# Patient Record
Sex: FEMALE | Race: WHITE | NOT HISPANIC OR LATINO | Employment: OTHER | URBAN - METROPOLITAN AREA
[De-identification: names, ages, dates, MRNs, and addresses within clinical notes are randomized per-mention and may not be internally consistent; named-entity substitution may affect disease eponyms.]

---

## 2017-09-22 ENCOUNTER — THERAPY VISIT (OUTPATIENT)
Dept: PHYSICAL THERAPY | Facility: CLINIC | Age: 37
End: 2017-09-22
Payer: OTHER MISCELLANEOUS

## 2017-09-22 DIAGNOSIS — M25.572 ACUTE LEFT ANKLE PAIN: ICD-10-CM

## 2017-09-22 DIAGNOSIS — M25.562 ACUTE PAIN OF LEFT KNEE: Primary | ICD-10-CM

## 2017-09-22 PROCEDURE — 97161 PT EVAL LOW COMPLEX 20 MIN: CPT | Mod: GP | Performed by: PHYSICAL THERAPIST

## 2017-09-22 PROCEDURE — 97110 THERAPEUTIC EXERCISES: CPT | Mod: GP | Performed by: PHYSICAL THERAPIST

## 2017-09-22 ASSESSMENT — ACTIVITIES OF DAILY LIVING (ADL)
STIFFNESS: THE SYMPTOM AFFECTS MY ACTIVITY SEVERELY
GO DOWN STAIRS: ACTIVITY IS FAIRLY DIFFICULT
WEAKNESS: THE SYMPTOM AFFECTS MY ACTIVITY SEVERELY
KNEE_ACTIVITY_OF_DAILY_LIVING_SCORE: 24.29
RISE FROM A CHAIR: ACTIVITY IS VERY DIFFICULT
SQUAT: ACTIVITY IS SOMEWHAT DIFFICULT
KNEEL ON THE FRONT OF YOUR KNEE: ACTIVITY IS VERY DIFFICULT
PAIN: THE SYMPTOM AFFECTS MY ACTIVITY SEVERELY
STAND: I AM UNABLE TO DO THE ACTIVITY
SWELLING: THE SYMPTOM AFFECTS MY ACTIVITY SLIGHTLY
LIMPING: THE SYMPTOM AFFECTS MY ACTIVITY SEVERELY
GIVING WAY, BUCKLING OR SHIFTING OF KNEE: THE SYMPTOM PREVENTS ME FROM ALL DAILY ACTIVITIES
GO UP STAIRS: ACTIVITY IS VERY DIFFICULT
KNEE_ACTIVITY_OF_DAILY_LIVING_SUM: 17
WALK: ACTIVITY IS VERY DIFFICULT
RAW_SCORE: 17
SIT WITH YOUR KNEE BENT: ACTIVITY IS VERY DIFFICULT

## 2017-09-22 NOTE — PROGRESS NOTES
Subjective:    Patient is a 37 year old female presenting with rehab left ankle/foot hpi.          Knee Activity of Daily Living Score: 24.29            Objective:    System    Physical Exam    General     ROS    Assessment/Plan:

## 2017-09-22 NOTE — LETTER
[facility]  [address] [address] [place] [id number]  [phone]    2017    Re: Yenifer Maher   :   1980  MRN:  [id number]   REFERRING PHYSICIAN:   Elisha Bernabe PA-C    [facility]  Date of Initial Evaluation:  17  Visits:  Rxs Used: 1  Reason for Referral:     Acute pain of left knee  Acute left ankle pain    EVALUATION SUMMARY  Subjective:  Patient is a 37 year old female presenting with rehab left knee hpi and rehab left ankle/foot hpi.   Yenifer Maher is a 37 year old female with a left knee condition.  Condition occurred with:  A fall/slip.  Condition occurred: at work.  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 Pt slipped and fell at work on 17, landed directly on L knee and twisting L ankle. Pt recalls slipping on wet floor in kitchen. Pt having difficulty with all weight bearing at this time, particularly squatting and ascending/descending stairs. Pt had MD appointment on 17 and referred to PT. .    Patient reports pain:  Anterior and in the joint.  Radiates to:  Thigh and lower leg.  Pain is described as sharp and aching  and reported as 6/10.  Associated symptoms:  Loss of motion/stiffness and loss of strength. Pain is the same all the time (activity dependent).  Symptoms are exacerbated by ascending stairs, descending stairs, walking, activity, weight bearing and bending/squatting and relieved by analgesics, NSAID's and ice.  Since onset symptoms are gradually improving.  Special tests:  X-ray.  Previous treatment: none.    General health as reported by patient is good.                  Barriers include:  None as reported by the patient.    Red flags:  None as reported by the patient.    Yenifer Maher is a 37 year old female with a left ankle condition.  Condition occurred with:  A fall/slip.  Condition occurred: at work.       Patient reports pain:  Anterior.  Radiates to:   No radiation.  Pain is described as aching and sharp (throbbing) and is intermittent and reported as 6/10.  Associated symptoms:  Loss of motion/stiffness and edema. Pain is worse in the P.M..  Symptoms are exacerbated by weight bearing, ascending stairs, descending stairs, kneeling and walking and relieved by ice and NSAID's.  Since onset symptoms are gradually improving.  Special tests:  X-ray.  Previous treatment: none.    General health as reported by patient is good.                  Barriers include:  None as reported by patient.    Red flags:  None as reported by patient.                              Re: Yenifer MANZO Dallinclaudia   :   1980  Objective:  Gait:    Gait Type:  Antalgic     Deviations:  Knee:  Knee flexion decr LAnkle:  Heel strike decr L and push off decr L    Ankle/Foot Evaluation  ROM:    AROM:    Dorsiflexion:  Left:   5  Right:   10  Plantarflexion:  Left:  55    Right:  55  Inversion:  Left:  75%     Right:  WNL  Eversion:  75%     Right:  WNL  Strength:    Dorsiflexion:  Left: 4+/5       Right: 5/5     Plantarflexion: Left: 4-/5      Right: 5/5    Inversion:Left: 4/5       Right: 5/5    Eversion:Left: 4/5    Right: 5/5    LIGAMENT TESTING: normal  PALPATION:   Left ankle tenderness present at:  posterior talofibular ligament and anterior tibiofibular ligament  Left ankle tenderness not present at:   anterior talofibular ligament or calcaneofibular ligament  EDEMA:   Left ankle edema present at: 48 cm  Right ankle edema present at:  48 cm  Figure 8 left: 48 cmFigure 8 right: 48 cm       Knee Evaluation:  ROM:  Strength wnl knee: glute med 4/5 L 4+/5 R, glute max 4/5 L 4+/5 R.  AROM  Hyperextension:  Left:  3    Right: 3  Extension:  Left: 0    Right:  0  Flexion: Left: 130    Right: 35  Strength:   Quad Set Left: Good     Quad Set Right: WNL      Ligament Testing:  Ligament testing knee: unable to assess secondary to increased muscle guarding.  Special Tests: Special test for knee: unable to  assess secondary to increased muscle guarding.  Palpation:    Left knee tenderness present at:  Patellar Tendon; Patellar Superior and Patellar Inferior  Left knee tenderness not present at:  Medial Joint Line and Lateral Joint Line  Edema:    Circumference:  Joint Line:  Left:  43 cm   Right:  43 cm  Functional Testing:    Quad:    Single Leg Squat:  Left:      Right:        Bilateral Leg Squat:   Moderate loss of control and excessive anterior knee excursion    Proprioception:   Stork Balance Test:  Left:  EO x 2 sec  Right:  EO x 10 sec  % of Uninvolved:             Re: Yenifer Maher   :   1980    [facility] Initial Evaluation  Assessment/Plan:    Patient is a 37 year old female with left side knee and left side ankle complaints.    Patient has the following significant findings with corresponding treatment plan.                Diagnosis 1:  L knee pain  Pain -  hot/cold therapy, manual therapy, self management, education and home program  Decreased ROM/flexibility - manual therapy, therapeutic exercise, therapeutic activity and home program  Decreased strength - therapeutic exercise, therapeutic activities and home program  Impaired balance - neuro re-education, therapeutic activities and home program  Impaired muscle performance - neuro re-education and home program  Decreased function - therapeutic activities and home program  Diagnosis 2:  L ankle pain   Pain -  hot/cold therapy, manual therapy, self management, education and home program  Decreased ROM/flexibility - manual therapy, therapeutic exercise, therapeutic activity and home program  Decreased strength - therapeutic exercise, therapeutic activities and home program  Impaired balance - neuro re-education, therapeutic activities and home program  Impaired muscle performance - neuro re-education and home program  Decreased function - therapeutic activities and home program    Therapy Evaluation Codes:   1) History comprised  of:   Personal factors that impact the plan of care:      None.    Comorbidity factors that impact the plan of care are:      Smoking.     Medications impacting care: None.  2) Examination of Body Systems comprised of:   Body structures and functions that impact the plan of care:      Ankle and Knee.   Activity limitations that impact the plan of care are:      Bending, Squatting/kneeling, Stairs, Standing and Walking.  3) Clinical presentation characteristics are:   Stable/Uncomplicated.  4) Decision-Making    Low complexity using standardized patient assessment instrument and/or measureable assessment of functional outcome.  Cumulative Therapy Evaluation is: Low complexity.  Previous and current functional limitations:  (See Goal Flow Sheet for this information)    Short term and Long term goals: (See Goal Flow Sheet for this information)     Communication ability:  Patient appears to be able to clearly communicate and understand verbal and written communication and follow directions correctly.  Treatment Explanation - The following has been discussed with the patient:     RX ordered/plan of care  Anticipated outcomes  Possible risks and side effects  This patient would benefit from PT intervention to resume normal activities.   Rehab potential is good.            Re: Yenifer Maher   :   1980    Frequency:  1 X week, once daily  Duration:  for 6 weeks  Discharge Plan:  Achieve all LTG.  Independent in home treatment program.  Return to previous functional level by discharge.  Reach maximal therapeutic benefit.  Knee Activity of Daily Living Score: 24.29              Thank you for your referral.    INQU[facility]IES  Therapist: TAIWO Cruz [address] [address]  Phone: [phone]  Fax: [phone]

## 2017-09-22 NOTE — PROGRESS NOTES
Subjective:    Patient is a 37 year old female presenting with rehab left ankle/foot hpi.                                      Pertinent medical history includes:  Smoking.    Other surgeries include:  Other.        Primary job tasks include:  Repetitive tasks, prolonged standing and lifting.                   Knee Activity of Daily Living Score: 24.29            Objective:    System    Physical Exam    General     ROS    Assessment/Plan:

## 2017-09-22 NOTE — PROGRESS NOTES
Subjective:    Patient is a 37 year old female presenting with rehab left knee hpi and rehab left ankle/foot hpi.   Yenifer Maher is a 37 year old female with a left knee condition.  Condition occurred with:  A fall/slip.  Condition occurred: at work.  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 Pt slipped and fell at work on 8/20/17, landed directly on L knee and twisting L ankle. Pt recalls slipping on wet floor in kitchen. Pt having difficulty with all weight bearing at this time, particularly squatting and ascending/descending stairs. Pt had MD appointment on 9/12/17 and referred to PT. .    Patient reports pain:  Anterior and in the joint.  Radiates to:  Thigh and lower leg.  Pain is described as sharp and aching  and reported as 6/10.  Associated symptoms:  Loss of motion/stiffness and loss of strength. Pain is the same all the time (activity dependent).  Symptoms are exacerbated by ascending stairs, descending stairs, walking, activity, weight bearing and bending/squatting and relieved by analgesics, NSAID's and ice.  Since onset symptoms are gradually improving.  Special tests:  X-ray.  Previous treatment: none.    General health as reported by patient is good.                  Barriers include:  None as reported by the patient.    Red flags:  None as reported by the patient.      Yenifer Maher is a 37 year old female with a left ankle condition.  Condition occurred with:  A fall/slip.  Condition occurred: at work.       Patient reports pain:  Anterior.  Radiates to:  No radiation.  Pain is described as aching and sharp (throbbing) and is intermittent and reported as 6/10.  Associated symptoms:  Loss of motion/stiffness and edema. Pain is worse in the P.M..  Symptoms are exacerbated by weight bearing, ascending stairs, descending stairs, kneeling and walking and relieved by ice and NSAID's.  Since onset symptoms are gradually improving.  Special tests:  X-ray.   Previous treatment: none.    General health as reported by patient is good.                  Barriers include:  None as reported by patient.    Red flags:  None as reported by patient.                        Objective:      Gait:    Gait Type:  Antalgic     Deviations:  Knee:  Knee flexion decr LAnkle:  Heel strike decr L and push off decr L          Ankle/Foot Evaluation  ROM:    AROM:    Dorsiflexion:  Left:   5  Right:   10  Plantarflexion:  Left:  55    Right:  55  Inversion:  Left:  75%     Right:  WNL  Eversion:  75%     Right:  WNL        Strength:    Dorsiflexion:  Left: 4+/5     Pain:   Right: 5/5   Pain:  Plantarflexion: Left: 4-/5   Pain:   Right: 5/5  Pain:  Inversion:Left: 4/5  Pain:     Right: 5/5  Pain:  Eversion:Left: 4/5  Pain:  Right: 5/5  Pain:                  LIGAMENT TESTING: normal                PALPATION:   Left ankle tenderness present at:  posterior talofibular ligament and anterior tibiofibular ligament  Left ankle tenderness not present at:   anterior talofibular ligament or calcaneofibular ligament    EDEMA:   Left ankle edema present at: 48 cm  Right ankle edema present at:  48 cm      Figure 8 left: 48 cmFigure 8 right: 48 cm                                                Knee Evaluation:  ROM:  Strength wnl knee: glute med 4/5 L 4+/5 R, glute max 4/5 L 4+/5 R.  AROM    Hyperextension:  Left:  3    Right: 3  Extension:  Left: 0    Right:  0  Flexion: Left: 130    Right: 35        Strength:         Quad Set Left: Good    Pain:   Quad Set Right: WNL    Pain:  Ligament Testing:  Ligament testing knee: unable to assess secondary to increased muscle guarding.                Special Tests: Special test for knee: unable to assess secondary to increased muscle guarding.      Palpation:    Left knee tenderness present at:  Patellar Tendon; Patellar Superior and Patellar Inferior  Left knee tenderness not present at:  Medial Joint Line and Lateral Joint Line    Edema:     Circumference:      Joint Line:  Left:  43 cm   Right:  43 cm      Functional Testing:          Quad:    Single Leg Squat:  Left:      Right:        Bilateral Leg Squat:   Moderate loss of control and excessive anterior knee excursion      Proprioception:   Stork Balance Test:  Left:  EO x 2 sec  Right:  EO x 10 sec  % of Uninvolved:           [facility]    [facility] Initial Evaluation    Assessment/Plan:      Patient is a 37 year old female with left side knee and left side ankle complaints.    Patient has the following significant findings with corresponding treatment plan.                Diagnosis 1:  L knee pain  Pain -  hot/cold therapy, manual therapy, self management, education and home program  Decreased ROM/flexibility - manual therapy, therapeutic exercise, therapeutic activity and home program  Decreased strength - therapeutic exercise, therapeutic activities and home program  Impaired balance - neuro re-education, therapeutic activities and home program  Impaired muscle performance - neuro re-education and home program  Decreased function - therapeutic activities and home program  Diagnosis 2:  L ankle pain   Pain -  hot/cold therapy, manual therapy, self management, education and home program  Decreased ROM/flexibility - manual therapy, therapeutic exercise, therapeutic activity and home program  Decreased strength - therapeutic exercise, therapeutic activities and home program  Impaired balance - neuro re-education, therapeutic activities and home program  Impaired muscle performance - neuro re-education and home program  Decreased function - therapeutic activities and home program    Therapy Evaluation Codes:   1) History comprised of:   Personal factors that impact the plan of care:      None.    Comorbidity factors that impact the plan of care are:      Smoking.     Medications impacting care: None.  2) Examination of Body Systems comprised of:   Body structures and  functions that impact the plan of care:      Ankle and Knee.   Activity limitations that impact the plan of care are:      Bending, Squatting/kneeling, Stairs, Standing and Walking.  3) Clinical presentation characteristics are:   Stable/Uncomplicated.  4) Decision-Making    Low complexity using standardized patient assessment instrument and/or measureable assessment of functional outcome.  Cumulative Therapy Evaluation is: Low complexity.    Previous and current functional limitations:  (See Goal Flow Sheet for this information)    Short term and Long term goals: (See Goal Flow Sheet for this information)     Communication ability:  Patient appears to be able to clearly communicate and understand verbal and written communication and follow directions correctly.  Treatment Explanation - The following has been discussed with the patient:   RX ordered/plan of care  Anticipated outcomes  Possible risks and side effects  This patient would benefit from PT intervention to resume normal activities.   Rehab potential is good.    Frequency:  1 X week, once daily  Duration:  for 6 weeks  Discharge Plan:  Achieve all LTG.  Independent in home treatment program.  Return to previous functional level by discharge.  Reach maximal therapeutic benefit.    Please refer to the daily flowsheet for treatment today, total treatment time and time spent performing 1:1 timed codes.         Initial evaluation equal to discharge summary as pt has not returned for follow up appointments.

## 2017-11-06 PROBLEM — M25.562 ACUTE PAIN OF LEFT KNEE: Status: RESOLVED | Noted: 2017-09-22 | Resolved: 2017-11-06

## 2017-11-06 PROBLEM — M25.572 ACUTE LEFT ANKLE PAIN: Status: RESOLVED | Noted: 2017-09-22 | Resolved: 2017-11-06

## 2018-08-23 ENCOUNTER — TRANSFERRED RECORDS (OUTPATIENT)
Dept: HEALTH INFORMATION MANAGEMENT | Facility: CLINIC | Age: 38
End: 2018-08-23

## 2018-08-24 ENCOUNTER — TRANSFERRED RECORDS (OUTPATIENT)
Dept: HEALTH INFORMATION MANAGEMENT | Facility: CLINIC | Age: 38
End: 2018-08-24

## 2018-08-25 ENCOUNTER — TRANSFERRED RECORDS (OUTPATIENT)
Dept: HEALTH INFORMATION MANAGEMENT | Facility: CLINIC | Age: 38
End: 2018-08-25

## 2018-08-26 ENCOUNTER — TRANSFERRED RECORDS (OUTPATIENT)
Dept: HEALTH INFORMATION MANAGEMENT | Facility: CLINIC | Age: 38
End: 2018-08-26

## 2018-08-27 ENCOUNTER — TRANSFERRED RECORDS (OUTPATIENT)
Dept: HEALTH INFORMATION MANAGEMENT | Facility: CLINIC | Age: 38
End: 2018-08-27

## 2018-08-30 ENCOUNTER — TRANSFERRED RECORDS (OUTPATIENT)
Dept: HEALTH INFORMATION MANAGEMENT | Facility: CLINIC | Age: 38
End: 2018-08-30

## 2018-09-02 ENCOUNTER — TRANSFERRED RECORDS (OUTPATIENT)
Dept: HEALTH INFORMATION MANAGEMENT | Facility: CLINIC | Age: 38
End: 2018-09-02

## 2018-09-03 ENCOUNTER — TRANSFERRED RECORDS (OUTPATIENT)
Dept: HEALTH INFORMATION MANAGEMENT | Facility: CLINIC | Age: 38
End: 2018-09-03

## 2018-09-04 ENCOUNTER — TRANSFERRED RECORDS (OUTPATIENT)
Dept: HEALTH INFORMATION MANAGEMENT | Facility: CLINIC | Age: 38
End: 2018-09-04

## 2018-09-06 ENCOUNTER — TRANSFERRED RECORDS (OUTPATIENT)
Dept: HEALTH INFORMATION MANAGEMENT | Facility: CLINIC | Age: 38
End: 2018-09-06

## 2018-09-07 ENCOUNTER — TRANSFERRED RECORDS (OUTPATIENT)
Dept: HEALTH INFORMATION MANAGEMENT | Facility: CLINIC | Age: 38
End: 2018-09-07

## 2018-09-08 ENCOUNTER — TRANSFERRED RECORDS (OUTPATIENT)
Dept: HEALTH INFORMATION MANAGEMENT | Facility: CLINIC | Age: 38
End: 2018-09-08

## 2018-09-10 ENCOUNTER — TRANSFERRED RECORDS (OUTPATIENT)
Dept: HEALTH INFORMATION MANAGEMENT | Facility: CLINIC | Age: 38
End: 2018-09-10

## 2018-09-13 ENCOUNTER — TRANSFERRED RECORDS (OUTPATIENT)
Dept: HEALTH INFORMATION MANAGEMENT | Facility: CLINIC | Age: 38
End: 2018-09-13

## 2018-09-14 ENCOUNTER — TRANSFERRED RECORDS (OUTPATIENT)
Dept: HEALTH INFORMATION MANAGEMENT | Facility: CLINIC | Age: 38
End: 2018-09-14

## 2018-11-20 ENCOUNTER — OFFICE VISIT (OUTPATIENT)
Dept: NEUROLOGY | Facility: CLINIC | Age: 38
End: 2018-11-20

## 2018-11-20 VITALS — WEIGHT: 135 LBS | DIASTOLIC BLOOD PRESSURE: 73 MMHG | HEART RATE: 76 BPM | SYSTOLIC BLOOD PRESSURE: 108 MMHG

## 2018-11-20 DIAGNOSIS — G63 NUTRITIONAL NEUROPATHY (H): Primary | ICD-10-CM

## 2018-11-20 DIAGNOSIS — E63.9 NUTRITIONAL NEUROPATHY (H): Primary | ICD-10-CM

## 2018-11-20 RX ORDER — FEXOFENADINE HCL 180 MG/1
TABLET ORAL
Status: ON HOLD | COMMUNITY
Start: 2010-10-11 | End: 2018-12-19

## 2018-11-20 RX ORDER — PRENATAL VIT/IRON FUM/FOLIC AC 27MG-0.8MG
1 TABLET ORAL DAILY
COMMUNITY
End: 2019-06-06

## 2018-11-20 ASSESSMENT — PAIN SCALES - GENERAL: PAINLEVEL: SEVERE PAIN (7)

## 2018-11-20 NOTE — PROGRESS NOTES
"Chief Complaint: pain in hands and feet    History of Present Illness:    Yenifer Maher is a 38 year old woman who I am seeing at the request of Dr. Worley for neuropathy evaluation. Historical data is not well characterized. She reports that the neuropathic symptoms began abruptly in August 2018. In her hands she experiences episodes of tingling, numbness, and pain in the finger tips. All the fingers are affected. These symptoms come and go. No clear aggravating events. In her feet she experiences stabbing pains in her arch. They occur when she is active and at rest. She also experiences tingling and numbness in her toes.  Her hands also feel stiff. She reports weakness in her hands. She has trouble with activities that require a firm . Her balance is poor. She currently is in a wheelchair. She can not articulate why she is in a wheel chair, but reports that her physical therapist advised her to use a wheel chair. She currently lives at a nursing home. Before this fairly abrupt change in August she did not have any numbness or gait difficulty.     In late August 2018 she was admitted to [facility] for alcohol liver cirrhosis. I do not have a good sense of her alcohol abuse history. She reports that over the summer prior to that admission she was having a glass or 2 of alcohol per night. Perhaps more in the years prior to that, but hard to know. Her mother recalls some episodes of N/V over the summer, but she does not recall this. Her mother also thinks there was some weight loss, but she is not sure.  In the [facility] notes there is comment made during her admission for liver cirrhosis that she presented with \"confusion and weakness with emesis and diarrhea potentially for months\".     She is a vegetarian.     Prior pertinent laboratory work-up:  10/18: B12 > 2000. B6 borderline low at 5 (N5-50).   9/18: Negative paraneoplastic panel other than a positive striational muscle ab    Prior pertinent " radiology work-up:  9/18: CT  Abdomen showed an enlarged liver and acites    Prior electrophysiologic work-up:  None available    Past Medical History:   Alcohol abuse  HTN    Past Surgical History:  Tonsillectomy    Family history:    There is no known family history of hereditary neuropathies or other neuromuscular disorders.    Social History:    Alcohol abuse    Medical Allergies:     Allergies   Allergen Reactions     Cats      Chocolate Dermatitis     Dicyclomine      Dust Mites      Derm, resp     No Clinical Screening - See Comments      GI upset     Phenobarbital      Pollen Extract      Derm, resp.      Current Medications:   Current Outpatient Prescriptions   Medication     B Complex-Biotin-FA (EQL B COMPLEX 50) TBCR     fexofenadine (ALLEGRA) 180 MG tablet     FUROSEMIDE PO     GABAPENTIN PO     MAGNESIUM OXIDE PO     METOPROLOL TARTRATE PO     PANTOPRAZOLE SODIUM PO     Pregabalin (LYRICA PO)     Prenatal Vit-Fe Fumarate-FA (PRENATAL MULTIVITAMIN PLUS IRON) 27-0.8 MG TABS per tablet     SPIRONOLACTONE PO     THIAMINE HCL PO     No current facility-administered medications for this visit.      Review of Systems: A complete review of systems was obtained and was negative except for what was noted above.    Physical examination:    /73 (BP Location: Right arm, Patient Position: Chair, Cuff Size: Adult Regular)  Pulse 76  Wt 61.2 kg (135 lb)     General Appearance: NAD    Skin: There are no rashes or other skin lesions.    Musculoskeletal:  There is no scoliosis, lordosis, kyphosis, pes cavus, or hammertoes.    Neurologic examination:    Mental status:  Patient is alert, attentive, and oriented x 3.  Language is coherent and fluent without dysarthria or aphasia.  Memory, comprehension and ability to follow commands were intact.       Cranial nerves:  Pupils were round and reacted to light.  Extraocular movements were full. There was no face, jaw, palate or tongue weakness or atrophy. Hearing was  grossly intact.  Shoulder shrug was normal.       Motor exam: Some atrophy in hand muscles. No fasciculations.   Manual muscle testing revealed the following MRC grade muscle power:   Right Left   Neck flexion 5    Neck extension: 5    Shoulder abduction:  5 5   Elbow extension: 5 5   Elbow flexion:  5 5   Wrist flexion:  5 5   Wrist extension:  5 5   Finger extension 4 4   FDI 3 3   APB 3 3   Hip flexion 5 5   Knee extension 5 5   Dorsiflexion 4 4   Plantar flexion 5 5     Complex motor skills: Ataxia with FNF     Sensory exam: Severely reduced vibration in feet > ankles and fingers. Pin reduced distally, with some hypesthesia to pin testing in feet and hands as well.     Gait: Wide based and ataxic    Deep tendon reflexes:   Right Left   Triceps 2 2   Biceps 2 2   Brachioradialis 2 2   Knee jerk 1 1   Ankle jerk 0 0   Plantar responses were flexor bilaterally.       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 Historical details are poorly characterized, but putting together what I can from her history and the available documentation the neuropathy appears to have abruptly developed in August 2018 within the context of alcohol abuse, emesis and diarrhea. She also is a vegetarian. These findings all raise the strongly likelihood of malnutrition and associated polynutritional deficiencies as the prinicipal cause of the neuropathy. Neuropathy due to thiamine deficiency (i.e., dry beriberi) is likely - but other nutritional deficiencies may be contributors as well. I will check for some of these today. I stressed the importance of optimization of nutrition as this gives her the best likelihood of neuropathy improvement - although given the severity of the neuropathy residual and irreversible deficits are likely. NCS for neuropathy characterization should be helpful to understand prognosis. All questions answered as best I was able. Will proceed as  below.     Plan:      1. Labs: B6, thiamine, vit E, SSa, SSb  2. Nutritional referral for malnutrition  3. She should continue physical therapy for gait and balance at her nursing home  4. Strict alcohol abstinence encouarged  5. I will see her back in a couple months. If individual vitamin are low then will offer some specific supplementation before then.  --

## 2018-11-20 NOTE — LETTER
Date:November 21, 2018      Patient was self referred, no letter generated. Do not send.        [facility] Information

## 2018-11-20 NOTE — MR AVS SNAPSHOT
After Visit Summary   11/20/2018    Yenifer Maher    MRN: [id number]           Patient Information     Date Of Birth          1980        Visit Information        Provider Department      11/20/2018 2:30 PM Broderick Navas MD [facility]UR[facility]        Today's Diagnoses     Nutritional neuropathy (H)    -  1       Follow-ups after your visit        Additional Services     NUTRITION REFERRAL       Your provider has referred you to: [facility]: [facility] [phone]   [email or web address]    Please be aware that coverage of these services is subject to the terms and limitations of your health insurance plan.  Call member services at your health plan with any benefit or coverage questions.      Please bring the following with you to your appointment:    (1) This referral request  (2) Any documents given to you regarding this referral  (3) Any specific questions you have about diet and/or food choices                  Follow-up notes from your care team     Return in about 3 months (around 2/20/2019).      Your next 10 appointments already scheduled     Jaime 15, 2019 10:00 AM CST   EMG with Broderick Navas MD   [facility]UR[facility] ([facility])    [address] [address] [phone]              Future tests that were ordered for you today     Open Future Orders        Priority Expected Expires Ordered    Vitamin B1 whole blood Routine  11/20/2019 11/20/2018    EMG Routine  11/20/2019 11/20/2018    SSA Ro OWEN Antibody IgG Routine  11/20/2019 11/20/2018            Who to contact     Please call your clinic at [phone] to:    Ask questions about your health    Make or cancel appointments    Discuss your medicines    Learn about your test results    Speak to your doctor            Additional Information About Your Visit        MyChart Information     [employer] is an electronic gateway that  provides easy, online access to your medical records. With [employer], you can request a clinic appointment, read your test results, renew a prescription or communicate with your care team.     To sign up for [employer] visit the website at [email or web address]   You will be asked to enter the access code listed below, as well as some personal information. Please follow the directions to create your username and password.     Your access code is: [email or web address]  Expires: 2019  1:59 PM     Your access code will  in 90 days. If you need help or a new code, please contact your [facility] or call [phone] for assistance.        Care EveryWhere ID     This is your Care EveryWhere ID. This could be used by other organizations to access your [facility] medical records  [id number]        Your Vitals Were     Pulse                   76            Blood Pressure from Last 3 Encounters:   18 108/73    Weight from Last 3 Encounters:   18 135 lb (61.2 kg)              We Performed the Following     NUTRITION REFERRAL     Vitamin B1 plasma     Vitamin B6     Vitamin E        Primary Care Provider    None Specified       No primary provider on file.        Equal Access to Services     XUAN THOMAS : Hadronna Sue, moises small, katelin villasenor, honey dean . So [facility] [phone].    ATENCIÓN: Si habla español, tiene a farris disposición servicios gratuitos de asistencia lingüística. Llame al [phone].    We comply with applicable federal civil rights laws and [place] laws. We do not discriminate on the basis of race, color, national origin, age, disability, sex, sexual orientation, or gender identity.            Thank you!     Thank you for choosing [facility]UROS[facility]CIAL[facility]  for your care. Our goal is always to provide you with excellent care. Hearing back from our patients is one way we can continue to improve our  services. Please take a few minutes to complete the written survey that you may receive in the mail after your visit with us. Thank you!             Your Updated Medication List - Protect others around you: Learn how to safely use, store and throw away your medicines at www.disposemymeds.org.          This list is accurate as of 11/20/18  3:57 PM.  Always use your most recent med list.                   Brand Name Dispense Instructions for use Diagnosis    EQL B COMPLEX 50 Tbcr      Take by mouth daily.        fexofenadine 180 MG tablet    ALLEGRA     Take 180 mg by mouth daily.        FUROSEMIDE PO      Take 20 mg by mouth        GABAPENTIN PO           LYRICA PO           MAGNESIUM OXIDE PO      Take 250 mg by mouth        METOPROLOL TARTRATE PO      Take 50 mg by mouth        PANTOPRAZOLE SODIUM PO      Take 40 mg by mouth        prenatal multivitamin plus iron 27-0.8 MG Tabs per tablet      Take 1 tablet by mouth daily        SPIRONOLACTONE PO      Take 50 mg by mouth        THIAMINE HCL PO      Take 100 mg by mouth

## 2018-11-20 NOTE — LETTER
"11/20/2018       RE: Yenifer Maher  [address] [facility] [address]     Dear Colleague,    Thank you for referring your patient, Yenifer Maher, to the P NEUROSPECIALTIES at [facility]. Please see a copy of my visit note below.    Chief Complaint: pain in hands and feet    History of Present Illness:    Yenifer Maher is a 38 year old woman who I am seeing at the request of Dr. Worley for neuropathy evaluation. Historical data is not well characterized. She reports that the neuropathic symptoms began abruptly in August 2018. In her hands she experiences episodes of tingling, numbness, and pain in the finger tips. All the fingers are affected. These symptoms come and go. No clear aggravating events. In her feet she experiences stabbing pains in her arch. They occur when she is active and at rest. She also experiences tingling and numbness in her toes.  Her hands also feel stiff. She reports weakness in her hands. She has trouble with activities that require a firm . Her balance is poor. She currently is in a wheelchair. She can not articulate why she is in a wheel chair, but reports that her physical therapist advised her to use a wheel chair. She currently lives at a nursing home. Before this fairly abrupt change in August she did not have any numbness or gait difficulty.     In late August 2018 she was admitted to [facility] for alcohol liver cirrhosis. I do not have a good sense of her alcohol abuse history. She reports that over the summer prior to that admission she was having a glass or 2 of alcohol per night. Perhaps more in the years prior to that, but hard to know. Her mother recalls some episodes of N/V over the summer, but she does not recall this. Her mother also thinks there was some weight loss, but she is not sure.  In the [facility] notes there is comment made during her admission for liver cirrhosis that she presented with \"confusion and weakness " "with emesis and diarrhea potentially for months\".     She is a vegetarian.     Prior pertinent laboratory work-up:  10/18: B12 > 2000. B6 borderline low at 5 (N5-50).   9/18: Negative paraneoplastic panel other than a positive striational muscle ab    Prior pertinent radiology work-up:  9/18: CT  Abdomen showed an enlarged liver and acites    Prior electrophysiologic work-up:  None available    Past Medical History:   Alcohol abuse  HTN    Past Surgical History:  Tonsillectomy    Family history:    There is no known family history of hereditary neuropathies or other neuromuscular disorders.    Social History:    Alcohol abuse    Medical Allergies:     Allergies   Allergen Reactions     Cats      Chocolate Dermatitis     Dicyclomine      Dust Mites      Derm, resp     No Clinical Screening - See Comments      GI upset     Phenobarbital      Pollen Extract      Derm, resp.      Current Medications:   Current Outpatient Prescriptions   Medication     B Complex-Biotin-FA (EQL B COMPLEX 50) TBCR     fexofenadine (ALLEGRA) 180 MG tablet     FUROSEMIDE PO     GABAPENTIN PO     MAGNESIUM OXIDE PO     METOPROLOL TARTRATE PO     PANTOPRAZOLE SODIUM PO     Pregabalin (LYRICA PO)     Prenatal Vit-Fe Fumarate-FA (PRENATAL MULTIVITAMIN PLUS IRON) 27-0.8 MG TABS per tablet     SPIRONOLACTONE PO     THIAMINE HCL PO     No current facility-administered medications for this visit.      Review of Systems: A complete review of systems was obtained and was negative except for what was noted above.    Physical examination:    /73 (BP Location: Right arm, Patient Position: Chair, Cuff Size: Adult Regular)  Pulse 76  Wt 61.2 kg (135 lb)     General Appearance: NAD    Skin: There are no rashes or other skin lesions.    Musculoskeletal:  There is no scoliosis, lordosis, kyphosis, pes cavus, or hammertoes.    Neurologic examination:    Mental status:  Patient is alert, attentive, and oriented x 3.  Language is coherent and fluent " without dysarthria or aphasia.  Memory, comprehension and ability to follow commands were intact.       Cranial nerves:  Pupils were round and reacted to light.  Extraocular movements were full. There was no face, jaw, palate or tongue weakness or atrophy. Hearing was grossly intact.  Shoulder shrug was normal.       Motor exam: Some atrophy in hand muscles. No fasciculations.   Manual muscle testing revealed the following MRC grade muscle power:   Right Left   Neck flexion 5    Neck extension: 5    Shoulder abduction:  5 5   Elbow extension: 5 5   Elbow flexion:  5 5   Wrist flexion:  5 5   Wrist extension:  5 5   Finger extension 4 4   FDI 3 3   APB 3 3   Hip flexion 5 5   Knee extension 5 5   Dorsiflexion 4 4   Plantar flexion 5 5     Complex motor skills: Ataxia with FNF     Sensory exam: Severely reduced vibration in feet > ankles and fingers. Pin reduced distally, with some hypesthesia to pin testing in feet and hands as well.     Gait: Wide based and ataxic    Deep tendon reflexes:   Right Left   Triceps 2 2   Biceps 2 2   Brachioradialis 2 2   Knee jerk 1 1   Ankle jerk 0 0   Plantar responses were flexor bilaterally.       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 Historical details are poorly characterized, but putting together what I can from her history and the available documentation the neuropathy appears to have abruptly developed in August 2018 within the context of alcohol abuse, emesis and diarrhea. She also is a vegetarian. These findings all raise the strongly likelihood of malnutrition and associated polynutritional deficiencies as the prinicipal cause of the neuropathy. Neuropathy due to thiamine deficiency (i.e., dry beriberi) is likely - but other nutritional deficiencies may be contributors as well. I will check for some of these today. I stressed the importance of optimization of nutrition as this gives her the best  likelihood of neuropathy improvement - although given the severity of the neuropathy residual and irreversible deficits are likely. NCS for neuropathy characterization should be helpful to understand prognosis. All questions answered as best I was able. Will proceed as below.     Plan:      1. Labs: B6, thiamine, vit E, SSa, SSb  2. Nutritional referral for malnutrition  3. She should continue physical therapy for gait and balance at her nursing home  4. Strict alcohol abstinence encouarged  5. I will see her back in a couple months. If individual vitamin are low then will offer some specific supplementation before then.  --        Again, thank you for allowing me to participate in the care of your patient.      Sincerely,    Broderick Navas MD

## 2018-12-10 ENCOUNTER — TRANSFERRED RECORDS (OUTPATIENT)
Dept: HEALTH INFORMATION MANAGEMENT | Facility: CLINIC | Age: 38
End: 2018-12-10

## 2018-12-11 ENCOUNTER — TRANSFERRED RECORDS (OUTPATIENT)
Dept: HEALTH INFORMATION MANAGEMENT | Facility: CLINIC | Age: 38
End: 2018-12-11

## 2018-12-11 LAB
ALT SERPL-CCNC: 21 U/L (ref 14–63)
AST SERPL-CCNC: 36 U/L (ref 15–37)
CREAT SERPL-MCNC: 0.54 MG/DL (ref 0.51–0.95)
GFR SERPL CREATININE-BSD FRML MDRD: >60 ML/MIN/1.73M2 (ref 60–150)
GLUCOSE SERPL-MCNC: 101 MG/DL (ref 74–100)
INR PPP: 1.5 (ref 0.9–1.1)
POTASSIUM SERPL-SCNC: 4.6 MMOL/L (ref 3.5–5.1)

## 2018-12-12 ENCOUNTER — HOSPITAL ENCOUNTER (INPATIENT)
Facility: CLINIC | Age: 38
LOS: 7 days | Discharge: HOME | DRG: 640 | End: 2018-12-19
Attending: INTERNAL MEDICINE | Admitting: INTERNAL MEDICINE
Payer: COMMERCIAL

## 2018-12-12 ENCOUNTER — TRANSFERRED RECORDS (OUTPATIENT)
Dept: HEALTH INFORMATION MANAGEMENT | Facility: CLINIC | Age: 38
End: 2018-12-12

## 2018-12-12 DIAGNOSIS — J30.89 SEASONAL ALLERGIC RHINITIS DUE TO OTHER ALLERGIC TRIGGER: ICD-10-CM

## 2018-12-12 DIAGNOSIS — R23.1 LIVEDO RETICULARIS: ICD-10-CM

## 2018-12-12 DIAGNOSIS — G62.9 NEUROPATHY: ICD-10-CM

## 2018-12-12 DIAGNOSIS — E83.59 CALCIPHYLAXIS: Primary | ICD-10-CM

## 2018-12-12 DIAGNOSIS — K21.9 GASTROESOPHAGEAL REFLUX DISEASE WITHOUT ESOPHAGITIS: ICD-10-CM

## 2018-12-12 DIAGNOSIS — F51.01 PRIMARY INSOMNIA: ICD-10-CM

## 2018-12-12 DIAGNOSIS — K59.04 CHRONIC IDIOPATHIC CONSTIPATION: ICD-10-CM

## 2018-12-12 PROCEDURE — 99223 1ST HOSP IP/OBS HIGH 75: CPT | Mod: GC | Performed by: INTERNAL MEDICINE

## 2018-12-12 PROCEDURE — 12000026 ZZH R&B TRANSPLANT

## 2018-12-12 PROCEDURE — 40000141 ZZH STATISTIC PERIPHERAL IV START W/O US GUIDANCE

## 2018-12-12 RX ORDER — LANOLIN ALCOHOL/MO/W.PET/CERES
3 CREAM (GRAM) TOPICAL
Status: DISCONTINUED | OUTPATIENT
Start: 2018-12-12 | End: 2018-12-19 | Stop reason: HOSPADM

## 2018-12-12 RX ORDER — NALOXONE HYDROCHLORIDE 0.4 MG/ML
.1-.4 INJECTION, SOLUTION INTRAMUSCULAR; INTRAVENOUS; SUBCUTANEOUS
Status: DISCONTINUED | OUTPATIENT
Start: 2018-12-12 | End: 2018-12-19 | Stop reason: HOSPADM

## 2018-12-12 RX ORDER — ONDANSETRON 4 MG/1
4 TABLET, ORALLY DISINTEGRATING ORAL EVERY 6 HOURS PRN
Status: DISCONTINUED | OUTPATIENT
Start: 2018-12-12 | End: 2018-12-19 | Stop reason: HOSPADM

## 2018-12-12 RX ORDER — TRAMADOL HYDROCHLORIDE 50 MG/1
50 TABLET ORAL EVERY 6 HOURS PRN
Status: ON HOLD | COMMUNITY
End: 2018-12-19

## 2018-12-12 RX ORDER — OXYCODONE HYDROCHLORIDE 5 MG/1
5-10 TABLET ORAL EVERY 4 HOURS PRN
Status: DISCONTINUED | OUTPATIENT
Start: 2018-12-12 | End: 2018-12-12

## 2018-12-12 RX ORDER — POLYETHYLENE GLYCOL 3350 17 G/17G
17 POWDER, FOR SOLUTION ORAL DAILY PRN
Status: DISCONTINUED | OUTPATIENT
Start: 2018-12-12 | End: 2018-12-19 | Stop reason: HOSPADM

## 2018-12-12 RX ORDER — PHYTONADIONE 5 MG/1
10 TABLET ORAL DAILY
Status: COMPLETED | OUTPATIENT
Start: 2018-12-13 | End: 2018-12-15

## 2018-12-12 RX ORDER — OXYCODONE HYDROCHLORIDE 5 MG/1
5-10 TABLET ORAL EVERY 6 HOURS PRN
Status: DISCONTINUED | OUTPATIENT
Start: 2018-12-12 | End: 2018-12-15

## 2018-12-12 RX ORDER — ONDANSETRON 2 MG/ML
4 INJECTION INTRAMUSCULAR; INTRAVENOUS EVERY 6 HOURS PRN
Status: DISCONTINUED | OUTPATIENT
Start: 2018-12-12 | End: 2018-12-19 | Stop reason: HOSPADM

## 2018-12-12 ASSESSMENT — MIFFLIN-ST. JEOR: SCORE: 1340.63

## 2018-12-13 ENCOUNTER — APPOINTMENT (OUTPATIENT)
Dept: ULTRASOUND IMAGING | Facility: CLINIC | Age: 38
DRG: 640 | End: 2018-12-13
Attending: INTERNAL MEDICINE
Payer: COMMERCIAL

## 2018-12-13 LAB
ALBUMIN SERPL-MCNC: 3.2 G/DL (ref 3.4–5)
ALBUMIN SERPL-MCNC: 3.2 G/DL (ref 3.4–5)
ALBUMIN UR-MCNC: 10 MG/DL
ALP SERPL-CCNC: 113 U/L (ref 40–150)
ALP SERPL-CCNC: 118 U/L (ref 40–150)
ALT SERPL W P-5'-P-CCNC: 19 U/L (ref 0–50)
ALT SERPL W P-5'-P-CCNC: 20 U/L (ref 0–50)
AMORPH CRY #/AREA URNS HPF: ABNORMAL /HPF
ANION GAP SERPL CALCULATED.3IONS-SCNC: 10 MMOL/L (ref 3–14)
ANION GAP SERPL CALCULATED.3IONS-SCNC: 9 MMOL/L (ref 3–14)
APPEARANCE UR: ABNORMAL
AST SERPL W P-5'-P-CCNC: 40 U/L (ref 0–45)
AST SERPL W P-5'-P-CCNC: 41 U/L (ref 0–45)
BACTERIA #/AREA URNS HPF: ABNORMAL /HPF
BASOPHILS # BLD AUTO: 0 10E9/L (ref 0–0.2)
BASOPHILS NFR BLD AUTO: 0.5 %
BILIRUB DIRECT SERPL-MCNC: 0.9 MG/DL (ref 0–0.2)
BILIRUB SERPL-MCNC: 2.5 MG/DL (ref 0.2–1.3)
BILIRUB SERPL-MCNC: 2.7 MG/DL (ref 0.2–1.3)
BILIRUB UR QL STRIP: NEGATIVE
BUN SERPL-MCNC: 10 MG/DL (ref 7–30)
BUN SERPL-MCNC: 9 MG/DL (ref 7–30)
C3 SERPL-MCNC: 119 MG/DL (ref 76–169)
C4 SERPL-MCNC: 20 MG/DL (ref 15–50)
CALCIUM SERPL-MCNC: 10.2 MG/DL (ref 8.5–10.1)
CALCIUM SERPL-MCNC: 10.4 MG/DL (ref 8.5–10.1)
CAOX CRY #/AREA URNS HPF: ABNORMAL /HPF
CERULOPLASMIN SERPL-MCNC: 33 MG/DL (ref 23–53)
CHLORIDE SERPL-SCNC: 104 MMOL/L (ref 94–109)
CHLORIDE SERPL-SCNC: 104 MMOL/L (ref 94–109)
CO2 SERPL-SCNC: 22 MMOL/L (ref 20–32)
CO2 SERPL-SCNC: 24 MMOL/L (ref 20–32)
COLOR UR AUTO: YELLOW
CREAT SERPL-MCNC: 0.48 MG/DL (ref 0.52–1.04)
CREAT SERPL-MCNC: 0.49 MG/DL (ref 0.52–1.04)
CRP SERPL-MCNC: 3.2 MG/L (ref 0–8)
CRP SERPL-MCNC: 4 MG/L (ref 0–8)
DEPRECATED CALCIDIOL+CALCIFEROL SERPL-MC: 24 UG/L (ref 20–75)
DIFFERENTIAL METHOD BLD: ABNORMAL
ENA RNP IGG SER IA-ACNC: 0.2 AI (ref 0–0.9)
ENA SCL70 IGG SER IA-ACNC: <0.2 AI (ref 0–0.9)
ENA SM IGG SER-ACNC: <0.2 AI (ref 0–0.9)
ENA SS-A IGG SER IA-ACNC: <0.2 AI (ref 0–0.9)
ENA SS-B IGG SER IA-ACNC: <0.2 AI (ref 0–0.9)
EOSINOPHIL # BLD AUTO: 0.2 10E9/L (ref 0–0.7)
EOSINOPHIL NFR BLD AUTO: 2.6 %
ERYTHROCYTE [DISTWIDTH] IN BLOOD BY AUTOMATED COUNT: 13.8 % (ref 10–15)
ERYTHROCYTE [DISTWIDTH] IN BLOOD BY AUTOMATED COUNT: 14 % (ref 10–15)
ERYTHROCYTE [SEDIMENTATION RATE] IN BLOOD BY WESTERGREN METHOD: 97 MM/H (ref 0–20)
FERRITIN SERPL-MCNC: 69 NG/ML (ref 12–150)
GFR SERPL CREATININE-BSD FRML MDRD: >90 ML/MIN/1.7M2
GFR SERPL CREATININE-BSD FRML MDRD: >90 ML/MIN/1.7M2
GLUCOSE SERPL-MCNC: 127 MG/DL (ref 70–99)
GLUCOSE SERPL-MCNC: 92 MG/DL (ref 70–99)
GLUCOSE UR STRIP-MCNC: NEGATIVE MG/DL
HBV CORE AB SERPL QL IA: NONREACTIVE
HBV SURFACE AB SERPL IA-ACNC: 2.1 M[IU]/ML
HBV SURFACE AG SERPL QL IA: NONREACTIVE
HCT VFR BLD AUTO: 30.6 % (ref 35–47)
HCT VFR BLD AUTO: 32.4 % (ref 35–47)
HCV AB SERPL QL IA: NONREACTIVE
HGB BLD-MCNC: 10.1 G/DL (ref 11.7–15.7)
HGB BLD-MCNC: 10.6 G/DL (ref 11.7–15.7)
HGB UR QL STRIP: NEGATIVE
HIV 1+2 AB+HIV1 P24 AG SERPL QL IA: NONREACTIVE
HYALINE CASTS #/AREA URNS LPF: 1 /LPF (ref 0–2)
IMM GRANULOCYTES # BLD: 0 10E9/L (ref 0–0.4)
IMM GRANULOCYTES NFR BLD: 0.2 %
INR PPP: 1.39 (ref 0.86–1.14)
IRON SATN MFR SERPL: 17 % (ref 15–46)
IRON SERPL-MCNC: 68 UG/DL (ref 35–180)
KETONES UR STRIP-MCNC: NEGATIVE MG/DL
LDH SERPL L TO P-CCNC: 164 U/L (ref 81–234)
LEUKOCYTE ESTERASE UR QL STRIP: ABNORMAL
LYMPHOCYTES # BLD AUTO: 2.3 10E9/L (ref 0.8–5.3)
LYMPHOCYTES NFR BLD AUTO: 37.4 %
MAGNESIUM SERPL-MCNC: 1.5 MG/DL (ref 1.6–2.3)
MCH RBC QN AUTO: 30.5 PG (ref 26.5–33)
MCH RBC QN AUTO: 31.1 PG (ref 26.5–33)
MCHC RBC AUTO-ENTMCNC: 32.7 G/DL (ref 31.5–36.5)
MCHC RBC AUTO-ENTMCNC: 33 G/DL (ref 31.5–36.5)
MCV RBC AUTO: 92 FL (ref 78–100)
MCV RBC AUTO: 95 FL (ref 78–100)
MONOCYTES # BLD AUTO: 1.1 10E9/L (ref 0–1.3)
MONOCYTES NFR BLD AUTO: 17.4 %
MUCOUS THREADS #/AREA URNS LPF: PRESENT /LPF
MYELOPEROXIDASE AB SER-ACNC: <0.2 AI (ref 0–0.9)
NEUTROPHILS # BLD AUTO: 2.6 10E9/L (ref 1.6–8.3)
NEUTROPHILS NFR BLD AUTO: 41.9 %
NITRATE UR QL: NEGATIVE
NRBC # BLD AUTO: 0 10*3/UL
NRBC BLD AUTO-RTO: 0 /100
PH UR STRIP: 5.5 PH (ref 5–7)
PHOSPHATE SERPL-MCNC: 5.2 MG/DL (ref 2.5–4.5)
PLATELET # BLD AUTO: 246 10E9/L (ref 150–450)
PLATELET # BLD AUTO: 259 10E9/L (ref 150–450)
POTASSIUM SERPL-SCNC: 4 MMOL/L (ref 3.4–5.3)
POTASSIUM SERPL-SCNC: 4.2 MMOL/L (ref 3.4–5.3)
PROT SERPL-MCNC: 7.8 G/DL (ref 6.8–8.8)
PROT SERPL-MCNC: 7.9 G/DL (ref 6.8–8.8)
PROTEINASE3 IGG SER-ACNC: <0.2 AI (ref 0–0.9)
PTH-INTACT SERPL-MCNC: 7 PG/ML (ref 18–80)
RBC # BLD AUTO: 3.31 10E12/L (ref 3.8–5.2)
RBC # BLD AUTO: 3.41 10E12/L (ref 3.8–5.2)
RBC #/AREA URNS AUTO: 0 /HPF (ref 0–2)
RETICS # AUTO: 56.9 10E9/L (ref 25–95)
RETICS # AUTO: 75.5 10E9/L (ref 25–95)
RETICS/RBC NFR AUTO: 1.7 % (ref 0.5–2)
RETICS/RBC NFR AUTO: 2.3 % (ref 0.5–2)
RHEUMATOID FACT SER NEPH-ACNC: <20 IU/ML (ref 0–20)
SODIUM SERPL-SCNC: 136 MMOL/L (ref 133–144)
SODIUM SERPL-SCNC: 137 MMOL/L (ref 133–144)
SOURCE: ABNORMAL
SP GR UR STRIP: 1.02 (ref 1–1.03)
SQUAMOUS #/AREA URNS AUTO: 17 /HPF (ref 0–1)
TIBC SERPL-MCNC: 393 UG/DL (ref 240–430)
TRANS CELLS #/AREA URNS HPF: <1 /HPF (ref 0–1)
TRANSFERRIN SERPL-MCNC: 326 MG/DL (ref 210–360)
UROBILINOGEN UR STRIP-MCNC: 2 MG/DL (ref 0–2)
VIT B12 SERPL-MCNC: 875 PG/ML (ref 193–986)
WBC # BLD AUTO: 5.9 10E9/L (ref 4–11)
WBC # BLD AUTO: 6.2 10E9/L (ref 4–11)
WBC #/AREA URNS AUTO: 29 /HPF (ref 0–5)

## 2018-12-13 PROCEDURE — 86160 COMPLEMENT ANTIGEN: CPT | Performed by: STUDENT IN AN ORGANIZED HEALTH CARE EDUCATION/TRAINING PROGRAM

## 2018-12-13 PROCEDURE — 81001 URINALYSIS AUTO W/SCOPE: CPT | Performed by: STUDENT IN AN ORGANIZED HEALTH CARE EDUCATION/TRAINING PROGRAM

## 2018-12-13 PROCEDURE — 87186 SC STD MICRODIL/AGAR DIL: CPT | Performed by: INTERNAL MEDICINE

## 2018-12-13 PROCEDURE — 87389 HIV-1 AG W/HIV-1&-2 AB AG IA: CPT | Performed by: STUDENT IN AN ORGANIZED HEALTH CARE EDUCATION/TRAINING PROGRAM

## 2018-12-13 PROCEDURE — 25000128 H RX IP 250 OP 636: Performed by: STUDENT IN AN ORGANIZED HEALTH CARE EDUCATION/TRAINING PROGRAM

## 2018-12-13 PROCEDURE — 83876 ASSAY MYELOPEROXIDASE: CPT | Performed by: STUDENT IN AN ORGANIZED HEALTH CARE EDUCATION/TRAINING PROGRAM

## 2018-12-13 PROCEDURE — 86235 NUCLEAR ANTIGEN ANTIBODY: CPT | Performed by: STUDENT IN AN ORGANIZED HEALTH CARE EDUCATION/TRAINING PROGRAM

## 2018-12-13 PROCEDURE — 86039 ANTINUCLEAR ANTIBODIES (ANA): CPT | Performed by: STUDENT IN AN ORGANIZED HEALTH CARE EDUCATION/TRAINING PROGRAM

## 2018-12-13 PROCEDURE — 85045 AUTOMATED RETICULOCYTE COUNT: CPT | Performed by: INTERNAL MEDICINE

## 2018-12-13 PROCEDURE — 82248 BILIRUBIN DIRECT: CPT | Performed by: INTERNAL MEDICINE

## 2018-12-13 PROCEDURE — 85025 COMPLETE CBC W/AUTO DIFF WBC: CPT | Performed by: INTERNAL MEDICINE

## 2018-12-13 PROCEDURE — 25000131 ZZH RX MED GY IP 250 OP 636 PS 637: Performed by: STUDENT IN AN ORGANIZED HEALTH CARE EDUCATION/TRAINING PROGRAM

## 2018-12-13 PROCEDURE — 85027 COMPLETE CBC AUTOMATED: CPT | Performed by: INTERNAL MEDICINE

## 2018-12-13 PROCEDURE — 85652 RBC SED RATE AUTOMATED: CPT | Performed by: INTERNAL MEDICINE

## 2018-12-13 PROCEDURE — 86431 RHEUMATOID FACTOR QUANT: CPT | Performed by: STUDENT IN AN ORGANIZED HEALTH CARE EDUCATION/TRAINING PROGRAM

## 2018-12-13 PROCEDURE — 87040 BLOOD CULTURE FOR BACTERIA: CPT | Performed by: STUDENT IN AN ORGANIZED HEALTH CARE EDUCATION/TRAINING PROGRAM

## 2018-12-13 PROCEDURE — 82306 VITAMIN D 25 HYDROXY: CPT | Performed by: STUDENT IN AN ORGANIZED HEALTH CARE EDUCATION/TRAINING PROGRAM

## 2018-12-13 PROCEDURE — 82595 ASSAY OF CRYOGLOBULIN: CPT | Performed by: STUDENT IN AN ORGANIZED HEALTH CARE EDUCATION/TRAINING PROGRAM

## 2018-12-13 PROCEDURE — 87340 HEPATITIS B SURFACE AG IA: CPT | Performed by: STUDENT IN AN ORGANIZED HEALTH CARE EDUCATION/TRAINING PROGRAM

## 2018-12-13 PROCEDURE — 84207 ASSAY OF VITAMIN B-6: CPT | Performed by: STUDENT IN AN ORGANIZED HEALTH CARE EDUCATION/TRAINING PROGRAM

## 2018-12-13 PROCEDURE — 83540 ASSAY OF IRON: CPT | Performed by: INTERNAL MEDICINE

## 2018-12-13 PROCEDURE — 85610 PROTHROMBIN TIME: CPT | Performed by: INTERNAL MEDICINE

## 2018-12-13 PROCEDURE — 80053 COMPREHEN METABOLIC PANEL: CPT | Performed by: INTERNAL MEDICINE

## 2018-12-13 PROCEDURE — 83516 IMMUNOASSAY NONANTIBODY: CPT | Performed by: STUDENT IN AN ORGANIZED HEALTH CARE EDUCATION/TRAINING PROGRAM

## 2018-12-13 PROCEDURE — 83970 ASSAY OF PARATHORMONE: CPT | Performed by: INTERNAL MEDICINE

## 2018-12-13 PROCEDURE — 87086 URINE CULTURE/COLONY COUNT: CPT | Performed by: INTERNAL MEDICINE

## 2018-12-13 PROCEDURE — 99233 SBSQ HOSP IP/OBS HIGH 50: CPT | Mod: GC | Performed by: INTERNAL MEDICINE

## 2018-12-13 PROCEDURE — 25000132 ZZH RX MED GY IP 250 OP 250 PS 637: Performed by: STUDENT IN AN ORGANIZED HEALTH CARE EDUCATION/TRAINING PROGRAM

## 2018-12-13 PROCEDURE — 36415 COLL VENOUS BLD VENIPUNCTURE: CPT | Performed by: STUDENT IN AN ORGANIZED HEALTH CARE EDUCATION/TRAINING PROGRAM

## 2018-12-13 PROCEDURE — 84425 ASSAY OF VITAMIN B-1: CPT | Performed by: STUDENT IN AN ORGANIZED HEALTH CARE EDUCATION/TRAINING PROGRAM

## 2018-12-13 PROCEDURE — 86803 HEPATITIS C AB TEST: CPT | Performed by: STUDENT IN AN ORGANIZED HEALTH CARE EDUCATION/TRAINING PROGRAM

## 2018-12-13 PROCEDURE — 83550 IRON BINDING TEST: CPT | Performed by: INTERNAL MEDICINE

## 2018-12-13 PROCEDURE — 84446 ASSAY OF VITAMIN E: CPT | Performed by: STUDENT IN AN ORGANIZED HEALTH CARE EDUCATION/TRAINING PROGRAM

## 2018-12-13 PROCEDURE — 36415 COLL VENOUS BLD VENIPUNCTURE: CPT | Performed by: INTERNAL MEDICINE

## 2018-12-13 PROCEDURE — 86334 IMMUNOFIX E-PHORESIS SERUM: CPT | Performed by: STUDENT IN AN ORGANIZED HEALTH CARE EDUCATION/TRAINING PROGRAM

## 2018-12-13 PROCEDURE — 86038 ANTINUCLEAR ANTIBODIES: CPT | Performed by: STUDENT IN AN ORGANIZED HEALTH CARE EDUCATION/TRAINING PROGRAM

## 2018-12-13 PROCEDURE — 82390 ASSAY OF CERULOPLASMIN: CPT | Performed by: STUDENT IN AN ORGANIZED HEALTH CARE EDUCATION/TRAINING PROGRAM

## 2018-12-13 PROCEDURE — 83615 LACTATE (LD) (LDH) ENZYME: CPT | Performed by: INTERNAL MEDICINE

## 2018-12-13 PROCEDURE — 84466 ASSAY OF TRANSFERRIN: CPT | Performed by: INTERNAL MEDICINE

## 2018-12-13 PROCEDURE — 86376 MICROSOMAL ANTIBODY EACH: CPT | Performed by: STUDENT IN AN ORGANIZED HEALTH CARE EDUCATION/TRAINING PROGRAM

## 2018-12-13 PROCEDURE — 84100 ASSAY OF PHOSPHORUS: CPT | Performed by: INTERNAL MEDICINE

## 2018-12-13 PROCEDURE — 87077 CULTURE AEROBIC IDENTIFY: CPT | Performed by: INTERNAL MEDICINE

## 2018-12-13 PROCEDURE — 82607 VITAMIN B-12: CPT | Performed by: INTERNAL MEDICINE

## 2018-12-13 PROCEDURE — 86704 HEP B CORE ANTIBODY TOTAL: CPT | Performed by: STUDENT IN AN ORGANIZED HEALTH CARE EDUCATION/TRAINING PROGRAM

## 2018-12-13 PROCEDURE — 82525 ASSAY OF COPPER: CPT | Performed by: INTERNAL MEDICINE

## 2018-12-13 PROCEDURE — 86140 C-REACTIVE PROTEIN: CPT | Performed by: INTERNAL MEDICINE

## 2018-12-13 PROCEDURE — 86706 HEP B SURFACE ANTIBODY: CPT | Performed by: STUDENT IN AN ORGANIZED HEALTH CARE EDUCATION/TRAINING PROGRAM

## 2018-12-13 PROCEDURE — 12000026 ZZH R&B TRANSPLANT

## 2018-12-13 PROCEDURE — 83735 ASSAY OF MAGNESIUM: CPT | Performed by: INTERNAL MEDICINE

## 2018-12-13 PROCEDURE — 93975 VASCULAR STUDY: CPT | Mod: TC

## 2018-12-13 PROCEDURE — 86162 COMPLEMENT TOTAL (CH50): CPT | Performed by: STUDENT IN AN ORGANIZED HEALTH CARE EDUCATION/TRAINING PROGRAM

## 2018-12-13 PROCEDURE — 83970 ASSAY OF PARATHORMONE: CPT | Performed by: STUDENT IN AN ORGANIZED HEALTH CARE EDUCATION/TRAINING PROGRAM

## 2018-12-13 PROCEDURE — 82728 ASSAY OF FERRITIN: CPT | Performed by: INTERNAL MEDICINE

## 2018-12-13 RX ORDER — SPIRONOLACTONE 25 MG/1
25 TABLET ORAL
Status: DISCONTINUED | OUTPATIENT
Start: 2018-12-13 | End: 2018-12-13

## 2018-12-13 RX ORDER — GABAPENTIN 800 MG/1
800 TABLET ORAL 3 TIMES DAILY
Status: DISCONTINUED | OUTPATIENT
Start: 2018-12-13 | End: 2018-12-19 | Stop reason: HOSPADM

## 2018-12-13 RX ORDER — MAGNESIUM OXIDE 400 MG/1
400 TABLET ORAL 2 TIMES DAILY
Status: DISCONTINUED | OUTPATIENT
Start: 2018-12-13 | End: 2018-12-19 | Stop reason: HOSPADM

## 2018-12-13 RX ORDER — LANOLIN ALCOHOL/MO/W.PET/CERES
100 CREAM (GRAM) TOPICAL DAILY
Status: DISCONTINUED | OUTPATIENT
Start: 2018-12-13 | End: 2018-12-19 | Stop reason: HOSPADM

## 2018-12-13 RX ORDER — PANTOPRAZOLE SODIUM 40 MG/1
40 TABLET, DELAYED RELEASE ORAL
Status: DISCONTINUED | OUTPATIENT
Start: 2018-12-13 | End: 2018-12-19 | Stop reason: HOSPADM

## 2018-12-13 RX ORDER — PRENATAL VIT/IRON FUM/FOLIC AC 27MG-0.8MG
1 TABLET ORAL DAILY
Status: DISCONTINUED | OUTPATIENT
Start: 2018-12-13 | End: 2018-12-19 | Stop reason: HOSPADM

## 2018-12-13 RX ORDER — FEXOFENADINE HCL 180 MG/1
180 TABLET ORAL DAILY
Status: DISCONTINUED | OUTPATIENT
Start: 2018-12-13 | End: 2018-12-19 | Stop reason: HOSPADM

## 2018-12-13 RX ORDER — MAGNESIUM OXIDE 400 MG/1
400 TABLET ORAL DAILY
Status: DISCONTINUED | OUTPATIENT
Start: 2018-12-13 | End: 2018-12-13

## 2018-12-13 RX ORDER — HYDROXYZINE HYDROCHLORIDE 10 MG/1
10 TABLET, FILM COATED ORAL 3 TIMES DAILY PRN
Status: DISCONTINUED | OUTPATIENT
Start: 2018-12-13 | End: 2018-12-19 | Stop reason: HOSPADM

## 2018-12-13 RX ORDER — FUROSEMIDE 20 MG
20 TABLET ORAL DAILY
Status: DISCONTINUED | OUTPATIENT
Start: 2018-12-13 | End: 2018-12-13

## 2018-12-13 RX ADMIN — MAGNESIUM SULFATE HEPTAHYDRATE 3 G: 500 INJECTION, SOLUTION INTRAMUSCULAR; INTRAVENOUS at 11:38

## 2018-12-13 RX ADMIN — FEXOFENADINE HYDROCHLORIDE 180 MG: 180 TABLET, FILM COATED ORAL at 07:48

## 2018-12-13 RX ADMIN — Medication 100 MG: at 07:48

## 2018-12-13 RX ADMIN — SPIRONOLACTONE 25 MG: 25 TABLET ORAL at 07:47

## 2018-12-13 RX ADMIN — GABAPENTIN 800 MG: 800 TABLET, FILM COATED ORAL at 14:08

## 2018-12-13 RX ADMIN — PHYTONADIONE 10 MG: 5 TABLET ORAL at 07:48

## 2018-12-13 RX ADMIN — OXYCODONE HYDROCHLORIDE 5 MG: 5 TABLET ORAL at 00:22

## 2018-12-13 RX ADMIN — ENOXAPARIN SODIUM 40 MG: 40 INJECTION SUBCUTANEOUS at 10:57

## 2018-12-13 RX ADMIN — Medication 400 MG: at 07:48

## 2018-12-13 RX ADMIN — MELATONIN TAB 3 MG 3 MG: 3 TAB at 02:00

## 2018-12-13 RX ADMIN — B-COMPLEX W/ C & FOLIC ACID TAB 1 TABLET: TAB at 07:48

## 2018-12-13 RX ADMIN — PRENATAL VIT W/ FE FUMARATE-FA TAB 27-0.8 MG 1 TABLET: 27-0.8 TAB at 07:48

## 2018-12-13 RX ADMIN — GABAPENTIN 800 MG: 800 TABLET, FILM COATED ORAL at 07:48

## 2018-12-13 RX ADMIN — ONDANSETRON 4 MG: 4 TABLET, ORALLY DISINTEGRATING ORAL at 07:16

## 2018-12-13 RX ADMIN — HYDROXYZINE HYDROCHLORIDE 10 MG: 10 TABLET ORAL at 01:53

## 2018-12-13 RX ADMIN — GABAPENTIN 800 MG: 800 TABLET, FILM COATED ORAL at 19:40

## 2018-12-13 RX ADMIN — MAGNESIUM OXIDE TAB 400 MG (241.3 MG ELEMENTAL MG) 400 MG: 400 (241.3 MG) TAB at 19:40

## 2018-12-13 RX ADMIN — HYDROXYZINE HYDROCHLORIDE 10 MG: 10 TABLET ORAL at 14:09

## 2018-12-13 RX ADMIN — PANTOPRAZOLE SODIUM 40 MG: 40 TABLET, DELAYED RELEASE ORAL at 07:47

## 2018-12-13 ASSESSMENT — ACTIVITIES OF DAILY LIVING (ADL)
ADLS_ACUITY_SCORE: 23

## 2018-12-13 NOTE — PROGRESS NOTES
Social Work: Assessment with Discharge Plan    Patient Name: Yenifer Maher  : 1980  Age: 38 year old  MRN: [id number]  Risk/Complexity Score:   Completed assessment with: Pt.    Presenting Information   Reason for Referral: Discharge plan, abuse concerns.  Date of intake: 2018  Referral Source: Bedside RN/Consult.  Decision Maker: Self.   Alternate Decision Maker: Per [facility] policy pts parents would be her decision makers.  Health Care Directive: None on file, did not discuss with pt.  Living Situation: Pt lives with her fiance. She has been residing at [facility] for the past few months in their TCU.  Previous Functional Status: Prior to TCU stay pt was independent at home. She now requires assistance with ADL's.  Patient and family understanding of hospitalization: Painful rash on legs.  Cultural/Language/Spiritual Considerations: Pt is a  female, english speaking.  Adjustment to Illness: Appropriate.     Physical Health  Reason for admission: No diagnosis found.  Services Needed/Recommended: Unclear at this time. Pt came from TCU. PT/OT consults in.    Mental Health/Chemical Dependency:   Diagnosis: History of alcohol abuse.  Support/Services in Place: Unknown.  Services Needed/Recommended: None.    Support System  Significant Relationship at Present time:  Pts ida.  Family of Origin is available for support: Yes.  Other support available:  Parents.  Current in home services: Pt is under evaluation for a CADI waiver.  Gaps in Support System: None.    Provider Information   Primary Care Physician:No primary care provider on file.      Clinic: Unknown.      : Yes, pts mother will provide SW with this information to follow up on CADI case.    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 She has been unable to do this since she has been at [facility].  Financial Concerns:  None.  Insurance: [employer] ANTHONY.      Discharge Plan   Patient and family discharge goal:  Pt would like to go home instead of returning to TCU.  Provided Education on discharge plan: YES  Patient agreeable to discharge plan:  YES  A list of Medicare Certified Facilities was provided to the patient and/or family to encourage patient choice. Patient's choices for facility are: Pt would prefer to not return to [facility]. If she needs TCU then SW will discuss alternate placement.  Will NH provide Skilled rehabilitation or complex medical:  YES  General information regarding anticipated insurance coverage and possible out of pocket cost was discussed. Patient and patient's family are aware patient may incur the cost of transportation to the facility, pending insurance payment: YES  Barriers to discharge: Medical workup.    Discharge Recommendations   Disposition: Unknown at this time.  Transportation Needs:   Name of Transportation Company and Phone:     Additional comments   Pt came from [facility] TCU. SW consulted due to safety concerns at facility that pt reported to bedside RN. Pt states she had concerns about another resident coming into her room and making harassing comments to her. She spoke with staff at the facility and she did not feel as though her concerns were properly addressed. SW offered to reach out to management to discuss this issue which pt declined. Also offered number for [facility] which pt also declined. She also states she has not been getting therapy at her facility for the past month or so and she is unsure why. She feels this led to further deconditioning and an inability to safely return home. She ultimately would like to go home after this hospital stay at not return to [facility]. MAI asked team to place orders for PT/OT to see if pt is safe to go home. She lives in a split level, but once she gets up the stairs her bedroom, bathroom, and kitchen are all on one floor. She resides with her fiance who does work during the day.    SOPHIA Kong, LGSW  7A    Ph: [phone], Pager: [phone]

## 2018-12-13 NOTE — PROGRESS NOTES
Resident/Fellow Attestation   I, Loc Ashby, was present with the medical student who participated in the service and in the documentation of the note.  I have verified the history and personally performed the physical exam and medical decision making.  I agree with the assessment and plan of care as documented in the note.        Loc Ashby MD  PGY1  Date of Service (when I saw the patient): 12/13/18    [facility], [place]    Progress Note - Maroon 1 Service        Date of Admission:  12/12/2018    Assessment & Plan   Yenifer Maher is a 38 year old female with a history of alcohol withdrawal, possible alcohol hepatitis and wernicke's, and polyneuorpathy 2/2 polynutritional deficiency admitted on 12/12/2018. She presented from an outpatient dermatology clinic for further work-up of 3 week history of a painful, migratory, bilateral, LE necrotic/eschar rash concerning for calciphylaxis vs vasculitis vs infection.    #Bilateral, migratory painful LE rash, eschar  Per pt, rash initially presented in September as bruising in her inner thigh. Now presents with 3 week hx of migratory, BL symmetric LE eschar with surrounding erythema. Rash pruritic and painful. No trauma hx. Seen by outpatient dermatologist 12/12/18 who attempted bx, patient couldn't tolerate, and sent to [facility] for further work-up. ESR and CRP elevated at outpatient derm visit. On admission, ESR 97, CRP 3.2. OWEN panel negative. Vasculitis panel negative. RF wnl. HIV negative. Initial DDx included vasculitis, LCV, calciphylaxis, possible relation to nutrition status. Per Rheum, vasculitis unlikely considering distribution of rash and negative autoimmune workup. 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 Serum calcium and phosphorous product greater than 50 and hx of alcoholic liver damage, both of which are assoc. w/calciphylaxis  - Dermatology consulted; appreciate recs  - Rheumatology consulted; appreciate recs  - Full  autoimmune work-up, grossly unremarkable: cryoglobulins, PTH, SMILEY, Vit D pending  - Atarax for itching, oxycodone for pain    #Hx of alcohol withdrawal  #Hx of Wernicke Encephalopathy(?)  #Eleveated INR  Per outside provider note, prior hospitalization at [facility], 8/23 - 9/14/18, for weakness, edema, and confusion found to have EtOH liver failure. Summary of this admission not in CareEverywhere. Pt could not provide details of hospitalization. Hx of multiple hospitalizations for alcohol withdrawal and possible Wernicke's Encephalopathy. Most recent 1/2018. Pt reports last drink was in August. On admission, Albumin 3.2, T Bili 2.7(direct 0.9). AST, ALT, AlkP wnl.  Hep serologies negative. Iron panel, ceruloplasmin, and urine copper unremarkable. Abdominal ultrasound without evidence of cirrhotic features, but showing possible cavernous transformation versus slow to-and-fro flow in the main and right portal veins.  MELD-Na score: 14 at 12/13/2018  8:33 AM  MELD score: 14 at 12/13/2018  8:33 AM  Calculated from:  Serum Creatinine: 0.49 mg/dL (Rounded to 1 mg/dL) at 12/13/2018  8:33 AM  Serum Sodium: 137 mmol/L at 12/13/2018  8:33 AM  Total Bilirubin: 2.7 mg/dL at 12/13/2018  8:33 AM  INR(ratio): 1.39 at 12/13/2018  8:33 AM  Age: 38 years    #Hypomagnesemia  Low magnesium possibly d/t low PO intake. On PTA Mag replacement. Unable to replace Mag by IV d/t burning sensation with infusion  - Increased PTA Mag 400 daily to 400mg BID    #Hyperphosphatemia  #Mild Hypercalcemia  Admission, serum Ca 10.4 (11.0 when corrected for albumin). Serum Phos 5.2. UA + calcium-oxalate crystals and amorphous crystals. Vit D wnl. Elevated calcium and phosphorous can be seen in tertiary hyperparathyroidism, however, patient does not have kidney dysfunction.   - PTH, ionized Calcium    #Polyneuropathy 2/2 polynutritional deficiency  #Malnutrition  #Poor PO intake  Seen by outpatient neurologist 11/20/18 for work-up. Per  visit note, patient reports tingling, numbness, and pain in all fingers on both hands as well as numbness and tingling in both feet. Began 8/18. PE notable for wide-based ataxic gait, reduced vibratory sense in feet>ankles and in hands, and reduced pin prick in feet and hands. Distal weakness in hands and feet with reduced ankle reflex. Started tx for polyneuropathy 2/2 polynutritional def. Vit B12 wnl on admission. Pt is vegetarian.  - Nutrition consulted; appreciate recs  - Labs: zinc, vit B1, vit B6, vitamin E, vit B12  - Continue all PTA supplements: thiamine, vitamin B complex with C, prenatal vitamin  - PTA gabapentin for polyneuropathic pain, consider increasing dose for increased pain control    #Normocytic, Normochromic Anemia  Admission Hgb 10.6. Last recorded Hgb 13.0 11/2017. Iron panel unremarkable. LDH wnl.   - Haptoglobin ordered    #UTI, uncomplicated  UA positive for WBC, LE, bacteria. Pt denies flank pain, dysuria, or burning with urination. No tx indicated at this time.    Chronic Medical Problems:  #GERD: continue PTA PPI  #HTN: hold PTA metoprolol (patient normotensive/hypotensive on admission)  #Seasonal allergies: continue allegra    Diet: Combination Diet [email or web address]  Fluids: PO  Lines: PIV  DVT Prophylaxis: Enoxaparin (Lovenox) SQ  Chavez Catheter: not present  Code Status: Full Code[unfilled]    Disposition Plan   Expected discharge: 2 - 3 days, recommended to prior living arrangement once LE skin rash worked up and liver disease plan established.  Entered: Berry Stanley 12/13/2018, 10:32 AM       The patient's care was discussed with the Attending Physician, Dr. Martins.    Berry Stanley  Medical Student  Maroon 1 Service  [facility], [place]  Pager: [phone]  Please see sticky note for cross cover information  ______________________________________________________________________    Interval History   Nursing notes reviewed. No acute events overnight.  Today, patient reports continued discomfort and pain resulting from her inner thigh rash. She is unable to provide details of her previous hospitalization in August-September. She could not remember which hospital she was at. She was agreeable to care team contacting mom for more information. She denies flank pain, dysuria, or burning with urination.    Data reviewed today: I reviewed all medications, new labs and imaging results over the last 24 hours. I personally reviewed the abdominal ultrasound image(s) showing 1.  Possible cavernous transformation versus slow to-and-fro flow in.    Physical Exam   Vital Signs: Temp: 98.4  F (36.9  C) Temp src: Oral BP: 106/64 Pulse: 76 Heart Rate: 74 Resp: 16 SpO2: 95 % O2 Device: None (Room air)    Weight: 136 lbs 3.91 oz  Constitutional: laying in bed, NAD  Eyes: nonicteric slcera, EOMI  Respiratory: CTAB  Cardiovascular: RRR, normal S1,S2. No extra heart sounds or murmurs  GI: active BS, soft, nontender, nondistended  Skin: eschar with surrounding salmon colored erythema located on both inner thighs. No excoriative pattern. 3-4 cm plaque with purple discoloration at center and surrounding salmon-colored erythema located on left, medial calf  Musculoskeletal: moves all extremities independently  Neurologic: AAOx3    Data   Recent Labs   Lab 12/13/18  0833 12/13/18  0017   WBC 6.2 5.9   HGB 10.1* 10.6*   MCV 92 95    259   INR 1.39*  --     136   POTASSIUM 4.2 4.0   CHLORIDE 104 104   CO2 24 22   BUN 10 9   CR 0.49* 0.48*   ANIONGAP 9 10   MARKO 10.4* 10.2*   GLC 92 127*   ALBUMIN 3.2* 3.2*   PROTTOTAL 7.8 7.9   BILITOTAL 2.7* 2.5*   ALKPHOS 113 118   ALT 19 20   AST 41 40     Recent Results (from the past 24 hour(s))   US Abd Cmpl Abd/Pelvis Duplex Cmpl    Narrative    EXAMINATION: US ABDOMEN COMPLETE WITH DOPPLER on 12/13/2018 9:36 AM.     INDICATION: History of cirrhosis.    COMPARISON: None.    TECHNIQUE: The abdomen was scanned in standard fashion  with  specialized ultrasound transducer(s) using both gray-scale, color  Doppler and spectral flow techniques.    FINDINGS:    Liver: Measures 18 cm in length. Demonstrates diffuse hyperechoic  hepatic parenchyma. No evidence of a focal hepatic mass.     Main portal vein exhibits to-and-fro flow. Maximum antegrade velocity  is 18 cm/s and retrograde velocity 23 cm/s. 1 cm diameter.  Right portal vein flow exhibits to-and-fro flow. Maximum antegrade  velocity is 8 cm/s and retrograde velocity 11 cm/s.  Left portal vein flow is antegrade, measuring 13 cm/sec.    Flow in the hepatic artery is towards the liver and:  92 cm/s peak systolic  0.52 resistive index.   The right hepatic artery is patent with a peak systolic velocity of 78  cm/s and a resistive index of 0.41.  The left hepatic artery is patent with a peak systolic velocity 67  cm/s and a resistive index of 0.69.    The splenic vein is patent and flow is towards the liver.  The left,  middle, and right hepatic veins are patent with flow towards the IVC.  The IVC is patent with flow towards the heart.   The visualized aorta  is not dilated.    Gallbladder: Borderline wall thickening (3 mm). No pericholecystic  fluid, positive sonographic Chao's sign. Layering sludge.    Bile Ducts: Both the intra- and extrahepatic biliary system are of  normal caliber.  The common bile duct measures 7 mm in diameter.    Pancreas: Visualized portions of the head and body of the pancreas are  unremarkable.     Kidneys: Both kidneys are of normal echotexture, without mass or  hydronephrosis.  The craniocaudal dimensions are: right- 10.3 cm,  left- 10.3 cm.    Spleen: The spleen measures 13 cm in sagittal dimension.    Fluid: No evidence of ascites or pleural effusions.    Vessels: The proximal aorta measures 1.5 cm in diameter. The IVC  measures 2.2 cm in diameter. Aorta and IVC are patent with antegrade  flow.      Impression    IMPRESSION:   1.  Possible cavernous  transformation versus slow to-and-fro flow in  the main portal vein. To and fro flow in the right portal vein. The  splenic vein, portal veins, hepatic veins, IVC and hepatic arteries  are patent.  If clinically indicated, consider CT or MRI for further  evaluation of the portal vein.  2.  Low resistive index in the right hepatic artery is suggestive of  an upstream stenosis.  3.  Hyperechoic hepatic parenchyma consistent with clinical history.  4.  Gallbladder wall sludge in borderline wall thickening without  evidence of acute cholecystitis.    I have personally reviewed the examination and initial interpretation  and I agree with the findings.    CHANTEL STOCKTON MD

## 2018-12-13 NOTE — CONSULTS
"[facility] Inpatient Consult Dermatology Note    Impression/Plan:  38y female with PMHx significant for alcohol use disorder, alcoholic hepatitis, hepatic encephalopathy, EtOH withdrawal, HTN, and polyneuropathy who was admitted on 12/12 for workup of 3 week history of painful/pruritic rash on her lower legs. 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 Patient has already had comprehensive work up that has been negative for or normal for MPO ab, proteinase 3 ab, RNP ab, scleroderma ab, La ab, Ro ab, Geronimo ab, hep B, hep C, cerulopasmin, HIV, RF. The following labs are still pending: SMILEY, F actin EIA, cryoglobulins, mitochondrial M2 ab, liver kidney microsomal ab, complement total.    A skin punch biopsy would possibly be very revealing for this case, however, patient has been unable to tolerate any stimulus to these plaques on her legs. She states that they are too painful. Biopsy was attempted at outside dermatologist and patient did not tolerate. Patient reports that she is resistant to any \"-rashad\" numbing medication. She reports that in the past her dentist has had to use a cocktail of 5 different numbing medications to get her numb.      Recommendations:    Quantiferon gold    In addition to extensive work up done so far, possible additional laboratory work up to include: cryofibrinogens, homocysteine, anticardiolipin ab, lupus anticoagulant, protein C and S, factor V leiden, and beta2-glycoprotein 1 ab.  Ideally, dermatology would like to attempt punch biopsy for both H&E as well as tissue culture, however, patient refused. Would request primary team inquire with anesthesiology if they would consider conscious sedation for biopsy or possible other solutions for local anesthesia.   Thank you for the dermatology consultation. Please do not hesitate to contact the " dermatology resident/faculty on call for any additional questions or concerns. We will continue to follow.     Berry Hollis MD, PhD  Medicine-Dermatology PGY-3  P:[phone]    Dr. Marti staffed the patient.    Staff Involved:  Resident/Staff    Attending Note  DDx for retiform purpura/stellate necrosis primarily includes prothrombotic processes such as APLS, DIC, TTP, calciphylaxis, but also medium (PAN) and small-medium (ANCA-pos) vasculitis.  Bx preferred to differentiate among these two classes; will attempt to facilitate over the next 24 hours.  For now broad hypercoagulable workup as you are doing.  Medial thigh involvement bilaterally raises concern for non-uremic calciphylaxis, though absence of significant coumadin or prednisone exposure makes this somewhat less likely (for many non-uremic cases, coumadin and/or chronic corticosteroids are significant cofactors).    I have seen and examined this patient and agree with the assessment and plan as documented in the resident's note.    Norberto Marti MD  Dermatology Attending        Dermatology Problem List:  1. Retiform purpura/livedoid vasculopathy      Date of Admission: Dec 12, 2018   Encounter Date: 12/13/2018     Reason for Consultation:   Presents from outside dermatologist with 3 week history of necrotic areas, eschars and palpable purpura of b/l lower extremities.     History of Present Illness:  Ms. Yenifer Maher is a 38 year old female with PMHx signfiicant for alcohol use disorder, alcoholic hepatitis, hepatic encephalopathy, EtOH withdrawal, HTN, and polyneuropathy who was admitted on 12/12 for workup of 3 week history of painful/pruritic rash on her lower legs that consists of palpable purpura, necrotic appearing plaques, and eschars. Patient is a poor historian and much of this HPI is gleamed from previous notes. Patient was seen by an outside dermatologist who attempted to perform a biopsy under local anaesthesia, however, this was not  "tolerated by the patient. Because of this and due to concern for patient's ongoing liver disease, patient was transferred to [facility] for further work up and evaluation. Dermatology has been consulted to assist in evaluation and management of her skin condition. Regarding her \"rash\" patient reports that 3 weeks ago it started out appearing like a bruise that eventually changed into red/brown areas with central scabbing. She says that these areas are extremely sensitive and can be very painful when touched. When describing her experience at the outside dermatologist when a punch biopsy was attempted, she says that she was not able to tolerate it because it was too painful. She says that she is resistant to numbing by any of the \"-rashad\" medications.      Patient was recently hospitalized for acute alcohol use related hepatitis/gastritis in August 2018 and has since been residing at a SNF undergoing rehab for deconditioning. She reports that she has had progressive weakness and fatigue over the last several months. She now has to use a walker to ambulate due to ataxia and recurrent falls. She reports that she has seen two neurologists since that time. Per a recent visit with Dr. Navas on 11/20, there was evidence for severe large and probably small fiber polyneuropathy. It was felt that this was most likely due to malnutrition and associated polynutritional deficiencies as the underlying cause. In addition to her drinking history, patient is a vegetarian.     On ROS, patient denies fevers, chills.       Past Medical History:   Patient Active Problem List   Diagnosis     Livedo reticularis     No past medical history on file.  No past surgical history on file.      Social History:  Patient reports that she has been smoking.  she has never used smokeless tobacco.    Family History:  No family history on file.    Medications:  Current Facility-Administered Medications   Medication     fexofenadine (ALLEGRA) tablet 180 mg     " "gabapentin (NEURONTIN) tablet 800 mg     hydrOXYzine (ATARAX) tablet 10 mg     influenza quadrivalent (PF) vacc (FLUZONE or Flulaval or FLUARIX) injection 0.5 mL     magnesium oxide (MAG-OX) tablet 400 mg     melatonin tablet 3 mg     naloxone (NARCAN) injection 0.1-0.4 mg     ondansetron (ZOFRAN-ODT) ODT tab 4 mg    Or     ondansetron (ZOFRAN) injection 4 mg     oxyCODONE (ROXICODONE) tablet 5-10 mg     pantoprazole (PROTONIX) EC tablet 40 mg     phytonadione (MEPHYTON/VIT K) tablet 10 mg     polyethylene glycol (MIRALAX/GLYCOLAX) Packet 17 g     prenatal multivitamin w/iron per tablet 1 tablet     vitamin B complex with vitamin C (STRESS TAB) tablet 1 tablet     vitamin B1 (THIAMINE) tablet 100 mg          Allergies   Allergen Reactions     Bengay Pain Relief [Menthol] Other (See Comments)     Skin turns red and feels extremely hot     Cats      Chocolate Dermatitis     Dicyclomine Other (See Comments)     Severe sedation     Dust Mites      Derm, resp     No Clinical Screening - See Comments      GI upset     Phenobarbital      Pollen Extract      Derm, resp.          Review of Systems:  -A ten point review of systems was performed and negative except as per HPI.      Physical exam:  Vitals: /64   Pulse 76   Temp 98.4  F (36.9  C) (Oral)   Resp 16   Ht 1.702 m (5' 7\")   Wt 61.8 kg (136 lb 3.9 oz)   SpO2 95%   BMI 21.34 kg/m    GEN: This is a female in no acute distress, in a pleasant mood, appears older than her age.    SKIN: Focused examination of the face, upper extremities, abdomen, and lower extremities was performed.  -on bilateral medial thighs there are erythematous plaques with central violaceous stellate plaques, partially covered with crust  - on the medial left lower leg there is a a erythematous plaque with central areas of violaceous stellate plaque with crust  - on the right medial lower leg there is an erythematous plaque  - patient reports severe pain when any of the above plaques are " lightly touched  - there appears to be pain out of proportion to exam  - No other lesions of concern on areas examined.     Laboratory:  Results for orders placed or performed during the hospital encounter of 12/12/18 (from the past 24 hour(s))   CBC with platelets   Result Value Ref Range    WBC 5.9 4.0 - 11.0 10e9/L    RBC Count 3.41 (L) 3.8 - 5.2 10e12/L    Hemoglobin 10.6 (L) 11.7 - 15.7 g/dL    Hematocrit 32.4 (L) 35.0 - 47.0 %    MCV 95 78 - 100 fl    MCH 31.1 26.5 - 33.0 pg    MCHC 32.7 31.5 - 36.5 g/dL    RDW 14.0 10.0 - 15.0 %    Platelet Count 259 150 - 450 10e9/L   Comprehensive metabolic panel   Result Value Ref Range    Sodium 136 133 - 144 mmol/L    Potassium 4.0 3.4 - 5.3 mmol/L    Chloride 104 94 - 109 mmol/L    Carbon Dioxide 22 20 - 32 mmol/L    Anion Gap 10 3 - 14 mmol/L    Glucose 127 (H) 70 - 99 mg/dL    Urea Nitrogen 9 7 - 30 mg/dL    Creatinine 0.48 (L) 0.52 - 1.04 mg/dL    GFR Estimate >90 >60 mL/min/1.7m2    GFR Estimate If Black >90 >60 mL/min/1.7m2    Calcium 10.2 (H) 8.5 - 10.1 mg/dL    Bilirubin Total 2.5 (H) 0.2 - 1.3 mg/dL    Albumin 3.2 (L) 3.4 - 5.0 g/dL    Protein Total 7.9 6.8 - 8.8 g/dL    Alkaline Phosphatase 118 40 - 150 U/L    ALT 20 0 - 50 U/L    AST 40 0 - 45 U/L   CRP inflammation   Result Value Ref Range    CRP Inflammation 4.0 0.0 - 8.0 mg/L   Reticulocyte count   Result Value Ref Range    % Retic 1.7 0.5 - 2.0 %    Absolute Retic 56.9 25 - 95 10e9/L   UA with Microscopic reflex to Culture   Result Value Ref Range    Color Urine Yellow     Appearance Urine Slightly Cloudy     Glucose Urine Negative NEG^Negative mg/dL    Bilirubin Urine Negative NEG^Negative    Ketones Urine Negative NEG^Negative mg/dL    Specific Gravity Urine 1.021 1.003 - 1.035    Blood Urine Negative NEG^Negative    pH Urine 5.5 5.0 - 7.0 pH    Protein Albumin Urine 10 (A) NEG^Negative mg/dL    Urobilinogen mg/dL 2.0 0.0 - 2.0 mg/dL    Nitrite Urine Negative NEG^Negative    Leukocyte Esterase Urine  Large (A) NEG^Negative    Source Unspecified Urine     WBC Urine 29 (H) 0 - 5 /HPF    RBC Urine 0 0 - 2 /HPF    Bacteria Urine Moderate (A) NEG^Negative /HPF    Squamous Epithelial /HPF Urine 17 (H) 0 - 1 /HPF    Transitional Epi <1 0 - 1 /HPF    Mucous Urine Present (A) NEG^Negative /LPF    Hyaline Casts 1 0 - 2 /LPF    Calcium Oxalate Few (A) NEG^Negative /HPF    Amorphous Crystals Few (A) NEG^Negative /HPF   Urine Culture Aerobic Bacterial   Result Value Ref Range    Specimen Description Unspecified Urine     Special Requests Specimen received in preservative     Culture Micro PENDING    Comprehensive metabolic panel   Result Value Ref Range    Sodium 137 133 - 144 mmol/L    Potassium 4.2 3.4 - 5.3 mmol/L    Chloride 104 94 - 109 mmol/L    Carbon Dioxide 24 20 - 32 mmol/L    Anion Gap 9 3 - 14 mmol/L    Glucose 92 70 - 99 mg/dL    Urea Nitrogen 10 7 - 30 mg/dL    Creatinine 0.49 (L) 0.52 - 1.04 mg/dL    GFR Estimate >90 >60 mL/min/1.7m2    GFR Estimate If Black >90 >60 mL/min/1.7m2    Calcium 10.4 (H) 8.5 - 10.1 mg/dL    Bilirubin Total 2.7 (H) 0.2 - 1.3 mg/dL    Albumin 3.2 (L) 3.4 - 5.0 g/dL    Protein Total 7.8 6.8 - 8.8 g/dL    Alkaline Phosphatase 113 40 - 150 U/L    ALT 19 0 - 50 U/L    AST 41 0 - 45 U/L   CBC with platelets differential   Result Value Ref Range    WBC 6.2 4.0 - 11.0 10e9/L    RBC Count 3.31 (L) 3.8 - 5.2 10e12/L    Hemoglobin 10.1 (L) 11.7 - 15.7 g/dL    Hematocrit 30.6 (L) 35.0 - 47.0 %    MCV 92 78 - 100 fl    MCH 30.5 26.5 - 33.0 pg    MCHC 33.0 31.5 - 36.5 g/dL    RDW 13.8 10.0 - 15.0 %    Platelet Count 246 150 - 450 10e9/L    Diff Method Automated Method     % Neutrophils 41.9 %    % Lymphocytes 37.4 %    % Monocytes 17.4 %    % Eosinophils 2.6 %    % Basophils 0.5 %    % Immature Granulocytes 0.2 %    Nucleated RBCs 0 0 /100    Absolute Neutrophil 2.6 1.6 - 8.3 10e9/L    Absolute Lymphocytes 2.3 0.8 - 5.3 10e9/L    Absolute Monocytes 1.1 0.0 - 1.3 10e9/L    Absolute Eosinophils 0.2  0.0 - 0.7 10e9/L    Absolute Basophils 0.0 0.0 - 0.2 10e9/L    Abs Immature Granulocytes 0.0 0 - 0.4 10e9/L    Absolute Nucleated RBC 0.0    Erythrocyte sedimentation rate auto   Result Value Ref Range    Sed Rate 97 (H) 0 - 20 mm/h   INR   Result Value Ref Range    INR 1.39 (H) 0.86 - 1.14   CRP inflammation   Result Value Ref Range    CRP Inflammation 3.2 0.0 - 8.0 mg/L   Vasculitis panel   Result Value Ref Range    Myeloperoxidase Antibody IgG <0.2 0.0 - 0.9 AI    Proteinase 3 Antibody IgG <0.2 0.0 - 0.9 AI   Rheumatoid factor   Result Value Ref Range    Rheumatoid Factor <20 <20 IU/mL   Complement C3   Result Value Ref Range    Complement C3 119 76 - 169 mg/dL   Complement C4   Result Value Ref Range    Complement C4 20 15 - 50 mg/dL   Ceruloplasmin   Result Value Ref Range    Ceruloplasmin 33 23 - 53 mg/dL   HIV Antigen Antibody Combo   Result Value Ref Range    HIV Antigen Antibody Combo Nonreactive NR^Nonreactive       OWEN antibody panel   Result Value Ref Range    RNP Antibody IgG 0.2 0.0 - 0.9 AI    Geronimo OWEN Antibody IgG <0.2 0.0 - 0.9 AI    SSA (Ro) (OWEN) Antibody, IgG <0.2 0.0 - 0.9 AI    SSB (La) (OWEN) Antibody, IgG <0.2 0.0 - 0.9 AI    Scleroderma Antibody Scl-70 OWEN IgG <0.2 0.0 - 0.9 AI   Hepatitis B surface antigen   Result Value Ref Range    Hep B Surface Agn Nonreactive NR^Nonreactive   Hepatitis B core antibody   Result Value Ref Range    Hepatitis B Core Rakel Nonreactive NR^Nonreactive   Hepatitis B Surface Antibody   Result Value Ref Range    Hepatitis B Surface Antibody 2.10 <8.00 m[IU]/mL   Iron and iron binding capacity   Result Value Ref Range    Iron 68 35 - 180 ug/dL    Iron Binding Cap 393 240 - 430 ug/dL    Iron Saturation Index 17 15 - 46 %   Ferritin   Result Value Ref Range    Ferritin 69 12 - 150 ng/mL   Lactate Dehydrogenase   Result Value Ref Range    Lactate Dehydrogenase 164 81 - 234 U/L   Magnesium   Result Value Ref Range    Magnesium 1.5 (L) 1.6 - 2.3 mg/dL   Phosphorus    Result Value Ref Range    Phosphorus 5.2 (H) 2.5 - 4.5 mg/dL   Transferrin   Result Value Ref Range    Transferrin 326 210 - 360 mg/dL   Reticulocyte count   Result Value Ref Range    % Retic 2.3 (H) 0.5 - 2.0 %    Absolute Retic 75.5 25 - 95 10e9/L   Vitamin B12   Result Value Ref Range    Vitamin B12 875 193 - 986 pg/mL   Hepatitis C antibody   Result Value Ref Range    Hepatitis C Antibody Nonreactive NR^Nonreactive   Bilirubin direct   Result Value Ref Range    Bilirubin Direct 0.9 (H) 0.0 - 0.2 mg/dL   Vitamin D Deficiency   Result Value Ref Range    Vitamin D Deficiency screening 24 20 - 75 ug/L
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
## 2018-12-13 NOTE — PLAN OF CARE
"/64   Pulse 76   Temp 98.4  F (36.9  C) (Oral)   Resp 16   Ht 1.702 m (5' 7\")   Wt 61.8 kg (136 lb 3.9 oz)   SpO2 95%   BMI 21.34 kg/m   VS stable on room air. Patient with unchanged generalized pain, vague leg pain around blackened scabs on the inside of her thighs. Patient with mild nausea associated with VP lab draws, relief with PRN Zofran. Patient unable to tolerate IV Magnesium due to burning at the site, changed to oral replacement. Waiting on Dermatology and Rheumatology consults.  Patient is restless in bed a lot,anxious and tearful, has difficulty at times sitting up on her own. PT/OT consulted. One void this morning encouraging fluids, ordered first meal of the day at 1430.  "

## 2018-12-13 NOTE — PLAN OF CARE
"/85 (BP Location: Right arm)   Temp 98.5  F (36.9  C) (Oral)   Resp 16   Ht 1.702 m (5' 7\")   Wt 61.8 kg (136 lb 3.9 oz)   SpO2 98%   BMI 21.34 kg/m      Pt was admitted from dermatology clinic @ 1920 this evening for suspected vasculitis. A/Ox4. VSS on RA. Regular diet. Up with SBA + walker. Pt reports throbbing pain in inner thighs related to possible vasculitis pain. She is unable to tolerate any stimulus to these areas. Attempted to apply 4x4 gauze, per orders, but pt refused application. WOC and Dermatology are consulted. Wounds are black with surrounding non-blanchable erythema; no drainage. She reports using betadine and gauze to treat these areas at home. Pain is tolerable; no PRN medications given at this time. Denies nausea. L PIV SL. Admission completed. Medication Reconciliation completed with patient's nursing home RN via telephone. Plan for possible biopsy tomorrow after primary team and consults completed. Will continue to monitor and notify the team of any acute changes.   "
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
## 2018-12-13 NOTE — H&P
[facility] acute hospital, [place]    History and Physical - [employer] [facility]        Date of Admission:  12/12/2018    Assessment & Plan   Yenifer Maher is a 38 year old female who has a history of alcoholic hepatitis, HE, alcohol withdrawal, HTN, polyneuropathy and  is admitted for a 3 week history of painful necrotic eschar/rash on her lower legs.    #Bilateral migratory LE rash, eschar   DDx includes vasculitis, PAN (liver disease, no Hep C labs), non-uremic calciphylaxis, LCV, possible related to nutritional status. Three week history of migratory, painful, erythematous rash on LE that now has necrotic dark eschar. Patient has no hx of dermatologic/rheumatologic disease. Elevated ESR/CRP at outpatient visit prior to admission. No hx of trauma. Outpatient dermatology visit 12/12/18 attempted bx though patient did no tolerate, recommended inpatient work up of cutaneous findings. Further, multiple recent ED visits for breakthrough pain/pruritis. Had been using toradol for pain.   - Dermatology consult  - Rheumatology consult    - Full autoimmune work-up: CRP, ESR, C3/4, cryoglobulins, OWEN panel, vasculitis panel, SMILEY, RF  - HIV  - Wound care consult  - Atarax, oxycodone for pain/itching    #Hx of alcoholic hepatitis  #Hx of EtOH withdrawal   #Hx of EtOH abuse (per patient last drink Aug 2018)  Per patient last drink sometime in August, though hx of minimization. Has been in a nursing home since the fall following a hospitalization for weakness, edema, and nausea/vomiting associated with alcohol use. MELD-Na calculated with outpatient clinic lab values from 12/12/18 and [facility] admission lab values 17. Has never seen a hepatologist. Unclear timecourse of treatment/evaluation. CareEverywhere with multiple admissions for EtOH hepatitis, withdrawal, hallucinosis admissions. Question of previous wernicke's.   - Daily MELD-Na labs  - Hepatology consult   - Abdominal U/S with doppler  - Hep C/B  serologies  - Autoimmune hepatitis serologies  - Serum ceruloplasmin, urine copper  - Iron panel   - Three day vit K challenge: 10 mg PO  - Continue home diuresis: lasix 20 mg daily, 25 mg bid spironolactone     #Anemia  Admission Hgb 10.6, unclear baseline though 13.0 in 11/2017. No sx/s of bleeding. Likely CATRACHO given nutritional status.   - hemolysis labs: LDH, haptoglobin, iron panel    #Malnutrition   #Hair loss  #Fragile skin, nails  #Polyneuropathy  #Polynutritional deficiency  #Thiamine deficiency (dry Beriberi)?  #Elevated INR  Albumin 3.2 on admission, however patient with hx of poor intake at SNF given lack of quality vegetarian options.. B12 WNL in past. Constellation of skin/nails/hair damage, polyneuropathy, ataxia, and gait/balance issues/weakness supports possible vitamin and/or micronutrient deficiency. Superimposed previous alcohol use also likely contributed.   - Nutrition consult  - Labs: zinc, vit B1, vit B6, vitamin E, vit B12  - Continue all PTA supplements: thiamine, vitamin B complex with C, prenatal vitamin  - Three day vit K challenge: 10 mg PO  - PTA gabapentin for polyneuropathic pain     #Hypomagnesemia  Low intake, recent lab value at today's outpatient clinic visit 1.3  - continue home  mg Mag daily        Chronic Medical Problems:  - GERD: continue PTA PPI  - HTN: hold PTA metoprolol (patient normotensive/hypotensive on admission)  - Seasonal allergies: continue allegra    Diet: Combination Diet Vegetarian Diet  Fluids: PO  DVT Prophylaxis: lovenox  Chavez Catheter: not present  Code Status: Full Code    Disposition Plan   Expected discharge: 2 - 3 days, recommended to prior living arrangement once diagnosis of rash reached and liver disease treatment plan established.  Entered: Ander Bagley MD 12/13/2018, 1:33 AM       The patient's care was discussed with the Attending Physician, Dr. Cano.    Ander Bagley MD  [facility]  "[facility], [place]  Pager: [phone]  Please see sticky note for cross cover information  ______________________________________________________________________    Chief Complaint   LE rash    History is obtained from the patient    History of Present Illness   Yenifer Maher is a 38 year old female who has a history of alcoholic hepatitis, HE, alcohol withdrawal, HTN, polyneuropathy and  is admitted for a 3 week history of painful necrotic eschar/rash on her lower legs.    Patient presents from an outpatient dermatology clinic visit for evaluation of lower extremity rash/eschar/necrosis.The outpatient dermatologist attempted biopsy (lidocaine was injected), though patient did not tolerate the remainder of the procedure. This provider was also concerned with the patient's liver disease and a decision was made to transfer to [facility] for enhanced work up and evaluation.     Since hospitalization for acute alcohol use related hepatitis/gastritis in August 2018, Ms. Maher has been residing at a SNF undergoing rehab for weakness. She tentatively remembers having a swollen belly and LE edema, though isn't sure of the timecourse.In the interim she provides a multiple month history of progressive weakness, lethargy, and fatigue. She is walker dependent due ataxia and recurrent falls.     Further, patient provides that her PO intake has been poor due to limited vegetarian options at her SNF and also a decreased appetite in the last few months.     As it relates to her bilateral LE skin changes, they have been present for approximately 3 weeks time. She describes the \"rash\" as originally appearing similar to a bruise, with eventual evolution toward red/brown erythema with central eschar. The areas have never wept or bleed, though they have crusted over. She claims they also have been migratory and have appeared at different locations on her legs.     Finally, Ms. Maher has experienced the development of " "stocking-glove polyneuropathy since approximately August 2018. She has been seen by two neurologists in the interim (see recent consult note from 11/20/18 for further details). She claims that she has lost \"soft touch\", though can feel pressure.     Fine bilateral distal UE tremor ongoing, used to be improved during previous alcohol use.     Patient denies arthralgias, mouth sores, myalgias, eye redness, dysuria, flank pain, N/V/D, chest pain, SOB, fevers, chills, HAs.     Denies sick contacts.     Smokes 1/2 ppd, last drink August 2018 (though per chart review patient has historically minimized EtOH use). Denies illicits    Review of Systems    The 10 point Review of Systems is negative other than noted in the HPI or here.     Past Medical History    I have reviewed this patient's medical history and updated it with pertinent information if needed.   No past medical history on file.     Past Surgical History   I have reviewed this patient's surgical history and updated it with pertinent information if needed.  No past surgical history on file.     Social History   I have reviewed this patient's social history and updated it with pertinent information if needed. Yenifer MANZO Nika  reports that she has been smoking.  she has never used smokeless tobacco.    Family History   I have reviewed this patient's family history and updated it with pertinent information if needed.   No family history on file.    Prior to Admission Medications   Prior to Admission Medications   Prescriptions Last Dose Informant Patient Reported? Taking?   B Complex-Biotin-FA (EQL B COMPLEX 50) TBCR 12/12/2018 at 0800  Yes Yes   Sig: Take by mouth daily.    FUROSEMIDE PO 12/12/2018 at 0800  Yes Yes   Sig: Take 20 mg by mouth   GABAPENTIN PO 12/12/2018 at 0800  Yes Yes   MAGNESIUM OXIDE PO 12/11/2018  Yes Yes   Sig: Take 250 mg by mouth   METOPROLOL TARTRATE PO 12/12/2018 at 0800  Yes Yes   Sig: Take 50 mg by mouth   PANTOPRAZOLE SODIUM PO " 12/12/2018 at 0800  Yes Yes   Sig: Take 40 mg by mouth   Pregabalin (LYRICA PO) More than a month at Unknown time  Yes No   Prenatal Vit-Fe Fumarate-FA (PRENATAL MULTIVITAMIN PLUS IRON) 27-0.8 MG TABS per tablet 12/12/2018 at 080  Yes Yes   Sig: Take 1 tablet by mouth daily   SPIRONOLACTONE PO 12/12/2018 at 0800  Yes Yes   Sig: Take 50 mg by mouth   THIAMINE HCL PO 12/12/2018 at 0800  Yes Yes   Sig: Take 100 mg by mouth   fexofenadine (ALLEGRA) 180 MG tablet 12/11/2018 at 0800  Yes Yes   Sig: Take 180 mg by mouth daily.   traMADol (ULTRAM) 50 MG tablet More than a month at Unknown time  Yes No   Sig: Take 50 mg by mouth every 6 hours as needed for severe pain      Facility-Administered Medications: None     Allergies   Allergies   Allergen Reactions     Bengay Pain Relief [Menthol] Other (See Comments)     Skin turns red and feels extremely hot     Cats      Chocolate Dermatitis     Dicyclomine Other (See Comments)     Severe sedation     Dust Mites      Derm, resp     No Clinical Screening - See Comments      GI upset     Phenobarbital      Pollen Extract      Derm, resp.        Physical Exam   Vital Signs: Temp: 98.5  F (36.9  C) Temp src: Oral BP: 124/85   Heart Rate: 74 Resp: 16 SpO2: 98 % O2 Device: None (Room air)    Weight: 136 lbs 3.91 oz    General Appearance: thin, frail, cachetic, NAD, cooperative  Eyes: EOMI, PERRLA  HEENT: tacky MMM, trachea midline  Respiratory: CTAB, comfortable on RA  Cardiovascular: RRR, no M/R/C/G  GI: increased liver span, soft, nontender, no rebound or guarding, normoactive BS  Lymph/Hematologic: no LAD appreciated   Genitourinary: lack of suprapubic or CVA tenderness  Skin: dry skin globally, 4 areas of dark brown/black eschar on medial aspects of LE and bilateral thighs, surrounding erythema with livedoid appearance, lack of excoriative pattern, extremely painful/hyperalgesic, pruritic on exam  Musculoskeletal: no gross deformities, moves all extremities independently,    Neurologic: ataxic gait, dysmetria with finger to nose, gradation of stocking-glove decreased sensation in a proximal-distal taper, decreased light touch from wrists to fingertips and ankles to toes, A&O x 3  Psychiatric: appropriate affect overall, some confabulation and tangential thoughts as it relates to previous hospitalizatoins    Data   Data reviewed today: I reviewed all medications, new labs and imaging results over the last 24 hours. I personally reviewed no images or EKG's today.    reviewed

## 2018-12-13 NOTE — PLAN OF CARE
5083-2224: AVSS on RA. PRNs overnight: melatonin, atarax for itching, oxycodone x1 at start of shift. Pt was able to sleep after around 0300 hour. L lower peripheral saline locked. Pt continuously asked for bacitracin on wounds, but per evening nurse per dermatologist, nothing is to be placed on wounds until consults are done and plan is made. Pt unable to tolerate dry gauze or any fabric touching her wounds but was comfortable once able to sleep. NPO since 0100 after pt had a cookie from "[employer] meal. UA and copper urine samples sent this AM. Pt continues to have discomfort with movement but declining intervention. Will continue to monitor and follow POC.

## 2018-12-14 ENCOUNTER — APPOINTMENT (OUTPATIENT)
Dept: OCCUPATIONAL THERAPY | Facility: CLINIC | Age: 38
DRG: 640 | End: 2018-12-14
Attending: INTERNAL MEDICINE
Payer: COMMERCIAL

## 2018-12-14 ENCOUNTER — APPOINTMENT (OUTPATIENT)
Dept: PHYSICAL THERAPY | Facility: CLINIC | Age: 38
DRG: 640 | End: 2018-12-14
Attending: INTERNAL MEDICINE
Payer: COMMERCIAL

## 2018-12-14 LAB
ALBUMIN SERPL-MCNC: 3.4 G/DL (ref 3.4–5)
ALP SERPL-CCNC: 120 U/L (ref 40–150)
ALT SERPL W P-5'-P-CCNC: 18 U/L (ref 0–50)
ANA PAT SER IF-IMP: ABNORMAL
ANA SER QL IF: POSITIVE
ANA TITR SER IF: ABNORMAL {TITER}
ANION GAP SERPL CALCULATED.3IONS-SCNC: 12 MMOL/L (ref 3–14)
AST SERPL W P-5'-P-CCNC: 40 U/L (ref 0–45)
BILIRUB SERPL-MCNC: 2.4 MG/DL (ref 0.2–1.3)
BUN SERPL-MCNC: 10 MG/DL (ref 7–30)
CA-I SERPL ISE-MCNC: 5 MG/DL (ref 4.4–5.2)
CALCIUM SERPL-MCNC: 10.2 MG/DL (ref 8.5–10.1)
CARDIOLIPIN ANTIBODY IGG: <1.6 GPL-U/ML (ref 0–19.9)
CARDIOLIPIN ANTIBODY IGM: 8.1 MPL-U/ML (ref 0–19.9)
CARDIOLIPIN IGA SERPL-ACNC: 1.1 APL U/ML (ref 0–19.9)
CHLORIDE SERPL-SCNC: 100 MMOL/L (ref 94–109)
CO2 SERPL-SCNC: 24 MMOL/L (ref 20–32)
CREAT SERPL-MCNC: 0.55 MG/DL (ref 0.52–1.04)
DAT POLY-SP REAG RBC QL: NORMAL
ERYTHROCYTE [DISTWIDTH] IN BLOOD BY AUTOMATED COUNT: 13.7 % (ref 10–15)
FACT V ACT/NOR PPP: 64 % (ref 60–140)
GFR SERPL CREATININE-BSD FRML MDRD: >90 ML/MIN/1.7M2
GLUCOSE SERPL-MCNC: 104 MG/DL (ref 70–99)
HAPTOGLOB SERPL-MCNC: 88 MG/DL (ref 15–200)
HCT VFR BLD AUTO: 31.4 % (ref 35–47)
HGB BLD-MCNC: 10.4 G/DL (ref 11.7–15.7)
IGA SERPL-MCNC: 273 MG/DL (ref 70–380)
IGG SERPL-MCNC: 1540 MG/DL (ref 695–1620)
IGG1 SER-MCNC: 1320 MG/DL (ref 300–856)
IGG2 SER-MCNC: 256 MG/DL (ref 158–761)
IGG3 SER-MCNC: 86 MG/DL (ref 24–192)
IGG4 SER-MCNC: 29 MG/DL (ref 11–86)
IGM SERPL-MCNC: 387 MG/DL (ref 60–265)
INR PPP: 1.35 (ref 0.86–1.14)
MAGNESIUM SERPL-MCNC: 1.5 MG/DL (ref 1.6–2.3)
MCH RBC QN AUTO: 30.9 PG (ref 26.5–33)
MCHC RBC AUTO-ENTMCNC: 33.1 G/DL (ref 31.5–36.5)
MCV RBC AUTO: 93 FL (ref 78–100)
MITOCHONDRIA M2 IGG SER-ACNC: 2 U/ML
PLATELET # BLD AUTO: 222 10E9/L (ref 150–450)
POTASSIUM SERPL-SCNC: 4.2 MMOL/L (ref 3.4–5.3)
PROT C ACT/NOR PPP CHRO: 49 % (ref 70–170)
PROT S FREE AG ACT/NOR PPP IA: 125 % (ref 55–125)
PROT SERPL-MCNC: 8.1 G/DL (ref 6.8–8.8)
RBC # BLD AUTO: 3.37 10E12/L (ref 3.8–5.2)
SMA IGG SER-ACNC: 9 UNITS (ref 0–19)
SODIUM SERPL-SCNC: 135 MMOL/L (ref 133–144)
WBC # BLD AUTO: 7.2 10E9/L (ref 4–11)

## 2018-12-14 PROCEDURE — 82784 ASSAY IGA/IGD/IGG/IGM EACH: CPT | Performed by: STUDENT IN AN ORGANIZED HEALTH CARE EDUCATION/TRAINING PROGRAM

## 2018-12-14 PROCEDURE — 00000401 ZZHCL STATISTIC THROMBIN TIME NC: Performed by: STUDENT IN AN ORGANIZED HEALTH CARE EDUCATION/TRAINING PROGRAM

## 2018-12-14 PROCEDURE — 97165 OT EVAL LOW COMPLEX 30 MIN: CPT | Mod: GO

## 2018-12-14 PROCEDURE — 85610 PROTHROMBIN TIME: CPT | Performed by: STUDENT IN AN ORGANIZED HEALTH CARE EDUCATION/TRAINING PROGRAM

## 2018-12-14 PROCEDURE — 85027 COMPLETE CBC AUTOMATED: CPT | Performed by: STUDENT IN AN ORGANIZED HEALTH CARE EDUCATION/TRAINING PROGRAM

## 2018-12-14 PROCEDURE — 84166 PROTEIN E-PHORESIS/URINE/CSF: CPT | Performed by: STUDENT IN AN ORGANIZED HEALTH CARE EDUCATION/TRAINING PROGRAM

## 2018-12-14 PROCEDURE — 84630 ASSAY OF ZINC: CPT | Performed by: STUDENT IN AN ORGANIZED HEALTH CARE EDUCATION/TRAINING PROGRAM

## 2018-12-14 PROCEDURE — 25000132 ZZH RX MED GY IP 250 OP 250 PS 637: Performed by: STUDENT IN AN ORGANIZED HEALTH CARE EDUCATION/TRAINING PROGRAM

## 2018-12-14 PROCEDURE — 86147 CARDIOLIPIN ANTIBODY EA IG: CPT | Performed by: STUDENT IN AN ORGANIZED HEALTH CARE EDUCATION/TRAINING PROGRAM

## 2018-12-14 PROCEDURE — 85613 RUSSELL VIPER VENOM DILUTED: CPT | Performed by: STUDENT IN AN ORGANIZED HEALTH CARE EDUCATION/TRAINING PROGRAM

## 2018-12-14 PROCEDURE — 85597 PHOSPHOLIPID PLTLT NEUTRALIZ: CPT | Performed by: STUDENT IN AN ORGANIZED HEALTH CARE EDUCATION/TRAINING PROGRAM

## 2018-12-14 PROCEDURE — 86146 BETA-2 GLYCOPROTEIN ANTIBODY: CPT | Performed by: STUDENT IN AN ORGANIZED HEALTH CARE EDUCATION/TRAINING PROGRAM

## 2018-12-14 PROCEDURE — 82787 IGG 1 2 3 OR 4 EACH: CPT | Performed by: STUDENT IN AN ORGANIZED HEALTH CARE EDUCATION/TRAINING PROGRAM

## 2018-12-14 PROCEDURE — 12000026 ZZH R&B TRANSPLANT

## 2018-12-14 PROCEDURE — 83010 ASSAY OF HAPTOGLOBIN QUANT: CPT | Performed by: STUDENT IN AN ORGANIZED HEALTH CARE EDUCATION/TRAINING PROGRAM

## 2018-12-14 PROCEDURE — 00000402 ZZHCL STATISTIC TOTAL PROTEIN: Performed by: STUDENT IN AN ORGANIZED HEALTH CARE EDUCATION/TRAINING PROGRAM

## 2018-12-14 PROCEDURE — 97530 THERAPEUTIC ACTIVITIES: CPT | Mod: GO

## 2018-12-14 PROCEDURE — 99233 SBSQ HOSP IP/OBS HIGH 50: CPT | Mod: GC | Performed by: INTERNAL MEDICINE

## 2018-12-14 PROCEDURE — 84165 PROTEIN E-PHORESIS SERUM: CPT | Performed by: STUDENT IN AN ORGANIZED HEALTH CARE EDUCATION/TRAINING PROGRAM

## 2018-12-14 PROCEDURE — 85306 CLOT INHIBIT PROT S FREE: CPT | Performed by: STUDENT IN AN ORGANIZED HEALTH CARE EDUCATION/TRAINING PROGRAM

## 2018-12-14 PROCEDURE — 97530 THERAPEUTIC ACTIVITIES: CPT | Mod: GP

## 2018-12-14 PROCEDURE — 40000193 ZZH STATISTIC PT WARD VISIT

## 2018-12-14 PROCEDURE — 83090 ASSAY OF HOMOCYSTEINE: CPT | Performed by: STUDENT IN AN ORGANIZED HEALTH CARE EDUCATION/TRAINING PROGRAM

## 2018-12-14 PROCEDURE — 0HBLXZX EXCISION OF LEFT LOWER LEG SKIN, EXTERNAL APPROACH, DIAGNOSTIC: ICD-10-PCS | Performed by: DERMATOLOGY

## 2018-12-14 PROCEDURE — 82330 ASSAY OF CALCIUM: CPT | Performed by: STUDENT IN AN ORGANIZED HEALTH CARE EDUCATION/TRAINING PROGRAM

## 2018-12-14 PROCEDURE — 86880 COOMBS TEST DIRECT: CPT | Performed by: STUDENT IN AN ORGANIZED HEALTH CARE EDUCATION/TRAINING PROGRAM

## 2018-12-14 PROCEDURE — 85220 BLOOC CLOT FACTOR V TEST: CPT | Performed by: STUDENT IN AN ORGANIZED HEALTH CARE EDUCATION/TRAINING PROGRAM

## 2018-12-14 PROCEDURE — 36415 COLL VENOUS BLD VENIPUNCTURE: CPT | Performed by: STUDENT IN AN ORGANIZED HEALTH CARE EDUCATION/TRAINING PROGRAM

## 2018-12-14 PROCEDURE — 88305 TISSUE EXAM BY PATHOLOGIST: CPT | Mod: TC | Performed by: STUDENT IN AN ORGANIZED HEALTH CARE EDUCATION/TRAINING PROGRAM

## 2018-12-14 PROCEDURE — 40000133 ZZH STATISTIC OT WARD VISIT

## 2018-12-14 PROCEDURE — 97535 SELF CARE MNGMENT TRAINING: CPT | Mod: GO

## 2018-12-14 PROCEDURE — 88312 SPECIAL STAINS GROUP 1: CPT | Mod: TC | Performed by: STUDENT IN AN ORGANIZED HEALTH CARE EDUCATION/TRAINING PROGRAM

## 2018-12-14 PROCEDURE — 85730 THROMBOPLASTIN TIME PARTIAL: CPT | Performed by: STUDENT IN AN ORGANIZED HEALTH CARE EDUCATION/TRAINING PROGRAM

## 2018-12-14 PROCEDURE — 83921 ORGANIC ACID SINGLE QUANT: CPT | Performed by: STUDENT IN AN ORGANIZED HEALTH CARE EDUCATION/TRAINING PROGRAM

## 2018-12-14 PROCEDURE — 83735 ASSAY OF MAGNESIUM: CPT | Performed by: STUDENT IN AN ORGANIZED HEALTH CARE EDUCATION/TRAINING PROGRAM

## 2018-12-14 PROCEDURE — 80053 COMPREHEN METABOLIC PANEL: CPT | Performed by: STUDENT IN AN ORGANIZED HEALTH CARE EDUCATION/TRAINING PROGRAM

## 2018-12-14 PROCEDURE — 82306 VITAMIN D 25 HYDROXY: CPT | Performed by: STUDENT IN AN ORGANIZED HEALTH CARE EDUCATION/TRAINING PROGRAM

## 2018-12-14 PROCEDURE — 85303 CLOT INHIBIT PROT C ACTIVITY: CPT | Performed by: STUDENT IN AN ORGANIZED HEALTH CARE EDUCATION/TRAINING PROGRAM

## 2018-12-14 PROCEDURE — 97110 THERAPEUTIC EXERCISES: CPT | Mod: GP

## 2018-12-14 PROCEDURE — 97161 PT EVAL LOW COMPLEX 20 MIN: CPT | Mod: GP

## 2018-12-14 PROCEDURE — 88313 SPECIAL STAINS GROUP 2: CPT | Mod: TC | Performed by: STUDENT IN AN ORGANIZED HEALTH CARE EDUCATION/TRAINING PROGRAM

## 2018-12-14 RX ORDER — PHYTONADIONE 5 MG/1
5 TABLET ORAL ONCE
Status: COMPLETED | OUTPATIENT
Start: 2018-12-14 | End: 2018-12-14

## 2018-12-14 RX ORDER — CEFTRIAXONE 2 G/1
2 INJECTION, POWDER, FOR SOLUTION INTRAMUSCULAR; INTRAVENOUS ONCE
Status: COMPLETED | OUTPATIENT
Start: 2018-12-14 | End: 2018-12-15

## 2018-12-14 RX ADMIN — PRENATAL VIT W/ FE FUMARATE-FA TAB 27-0.8 MG 1 TABLET: 27-0.8 TAB at 07:57

## 2018-12-14 RX ADMIN — OXYCODONE HYDROCHLORIDE 5 MG: 5 TABLET ORAL at 03:39

## 2018-12-14 RX ADMIN — GABAPENTIN 800 MG: 800 TABLET, FILM COATED ORAL at 07:57

## 2018-12-14 RX ADMIN — MAGNESIUM OXIDE TAB 400 MG (241.3 MG ELEMENTAL MG) 400 MG: 400 (241.3 MG) TAB at 07:57

## 2018-12-14 RX ADMIN — PHYTONADIONE 5 MG: 5 TABLET ORAL at 20:42

## 2018-12-14 RX ADMIN — MELATONIN TAB 3 MG 3 MG: 3 TAB at 01:15

## 2018-12-14 RX ADMIN — HYDROXYZINE HYDROCHLORIDE 10 MG: 10 TABLET ORAL at 09:16

## 2018-12-14 RX ADMIN — FEXOFENADINE HYDROCHLORIDE 180 MG: 180 TABLET, FILM COATED ORAL at 07:57

## 2018-12-14 RX ADMIN — OXYCODONE HYDROCHLORIDE 5 MG: 5 TABLET ORAL at 06:53

## 2018-12-14 RX ADMIN — HYDROXYZINE HYDROCHLORIDE 10 MG: 10 TABLET ORAL at 22:58

## 2018-12-14 RX ADMIN — GABAPENTIN 800 MG: 800 TABLET, FILM COATED ORAL at 20:41

## 2018-12-14 RX ADMIN — B-COMPLEX W/ C & FOLIC ACID TAB 1 TABLET: TAB at 07:57

## 2018-12-14 RX ADMIN — PANTOPRAZOLE SODIUM 40 MG: 40 TABLET, DELAYED RELEASE ORAL at 07:57

## 2018-12-14 RX ADMIN — HYDROXYZINE HYDROCHLORIDE 10 MG: 10 TABLET ORAL at 01:15

## 2018-12-14 RX ADMIN — Medication 100 MG: at 07:57

## 2018-12-14 RX ADMIN — MAGNESIUM OXIDE TAB 400 MG (241.3 MG ELEMENTAL MG) 400 MG: 400 (241.3 MG) TAB at 20:42

## 2018-12-14 RX ADMIN — PHYTONADIONE 10 MG: 5 TABLET ORAL at 07:56

## 2018-12-14 ASSESSMENT — ACTIVITIES OF DAILY LIVING (ADL)
ADLS_ACUITY_SCORE: 24
ADLS_ACUITY_SCORE: 24
ADLS_ACUITY_SCORE: 23
ADLS_ACUITY_SCORE: 21
ADLS_ACUITY_SCORE: 21
ADLS_ACUITY_SCORE: 25

## 2018-12-14 ASSESSMENT — PAIN DESCRIPTION - DESCRIPTORS: DESCRIPTORS: ACHING

## 2018-12-14 NOTE — PLAN OF CARE
"/61 (BP Location: Right arm)   Pulse 73   Temp 98.6  F (37  C) (Oral)   Resp 16   Ht 1.702 m (5' 7\")   Wt 61.8 kg (136 lb 3.9 oz)   SpO2 93%   BMI 21.34 kg/m      9206-8470: A/Ox4; very tired today. VSS on RA. Vegetarian diet; no PO intake - ordered lunch and dinner, but refused meals. Up with SBA + walker. Pt reports constant pain at wound sites on bilateral legs; comfortably manageable with repositioning. Waiting for wound care plan from Dermatology. Will continue to monitor.  "

## 2018-12-14 NOTE — PLAN OF CARE
"/60 (BP Location: Right arm)   Pulse 73   Temp 98.6  F (37  C) (Oral)   Resp 18   Ht 1.702 m (5' 7\")   Wt 61.8 kg (136 lb 3.9 oz)   SpO2 98%   BMI 21.34 kg/m       BPs soft. OVSS on RA. Pt c/o severe leg pain. Received atarax x1, 5mg oxy x1 and ice packs applied with minimal relief. Pt intermittently with bacitracin + dry gauze on inner thigh wounds. Pt unable to tolerate anything touching her legs. Denies nausea. PIV saline locked. Up SBA with walker. Will continue to monitor and notify of any changes.   "

## 2018-12-14 NOTE — PLAN OF CARE
Discharge Planner PT  7A  Patient plan for discharge: Prefers home over return to TCU  Current status: PT eval complete, treatment initiated. Pt intermittently tearful throughout session 2/2 pain and itching in B medial thigh/rash area. Pt does not tolerate any formal LE strength testing 2/2 pain and sensitivity to touch. Pt performs supine>sit with CGA, sit<>stand with CGA and FWW, ambulates 10ft with FWW and CGA with mild unsteadiness noted. Pt unable to tolerate further ambulation distance 2/2 LE pain.   Barriers to return to prior living situation: Medical status, wounds, fall history/risk, impaired balance, weakness, decreased activity tolerance  Recommendations for discharge: TCU  Rationale for recommendations: Pt currently below functional baseline and would benefit from continued skilled therapy services to progress strength, endurance, balance, and safety with functional mobility prior to safe return home.       Entered by: Kary Tinoco 12/14/2018 9:24 AM

## 2018-12-14 NOTE — PROGRESS NOTES
12/14/18 4325   Quick Adds   Type of Visit Initial PT Evaluation       Present no   Living Environment   Lives With significant other  (has been in TCU for last ~2 months )   Living Arrangements house  (TCU for past ~2 months)   Home Accessibility stairs to enter home;stairs within home   Living Environment Comment Above information is regarding house in which pt lives with her fiance, which is a split level home with 10 stairs up and 10 stairs down, all needs met on upper level, 5 stairs to enter home from outside. Pt has been residing in a TCU for the past ~2 months per pt report, prefers to return home over TCU.   Self-Care   Usual Activity Tolerance moderate   Current Activity Tolerance poor   Equipment Currently Used at Home cane, straight;walker, standard   Activity/Exercise/Self-Care Comment Pt has a cane and walker at home but was not using when living at home. Pt states she has been using a walker at [facility]. Pt states she was working with PT/OT at [facility] but states they have not come to see her in at least a few weeks and she is uncertain why, uncertain of accuracy of this information.   Functional Level Prior   Ambulation 1-->assistive equipment   Transferring 1-->assistive equipment   Toileting 1-->assistive equipment   Bathing 3-->assistive equipment and person   Communication 0-->understands/communicates without difficulty   Swallowing 0-->swallows foods/liquids without difficulty   Cognition 1 - attention or memory deficits   Fall history within last six months yes   Number of times patient has fallen within last six months 10  (10 (2 in last week))   Which of the above functional risks had a recent onset or change? ambulation;transferring   Prior Functional Level Comment Prior independent with functional mobility before most recent TCU admission, has required assist with mobility and ADLs at TCU most recently   General Information   Onset of Illness/Injury or Date of Surgery - Date  12/12/18   Referring Physician Tammy Lunsford MD   Patient/Family Goals Statement To return home   Pertinent History of Current Problem (include personal factors and/or comorbidities that impact the POC) Yenifer Maher is a 38 year old female who has a history of alcoholic hepatitis, HE, alcohol withdrawal, HTN, polyneuropathy and  is admitted for a 3 week history of painful necrotic eschar/rash on her lower legs.   Precautions/Limitations fall precautions   General Info Comments Activity orders: up with assist   Cognitive Status Examination   Orientation orientation to person, place and time   Level of Consciousness alert   Follows Commands and Answers Questions 100% of the time   Personal Safety and Judgment intact   Memory impaired   Pain Assessment   Patient Currently in Pain Yes, see Vital Sign flowsheet   Integumentary/Edema   Integumentary/Edema Comments Pt with rash/wounds on B medial thighs   Posture    Posture Forward head position;Protracted shoulders   Range of Motion (ROM)   ROM Comment B LE ROM limited 2/2 pain from wounds    Strength   Strength Comments Unable to assess 2/2 pt unable to tolerate therapist touching LEs, though pt displays at least 3/5 LE strength per ability to sit<>stand with CGA   Bed Mobility   Bed Mobility Comments CGA at trunk for supine>sit   Transfer Skills   Transfer Comments CGA and FWW for sit<>stand   Gait   Gait Comments Unable to assess   Balance   Balance Comments SBA for sitting balance, pt tends to posteriorly lean for comfort, CGA and FWW for standing balance   Sensory Examination   Sensory Perception Comments Numbness and tingling in B UEs and LEs per pt report   General Therapy Interventions   Planned Therapy Interventions balance training;bed mobility training;gait training;neuromuscular re-education;strengthening;transfer training;home program guidelines;progressive activity/exercise   Clinical Impression   Criteria for Skilled Therapeutic Intervention yes,  "treatment indicated   PT Diagnosis Impaired functional mobility   Influenced by the following impairments Decreased strength and endurance, impaired balance, numbness/tingling in UEs and LEs   Functional limitations due to impairments Pt requires assist for all mobility, has significant fall history   Clinical Presentation Stable/Uncomplicated   Clinical Presentation Rationale PMH and clinical judgment   Clinical Decision Making (Complexity) Low complexity   Therapy Frequency` 5 times/week   Predicted Duration of Therapy Intervention (days/wks) 1 week   Anticipated Equipment Needs at Discharge (TBD)   Anticipated Discharge Disposition Transitional Care Facility   Risk & Benefits of therapy have been explained Yes   Patient, Family & other staff in agreement with plan of care Yes   [facility] TM \"6 Clicks\"   2016, Trustees of [facility], under license to [employer].  All rights reserved.   6 Clicks Short Forms Basic Mobility Inpatient Short Form   [facility]  \"6 Clicks\" V.2 Basic Mobility Inpatient Short Form   1. Turning from your back to your side while in a flat bed without using bedrails? 4 - None   2. Moving from lying on your back to sitting on the side of a flat bed without using bedrails? 3 - A Little   3. Moving to and from a bed to a chair (including a wheelchair)? 3 - A Little   4. Standing up from a chair using your arms (e.g., wheelchair, or bedside chair)? 3 - A Little   5. To walk in hospital room? 3 - A Little   6. Climbing 3-5 steps with a railing? 2 - A Lot   Basic Mobility Raw Score (Score out of 24.Lower scores equate to lower levels of function) 18   Total Evaluation Time   Total Evaluation Time (Minutes) 10     "
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
## 2018-12-14 NOTE — PLAN OF CARE
OT/7A: Discharge Planner OT   Patient plan for discharge: wants to go home but reports not feeling ready  Current status: OT eval completed, treatment initiated. Extended time spent EOB to trial OOB activity. Pt tolerates standing ~2 min SBA+FWW and c/o of increased LE pain, refusing further OOB activity. Pt tearful during session. Performed in-bed ADLs SBA with set up provided.   Barriers to return to prior living situation: generalized weakness/deconditioning, medical status, pain, activity tolerance  Recommendations for discharge: TCU  Rationale for recommendations: Pt is performing below functional baseline and does not demonstrate OOB activity at this time. Recommending continued skilled therapy services to increase IND and activity tolerance for ADL/IADLs prior to returning home safely.        Entered by: Lily Cano 12/14/2018 2:18 PM

## 2018-12-14 NOTE — PLAN OF CARE
Patient reports unchanged bilateral inner leg pain from lesions. Bacitracin and gauze applied PRN. Anesthesiology consulted regarding Dermatology procedure. Patient's family in this morning. Release of information faxed to [facility] for previous records. Atarax 10 mg X1 for itching, patient declining Oxycodone preferring to stay away from narcotics. Will address with team alternatives to narcotics for better pain relief.

## 2018-12-14 NOTE — PROGRESS NOTES
12/14/18 1400   Quick Adds   Type of Visit Initial Occupational Therapy Evaluation   Living Environment   Lives With significant other  (Though has been in the TCU/SNF for the past 2 months)   Living Arrangements house  (TCU/SNF past few months)   Home Accessibility stairs to enter home;stairs within home   Living Environment Comment Pt reports the home she lives in with her ida has 5 stairs to enter, 10 stairs up, 10 stairs down. Pt wants to return to home instead of return to TCU.   Self-Care   Usual Activity Tolerance moderate   Current Activity Tolerance poor   Equipment Currently Used at Home cane, straight;walker, standard   Activity/Exercise/Self-Care Comment Unclear at this time what prior level of function was before TCU; Pt reports using walker for functional mobility at this time. There was a shower chair and grab bars at the TCU but not at her fijoanne's house.   Functional Level   Ambulation 1-->assistive equipment   Transferring 1-->assistive equipment   Toileting 1-->assistive equipment   Bathing 3-->assistive equipment and person   Dressing 0-->independent   Eating 0-->independent   Communication 0-->understands/communicates without difficulty   Swallowing 0-->swallows foods/liquids without difficulty   Cognition 1 - attention or memory deficits   Fall history within last six months yes   Number of times patient has fallen within last six months 10   Which of the above functional risks had a recent onset or change? ambulation;transferring   Prior Functional Level Comment Pt reports IND with most ADLs prior to TCU but has required assist for ADLs and use of FWW for functional mobility most recently.   General Information   Onset of Illness/Injury or Date of Surgery - Date 12/12/18   Referring Physician Tammy Lunsford MD    Patient/Family Goals Statement Wants to return home but does not feel strong enough yet.   Additional Occupational Profile Info/Pertinent History of Current Problem 38y  "female with PMHx significant for alcohol use disorder, alcoholic hepatitis, hepatic encephalopathy, EtOH withdrawal, HTN, and polyneuropathy who was admitted on 12/12 for workup of 3 week history of painful/pruritic rash on her lower legs.   Precautions/Limitations fall precautions   General Observations Pt supine in bed upon arrival, agreeable to therapy.   General Info Comments Activity: up with assist PRN   Cognitive Status Examination   Orientation orientation to person, place and time   Level of Consciousness alert   Follows Commands (Cognition) WFL   Memory impaired   Cognitive Comment Unclear what pt's cogntive baseline is at this time. Pt demonstrates difficulty with memory. Will assess per POC.   Visual Perception   Visual Perception Wears glasses   Sensory Examination   Sensory Comments polyneuropathy   Range of Motion (ROM)   ROM Comment WFL, symmetrical   Strength   Strength Comments WFL, symmetrical   Instrumental Activities of Daily Living (IADL)   IADL Comments Unclear at this time what her IADL responsibilities were prior to TCU. Based on pt report she receives some assist for IADLs.    Activities of Daily Living Analysis   Impairments Contributing to Impaired Activities of Daily Living cognition impaired;balance impaired;pain;sensation decreased;sensory feedback impaired;strength decreased   General Therapy Interventions   Planned Therapy Interventions ADL retraining;cognition;strengthening;home program guidelines;progressive activity/exercise;risk factor education;transfer training;ROM   Clinical Impression   Criteria for Skilled Therapeutic Interventions Met yes, treatment indicated   OT Diagnosis OT order for: \"hx of polyneuropathy, ataxic gait. From TCU would like to be safe to DC home\"   Influenced by the following impairments generalized weakness/deconditioning, pain, cognition, activity tolerance   Assessment of Occupational Performance 3-5 Performance Deficits   Identified Performance " "Deficits bathing, dressing, g/h, toileting, home mgmt   Clinical Decision Making (Complexity) Low complexity   Therapy Frequency 5 times/wk   Predicted Duration of Therapy Intervention (days/wks) 1 week   Anticipated Equipment Needs at Discharge other (see comments)  (TBD with further therapy)   Anticipated Discharge Disposition Transitional Care Facility   Risks and Benefits of Treatment have been explained. Yes   Patient, Family & other staff in agreement with plan of care Yes   [facility] TM \"6 Clicks\"   2016, Trustees of [facility], under license to [employer].  All rights reserved.   6 Clicks Short Forms Daily Activity Inpatient Short Form   [facility]  \"6 Clicks\" Daily Activity Inpatient Short Form   1. Putting on and taking off regular lower body clothing? 3 - A Little   2. Bathing (including washing, rinsing, drying)? 2 - A Lot   3. Toileting, which includes using toilet, bedpan or urinal? 2 - A Lot   4. Putting on and taking off regular upper body clothing? 3 - A Little   5. Taking care of personal grooming such as brushing teeth? 3 - A Little   6. Eating meals? 4 - None   Daily Activity Raw Score (Score out of 24.Lower scores equate to lower levels of function) 17   Total Evaluation Time   Total Evaluation Time (Minutes) 9     "

## 2018-12-14 NOTE — PROGRESS NOTES
[facility], [place]    Progress Note - Sundar 1 Service        Date of Admission:  12/12/2018    Assessment & Plan   Yenifer Maher is a 38 year old female with a history of alcohol withdrawal, alcohol hepatitis and ?wernicke's, and polyneuorpathy 2/2 polynutritional deficiency admitted on 12/12/2018. She presented from an outpatient dermatology clinic for further work-up of 3 week history of a painful, bilateral, LE necrotic/eschar rash concerning for calciphylaxis vs vasculitis/another rheum d/o.     Changes today:   - Per d/w anesthesia, patient needs an OR slot in order to have anesthesia perform conscious sedation for punch biopsy- discussed with derm, recommended gen surg consult for wedge biopsy     #Bilateral, migratory painful LE rash, eschar  Per pt, rash initially presented in September as bruising in her inner thigh. Now presents with 3 week hx of migratory, BL symmetric LE eschar with surrounding erythema. Rash pruritic and painful. No trauma hx. Seen by outpatient dermatologist 12/12/18 who attempted bx, patient couldn't tolerate, and sent to [facility] for further work-up. ESR and CRP elevated at outpatient derm visit. On admission 12/12, ESR 97, CRP 3.2. OWEN panel negative. Vasculitis panel negative. RF wnl. C3 and C4 wnl. HIV negative. Initial DDx included vasculitis, LCV, calciphylaxis, possible relation to nutrition status. Per Rheum, vasculitis unlikely, concern for calciphylaxis. Low suspicion for systemic autoimmune condition. 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 Punch bx would be helpful. 12/13 Further workup showed low protein C. Elevated IgG1 and IgM. Factor 5 assay wnl. Cardiolipin panel wnl.  - Cryoglobulin, SMILEY in process  - Dermatology consulted; appreciate recs       -In Process - cyrofibrinogens, lupus anticoagulant, beta2-glycoprotein 1 ab       -Punch biopsy for H&E and tissue culture but need to figure out sedation  (will need OR slot and conscious sedation).  - Rheumatology consulted; appreciate recs       - In Process - SPEP, UPEP, MMA, homocysteine, antiphospholipid syndrome workup  - Anesthesia consult - conscious sedation for punch bx - must perform punch bx in OR  - Oxycodone for pain  - Atarax 10mg itching    #Indirect hyperbilirubinemia  Down trending, US w/ gallbladder sludge but not signs of cholecystitis   - Coomb's test pending     #Hx of alcohol withdrawal  #Hx of Wernicke Encephalopathy(?)  Per outside provider note, prior hospitalization at [facility], 8/23 - 9/14/18, for weakness, edema, and confusion found to have EtOH liver failure. Summary of this admission not in CareEverywhere. Pt could not provide details of hospitalization. Hx of multiple hospitalizations for alcohol withdrawal and possible Wernicke's Encephalopathy. Most recent 1/2018. Pt reports last drink was in August. On admission, Albumin 3.2, T Bili 2.7(direct 0.9). AST, ALT, AlkP wnl. INR 1.39. Hep B and C serologies negative. Iron panel, ceruloplasmin unremarkable. Abdominal ultrasound without evidence of cirrhotic features, but showing possible cavernous transformation versus slow to-and-fro flow in the main and right portal veins. Per d/w hepatology, this is c/w early portal hypertension, would not recommend further w/u or imaging at this time given lack of abd pain and LFTs not terrible (has mild indirect hyperbilirubinemia, recommended following up w/ GI in outpatient setting in 6-8wks   - In process - urine copper  - Thiamine and folate     #Hypomagnesemia  Low magnesium possibly d/t low PO intake. On PTA Mag replacement. Unable to replace Mag by IV d/t burning sensation with infusion and patient refusal   - Increased PTA Mag 400 daily to 400mg BID      #Hyperphosphatemia  #Mild Hypercalcemia  Admission, serum Ca 10.4 (11.0 when corrected for albumin). Serum Phos 5.2. UA + calcium-oxalate crystals and amorphous crystals. Vit D  wnl. PTH low. Per discussion w/ nephrology, will order 1,25 vit D and TSH. Likely also be related to bone resorption and deconditioning. Can consider hypercalcemia of malignancy work up  - 1,25 vit D, TSH    #Polyneuropathy 2/2 polynutritional deficiency  #Severe Malnutrition  #Poor PO intake  Seen by outpatient neurologist 11/20/18 for work-up. Per visit note, patient reports tingling, numbness, and pain in all fingers on both hands as well as numbness and tingling in both feet. Began 8/18. PE notable for wide-based ataxic gait, reduced vibratory sense in feet>ankles and in hands, and reduced pin prick in feet and hands. Distal weakness in hands and feet with reduced ankle reflex. Started tx for polyneuropathy 2/2 polynutritional def. Vit B12 wnl on admission. Pt is vegetarian.  - PT/OT consult  - Start calorie counts  - Nutrition consulted; appreciate recs  - Labs in process: zinc, vit B1, vit B6, vitamin E  - Continue all PTA supplements: thiamine, vitamin B complex with C, prenatal vitamin, Vit K  - PTA gabapentin for polyneuropathic pain    #Normocytic, Normochromic Anemia  Admission Hgb 10.6. Last recorded Hgb 13.0 11/2017. Iron panel unremarkable. LDH wnl. Haptoglobin wnl. Likely AOCD   - Gilmer test as above   - Cont to monitor     Chronic Medical Problems:  #GERD: continue PTA PPI  #HTN: hold PTA metoprolol (patient normotensive/hypotensive on admission)  #Seasonal allergies: continue allegra    Diet: Combination Diet Vegetarian Diet  Snacks/Supplements Adult: Boost Breeze; Between Meals  Calorie Counts  Fluids: PO  Lines: PIV  DVT Prophylaxis: Enoxaparin (Lovenox) SQ  Chavez Catheter: not present  Code Status: Full Code[unfilled]    Disposition Plan   Expected discharge: 2 - 3 days, recommended to TCU once skin bx obtained  Entered: Berry Stanley 12/14/2018, 3:28 PM     The patient's care was discussed with the Attending Physician, Dr. Martins.    Tammy Lunsford M.D.   PGY2 Internal Medicine    [phone]    Physician Attestation   I, Melyssa Martins, was present with the medical student and resident who participated in the service and in the documentation of the note.  I have verified the history and personally performed the physical exam and medical decision making.  I agree with the assessment and plan of care as documented in the note.      I personally reviewed vital signs, medications, labs and imaging.      Melyssa Martins MD  Date of Service (when I saw the patient): 12/14/18    ______________________________________________________________________    Interval History   Nursing notes reviewed. Noted to have no PO intake yesterday. She ordered both lunch and dinner but did not eat them. No acute events overnight.     Today, she reports a on and off stabbing/cramping pain in her thighs that is new from yesterday. The pain started this morning. She did not wake up with it. The pain does not radiate. She is tender to palpation of any part of the thigh and back of the knee. She was in distress when the visit began but appeared to relax and have pain resolution ten minutes into the interview. She was not in pain when writer left the room. Writer explained reasoning for skin biopsy, and patient is open to conscious sedation for procedure.    She reports eating the majority of her dinner last night. She denies fever, chills, nausea, vomiting, diarrhea, CP, or SOB.    Data reviewed today: I reviewed all medications, new labs and imaging results over the last 24 hours. I personally reviewed the abdominal ultrasound image(s) showing 1.  Possible cavernous transformation versus slow to-and-fro flow in.    Physical Exam   Vital Signs: Temp: 98.5  F (36.9  C) Temp src: Oral BP: 108/63 Pulse: 73 Heart Rate: 86 Resp: 16 SpO2: 94 % O2 Device: None (Room air)    Weight: 136 lbs 3.91 oz  Constitutional: laying in bed, appears in pain  Eyes: nonicteric slcera, EOMI  Respiratory: CTAB  Cardiovascular: RRR, normal S1,S2. No  extra heart sounds or murmurs  GI: active BS, soft, nontender, nondistended  Skin: eschar with surrounding salmon colored erythema located on both inner thighs. No change compared to 12/13.No excoriative pattern. 3-4 cm plaque with purple discoloration at center and surrounding salmon-colored erythema located on left, medial calf. No change compared to 12/13  Musculoskeletal: moves all extremities independently  Neurologic: AAOx3, loses train of thought easily    Data   Recent Labs   Lab 12/14/18  0851 12/13/18  0833 12/13/18  0017   WBC 7.2 6.2 5.9   HGB 10.4* 10.1* 10.6*   MCV 93 92 95    246 259   INR 1.35* 1.39*  --     137 136   POTASSIUM 4.2 4.2 4.0   CHLORIDE 100 104 104   CO2 24 24 22   BUN 10 10 9   CR 0.55 0.49* 0.48*   ANIONGAP 12 9 10   MARKO 10.2* 10.4* 10.2*   * 92 127*   ALBUMIN 3.4 3.2* 3.2*   PROTTOTAL 8.1 7.8 7.9   BILITOTAL 2.4* 2.7* 2.5*   ALKPHOS 120 113 118   ALT 18 19 20   AST 40 41 40     No results found for this or any previous visit (from the past 24 hour(s)).

## 2018-12-15 LAB
A-TOCOPHEROL VIT E SERPL-MCNC: 12 MG/L (ref 5.5–18)
ALBUMIN SERPL-MCNC: 3.2 G/DL (ref 3.4–5)
ALP SERPL-CCNC: 110 U/L (ref 40–150)
ALT SERPL W P-5'-P-CCNC: 17 U/L (ref 0–50)
ANION GAP SERPL CALCULATED.3IONS-SCNC: 11 MMOL/L (ref 3–14)
AST SERPL W P-5'-P-CCNC: 37 U/L (ref 0–45)
BACTERIA SPEC CULT: ABNORMAL
BETA+GAMMA TOCOPHEROL SERPL-MCNC: 2.6 MG/L (ref 0–6)
BILIRUB DIRECT SERPL-MCNC: 0.7 MG/DL (ref 0–0.2)
BILIRUB SERPL-MCNC: 2 MG/DL (ref 0.2–1.3)
BUN SERPL-MCNC: 8 MG/DL (ref 7–30)
CALCIUM SERPL-MCNC: 10 MG/DL (ref 8.5–10.1)
CHLORIDE SERPL-SCNC: 101 MMOL/L (ref 94–109)
CO2 SERPL-SCNC: 24 MMOL/L (ref 20–32)
CREAT SERPL-MCNC: 0.53 MG/DL (ref 0.52–1.04)
GFR SERPL CREATININE-BSD FRML MDRD: >90 ML/MIN/1.7M2
GLUCOSE SERPL-MCNC: 93 MG/DL (ref 70–99)
LKM AB TITR SER IF: NORMAL {TITER}
Lab: ABNORMAL
MAGNESIUM SERPL-MCNC: 1.6 MG/DL (ref 1.6–2.3)
PHOSPHATE SERPL-MCNC: 4.6 MG/DL (ref 2.5–4.5)
POTASSIUM SERPL-SCNC: 4.6 MMOL/L (ref 3.4–5.3)
PROT SERPL-MCNC: 7.6 G/DL (ref 6.8–8.8)
SODIUM SERPL-SCNC: 135 MMOL/L (ref 133–144)
SPECIMEN SOURCE: ABNORMAL
TSH SERPL DL<=0.005 MIU/L-ACNC: 1.44 MU/L (ref 0.4–4)
VIT B6 SERPL-MCNC: 85.1 NMOL/L (ref 20–125)

## 2018-12-15 PROCEDURE — 25000132 ZZH RX MED GY IP 250 OP 250 PS 637: Performed by: STUDENT IN AN ORGANIZED HEALTH CARE EDUCATION/TRAINING PROGRAM

## 2018-12-15 PROCEDURE — 84100 ASSAY OF PHOSPHORUS: CPT | Performed by: STUDENT IN AN ORGANIZED HEALTH CARE EDUCATION/TRAINING PROGRAM

## 2018-12-15 PROCEDURE — 84443 ASSAY THYROID STIM HORMONE: CPT | Performed by: STUDENT IN AN ORGANIZED HEALTH CARE EDUCATION/TRAINING PROGRAM

## 2018-12-15 PROCEDURE — 36415 COLL VENOUS BLD VENIPUNCTURE: CPT | Performed by: STUDENT IN AN ORGANIZED HEALTH CARE EDUCATION/TRAINING PROGRAM

## 2018-12-15 PROCEDURE — 80048 BASIC METABOLIC PNL TOTAL CA: CPT | Performed by: STUDENT IN AN ORGANIZED HEALTH CARE EDUCATION/TRAINING PROGRAM

## 2018-12-15 PROCEDURE — 80076 HEPATIC FUNCTION PANEL: CPT | Performed by: STUDENT IN AN ORGANIZED HEALTH CARE EDUCATION/TRAINING PROGRAM

## 2018-12-15 PROCEDURE — 25000128 H RX IP 250 OP 636

## 2018-12-15 PROCEDURE — 12000026 ZZH R&B TRANSPLANT

## 2018-12-15 PROCEDURE — 83735 ASSAY OF MAGNESIUM: CPT | Performed by: STUDENT IN AN ORGANIZED HEALTH CARE EDUCATION/TRAINING PROGRAM

## 2018-12-15 PROCEDURE — 99231 SBSQ HOSP IP/OBS SF/LOW 25: CPT | Mod: GC | Performed by: INTERNAL MEDICINE

## 2018-12-15 RX ORDER — ACETAMINOPHEN 325 MG/1
650 TABLET ORAL EVERY 6 HOURS PRN
Status: DISCONTINUED | OUTPATIENT
Start: 2018-12-15 | End: 2018-12-17

## 2018-12-15 RX ORDER — TRAMADOL HYDROCHLORIDE 50 MG/1
50 TABLET ORAL EVERY 6 HOURS PRN
Status: DISCONTINUED | OUTPATIENT
Start: 2018-12-15 | End: 2018-12-19 | Stop reason: HOSPADM

## 2018-12-15 RX ADMIN — MAGNESIUM OXIDE TAB 400 MG (241.3 MG ELEMENTAL MG) 400 MG: 400 (241.3 MG) TAB at 20:05

## 2018-12-15 RX ADMIN — PRENATAL VIT W/ FE FUMARATE-FA TAB 27-0.8 MG 1 TABLET: 27-0.8 TAB at 09:22

## 2018-12-15 RX ADMIN — B-COMPLEX W/ C & FOLIC ACID TAB 1 TABLET: TAB at 08:30

## 2018-12-15 RX ADMIN — PANTOPRAZOLE SODIUM 40 MG: 40 TABLET, DELAYED RELEASE ORAL at 08:30

## 2018-12-15 RX ADMIN — GABAPENTIN 800 MG: 800 TABLET, FILM COATED ORAL at 20:05

## 2018-12-15 RX ADMIN — Medication 100 MG: at 08:30

## 2018-12-15 RX ADMIN — CEFTRIAXONE SODIUM 2 G: 2 INJECTION, POWDER, FOR SOLUTION INTRAMUSCULAR; INTRAVENOUS at 00:52

## 2018-12-15 RX ADMIN — GABAPENTIN 800 MG: 800 TABLET, FILM COATED ORAL at 08:30

## 2018-12-15 RX ADMIN — PHYTONADIONE 10 MG: 5 TABLET ORAL at 16:00

## 2018-12-15 RX ADMIN — TRAMADOL HYDROCHLORIDE 50 MG: 50 TABLET, COATED ORAL at 20:05

## 2018-12-15 RX ADMIN — ACETAMINOPHEN 650 MG: 325 TABLET, FILM COATED ORAL at 16:00

## 2018-12-15 RX ADMIN — FEXOFENADINE HYDROCHLORIDE 180 MG: 180 TABLET, FILM COATED ORAL at 16:00

## 2018-12-15 ASSESSMENT — ACTIVITIES OF DAILY LIVING (ADL)
ADLS_ACUITY_SCORE: 23
ADLS_ACUITY_SCORE: 23
ADLS_ACUITY_SCORE: 22
ADLS_ACUITY_SCORE: 23
ADLS_ACUITY_SCORE: 23
ADLS_ACUITY_SCORE: 21

## 2018-12-15 ASSESSMENT — PAIN DESCRIPTION - DESCRIPTORS: DESCRIPTORS: ACHING;SHARP

## 2018-12-15 NOTE — PROGRESS NOTES
Brief Inpatient Dermatology Procedure Note    Punch biopsy:  After discussion of benefits and risks including but not limited to bleeding/bruising, pain/swelling, infection, scar, incomplete removal, nerve damage/numbness, recurrence, and non-diagnostic biopsy, written consent, verbal consent and photographs were obtained. Time-out was performed. Ice was applied to the biopsy site for 15 minutes to provide initial anesthesia. The area was cleaned with isopropyl alcohol. 0.5mL of 1% lidocaine with epinephrine was injected to obtain adequate anesthesia of the lesion on the left medial calf. A 4 mm punch biopsy was performed.  4-0 prolene sutures were utilized to approximate the epidermal edges.  White petroleum jelly/VaselineTM and a bandage was applied to the wound.  Explicit verbal and written wound care instructions were provided.  Patient tolerated the procedure well and was left in her room in good condition. The patient was counseled to follow up for suture removal in approximately 14 days (12/28/2018).    Procedure performed with Dr. Culp.     Berry Hollis MD, PhD  Medicine-Dermatology PGY-3    Physician Attestation   I, Sushant Culp, saw this patient with the resident and agree with the resident/fellow's findings and plan of care as documented in the note.      I personally reviewed vital signs, medications and labs.    Extensively counseled patient on risks/benefits of bedside punch biopsy versus more invasive incisional biopsy under general anesthesia in the OR. Patient very tearful, high anxiety. Required reassurance of very low-risk nature of punch biopsy and brevity of procedure. Eventually, patient amenable to this biopsy and tolerated well without overt discomfort.     Sushant Culp MD  Date of Service (when I saw the patient): 12/14/18    Staff Physician Comments:   I saw and evaluated the patient with the resident and I edited the assessment and plan as documented in the note. I was  present for the entire punch biopsy and examination.    Sushant Culp MD   of Dermatology  Department of Dermatology  [facility] [employer]

## 2018-12-15 NOTE — PROGRESS NOTES
[facility] [place]    Progress Note - [facility] 1 Service        Date of Admission:  12/12/2018    Assessment & Plan   Yenifer Maher is a 38 year old female with a history of alcohol withdrawal, alcohol hepatitis and ?wernicke's, and polyneuorpathy 2/2 polynutritional deficiency admitted on 12/12/2018. 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    #Bilateral, migratory painful LE rash, eschar  Per pt, rash initially presented in September as bruising in her inner thigh. Now presents with 3 week hx of migratory, BL symmetric LE eschar with surrounding erythema. Rash pruritic and painful. No trauma hx. Seen by outpatient dermatologist 12/12/18 who attempted bx, patient couldn't tolerate, and sent to [facility] for further work-up. ESR and CRP elevated at outpatient derm visit. On admission 12/12, ESR 97, CRP 3.2. OWEN panel negative. Vasculitis panel negative. RF wnl. C3 and C4 wnl. HIV negative. Initial DDx included vasculitis, LCV, calciphylaxis, possible relation to nutrition status. Per Rheum, vasculitis unlikely, concern for calciphylaxis. Low suspicion for systemic autoimmune condition. 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 12/13 Further workup showed low protein C. Elevated IgG1 and IgM. Factor 5 assay wnl. Cardiolipin panel wnl. SMILEY positive. 12/14 Derm performed punch bx of left medial calf lesion, sent for staining and tissue culture.  - 12/14 4mm punch biopsy of left medial calf lesion  - Follow up with Derm for suture removal, appt 12/28/18  - Cryoglobulin in process  - Dermatology consulted; appreciate recs       -In Process - cyrofibrinogens, lupus anticoagulant, beta2-glycoprotein 1 ab  - Rheumatology consulted; appreciate recs       - In Process - SPEP, UPEP, MMA, homocysteine, antiphospholipid syndrome  workup  - Discontinued oxycodone per pt request. Start Tylenol for pain  - Atarax 10mg itching    #Indirect hyperbilirubinemia  Down trending, US w/ gallbladder sludge but no signs of cholecystitis. LDH and haptoglobin wnl. Gilmer test non-reactive.  - Continue to monitor    #Asymptomatic bacteriuria  UA on admission consistent with UTI. Pt asymptomatic. No fever, chills, flank pain, or dysuria. UCx grew Klebsiella pneumoniae. Received 2g IV ceftriaxone overnight on 12/15. Considering pt asymptomatic, will defer further treatment.    #Hx of alcohol withdrawal  #Hx of Wernicke Encephalopathy(?)  Per outside provider note, prior hospitalization at [facility], 8/23 - 9/14/18, for weakness, edema, and confusion found to have EtOH liver failure. Summary of this admission not in CareEverywhere. Pt could not provide details of hospitalization. Hx of multiple hospitalizations for alcohol withdrawal and possible Wernicke's Encephalopathy. Most recent 1/2018. Pt reports last drink was in August. On admission, Albumin 3.2, T Bili 2.7(direct 0.9). AST, ALT, AlkP wnl. INR 1.39. Hep B and C serologies negative. Iron panel, ceruloplasmin unremarkable. Abdominal ultrasound without evidence of cirrhotic features, but showing possible cavernous transformation versus slow to-and-fro flow in the main and right portal veins. Per d/w hepatology, this is c/w early portal hypertension, would not recommend further w/u or imaging at this time given lack of abd pain and LFTs not terrible (has mild indirect hyperbilirubinemia, recommended following up w/ GI in outpatient setting in 6-8wks   - In process - urine copper  - Thiamine and folate     #Hypomagnesemia  Low magnesium possibly d/t low PO intake. On PTA Mag replacement. Unable to replace Mag by IV d/t burning sensation with infusion and patient refusal   - Continue Mag 400mg BID    #Hyperphosphatemia  #Mild Hypercalcemia  Admission, serum Ca 10.4 (11.0 when corrected for  albumin). Serum Phos 5.2. UA + calcium-oxalate crystals and amorphous crystals. Vit D wnl. PTH low. Per discussion w/ nephrology 12/14, will order 1,25 vit D and TSH. Likely also be related to bone resorption and deconditioning. TSH wnl.   - 1,25 vit D in process    #Polyneuropathy 2/2 polynutritional deficiency  #Severe Malnutrition  #Poor PO intake  Seen by outpatient neurologist 11/20/18 for work-up. Per visit note, patient reports tingling, numbness, and pain in all fingers on both hands as well as numbness and tingling in both feet. Began 8/18. PE notable for wide-based ataxic gait, reduced vibratory sense in feet>ankles and in hands, and reduced pin prick in feet and hands. Distal weakness in hands and feet with reduced ankle reflex. Started tx for polyneuropathy 2/2 polynutritional def. Vit B12 wnl on admission. Pt is vegetarian.  - PT/OT consult  - Continue calorie counts (12/14-12/16); goal of 3721-4700 kcals/day  - Nutrition consulted; appreciate recs  - Labs in process: zinc, vit B1, vit B6, vitamin E  - Continue all PTA supplements: thiamine, vitamin B complex with C, prenatal vitamin, Vit K  - PTA gabapentin for polyneuropathic pain    #Normocytic, Normochromic Anemia  Admission Hgb 10.6. Last recorded Hgb 13.0 11/2017. Iron panel unremarkable. LDH wnl. Haptoglobin wnl. Gilmer test nonreactive. Likely AOCD   - Cont to monitor     Chronic Medical Problems:  #GERD: continue PTA PPI  #HTN: hold PTA metoprolol (patient normotensive/hypotensive on admission)  #Seasonal allergies: continue allegra    Diet: Combination Diet Vegetarian Diet  Snacks/Supplements Adult: Boost Breeze; Between Meals  Calorie Counts  Fluids: PO  Lines: PIV  DVT Prophylaxis: Enoxaparin (Lovenox) SQ  Chavez Catheter: not present  Code Status: Full Code[unfilled]    Disposition Plan   Expected discharge: 2 - 3 days, recommended to TCU once skin bx obtained  Entered: Berry Stanley 12/15/2018, 9:00 AM     The patient's care was discussed  with the Attending Physician, Dr. Gloria Stanley  Medical Student  [facility], [facility] 1 Service  Pager: [phone]      Loc Ashby MD  Internal Medicine, PGY1  Pager [phone]    ______________________________________________________________________    Interval History   Nursing notes reviewed. Received Ceftriaxone x1 for UCx positive for Klebsiella. She continues to deny any urinary symptoms of dysuria, urgency, frequency. Today, she reports continued pain in her thighs and new pain at her biopsy site. She has not been taking her PRN oxycodone and was hoping to try a non-opioid analgesic. She reports her appetite has been good.    She denies fever, chills, nausea, vomiting, diarrhea, CP, or SOB.    Data reviewed today: I reviewed all medications, new labs and imaging results over the last 24 hours. I personally reviewed the abdominal ultrasound image(s) showing 1.  Possible cavernous transformation versus slow to-and-fro flow in.    Physical Exam   Vital Signs: Temp: 98.7  F (37.1  C) Temp src: Oral BP: 114/69 Pulse: 90 Heart Rate: 86 Resp: 14 SpO2: 96 % O2 Device: None (Room air)    Weight: 136 lbs 3.91 oz  Constitutional: laying in bed, NAD  Eyes: nonicteric slcera, EOMI  Respiratory: CTAB  Cardiovascular: RRR, normal S1,S2. No extra heart sounds or murmurs  GI: active BS, soft, nontender, nondistended  Skin: eschar with surrounding salmon colored erythema located on both inner thighs. No change compared to 12/13.No excoriative pattern. 3-4 cm plaque with purple discoloration at center and surrounding salmon-colored erythema located on left, medial calf. Bx taken from this lesion. Band-aid over bx site.  Musculoskeletal: moves all extremities independently  Neurologic: AAOx3, loses train of thought easily    Data   Recent Labs   Lab 12/15/18  0645 12/14/18  0851 12/13/18  0833 12/13/18  0017   WBC  --  7.2 6.2 5.9   HGB  --  10.4* 10.1* 10.6*   MCV  --  93 92 95   PLT  --  222  246 259   INR  --  1.35* 1.39*  --     135 137 136   POTASSIUM 4.6 4.2 4.2 4.0   CHLORIDE 101 100 104 104   CO2 24 24 24 22   BUN 8 10 10 9   CR 0.53 0.55 0.49* 0.48*   ANIONGAP 11 12 9 10   MARKO 10.0 10.2* 10.4* 10.2*   GLC 93 104* 92 127*   ALBUMIN  --  3.4 3.2* 3.2*   PROTTOTAL  --  8.1 7.8 7.9   BILITOTAL  --  2.4* 2.7* 2.5*   ALKPHOS  --  120 113 118   ALT  --  18 19 20   AST  --  40 41 40     No results found for this or any previous visit (from the past 24 hour(s)).

## 2018-12-15 NOTE — PLAN OF CARE
"Patient tearful at times complaining of moderate right and left leg pain. She does not take her medications in a timely manner, states needing food to take them with but then does not order food or take after eating. She declines interventions to help with pain, ice/ bacitracin, vaseline gauze, etc. After much encouragement patient agreed to take Tylenol, has new order for Tramadol but falls asleep before medication can be given.  Patient reports more pain/burning when getting up to the bathroom. She also reports her fingers and hands are \"locked up\" at times, but is vague about symptoms.  "

## 2018-12-15 NOTE — PROGRESS NOTES
Progress Note:    Hospital Day: 4  Date of Initial SW Assessment: 12/13/18    Data: Pt is a 37 y/o female admitted to . She admitted from [facility], but does want to return there. TCU referrals made. Pt is medically ready for discharge, per medical team.      Intervention: SW followed up on TCU referrals:    [facility] [phone]: Left VM with DON.     [facility] (Weekend p: [phone]; f: [phone]): declined due to alcohol abuse.    [facility] Admissions: [phone]: No admissions on weekend. Requested that SW follow up on Monday.    [facility] (Admissions: [phone]): Left VM. No admissions this weekend.     [facility]: [phone]: Left VM. No weekend admissions.     12:15 PM: MAI met with pt. Updated on TCU referrals and decline by [facility] and the reason why. Pt discussed concern around this, as she works at [employer], and said that she was not aware her H&P would be sent to them. SW explained the TCU referral process. Pt said she did not feel she was fully informed. SW requested additional options for TCU's and provided pt with list of facilities near her home. Pt said that she would like her mom to provide SW with these options and left a VM for her mom with MAI's pager number.     2:20 PM: MAI met with pt. She called her mom who said that they would like to wait to hear from the TCU facilities they chose before looking for other facilities. SW let them know that we will likely not hear until Monday.     Assessment: Pt medically ready to discharge, per medical team. Pt will discharge to TCU. TCU referrals pending, but most are not open on the weekend. Pt frustrated that she was not informed about the medical information that would be sent to [employer] (her employer). Pt and her mom did not want to provide additional TCU choices until we hear from the ones they've chosen.      Plan:  -Discharge plans in progress: TCU referrals pending  -Barriers to discharge plan: Placement at TCU  -Follow up plan: SW will continue to follow to assist with discharge plan.     SOPHIA Elizabeth, LGSW  [facility]  [phone]

## 2018-12-15 NOTE — PLAN OF CARE
"/59 (BP Location: Right arm)   Pulse 90   Temp 99.2  F (37.3  C) (Oral)   Resp 16   Ht 1.702 m (5' 7\")   Wt 61.8 kg (136 lb 3.9 oz)   SpO2 94%   BMI 21.34 kg/m       VSS on RA. Patient endorses pain at wound sites with movement and at rest. Given PRN atarax x 1 for itching. Patient denies nausea. Punch biopsy collected at 1900 on 12/14. KIRA wounds covered with vaseline gauze per dermatology MD request. Urine Output - 200 mL orange, clear. No BM. Nutrition - vegetarian diet, fair appetite. L PIV NS locked. 1x dose of rocephin given for +urine culture for Klebsiella pneumoniae colonies. See related provider notification. Activity - up with walker + SBA. Endorses sore knees, encouraged to ambulate to maintain muscular function. Slept soundly through the night. Continue to monitor and update team with acute changes.     "

## 2018-12-15 NOTE — PROGRESS NOTES
Calorie Counts  Intake recorded for: 12/14 Total Kcals: 199  Total Protein: 8g  Kcals from Hospital Food: 199   Protein: 8g  Kcals from Outside Food: 0   Protein: 0g  # Meals recorded: 50% garlic maria alejandra tofu stirfry  # Supplements recorded: 0

## 2018-12-16 ENCOUNTER — APPOINTMENT (OUTPATIENT)
Dept: PHYSICAL THERAPY | Facility: CLINIC | Age: 38
DRG: 640 | End: 2018-12-16
Attending: INTERNAL MEDICINE
Payer: COMMERCIAL

## 2018-12-16 LAB
ALBUMIN SERPL-MCNC: 3.1 G/DL (ref 3.4–5)
ALP SERPL-CCNC: 108 U/L (ref 40–150)
ALT SERPL W P-5'-P-CCNC: 16 U/L (ref 0–50)
ANION GAP SERPL CALCULATED.3IONS-SCNC: 10 MMOL/L (ref 3–14)
AST SERPL W P-5'-P-CCNC: 35 U/L (ref 0–45)
BILIRUB SERPL-MCNC: 2 MG/DL (ref 0.2–1.3)
BUN SERPL-MCNC: 10 MG/DL (ref 7–30)
CALCIUM SERPL-MCNC: 10 MG/DL (ref 8.5–10.1)
CH50 SERPL-ACNC: 130 CAE UNITS (ref 60–144)
CHLORIDE SERPL-SCNC: 102 MMOL/L (ref 94–109)
CO2 SERPL-SCNC: 23 MMOL/L (ref 20–32)
COLLECT DURATION TIME UR: ABNORMAL HR
COPPER 24H UR-MRATE: ABNORMAL UG/D (ref 3–45)
COPPER ?TM UR-MCNC: 3.5 UG/DL (ref 0.3–3.2)
COPPER/CREAT 24H UR: 15.8 UG/G CRT (ref 10–45)
CREAT 24H UR-MRATE: ABNORMAL MG/D (ref 700–1600)
CREAT SERPL-MCNC: 0.44 MG/DL (ref 0.52–1.04)
CREAT UR-MCNC: 222 MG/DL
GFR SERPL CREATININE-BSD FRML MDRD: >90 ML/MIN/1.7M2
GLUCOSE SERPL-MCNC: 95 MG/DL (ref 70–99)
IGD SER-MCNC: 2.5 MG/DL
PHOSPHATE SERPL-MCNC: 4.6 MG/DL (ref 2.5–4.5)
PLATELET # BLD AUTO: 201 10E9/L (ref 150–450)
POTASSIUM SERPL-SCNC: 4.2 MMOL/L (ref 3.4–5.3)
PROT SERPL-MCNC: 7.5 G/DL (ref 6.8–8.8)
SODIUM SERPL-SCNC: 136 MMOL/L (ref 133–144)
SPECIMEN VOL ?TM UR: ABNORMAL ML
VIT B1 SERPL-MCNC: 18 NMOL/L (ref 4–15)

## 2018-12-16 PROCEDURE — 86225 DNA ANTIBODY NATIVE: CPT | Performed by: STUDENT IN AN ORGANIZED HEALTH CARE EDUCATION/TRAINING PROGRAM

## 2018-12-16 PROCEDURE — 36415 COLL VENOUS BLD VENIPUNCTURE: CPT | Performed by: STUDENT IN AN ORGANIZED HEALTH CARE EDUCATION/TRAINING PROGRAM

## 2018-12-16 PROCEDURE — 85049 AUTOMATED PLATELET COUNT: CPT | Performed by: STUDENT IN AN ORGANIZED HEALTH CARE EDUCATION/TRAINING PROGRAM

## 2018-12-16 PROCEDURE — 25000132 ZZH RX MED GY IP 250 OP 250 PS 637: Performed by: STUDENT IN AN ORGANIZED HEALTH CARE EDUCATION/TRAINING PROGRAM

## 2018-12-16 PROCEDURE — 80053 COMPREHEN METABOLIC PANEL: CPT | Performed by: STUDENT IN AN ORGANIZED HEALTH CARE EDUCATION/TRAINING PROGRAM

## 2018-12-16 PROCEDURE — 99231 SBSQ HOSP IP/OBS SF/LOW 25: CPT | Mod: GC | Performed by: INTERNAL MEDICINE

## 2018-12-16 PROCEDURE — 97530 THERAPEUTIC ACTIVITIES: CPT | Mod: GP

## 2018-12-16 PROCEDURE — 12000026 ZZH R&B TRANSPLANT

## 2018-12-16 PROCEDURE — 40000193 ZZH STATISTIC PT WARD VISIT

## 2018-12-16 PROCEDURE — 82652 VIT D 1 25-DIHYDROXY: CPT | Performed by: STUDENT IN AN ORGANIZED HEALTH CARE EDUCATION/TRAINING PROGRAM

## 2018-12-16 PROCEDURE — 84100 ASSAY OF PHOSPHORUS: CPT | Performed by: STUDENT IN AN ORGANIZED HEALTH CARE EDUCATION/TRAINING PROGRAM

## 2018-12-16 RX ORDER — IBUPROFEN 400 MG/1
400 TABLET, FILM COATED ORAL EVERY 6 HOURS PRN
Status: DISCONTINUED | OUTPATIENT
Start: 2018-12-16 | End: 2018-12-18

## 2018-12-16 RX ORDER — OXYCODONE HYDROCHLORIDE 5 MG/1
5 TABLET ORAL
Status: COMPLETED | OUTPATIENT
Start: 2018-12-16 | End: 2018-12-16

## 2018-12-16 RX ADMIN — FEXOFENADINE HYDROCHLORIDE 180 MG: 180 TABLET, FILM COATED ORAL at 07:56

## 2018-12-16 RX ADMIN — GABAPENTIN 800 MG: 800 TABLET, FILM COATED ORAL at 21:09

## 2018-12-16 RX ADMIN — HYDROXYZINE HYDROCHLORIDE 10 MG: 10 TABLET ORAL at 14:39

## 2018-12-16 RX ADMIN — MAGNESIUM OXIDE TAB 400 MG (241.3 MG ELEMENTAL MG) 400 MG: 400 (241.3 MG) TAB at 07:56

## 2018-12-16 RX ADMIN — TRAMADOL HYDROCHLORIDE 50 MG: 50 TABLET, COATED ORAL at 18:53

## 2018-12-16 RX ADMIN — GABAPENTIN 800 MG: 800 TABLET, FILM COATED ORAL at 07:56

## 2018-12-16 RX ADMIN — B-COMPLEX W/ C & FOLIC ACID TAB 1 TABLET: TAB at 07:56

## 2018-12-16 RX ADMIN — Medication 100 MG: at 07:56

## 2018-12-16 RX ADMIN — HYDROXYZINE HYDROCHLORIDE 10 MG: 10 TABLET ORAL at 21:08

## 2018-12-16 RX ADMIN — GABAPENTIN 800 MG: 800 TABLET, FILM COATED ORAL at 14:39

## 2018-12-16 RX ADMIN — IBUPROFEN 400 MG: 400 TABLET ORAL at 11:50

## 2018-12-16 RX ADMIN — PANTOPRAZOLE SODIUM 40 MG: 40 TABLET, DELAYED RELEASE ORAL at 07:56

## 2018-12-16 RX ADMIN — TRAMADOL HYDROCHLORIDE 50 MG: 50 TABLET, COATED ORAL at 09:43

## 2018-12-16 RX ADMIN — MAGNESIUM OXIDE TAB 400 MG (241.3 MG ELEMENTAL MG) 400 MG: 400 (241.3 MG) TAB at 21:08

## 2018-12-16 RX ADMIN — TRAMADOL HYDROCHLORIDE 50 MG: 50 TABLET, COATED ORAL at 04:11

## 2018-12-16 RX ADMIN — OXYCODONE HYDROCHLORIDE 5 MG: 5 TABLET ORAL at 01:13

## 2018-12-16 RX ADMIN — POLYETHYLENE GLYCOL 3350 17 G: 17 POWDER, FOR SOLUTION ORAL at 18:53

## 2018-12-16 RX ADMIN — IBUPROFEN 400 MG: 400 TABLET ORAL at 21:08

## 2018-12-16 RX ADMIN — HYDROXYZINE HYDROCHLORIDE 10 MG: 10 TABLET ORAL at 04:11

## 2018-12-16 RX ADMIN — PRENATAL VIT W/ FE FUMARATE-FA TAB 27-0.8 MG 1 TABLET: 27-0.8 TAB at 07:56

## 2018-12-16 ASSESSMENT — ACTIVITIES OF DAILY LIVING (ADL)
ADLS_ACUITY_SCORE: 21
ADLS_ACUITY_SCORE: 22
ADLS_ACUITY_SCORE: 21
ADLS_ACUITY_SCORE: 22
ADLS_ACUITY_SCORE: 23
ADLS_ACUITY_SCORE: 21

## 2018-12-16 NOTE — PLAN OF CARE
"BP 99/56 (BP Location: Right arm)   Pulse 90   Temp 98.5  F (36.9  C) (Oral)   Resp 16   Ht 1.702 m (5' 7\")   Wt 61.8 kg (136 lb 3.9 oz)   SpO2 95%   BMI 21.34 kg/m       Hypotensive, AOVSS on RA. Patient endorses intermittent KIRA LE pain along backside of quadriceps & inner thighs. Given PRN tramadol 50 mg x 2 & 1x dose of oxycodone 5 mg x 1 with little relief. Given PRN atarax 10 mg for itchiness. Wound on inner R thigh streaking up towards groin, MD called & rounded. Redness outlined. Redness appears to have decreased by AM. Dressings on KIRA upper thighs changed, biopsy site bandage replaced.  mL, yellow-orange. Bowel Function - last BM 12/12. Nutrition - vegetarian diet, calorie counts. L PIV NS locked, stinging on flush. Activity - up with SBA assist/assist x 1. Encouraged to ambulate to help with stiff joints and to maintain strength.      "

## 2018-12-16 NOTE — PLAN OF CARE
PT -   Discharge Planner PT   Patient plan for discharge: TCU  Current status: Pt continues to be in a significant amount of pain. Pt completed supine>sit with min A. Pt completed sit>stands x 2 with FWW and min A. Pt stood for 10 seconds and 5 seconds but needed to return to sitting 2/2 intense pain. Pt eventually became tearful and needed to return to bed. Pt able to scoot on elbows for repositioning but eventually required total boost to HOB.   Barriers to return to prior living situation: Medical status, pain, decreased activity tolerance, decreased functional mobility  Recommendations for discharge: Per plan established by the PhysicalTherapist, the recommendation for discharge location is TCU  Rationale for recommendations: Pt is significantly below baseline mobility and would benefit from continued therapy to progress gait, endurance, and strength once pain in managed.   Entered by: Aleena Soriaon 12/16/2018 11:55 AM

## 2018-12-16 NOTE — PROGRESS NOTES
[facility], [place]    Progress Note - Sundar 1 Service        Date of Admission:  12/12/2018    Assessment & Plan   Yenifer Maher is a 38 year old female with a history of alcohol withdrawal, alcohol hepatitis and ?wernicke's, and polyneuorpathy 2/2 polynutritional deficiency admitted on 12/12/2018. She presented from an outpatient dermatology clinic for further work-up of 3 week history of a painful, bilateral, LE necrotic/eschar rash concerning for calciphylaxis vs. vasculitis.     #Bilateral, migratory painful LE rash, eschar  Per pt, rash initially presented in September as bruising in her inner thigh. Presented with 3 week hx of migratory, BL symmetric LE eschar with surrounding erythema. Rash pruritic and painful. No trauma hx. Seen by outpatient dermatologist 12/12/18 who attempted bx, patient couldn't tolerate, and sent to [facility] for further work-up. ESR and CRP elevated at outpatient derm visit. On admission 12/12, ESR 97, CRP 3.2. OWEN panel negative. Vasculitis panel negative. RF wnl. C3 and C4 wnl. HIV negative. Initial DDx included vasculitis, LCV, calciphylaxis, possible relation to nutrition status. Per Rheum, vasculitis unlikely, concern for calciphylaxis. Low suspicion for systemic autoimmune condition. 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 12/13 Further workup showed low protein C. Elevated IgG1 and IgM. Factor 5 assay wnl. Cardiolipin panel wnl. SMILEY positive. 12/14 Derm performed punch bx of left medial calf lesion, sent for staining and tissue culture.  - Close follow-up with Derm for suture removal, path results, treatment plan  - Cryoglobulin in process  - Dermatology consulted; appreciate recs       -In Process - cyrofibrinogens, lupus anticoagulant, beta2-glycoprotein 1 ab  - Rheumatology consulted; appreciate recs       - In Process - SPEP, UPEP, MMA, homocysteine, antiphospholipid syndrome workup  - Increasing pain today at  medial thigh site - discontinue tramadol, added dilaudid PRN for pain  - Atarax 10mg itching    #Indirect hyperbilirubinemia  #Possible cavernous transformation in Portal vein  Down trending, US w/ gallbladder sludge but no signs of cholecystitis. LDH and haptoglobin wnl. Gilmer test non-reactive. Trending down. Abdominal ultrasound without evidence of cirrhotic features, but showing possible cavernous transformation versus slow to-and-fro flow in the main and right portal veins. Per d/w hepatology, this is c/w early portal hypertension, would not recommend further w/u or imaging at this time given lack of abd pain and LFTs not terrible.   - Following up w/ GI in outpatient setting in 6-8wks  - In process - urine copper  - Continue to monitor    #Hx of alcohol withdrawal  #Hx of Wernicke Encephalopathy(?)  Per outside provider note, prior hospitalization at [facility], 8/23 - 9/14/18, for weakness, edema, and confusion found to have EtOH liver failure. Summary of this admission not in CareEverywhere. Pt could not provide details of hospitalization. Hx of multiple hospitalizations for alcohol withdrawal and possible Wernicke's Encephalopathy. Most recent 1/2018. Pt reports last drink was in August. On admission, Albumin 3.2, T Bili 2.7(direct 0.9). AST, ALT, AlkP wnl. INR 1.39. Hep B and C serologies negative. Iron panel, ceruloplasmin unremarkable. - Thiamine and folate     #Hypomagnesemia  Low magnesium possibly d/t low PO intake. On PTA Mag replacement. Unable to replace Mag by IV d/t burning sensation with infusion and patient refusal   - Continue Mag 400mg BID    #Hyperphosphatemia  #Mild Hypercalcemia  Admission, serum Ca 10.4 (11.0 when corrected for albumin). Serum Phos 5.2. UA + calcium-oxalate crystals and amorphous crystals. Vit D wnl. PTH low. Per discussion w/ nephrology 12/14, will order 1,25 vit D and TSH. Likely also be related to bone resorption and deconditioning. TSH wnl.   - 1,25 vit  D in process  - CTM after discharge     #Polyneuropathy 2/2 polynutritional deficiency  #Severe Malnutrition  #Poor PO intake  Seen by outpatient neurologist 11/20/18 for work-up. Per visit note, patient reports tingling, numbness, and pain in all fingers on both hands as well as numbness and tingling in both feet. Began 8/18. PE notable for wide-based ataxic gait, reduced vibratory sense in feet>ankles and in hands, and reduced pin prick in feet and hands. Distal weakness in hands and feet with reduced ankle reflex. Started tx for polyneuropathy 2/2 polynutritional def. Vit B12 wnl on admission. Pt is vegetarian.  - PT/OT consulted   - Continue calorie counts (12/14-12/16); goal of 6135-9261 kcals/day  - Nutrition consulted; appreciate recs  - Labs in process: zinc, vit B1, vit B6, vitamin E  - Continue all PTA supplements: thiamine, vitamin B complex with C, prenatal vitamin, Vit K  - PTA gabapentin for polyneuropathic pain    #Asymptomatic bacteriuria  UA on admission with pyuria. Pt asymptomatic. No fever, chills, flank pain, or dysuria or urgency/frequency. UCx grew Klebsiella pneumoniae. Received 2g IV ceftriaxone overnight on 12/15. Considering pt asymptomatic, will defer further treatment.    #Normocytic, Normochromic Anemia  Admission Hgb 10.6. Last recorded Hgb 13.0 11/2017. Iron panel unremarkable. LDH wnl. Haptoglobin wnl. Gilmer test nonreactive. Likely AOCD   - Cont to monitor     Chronic Medical Problems:  #GERD: continue PTA PPI  #HTN: hold PTA metoprolol (patient normotensive/hypotensive on admission)  #Seasonal allergies: continue allegra    Diet: Combination Diet Vegetarian Diet  Snacks/Supplements Adult: Boost Breeze; Between Meals  Calorie Counts  Fluids: PO  Lines: PIV  DVT Prophylaxis: Enoxaparin (Lovenox) SQ  Chavez Catheter: not present  Code Status: Full Code    Disposition Plan   Expected discharge: 2 - 3 days, pending TCU placement  Entered: Lco Ashby MD 12/16/2018, 1:17 PM     The  patient's care was discussed with the Attending Physician, Dr. Martins.      Loc Ashby MD  Internal Medicine, PGY1  [facility], [facility] 1 Service  Pager [phone]    Physician Attestation  I, Melyssa Martins MD, saw this patient with the resident and agree with the resident s findings and plan of care as documented in the resident s note with my edits.     I personally reviewed vital signs, medications, labs and imaging.    Melyssa Martins MD  Date of Service (when I saw the patient): 12/16/18      ______________________________________________________________________    Interval History   Nursing notes reviewed. Patient had significant pain overnight, was given oxy 5mg x 1 dose with some relief. Seen and examined at bedside today. States that rash changed in color overnight to purple, now red but with increased erythema. Was outlined overnight, remains within outlined borders. Patient continues to experience sharp, intermittent pain at rash site in medial thighs. Tearful from pain this morning. Appreciates nausea, secondary to the pain. No vomiting or abdominal pain, no chest pain, SOB, fevers, chills. Painful to ambulate.       Data reviewed today: I reviewed all medications, new labs and imaging results over the last 24 hours. I personally reviewed the abdominal ultrasound image(s) showing 1.  Possible cavernous transformation versus slow to-and-fro flow in.    Physical Exam   Vital Signs: Temp: 98.5  F (36.9  C) Temp src: Oral BP: 114/61 Pulse: 81 Heart Rate: 87 Resp: 16 SpO2: 94 % O2 Device: None (Room air)    Weight: 136 lbs 3.91 oz  Constitutional: laying in bed, in acute distress 2/2 pain, tearful  Eyes: nonicteric slcera, EOMI  Respiratory: CTAB  Cardiovascular: RRR, normal S1,S2. No extra heart sounds or murmurs  GI: active BS, soft, nontender, nondistended  Skin: eschar with surrounding salmon colored erythema located on both inner thighs. Increased erythema today, no warmth.  Slightly increased from 12/14. Borders outlined 12/15, without expansion today. No excoriative pattern.   LLE: 3-4 cm plaque with purple discoloration at center and surrounding salmon-colored erythema located on left, medial calf. Bx taken from this lesion. Band-aid over bx site.  Musculoskeletal: moves all extremities independently  Neurologic: AAOx3, loses train of thought easily    Data   Recent Labs   Lab 12/16/18  0730 12/15/18  0645 12/14/18  0851 12/13/18  0833 12/13/18  0017   WBC  --   --  7.2 6.2 5.9   HGB  --   --  10.4* 10.1* 10.6*   MCV  --   --  93 92 95     --  222 246 259   INR  --   --  1.35* 1.39*  --     135 135 137 136   POTASSIUM 4.2 4.6 4.2 4.2 4.0   CHLORIDE 102 101 100 104 104   CO2 23 24 24 24 22   BUN 10 8 10 10 9   CR 0.44* 0.53 0.55 0.49* 0.48*   ANIONGAP 10 11 12 9 10   MARKO 10.0 10.0 10.2* 10.4* 10.2*   GLC 95 93 104* 92 127*   ALBUMIN 3.1* 3.2* 3.4 3.2* 3.2*   PROTTOTAL 7.5 7.6 8.1 7.8 7.9   BILITOTAL 2.0* 2.0* 2.4* 2.7* 2.5*   ALKPHOS 108 110 120 113 118   ALT 16 17 18 19 20   AST 35 37 40 41 40     No results found for this or any previous visit (from the past 24 hour(s)).

## 2018-12-16 NOTE — PROGRESS NOTES
Calorie Count  Intake recorded for: 12/15  Kcals: 500  Protein: 15g  kcals from Hospital Food: 0  Protein: 9g  Kcals from Outside Food: 250  Protein: 6g  # Meals Recorded: 100% 1 pasta salad  # Supplements Recorded: 100% 1 Breeze

## 2018-12-17 ENCOUNTER — APPOINTMENT (OUTPATIENT)
Dept: PHYSICAL THERAPY | Facility: CLINIC | Age: 38
DRG: 640 | End: 2018-12-17
Attending: INTERNAL MEDICINE
Payer: COMMERCIAL

## 2018-12-17 ENCOUNTER — APPOINTMENT (OUTPATIENT)
Dept: OCCUPATIONAL THERAPY | Facility: CLINIC | Age: 38
DRG: 640 | End: 2018-12-17
Attending: INTERNAL MEDICINE
Payer: COMMERCIAL

## 2018-12-17 LAB
ALBUMIN SERPL ELPH-MCNC: 3.9 G/DL (ref 3.7–5.1)
ALBUMIN SERPL-MCNC: 3 G/DL (ref 3.4–5)
ALP SERPL-CCNC: 110 U/L (ref 40–150)
ALPHA1 GLOB SERPL ELPH-MCNC: 0.3 G/DL (ref 0.2–0.4)
ALPHA2 GLOB SERPL ELPH-MCNC: 0.8 G/DL (ref 0.5–0.9)
ALT SERPL W P-5'-P-CCNC: 15 U/L (ref 0–50)
ANION GAP SERPL CALCULATED.3IONS-SCNC: 11 MMOL/L (ref 3–14)
AST SERPL W P-5'-P-CCNC: 34 U/L (ref 0–45)
B-GLOBULIN SERPL ELPH-MCNC: 0.9 G/DL (ref 0.6–1)
B2 GLYCOPROT1 IGG SERPL IA-ACNC: 0.7 U/ML
B2 GLYCOPROT1 IGM SERPL IA-ACNC: 4.8 U/ML
BILIRUB SERPL-MCNC: 2 MG/DL (ref 0.2–1.3)
BUN SERPL-MCNC: 13 MG/DL (ref 7–30)
CALCIUM SERPL-MCNC: 10.3 MG/DL (ref 8.5–10.1)
CHLORIDE SERPL-SCNC: 102 MMOL/L (ref 94–109)
CO2 SERPL-SCNC: 22 MMOL/L (ref 20–32)
CREAT SERPL-MCNC: 0.54 MG/DL (ref 0.52–1.04)
DEPRECATED CALCIDIOL+CALCIFEROL SERPL-MC: <26 UG/L (ref 20–75)
DSDNA AB SER-ACNC: 1 IU/ML
ERYTHROCYTE [DISTWIDTH] IN BLOOD BY AUTOMATED COUNT: 13.6 % (ref 10–15)
GAMMA GLOB SERPL ELPH-MCNC: 1.7 G/DL (ref 0.7–1.6)
GFR SERPL CREATININE-BSD FRML MDRD: >90 ML/MIN/1.7M2
GLUCOSE SERPL-MCNC: 86 MG/DL (ref 70–99)
HCT VFR BLD AUTO: 29.1 % (ref 35–47)
HGB BLD-MCNC: 9.5 G/DL (ref 11.7–15.7)
LA PPP-IMP: POSITIVE
M PROTEIN SERPL ELPH-MCNC: 0 G/DL
MAGNESIUM SERPL-MCNC: 1.6 MG/DL (ref 1.6–2.3)
MCH RBC QN AUTO: 30.4 PG (ref 26.5–33)
MCHC RBC AUTO-ENTMCNC: 32.6 G/DL (ref 31.5–36.5)
MCV RBC AUTO: 93 FL (ref 78–100)
METHYLMALONATE SERPL-SCNC: 0.18 UMOL/L (ref 0–0.4)
PHOSPHATE SERPL-MCNC: 5.9 MG/DL (ref 2.5–4.5)
PLATELET # BLD AUTO: 203 10E9/L (ref 150–450)
POTASSIUM SERPL-SCNC: 4.4 MMOL/L (ref 3.4–5.3)
PROT PATTERN SERPL ELPH-IMP: ABNORMAL
PROT PATTERN UR ELPH-IMP: NORMAL
PROT SERPL-MCNC: 7.2 G/DL (ref 6.8–8.8)
RBC # BLD AUTO: 3.12 10E12/L (ref 3.8–5.2)
SODIUM SERPL-SCNC: 135 MMOL/L (ref 133–144)
VITAMIN D2 SERPL-MCNC: <5 UG/L
VITAMIN D3 SERPL-MCNC: 21 UG/L
WBC # BLD AUTO: 4.5 10E9/L (ref 4–11)
ZINC SERPL-MCNC: 68 UG/DL (ref 60–120)

## 2018-12-17 PROCEDURE — 25000132 ZZH RX MED GY IP 250 OP 250 PS 637: Performed by: STUDENT IN AN ORGANIZED HEALTH CARE EDUCATION/TRAINING PROGRAM

## 2018-12-17 PROCEDURE — 97110 THERAPEUTIC EXERCISES: CPT | Mod: GP

## 2018-12-17 PROCEDURE — 99222 1ST HOSP IP/OBS MODERATE 55: CPT | Mod: GC | Performed by: INTERNAL MEDICINE

## 2018-12-17 PROCEDURE — 97535 SELF CARE MNGMENT TRAINING: CPT | Mod: GO

## 2018-12-17 PROCEDURE — 85027 COMPLETE CBC AUTOMATED: CPT | Performed by: STUDENT IN AN ORGANIZED HEALTH CARE EDUCATION/TRAINING PROGRAM

## 2018-12-17 PROCEDURE — 80053 COMPREHEN METABOLIC PANEL: CPT | Performed by: STUDENT IN AN ORGANIZED HEALTH CARE EDUCATION/TRAINING PROGRAM

## 2018-12-17 PROCEDURE — 40000193 ZZH STATISTIC PT WARD VISIT

## 2018-12-17 PROCEDURE — 97530 THERAPEUTIC ACTIVITIES: CPT | Mod: GO

## 2018-12-17 PROCEDURE — 40000133 ZZH STATISTIC OT WARD VISIT

## 2018-12-17 PROCEDURE — 83735 ASSAY OF MAGNESIUM: CPT | Performed by: STUDENT IN AN ORGANIZED HEALTH CARE EDUCATION/TRAINING PROGRAM

## 2018-12-17 PROCEDURE — 36415 COLL VENOUS BLD VENIPUNCTURE: CPT | Performed by: STUDENT IN AN ORGANIZED HEALTH CARE EDUCATION/TRAINING PROGRAM

## 2018-12-17 PROCEDURE — 25000132 ZZH RX MED GY IP 250 OP 250 PS 637: Performed by: INTERNAL MEDICINE

## 2018-12-17 PROCEDURE — 97530 THERAPEUTIC ACTIVITIES: CPT | Mod: GP

## 2018-12-17 PROCEDURE — 84100 ASSAY OF PHOSPHORUS: CPT | Performed by: STUDENT IN AN ORGANIZED HEALTH CARE EDUCATION/TRAINING PROGRAM

## 2018-12-17 PROCEDURE — 12000026 ZZH R&B TRANSPLANT

## 2018-12-17 PROCEDURE — 99232 SBSQ HOSP IP/OBS MODERATE 35: CPT | Mod: GC | Performed by: INTERNAL MEDICINE

## 2018-12-17 RX ORDER — OXYCODONE HYDROCHLORIDE 5 MG/1
5 TABLET ORAL ONCE
Status: DISCONTINUED | OUTPATIENT
Start: 2018-12-17 | End: 2018-12-17

## 2018-12-17 RX ORDER — NORTRIPTYLINE HCL 10 MG
10 CAPSULE ORAL AT BEDTIME
Status: DISCONTINUED | OUTPATIENT
Start: 2018-12-17 | End: 2018-12-17

## 2018-12-17 RX ORDER — ACETAMINOPHEN 325 MG/1
650 TABLET ORAL EVERY 6 HOURS
Status: DISCONTINUED | OUTPATIENT
Start: 2018-12-17 | End: 2018-12-18

## 2018-12-17 RX ORDER — OXYCODONE HYDROCHLORIDE 5 MG/1
5 TABLET ORAL
Status: COMPLETED | OUTPATIENT
Start: 2018-12-17 | End: 2018-12-17

## 2018-12-17 RX ORDER — NORTRIPTYLINE HCL 10 MG
10 CAPSULE ORAL AT BEDTIME
Status: DISPENSED | OUTPATIENT
Start: 2018-12-17 | End: 2018-12-18

## 2018-12-17 RX ADMIN — TRAMADOL HYDROCHLORIDE 50 MG: 50 TABLET, COATED ORAL at 05:34

## 2018-12-17 RX ADMIN — TRAMADOL HYDROCHLORIDE 50 MG: 50 TABLET, COATED ORAL at 20:57

## 2018-12-17 RX ADMIN — OXYCODONE HYDROCHLORIDE 5 MG: 5 TABLET ORAL at 08:27

## 2018-12-17 RX ADMIN — B-COMPLEX W/ C & FOLIC ACID TAB 1 TABLET: TAB at 08:21

## 2018-12-17 RX ADMIN — PANTOPRAZOLE SODIUM 40 MG: 40 TABLET, DELAYED RELEASE ORAL at 08:21

## 2018-12-17 RX ADMIN — ACETAMINOPHEN 650 MG: 325 TABLET, FILM COATED ORAL at 22:31

## 2018-12-17 RX ADMIN — MAGNESIUM OXIDE TAB 400 MG (241.3 MG ELEMENTAL MG) 400 MG: 400 (241.3 MG) TAB at 20:57

## 2018-12-17 RX ADMIN — TRAMADOL HYDROCHLORIDE 50 MG: 50 TABLET, COATED ORAL at 11:15

## 2018-12-17 RX ADMIN — ACETAMINOPHEN 650 MG: 325 TABLET, FILM COATED ORAL at 11:17

## 2018-12-17 RX ADMIN — IBUPROFEN 400 MG: 400 TABLET ORAL at 06:22

## 2018-12-17 RX ADMIN — GABAPENTIN 800 MG: 800 TABLET, FILM COATED ORAL at 08:21

## 2018-12-17 RX ADMIN — ACETAMINOPHEN 650 MG: 325 TABLET, FILM COATED ORAL at 16:26

## 2018-12-17 RX ADMIN — HYDROXYZINE HYDROCHLORIDE 10 MG: 10 TABLET ORAL at 06:22

## 2018-12-17 RX ADMIN — FEXOFENADINE HYDROCHLORIDE 180 MG: 180 TABLET, FILM COATED ORAL at 08:22

## 2018-12-17 RX ADMIN — MAGNESIUM OXIDE TAB 400 MG (241.3 MG ELEMENTAL MG) 400 MG: 400 (241.3 MG) TAB at 08:21

## 2018-12-17 RX ADMIN — GABAPENTIN 800 MG: 800 TABLET, FILM COATED ORAL at 20:57

## 2018-12-17 RX ADMIN — PRENATAL VIT W/ FE FUMARATE-FA TAB 27-0.8 MG 1 TABLET: 27-0.8 TAB at 08:21

## 2018-12-17 RX ADMIN — Medication 100 MG: at 08:21

## 2018-12-17 RX ADMIN — IBUPROFEN 400 MG: 400 TABLET ORAL at 16:27

## 2018-12-17 RX ADMIN — GABAPENTIN 800 MG: 800 TABLET, FILM COATED ORAL at 14:05

## 2018-12-17 ASSESSMENT — PAIN DESCRIPTION - DESCRIPTORS
DESCRIPTORS: SHARP
DESCRIPTORS: ACHING;SHARP
DESCRIPTORS: ACHING
DESCRIPTORS: ACHING

## 2018-12-17 ASSESSMENT — ACTIVITIES OF DAILY LIVING (ADL)
ADLS_ACUITY_SCORE: 21
ADLS_ACUITY_SCORE: 20
ADLS_ACUITY_SCORE: 21
ADLS_ACUITY_SCORE: 21

## 2018-12-17 NOTE — PROGRESS NOTES
Calorie Count    Intake recorded for: 12/16  Total Kcals: 0 Total Protein: 0g    Kcals from Hospital Food: 0   Protein: 0g    Kcals from Outside Food (average):0 Protein: 0g    # Meals Recorded: 2 meals ordered from kitchen, no intake recorded.     # Supplements Recorded: no intake recorded.

## 2018-12-17 NOTE — PROGRESS NOTES
[facility], [place]    Progress Note - Sundar 1 Service        Date of Admission:  12/12/2018    Plan Today:  - Improve pain control: Scheduled Tylenol, Nortriptyline HS x 1 dose today, monitor  - Hematology consulted to evaluate for Protein C deficiency (could this be associated with her necrotic skin rash?)  - Continue calorie counts    Assessment & Plan   Yenifer Maher is a 38 year old female with a history of alcohol withdrawal, alcohol hepatitis and ?wernicke's, and polyneuorpathy 2/2 polynutritional deficiency admitted on 12/12/2018. She presented from an outpatient dermatology clinic for further work-up of 3 week history of a painful, bilateral, LE necrotic/eschar rash concerning for calciphylaxis vs. vasculitis.     #Bilateral, migratory painful LE rash, eschar  Per pt, rash initially presented in September as bruising in her inner thigh. Presented with 3 week hx of migratory, BL symmetric LE eschar with surrounding erythema. Rash pruritic and painful. No trauma hx. Seen by outpatient dermatologist 12/12/18 who attempted bx, patient couldn't tolerate, and sent to [facility] for further work-up. ESR and CRP elevated at outpatient derm visit. On admission 12/12, ESR elevated, CRP WNL. Initial DDx included vasculitis, calciphylaxis, possible relation to nutrition status. OWEN panel negative. Vasculitis panel negative. RF, C3, C4 WNL. HIV negative. Rheum consulted; vasculitis unlikely, concern for calciphylaxis. Low suspicion for systemic autoimmune condition. 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 12/13 Further workup showed low protein C. Elevated IgG1 and IgM. Factor 5 assay wnl. Cardiolipin panel wnl. SMILEY positive. 12/14 Derm performed punch bx of left medial calf lesion, sent for staining and tissue culture.  - Heme Consult (found to have low Protein C, is rash related?), recommend serum Copper and Zinc, pending  - Increasing pain today at  medial thigh site, R>L       - Start notriptyline, at bedtime       - Oxy x1 today. Tylenol, Ibuprofen, Tramadol PRN.  - Close follow-up with Derm for suture removal, path results, treatment plan (Appt on 12/19 - call Derm to reschedule if patient remains inpatient longer).   - Pending labs: Cryoglobulin, cyrofibrinogens, lupus anticoagulant, serum copper, SPEP UPEP, homocysteine  - Rheumatology consulted; appreciate recs       - In Process - SPEP, UPEP, homocysteine, antiphospholipid syndrome workup  - Atarax 10mg itching    #Indirect hyperbilirubinemia  Down trending, US w/ gallbladder sludge but no signs of cholecystitis. LDH and haptoglobin wnl. Gilmer test non-reactive. Trending down.   - Continue to monitor    #Hx of alcohol withdrawal  #Hx of Wernicke Encephalopathy(?)  Per outside provider note, prior hospitalization at [facility], 8/23 - 9/14/18, for weakness, edema, and confusion found to have EtOH liver failure. Summary of this admission not in CareEverywhere. Pt could not provide details of hospitalization. Hx of multiple hospitalizations for alcohol withdrawal and possible Wernicke's Encephalopathy. Most recent 1/2018. Pt reports last drink was in August. On admission, Albumin 3.2, T Bili 2.7(direct 0.9). AST, ALT, AlkP wnl. INR 1.39. Hep B and C serologies negative. Iron panel, ceruloplasmin unremarkable. Urine copper wnl. Abdominal ultrasound without evidence of cirrhotic features, but showing possible cavernous transformation versus slow to-and-fro flow in the main and right portal veins. Per d/w hepatology, this is c/w early portal hypertension, would not recommend further w/u or imaging at this time given lack of abd pain and LFTs not terrible.  - Following up w/ GI in outpatient setting in 6-8wks  - Thiamine and folate     #Hypomagnesemia  Low magnesium possibly d/t low PO intake. On PTA Mag replacement. Unable to replace Mag by IV d/t burning sensation with infusion and patient  refusal   - Continue Mag 400mg BID    #Hyperphosphatemia  #Mild Hypercalcemia  Admission, serum Ca 10.4 (11.0 when corrected for albumin). Serum Phos 5.2. UA + calcium-oxalate crystals and amorphous crystals. Vit D wnl. PTH low. Per discussion w/ nephrology 12/14, will order 1,25 vit D and TSH. Likely also be related to bone resorption and deconditioning. TSH wnl.   - 1,25 vit D in process  - CTM after discharge    #Polyneuropathy 2/2 polynutritional deficiency  #Severe Malnutrition  #Poor PO intake  Seen by outpatient neurologist 11/20/18 for work-up. Per visit note, patient reports tingling, numbness, and pain in all fingers on both hands as well as numbness and tingling in both feet. Began 8/18. PE notable for wide-based ataxic gait, reduced vibratory sense in feet>ankles and in hands, and reduced pin prick in feet and hands. Distal weakness in hands and feet with reduced ankle reflex. Started tx for polyneuropathy 2/2 polynutritional def. Vit B12 wnl on admission. Pt is vegetarian. Calorie counts 12/14-12/16 showed 3-day total kcal 699 (goal of 6639-3065 kcal/day). 12/17 eating today.  - PT/OT consulted   - Calorie counts completed.3-day total kcal 699 (goal of 9126-3178 kcal/day)       -Eating today, will cont to monitor  - Nutrition consulted; appreciate recs  - Labs in process: zinc, vit B1, vit B6, vitamin E  - Continue all PTA supplements: thiamine, vitamin B complex with C, prenatal vitamin, Vit K  - PTA gabapentin for polyneuropathic pain    #Asymptomatic bacteriuria  UA on admission consistent with UTI. Pt asymptomatic. No fever, chills, flank pain, or dysuria. UCx grew Klebsiella pneumoniae. Received 2g IV ceftriaxone overnight on 12/15. Considering pt asymptomatic, will defer further treatment.    #Normocytic, Normochromic Anemia  Admission Hgb 10.6. Last recorded Hgb 13.0 11/2017. Iron panel unremarkable. LDH wnl. Haptoglobin wnl. Gilmer test nonreactive. Likely AOCD   - Cont to monitor     Chronic  Medical Problems:  #GERD: continue PTA PPI  #HTN: hold PTA metoprolol (patient normotensive/hypotensive on admission)  #Seasonal allergies: continue allegra    Diet: Combination Diet Vegetarian Diet  Snacks/Supplements Adult: Boost Breeze; Between Meals  Fluids: PO  Lines: PIV  DVT Prophylaxis: Enoxaparin (Lovenox) SQ  Chavez Catheter: not present  Code Status: Full Code    Disposition Plan   Expected discharge: 1-2 days, pending TCU placement  Entered: Berry Stanley 12/17/2018, 8:28 AM     The patient's care was discussed with the Attending Physician, Dr. Martins.    Berry Stanley  Medical Student  [facility], [place] [address] Service  Pager [phone]      Loc Ashby MD  Internal Medicine, PGY1  Pager [phone]    Physician Attestation   I, Melyssa Martins, was present with the medical student and resident who participated in the service and in the documentation of the note.  I have verified the history and personally performed the physical exam and medical decision making.  I agree with the assessment and plan of care as documented in the note.      I personally reviewed vital signs, medications, labs and imaging.      Melyssa Martins MD  Date of Service (when I saw the patient): 12/17/18  ______________________________________________________________________    Interval History      Nursing notes reviewed. No acute events overnight. Today, she reports continued shooting pain originating from her b/l thigh lesions. The right thigh pain is worse than the left. The right thigh pain ascends to the level of her groin and then radiates to both the front and back of her leg. She does not report improvement on her regimen of gabapentin, tylenol, and tramadol.     She reports a BM yesterday, improvement of her nausea thereafter. She denies fever, chills, SOB, CP, and constipation    Data reviewed today: I reviewed all medications, new labs and imaging results over the last 24 hours. I personally reviewed the  abdominal ultrasound image(s) showing 1.  Possible cavernous transformation versus slow to-and-fro flow in.    Physical Exam   Vital Signs: Temp: 98.5  F (36.9  C) Temp src: Oral BP: 110/63   Heart Rate: 85 Resp: 16 SpO2: 97 % O2 Device: None (Room air)    Weight: 136 lbs 3.91 oz  Constitutional: laying in bed, in acute distress 2/2 pain and blood draw  Eyes: nonicteric sclera, EOMI  Respiratory: CTAB  Cardiovascular: RRR, normal S1,S2. No extra heart sounds or murmurs  GI: active BS, soft, nontender, nondistended  Skin: eschar with surrounding salmon colored erythema located on both inner thighs. R Thigh: erythema expanded and Borders outlined 12/15. No expansion outside 12/15 border today. No excoriative pattern. Left Thigh: No change  LLE: 3-4 cm plaque with purple discoloration at center and surrounding salmon-colored erythema located on left, medial calf. Bx taken from this lesion. Band-aid over bx site.  Musculoskeletal: moves all extremities independently  Neurologic: AAOx3, loses train of thought easily    Data   Recent Labs   Lab 12/17/18  0733 12/16/18  0730 12/15/18  0645 12/14/18  0851 12/13/18  0833   WBC 4.5  --   --  7.2 6.2   HGB 9.5*  --   --  10.4* 10.1*   MCV 93  --   --  93 92    201  --  222 246   INR  --   --   --  1.35* 1.39*   NA  --  136 135 135 137   POTASSIUM  --  4.2 4.6 4.2 4.2   CHLORIDE  --  102 101 100 104   CO2  --  23 24 24 24   BUN  --  10 8 10 10   CR  --  0.44* 0.53 0.55 0.49*   ANIONGAP  --  10 11 12 9   MARKO  --  10.0 10.0 10.2* 10.4*   GLC  --  95 93 104* 92   ALBUMIN  --  3.1* 3.2* 3.4 3.2*   PROTTOTAL  --  7.5 7.6 8.1 7.8   BILITOTAL  --  2.0* 2.0* 2.4* 2.7*   ALKPHOS  --  108 110 120 113   ALT  --  16 17 18 19   AST  --  35 37 40 41     No results found for this or any previous visit (from the past 24 hour(s)).

## 2018-12-17 NOTE — PLAN OF CARE
[id number]: A&O x 4; Afebrile, soft BPs at time, oVSS on RA; c/o constant bilateral burning sensation at wounds, team notified, elected to defer to AM team; Administered Advil x 2, Atarax x 2; and Tramadol x 1; denies nausea and SOB; Vegetarian diet; Voiding; No BM overnight; Up with assist 1 and walker; Will continue to monitor and update [facility] service with any significant changes.

## 2018-12-17 NOTE — PLAN OF CARE
"/64 (BP Location: Right arm)   Pulse 81   Temp 98.2  F (36.8  C) (Oral)   Resp 16   Ht 1.702 m (5' 7\")   Wt 61.8 kg (136 lb 3.9 oz)   SpO2 97%   BMI 21.34 kg/m      Neuro: A&Ox4.     Cardiac: Afebrile, VSS.     Respiratory: RA     GI/: Voiding spontaneously. No BM this shift.     Diet/appetite: Tolerating diet. Denies nausea     Activity: Up SBA    Pain: Pt was in a lot of pain in am, but pain seems to be better this afternoon. Atarax, Ibuprofen and tramadol given. Pt has been sleeping most of the afternoon.    Skin: No new deficits noted. Biopsy sites ( L & R inner thighs)  Lines: PIV x1- SL  Drains: None      Will continue to monitor and follow plan of care.       "

## 2018-12-17 NOTE — PROGRESS NOTES
Social Work Services Progress Note    Hospital Day: 6  Date of Initial Social Work Evaluation:  12/13/2018  Collaborated with:  Pt, TCU's.    Data:  Pt needs TCU. Is medically stable for discharge. SW sent referrals, awaiting placement.    Intervention:      Wayne Rdz [phone]: Left VM with CORAZON Leslie at [facility], P: [phone]. Sent referral.    [facility], P: [phone]. Sent referral.    [facility] (Weekend p: [phone]; f: [phone]): declined due to alcohol abuse.    [facility], Main: [phone]: No beds.     [facility] Admissions: [phone]: Declined due to complexity.    [facility] (Admissions: [phone]): Does not take MA.    [facility], P: [phone]. No too complex.    [facility], P: [phone]. No too complex.    Assessment:  Pt needs TCU, not accepted anywhere yet.    Plan:    Anticipated Disposition: TCU.    Barriers to d/c plan:  TCU bed.    Follow Up:  SW will continue to pursue TCU placement.    SOPHIA Kong, [facility]  7A   Ph: [phone], Pager: [phone]

## 2018-12-17 NOTE — PLAN OF CARE
"OT/7A: Discharge Planner OT   Patient plan for discharge: TCU  Current status: RN present at the start of therapy to administer pain meds. Encouraged OOB activity to participate in ADLs. Extended time spent during position changes 2/2 pain. Pt tearful reporting \"shooting pain\" when bearing weight in LE's. Bed mobility SBA. STS x2 Shannan+FWW. Tolerates standing CGA+FWW ~10 seconds at a time before returning to EOB. Pt reports dizziness+nausea while standing, reports feeling like she \"could fall over when walking to the bathroom\". BP: 112/70 (83). Educated on breathing techniques for pain mgmt. Facilitated in-bed ADLs SBA with set up provided.   Barriers to return to prior living situation: generalized weakness, pain, polyneuropathy, fall risk/safety  Recommendations for discharge: TCU  Rationale for recommendations: Pt is performing below functional baseline and would benefit from continued skilled therapy services to increase IND in ADL/IADLs. Based on pain and limited OOB activity, pt would not be safe to return home at this time.        Entered by: Lily Cano 12/17/2018 10:40 AM       "

## 2018-12-17 NOTE — PLAN OF CARE
Discharge Planner PT  7A  Patient plan for discharge: Not stated at this date  Current status: Pt performs supine<>sit with SBA, sit<>stand with CGA and FWW. Pt takes 2 steps forward with FWW and CGA, though unable to tolerate further 2/2 significant increase in LE pain. Pt participates in supine LE exercises to promote ROM, strength, and endurance, handout left in room and encouraged to perform 2-3x/day.  Barriers to return to prior living situation: Medical status, weakness, pain, decreased activity tolerance, level of assist  Recommendations for discharge: TCU  Rationale for recommendations: Pt would benefit from continued skilled therapy services to progress strength, endurance, balance, and safety and independence with functional mobility. Pt currently unable to tolerate household distances for ambulation without significant increase in pain, therefore is at an increased risk for falls.        Entered by: Kary Tinoco 12/17/2018 2:34 PM

## 2018-12-17 NOTE — PROGRESS NOTES
CLINICAL NUTRITION SERVICES - BRIEF NOTE     Nutrition Prescription    RECOMMENDATIONS FOR MDs/PROVIDERS TO ORDER:  Consider checking whole blood thiamine level (which may more accurately reflect stores, plasma levels affected more by recent supplement intake)    Recommendations already ordered by Registered Dietitian (RD):  Re-ordered calorie counts    Oral supplements with meals, HS snack - per patient's preferences    Future/Additional Recommendations:  If patient unable to consume at least 1000 kcal, 40 grams of protein (~60% needs) daily, recommend FT for supplemental TF.       EVALUATION OF THE PROGRESS TOWARD GOALS   Diet: Vegetarian    Intake: 2 day calorie count average = 350 kcal, 12 grams protein (~20% needs)  Patient reports to have been eating more than this.  Suspect inadequate recording of meals.     Interventions:  Discussed resumption of calorie counts.  Encouraged accurate recording of all PO intake, discussed need to save meal tickets and notify RN of anything consumed.  Reviewed oral supplement options and encouraged trial.     Monitoring/Evaluation  Progress toward goals will be monitored and evaluated per protocol.    Angeles Christine MS, RD, LD  Pager [phone]

## 2018-12-17 NOTE — CONSULTS
HEMATOLOGY INITIAL CONSULT NOTE  12/17/18  11:20 AM    Assessment:  Bilateral lower extremity skin lesions  Hypercalcemia, hyperphosphatemia  Severe protein-calorie malnutrition  Polyneuropathy  Normocytic, normochromic anemia  Positive SMILEY and positive lupus anticoagulant    Ms. Maher is a 38-year-old woman with history of alcoholic hepatitis who presents with new skin lesions and has been found to have decreased protein C activity.  Hematology service has been consulted with the question of whether this could be caused by protein C deficiency in the setting of chronic liver disease, a sort of warfarin-induced skin necrosis without the warfarin.      It is not surprising that protein C is decreased, as this is made by the liver and Ms. Maher has chronic liver disease.  We suspect other factors would be decreased, as they normally are with liver disease.  Skin necrosis with warfarin occurs due to acute disbalance of protein C and the vitamin K-dependent factors (II, VII, IX, X).  As the patient has had no warfarin exposure, it is difficult to imagine how this would occur just due to liver disease, as the protein C is coming from the same source.    We believe the most appropriate differential includes etiologies already being worked up by the primary team: cryoglobulinemia, vasculitis, calciphylaxis.  The biopsy will be essential to diagnosis.     It should be noted that while Ms. Maher's lupus anticoagulant antibody is positive, she does not have antiphospholipid syndrome.  There is no reason to suspect that the skin lesions are due to coagulopathy.    Recommendations:  Await biopsy results, cryoglobulins.    Thank you for involving us in the care of Ms. Maher.  Hematology consult service will continue to follow with you.  Patient seen and discussed with faculty, Dr. Jeffery.    Monique Ardon MD  Hematology-Oncology-Transplant Fellow    Attending Note:  I have reviewed the patient chart, and interviewed  and examined the patient.  I agree with the assessment and plan.  Aleida Jeffery MD  Hematology    ---------------------------------------------------------------  History of present illness:  Ms. Maher is a 38-year-old woman with history of alcoholic hepatitis who was brought into the hospital when she could not tolerate a biopsy of a skin lesion in her outpatient dermatology clinic.    The skin lesions are on her bilateral inner thighs and calves and have been present for several weeks.  They first started as a subtle darkening of the skin (reddish-brown discoloration), and then became quite painful.  Now there are areas of central pallor with some blackish spots beginning to show through.  She says the lesions sometimes leave blood on bandages that are peeled off, but she has not noticed significant weeping or exudates.  They hurt any time she moves and her skin stretches or is pulled in a certain direction.    She has been staying in a rehab facility since August of this year, after a hospitalization for gastritis and alcoholic hepatitis.  She reports that she has been eating about the same amount, but is not getting many vegetables now, as they simply do not offer a lot of fresh vegetables.  She is a vegetarian and so is eating a lot of breads and pastas, since her choices are limited in this facility.    She has not had fevers, chills, headache, nausea, vomiting, diarrhea.  No history of blood clots for her or anyone in her family.  Says she is more apt to bleed if she gets a paper cut or something like that.  Has not noticed any recent bleeding or easy bruising.  No swelling or joint pains.    A complete review of systems was performed and was negative with the exception of pertinent positives noted above.    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  Lives in a nursing home.  Not currently smoking; no illicit drug use.    Medications:  Fexofenadine 180 mg daily  Gabapentin 800 mg TID  Magnesium oxide 400 mg BID  Nortriptyline 10 mg at bedtime  Pantoprazole 40 mg daily  Multivitamin, vitamin B complex, thiamine  PRN Tylenol, melatonin, hydroxyzine, ondansetron, tramadol    Allergies:  Bengay, dicyclomine, phenobarbital    OBJECTIVE DATA  Blood pressure 107/78, pulse 91, temperature 97.9  F (36.6  C), temperature source Oral, resp. rate 16, height 1.702 m (5' 7\"), weight 61.8 kg (136 lb 3.9 oz), SpO2 96 %.  -- General: no acute distress; appears older than her stated age  -- HEENT: mucous membranes moist, no erythema; pupils equally round, reactive  -- Lymph: no lymphadenopathy in the cervical, submandibular, supraclavicular, axillary, or inguinal areas  -- Cardiovascular: regular rate and rhythm, no murmurs; no peripheral edema or JVD  -- Pulmonary: lungs clear bilaterally, equal diaphragmatic excursion, no wheezes or crackles  -- Gastrointestinal: abdomen soft, non-tender, protuberant but non-distended; bowel sounds present; no organomegaly  -- Musculoskeletal: no swelling or erythema of joints  -- Integumentary: there are bilateral erythematous patches on the thighs and calves that have areas of central clearing with faint black areas underneath the clearing.  The white/black areas appear faintly cratered.  No obvious exudates or drainage.   -- Neurologic: alert and oriented to self, place, time; cranial nerves II-XII intact and symmetric; strength and sensation intact and symmetric; gait normal; reflexes symmetric in BLE and BUE  -- Psychiatric: appropriate affect, cooperative    I personally reviewed the relevant laboratory results and imaging studies.    "

## 2018-12-17 NOTE — PLAN OF CARE
Patient appears to be more comfortable, her mood is stable and she is more alert, less napping during the day. Right and left inner thigh wounds are unchanged blotchy and reddened, painful but more tolerable with dressing changes. Vaseline gauze to both scabs/wounds. Moderate pain with ambulation, she uses the walker with stand by assist to the bathroom. Appetite is improving, she is on calorie counts. PIV saline locked. Mom updated on the phone about progress and plan of care. Plan for transfer to TCU when facility and bed are available. Tramadol, 1X Oxycodone for leg pain, patient started on scheduled Tylenol and has PRN Ibuprofren when available from Pharmacy. Hematology consulted.

## 2018-12-18 ENCOUNTER — APPOINTMENT (OUTPATIENT)
Dept: PHYSICAL THERAPY | Facility: CLINIC | Age: 38
DRG: 640 | End: 2018-12-18
Attending: INTERNAL MEDICINE
Payer: COMMERCIAL

## 2018-12-18 LAB
ANION GAP SERPL CALCULATED.3IONS-SCNC: 10 MMOL/L (ref 3–14)
BUN SERPL-MCNC: 30 MG/DL (ref 7–30)
CALCIUM SERPL-MCNC: 10.4 MG/DL (ref 8.5–10.1)
CHLORIDE SERPL-SCNC: 103 MMOL/L (ref 94–109)
CO2 SERPL-SCNC: 22 MMOL/L (ref 20–32)
CREAT SERPL-MCNC: 0.79 MG/DL (ref 0.52–1.04)
CRYOGLOB SER QL: ABNORMAL %
ERYTHROCYTE [DISTWIDTH] IN BLOOD BY AUTOMATED COUNT: 13.6 % (ref 10–15)
GFR SERPL CREATININE-BSD FRML MDRD: 82 ML/MIN/1.7M2
GLUCOSE SERPL-MCNC: 101 MG/DL (ref 70–99)
HCT VFR BLD AUTO: 27.3 % (ref 35–47)
HGB BLD-MCNC: 8.9 G/DL (ref 11.7–15.7)
MAGNESIUM SERPL-MCNC: 1.8 MG/DL (ref 1.6–2.3)
MCH RBC QN AUTO: 30.4 PG (ref 26.5–33)
MCHC RBC AUTO-ENTMCNC: 32.6 G/DL (ref 31.5–36.5)
MCV RBC AUTO: 93 FL (ref 78–100)
PHOSPHATE SERPL-MCNC: 6.3 MG/DL (ref 2.5–4.5)
PLATELET # BLD AUTO: 191 10E9/L (ref 150–450)
POTASSIUM SERPL-SCNC: 4.7 MMOL/L (ref 3.4–5.3)
RBC # BLD AUTO: 2.93 10E12/L (ref 3.8–5.2)
SODIUM SERPL-SCNC: 136 MMOL/L (ref 133–144)
WBC # BLD AUTO: 4.2 10E9/L (ref 4–11)

## 2018-12-18 PROCEDURE — 80048 BASIC METABOLIC PNL TOTAL CA: CPT | Performed by: STUDENT IN AN ORGANIZED HEALTH CARE EDUCATION/TRAINING PROGRAM

## 2018-12-18 PROCEDURE — 25000132 ZZH RX MED GY IP 250 OP 250 PS 637: Performed by: STUDENT IN AN ORGANIZED HEALTH CARE EDUCATION/TRAINING PROGRAM

## 2018-12-18 PROCEDURE — 97530 THERAPEUTIC ACTIVITIES: CPT | Mod: GP

## 2018-12-18 PROCEDURE — 25000132 ZZH RX MED GY IP 250 OP 250 PS 637: Performed by: INTERNAL MEDICINE

## 2018-12-18 PROCEDURE — 12000026 ZZH R&B TRANSPLANT

## 2018-12-18 PROCEDURE — 99232 SBSQ HOSP IP/OBS MODERATE 35: CPT | Mod: GC | Performed by: INTERNAL MEDICINE

## 2018-12-18 PROCEDURE — 83735 ASSAY OF MAGNESIUM: CPT | Performed by: STUDENT IN AN ORGANIZED HEALTH CARE EDUCATION/TRAINING PROGRAM

## 2018-12-18 PROCEDURE — 82525 ASSAY OF COPPER: CPT | Performed by: STUDENT IN AN ORGANIZED HEALTH CARE EDUCATION/TRAINING PROGRAM

## 2018-12-18 PROCEDURE — 85027 COMPLETE CBC AUTOMATED: CPT | Performed by: STUDENT IN AN ORGANIZED HEALTH CARE EDUCATION/TRAINING PROGRAM

## 2018-12-18 PROCEDURE — 40000193 ZZH STATISTIC PT WARD VISIT

## 2018-12-18 PROCEDURE — 84100 ASSAY OF PHOSPHORUS: CPT | Performed by: STUDENT IN AN ORGANIZED HEALTH CARE EDUCATION/TRAINING PROGRAM

## 2018-12-18 PROCEDURE — 36415 COLL VENOUS BLD VENIPUNCTURE: CPT | Performed by: STUDENT IN AN ORGANIZED HEALTH CARE EDUCATION/TRAINING PROGRAM

## 2018-12-18 RX ORDER — SENNOSIDES 8.6 MG
1 TABLET ORAL 2 TIMES DAILY PRN
Status: DISCONTINUED | OUTPATIENT
Start: 2018-12-18 | End: 2018-12-19 | Stop reason: HOSPADM

## 2018-12-18 RX ORDER — ACETAMINOPHEN 325 MG/1
650 TABLET ORAL EVERY 8 HOURS PRN
Status: DISCONTINUED | OUTPATIENT
Start: 2018-12-18 | End: 2018-12-19 | Stop reason: HOSPADM

## 2018-12-18 RX ORDER — LIDOCAINE 4 G/G
1 PATCH TOPICAL
Status: DISCONTINUED | OUTPATIENT
Start: 2018-12-18 | End: 2018-12-19 | Stop reason: HOSPADM

## 2018-12-18 RX ORDER — NORTRIPTYLINE HCL 10 MG
10 CAPSULE ORAL AT BEDTIME
Status: DISCONTINUED | OUTPATIENT
Start: 2018-12-18 | End: 2018-12-19 | Stop reason: HOSPADM

## 2018-12-18 RX ADMIN — TRAMADOL HYDROCHLORIDE 50 MG: 50 TABLET, COATED ORAL at 05:00

## 2018-12-18 RX ADMIN — NORTRIPTYLINE HYDROCHLORIDE 10 MG: 10 CAPSULE ORAL at 23:04

## 2018-12-18 RX ADMIN — GABAPENTIN 800 MG: 800 TABLET, FILM COATED ORAL at 08:36

## 2018-12-18 RX ADMIN — FEXOFENADINE HYDROCHLORIDE 180 MG: 180 TABLET, FILM COATED ORAL at 08:36

## 2018-12-18 RX ADMIN — TRAMADOL HYDROCHLORIDE 50 MG: 50 TABLET, COATED ORAL at 16:36

## 2018-12-18 RX ADMIN — MAGNESIUM OXIDE TAB 400 MG (241.3 MG ELEMENTAL MG) 400 MG: 400 (241.3 MG) TAB at 20:04

## 2018-12-18 RX ADMIN — MAGNESIUM OXIDE TAB 400 MG (241.3 MG ELEMENTAL MG) 400 MG: 400 (241.3 MG) TAB at 08:36

## 2018-12-18 RX ADMIN — GABAPENTIN 800 MG: 800 TABLET, FILM COATED ORAL at 20:04

## 2018-12-18 RX ADMIN — LIDOCAINE 1 PATCH: 560 PATCH PERCUTANEOUS; TOPICAL; TRANSDERMAL at 20:06

## 2018-12-18 RX ADMIN — PRENATAL VIT W/ FE FUMARATE-FA TAB 27-0.8 MG 1 TABLET: 27-0.8 TAB at 08:36

## 2018-12-18 RX ADMIN — TRAMADOL HYDROCHLORIDE 50 MG: 50 TABLET, COATED ORAL at 11:16

## 2018-12-18 RX ADMIN — B-COMPLEX W/ C & FOLIC ACID TAB 1 TABLET: TAB at 08:36

## 2018-12-18 RX ADMIN — Medication 100 MG: at 08:36

## 2018-12-18 RX ADMIN — ACETAMINOPHEN 650 MG: 325 TABLET, FILM COATED ORAL at 03:57

## 2018-12-18 RX ADMIN — PANTOPRAZOLE SODIUM 40 MG: 40 TABLET, DELAYED RELEASE ORAL at 08:36

## 2018-12-18 RX ADMIN — GABAPENTIN 800 MG: 800 TABLET, FILM COATED ORAL at 16:36

## 2018-12-18 ASSESSMENT — ACTIVITIES OF DAILY LIVING (ADL)
ADLS_ACUITY_SCORE: 20

## 2018-12-18 ASSESSMENT — PAIN DESCRIPTION - DESCRIPTORS: DESCRIPTORS: ACHING;DISCOMFORT;CONSTANT;SHARP

## 2018-12-18 NOTE — PLAN OF CARE
Discharge Planner PT  7A  Patient plan for discharge: TCU  Current status: Pt again limited by pain in B LE wound sites at this date, unable to tolerate ambulation. Pt performs supine>sit Leyla, sit<>stand with CGA and FWW, tolerates standing ~45 seconds before returning to sitting 2/2 increase in pain. Pt expresses frustration and is tearful regarding pain and limited tolerance for therapy as of late, therapeutic listening and support provided.  Barriers to return to prior living situation: Medical status, wounds, pain, decreased activity tolerance, fall risk  Recommendations for discharge: TCU  Rationale for recommendations: Pt would benefit from continued skilled therapy services to progress strength, endurance, balance, and safety and independence with functional mobility prior to safe return home.        Entered by: Kary Tinoco 12/18/2018 9:46 AM

## 2018-12-18 NOTE — PLAN OF CARE
"/51 (BP Location: Right arm)   Pulse 91   Temp 98.5  F (36.9  C) (Oral)   Resp 16   Ht 1.702 m (5' 7\")   Wt 61.8 kg (136 lb 3.9 oz)   SpO2 95%   BMI 21.34 kg/m      Neuros: A&O x4  GI: Tolerating reg/vegetarian diet, Calorie counts in place. Denies nausea.  : Voiding spontaneously     IV: IV SL  Activity: Up Ax1, walker  Pain: Pain controlled with scheduled Tylenol and PO Tramadol x1  Skin: Lesions on bilateral legs with Vaseline gauze and co bands. Drsg changed during shift.    Plan of care: Pt resting comfortably, will continue to monitor and follow plan of care.     "

## 2018-12-18 NOTE — PROGRESS NOTES
[facility] Inpatient Consult Dermatology Note    Impression/Plan:    1. 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 On physical exam, patient's findings are consistent with retiform purpura/thrombotic vasculopathy. Patient has already had comprehensive work up that has been negative for or normal for MPO ab, proteinase 3 ab, RNP ab, scleroderma ab, La ab, Ro ab, Geronimo ab, hep B, hep C, cerulopasmin, HIV, RF, SMILEY, liver kidney microsomal ab, C3/C4, SPEP, mitochondrial M2 ab.     She has a positive lupus anticoagulant and a low protein C level. Level S is normal as is homocysteine, anticardiolipin ab, factor V leiden, and beta2-glycoprotein 1. Quantiferon gold and cryoglobulins are still still pending  -Bx shows calcium deposits in small blood vessels. 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  - Ideally pt would benefit from apixiban 5mg BID and starting trental 800mg TID. There is not a clear indication for STS in non-uremic calciphylaxis (studies of STS for non-uremic cases have been disappointing).   - However, the pt's ongoing liver dz and alcohol abuse potentially complicate the use of these medications. We will defer to the primary team and hematology what they believe is most practical for the pt. Ultimately, it is essential that warfarin is not used as it can exacerbate this process  - For patients with non-uremic calciphylaxis unable to tolerate apixaban, we have also had some limited success with Lovenox as a second-line option  -Pt should have follow up with dermatology as an  outpatient following her discharge from the nursing facility. I have placed a request for 6 weeks from today (the final appointment should appear in her [facility] upcoming visit list effective tomorrow)    Thank you for the dermatology consultation. Please do not hesitate to contact the dermatology resident/faculty on call for any additional questions or concerns. We will continue to follow.     Joaquin Lewis MD  PGY-4, Dermatology  Pager: [phone]    Dr. Marti staffed the patient.    Staff Involved:  Resident/Staff    I have seen and examined this patient and agree with the assessment and plan as documented in the resident's note.    Norberto Marti MD  Dermatology Attending      Dermatology Problem List:  1. Retiform purpura/thrombotic vasculopathy      Date of Admission: Dec 12, 2018   Encounter Date: 12/18/2018      Reason for Consultation:   Presents from outside dermatologist with 3 week history of necrotic areas, eschars and palpable purpura of b/l lower extremities.     Interval update:  The pt thinks she may be getting new areas of her skin changes around her ankles, but she is unsure. She notes she has active lesions on her bilateral thighs, and notes they are extremely painful. These lesions do not itch or burn. Otherwise, the pt denies any recurrent fevers, chill, or N/V. Pt denies any other general or skin-specific complaints at this time.       Past Medical History:   Patient Active Problem List   Diagnosis     Livedo reticularis     No past medical history on file.  No past surgical history on file.      Social History:  Patient reports that she has been smoking.  she has never used smokeless tobacco.    Family History:  No family history on file.    Medications:  Current Facility-Administered Medications   Medication     acetaminophen (TYLENOL) tablet 650 mg     fexofenadine (ALLEGRA) tablet 180 mg     gabapentin (NEURONTIN) tablet 800 mg     hydrOXYzine (ATARAX) tablet 10 mg     influenza  "quadrivalent (PF) vacc (FLUZONE or Flulaval or FLUARIX) injection 0.5 mL     Lidocaine (LIDOCARE) 4 % Patch 1 patch    And     [START ON 12/19/2018] lidocaine patch REMOVAL    And     lidocaine patch in PLACE     magnesium oxide (MAG-OX) tablet 400 mg     melatonin tablet 3 mg     naloxone (NARCAN) injection 0.1-0.4 mg     nortriptyline (PAMELOR) capsule 10 mg     nortriptyline (PAMELOR) capsule 10 mg     ondansetron (ZOFRAN-ODT) ODT tab 4 mg    Or     ondansetron (ZOFRAN) injection 4 mg     pantoprazole (PROTONIX) EC tablet 40 mg     polyethylene glycol (MIRALAX/GLYCOLAX) Packet 17 g     prenatal multivitamin w/iron per tablet 1 tablet     sennosides (SENOKOT) tablet 1 tablet     traMADol (ULTRAM) tablet 50 mg     vitamin B complex with vitamin C (STRESS TAB) tablet 1 tablet     vitamin B1 (THIAMINE) tablet 100 mg          Allergies   Allergen Reactions     Bengay Pain Relief [Menthol] Other (See Comments)     Skin turns red and feels extremely hot     Cats      Chocolate Dermatitis     Dicyclomine Other (See Comments)     Severe sedation     Dust Mites      Derm, resp     No Clinical Screening - See Comments      GI upset     Phenobarbital      Pollen Extract      Derm, resp.          Review of Systems:  -Positive as per HPI      Physical exam:  Vitals: /62 (BP Location: Right arm)   Pulse 85   Temp 98.5  F (36.9  C) (Oral)   Resp 16   Ht 1.702 m (5' 7\")   Wt 61.8 kg (136 lb 3.9 oz)   SpO2 94%   BMI 21.34 kg/m    GEN: This is a female in no acute distress, in a pleasant mood, appears older than her age.    SKIN: Focused examination of the face, neck, right and left arms, hands, right and left legs was notable for:  -Retention hyperkeratosis of the bilateral ankles  -A stellate purpuric plaque with gun metal gray necrosis centrally in the three areas on the right medial thigh. No similar lesions are noted elsewhere on the areas examined today  -Unable to assess left thigh as it is " wrapped    Laboratory:  Results for orders placed or performed during the hospital encounter of 12/12/18 (from the past 24 hour(s))   Basic metabolic panel   Result Value Ref Range    Sodium 136 133 - 144 mmol/L    Potassium 4.7 3.4 - 5.3 mmol/L    Chloride 103 94 - 109 mmol/L    Carbon Dioxide 22 20 - 32 mmol/L    Anion Gap 10 3 - 14 mmol/L    Glucose 101 (H) 70 - 99 mg/dL    Urea Nitrogen 30 7 - 30 mg/dL    Creatinine 0.79 0.52 - 1.04 mg/dL    GFR Estimate 82 >60 mL/min/1.7m2    GFR Estimate If Black >90 >60 mL/min/1.7m2    Calcium 10.4 (H) 8.5 - 10.1 mg/dL   Phosphorus   Result Value Ref Range    Phosphorus 6.3 (H) 2.5 - 4.5 mg/dL   Magnesium   Result Value Ref Range    Magnesium 1.8 1.6 - 2.3 mg/dL   CBC with platelets   Result Value Ref Range    WBC 4.2 4.0 - 11.0 10e9/L    RBC Count 2.93 (L) 3.8 - 5.2 10e12/L    Hemoglobin 8.9 (L) 11.7 - 15.7 g/dL    Hematocrit 27.3 (L) 35.0 - 47.0 %    MCV 93 78 - 100 fl    MCH 30.4 26.5 - 33.0 pg    MCHC 32.6 31.5 - 36.5 g/dL    RDW 13.6 10.0 - 15.0 %    Platelet Count 191 150 - 450 10e9/L

## 2018-12-18 NOTE — PROGRESS NOTES
Social Work Services Progress Note    Hospital Day: 7  Date of Initial Social Work Evaluation:  12/13/2018  Collaborated with:  Pt, TCU's, medical team.    Data:  Pt accepted at [facility], P: [phone]. They were going to take pt today but then one of their residents did not end up discharging. They are hoping there will be a bed tomorrow. Updated pt and team. Scheduled ride for 2pm on 12/19. [id number]    Intervention:  Spoke with pt and team. Scheduled ride.    Assessment: Pt can likely go to TCU tomorrow.    Plan:    Anticipated Disposition: [facility].    Barriers to d/c plan:  Bed availability.    Follow Up:  SW will continue to follow.    SOPHIA Kong, [facility]  7A   Ph: [phone], Pager: [phone]

## 2018-12-18 NOTE — PROGRESS NOTES
Resident/Fellow Attestation   I, Conrado Flynn, was present with the medical student who participated in the service and in the documentation of the note.  I have verified the history and personally performed the physical exam and medical decision making.  I agree with the assessment and plan of care as documented in the note.      39 y/o F with a hx of alcohol abuse and likely wernicke's encephalopathy, stocking glove polyneuropathy, admitted from derm clinic for evaluation of 3 weeks hx of painful bilateral LE necrotic eschar/rash now with pathology consistent with non uremic calciphylaxis    Will need to discuss apixaban initiation and Pentoxifylline initiation with the patient. No STS as she is not on dialysis. Will likely start in the AM and may be able to discharge to TCU tomorrow.     Appreciate dermatology recs.     Conrado Flynn MD  PGY3  Date of Service (when I saw the patient): 12/18/18    [facility], [place]    Progress Note - [facility] 1 Service        Date of Admission:  12/12/2018    Plan Today:  - Hold NSAIDs d/t SAPPHIRE  - Start lidocaine patch for pain  - Continue calorie counts  - Plan to discharge tomorrow to TCU    Assessment & Plan   Yenifer Maher is a 38 year old female with a history of alcohol withdrawal, alcohol hepatitis and ?wernicke's, and polyneuorpathy 2/2 polynutritional deficiency admitted on 12/12/2018. She presented from an outpatient dermatology clinic for further work-up of 3 week history of a painful, bilateral, LE necrotic/eschar rash concerning for calciphylaxis vs. vasculitis.     #Bilateral, migratory painful LE rash, eschar  Per pt, rash initially presented in September as bruising in her inner thigh. Presented with 3 week hx of migratory, BL symmetric LE eschar with surrounding erythema. Rash pruritic and painful. No trauma hx. Seen by outpatient dermatologist 12/12/18 who attempted bx, patient couldn't tolerate, and sent to [facility]  for further work-up. ESR and CRP elevated at outpatient derm visit. On admission 12/12, ESR elevated, CRP WNL. Initial DDx included vasculitis, calciphylaxis, possible relation to nutrition status. OWEN panel negative. Vasculitis panel negative. RF, C3, C4 WNL. HIV negative. Rheum consulted; vasculitis unlikely, concern for calciphylaxis. Low suspicion for systemic autoimmune condition. 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 12/13 Further workup showed low protein C. Elevated IgG1 and IgM. Factor 5 assay wnl. Cardiolipin panel wnl. SMILEY positive. 12/14 Derm performed punch bx of left medial calf lesion, sent for staining and tissue culture. 12/18 lupus anticoagulant positive. Trace cyroglobulin present.  - Heme consulted, no concern for protein C related process. Zinc wnl. rec serum Copper pending  - Continued pain originating from skin lesion sites       - Continue notriptyline, at bedtime       - Start lidocaine patch       - Tylenol, Tramadol PRN.  - Close follow-up with Derm for suture removal, path results, treatment plan (Appt on 12/19 - call Derm to reschedule if patient remains inpatient longer).   - Pending labs: Cyrofibrinogens, serum copper, SPEP UPEP, homocysteine  - Rheumatology consulted; appreciate recs  - Atarax 10mg itching    #SAPPHIRE  Cr bump 0.54 to 0.79. BUN 30. Likely 2/2 NSAIDs.   -Hold NSAIDs  -Trend Cr, BUN    #Indirect hyperbilirubinemia  Down trending, US w/ gallbladder sludge but no signs of cholecystitis. LDH and haptoglobin wnl. Gilmer test non-reactive. Trending down.   - Continue to monitor    #Hx of alcohol withdrawal  #Hx of Wernicke Encephalopathy(?)  Per outside provider note, prior hospitalization at [facility], 8/23 - 9/14/18, for weakness, edema, and confusion found to have EtOH liver failure. Summary of this admission not in CareEverywhere. Pt could not provide details of hospitalization. Hx of multiple hospitalizations for  alcohol withdrawal and possible Wernicke's Encephalopathy. Most recent 1/2018. Pt reports last drink was in August. On admission, Albumin 3.2, T Bili 2.7(direct 0.9). AST, ALT, AlkP wnl. INR 1.39. Hep B and C serologies negative. Iron panel, ceruloplasmin unremarkable. Urine copper wnl. Abdominal ultrasound without evidence of cirrhotic features, but showing possible cavernous transformation versus slow to-and-fro flow in the main and right portal veins. Per d/w hepatology, this is c/w early portal hypertension, would not recommend further w/u or imaging at this time given lack of abd pain and LFTs not terrible.  - Following up w/ GI in outpatient setting in 6-8wks  - Thiamine and folate     #Hypomagnesemia  Low magnesium possibly d/t low PO intake. On PTA Mag replacement. Unable to replace Mag by IV d/t burning sensation with infusion and patient refusal   - Continue Mag 400mg BID    #Hyperphosphatemia  #Mild Hypercalcemia  Admission, serum Ca 10.4 (11.0 when corrected for albumin). Serum Phos 5.2. UA + calcium-oxalate crystals and amorphous crystals. Vit D wnl. PTH low. Per discussion w/ nephrology 12/14, will order 1,25 vit D and TSH. Likely also be related to bone resorption and deconditioning. TSH wnl. 1,25 Vit D normal.  - CTM after discharge    #Polyneuropathy 2/2 polynutritional deficiency  #Severe Malnutrition  #Poor PO intake  Seen by outpatient neurologist 11/20/18 for work-up. Per visit note, patient reports tingling, numbness, and pain in all fingers on both hands as well as numbness and tingling in both feet. Began 8/18. PE notable for wide-based ataxic gait, reduced vibratory sense in feet>ankles and in hands, and reduced pin prick in feet and hands. Distal weakness in hands and feet with reduced ankle reflex. Started tx for polyneuropathy 2/2 polynutritional def. Vit B12 wnl on admission. Pt is vegetarian. Calorie counts 12/14-12/16 showed 3-day total kcal 699 (goal of 5503-0886 kcal/day). 12/17 1827  kcal  - PT/OT consulted   - Continue calorie counts  - Nutrition consulted; appreciate recs  - Continue all PTA supplements: thiamine, vitamin B complex with C, prenatal vitamin, Vit K  - PTA gabapentin for polyneuropathic pain    #Asymptomatic bacteriuria  UA on admission consistent with UTI. Pt asymptomatic. No fever, chills, flank pain, or dysuria. UCx grew Klebsiella pneumoniae. Received 2g IV ceftriaxone overnight on 12/15. Considering pt asymptomatic, will defer further treatment.    #Normocytic, Normochromic Anemia  Admission Hgb 10.6. Last recorded Hgb 13.0 11/2017. Iron panel unremarkable. LDH wnl. Haptoglobin wnl. Gilmer test nonreactive. Likely AOCD   - Cont to monitor     Chronic Medical Problems:  #GERD: continue PTA PPI  #HTN: hold PTA metoprolol (patient normotensive/hypotensive on admission)  #Seasonal allergies: continue allegra    Diet: Combination Diet Vegetarian Diet  Snacks/Supplements Adult: Boost Breeze; Between Meals  Calorie Counts  Fluids: PO  Lines: PIV  DVT Prophylaxis: Enoxaparin (Lovenox) SQ  Chavez Catheter: not present  Code Status: Full Code    Disposition Plan   Expected discharge: 1-2 days, pending TCU placement  Entered: Berry Stanley 12/18/2018, 6:51 AM     The patient's care was discussed with the Attending Physician, Dr. Kilpatrick.    Berry Stanley  Medical Student  [facility], [place] [id number] Service  Pager [phone]  ______________________________________________________________________    Interval History      Nursing notes reviewed. No acute events overnight. Today, she reports improvement in her shooting leg pain compared to yesterday. She does describe continued leg pain with walking. She reports increased itchiness in her legs. She reports increased numbness in both her hands. The numbness encompasses all fingers and extends to the wrist. She reports good appetite, and calorie counts from 12/17 noted 1827 kcal intact. She denies fever, chills,  chest pain, SOB, or changes in bowel habits.    Data reviewed today: I reviewed all medications, new labs and imaging results over the last 24 hours. I personally reviewed the abdominal ultrasound image(s) showing 1.  Possible cavernous transformation versus slow to-and-fro flow in.    Physical Exam   Vital Signs: Temp: 98.6  F (37  C) Temp src: Oral BP: 106/56 Pulse: 85 Heart Rate: 85 Resp: 16 SpO2: 95 % O2 Device: None (Room air)    Weight: 136 lbs 3.91 oz  Constitutional: sleeping in bed, NAD  Eyes: nonicteric sclera, EOMI  Respiratory: CTAB  Cardiovascular: RRR, normal S1,S2. No extra heart sounds or murmurs  GI: active BS, soft, nontender, nondistended  Skin: R Thigh: eschar with surrounding salmon colored erythema. Peripheral erythema now developing a central purple discoloration. Erythema interrupted by patch of normal appearing skin. Erythema border outlined 12/15, no expansion today. Left Thigh: eschar with surrounding salmon colored erythema. No change noticed today. LLE: 3-4 cm plaque with purple discoloration at center and surrounding salmon-colored erythema located on left, medial calf. Bx taken from this lesion. Band-aid over bx site.  Musculoskeletal: moves all extremities independently  Neurologic: AAOx3    Data   Recent Labs   Lab 12/18/18  0623 12/17/18  0733 12/16/18  0730 12/15/18  0645 12/14/18  0851 12/13/18  0833   WBC 4.2 4.5  --   --  7.2 6.2   HGB 8.9* 9.5*  --   --  10.4* 10.1*   MCV 93 93  --   --  93 92    203 201  --  222 246   INR  --   --   --   --  1.35* 1.39*   NA  --  135 136 135 135 137   POTASSIUM  --  4.4 4.2 4.6 4.2 4.2   CHLORIDE  --  102 102 101 100 104   CO2  --  22 23 24 24 24   BUN  --  13 10 8 10 10   CR  --  0.54 0.44* 0.53 0.55 0.49*   ANIONGAP  --  11 10 11 12 9   MARKO  --  10.3* 10.0 10.0 10.2* 10.4*   GLC  --  86 95 93 104* 92   ALBUMIN  --  3.0* 3.1* 3.2* 3.4 3.2*   PROTTOTAL  --  7.2 7.5 7.6 8.1 7.8   BILITOTAL  --  2.0* 2.0* 2.0* 2.4* 2.7*   ALKPHOS  --  110  108 110 120 113   ALT  --  15 16 17 18 19   AST  --  34 35 37 40 41     No results found for this or any previous visit (from the past 24 hour(s)).

## 2018-12-18 NOTE — PROGRESS NOTES
Calorie Count    Intake recorded for: 12/17  Total Kcals: 1827 Total Protein: 84g    Kcals from Hospital Food: 1827  Protein: 84g    Kcals from Outside Food (average):0 Protein: 0g    # Meals Recorded: 3 meals (First - 100% raisin bran w/ skim milk, veggie sausage, bagel w/ cream cheese)          (Second - 100% garlic ginger tofu stir friedman, iced tea w/ lemon)           (Third - 100% ginger ale, fruit ice, side caesar salad, flatbread pizza w/ mushrooms)    # Supplements Recorded: 100% 1 Gelatein Plus

## 2018-12-18 NOTE — PLAN OF CARE
Temp: 98.1  F (36.7  C) Temp src: Oral BP: 102/59 Pulse: 91 Heart Rate: 85 Resp: 14 SpO2: 96 % O2 Device: None (Room air)       Patient is here for new lesions on bilateral legs:    -pain well controlled with scheduled Tylenol and PRN Tramadol and Ibuprofen  -tolerating Regular diet ( vegetarian ) with good appetite; denies nausea  -on calorie count  -PIV saline locked  -no BM today  -up with one assist and a walker  -wounds covered with Vaseline gauze  -alert and oriented x 4    Plan: awaiting TCU placement.

## 2018-12-18 NOTE — PLAN OF CARE
Patient continues to experience more bilateral leg pain after getting up to the bathroom. Patient was given Tramadol but had waited to take until after she got up due to sleepiness from medication. Encouraged her to take in a timely manner and use before activities. Right scab/wound is reddened after getting up, left leg covered with gauze dressing. Patient did not eat breakfast due to rounds and therapy, is now eating lunch. PIV DC'd before discharge plans to TCU altered for tomorrow.

## 2018-12-19 ENCOUNTER — PATIENT OUTREACH (OUTPATIENT)
Dept: CARE COORDINATION | Facility: CLINIC | Age: 38
End: 2018-12-19

## 2018-12-19 VITALS
BODY MASS INDEX: 21.38 KG/M2 | HEART RATE: 95 BPM | OXYGEN SATURATION: 96 % | RESPIRATION RATE: 16 BRPM | SYSTOLIC BLOOD PRESSURE: 110 MMHG | DIASTOLIC BLOOD PRESSURE: 58 MMHG | TEMPERATURE: 98.6 F | WEIGHT: 136.24 LBS | HEIGHT: 67 IN

## 2018-12-19 LAB
1,25(OH)2D SERPL-MCNC: 9.7 PG/ML (ref 18–78)
ALBUMIN SERPL ELPH-MCNC: NEGATIVE G/DL
ANION GAP SERPL CALCULATED.3IONS-SCNC: 7 MMOL/L (ref 3–14)
BUN SERPL-MCNC: 14 MG/DL (ref 7–30)
CALCIUM SERPL-MCNC: 10.1 MG/DL (ref 8.5–10.1)
CHLORIDE SERPL-SCNC: 103 MMOL/L (ref 94–109)
CO2 SERPL-SCNC: 25 MMOL/L (ref 20–32)
CREAT SERPL-MCNC: 0.47 MG/DL (ref 0.52–1.04)
CRYOGLOB IGA & IGG & IGM SER-IMP: NORMAL
CRYOGLOB TYP SER IFE: NEGATIVE
ERYTHROCYTE [DISTWIDTH] IN BLOOD BY AUTOMATED COUNT: 13.9 % (ref 10–15)
GFR SERPL CREATININE-BSD FRML MDRD: >90 ML/MIN/{1.73_M2}
GLUCOSE SERPL-MCNC: 83 MG/DL (ref 70–99)
HCT VFR BLD AUTO: 28.5 % (ref 35–47)
HCYS SERPL-SCNC: 7.8 UMOL/L (ref 4–12)
HGB BLD-MCNC: 9.5 G/DL (ref 11.7–15.7)
MCH RBC QN AUTO: 31 PG (ref 26.5–33)
MCHC RBC AUTO-ENTMCNC: 33.3 G/DL (ref 31.5–36.5)
MCV RBC AUTO: 93 FL (ref 78–100)
PLATELET # BLD AUTO: 210 10E9/L (ref 150–450)
POTASSIUM SERPL-SCNC: 4.5 MMOL/L (ref 3.4–5.3)
RBC # BLD AUTO: 3.06 10E12/L (ref 3.8–5.2)
SODIUM SERPL-SCNC: 136 MMOL/L (ref 133–144)
WBC # BLD AUTO: 5.1 10E9/L (ref 4–11)

## 2018-12-19 PROCEDURE — 99239 HOSP IP/OBS DSCHRG MGMT >30: CPT | Mod: GC | Performed by: INTERNAL MEDICINE

## 2018-12-19 PROCEDURE — 25000132 ZZH RX MED GY IP 250 OP 250 PS 637: Performed by: STUDENT IN AN ORGANIZED HEALTH CARE EDUCATION/TRAINING PROGRAM

## 2018-12-19 PROCEDURE — 85027 COMPLETE CBC AUTOMATED: CPT | Performed by: INTERNAL MEDICINE

## 2018-12-19 PROCEDURE — 36415 COLL VENOUS BLD VENIPUNCTURE: CPT | Performed by: INTERNAL MEDICINE

## 2018-12-19 PROCEDURE — 80048 BASIC METABOLIC PNL TOTAL CA: CPT | Performed by: INTERNAL MEDICINE

## 2018-12-19 PROCEDURE — 82542 COL CHROMOTOGRAPHY QUAL/QUAN: CPT | Performed by: STUDENT IN AN ORGANIZED HEALTH CARE EDUCATION/TRAINING PROGRAM

## 2018-12-19 RX ORDER — GABAPENTIN 800 MG/1
800 TABLET ORAL 3 TIMES DAILY
Qty: 90 TABLET | Refills: 0 | Status: ON HOLD | DISCHARGE
Start: 2018-12-19 | End: 2019-06-04

## 2018-12-19 RX ORDER — NORTRIPTYLINE HCL 10 MG
10 CAPSULE ORAL AT BEDTIME
Qty: 30 CAPSULE | Refills: 0 | Status: ON HOLD | DISCHARGE
Start: 2018-12-19 | End: 2019-08-13

## 2018-12-19 RX ORDER — HYDROXYZINE HYDROCHLORIDE 10 MG/1
10 TABLET, FILM COATED ORAL 3 TIMES DAILY PRN
DISCHARGE
Start: 2018-12-19 | End: 2018-12-29

## 2018-12-19 RX ORDER — PENTOXIFYLLINE 400 MG/1
800 TABLET, EXTENDED RELEASE ORAL
Qty: 180 TABLET | Refills: 0 | Status: ON HOLD | DISCHARGE
Start: 2018-12-19 | End: 2019-06-04

## 2018-12-19 RX ORDER — LORATADINE 10 MG/1
10 TABLET ORAL DAILY
Qty: 30 TABLET | Refills: 3 | Status: ON HOLD | DISCHARGE
Start: 2018-12-19 | End: 2019-06-04

## 2018-12-19 RX ORDER — SENNOSIDES 8.6 MG
1 TABLET ORAL 2 TIMES DAILY PRN
DISCHARGE
Start: 2018-12-19 | End: 2019-01-18

## 2018-12-19 RX ORDER — PANTOPRAZOLE SODIUM 40 MG/1
40 TABLET, DELAYED RELEASE ORAL
Qty: 30 TABLET | Refills: 0 | DISCHARGE
Start: 2018-12-20 | End: 2019-01-19

## 2018-12-19 RX ORDER — LANOLIN ALCOHOL/MO/W.PET/CERES
100 CREAM (GRAM) TOPICAL DAILY
Qty: 30 TABLET | Refills: 0 | DISCHARGE
Start: 2018-12-20 | End: 2019-01-19

## 2018-12-19 RX ORDER — ONDANSETRON 4 MG/1
4 TABLET, ORALLY DISINTEGRATING ORAL EVERY 6 HOURS PRN
DISCHARGE
Start: 2018-12-19 | End: 2018-12-26

## 2018-12-19 RX ORDER — LANOLIN ALCOHOL/MO/W.PET/CERES
3 CREAM (GRAM) TOPICAL
DISCHARGE
Start: 2018-12-19 | End: 2019-01-18

## 2018-12-19 RX ORDER — PENTOXIFYLLINE 400 MG/1
400 TABLET, EXTENDED RELEASE ORAL
Status: DISCONTINUED | OUTPATIENT
Start: 2018-12-19 | End: 2018-12-19

## 2018-12-19 RX ORDER — PENTOXIFYLLINE 400 MG/1
800 TABLET, EXTENDED RELEASE ORAL
Status: DISCONTINUED | OUTPATIENT
Start: 2018-12-19 | End: 2018-12-19 | Stop reason: HOSPADM

## 2018-12-19 RX ORDER — ACETAMINOPHEN 325 MG/1
650 TABLET ORAL EVERY 8 HOURS PRN
DISCHARGE
Start: 2018-12-19 | End: 2019-01-18

## 2018-12-19 RX ORDER — TRAMADOL HYDROCHLORIDE 50 MG/1
50 TABLET ORAL 3 TIMES DAILY PRN
Qty: 42 TABLET | Refills: 0 | Status: SHIPPED | OUTPATIENT
Start: 2018-12-19 | End: 2019-01-02

## 2018-12-19 RX ADMIN — TRAMADOL HYDROCHLORIDE 50 MG: 50 TABLET, COATED ORAL at 08:48

## 2018-12-19 RX ADMIN — PENTOXIFYLLINE 800 MG: 400 TABLET, FILM COATED, EXTENDED RELEASE ORAL at 09:33

## 2018-12-19 RX ADMIN — B-COMPLEX W/ C & FOLIC ACID TAB 1 TABLET: TAB at 08:30

## 2018-12-19 RX ADMIN — APIXABAN 5 MG: 5 TABLET, FILM COATED ORAL at 09:33

## 2018-12-19 RX ADMIN — TRAMADOL HYDROCHLORIDE 50 MG: 50 TABLET, COATED ORAL at 14:15

## 2018-12-19 RX ADMIN — Medication 100 MG: at 08:30

## 2018-12-19 RX ADMIN — MAGNESIUM OXIDE TAB 400 MG (241.3 MG ELEMENTAL MG) 400 MG: 400 (241.3 MG) TAB at 08:30

## 2018-12-19 RX ADMIN — FEXOFENADINE HYDROCHLORIDE 180 MG: 180 TABLET, FILM COATED ORAL at 08:30

## 2018-12-19 RX ADMIN — TRAMADOL HYDROCHLORIDE 50 MG: 50 TABLET, COATED ORAL at 00:44

## 2018-12-19 RX ADMIN — GABAPENTIN 800 MG: 800 TABLET, FILM COATED ORAL at 08:30

## 2018-12-19 RX ADMIN — PANTOPRAZOLE SODIUM 40 MG: 40 TABLET, DELAYED RELEASE ORAL at 08:30

## 2018-12-19 RX ADMIN — PENTOXIFYLLINE 800 MG: 400 TABLET, FILM COATED, EXTENDED RELEASE ORAL at 13:20

## 2018-12-19 RX ADMIN — PRENATAL VIT W/ FE FUMARATE-FA TAB 27-0.8 MG 1 TABLET: 27-0.8 TAB at 08:30

## 2018-12-19 ASSESSMENT — ACTIVITIES OF DAILY LIVING (ADL)
ADLS_ACUITY_SCORE: 20

## 2018-12-19 ASSESSMENT — PAIN DESCRIPTION - DESCRIPTORS
DESCRIPTORS: ACHING
DESCRIPTORS: ACHING

## 2018-12-19 NOTE — PLAN OF CARE
Occupational Therapy Discharge Summary    Reason for therapy discharge:    Discharged to long term care facility.    Progress towards therapy goal(s). See goals on Care Plan in [facility] electronic health record for goal details.  Goals partially met.  Barriers to achieving goals:   discharge from facility.    Therapy recommendation(s):    Continued therapy is recommended.  Rationale/Recommendations:  to increase ADL/IADL IND prior to return home.

## 2018-12-19 NOTE — DISCHARGE SUMMARY
[facility], [place]  Discharge Summary - Medicine & Pediatrics       Date of Admission:  12/12/2018  Date of Discharge:  12/19/2018  2:15 PM  Discharging Provider: Dr. Kilpatrick   Discharge Service: Sundar 1    Discharge Diagnoses   1. Non Uremic Calciphylaxis  2. Positive Lupus Anticoagulant  3. Hx of Alcohol Abuse in Remission with a question of Wernicke's Encephalopathy  4. Mild asymptomatic hypercalcemia  5. Polyneuropathy likely 2/2 Nutritional Deficiency   6. Asymptomatic Bacteruria   7. Hx of Alcoholic Hepatitis   8. Chronic Normocytic Anemia     Follow-ups Needed After Discharge   1. PTHrP drawn and pending on discharge for continued workup of hypercalcemia. This should be followed as an outpatient by PCP.  2. Repeat Lupus Anticoagulant in 6 weeks to see whether a diagnosis of APAS can be made  3. Follow up with Hematology Oncology in 6 weeks  4. Follow up with Dermatology in 6 weeks  5. Follow up with neurology in 4 weeks  6. Labs and PCP follow up in 1 week with CBC and CMP   7. Follow up with GI in 6 weeks.     Unresulted Labs Ordered in the Past 30 Days of this Admission     Date and Time Order Name Status Description    12/18/2018 2300 PTH Related Peptide Test In process     12/14/2018 1953 Dermatological path order and indications In process       These results will be followed up by Primary Care in 1 week     Hospital Course   Yenifer Maher is a 38 year old female with a history of alcohol withdrawal, alcohol hepatitis and ?wernicke's, and polyneuorpathy 2/2 polynutritional deficiency admitted on 12/12/2018. She presented from an outpatient dermatology clinic for further work-up of 3 week history of a painful, bilateral, LE necrotic/eschar rash concerning for calciphylaxis vs. vasculitis.     1. Non Uremic Calciphylaxis: The patient presented after a 3-week history of migratory bilateral symmetric inner thigh rash, with surrounding erythema and central S chart.  The rash  was pruritic and extremely painful.  She was seen actually as an outpatient initially by dermatology on 12 December, where biopsy was attempted.  However it was much too painful, and therefore the patient was admitted to the [facility] for further workup.  A very broad rheumatologic and dermatologic workup was commenced.  Biopsy was done by the dermatology team at the bedside of the left lower extremity rash and a host of labs was ordered.  She was seen by rheumatology, hematology, and dermatology. Notably the patient's lupus anticoagulant came back positive as did her SMILEY. Her protein C was also noted to be mildly decreased.  Final pathology did show evidence of calcium deposition in the small blood vessels in the skin, consistent with a diagnosis of nonuremic calciphylaxis.  The rheumatology team did not feel that her presentation was c/w vasculitic process.   The patient was started on pentoxyfylline, and apixaban initially.  However shortly after discharge, the hematology team actually recommended that she be switched from a apixaban to Lovenox as they could not rule out antiphospholipid antibody syndrome in the setting of her positive lupus anticoagulant (though admittedly with a lower suspicion).  We therefore called the transitional care unit that the patient was sent to, and faxed over new prescription for lovenox and instructions to discontinue apixaban. The medication changes were discussed with Laura Martinez. She had started apixaban already and we therefore clarified that she should not ever receive both lovenox and apixaban. Her Lovenox will start on the morning of 12/21 at 60mg BID. She will need to continue Lovenox, and pentoxyfylline at least until she follows up with dermatology and hematology in 6 weeks.   In the setting of her nonuremic calciphylaxis, pain was a significant issue.  She was discharged on gabapentin 800 mg 3 times daily, tramadol 50 mg 3 times daily as  needed, scheduled Tylenol 3 times a day, as well as nortriptyline at night.    Meds For Calciphylaxis On Discharge: Lovenox 60 mg BID, and Pentoxyfylline.   Pain Meds: Tramadol 50mg TID, Tylenol, Neurontin 800mg TID, and Nortryptilline 10mg hs.     2.  Polyneuropathy likely secondary to nutritional deficiency.  The patient has a long history of significant numbness and tingling in bilateral upper and lower extremities especially in the feet and the hands.  On exam she was noted to have a wide-based ataxic gait, reduced vibratory sense in the feet greater than the ankles as well as in the hands, and reduced pinprick sensation in the feet and the hands.  She also had distal weakness in the hands and feet with reduced ankle reflexes.  She had been seen by neurology as an outpatient, who thought that this was most likely related to nutritional deficiency as the rest of her workup had been negative.  We did do a broad workup for nutritional deficiencies including a vitamin B12 level, folate level, thiamine level, which all came back within normal limits.  She was continued on her prior to admission vitamin supplementation, and asked to follow-up with the neurology team as an outpatient in about 4 weeks.  We continued her gabapentin for her neuropathic pain associated with this, and also added nortriptyline at night to help her with sleep, which seemed to work well.    3.  History of alcoholic hepatitis, alcohol abuse history, and a question of Warnicke's encephalopathy.  The patient has a history of significant alcohol use, and an admission for alcoholic hepatitis in August 2018.  She however has been in and out of TCU, and has been sober since about August.  On admission she had mild elevations in her total bilirubin to 2.7, with evidence of decreased synthetic function with an INR of 1.4.  Abdominal ultrasound was completed which showed a possible cavernous transformation versus slow to and fro flow in the main and  right portal veins consistent with early development of portal hypertension.  She did NOT have a cirrhotic configuration of the liver on ultrasound.  She will follow-up with GI in about 8 weeks for continued monitoring of this.  She does not currently carry the diagnosis of cirrhosis.    4.  Mild hypercalcemia.  The patient presented with mild hypercalcemia to 11 (when corrected for albumin).  This persisted despite IV fluid hydration.  The patient's PTH was appropriately low, with normal vitamin D studies.  Her TSH, was completely within normal limits.  PTH RP was drawn, and pending upon discharge.    5. Asymptomatic Bacteruria: The patient had a UC done on admission that resulted with Klebsiella Pneumoniae. However she had absolutely no symptoms of cystitis or pyelonephritis. She received one dose of abx in the Emergency Dept and no more. She continued to have no symptoms during her admission.     Consultations This Hospital Stay   ADVANCE DIRECTIVE IP CONSULT  SOCIAL WORK IP CONSULT  VASCULAR ACCESS CARE ADULT IP CONSULT  WOUND OSTOMY CONTINENCE NURSE  IP CONSULT  DERMATOLOGY IP CONSULT  RHEUMATOLOGY IP CONSULT  GI HEPATOLOGY ADULT IP CONSULT  NUTRITION SERVICES ADULT IP CONSULT  PHYSICAL THERAPY ADULT IP CONSULT  OCCUPATIONAL THERAPY ADULT IP CONSULT  ANESTHESIOLOGY IP CONSULT  PHARMACY TO DOSE PHYTONADIONE  HEMATOLOGY IP CONSULT  PHYSICAL THERAPY ADULT IP CONSULT  OCCUPATIONAL THERAPY ADULT IP CONSULT    Code Status   Full Code     The patient was discussed with MD Sundar Hill 1 Service  [facility], [place]  Pager: [phone]   ______________________________________________________________________    Physical Exam   Vital Signs: Temp: 98.6  F (37  C) Temp src: Oral BP: 110/58 Pulse: 95 Heart Rate: 84 Resp: 16 SpO2: 96 % O2 Device: None (Room air)    Weight: 136 lbs 3.91 oz  Constitutional: Anxious about transition but in nad  Eyes: nonicteric  sclera, EOMI  Respiratory: CTAB  Cardiovascular: RRR, normal S1,S2. No extra heart sounds or murmurs  GI: active BS, soft, nontender, nondistended  Skin: R Thigh: eschar with surrounding salmon colored erythema. Peripheral erythema now developing a central purple discoloration. Erythema interrupted by patch of normal appearing skin. Erythema border outlined 12/15, no expansion today. Left Thigh: eschar with surrounding salmon colored erythema. No change noticed today. LLE: 3-4 cm plaque with purple discoloration at center and surrounding salmon-colored erythema located on left, medial calf. Bx taken from this lesion. Band-aid over bx site.  Musculoskeletal: moves all extremities independently  Neurologic: AAOx3       Primary Care Physician   Physician No Ref-Primary    Discharge Disposition   Discharged to rehabilitation facility  Condition at discharge: Stable    Significant Results and Procedures   Most Recent 3 CBC's:  Recent Labs   Lab Test 12/19/18  0707 12/18/18  0623 12/17/18  0733   WBC 5.1 4.2 4.5   HGB 9.5* 8.9* 9.5*   MCV 93 93 93    191 203     Most Recent 3 BMP's:  Recent Labs   Lab Test 12/19/18  0707 12/18/18  0623 12/17/18  0733    136 135   POTASSIUM 4.5 4.7 4.4   CHLORIDE 103 103 102   CO2 25 22 22   BUN 14 30 13   CR 0.47* 0.79 0.54   ANIONGAP 7 10 11   MARKO 10.1 10.4* 10.3*   GLC 83 101* 86     Most Recent 2 LFT's:  Recent Labs   Lab Test 12/17/18  0733 12/16/18  0730   AST 34 35   ALT 15 16   ALKPHOS 110 108   BILITOTAL 2.0* 2.0*     Most Recent 3 INR's:  Recent Labs   Lab Test 12/14/18  0851 12/13/18  0833   INR 1.35* 1.39*     Most Recent 6 Bacteria Isolates From Any Culture (See EPIC Reports for Culture Details):  Recent Labs   Lab Test 12/13/18  0609   CULT >100,000 colonies/mL  Klebsiella pneumoniae  *   ,   Results for orders placed or performed during the hospital encounter of 12/12/18   US Abd Cmpl Abd/Pelvis Duplex Cmpl    Narrative    EXAMINATION: US ABDOMEN COMPLETE WITH  DOPPLER on 12/13/2018 9:36 AM.     INDICATION: History of cirrhosis.    COMPARISON: None.    TECHNIQUE: The abdomen was scanned in standard fashion with  specialized ultrasound transducer(s) using both gray-scale, color  Doppler and spectral flow techniques.    FINDINGS:    Liver: Measures 18 cm in length. Demonstrates diffuse hyperechoic  hepatic parenchyma. No evidence of a focal hepatic mass.     Main portal vein exhibits to-and-fro flow. Maximum antegrade velocity  is 18 cm/s and retrograde velocity 23 cm/s. 1 cm diameter.  Right portal vein flow exhibits to-and-fro flow. Maximum antegrade  velocity is 8 cm/s and retrograde velocity 11 cm/s.  Left portal vein flow is antegrade, measuring 13 cm/sec.    Flow in the hepatic artery is towards the liver and:  92 cm/s peak systolic  0.52 resistive index.   The right hepatic artery is patent with a peak systolic velocity of 78  cm/s and a resistive index of 0.41.  The left hepatic artery is patent with a peak systolic velocity 67  cm/s and a resistive index of 0.69.    The splenic vein is patent and flow is towards the liver.  The left,  middle, and right hepatic veins are patent with flow towards the IVC.  The IVC is patent with flow towards the heart.   The visualized aorta  is not dilated.    Gallbladder: Borderline wall thickening (3 mm). No pericholecystic  fluid, positive sonographic Chao's sign. Layering sludge.    Bile Ducts: Both the intra- and extrahepatic biliary system are of  normal caliber.  The common bile duct measures 7 mm in diameter.    Pancreas: Visualized portions of the head and body of the pancreas are  unremarkable.     Kidneys: Both kidneys are of normal echotexture, without mass or  hydronephrosis.  The craniocaudal dimensions are: right- 10.3 cm,  left- 10.3 cm.    Spleen: The spleen measures 13 cm in sagittal dimension.    Fluid: No evidence of ascites or pleural effusions.    Vessels: The proximal aorta measures 1.5 cm in diameter. The  IVC  measures 2.2 cm in diameter. Aorta and IVC are patent with antegrade  flow.      Impression    IMPRESSION:   1.  Possible cavernous transformation versus slow to-and-fro flow in  the main portal vein. To and fro flow in the right portal vein. The  splenic vein, portal veins, hepatic veins, IVC and hepatic arteries  are patent.  If clinically indicated, consider CT or MRI for further  evaluation of the portal vein.  2.  Low resistive index in the right hepatic artery is suggestive of  an upstream stenosis.  3.  Hyperechoic hepatic parenchyma consistent with clinical history.  4.  Gallbladder wall sludge in borderline wall thickening without  evidence of acute cholecystitis.    I have personally reviewed the examination and initial interpretation  and I agree with the findings.    CHANTEL STOCKTON MD       Discharge Orders      Neurology Adult Referral      General info for SNF    Length of Stay Estimate: Short Term Care: Estimated # of Days <30  Condition at Discharge: Improving  Level of care:skilled   Rehabilitation Potential: Good  Admission H&P remains valid and up-to-date: Yes  Recent Chemotherapy: N/A  Use Nursing Home Standing Orders: Yes     Mantoux instructions    Give two-step Mantoux (PPD) Per Facility Policy Yes     Activity - Up ad marcelo     Follow Up ([facility])    Follow up with primary care provider, Physician No Ref-Primary, within 7 days to evaluate medication change.  The following labs/tests are recommended: CBC, CMP.      Appointments on [place] and/or [facility] (with [facility] or [facility] provider or service). Call [phone] if you haven't heard regarding these appointments within 7 days of discharge.     Reason for your hospital stay    Dear Ms. Maher,     You came to the hospital because you were having pain and a new rash on your legs. You were seen by dermatology, and they did a biopsy that showed that you have something called calciphylaxis. This is a rare condition where your  blood vessels in your skin get very narrow and clogged. This can cause a lot of pain. Treatment aims to open up these blood vessels. This should relieve your pain and help improve the appearance of the rash.     You also have some numbness and tingling in your feet and hands. You should see a neurologist for this. Your vitamin levels are normal, so we do not think that you need to change your vitamins at this time.     Take care. We hope you get stronger and recover nicely. Please follow up with labs within 1 week. Please follow up with neurology within 4 weeks. Please follow up with dermatology within 6 weeks.     Full Code     Physical Therapy Adult Consult    Evaluate and treat as clinically indicated.    Reason:  Decreased sensation in the feet, difficulty with walking     Occupational Therapy Adult Consult    Evaluate and treat as clinically indicated.    Reason:  Decreased sensation in her hands; difficulty using her hands     Fall precautions     Advance Diet as Tolerated    Follow this diet upon discharge: Regular     Discharge Medications   Discharge Medication List as of 12/19/2018  1:41 PM      START taking these medications    Details   acetaminophen (TYLENOL) 325 MG tablet Take 2 tablets (650 mg) by mouth every 8 hours as needed for mild pain, Transitional      hydrOXYzine (ATARAX) 10 MG tablet Take 1 tablet (10 mg) by mouth 3 times daily as needed for itching, Transitional      loratadine (CLARITIN) 10 MG tablet Take 1 tablet (10 mg) by mouth daily, Disp-30 tablet, R-3, Transitional      melatonin 3 MG tablet Take 1 tablet (3 mg) by mouth nightly as needed for sleep, Transitional      nortriptyline (PAMELOR) 10 MG capsule Take 1 capsule (10 mg) by mouth At Bedtime, Disp-30 capsule, R-0, Transitional      ondansetron (ZOFRAN-ODT) 4 MG ODT tab Take 1 tablet (4 mg) by mouth every 6 hours as needed for nausea or vomiting, Transitional      pentoxifylline ER (TRENTAL) 400 MG CR tablet Take 2 tablets (800  mg) by mouth 3 times daily (with meals), Disp-180 tablet, R-0, Transitional      sennosides (SENOKOT) 8.6 MG tablet Take 1 tablet by mouth 2 times daily as needed for constipation, Transitional      vitamin B complex with vitamin C (STRESS TAB) tablet Take 1 tablet by mouth daily, Disp-30 tablet, R-0, Transitional      apixaban ANTICOAGULANT (ELIQUIS) 5 MG tablet Take 1 tablet (5 mg) by mouth 2 times daily, Disp-60 tablet, R-0, Transitional         CONTINUE these medications which have CHANGED    Details   gabapentin (NEURONTIN) 800 MG tablet Take 1 tablet (800 mg) by mouth 3 times daily, Disp-90 tablet, R-0, Transitional      pantoprazole (PROTONIX) 40 MG EC tablet Take 1 tablet (40 mg) by mouth every morning (before breakfast), Disp-30 tablet, R-0, Transitional      traMADol (ULTRAM) 50 MG tablet Take 1 tablet (50 mg) by mouth 3 times daily as needed for severe pain, Disp-42 tablet, R-0, Local Print      vitamin B1 (THIAMINE) 100 MG tablet Take 1 tablet (100 mg) by mouth daily, Disp-30 tablet, R-0, Transitional         CONTINUE these medications which have NOT CHANGED    Details   Prenatal Vit-Fe Fumarate-FA (PRENATAL MULTIVITAMIN PLUS IRON) 27-0.8 MG TABS per tablet Take 1 tablet by mouth daily, Historical         STOP taking these medications       B Complex-Biotin-FA (EQL B COMPLEX 50) TBCR Comments:   Reason for Stopping:         fexofenadine (ALLEGRA) 180 MG tablet Comments:   Reason for Stopping:         FUROSEMIDE PO Comments:   Reason for Stopping:         MAGNESIUM OXIDE PO Comments:   Reason for Stopping:         METOPROLOL TARTRATE PO Comments:   Reason for Stopping:         Pregabalin (LYRICA PO) Comments:   Reason for Stopping:         SPIRONOLACTONE PO Comments:   Reason for Stopping:             Allergies   Allergies   Allergen Reactions     Bengay Pain Relief [Menthol] Other (See Comments)     Skin turns red and feels extremely hot     Cats      Chocolate Dermatitis     Dicyclomine Other (See  Comments)     Severe sedation     Dust Mites      Derm, resp     No Clinical Screening - See Comments      GI upset     Phenobarbital      Pollen Extract      Derm, resp.

## 2018-12-19 NOTE — PROGRESS NOTES
[facility] Inpatient Consult Dermatology Note    12/19/2018      Impression/Plan:    1. 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 On physical exam, patient's findings are consistent with retiform purpura/thrombotic vasculopathy. Patient has already had comprehensive work up that has been negative for or normal for MPO ab, proteinase 3 ab, RNP ab, scleroderma ab, La ab, Ro ab, Geronimo ab, hep B, hep C, cerulopasmin, HIV, RF, SMILEY, liver kidney microsomal ab, C3/C4, SPEP, mitochondrial M2 ab.     She has a positive lupus anticoagulant and a low protein C level. Level S is normal as is homocysteine, anticardiolipin ab, factor V leiden, and beta2-glycoprotein 1. The pt has trace cryoglobulins (identification is still pending).  -Bx shows calcium deposits in small blood vessels. 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  - Pt has been started on apixiban 5mg BID and pentoxifylline 800mg TID. We do not believe the pt has to have a 10mg BID loading treatment as we have not done this in the past to treat calciphylaxis. Reviewed with pt risks of bleeding and GI upset  -Pt is scheduled to see Dr. Marti as an outpatient on 2/7/2019 at 12:15pm  -Her stiches should be removed at sometime between December 25 and 29, 2018  -Pt may benefit from a F/U in 6 weeks with benign hematology to reassess her positive lupus anticoagulant    Thank you for the dermatology consultation. Please do not hesitate to contact the dermatology resident/faculty on call for any additional questions or concerns. We will continue  to follow.     Joaquin Lewis MD  PGY-4, Dermatology  Pager: [phone]     Dr. Marti staffed the patient.    Staff Involved:  Resident/Staff        Dermatology Problem List:  1. Retiform purpura/thrombotic vasculopathy      Date of Admission: Dec 12, 2018   Encounter Date: 12/18/2018      Reason for Consultation:   Presents from outside dermatologist with 3 week history of necrotic areas, eschars and palpable purpura of b/l lower extremities.     Interval update:  Since yesterday, the pt has started apixiban 5mg BDI and pentoxifylline 800mg TID. The pt states she does not have any concerns about the medication, but her mother did (her mother was not present in the room during the visit). The pt notes that the areas of calciphylaxis hurt, often having a pinching sensation, and she is interested in preventing more of the spots. Otherwise, the pt denies any other general or skin-specific complaints at this time.       Past Medical History:   Patient Active Problem List   Diagnosis     Livedo reticularis     No past medical history on file.  No past surgical history on file.      Social History:  Patient reports that she has been smoking.  she has never used smokeless tobacco.    Family History:  No family history on file.    Medications:  Current Facility-Administered Medications   Medication     acetaminophen (TYLENOL) tablet 650 mg     apixaban ANTICOAGULANT (ELIQUIS) tablet 5 mg     fexofenadine (ALLEGRA) tablet 180 mg     gabapentin (NEURONTIN) tablet 800 mg     hydrOXYzine (ATARAX) tablet 10 mg     influenza quadrivalent (PF) vacc (FLUZONE or Flulaval or FLUARIX) injection 0.5 mL     Lidocaine (LIDOCARE) 4 % Patch 1 patch    And     lidocaine patch REMOVAL    And     lidocaine patch in PLACE     magnesium oxide (MAG-OX) tablet 400 mg     melatonin tablet 3 mg     naloxone (NARCAN) injection 0.1-0.4 mg     nortriptyline (PAMELOR) capsule 10 mg     ondansetron (ZOFRAN-ODT) ODT tab 4 mg    Or     ondansetron  "(ZOFRAN) injection 4 mg     pantoprazole (PROTONIX) EC tablet 40 mg     pentoxifylline ER (TRENtal) CR tablet 800 mg     polyethylene glycol (MIRALAX/GLYCOLAX) Packet 17 g     prenatal multivitamin w/iron per tablet 1 tablet     sennosides (SENOKOT) tablet 1 tablet     traMADol (ULTRAM) tablet 50 mg     vitamin B complex with vitamin C (STRESS TAB) tablet 1 tablet     vitamin B1 (THIAMINE) tablet 100 mg          Allergies   Allergen Reactions     Bengay Pain Relief [Menthol] Other (See Comments)     Skin turns red and feels extremely hot     Cats      Chocolate Dermatitis     Dicyclomine Other (See Comments)     Severe sedation     Dust Mites      Derm, resp     No Clinical Screening - See Comments      GI upset     Phenobarbital      Pollen Extract      Derm, resp.          Review of Systems:  -Positive as per HPI      Physical exam:  Vitals: /58   Pulse 95   Temp 98.6  F (37  C) (Oral)   Resp 16   Ht 1.702 m (5' 7\")   Wt 61.8 kg (136 lb 3.9 oz)   SpO2 96%   BMI 21.34 kg/m    GEN: This is a female in no acute distress, in a pleasant mood, appears older than her age.    SKIN: Focused examination of the face, neck, right and left arms, hands, right and left legs was notable for:  -A stellate purpuric plaque with gun metal gray necrosis centrally in the three areas on the right medial thigh. The left thigh is wrapped. There is a purpuric plaque on the left posterior lower leg with a band aid over the biopsy site.    Laboratory:  Results for orders placed or performed during the hospital encounter of 12/12/18 (from the past 24 hour(s))   Basic metabolic panel   Result Value Ref Range    Sodium 136 133 - 144 mmol/L    Potassium 4.5 3.4 - 5.3 mmol/L    Chloride 103 94 - 109 mmol/L    Carbon Dioxide 25 20 - 32 mmol/L    Anion Gap 7 3 - 14 mmol/L    Glucose 83 70 - 99 mg/dL    Urea Nitrogen 14 7 - 30 mg/dL    Creatinine 0.47 (L) 0.52 - 1.04 mg/dL    GFR Estimate >90 >60 mL/min/[1.73_m2]    GFR Estimate If " Black >90 >60 mL/min/[1.73_m2]    Calcium 10.1 8.5 - 10.1 mg/dL   CBC with platelets   Result Value Ref Range    WBC 5.1 4.0 - 11.0 10e9/L    RBC Count 3.06 (L) 3.8 - 5.2 10e12/L    Hemoglobin 9.5 (L) 11.7 - 15.7 g/dL    Hematocrit 28.5 (L) 35.0 - 47.0 %    MCV 93 78 - 100 fl    MCH 31.0 26.5 - 33.0 pg    MCHC 33.3 31.5 - 36.5 g/dL    RDW 13.9 10.0 - 15.0 %    Platelet Count 210 150 - 450 10e9/L

## 2018-12-19 NOTE — PLAN OF CARE
Physical Therapy Discharge Summary    Reason for therapy discharge:    Discharged to transitional care facility.    Progress towards therapy goal(s). See goals on Care Plan in [facility] electronic health record for goal details.  Goals not met.  Barriers to achieving goals:   limited tolerance for therapy and discharge from facility.    Therapy recommendation(s):    Continued therapy is recommended.  Rationale/Recommendations:  TCU to progress strength, endurance, and safety and independence with functional mobility.

## 2018-12-19 NOTE — PROGRESS NOTES
Social Work Services Discharge Note      Patient Name:  Yenifer Maher     Anticipated Discharge Date:  12/19/2018     Discharge Disposition:   [facility]    Following MD:  Dr. Kilpatrick     Pre-Admission Screening (PAS) online form has been completed.  The Level of Care (LOC) is:  Determined  Confirmation Code is: [id number]  Patient/caregiver informed of referral to [facility] for Pre-Admission Screening for skilled nursing facility (SNF) placement and to expect a phone call post discharge from SNF.     Additional Services/Equipment Arranged:   arranged WC ride for 2pm via [facility] [employer] [phone].     Patient / Family response to discharge plan:  In agreement.     Persons notified of above discharge plan:  Pt and mother, medical team.    Staff Discharge Instructions:  Please fax discharge orders and signed hard scripts for any controlled substances.  Please print a packet and send with patient.     CTS Handoff completed:  NO    Medicare Notice of Rights provided to the patient/family:  NO    SOPHIA Kong, TEJAL  7A   Ph: [phone], Pager: [phone]

## 2018-12-19 NOTE — PLAN OF CARE
"/68 (BP Location: Right arm)   Pulse 85   Temp 98.6  F (37  C) (Oral)   Resp 16   Ht 1.702 m (5' 7\")   Wt 61.8 kg (136 lb 3.9 oz)   SpO2 96%   BMI 21.34 kg/m  . Vitals stable on RA. Pt. c/o bilat. Thigh pain & decreased with sched. Lidoderm patch & prn Tramadol x1. Right thigh dressing changed d/t prior dressing fell off. Left thigh dressing dry/intact. Pt. up to commode with assist of 1 & walker. Voided adequate amounts, no stools this shift. No IV access. Pt. slept fair between cares.  Pt. to discharge to TCU today with ride coming at 1400. Continue to follow POC & notify team with changes.    "

## 2018-12-19 NOTE — PROGRESS NOTES
I assumed service from Dr. Jeffery today. Patient was discharged and unable to be seen on rounds. Had discussion with fellow and plan is documented as below. Will defer ultimate management of calciphylaxis to Dermatology- but patient's liver disease, malnutrition and falls risk should be considered.   Copper level needs to be followed up as may explain her atrixa, etc.   Follow up with Dr. Jeffery or myself is appropriate.   Susan Lara MD/PhD   of Medicine  Division of Hematology, Oncology and Transplantation  Pager [phone]      Brief Hematology Note  12/19/18  3:34 PM    Patient with calciphylaxis on biopsy; recommendation by Dermatology is to anticoagulate with apixaban.  We would actually recommend enoxaparin 1 mg/kg BID for several reasons:    1. Patient has severe malnutrition and we do not know how apixaban will be absorbed.    2. Patient is at risk for GI bleed with her liver disease and malnutrition; while enoxaparin still carries a risk of GI bleed, the risk from apixaban is higher.    3. Patient has a positive lupus anticoagulant.  From Kendricko et al. Blood 2018;132:0975-9507, we know that the direct oral anticoagulant rivaroxaban has poor efficacy (as compared to warfarin) in patients who have high-risk antiphospholipid antibody syndrome.  This patient does not have a conclusive diagnosis of antiphospholipid antibody syndrome, but does have one antibody positive.  To determine whether this antibody is spurious or persistent, we will need to retest her in 6 weeks (see below).  The relevant issue is that we have evidence that rivaroxaban is not effective means of anticoagulation in such patients.  Apixaban efficacy has not been reported, but by extension, we do not think it safe to consider any DOAC as adequate anticoagulation in this population.    We do recognize that Dermatology's recommendation is for apixaban and that enoxaparin would be considered second-line therapy for  calciphylaxis, but we want to ensure the care team is informed on all the risks unique to this patient.  I discussed these recommendations with the patient's care team.    Please have her follow up with Dr. Jeffery in 6 weeks for repeat testing.    Monique Ardon MD  Hematology-Oncology-Transplant Fellow

## 2018-12-19 NOTE — PROGRESS NOTES
Calorie Count    Intake recorded for: 12/18  Total Kcals: 0 Total Protein: 0g    Kcals from Hospital Food: 0   Protein: 0g    Kcals from Outside Food (average):0 Protein: 0g    # Meals Recorded: 1 meal ordered from kitchen, no intake recorded.     # Supplements Recorded: no intake recorded.

## 2018-12-19 NOTE — PLAN OF CARE
Shift:   VS: Temp: 98.4  F (36.9  C) Temp src: Oral BP: 112/71 Pulse: 85 Heart Rate: 100 Resp: 18 SpO2: 96 % O2 Device: None (Room air)    Pain: C/o BLE pain, managed wit prn tramadol  Neuro: A&Ox4, pt is tearful/crying d/t pain  Cardiac:   Intermittent tachycardiac  Respiratory: Tolerating RA  GI/Diet/Appetite: No appetite, refused dinner. Denies nausea  :  Adequate UO  LDA's: No PIV access  Skin: No new deficit noted. Bilateral inner thing rash dressing CDI  Activity: SBA with walker  Tests/Procedures:   Pertinent Labs/Lab Collection:      Plan: Possible discharge to TCU tomorrow. Continue with cares.

## 2018-12-19 NOTE — PLAN OF CARE
Patient transferred tp [facility] at 1400 via [facility]. Patient was tearful, very upset about the numbness in her hands and feet. Tramadol 50 mg given before transfer. Report called to Admissions. All belongings with the patient, no lines or drains in place. Bilateral gauze wraps in place on upper thighs. Vitals are stable, patient started on new medications for leg wounds, Trental and Eliquis.

## 2018-12-20 LAB — COPPER SERPL-MCNC: 90 UG/DL (ref 80–155)

## 2018-12-20 NOTE — PROGRESS NOTES
Hematology team requesting a change in anticoagulation plan after patient discharged to [facility] TCU the afternoon of 12/19/18. In short, recommendations per Hematology are to discontinue the apixaban prescription patient was discharged with (apixaban 5mg PO BID) and instead initiate enoxaparin 1mg/kg BID and follow up with Hematology clinic in 6 weeks. Per Dermatology recommendations, patient should remain on anticoagulation indefinitely for management of calciphylaxis unless patient develops a contraindication to anticoagulation.     I spoke with Discharge Pharmacy to confirm dosing for enoxaparin  (recommendation per Heme is for 1mg/kg Q12hrs), with most recent weight in chart and review of renal function, dosing recommendation per Pharmacist is for enoxaparin 60mg subcutaneous Q12hrs. Flora with Social Work team facilitated communication with [facility] which was greatly appreciated, I completed a [id number] update on this medication change by phone on the morning of 12/20/18 with Laura Martinez PA-C. Per Ms. Martinez's request, I also faxed a copy of a new order for enoxaparin, a copy of a discontinued order for apixaban, and a letter outlining instructions for the medication change on the morning of 12/20/18. Discharge summary was also faxed today which reflects these updated instructions.     Kalee Kilpatrick MD  [phone]

## 2018-12-30 LAB — PTH RELATED PROT SERPL-SCNC: 2.2 PMOL/L (ref 0–3.4)

## 2019-01-02 ENCOUNTER — DOCUMENTATION ONLY (OUTPATIENT)
Dept: OTHER | Facility: CLINIC | Age: 39
End: 2019-01-02

## 2019-01-02 LAB — COPATH REPORT: NORMAL

## 2019-01-06 ENCOUNTER — APPOINTMENT (OUTPATIENT)
Dept: GENERAL RADIOLOGY | Facility: CLINIC | Age: 39
End: 2019-01-06
Payer: COMMERCIAL

## 2019-01-06 ENCOUNTER — HOSPITAL ENCOUNTER (EMERGENCY)
Facility: CLINIC | Age: 39
Discharge: HOME | End: 2019-01-07
Attending: EMERGENCY MEDICINE | Admitting: EMERGENCY MEDICINE
Payer: COMMERCIAL

## 2019-01-06 DIAGNOSIS — N39.0 URINARY TRACT INFECTION WITHOUT HEMATURIA, SITE UNSPECIFIED: ICD-10-CM

## 2019-01-06 DIAGNOSIS — A08.4 VIRAL GASTROENTERITIS: ICD-10-CM

## 2019-01-06 LAB
ALBUMIN SERPL-MCNC: 3.3 G/DL (ref 3.4–5)
ALP SERPL-CCNC: 123 U/L (ref 40–150)
ALT SERPL W P-5'-P-CCNC: 32 U/L (ref 0–50)
ANION GAP SERPL CALCULATED.3IONS-SCNC: 11 MMOL/L (ref 3–14)
AST SERPL W P-5'-P-CCNC: 70 U/L (ref 0–45)
BASOPHILS # BLD AUTO: 0 10E9/L (ref 0–0.2)
BASOPHILS NFR BLD AUTO: 0 %
BILIRUB SERPL-MCNC: 2.2 MG/DL (ref 0.2–1.3)
BUN SERPL-MCNC: 13 MG/DL (ref 7–30)
CALCIUM SERPL-MCNC: 10 MG/DL (ref 8.5–10.1)
CHLORIDE SERPL-SCNC: 103 MMOL/L (ref 94–109)
CO2 BLDCOV-SCNC: 21 MMOL/L (ref 21–28)
CO2 SERPL-SCNC: 21 MMOL/L (ref 20–32)
CREAT SERPL-MCNC: 0.45 MG/DL (ref 0.52–1.04)
DIFFERENTIAL METHOD BLD: ABNORMAL
EOSINOPHIL # BLD AUTO: 0 10E9/L (ref 0–0.7)
EOSINOPHIL NFR BLD AUTO: 0 %
ERYTHROCYTE [DISTWIDTH] IN BLOOD BY AUTOMATED COUNT: 14.3 % (ref 10–15)
GFR SERPL CREATININE-BSD FRML MDRD: >90 ML/MIN/{1.73_M2}
GLUCOSE SERPL-MCNC: 118 MG/DL (ref 70–99)
HCG UR QL: NEGATIVE
HCT VFR BLD AUTO: 30.8 % (ref 35–47)
HGB BLD-MCNC: 10.3 G/DL (ref 11.7–15.7)
IMM GRANULOCYTES # BLD: 0 10E9/L (ref 0–0.4)
IMM GRANULOCYTES NFR BLD: 0.2 %
INTERNAL QC OK POCT: YES
LACTATE BLD-SCNC: 2.1 MMOL/L (ref 0.7–2.1)
LACTATE BLD-SCNC: 2.6 MMOL/L (ref 0.7–2)
LIPASE SERPL-CCNC: 36 U/L (ref 73–393)
LYMPHOCYTES # BLD AUTO: 0.6 10E9/L (ref 0.8–5.3)
LYMPHOCYTES NFR BLD AUTO: 7.1 %
MCH RBC QN AUTO: 30.7 PG (ref 26.5–33)
MCHC RBC AUTO-ENTMCNC: 33.4 G/DL (ref 31.5–36.5)
MCV RBC AUTO: 92 FL (ref 78–100)
MONOCYTES # BLD AUTO: 0.3 10E9/L (ref 0–1.3)
MONOCYTES NFR BLD AUTO: 4.2 %
NEUTROPHILS # BLD AUTO: 7.3 10E9/L (ref 1.6–8.3)
NEUTROPHILS NFR BLD AUTO: 88.5 %
NRBC # BLD AUTO: 0 10*3/UL
NRBC BLD AUTO-RTO: 0 /100
PCO2 BLDV: 31 MM HG (ref 40–50)
PH BLDV: 7.43 PH (ref 7.32–7.43)
PLATELET # BLD AUTO: 188 10E9/L (ref 150–450)
PO2 BLDV: 49 MM HG (ref 25–47)
POTASSIUM SERPL-SCNC: 3.6 MMOL/L (ref 3.4–5.3)
PROT SERPL-MCNC: 7.9 G/DL (ref 6.8–8.8)
RBC # BLD AUTO: 3.35 10E12/L (ref 3.8–5.2)
SAO2 % BLDV FROM PO2: 86 %
SODIUM SERPL-SCNC: 134 MMOL/L (ref 133–144)
WBC # BLD AUTO: 8.2 10E9/L (ref 4–11)

## 2019-01-06 PROCEDURE — 87186 SC STD MICRODIL/AGAR DIL: CPT | Performed by: EMERGENCY MEDICINE

## 2019-01-06 PROCEDURE — 96375 TX/PRO/DX INJ NEW DRUG ADDON: CPT | Performed by: EMERGENCY MEDICINE

## 2019-01-06 PROCEDURE — 74019 RADEX ABDOMEN 2 VIEWS: CPT

## 2019-01-06 PROCEDURE — 99284 EMERGENCY DEPT VISIT MOD MDM: CPT | Mod: 25 | Performed by: EMERGENCY MEDICINE

## 2019-01-06 PROCEDURE — 83605 ASSAY OF LACTIC ACID: CPT

## 2019-01-06 PROCEDURE — 87506 IADNA-DNA/RNA PROBE TQ 6-11: CPT | Performed by: EMERGENCY MEDICINE

## 2019-01-06 PROCEDURE — 87088 URINE BACTERIA CULTURE: CPT | Performed by: EMERGENCY MEDICINE

## 2019-01-06 PROCEDURE — 25000128 H RX IP 250 OP 636: Performed by: EMERGENCY MEDICINE

## 2019-01-06 PROCEDURE — 85025 COMPLETE CBC W/AUTO DIFF WBC: CPT | Performed by: EMERGENCY MEDICINE

## 2019-01-06 PROCEDURE — 83690 ASSAY OF LIPASE: CPT | Performed by: EMERGENCY MEDICINE

## 2019-01-06 PROCEDURE — 83605 ASSAY OF LACTIC ACID: CPT | Performed by: EMERGENCY MEDICINE

## 2019-01-06 PROCEDURE — 81001 URINALYSIS AUTO W/SCOPE: CPT | Performed by: EMERGENCY MEDICINE

## 2019-01-06 PROCEDURE — 80053 COMPREHEN METABOLIC PANEL: CPT | Performed by: EMERGENCY MEDICINE

## 2019-01-06 PROCEDURE — 99284 EMERGENCY DEPT VISIT MOD MDM: CPT | Mod: Z6 | Performed by: EMERGENCY MEDICINE

## 2019-01-06 PROCEDURE — 87086 URINE CULTURE/COLONY COUNT: CPT | Performed by: EMERGENCY MEDICINE

## 2019-01-06 PROCEDURE — 87040 BLOOD CULTURE FOR BACTERIA: CPT | Performed by: EMERGENCY MEDICINE

## 2019-01-06 PROCEDURE — 81025 URINE PREGNANCY TEST: CPT | Performed by: EMERGENCY MEDICINE

## 2019-01-06 PROCEDURE — 96365 THER/PROPH/DIAG IV INF INIT: CPT | Performed by: EMERGENCY MEDICINE

## 2019-01-06 PROCEDURE — 82803 BLOOD GASES ANY COMBINATION: CPT

## 2019-01-06 PROCEDURE — 87493 C DIFF AMPLIFIED PROBE: CPT | Performed by: EMERGENCY MEDICINE

## 2019-01-06 PROCEDURE — 96361 HYDRATE IV INFUSION ADD-ON: CPT | Performed by: EMERGENCY MEDICINE

## 2019-01-06 RX ORDER — SODIUM CHLORIDE 9 MG/ML
1000 INJECTION, SOLUTION INTRAVENOUS CONTINUOUS
Status: DISCONTINUED | OUTPATIENT
Start: 2019-01-06 | End: 2019-01-07 | Stop reason: HOSPADM

## 2019-01-06 RX ORDER — KETOROLAC TROMETHAMINE 30 MG/ML
30 INJECTION, SOLUTION INTRAMUSCULAR; INTRAVENOUS ONCE
Status: COMPLETED | OUTPATIENT
Start: 2019-01-06 | End: 2019-01-06

## 2019-01-06 RX ORDER — ONDANSETRON 2 MG/ML
4 INJECTION INTRAMUSCULAR; INTRAVENOUS EVERY 30 MIN PRN
Status: DISCONTINUED | OUTPATIENT
Start: 2019-01-06 | End: 2019-01-07 | Stop reason: HOSPADM

## 2019-01-06 RX ADMIN — SODIUM CHLORIDE 1000 ML: 9 INJECTION, SOLUTION INTRAVENOUS at 20:31

## 2019-01-06 RX ADMIN — ONDANSETRON 4 MG: 2 INJECTION INTRAMUSCULAR; INTRAVENOUS at 22:15

## 2019-01-06 RX ADMIN — PROCHLORPERAZINE EDISYLATE 10 MG: 5 INJECTION INTRAMUSCULAR; INTRAVENOUS at 22:48

## 2019-01-06 RX ADMIN — FAMOTIDINE 20 MG: 20 INJECTION, SOLUTION INTRAVENOUS at 22:54

## 2019-01-06 RX ADMIN — SODIUM CHLORIDE 1000 ML: 9 INJECTION, SOLUTION INTRAVENOUS at 22:15

## 2019-01-06 RX ADMIN — KETOROLAC TROMETHAMINE 30 MG: 30 INJECTION, SOLUTION INTRAMUSCULAR at 22:46

## 2019-01-06 RX ADMIN — SODIUM CHLORIDE 1000 ML: 9 INJECTION, SOLUTION INTRAVENOUS at 23:47

## 2019-01-06 ASSESSMENT — ENCOUNTER SYMPTOMS
NAUSEA: 1
FEVER: 1
HEADACHES: 1
DIARRHEA: 1
VOMITING: 1
ABDOMINAL PAIN: 1

## 2019-01-06 NOTE — ED AVS SNAPSHOT
[facility], [place], Emergency Department  [address] [place] [id number]  Phone:  [phone]                                    Yenifer Maher   MRN: [id number]    Department:  [facility], Emergency Department   Date of Visit:  1/6/2019           After Visit Summary Signature Page    I have received my discharge instructions, and my questions have been answered. I have discussed any challenges I see with this plan with the nurse or doctor.    ..........................................................................................................................................  Patient/Patient Representative Signature      ..........................................................................................................................................  Patient Representative Print Name and Relationship to Patient    ..................................................               ................................................  Date                                   Time    ..........................................................................................................................................  Reviewed by Signature/Title    ...................................................              ..............................................  Date                                               Time          22EPIC Rev 08/18

## 2019-01-07 ENCOUNTER — TELEPHONE (OUTPATIENT)
Dept: EMERGENCY MEDICINE | Facility: CLINIC | Age: 39
End: 2019-01-07

## 2019-01-07 VITALS
OXYGEN SATURATION: 99 % | BODY MASS INDEX: 22.71 KG/M2 | RESPIRATION RATE: 14 BRPM | WEIGHT: 145 LBS | SYSTOLIC BLOOD PRESSURE: 121 MMHG | TEMPERATURE: 98.6 F | DIASTOLIC BLOOD PRESSURE: 68 MMHG | HEART RATE: 98 BPM

## 2019-01-07 LAB
ALBUMIN UR-MCNC: 30 MG/DL
APPEARANCE UR: ABNORMAL
BILIRUB UR QL STRIP: NEGATIVE
C COLI+JEJUNI+LARI FUSA STL QL NAA+PROBE: NOT DETECTED
C DIFF TOX B STL QL: NEGATIVE
COLOR UR AUTO: ABNORMAL
EC STX1 GENE STL QL NAA+PROBE: NOT DETECTED
EC STX2 GENE STL QL NAA+PROBE: NOT DETECTED
ENTERIC PATHOGEN COMMENT: ABNORMAL
GLUCOSE UR STRIP-MCNC: NEGATIVE MG/DL
HGB UR QL STRIP: ABNORMAL
KETONES UR STRIP-MCNC: NEGATIVE MG/DL
LEUKOCYTE ESTERASE UR QL STRIP: ABNORMAL
MUCOUS THREADS #/AREA URNS LPF: PRESENT /LPF
NITRATE UR QL: NEGATIVE
NOROV GI+II ORF1-ORF2 JNC STL QL NAA+PR: ABNORMAL
PH UR STRIP: 5.5 PH (ref 5–7)
RBC #/AREA URNS AUTO: 13 /HPF (ref 0–2)
RVA NSP5 STL QL NAA+PROBE: NOT DETECTED
SALMONELLA SP RPOD STL QL NAA+PROBE: NOT DETECTED
SHIGELLA SP+EIEC IPAH STL QL NAA+PROBE: NOT DETECTED
SOURCE: ABNORMAL
SP GR UR STRIP: 1.03 (ref 1–1.03)
SPECIMEN SOURCE: NORMAL
SQUAMOUS #/AREA URNS AUTO: 10 /HPF (ref 0–1)
TRANS CELLS #/AREA URNS HPF: 1 /HPF (ref 0–1)
UROBILINOGEN UR STRIP-MCNC: NORMAL MG/DL (ref 0–2)
V CHOL+PARA RFBL+TRKH+TNAA STL QL NAA+PR: NOT DETECTED
WBC #/AREA URNS AUTO: 36 /HPF (ref 0–5)
Y ENTERO RECN STL QL NAA+PROBE: NOT DETECTED

## 2019-01-07 RX ORDER — NITROFURANTOIN 25; 75 MG/1; MG/1
100 CAPSULE ORAL 2 TIMES DAILY
Qty: 14 CAPSULE | Refills: 0 | Status: SHIPPED | OUTPATIENT
Start: 2019-01-07 | End: 2019-01-14

## 2019-01-07 NOTE — DISCHARGE INSTRUCTIONS
Your blood work is stable.   You have tests from your diarrhea that are still not back, you will receive a phone call in 24 hrs if the diarrhea shows anything concerning.     Please make an appointment to follow up with Your Primary Care Provider in 2-4 days even if entirely better.    Return to the ER if symptoms worsen.     Take the antibiotics as prescribed.

## 2019-01-07 NOTE — ED TRIAGE NOTES
Yenifer brought in by EMS for c/o fever and abd pain starting yesterday. Pt states she had a fever of 102 today and was given Tylenol per group home staff, but pt is unsure of time. Pt also c/o N/V/D today. Pt tearful during triage.

## 2019-01-07 NOTE — ED PROVIDER NOTES
History     Chief Complaint   Patient presents with     Fever     Nausea, Vomiting, & Diarrhea     HPI  Yenifer Maher is a 38 year old female with a history of calciphylaxis, alcohol abuse, who presents to the emergency department for evaluation of fever, nausea, vomiting, diarrhea.  Patient states that at her group home today she had a temperature of 102.5 and experienced vomiting last night and diarrhea last night through this morning (estimated approximately 5 episodes within the last 24 hours).  Patient also complains of a headache, abdominal pain, nausea, and darker urine than usual.  Patient states that she has been taking her Lovenox injections as directed and drinking fluids.   She states that she takes multiple medications though does not know what they are as her group home organizes this for her. Denies any recent alcohol use.  Denies history of abdominal surgeries.  Patient uses a walker to ambulate. She states that she has not had food since yesterday.    I have reviewed the Medications, Allergies, Past Medical and Surgical History, and Social History in the Epic system.  No past medical history on file.    No past surgical history on file.    No family history on file.    Social History     Tobacco Use     Smoking status: Current Some Day Smoker     Smokeless tobacco: Never Used   Substance Use Topics     Alcohol use: Not on file       Current Facility-Administered Medications   Medication     0.9% sodium chloride BOLUS     0.9% sodium chloride BOLUS    Followed by     sodium chloride 0.9% infusion     ondansetron (ZOFRAN) injection 4 mg     Current Outpatient Medications   Medication     acetaminophen (TYLENOL) 325 MG tablet     enoxaparin (LOVENOX) 60 MG/0.6ML syringe     gabapentin (NEURONTIN) 800 MG tablet     loratadine (CLARITIN) 10 MG tablet     melatonin 3 MG tablet     nortriptyline (PAMELOR) 10 MG capsule     pantoprazole (PROTONIX) 40 MG EC tablet     pentoxifylline ER (TRENTAL) 400 MG CR  tablet     Prenatal Vit-Fe Fumarate-FA (PRENATAL MULTIVITAMIN PLUS IRON) 27-0.8 MG TABS per tablet     sennosides (SENOKOT) 8.6 MG tablet     vitamin B complex with vitamin C (STRESS TAB) tablet     vitamin B1 (THIAMINE) 100 MG tablet        Allergies   Allergen Reactions     Bengay Pain Relief [Menthol] Other (See Comments)     Skin turns red and feels extremely hot     Cats      Chocolate Dermatitis     Dicyclomine Other (See Comments)     Severe sedation     Dust Mites      Derm, resp     No Clinical Screening - See Comments      GI upset     Phenobarbital      Pollen Extract      Derm, resp.        Review of Systems   Constitutional: Positive for fever (102.5 measured at group home).   Gastrointestinal: Positive for abdominal pain, diarrhea, nausea and vomiting.   Genitourinary:        Positive - dark urine   Neurological: Positive for headaches.       Physical Exam   BP: 125/74  Heart Rate: 125  Temp: 99.4  F (37.4  C)(Pt was given Tylenol a few hours ago.)  Resp: 16  Weight: 65.8 kg (145 lb)  SpO2: 97 %      Physical Exam   Constitutional: She is oriented to person, place, and time. She appears well-developed and well-nourished.   HENT:   Head: Normocephalic and atraumatic.   Neck: Normal range of motion.   Cardiovascular: Normal rate, regular rhythm and normal heart sounds.   Pulmonary/Chest: Effort normal and breath sounds normal. No stridor. No respiratory distress. She has no wheezes.   Abdominal: Soft. She exhibits no distension and no mass. There is no tenderness. There is no rebound and no guarding.   Musculoskeletal: She exhibits no tenderness.   Neurological: She is alert and oriented to person, place, and time.   Skin: Skin is warm and dry.   No visible large skin lesion on extremities or torso    Psychiatric: She has a normal mood and affect. Her behavior is normal. Thought content normal.       ED Course   8:31 PM  The patient was seen and examined by Geraldine Moreno MD in Room 12.        Procedures    Results for orders placed or performed during the hospital encounter of 01/06/19 (from the past 24 hour(s))   CBC with platelets differential   Result Value Ref Range    WBC 8.2 4.0 - 11.0 10e9/L    RBC Count 3.35 (L) 3.8 - 5.2 10e12/L    Hemoglobin 10.3 (L) 11.7 - 15.7 g/dL    Hematocrit 30.8 (L) 35.0 - 47.0 %    MCV 92 78 - 100 fl    MCH 30.7 26.5 - 33.0 pg    MCHC 33.4 31.5 - 36.5 g/dL    RDW 14.3 10.0 - 15.0 %    Platelet Count 188 150 - 450 10e9/L    Diff Method Automated Method     % Neutrophils 88.5 %    % Lymphocytes 7.1 %    % Monocytes 4.2 %    % Eosinophils 0.0 %    % Basophils 0.0 %    % Immature Granulocytes 0.2 %    Nucleated RBCs 0 0 /100    Absolute Neutrophil 7.3 1.6 - 8.3 10e9/L    Absolute Lymphocytes 0.6 (L) 0.8 - 5.3 10e9/L    Absolute Monocytes 0.3 0.0 - 1.3 10e9/L    Absolute Eosinophils 0.0 0.0 - 0.7 10e9/L    Absolute Basophils 0.0 0.0 - 0.2 10e9/L    Abs Immature Granulocytes 0.0 0 - 0.4 10e9/L    Absolute Nucleated RBC 0.0    Lactic acid whole blood   Result Value Ref Range    Lactic Acid 2.6 (H) 0.7 - 2.0 mmol/L                  Critical Care time:  none         Results for orders placed or performed during the hospital encounter of 01/06/19   XR Abdomen 2 Views    Impression    Impression: Non obstructed bowel gas pattern   CBC with platelets differential   Result Value Ref Range    WBC 8.2 4.0 - 11.0 10e9/L    RBC Count 3.35 (L) 3.8 - 5.2 10e12/L    Hemoglobin 10.3 (L) 11.7 - 15.7 g/dL    Hematocrit 30.8 (L) 35.0 - 47.0 %    MCV 92 78 - 100 fl    MCH 30.7 26.5 - 33.0 pg    MCHC 33.4 31.5 - 36.5 g/dL    RDW 14.3 10.0 - 15.0 %    Platelet Count 188 150 - 450 10e9/L    Diff Method Automated Method     % Neutrophils 88.5 %    % Lymphocytes 7.1 %    % Monocytes 4.2 %    % Eosinophils 0.0 %    % Basophils 0.0 %    % Immature Granulocytes 0.2 %    Nucleated RBCs 0 0 /100    Absolute Neutrophil 7.3 1.6 - 8.3 10e9/L    Absolute Lymphocytes 0.6 (L) 0.8 - 5.3 10e9/L    Absolute Monocytes 0.3 0.0  - 1.3 10e9/L    Absolute Eosinophils 0.0 0.0 - 0.7 10e9/L    Absolute Basophils 0.0 0.0 - 0.2 10e9/L    Abs Immature Granulocytes 0.0 0 - 0.4 10e9/L    Absolute Nucleated RBC 0.0    Comprehensive metabolic panel   Result Value Ref Range    Sodium 134 133 - 144 mmol/L    Potassium 3.6 3.4 - 5.3 mmol/L    Chloride 103 94 - 109 mmol/L    Carbon Dioxide 21 20 - 32 mmol/L    Anion Gap 11 3 - 14 mmol/L    Glucose 118 (H) 70 - 99 mg/dL    Urea Nitrogen 13 7 - 30 mg/dL    Creatinine 0.45 (L) 0.52 - 1.04 mg/dL    GFR Estimate >90 >60 mL/min/[1.73_m2]    GFR Estimate If Black >90 >60 mL/min/[1.73_m2]    Calcium 10.0 8.5 - 10.1 mg/dL    Bilirubin Total 2.2 (H) 0.2 - 1.3 mg/dL    Albumin 3.3 (L) 3.4 - 5.0 g/dL    Protein Total 7.9 6.8 - 8.8 g/dL    Alkaline Phosphatase 123 40 - 150 U/L    ALT 32 0 - 50 U/L    AST 70 (H) 0 - 45 U/L   Lactic acid whole blood   Result Value Ref Range    Lactic Acid 2.6 (H) 0.7 - 2.0 mmol/L   UA with Microscopic   Result Value Ref Range    Color Urine Dark Yellow     Appearance Urine Cloudy     Glucose Urine Negative NEG^Negative mg/dL    Bilirubin Urine Negative NEG^Negative    Ketones Urine Negative NEG^Negative mg/dL    Specific Gravity Urine 1.029 1.003 - 1.035    Blood Urine Moderate (A) NEG^Negative    pH Urine 5.5 5.0 - 7.0 pH    Protein Albumin Urine 30 (A) NEG^Negative mg/dL    Urobilinogen mg/dL Normal 0.0 - 2.0 mg/dL    Nitrite Urine Negative NEG^Negative    Leukocyte Esterase Urine Large (A) NEG^Negative    Source Midstream Urine     WBC Urine 36 (H) 0 - 5 /HPF    RBC Urine 13 (H) 0 - 2 /HPF    Squamous Epithelial /HPF Urine 10 (H) 0 - 1 /HPF    Transitional Epi 1 0 - 1 /HPF    Mucous Urine Present (A) NEG^Negative /LPF   Lipase   Result Value Ref Range    Lipase 36 (L) 73 - 393 U/L   hCG qual urine POCT   Result Value Ref Range    HCG Qual Urine Negative neg    Internal QC OK Yes    ISTAT gases lactate jasmin POCT   Result Value Ref Range    Ph Venous 7.43 7.32 - 7.43 pH    PCO2 Venous 31  (L) 40 - 50 mm Hg    PO2 Venous 49 (H) 25 - 47 mm Hg    Bicarbonate Venous 21 21 - 28 mmol/L    O2 Sat Venous 86 %    Lactic Acid 2.1 0.7 - 2.1 mmol/L   Urine Culture   Result Value Ref Range    Specimen Description Unspecified Urine     Special Requests Specimen received in preservative     Culture Micro PENDING    Blood culture   Result Value Ref Range    Specimen Description Blood Left Arm     Special Requests Received in aerobic bottle only     Culture Micro PENDING    Blood culture   Result Value Ref Range    Specimen Description Blood Left Arm     Special Requests Received in aerobic bottle only     Culture Micro PENDING    Clostridium difficile toxin B PCR   Result Value Ref Range    Specimen Description Feces     C Diff Toxin B PCR Negative NEG^Negative     Medications   0.9% sodium chloride BOLUS (0 mLs Intravenous Stopped 1/6/19 2315)     Followed by   sodium chloride 0.9% infusion (0 mLs Intravenous Stopped 1/7/19 0036)   ondansetron (ZOFRAN) injection 4 mg (4 mg Intravenous Given 1/6/19 2215)   0.9% sodium chloride BOLUS (0 mLs Intravenous Stopped 1/6/19 2153)   prochlorperazine (COMPAZINE) injection 10 mg (10 mg Intravenous Given 1/6/19 2248)   famotidine (PEPCID) infusion 20 mg (0 mg Intravenous Stopped 1/6/19 2315)   ketorolac (TORADOL) injection 30 mg (30 mg Intravenous Given 1/6/19 2246)            Labs Ordered and Resulted from Time of ED Arrival Up to the Time of Departure from the ED - No data to display         Assessments & Plan (with Medical Decision Making)   Patient is a 38-year-old female with a history of alcohol abuse and subsequent Wernicke's encephalopathy as well as history of calciphylaxis who presented to the ER due to 1 day of vomiting and 1 day of diarrhea.  Patient here received IV fluids and nausea medications.  Her nausea improved.  Patient was able to eat some crackers and drink some juice.  Patient did have one large episode of liquid diarrhea here in the ER and a culture was  sent.  Patient's abdominal exam remained remains benign.  Patient with no signs of acute abdomen.  Patient's blood work is stable.  Patient with no signs of acute calciphylaxis at this time.  Patient is feeling better as will be discharged back to her group home.  Patient encouraged to return to the ER if symptoms worsen.  Symptoms are likely caused by gastroenteritis.    I have reviewed the nursing notes.    I have reviewed the findings, diagnosis, plan and need for follow up with the patient.       Medication List      There are no discharge medications for this visit.         Final diagnoses:   Viral gastroenteritis   Urinary tract infection without hematuria, site unspecified     IAlbin, am serving as a trained medical scribe to document services personally performed by Geraldine Moreno MD, based on the provider's statements to me.   Geraldine BELLE MD, was physically present and have reviewed and verified the accuracy of this note documented by Albin Figueroa.    1/6/2019   [facility], [place], EMERGENCY DEPARTMENT     Geraldine Moreno MD  01/07/19 0222

## 2019-01-07 NOTE — TELEPHONE ENCOUNTER
[place] /[facility] Emergency Department Lab result notification:    [place] ED lab result protocol used  Norovirus    Reason for call  Notify of lab results, assess symptoms,  review ED providers recommendations/discharge instructions (if necessary) and advise per ED lab result f/u protocol    Lab Result  Final Enteric Bacteria and Virus Panel by [place] [facility] is POSITIVE for Norovirus I and II by [place]  Patient to be notified, symptom's assessed and advised per [place] ED lab result f/u protocol.  Information table from ED Provider visit on 1-6-19/1-7-19  Symptoms reported at ED visit (Chief complaint, HPI)  Fever     Nausea, Vomiting, & Diarrhea      HPI  Yenifer Maher is a 38 year old female with a history of calciphylaxis, alcohol abuse, who presents to the emergency department for evaluation of fever, nausea, vomiting, diarrhea.  Patient states that at her group home today she had a temperature of 102.5 and experienced vomiting last night and diarrhea last night through this morning (estimated approximately 5 episodes within the last 24 hours).  Patient also complains of a headache, abdominal pain, nausea, and darker urine than usual.  Patient states that she has been taking her Lovenox injections as directed and drinking fluids.   She states that she takes multiple medications though does not know what they are as her group home organizes this for her. Denies any recent alcohol use.  Denies history of abdominal surgeries.  Patient uses a walker to ambulate. She states that she has not had food since yesterday.     ED providers Impression and Plan (applicable information) Patient is a 38-year-old female with a history of alcohol abuse and subsequent Wernicke's encephalopathy as well as history of calciphylaxis who presented to the ER due to 1 day of vomiting and 1 day of diarrhea.  Patient here received IV fluids and nausea medications.  Her nausea improved.  Patient was able to eat some crackers and  "drink some juice.  Patient did have one large episode of liquid diarrhea here in the ER and a culture was sent.  Patient's abdominal exam remained remains benign.  Patient with no signs of acute abdomen.  Patient's blood work is stable.  Patient with no signs of acute calciphylaxis at this time.  Patient is feeling better as will be discharged back to her group home.  Patient encouraged to return to the ER if symptoms worsen.  Symptoms are likely caused by gastroenteritis.   Miscellaneous information Final diagnosis:   Viral gastroenteritis   Urinary tract infection without hematuria, site unspecified          RN Assessment (Patient s current Symptoms), include time called.  [Insert Left message here if message left]  5:10PM: Spoke with group home nurse Ac. He states that the Patient is doing better. Has had no diarrhea stools today, denies fever or abdominal pain. \"She's back to normal.\"   RN Recommendations/Instructions per [place] ED lab result protocol  Patient's group home nurse notified of lab result and treatment recommendation.   Nurse declines further information regarding Norovirus. \"It has been going around over here so we know all about it.\" She's on clear liquids today and is doing well.\"     Please Contact your PCP clinic or return to the Emergency department if your:    Symptoms worsen or other concerning symptom's.    PCP follow-up Questions asked: YES       [RN Name]  Gauri Lainez RN  [place] [facility] RN  Lung Nodule and ED Lab Result RN  Epic pool (ED late result f/u RN): [id number]  [facility] INCIDENTAL RADIOLOGY F/U NURSES: [id number]  Ph# [phone]      Copy of Lab result   Enteric Bacteria and Virus Panel by [place] [facility] [[id number]] (Order [id number])   Exam Information     Exam Date Exam Time Accession # Results    1/6/19 11:15 PM     Specimen Information: Feces        Component Value Flag Ref Range Units Status Collected Lab   Campylobacter group by [place] Not Detected   NDET^Not " Detected  Final 01/06/2019 11:15    Salmonella species by [place] Not Detected   NDET^Not Detected  Final 01/06/2019 11:15    Shigella species by [place] Not Detected   NDET^Not Detected  Final 01/06/2019 11:15    Vibrio group by [place] Not Detected   NDET^Not Detected  Final 01/06/2019 11:15    Rotavirus A by [place] Not Detected   NDET^Not Detected  Final 01/06/2019 11:15    Shiga toxin 1 gene by [place] Not Detected   NDET^Not Detected  Final 01/06/2019 11:15    Shiga toxin 2 gene by [place] Not Detected   NDET^Not Detected  Final 01/06/2019 11:15    Norovirus I and II by [place] (Abnormal)   NDET^Not Detected  Final 01/06/2019 11:15    Detected, Abnormal Result Abnormal     Yersinia enterocolitica by [place] Not Detected   NDET^Not Detected  Final 01/06/2019 11:15    Enteric pathogen comment     Final 01/06/2019 11:15    Testing performed by multiplexed, qualitative PCR using the [employer] Enteric   Pathogens Nucleic Acid Test. Results should not be used as the sole basis for diagnosis,   treatment, or other patient management decisions.    Comment:   Positive results do not rule out co-infection with other organisms that are   not detected by this test, and may not be the sole or definitive cause of   patient illness.   Negative results in the setting of clinical illness compatible with   gastroenteritis may be due to infection by pathogens that are not detected by   this test or non-infectious causes such as ulcerative colitis, irritable bowel    syndrome, or Crohn's disease.   Note: Shiga toxin producing E. coli (STEC) typically harbor one or both genes   that encode for Shiga toxins 1 and 2.

## 2019-01-08 LAB
BACTERIA SPEC CULT: ABNORMAL
BACTERIA SPEC CULT: ABNORMAL
Lab: ABNORMAL
SPECIMEN SOURCE: ABNORMAL

## 2019-01-08 NOTE — RESULT ENCOUNTER NOTE
Emergency Dept/Urgent Care discharge antibiotic: Nitrofurantoin Macrocrystal-Monohydrate (Macrobid) 100 mg PO capsule, 1 capsule (100 mg) by mouth 2 times daily for 7 days  Date of Rx (If Applicable): 1/6/18  Recommendations per [place] ED Lab result protocol - Urine culture protocol.

## 2019-01-09 NOTE — RESULT ENCOUNTER NOTE
Final Urine Culture Report on 1/8/19  Emergency Dept/Urgent Care discharge antibiotic prescribed: Nitrofurantoin Macrocrystal-Monohydrate (Macrobid) 100 mg PO capsule, 1 capsule (100 mg) by mouth 2 times daily for 7 days  #1. Bacteria, >100,000 colonies/mL Escherichia coli, is SUSCEPTIBLE to Antibiotic.    As per [place] ED Lab Result protocol, no change in antibiotic therapy.

## 2019-01-12 LAB
BACTERIA SPEC CULT: NO GROWTH
BACTERIA SPEC CULT: NO GROWTH
Lab: NORMAL
Lab: NORMAL
SPECIMEN SOURCE: NORMAL
SPECIMEN SOURCE: NORMAL

## 2019-01-26 ENCOUNTER — HOSPITAL ENCOUNTER (EMERGENCY)
Facility: CLINIC | Age: 39
Discharge: HOME | End: 2019-01-26
Attending: EMERGENCY MEDICINE | Admitting: EMERGENCY MEDICINE
Payer: COMMERCIAL

## 2019-01-26 VITALS
OXYGEN SATURATION: 93 % | SYSTOLIC BLOOD PRESSURE: 130 MMHG | WEIGHT: 139 LBS | BODY MASS INDEX: 21.82 KG/M2 | HEIGHT: 67 IN | TEMPERATURE: 98.4 F | HEART RATE: 112 BPM | DIASTOLIC BLOOD PRESSURE: 79 MMHG | RESPIRATION RATE: 18 BRPM

## 2019-01-26 DIAGNOSIS — R04.0 ANTERIOR EPISTAXIS: ICD-10-CM

## 2019-01-26 DIAGNOSIS — G62.9 NEUROPATHY: ICD-10-CM

## 2019-01-26 LAB
ALBUMIN SERPL-MCNC: 3 G/DL (ref 3.4–5)
ALP SERPL-CCNC: 116 U/L (ref 40–150)
ALT SERPL W P-5'-P-CCNC: 25 U/L (ref 0–50)
ANION GAP SERPL CALCULATED.3IONS-SCNC: 9 MMOL/L (ref 3–14)
APTT PPP: 50 SEC (ref 22–37)
AST SERPL W P-5'-P-CCNC: ABNORMAL U/L (ref 0–45)
BASOPHILS # BLD AUTO: 0 10E9/L (ref 0–0.2)
BASOPHILS NFR BLD AUTO: 0.5 %
BILIRUB SERPL-MCNC: 1.6 MG/DL (ref 0.2–1.3)
BUN SERPL-MCNC: 7 MG/DL (ref 7–30)
CALCIUM SERPL-MCNC: 9.8 MG/DL (ref 8.5–10.1)
CHLORIDE SERPL-SCNC: 104 MMOL/L (ref 94–109)
CO2 SERPL-SCNC: 24 MMOL/L (ref 20–32)
CREAT SERPL-MCNC: 0.4 MG/DL (ref 0.52–1.04)
DIFFERENTIAL METHOD BLD: ABNORMAL
EOSINOPHIL # BLD AUTO: 0.1 10E9/L (ref 0–0.7)
EOSINOPHIL NFR BLD AUTO: 1.9 %
ERYTHROCYTE [DISTWIDTH] IN BLOOD BY AUTOMATED COUNT: 13.4 % (ref 10–15)
ETHANOL SERPL-MCNC: <0.01 G/DL
GFR SERPL CREATININE-BSD FRML MDRD: >90 ML/MIN/{1.73_M2}
GLUCOSE SERPL-MCNC: 87 MG/DL (ref 70–99)
HCT VFR BLD AUTO: 31.8 % (ref 35–47)
HGB BLD-MCNC: 11 G/DL (ref 11.7–15.7)
IMM GRANULOCYTES # BLD: 0 10E9/L (ref 0–0.4)
IMM GRANULOCYTES NFR BLD: 0.3 %
INR PPP: 1.26 (ref 0.86–1.14)
LYMPHOCYTES # BLD AUTO: 2.3 10E9/L (ref 0.8–5.3)
LYMPHOCYTES NFR BLD AUTO: 36.4 %
MCH RBC QN AUTO: 31.2 PG (ref 26.5–33)
MCHC RBC AUTO-ENTMCNC: 34.6 G/DL (ref 31.5–36.5)
MCV RBC AUTO: 90 FL (ref 78–100)
MONOCYTES # BLD AUTO: 0.7 10E9/L (ref 0–1.3)
MONOCYTES NFR BLD AUTO: 11 %
NEUTROPHILS # BLD AUTO: 3.1 10E9/L (ref 1.6–8.3)
NEUTROPHILS NFR BLD AUTO: 49.9 %
NRBC # BLD AUTO: 0 10*3/UL
NRBC BLD AUTO-RTO: 0 /100
PHOSPHATE SERPL-MCNC: 4.4 MG/DL (ref 2.5–4.5)
PLATELET # BLD AUTO: 242 10E9/L (ref 150–450)
POTASSIUM SERPL-SCNC: 5.9 MMOL/L (ref 3.4–5.3)
PROT SERPL-MCNC: 7.9 G/DL (ref 6.8–8.8)
RBC # BLD AUTO: 3.53 10E12/L (ref 3.8–5.2)
SODIUM SERPL-SCNC: 137 MMOL/L (ref 133–144)
WBC # BLD AUTO: 6.3 10E9/L (ref 4–11)

## 2019-01-26 PROCEDURE — 85025 COMPLETE CBC W/AUTO DIFF WBC: CPT | Performed by: EMERGENCY MEDICINE

## 2019-01-26 PROCEDURE — 82040 ASSAY OF SERUM ALBUMIN: CPT

## 2019-01-26 PROCEDURE — 80320 DRUG SCREEN QUANTALCOHOLS: CPT | Performed by: EMERGENCY MEDICINE

## 2019-01-26 PROCEDURE — 84100 ASSAY OF PHOSPHORUS: CPT | Performed by: EMERGENCY MEDICINE

## 2019-01-26 PROCEDURE — 96361 HYDRATE IV INFUSION ADD-ON: CPT | Performed by: EMERGENCY MEDICINE

## 2019-01-26 PROCEDURE — 25000128 H RX IP 250 OP 636: Performed by: EMERGENCY MEDICINE

## 2019-01-26 PROCEDURE — 82565 ASSAY OF CREATININE: CPT

## 2019-01-26 PROCEDURE — 25000132 ZZH RX MED GY IP 250 OP 250 PS 637: Performed by: EMERGENCY MEDICINE

## 2019-01-26 PROCEDURE — 84460 ALANINE AMINO (ALT) (SGPT): CPT

## 2019-01-26 PROCEDURE — 82247 BILIRUBIN TOTAL: CPT

## 2019-01-26 PROCEDURE — 84295 ASSAY OF SERUM SODIUM: CPT

## 2019-01-26 PROCEDURE — 82435 ASSAY OF BLOOD CHLORIDE: CPT

## 2019-01-26 PROCEDURE — 82947 ASSAY GLUCOSE BLOOD QUANT: CPT

## 2019-01-26 PROCEDURE — 96360 HYDRATION IV INFUSION INIT: CPT | Performed by: EMERGENCY MEDICINE

## 2019-01-26 PROCEDURE — 84132 ASSAY OF SERUM POTASSIUM: CPT

## 2019-01-26 PROCEDURE — 85730 THROMBOPLASTIN TIME PARTIAL: CPT | Performed by: EMERGENCY MEDICINE

## 2019-01-26 PROCEDURE — 84075 ASSAY ALKALINE PHOSPHATASE: CPT

## 2019-01-26 PROCEDURE — 84155 ASSAY OF PROTEIN SERUM: CPT

## 2019-01-26 PROCEDURE — 99284 EMERGENCY DEPT VISIT MOD MDM: CPT | Mod: 25 | Performed by: EMERGENCY MEDICINE

## 2019-01-26 PROCEDURE — 84520 ASSAY OF UREA NITROGEN: CPT

## 2019-01-26 PROCEDURE — 82310 ASSAY OF CALCIUM: CPT

## 2019-01-26 PROCEDURE — 82374 ASSAY BLOOD CARBON DIOXIDE: CPT

## 2019-01-26 PROCEDURE — 85610 PROTHROMBIN TIME: CPT | Performed by: EMERGENCY MEDICINE

## 2019-01-26 PROCEDURE — 99285 EMERGENCY DEPT VISIT HI MDM: CPT | Mod: Z6 | Performed by: EMERGENCY MEDICINE

## 2019-01-26 RX ORDER — SODIUM CHLORIDE 9 MG/ML
1000 INJECTION, SOLUTION INTRAVENOUS CONTINUOUS
Status: DISCONTINUED | OUTPATIENT
Start: 2019-01-26 | End: 2019-01-26 | Stop reason: HOSPADM

## 2019-01-26 RX ADMIN — Medication 1 SPRAY: at 18:21

## 2019-01-26 RX ADMIN — SODIUM CHLORIDE 1000 ML: 9 INJECTION, SOLUTION INTRAVENOUS at 15:33

## 2019-01-26 ASSESSMENT — ENCOUNTER SYMPTOMS
FEVER: 0
NUMBNESS: 1
ABDOMINAL PAIN: 0
WOUND: 1
SHORTNESS OF BREATH: 0
DYSURIA: 0
CHILLS: 1
VOMITING: 0
COUGH: 0
HEADACHES: 0
NAUSEA: 0
SPEECH DIFFICULTY: 0
DIARRHEA: 0

## 2019-01-26 ASSESSMENT — MIFFLIN-ST. JEOR: SCORE: 1353.13

## 2019-01-26 NOTE — ED PROVIDER NOTES
"      [facility] ([facility])  January 26, 2019    History     Chief Complaint   Patient presents with     Epistaxis     HPI  Yenifer Maher is a 38 year old female with history notable for non-uremic calciphylaxis with recent admission for necrotic skin wounds on her thighs, positive lupus anticoagulant (on Lovenox), polyneuropathy, and alcohol abuse who presents from her skilled nursing facility  to the ED for nosebleeds and bleeding in her mouth. Also c/o worsening paresthesias in bilateral hands and feet. Patient reports that yesterday, she had an episode of epistaxis that lasted for a couple of hours, and today, she had another episode of bleeding from left nare that lasted for an hour and noted some blood clots. She also had some bleeding from a wound in her mouth. She thinks this was from around a tooth. She is not currently having any bleeding. She complains of painful numbness and tingling in her bilateral hands and feet which she has had previously. She states that this symptoms is worse than usual and her hands and feet \"feel like sandbags.\"  She also reports a new lesion in her R  thigh that developed in the last week. She otherwise currently denies any recent alcohol consumption, headache, fevers, slurred speech, vomiting, diarrhea, cough, or cold symptoms.    Per chart review, she was hospitalized from 12/12-12/19/18 for 3 weeks of ongoing bilateral lower extremity necrotic/eschar rash and diagnosed with nonuremic calciphylaxis.  She was discharged with tramadol for pain and Neurontin.  Patient also noted to have long history of significant numbness and tingling in her bilateral hands and feet.  She has been seen previously in Neurology in outpatient, who felt that this would likely be related to nutritional deficiency.  Plan was to have her follow-up with Neurology again in 4 weeks.    PAST MEDICAL HISTORY  History reviewed. No pertinent past medical history.  PAST SURGICAL " HISTORY  History reviewed. No pertinent surgical history.  FAMILY HISTORY  History reviewed. No pertinent family history.  SOCIAL HISTORY  Social History     Tobacco Use     Smoking status: Current Some Day Smoker     Smokeless tobacco: Never Used   Substance Use Topics     Alcohol use: Yes     Comment: rarely     MEDICATIONS  Current Facility-Administered Medications   Medication     sodium chloride 0.9% infusion     Current Outpatient Medications   Medication     enoxaparin (LOVENOX) 60 MG/0.6ML syringe     gabapentin (NEURONTIN) 800 MG tablet     loratadine (CLARITIN) 10 MG tablet     nortriptyline (PAMELOR) 10 MG capsule     pentoxifylline ER (TRENTAL) 400 MG CR tablet     Prenatal Vit-Fe Fumarate-FA (PRENATAL MULTIVITAMIN PLUS IRON) 27-0.8 MG TABS per tablet     ALLERGIES  Allergies   Allergen Reactions     Bengay Pain Relief [Menthol] Other (See Comments)     Skin turns red and feels extremely hot     Cats      Chocolate Dermatitis     Dicyclomine Other (See Comments)     Severe sedation     Dust Mites      Derm, resp     No Clinical Screening - See Comments      GI upset     Phenobarbital      Pollen Extract      Derm, resp.            I have reviewed the Medications, Allergies, Past Medical and Surgical History, and Social History in the Epic system.    Review of Systems   Constitutional: Positive for chills. Negative for fever.   HENT: Positive for nosebleeds.    Eyes: Negative for visual disturbance.   Respiratory: Negative for cough and shortness of breath.    Gastrointestinal: Negative for abdominal pain, diarrhea, nausea and vomiting.   Genitourinary: Negative for dysuria.   Skin: Positive for wound (lesion in R thigh).   Neurological: Positive for numbness (subjective paresthesias in bilateral hands and feet). Negative for speech difficulty and headaches.   All other systems reviewed and are negative.      Physical Exam   BP: (!) 136/92  Pulse: 115  Temp: 98.4  F (36.9  C)  Resp: 18  Height: 170.2 cm  "(5' 7\")  Weight: 63 kg (139 lb)  SpO2: 97 %      Physical Exam   Constitutional: She is oriented to person, place, and time. She appears well-developed and well-nourished. She appears distressed.   HENT:   Head: Normocephalic and atraumatic. Head is without laceration.   Nose: Nose normal. No mucosal edema, rhinorrhea, nose lacerations or nasal deformity. No epistaxis.  No foreign bodies.   Mouth/Throat: Mucous membranes are dry. No oral lesions. No trismus in the jaw. No lacerations. No oropharyngeal exudate, posterior oropharyngeal edema or posterior oropharyngeal erythema.   Trace dried blood in left nare. No visible mucosal lesions. No dried blood in oropharynx. No buccal mucosal lesions or gingival hemorrhage.   Eyes: Conjunctivae are normal. No scleral icterus.   Neck: Normal range of motion. Neck supple.   Cardiovascular: Normal rate, regular rhythm, normal heart sounds and intact distal pulses. Exam reveals no friction rub.   No murmur heard.  Pulmonary/Chest: Effort normal and breath sounds normal. No stridor. No respiratory distress. She has no wheezes. She has no rales.   Abdominal: Soft. She exhibits no distension. There is no tenderness. There is no rebound and no guarding.   Musculoskeletal: Normal range of motion. She exhibits no deformity.        Right hand: She exhibits tenderness. She exhibits normal range of motion, no bony tenderness, normal capillary refill, no deformity, no laceration and no swelling. Normal sensation noted.        Left hand: She exhibits tenderness. She exhibits normal range of motion, no bony tenderness, normal capillary refill, no deformity, no laceration and no swelling. Normal sensation noted.        Right foot: There is tenderness. There is normal range of motion, no bony tenderness, no swelling, normal capillary refill, no deformity and no laceration.        Left foot: There is tenderness. There is normal range of motion, no bony tenderness, no swelling, normal capillary " refill, no deformity and no laceration.   Exam of  strength limited by pain and poor effort. Able to make a fist bilaterally. Bilateral feet, dry and flaky with hyperesthesia. No skin discoloration. Warm with brisk cap refill and intact ROM of toes and ankles. Subjectively with paresthesias on palpation on plantar and dorsal feet as well as hands. No wounds, lesions, joint swelling, or rash.   Neurological: She is alert and oriented to person, place, and time.   Skin: Skin is warm and dry. Lesion noted. No rash noted. She is not diaphoretic. No pallor.   Wound dressing removed from bilateral distal thighs. There is a newer wound on posterior right thigh (per pt). This was examined and noted to be erythematous with dark center. Painful to palpation with friability and some superficial wound disruption during dressing removal. No drainage or warmth. No proximal streaking or edema.   Psychiatric: Thought content normal. Her mood appears anxious. Her affect is labile. She is slowed. She exhibits a depressed mood.   Nursing note and vitals reviewed.      ED Course        Procedures   2:38 PM  The patient was seen and examined by Dr. Lyle in Room 23.                Critical Care time:  none             Labs Ordered and Resulted from Time of ED Arrival Up to the Time of Departure from the ED   PARTIAL THROMBOPLASTIN TIME - Abnormal; Notable for the following components:       Result Value    PTT 50 (*)     All other components within normal limits   CBC WITH PLATELETS DIFFERENTIAL - Abnormal; Notable for the following components:    RBC Count 3.53 (*)     Hemoglobin 11.0 (*)     Hematocrit 31.8 (*)     All other components within normal limits   INR - Abnormal; Notable for the following components:    INR 1.26 (*)     All other components within normal limits   COMPREHENSIVE METABOLIC PANEL - Abnormal; Notable for the following components:    Potassium 5.9 (*)     Creatinine 0.40 (*)     Bilirubin Total 1.6 (*)      Albumin 3.0 (*)     All other components within normal limits   ALCOHOL ETHYL   PHOSPHORUS   PERIPHERAL IV CATHETER       Consults  Other (Comment): Called(Dermatology) (01/26/19 5352)    Assessments & Plan (with Medical Decision Making)   Yenifer Maher is a 38 year old female with history notable for non-uremic calciphylaxis, positive for lupus anticoagulant (on Lovenox), polyneuropathy, and alcohol abuse who presents from her skilled nursing facility  to the ED for epistaxis and paresthesias in her bilateral hands and feet.    Ddx: resolved epistaxis, acute blood loss anemia, supratherapeutic lovenox, coagulopathy, acute on chronic glove and stocking neuropathy, progressive calciphylaxis, hypovolemia       Patient is in distress on the time of my evaluation due to the IV catheter in her left hand.  Tearful and emotionally labile.  She appears dry with red, cracked lips.  No active epistaxis.  Very trace dried blood in the left nare.  She was sent in by her nursing home for evaluation of epistaxis and glove and stocking distribution numbness and pain.  Patient reports she has not had symptoms like this in the past but per review of previous admission and discharge notes, she has had significant distress about painful neuropathy and paresthesias in her bilateral hands and feet.  No abnormality on exam decision ischemia, infection, trauma.  No other focal neurologic deficits to suggest CVA.  Blood work obtained and significant for PTT of 50.  No thrombocytopenia.  Low suspicion for supratherapeutic Lovenox as a cause for her epistaxis.  Discussed with the patient that it is common to have epistaxis this time a year with the dry air.  I discussed the patient with hematology and dermatology.  Per their report, no indication for admission.  Symptoms are not consistent with progressive calciphylaxis that would require additional treatment or diagnostic workup.  She is already on outpatient management with Lovenox and  pentoxifylline.  The dermatology fellow stated that he would send a message to schedule patient for close outpatient dermatology clinic follow-up.  He was aware of the new wound on the patient's right thigh but did not think hospitalization would of benefit as wound care is currently been provided at patient's nursing home.  Hemoglobin 11 from 10.3 earlier this month.  No evidence of significant hemorrhage or blood loss from patient's recent epistaxis episodes.  Labs otherwise unremarkable.  Encouraged to continue outpatient management with Lovenox and maintain follow-up with dermatology who will call her to set up an appointment.  She should also establish care with a primary care doctor who can continue to manage her illnesses.  Patient was comfortable with this plan and agreed to discharge back to her nursing facility.  Detailed return precautions provided eluding worsening wounds, fever, recurrent epistaxis, signs of a stroke      I have reviewed the nursing notes.    I have reviewed the findings, diagnosis, plan and need for follow up with the patient.       Medication List      There are no discharge medications for this visit.         Final diagnoses:   Anterior epistaxis   Neuropathy     IPratik, am serving as a trained medical scribe to document services personally performed by Milagros Lyel MD, based on the provider's statements to me.      IMilagros MD, was physically present and have reviewed and verified the accuracy of this note documented by Pratik Lewis.    1/26/2019   [facility], [place], EMERGENCY DEPARTMENT    Milagros Lyle MD  01/26/19 9963

## 2019-01-26 NOTE — ED NOTES
Bed: ED23  Expected date: 1/26/19  Expected time: 1:35 PM  Means of arrival: Ambulance  Comments:  [facility] with a 39 yo F c/o nose bleed, on blood thinners. Yellow in 10 mins

## 2019-01-26 NOTE — ED AVS SNAPSHOT
[facility], [place], Emergency Department  [address] [place] [id number]  Phone:  [phone]                                    Yenifer Maher   MRN: [id number]    Department:  [facility], Emergency Department   Date of Visit:  1/26/2019           After Visit Summary Signature Page    I have received my discharge instructions, and my questions have been answered. I have discussed any challenges I see with this plan with the nurse or doctor.    ..........................................................................................................................................  Patient/Patient Representative Signature      ..........................................................................................................................................  Patient Representative Print Name and Relationship to Patient    ..................................................               ................................................  Date                                   Time    ..........................................................................................................................................  Reviewed by Signature/Title    ...................................................              ..............................................  Date                                               Time          22EPIC Rev 08/18

## 2019-01-26 NOTE — ED TRIAGE NOTES
Pt BIBA from TCU. Has had 2 nosebleeds over last couple days each lasting 1-2 hours. No current epistaxis. Hands and feet feel heavy. Hx of calcification of red blood cells.

## 2019-01-26 NOTE — DISCHARGE INSTRUCTIONS
Please make an appointment to follow up with Your Primary Care Provider and Dermatology Clinic (phone: [phone]) as soon as possible.    Return to the emergency department for worsening bleeding, pain, weakness, fever.

## 2019-01-28 NOTE — PROGRESS NOTES
Emergency Social Work Services Note    Date of  Intervention: 01/26/19 LATE ENTRY  Last Emergency Department Visit:    Care Plan:  1/6/19  Collaborated with:  radha Kelley RN    Data:  Pt presented to the ED with a nosebleed.  Pt is from [facility].  SW informed that pt is ready to return to the SNF and will need a w/c ride.    Intervention:  Chart reviewed.  SW met with pt who confirmed she is currently at [facility] and will need a w/c ride back.  SW noted mild cognitive concerns during this interaction.    SW arranged a w/c ride with [facility] for earliest  (in one hour).  Updated BRAYAN Kelley.    Assessment:  W/C ride back to SNF.    Plan:    Anticipated Disposition:  Facility:  [facility] in [place], [place]    Barriers to d/c plan:  Pt states she needs her IV out and has not yet received discharge paperwork.     Follow Up:  SW available as needed.    RAJINDER Bowers  Social Work Services  Emergency Department   [phone] phone  [phone] pager  On-call pager, [phone], 1600 to midnight

## 2019-02-07 ENCOUNTER — OFFICE VISIT (OUTPATIENT)
Dept: DERMATOLOGY | Facility: CLINIC | Age: 39
End: 2019-02-07
Payer: COMMERCIAL

## 2019-02-07 ENCOUNTER — ONCOLOGY VISIT (OUTPATIENT)
Dept: ONCOLOGY | Facility: CLINIC | Age: 39
End: 2019-02-07
Attending: INTERNAL MEDICINE
Payer: COMMERCIAL

## 2019-02-07 VITALS
HEART RATE: 119 BPM | TEMPERATURE: 98.6 F | OXYGEN SATURATION: 98 % | DIASTOLIC BLOOD PRESSURE: 88 MMHG | BODY MASS INDEX: 21.77 KG/M2 | HEIGHT: 67 IN | RESPIRATION RATE: 16 BRPM | SYSTOLIC BLOOD PRESSURE: 131 MMHG

## 2019-02-07 DIAGNOSIS — D63.8 ANEMIA IN OTHER CHRONIC DISEASES CLASSIFIED ELSEWHERE: ICD-10-CM

## 2019-02-07 DIAGNOSIS — R58 HEMORRHAGE: Primary | ICD-10-CM

## 2019-02-07 DIAGNOSIS — E83.59 CALCIPHYLAXIS: Primary | ICD-10-CM

## 2019-02-07 LAB
ALBUMIN SERPL-MCNC: 3.8 G/DL (ref 3.4–5)
ALP SERPL-CCNC: 128 U/L (ref 40–150)
ALT SERPL W P-5'-P-CCNC: 27 U/L (ref 0–50)
ANION GAP SERPL CALCULATED.3IONS-SCNC: 10 MMOL/L (ref 3–14)
APTT PPP: 46 SEC (ref 22–37)
AST SERPL W P-5'-P-CCNC: 66 U/L (ref 0–45)
BASOPHILS # BLD AUTO: 0 10E9/L (ref 0–0.2)
BASOPHILS NFR BLD AUTO: 0.6 %
BILIRUB SERPL-MCNC: 1.9 MG/DL (ref 0.2–1.3)
BUN SERPL-MCNC: 8 MG/DL (ref 7–30)
CALCIUM SERPL-MCNC: 10.5 MG/DL (ref 8.5–10.1)
CHLORIDE SERPL-SCNC: 102 MMOL/L (ref 94–109)
CO2 SERPL-SCNC: 25 MMOL/L (ref 20–32)
CREAT SERPL-MCNC: 0.47 MG/DL (ref 0.52–1.04)
DIFFERENTIAL METHOD BLD: ABNORMAL
EOSINOPHIL # BLD AUTO: 0.1 10E9/L (ref 0–0.7)
EOSINOPHIL NFR BLD AUTO: 1.8 %
ERYTHROCYTE [DISTWIDTH] IN BLOOD BY AUTOMATED COUNT: 14.3 % (ref 10–15)
FERRITIN SERPL-MCNC: 79 NG/ML (ref 12–150)
FIBRINOGEN PPP-MCNC: 483 MG/DL (ref 200–420)
GFR SERPL CREATININE-BSD FRML MDRD: >90 ML/MIN/{1.73_M2}
GLUCOSE SERPL-MCNC: 96 MG/DL (ref 70–99)
HCT VFR BLD AUTO: 32.6 % (ref 35–47)
HGB BLD-MCNC: 11.1 G/DL (ref 11.7–15.7)
IMM GRANULOCYTES # BLD: 0 10E9/L (ref 0–0.4)
IMM GRANULOCYTES NFR BLD: 0.3 %
INR PPP: 1.28 (ref 0.86–1.14)
LYMPHOCYTES # BLD AUTO: 1.9 10E9/L (ref 0.8–5.3)
LYMPHOCYTES NFR BLD AUTO: 28.6 %
MCH RBC QN AUTO: 30.9 PG (ref 26.5–33)
MCHC RBC AUTO-ENTMCNC: 34 G/DL (ref 31.5–36.5)
MCV RBC AUTO: 91 FL (ref 78–100)
MONOCYTES # BLD AUTO: 0.7 10E9/L (ref 0–1.3)
MONOCYTES NFR BLD AUTO: 10.2 %
NEUTROPHILS # BLD AUTO: 3.9 10E9/L (ref 1.6–8.3)
NEUTROPHILS NFR BLD AUTO: 58.5 %
NRBC # BLD AUTO: 0 10*3/UL
NRBC BLD AUTO-RTO: 0 /100
PLATELET # BLD AUTO: 304 10E9/L (ref 150–450)
POTASSIUM SERPL-SCNC: 4.4 MMOL/L (ref 3.4–5.3)
PROT SERPL-MCNC: 8.3 G/DL (ref 6.8–8.8)
RBC # BLD AUTO: 3.59 10E12/L (ref 3.8–5.2)
RETICS # AUTO: 100.9 10E9/L (ref 25–95)
RETICS/RBC NFR AUTO: 2.8 % (ref 0.5–2)
SODIUM SERPL-SCNC: 137 MMOL/L (ref 133–144)
WBC # BLD AUTO: 6.6 10E9/L (ref 4–11)

## 2019-02-07 PROCEDURE — 82728 ASSAY OF FERRITIN: CPT | Performed by: INTERNAL MEDICINE

## 2019-02-07 PROCEDURE — 00000401 ZZHCL STATISTIC THROMBIN TIME NC: Performed by: INTERNAL MEDICINE

## 2019-02-07 PROCEDURE — 85384 FIBRINOGEN ACTIVITY: CPT | Performed by: INTERNAL MEDICINE

## 2019-02-07 PROCEDURE — 85730 THROMBOPLASTIN TIME PARTIAL: CPT | Mod: 91 | Performed by: INTERNAL MEDICINE

## 2019-02-07 PROCEDURE — G0463 HOSPITAL OUTPT CLINIC VISIT: HCPCS | Mod: ZF

## 2019-02-07 PROCEDURE — 85730 THROMBOPLASTIN TIME PARTIAL: CPT | Performed by: INTERNAL MEDICINE

## 2019-02-07 PROCEDURE — 36415 COLL VENOUS BLD VENIPUNCTURE: CPT

## 2019-02-07 PROCEDURE — 85025 COMPLETE CBC W/AUTO DIFF WBC: CPT | Performed by: INTERNAL MEDICINE

## 2019-02-07 PROCEDURE — 80053 COMPREHEN METABOLIC PANEL: CPT | Performed by: INTERNAL MEDICINE

## 2019-02-07 PROCEDURE — 85610 PROTHROMBIN TIME: CPT | Performed by: INTERNAL MEDICINE

## 2019-02-07 PROCEDURE — 85525 HEPARIN NEUTRALIZATION: CPT | Performed by: INTERNAL MEDICINE

## 2019-02-07 PROCEDURE — 85597 PHOSPHOLIPID PLTLT NEUTRALIZ: CPT | Performed by: INTERNAL MEDICINE

## 2019-02-07 PROCEDURE — 85613 RUSSELL VIPER VENOM DILUTED: CPT | Performed by: INTERNAL MEDICINE

## 2019-02-07 PROCEDURE — 85045 AUTOMATED RETICULOCYTE COUNT: CPT | Performed by: INTERNAL MEDICINE

## 2019-02-07 PROCEDURE — 85303 CLOT INHIBIT PROT C ACTIVITY: CPT | Performed by: INTERNAL MEDICINE

## 2019-02-07 PROCEDURE — 99213 OFFICE O/P EST LOW 20 MIN: CPT | Mod: ZP | Performed by: INTERNAL MEDICINE

## 2019-02-07 RX ORDER — PENTOXIFYLLINE 400 MG/1
800 TABLET, EXTENDED RELEASE ORAL
Qty: 540 TABLET | Refills: 3 | Status: ON HOLD | OUTPATIENT
Start: 2019-02-07 | End: 2019-06-06

## 2019-02-07 RX ORDER — AMINOCAPROIC ACID 0.25 G/ML
SYRUP ORAL EVERY 6 HOURS
Status: CANCELLED | OUTPATIENT
Start: 2019-02-07 | End: 2020-02-07

## 2019-02-07 ASSESSMENT — PAIN SCALES - GENERAL
PAINLEVEL: MODERATE PAIN (5)
PAINLEVEL: MODERATE PAIN (4)

## 2019-02-07 NOTE — NURSING NOTE
"Oncology Rooming Note    February 7, 2019 8:25 AM   Yenifer Maher is a 38 year old female who presents for:    Chief Complaint   Patient presents with     Lab Only     VS taken. No lab orders. Pt checked in for next appt     RECHECK     ONC Hematology consult 6 week f/up     Initial Vitals: /88 (BP Location: Right arm, Patient Position: Sitting, Cuff Size: Adult Regular)   Pulse 119   Temp 98.6  F (37  C) (Oral)   Resp 16   Ht 1.702 m (5' 7.01\")   SpO2 98%   BMI 21.77 kg/m   Estimated body mass index is 21.77 kg/m  as calculated from the following:    Height as of this encounter: 1.702 m (5' 7.01\").    Weight as of 1/26/19: 63 kg (139 lb). Body surface area is 1.73 meters squared.  Moderate Pain (5) Comment: Data Unavailable   No LMP recorded. Patient is not currently having periods (Reason: Irregular Periods).  Allergies reviewed: Yes  Medications reviewed: Yes    Medications: Medication refills not needed today.  Pharmacy name entered into EPIC: [facility] - [place], [place] - [address] [address]    Clinical concerns: none      6 minutes for nursing intake (face to face time)     Elicia JASPREET Nickerson              "

## 2019-02-07 NOTE — NURSING NOTE
Chief Complaint   Patient presents with     Lab Only     VS taken. No lab orders. Pt checked in for next appt     Pt VS taken. No lab orders. Pt unable to stand at this time for weight. Message sent to provider team.    Venecia PISANO RN PHN BSN  BMT/Oncology Lab

## 2019-02-07 NOTE — PROGRESS NOTES
PROBLEM LIST:  1. 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   Patient Name: BJORN MAHER   MR#: [id number]   Specimen #: [id number]   Collected: 12/14/2018   Received: 12/15/2018   Reported: 1/2/2019 09:48   Ordering Phy(s): ABHIJIT CORRIGAN     For improved result formatting, select 'View Enhanced Report Format' under    Linked Documents section.     SPECIMEN(S):   Skin, left medial calf     FINAL DIAGNOSIS:   Skin, left medial calf:   - Features consistent with thrombotic vasculopathy - (see comment)     COMMENT:   Given the presence of calcium deposition within small vessels in the   subcutis, a diagnosis of non-uremic   calciphylaxis is favored.  Regardless, complete hypercoagulability workup   is recommended for further   evaluation.  Lipomembranous fat necrosis is also present, suggesting a   concomitant element of chronic venous   insufficiency.     I have personally reviewed all specimens  and/or slides, including the   listed special stains, and used them   with my medical judgement to determine or confirm the final diagnosis.     Electronically signed out by:   Norberto Marti M.D., [facility]   2. Positive lupus anticoagulant and mildly low protein C on 12/14/18 with no h/o VTE or arterial thrombosis.   3. H/O alcoholic hepatitis. Negatvie hepatitis B and C  4. SMILEY positive 1:640 on 12/13/18. Negative RNP, Geronimo, SSA, SSB, scleroderma Proteinase 3, DS DNA. Negative SPEP. Trace cryoglobulins with negative cryo proteins on further studies.   5.  Polyneuropathy due to nutritional deficiency.    Interim History:  Ms. Maher is here for f/u.  She is accompanied by her mother.  She was hospitalized from 12/12- 12/19/18 for nonuremic calciphylaxis.  She had significant ulcerations and eschar of her skin on the lower extremities.  This was managed by dermatology and wound care.  She was found to have a positive lupus anticoagulant.  And was placed initially on apixaban but then switched to  "enoxaparin 60 mg twice daily.  She was also treated with pentoxyfylline.     She was seen in the ED on 1/6/19 for nausea and vomiting and diarrhea.  She was given IV fluids and antinausea medications and this resolved, not needing admission.  At that time there is no sign of acute calciphylaxis.    She was then seen on 1/26/19 for epistaxis and bleeding in her mouth.  She also had a new lesion on her right thigh.  At the time she was actually seen in the ED there was no active bleeding.  She was discharged back to group home.  Since discharge she has not seen dermatology or wound care clinic.  She has follow-up with Dr. Marti in dermatology today.    Yesterday she started having bleeding from her mouth again.  She says she was having large clots in the mouth. She dos not recall biting the buccal mucosa when chewing. .  She has been given gauze that says soaked in some water.  She does not think it is from a bad tooth.  Her Lovenox was given yesterday, 60 mg subcu.  It was not given this morning.  She has been living in a group home, and is given her meds by nurse.  She was hoping to move out of the group home within the week, but that may be has been delayed.      PHYSICAL EXAMINATION:  /88 (BP Location: Right arm, Patient Position: Sitting, Cuff Size: Adult Regular)   Pulse 119   Temp 98.6  F (37  C) (Oral)   Resp 16   Ht 1.702 m (5' 7.01\")   SpO2 98%   BMI 21.77 kg/m      General appearance:  Patient is 39 yo woman sitting in wheelchair. Looks uncomfortable, gauze in L side of mouth.    HEENT:  No pallor, icterus.  About 5 mm of dark blood blister left buccal mucosa and a small blood blister higher up in the gum on the left.  No active bleeding.  Lymph nodes:  No cervical, supraclavicular, axillary, or inguinal lymphadenopathy.   Lungs:  Clear to auscultation bilaterally.   Heart:  Tachycardic. Regular rate and rhythm; no S3 S4 or murmer.     Abdomen: Multiple 1-2 cm ecchymoses lower abdomen in " various stages of healing at Lovenox injection sites.  Positive bowel sounds, soft and nontender, nondistended.  No hepatomegaly. No splenomegaly appreciated.    Extremities: I did not have her take off her pants to look at the skin lesions, since she will be seeing dermatology in the few hours.  No ankle edema.     Skin:  No rash, no petechiae or ecchymoses on back , chset, forearms. Spider angioma on forearms.     Labs:  Pending    Assessment and Recommendation:  1.  Nonuremic calciphylaxis-there is no clear association of thrombophilias with calciphylaxis and it is not clear that treatment with anticoagulation is helpful.There are case reports of non-uremic calciphylaxis associated with liver disease, and with a variety of other chronic conditions. She did have a positive lupus anticoagulant, which could be a temporary abnormality related to inflammation versus a chronic condition.  She does not have any features of antiphospholipid antibody syndrome-no DVT, PE, stroke, pregnancy loss.  She also had a low protein C when she was admitted.  In the absence of warfarin I would not expect a mildly low protein C to cause skin necrosis.  I think it is worth rechecking the lupus anticoagulant and protein C today.  Because she is having recurrent bleeding episodes while on enoxaparin 60 mg every 12 hours, I recommend holding it for a day to allow the mouth bleeding to get under control. Because there is no proof that anticoagulation is of any benefit for calciphylaxis, and she is having bleeding issues, I will switch her to enoxaparin at a prophylactic dose of 40 mg subcu daily for the next few months while she is relatively immobilized.       She has not had sodium thiosulfate- not clear to me how likely that is to be helpful in this setting, but will leave that decision to the dermatologist.     2.  Bleeding from mouth with blood blister-I am not sure if she actually bit the inside of her lip.  Otherwise sometimes one  can see lesions like this with very low platelet count.  Certainly being fully anticoagulated will make the bleeding worse.  I recommend holding the Lovenox today and then restarting at 40 mg subcu daily (prophylactic dose).  I also recommend aminocaproic acid 25% liquid, 2.5 g swish and spit every 4 hours times 5 days.    Most importantly she needs to follow-up with Dr. Marti today.    RTC with me in 2 months with labs.    Aleida Jeffery MD  Hematology

## 2019-02-07 NOTE — NURSING NOTE
Chief Complaint   Patient presents with     Blood Draw     Labs drawn via  by RN in lab. Lab appt only.      Malick Pierson RN

## 2019-02-07 NOTE — NURSING NOTE
Dermatology Rooming Note    Yenifer Maher's goals for this visit include:   Chief Complaint   Patient presents with     Derm Problem     Yenifer is here for hospital follow up.     Amina Oneal, CMA

## 2019-02-07 NOTE — PROGRESS NOTES
[facility] Dermatology Note    Dermatology Problem List:  1. Retiform purpura and stellate necrosis, with clinical and biopsy features most consistent with non-uremic calicphylaxis  - (+) lupus anticoagulant and a low protein C level. Level S is normal as is homocysteine, anticardiolipin ab, factor V leiden, and beta2-glycoprotein 1. The pt had trace cryoglobulins.  - Bx showed calcium deposits in small blood vessels.   - apixiban 5mg BID and pentoxifylline 800mg TID.     Encounter Date: Feb 7, 2019    CC:   Chief Complaint   Patient presents with     Derm Problem     Yenifer is here for hospital follow up.     History of Present Illness:  Ms. Yenifer Maher is a 38 year old female with PMHx significant for alcohol use disorder, alcoholic hepatitis, hepatic encephalopathy, EtOH withdrawal, HTN, and polyneuropathy who was admitted on 12/12 for workup of 3 week history of painful/pruritic rash on her lower legs. On physical exam, patient's findings are consistent with retiform purpura/thrombotic vasculopathy and bx was consistent with calciphylaxis. Patient work up was negative/ normal for MPO ab, proteinase 3 ab, RNP ab, scleroderma ab, La ab, Ro ab, Geronimo ab, hep B, hep C, cerulopasmin, HIV, RF, SMILEY, liver kidney microsomal ab, C3/C4, SPEP, mitochondrial M2 ab. At time of discharge we had recommended apixiban and trental.  Due to concern from the hematology team that she may have APLS she was switched from apixiban to lovenox. At her last visit with Dr. Jeffery today there was less concern for APLS and so there was a decrease in her Lovenox to prophylactic dosing. They are also planning to repeat her lupus anticoagulant and Protein C labs today.   At today's visit Yenifer states that she feels her left thigh is starting to healing quite nicely however she feels that she has had a distention of her right thigh plaque posteromedially while the traveling areas are healing in.  She is concerned that she is  continuing to develop new lesions.  She reports that they have been doing daily dressing changes with nonadhesive dressings but due to location she is still having quite a bit of pain particularly when sitting on the toilet.  She also notes that she has been having and newer outbreaks of blisters and bleeding in the mouth which started only several days ago.  She reports that this is exacerbated by eating foods that are not soft.  She is accompanied by her caretaker at today's visit who is wondering about the utility of using STS as this was mentioned at her hematology appointment.      Past Medical History:   Patient Active Problem List   Diagnosis     Livedo reticularis     No past medical history on file.  No past surgical history on file.    Social History:  Patient reports that she has been smoking.  she has never used smokeless tobacco. She reports that she drinks alcohol.    Family History:  No family history on file.    Medications:  Current Outpatient Medications   Medication Sig Dispense Refill     enoxaparin (LOVENOX) 60 MG/0.6ML syringe Inject 0.6 mLs (60 mg) Subcutaneous every 12 hours (Patient taking differently: Inject 40 mg Subcutaneous every 12 hours ) 30 Syringe 1     loratadine (CLARITIN) 10 MG tablet Take 1 tablet (10 mg) by mouth daily 30 tablet 3     Prenatal Vit-Fe Fumarate-FA (PRENATAL MULTIVITAMIN PLUS IRON) 27-0.8 MG TABS per tablet Take 1 tablet by mouth daily       gabapentin (NEURONTIN) 800 MG tablet Take 1 tablet (800 mg) by mouth 3 times daily 90 tablet 0     nortriptyline (PAMELOR) 10 MG capsule Take 1 capsule (10 mg) by mouth At Bedtime 30 capsule 0     pentoxifylline ER (TRENTAL) 400 MG CR tablet Take 2 tablets (800 mg) by mouth 3 times daily (with meals) 180 tablet 0        Allergies   Allergen Reactions     Bengay Pain Relief [Menthol] Other (See Comments)     Skin turns red and feels extremely hot     Cats      Chocolate Dermatitis     Dicyclomine Other (See Comments)     Severe  sedation     Dust Mites      Derm, resp     No Clinical Screening - See Comments      GI upset     Phenobarbital      Pollen Extract      Derm, resp.          Review of Systems:  -As per HPI  -Constitutional: Otherwise feeling well today, in usual state of health.  -HEENT: Patient denies nonhealing oral sores.  -Skin: As above in HPI. No additional skin concerns.    Physical exam:  Vitals: There were no vitals taken for this visit.  GEN: This is a well developed, mal-nourished female in no acute distress, in a pleasant mood though does not answer questions appropriately.    SKIN: Focused examination of the face, mouth, chest, upper and lower back, b/l lower legs and buttocks was performed.  - blood clots seen on the left buccal mucosa  - scattered 1-2mm telangectasia on the upper chest and back  - retiform purpura on the b/l inner thighs (<1% BSA) with one 4mm ulcer on the R medial thigh  - Livido reticularis of the b/l lower legs without skin   - No other lesions of concern on areas examined.                   Impression/Plan:    1. Non-uremic calciphylaxis    normal for MPO ab, proteinase 3 ab, RNP ab, scleroderma ab, La ab, Ro ab, Geronimo ab, hep B, hep C, cerulopasmin, HIV, RF, SMILEY, liver kidney microsomal ab, C3/C4, SPEP, mitochondrial M2 ab.     (+) lupus anticoagulant and a low protein C level. Level S is normal as is homocysteine, anticardiolipin ab, factor V leiden, and beta2-glycoprotein 1. The pt has trace cryoglobulins (identification is still pending).    Bx showed calcium deposits in small blood vessels. 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    We feel that any could benefit from switching back to apixaban which is been shown to be of some benefit in patients with calciphylaxis over other anticoagulants such as Lovenox.  Will plan to discuss this with Dr. Jeffery.     Will plan to check an anti-smooth muscle  antibody today    Plan to reinitiate pentoxifylline 800 mg 3 times daily with meals    Follow-up in 1 months, earlier for new or changing lesions.     Dr. Marti staffed the patient.    Staff Involved:  Resident (Migdalia Ibarra MD) / Staff (as above)    Attending Note  Dr. Jeffery and I are in the process of discussing best ongoing anticoagulation for Yenifer.  Her repeat lupus anticoagulant is positive.  Her skin disease is improved since her hospitalization, which is encouraging, but she does report continuing to get some painful new lesions.  She has had bleeding complications on standard-dose Lovenox, thus we are approaching a switch to apixaban with more caution than we have in other patients.    I have seen and examined this patient and agree with the assessment and plan as documented in the resident's note.    Norberto Marti MD  Dermatology Attending

## 2019-02-07 NOTE — LETTER
2/7/2019       RE: Yenifer Maher  [address] [facility] [address]     Dear Colleague,    Thank you for referring your patient, Yenifer Maher, to the [facility] DERMATOLOGY at [facility]. Please see a copy of my visit note below.    .    [facility] Dermatology Note    Dermatology Problem List:  1. Retiform purpura and stellate necrosis, with clinical and biopsy features most consistent with non-uremic calicphylaxis  - (+) lupus anticoagulant and a low protein C level. Level S is normal as is homocysteine, anticardiolipin ab, factor V leiden, and beta2-glycoprotein 1. The pt had trace cryoglobulins.  - Bx showed calcium deposits in small blood vessels.   - apixiban 5mg BID and pentoxifylline 800mg TID.     Encounter Date: Feb 7, 2019    CC:   Chief Complaint   Patient presents with     Derm Problem     Yenifer is here for hospital follow up.     History of Present Illness:  Ms. Yenifer Maher is a 38 year old female with PMHx significant for alcohol use disorder, alcoholic hepatitis, hepatic encephalopathy, EtOH withdrawal, HTN, and polyneuropathy who was admitted on 12/12 for workup of 3 week history of painful/pruritic rash on her lower legs. On physical exam, patient's findings are consistent with retiform purpura/thrombotic vasculopathy and bx was consistent with calciphylaxis. Patient work up was negative/ normal for MPO ab, proteinase 3 ab, RNP ab, scleroderma ab, La ab, Ro ab, Geronimo ab, hep B, hep C, cerulopasmin, HIV, RF, SMILEY, liver kidney microsomal ab, C3/C4, SPEP, mitochondrial M2 ab. At time of discharge we had recommended apixiban and trental.  Due to concern from the hematology team that she may have APLS she was switched from apixiban to lovenox. At her last visit with Dr. Jeffery today there was less concern for APLS and so there was a decrease in her Lovenox to prophylactic dosing. They are also planning to repeat her lupus anticoagulant and  Protein C labs today.   At today's visit Yenifer states that she feels her left thigh is starting to healing quite nicely however she feels that she has had a distention of her right thigh plaque posteromedially while the traveling areas are healing in.  She is concerned that she is continuing to develop new lesions.  She reports that they have been doing daily dressing changes with nonadhesive dressings but due to location she is still having quite a bit of pain particularly when sitting on the toilet.  She also notes that she has been having and newer outbreaks of blisters and bleeding in the mouth which started only several days ago.  She reports that this is exacerbated by eating foods that are not soft.  She is accompanied by her caretaker at today's visit who is wondering about the utility of using STS as this was mentioned at her hematology appointment.      Past Medical History:   Patient Active Problem List   Diagnosis     Livedo reticularis     No past medical history on file.  No past surgical history on file.    Social History:  Patient reports that she has been smoking.  she has never used smokeless tobacco. She reports that she drinks alcohol.    Family History:  No family history on file.    Medications:  Current Outpatient Medications   Medication Sig Dispense Refill     enoxaparin (LOVENOX) 60 MG/0.6ML syringe Inject 0.6 mLs (60 mg) Subcutaneous every 12 hours (Patient taking differently: Inject 40 mg Subcutaneous every 12 hours ) 30 Syringe 1     loratadine (CLARITIN) 10 MG tablet Take 1 tablet (10 mg) by mouth daily 30 tablet 3     Prenatal Vit-Fe Fumarate-FA (PRENATAL MULTIVITAMIN PLUS IRON) 27-0.8 MG TABS per tablet Take 1 tablet by mouth daily       gabapentin (NEURONTIN) 800 MG tablet Take 1 tablet (800 mg) by mouth 3 times daily 90 tablet 0     nortriptyline (PAMELOR) 10 MG capsule Take 1 capsule (10 mg) by mouth At Bedtime 30 capsule 0     pentoxifylline ER (TRENTAL) 400 MG CR tablet Take 2  tablets (800 mg) by mouth 3 times daily (with meals) 180 tablet 0        Allergies   Allergen Reactions     Bengay Pain Relief [Menthol] Other (See Comments)     Skin turns red and feels extremely hot     Cats      Chocolate Dermatitis     Dicyclomine Other (See Comments)     Severe sedation     Dust Mites      Derm, resp     No Clinical Screening - See Comments      GI upset     Phenobarbital      Pollen Extract      Derm, resp.          Review of Systems:  -As per HPI  -Constitutional: Otherwise feeling well today, in usual state of health.  -HEENT: Patient denies nonhealing oral sores.  -Skin: As above in HPI. No additional skin concerns.    Physical exam:  Vitals: There were no vitals taken for this visit.  GEN: This is a well developed, mal-nourished female in no acute distress, in a pleasant mood though does not answer questions appropriately.    SKIN: Focused examination of the face, mouth, chest, upper and lower back, b/l lower legs and buttocks was performed.  - blood clots seen on the left buccal mucosa  - scattered 1-2mm telangectasia on the upper chest and back  - retiform purpura on the b/l inner thighs (<1% BSA) with one 4mm ulcer on the R medial thigh  - Livido reticularis of the b/l lower legs without skin   - No other lesions of concern on areas examined.                   Impression/Plan:    1. Non-uremic calciphylaxis    normal for MPO ab, proteinase 3 ab, RNP ab, scleroderma ab, La ab, Ro ab, Geronimo ab, hep B, hep C, cerulopasmin, HIV, RF, SMILEY, liver kidney microsomal ab, C3/C4, SPEP, mitochondrial M2 ab.     (+) lupus anticoagulant and a low protein C level. Level S is normal as is homocysteine, anticardiolipin ab, factor V leiden, and beta2-glycoprotein 1. The pt has trace cryoglobulins (identification is still pending).    Bx showed calcium deposits in small blood vessels. Given known lupus anticoagulant and low Protein C levels, we view non-uremic calciphylaxis as the best clinico-pathologic  diagnosis.    We feel that any could benefit from switching back to apixaban which is been shown to be of some benefit in patients with calciphylaxis over other anticoagulants such as Lovenox.  Will plan to discuss this with Dr. Jeffery.     Will plan to check an anti-smooth muscle antibody today    Plan to reinitiate pentoxifylline 800 mg 3 times daily with meals    Follow-up in 1 months, earlier for new or changing lesions.     Dr. Marti staffed the patient.    Staff Involved:  Resident (Migdalia Ibarra MD) / Staff (as above)    Attending Note  Dr. Jeffery and I are in the process of discussing best ongoing anticoagulation for Yenifer.  Her repeat lupus anticoagulant is positive.  Her skin disease is improved since her hospitalization, which is encouraging, but she does report continuing to get some painful new lesions.  She has had bleeding complications on standard-dose Lovenox, thus we are approaching a switch to apixaban with more caution than we have in other patients.    I have seen and examined this patient and agree with the assessment and plan as documented in the resident's note.    Norberto Marti MD  Dermatology Attending

## 2019-02-07 NOTE — NURSING NOTE
Chief Complaint   Patient presents with     Lab Only     labs drawn with vpt by rn.      Labs drawn with vpt by rn.  Pt tolerated well.      Isabel Garcia RN

## 2019-02-08 ENCOUNTER — CARE COORDINATION (OUTPATIENT)
Dept: ONCOLOGY | Facility: CLINIC | Age: 39
End: 2019-02-08

## 2019-02-08 LAB — PROT C ACT/NOR PPP CHRO: 84 % (ref 70–170)

## 2019-02-08 NOTE — PROGRESS NOTES
Received call from patient's TCU nursing staff.  They are having difficulty getting aminocaproic acid 25% liquid for mouth swishes as recommended by Dr. Jeffery yesterday.  It would be next week before they can get medication and it is very expensive.     Reviewed with Tracey Tovar PA-C- as long as pt is not actively bleeding in mouth it is ok to continue salt water rinses.   If pt begins to actively bleed they should pursue aminocaproic acid 25% liquid.

## 2019-02-09 LAB — SMA IGG SER-ACNC: 11 UNITS (ref 0–19)

## 2019-02-11 LAB — LA PPP-IMP: POSITIVE

## 2019-02-17 PROBLEM — E83.59 CALCIPHYLAXIS: Status: ACTIVE | Noted: 2019-02-17

## 2019-03-07 ENCOUNTER — TELEPHONE (OUTPATIENT)
Dept: DERMATOLOGY | Facility: CLINIC | Age: 39
End: 2019-03-07

## 2019-03-07 NOTE — TELEPHONE ENCOUNTER
I called the patients TCU and she was scheduled for the 26th at 9am as 8am is too early for her TCU's transportation.   Reina Schilling, CMA

## 2019-03-07 NOTE — TELEPHONE ENCOUNTER
[facility] Call Center    Phone Message    May a detailed message be left on voicemail: yes    Reason for Call: Other: Pt had to reschedule appt with Dr. Marti for today, was for 4 week f/u and soonest is going out until May. Please call Karen if anything can be scheuled sooner     Action Taken: Message routed to:  [facility] ([facility] Derm

## 2019-03-21 ENCOUNTER — DOCUMENTATION ONLY (OUTPATIENT)
Dept: CARE COORDINATION | Facility: CLINIC | Age: 39
End: 2019-03-21

## 2019-05-24 ENCOUNTER — TELEPHONE (OUTPATIENT)
Dept: ONCOLOGY | Facility: CLINIC | Age: 39
End: 2019-05-24

## 2019-05-24 NOTE — TELEPHONE ENCOUNTER
"Pt called in experiencing a \"heavy\" menstrual flow for the past 10 days. Using one tampon every 1-2 hours and quantity \"isn't tapering like usual\". Pt reports taking 0.4 mL/day of Lovenox and would like to know if she should stop. Feels \"pretty good otherwise\". No loss of energy, SOB, fever, or other complaints.    Paged Dr Jeffery, said yes hold Lovenox and see GYN or PCP as soon as possible.    Called Pt back and reported what Dr Jeffery said, Pt verbalized understanding.  "

## 2019-05-28 ENCOUNTER — HOSPITAL ENCOUNTER (INPATIENT)
Facility: CLINIC | Age: 39
LOS: 9 days | Discharge: HOME | DRG: 760 | End: 2019-06-06
Attending: PEDIATRICS | Admitting: INTERNAL MEDICINE
Payer: COMMERCIAL

## 2019-05-28 ENCOUNTER — APPOINTMENT (OUTPATIENT)
Dept: GENERAL RADIOLOGY | Facility: CLINIC | Age: 39
DRG: 760 | End: 2019-05-28
Attending: PEDIATRICS
Payer: COMMERCIAL

## 2019-05-28 ENCOUNTER — TRANSFERRED RECORDS (OUTPATIENT)
Dept: HEALTH INFORMATION MANAGEMENT | Facility: CLINIC | Age: 39
End: 2019-05-28

## 2019-05-28 DIAGNOSIS — G62.9 NEUROPATHY: ICD-10-CM

## 2019-05-28 DIAGNOSIS — Z36.3 SCREENING, ANTENATAL, FOR MALFORMATION BY ULTRASOUND: ICD-10-CM

## 2019-05-28 DIAGNOSIS — K21.00 GASTROESOPHAGEAL REFLUX DISEASE WITH ESOPHAGITIS: ICD-10-CM

## 2019-05-28 DIAGNOSIS — E83.42 HYPOMAGNESEMIA: ICD-10-CM

## 2019-05-28 DIAGNOSIS — R14.0 BLOATING: ICD-10-CM

## 2019-05-28 DIAGNOSIS — R23.1 LIVEDO RETICULARIS: ICD-10-CM

## 2019-05-28 DIAGNOSIS — J30.2 SEASONAL ALLERGIC RHINITIS, UNSPECIFIED TRIGGER: ICD-10-CM

## 2019-05-28 DIAGNOSIS — R58 BLEEDING: Primary | ICD-10-CM

## 2019-05-28 DIAGNOSIS — K59.00 CONSTIPATION, UNSPECIFIED CONSTIPATION TYPE: ICD-10-CM

## 2019-05-28 DIAGNOSIS — F10.10 ALCOHOL ABUSE: ICD-10-CM

## 2019-05-28 DIAGNOSIS — E83.59 CALCIPHYLAXIS: ICD-10-CM

## 2019-05-28 LAB
ALBUMIN SERPL-MCNC: 2.5 G/DL (ref 3.4–5)
ALP SERPL-CCNC: 195 U/L (ref 40–150)
ALT SERPL W P-5'-P-CCNC: 77 U/L (ref 0–50)
ALT SERPL-CCNC: 88 U/L (ref 14–63)
ANION GAP SERPL CALCULATED.3IONS-SCNC: 6 MMOL/L (ref 3–14)
APTT PPP: 31 SEC (ref 22–37)
AST SERPL W P-5'-P-CCNC: 289 U/L (ref 0–45)
AST SERPL-CCNC: 298 U/L (ref 14–37)
B-HCG SERPL-ACNC: <1 IU/L (ref 0–5)
BASOPHILS # BLD AUTO: 0 10E9/L (ref 0–0.2)
BASOPHILS NFR BLD AUTO: 0.2 %
BILIRUB SERPL-MCNC: 2.7 MG/DL (ref 0.2–1.3)
BLD PROD TYP BPU: NORMAL
BLD UNIT ID BPU: 0
BLOOD PRODUCT CODE: NORMAL
BPU ID: NORMAL
BUN SERPL-MCNC: 7 MG/DL (ref 7–30)
CALCIUM SERPL-MCNC: 7.5 MG/DL (ref 8.5–10.1)
CHLORIDE SERPL-SCNC: 100 MMOL/L (ref 94–109)
CK SERPL-CCNC: 78 U/L (ref 30–225)
CO2 SERPL-SCNC: 23 MMOL/L (ref 20–32)
CREAT SERPL-MCNC: 0.33 MG/DL (ref 0.52–1.04)
CREAT SERPL-MCNC: 0.39 MG/DL (ref 0.51–0.95)
DIFFERENTIAL METHOD BLD: ABNORMAL
EOSINOPHIL # BLD AUTO: 0 10E9/L (ref 0–0.7)
EOSINOPHIL NFR BLD AUTO: 0.2 %
ERYTHROCYTE [DISTWIDTH] IN BLOOD BY AUTOMATED COUNT: 14.4 % (ref 10–15)
ERYTHROCYTE [SEDIMENTATION RATE] IN BLOOD BY WESTERGREN METHOD: 63 MM/H (ref 0–20)
FIBRINOGEN PPP-MCNC: 255 MG/DL (ref 200–420)
GFR SERPL CREATININE-BSD FRML MDRD: >60 ML/MIN/1.73ME2 (ref 60–150)
GFR SERPL CREATININE-BSD FRML MDRD: >90 ML/MIN/{1.73_M2}
GLUCOSE BLDC GLUCOMTR-MCNC: 121 MG/DL (ref 70–99)
GLUCOSE SERPL-MCNC: 104 MG/DL (ref 70–99)
GLUCOSE SERPL-MCNC: 152 MG/DL (ref 74–100)
HCT VFR BLD AUTO: 17.7 % (ref 35–47)
HGB BLD-MCNC: 5.8 G/DL (ref 11.7–15.7)
IMM GRANULOCYTES # BLD: 0.1 10E9/L (ref 0–0.4)
IMM GRANULOCYTES NFR BLD: 1 %
INR PPP: 1.29 (ref 0.86–1.14)
INR PPP: 1.4 (ref 0.9–1.1)
LACTATE BLD-SCNC: 0.8 MMOL/L (ref 0.7–2)
LMWH PPP CHRO-ACNC: <0.1 IU/ML
LYMPHOCYTES # BLD AUTO: 1.4 10E9/L (ref 0.8–5.3)
LYMPHOCYTES NFR BLD AUTO: 13.5 %
MAGNESIUM SERPL-MCNC: 1.7 MG/DL (ref 1.6–2.3)
MCH RBC QN AUTO: 29.1 PG (ref 26.5–33)
MCHC RBC AUTO-ENTMCNC: 32.8 G/DL (ref 31.5–36.5)
MCV RBC AUTO: 89 FL (ref 78–100)
MONOCYTES # BLD AUTO: 1.5 10E9/L (ref 0–1.3)
MONOCYTES NFR BLD AUTO: 14 %
NEUTROPHILS # BLD AUTO: 7.6 10E9/L (ref 1.6–8.3)
NEUTROPHILS NFR BLD AUTO: 71.1 %
NRBC # BLD AUTO: 0.1 10*3/UL
NRBC BLD AUTO-RTO: 1 /100
PHOSPHATE SERPL-MCNC: 1.9 MG/DL (ref 2.5–4.5)
PLATELET # BLD AUTO: 242 10E9/L (ref 150–450)
POTASSIUM SERPL-SCNC: 4.1 MMOL/L (ref 3.4–5.3)
POTASSIUM SERPL-SCNC: 4.2 MMOL/L (ref 3.5–5.1)
PROT SERPL-MCNC: 5.5 G/DL (ref 6.8–8.8)
RBC # BLD AUTO: 1.99 10E12/L (ref 3.8–5.2)
RETICS # AUTO: 131.5 10E9/L (ref 25–95)
RETICS/RBC NFR AUTO: 6.6 % (ref 0.5–2)
SODIUM SERPL-SCNC: 130 MMOL/L (ref 133–144)
TRANSFUSION STATUS PATIENT QL: NORMAL
TRANSFUSION STATUS PATIENT QL: NORMAL
TSH SERPL DL<=0.005 MIU/L-ACNC: 1.96 MU/L (ref 0.4–4)
WBC # BLD AUTO: 10.7 10E9/L (ref 4–11)

## 2019-05-28 PROCEDURE — 12000004 ZZH R&B IMCU UMMC

## 2019-05-28 PROCEDURE — 71045 X-RAY EXAM CHEST 1 VIEW: CPT

## 2019-05-28 PROCEDURE — 85396 CLOTTING ASSAY WHOLE BLOOD: CPT | Performed by: SURGERY

## 2019-05-28 PROCEDURE — 83735 ASSAY OF MAGNESIUM: CPT | Performed by: SURGERY

## 2019-05-28 PROCEDURE — 85045 AUTOMATED RETICULOCYTE COUNT: CPT | Performed by: SURGERY

## 2019-05-28 PROCEDURE — 86900 BLOOD TYPING SEROLOGIC ABO: CPT | Performed by: SURGERY

## 2019-05-28 PROCEDURE — 84702 CHORIONIC GONADOTROPIN TEST: CPT | Performed by: SURGERY

## 2019-05-28 PROCEDURE — 85610 PROTHROMBIN TIME: CPT | Performed by: SURGERY

## 2019-05-28 PROCEDURE — 93005 ELECTROCARDIOGRAM TRACING: CPT

## 2019-05-28 PROCEDURE — 85652 RBC SED RATE AUTOMATED: CPT | Performed by: SURGERY

## 2019-05-28 PROCEDURE — 80053 COMPREHEN METABOLIC PANEL: CPT | Performed by: SURGERY

## 2019-05-28 PROCEDURE — 86850 RBC ANTIBODY SCREEN: CPT | Performed by: SURGERY

## 2019-05-28 PROCEDURE — 93010 ELECTROCARDIOGRAM REPORT: CPT | Performed by: INTERNAL MEDICINE

## 2019-05-28 PROCEDURE — 85025 COMPLETE CBC W/AUTO DIFF WBC: CPT | Performed by: SURGERY

## 2019-05-28 PROCEDURE — 83605 ASSAY OF LACTIC ACID: CPT | Performed by: SURGERY

## 2019-05-28 PROCEDURE — 40000141 ZZH STATISTIC PERIPHERAL IV START W/O US GUIDANCE

## 2019-05-28 PROCEDURE — HZ2ZZZZ DETOXIFICATION SERVICES FOR SUBSTANCE ABUSE TREATMENT: ICD-10-PCS | Performed by: INTERNAL MEDICINE

## 2019-05-28 PROCEDURE — 84443 ASSAY THYROID STIM HORMONE: CPT | Performed by: SURGERY

## 2019-05-28 PROCEDURE — 82550 ASSAY OF CK (CPK): CPT | Performed by: SURGERY

## 2019-05-28 PROCEDURE — 84100 ASSAY OF PHOSPHORUS: CPT | Performed by: SURGERY

## 2019-05-28 PROCEDURE — 86901 BLOOD TYPING SEROLOGIC RH(D): CPT | Performed by: SURGERY

## 2019-05-28 PROCEDURE — 85260 CLOT FACTOR X STUART-POWER: CPT | Performed by: SURGERY

## 2019-05-28 PROCEDURE — 36415 COLL VENOUS BLD VENIPUNCTURE: CPT | Performed by: SURGERY

## 2019-05-28 PROCEDURE — 85520 HEPARIN ASSAY: CPT | Performed by: SURGERY

## 2019-05-28 PROCEDURE — 86923 COMPATIBILITY TEST ELECTRIC: CPT | Performed by: SURGERY

## 2019-05-28 PROCEDURE — P9016 RBC LEUKOCYTES REDUCED: HCPCS | Performed by: SURGERY

## 2019-05-28 PROCEDURE — 85730 THROMBOPLASTIN TIME PARTIAL: CPT | Performed by: SURGERY

## 2019-05-28 PROCEDURE — 85384 FIBRINOGEN ACTIVITY: CPT | Performed by: SURGERY

## 2019-05-28 PROCEDURE — 00000146 ZZHCL STATISTIC GLUCOSE BY METER IP

## 2019-05-28 RX ORDER — MULTIPLE VITAMINS W/ MINERALS TAB 9MG-400MCG
1 TAB ORAL DAILY
Status: DISCONTINUED | OUTPATIENT
Start: 2019-05-29 | End: 2019-06-06 | Stop reason: HOSPADM

## 2019-05-28 RX ORDER — LORAZEPAM 1 MG/1
1-2 TABLET ORAL EVERY 30 MIN PRN
Status: DISCONTINUED | OUTPATIENT
Start: 2019-05-28 | End: 2019-06-01

## 2019-05-28 RX ORDER — ONDANSETRON 2 MG/ML
4 INJECTION INTRAMUSCULAR; INTRAVENOUS EVERY 6 HOURS PRN
Status: DISCONTINUED | OUTPATIENT
Start: 2019-05-28 | End: 2019-05-29

## 2019-05-28 RX ORDER — FOLIC ACID 1 MG/1
1 TABLET ORAL DAILY
Status: DISCONTINUED | OUTPATIENT
Start: 2019-05-29 | End: 2019-05-29

## 2019-05-28 RX ORDER — LANOLIN ALCOHOL/MO/W.PET/CERES
100 CREAM (GRAM) TOPICAL DAILY
Status: DISCONTINUED | OUTPATIENT
Start: 2019-05-29 | End: 2019-05-29

## 2019-05-28 RX ORDER — AMOXICILLIN 250 MG
1 CAPSULE ORAL 2 TIMES DAILY
Status: DISCONTINUED | OUTPATIENT
Start: 2019-05-28 | End: 2019-06-06 | Stop reason: HOSPADM

## 2019-05-28 RX ORDER — ONDANSETRON 4 MG/1
4 TABLET, ORALLY DISINTEGRATING ORAL EVERY 6 HOURS PRN
Status: DISCONTINUED | OUTPATIENT
Start: 2019-05-28 | End: 2019-05-29

## 2019-05-28 RX ORDER — LIDOCAINE 40 MG/G
CREAM TOPICAL
Status: DISCONTINUED | OUTPATIENT
Start: 2019-05-28 | End: 2019-06-06 | Stop reason: HOSPADM

## 2019-05-28 RX ORDER — AMOXICILLIN 250 MG
2 CAPSULE ORAL 2 TIMES DAILY
Status: DISCONTINUED | OUTPATIENT
Start: 2019-05-28 | End: 2019-06-06 | Stop reason: HOSPADM

## 2019-05-28 RX ORDER — NALOXONE HYDROCHLORIDE 0.4 MG/ML
.1-.4 INJECTION, SOLUTION INTRAMUSCULAR; INTRAVENOUS; SUBCUTANEOUS
Status: DISCONTINUED | OUTPATIENT
Start: 2019-05-28 | End: 2019-06-06 | Stop reason: HOSPADM

## 2019-05-28 RX ORDER — PROCHLORPERAZINE MALEATE 5 MG
10 TABLET ORAL EVERY 6 HOURS PRN
Status: DISCONTINUED | OUTPATIENT
Start: 2019-05-28 | End: 2019-05-29

## 2019-05-28 RX ORDER — PROCHLORPERAZINE 25 MG
25 SUPPOSITORY, RECTAL RECTAL EVERY 12 HOURS PRN
Status: DISCONTINUED | OUTPATIENT
Start: 2019-05-28 | End: 2019-05-29

## 2019-05-28 RX ORDER — ACETAMINOPHEN 325 MG/1
650 TABLET ORAL EVERY 4 HOURS PRN
Status: DISCONTINUED | OUTPATIENT
Start: 2019-05-28 | End: 2019-05-29

## 2019-05-28 ASSESSMENT — PAIN DESCRIPTION - DESCRIPTORS: DESCRIPTORS: NUMBNESS

## 2019-05-28 NOTE — PROGRESS NOTES
[facility]  Transfer Triage Note    Date of call: 05/28/19  Time of call: 2:42 PM    Reason for Transfer:Patient has established care here  Procedure can be done here and not at referring hospital  Further diagnostic work up, management, and consultation for specialized care  Diagnosis: Severe anemia    Outside Records: Not available  Additional records requested to be faxed to [phone].    Stability of Patient: Patient is too unstable for transfer, and transfer is not urgent. Transfer will be delayed until patient has stabilized    Expected Time of Arrival for Transfer: 8-24 hours    Recommendations for Management and Stabilization: Given    Additional Comments patient with ETOH abuse, calciphalyaxis, lupus anticoagulation on chronic lovenox. Called into Heme clinic with concern for heavy menstral bleed. Told to stop lovenox and see GYN. She did stop lovenox, but did not see doctor. Now in ED Larry v[facility]quez with HGB 3.3.  Pulse 120s. /60.  We need [facility] bed with that anemia, but none available.  The ED there will be giving 2UNits of prbcs, and will call back if Hgb is >5 for consideration of med-surg tele bed.     Manohar Alonzo MD

## 2019-05-29 ENCOUNTER — APPOINTMENT (OUTPATIENT)
Dept: ULTRASOUND IMAGING | Facility: CLINIC | Age: 39
DRG: 760 | End: 2019-05-29
Attending: PEDIATRICS
Payer: COMMERCIAL

## 2019-05-29 ENCOUNTER — APPOINTMENT (OUTPATIENT)
Dept: OCCUPATIONAL THERAPY | Facility: CLINIC | Age: 39
DRG: 760 | End: 2019-05-29
Attending: PEDIATRICS
Payer: COMMERCIAL

## 2019-05-29 ENCOUNTER — APPOINTMENT (OUTPATIENT)
Dept: PHYSICAL THERAPY | Facility: CLINIC | Age: 39
DRG: 760 | End: 2019-05-29
Attending: PEDIATRICS
Payer: COMMERCIAL

## 2019-05-29 LAB
ALBUMIN UR-MCNC: 30 MG/DL
ANGLE RATE OF CLOT STRENGTH: 78.4 DEGREES (ref 53–72)
APPEARANCE UR: CLEAR
BASOPHILS # BLD AUTO: 0 10E9/L (ref 0–0.2)
BASOPHILS NFR BLD AUTO: 0.2 %
BILIRUB UR QL STRIP: ABNORMAL
BLD PROD TYP BPU: NORMAL
BLD UNIT ID BPU: 0
BLOOD PRODUCT CODE: NORMAL
BPU ID: NORMAL
CI HYPERCOAGULATION INDEX: 3.9 RATIO (ref 0–3)
COLOR UR AUTO: YELLOW
DIFFERENTIAL METHOD BLD: ABNORMAL
EOSINOPHIL # BLD AUTO: 0.1 10E9/L (ref 0–0.7)
EOSINOPHIL NFR BLD AUTO: 0.9 %
ERYTHROCYTE [DISTWIDTH] IN BLOOD BY AUTOMATED COUNT: 14.2 % (ref 10–15)
G ACTUAL CLOT STRENGTH: 11.5 KD/SC (ref 4.5–11)
GLUCOSE BLDC GLUCOMTR-MCNC: 100 MG/DL (ref 70–99)
GLUCOSE BLDC GLUCOMTR-MCNC: 112 MG/DL (ref 70–99)
GLUCOSE UR STRIP-MCNC: NEGATIVE MG/DL
HCT VFR BLD AUTO: 22.9 % (ref 35–47)
HGB BLD-MCNC: 7.3 G/DL (ref 11.7–15.7)
HGB BLD-MCNC: 7.8 G/DL (ref 11.7–15.7)
HGB BLD-MCNC: 8.3 G/DL (ref 11.7–15.7)
HGB UR QL STRIP: ABNORMAL
IMM GRANULOCYTES # BLD: 0.1 10E9/L (ref 0–0.4)
IMM GRANULOCYTES NFR BLD: 0.9 %
INTERPRETATION ECG - MUSE: NORMAL
K TIME TO SPEC CLOT STRENGTH: 0.8 MINUTE (ref 1–3)
KETONES UR STRIP-MCNC: 5 MG/DL
LEUKOCYTE ESTERASE UR QL STRIP: NEGATIVE
LY30 LYSIS AT 30 MINUTES: 1.4 % (ref 0–8)
LY60 LYSIS AT 60 MINUTES: 5.2 % (ref 0–15)
LYMPHOCYTES # BLD AUTO: 1.4 10E9/L (ref 0.8–5.3)
LYMPHOCYTES NFR BLD AUTO: 17.5 %
MA MAXIMUM CLOT STRENGTH: 69.8 MM (ref 50–70)
MCH RBC QN AUTO: 28.9 PG (ref 26.5–33)
MCHC RBC AUTO-ENTMCNC: 34.1 G/DL (ref 31.5–36.5)
MCV RBC AUTO: 85 FL (ref 78–100)
MONOCYTES # BLD AUTO: 1 10E9/L (ref 0–1.3)
MONOCYTES NFR BLD AUTO: 12.7 %
MUCOUS THREADS #/AREA URNS LPF: PRESENT /LPF
NEUTROPHILS # BLD AUTO: 5.4 10E9/L (ref 1.6–8.3)
NEUTROPHILS NFR BLD AUTO: 67.8 %
NITRATE UR QL: NEGATIVE
NRBC # BLD AUTO: 0 10*3/UL
NRBC BLD AUTO-RTO: 1 /100
PH UR STRIP: 6 PH (ref 5–7)
PHOSPHATE SERPL-MCNC: 2.1 MG/DL (ref 2.5–4.5)
PLATELET # BLD AUTO: 145 10E9/L (ref 150–450)
PLATELET # BLD EST: ABNORMAL 10*3/UL
R TIME UNTIL CLOT FORMS: 3.8 MINUTE (ref 5–10)
RBC # BLD AUTO: 2.7 10E12/L (ref 3.8–5.2)
RBC #/AREA URNS AUTO: >182 /HPF (ref 0–2)
SOURCE: ABNORMAL
SP GR UR STRIP: 1.02 (ref 1–1.03)
SQUAMOUS #/AREA URNS AUTO: 1 /HPF (ref 0–1)
TRANSFUSION STATUS PATIENT QL: NORMAL
TRANSFUSION STATUS PATIENT QL: NORMAL
UROBILINOGEN UR STRIP-MCNC: 2 MG/DL (ref 0–2)
WBC # BLD AUTO: 8 10E9/L (ref 4–11)
WBC #/AREA URNS AUTO: 2 /HPF (ref 0–5)

## 2019-05-29 PROCEDURE — 93975 VASCULAR STUDY: CPT | Mod: TC

## 2019-05-29 PROCEDURE — 97116 GAIT TRAINING THERAPY: CPT | Mod: GP

## 2019-05-29 PROCEDURE — 97161 PT EVAL LOW COMPLEX 20 MIN: CPT | Mod: GP

## 2019-05-29 PROCEDURE — 36415 COLL VENOUS BLD VENIPUNCTURE: CPT | Performed by: SURGERY

## 2019-05-29 PROCEDURE — 25000128 H RX IP 250 OP 636: Performed by: SURGERY

## 2019-05-29 PROCEDURE — 12000004 ZZH R&B IMCU UMMC

## 2019-05-29 PROCEDURE — 99223 1ST HOSP IP/OBS HIGH 75: CPT | Mod: AI | Performed by: INTERNAL MEDICINE

## 2019-05-29 PROCEDURE — 81001 URINALYSIS AUTO W/SCOPE: CPT | Performed by: SURGERY

## 2019-05-29 PROCEDURE — 25000125 ZZHC RX 250: Performed by: SURGERY

## 2019-05-29 PROCEDURE — 25000132 ZZH RX MED GY IP 250 OP 250 PS 637: Performed by: HOSPITALIST

## 2019-05-29 PROCEDURE — 85018 HEMOGLOBIN: CPT | Performed by: HOSPITALIST

## 2019-05-29 PROCEDURE — 25800030 ZZH RX IP 258 OP 636: Performed by: SURGERY

## 2019-05-29 PROCEDURE — 36415 COLL VENOUS BLD VENIPUNCTURE: CPT | Performed by: HOSPITALIST

## 2019-05-29 PROCEDURE — 97530 THERAPEUTIC ACTIVITIES: CPT | Mod: GP

## 2019-05-29 PROCEDURE — P9016 RBC LEUKOCYTES REDUCED: HCPCS | Performed by: SURGERY

## 2019-05-29 PROCEDURE — 97535 SELF CARE MNGMENT TRAINING: CPT | Mod: GO | Performed by: OCCUPATIONAL THERAPIST

## 2019-05-29 PROCEDURE — 97165 OT EVAL LOW COMPLEX 30 MIN: CPT | Mod: GO | Performed by: OCCUPATIONAL THERAPIST

## 2019-05-29 PROCEDURE — 76830 TRANSVAGINAL US NON-OB: CPT

## 2019-05-29 PROCEDURE — 84100 ASSAY OF PHOSPHORUS: CPT | Performed by: SURGERY

## 2019-05-29 PROCEDURE — 84100 ASSAY OF PHOSPHORUS: CPT | Performed by: HOSPITALIST

## 2019-05-29 PROCEDURE — 85025 COMPLETE CBC W/AUTO DIFF WBC: CPT | Performed by: SURGERY

## 2019-05-29 PROCEDURE — 00000146 ZZHCL STATISTIC GLUCOSE BY METER IP

## 2019-05-29 PROCEDURE — 85027 COMPLETE CBC AUTOMATED: CPT | Performed by: SURGERY

## 2019-05-29 PROCEDURE — 25000132 ZZH RX MED GY IP 250 OP 250 PS 637: Performed by: SURGERY

## 2019-05-29 PROCEDURE — 99222 1ST HOSP IP/OBS MODERATE 55: CPT | Mod: GC | Performed by: INTERNAL MEDICINE

## 2019-05-29 RX ORDER — LACTULOSE 10 G/15ML
100 SOLUTION ORAL
Status: DISCONTINUED | OUTPATIENT
Start: 2019-05-29 | End: 2019-05-30

## 2019-05-29 RX ORDER — MEDROXYPROGESTERONE ACETATE 10 MG
20 TABLET ORAL DAILY
Status: DISCONTINUED | OUTPATIENT
Start: 2019-05-29 | End: 2019-05-31

## 2019-05-29 RX ORDER — ONDANSETRON 2 MG/ML
4 INJECTION INTRAMUSCULAR; INTRAVENOUS EVERY 6 HOURS PRN
Status: DISCONTINUED | OUTPATIENT
Start: 2019-05-29 | End: 2019-06-06 | Stop reason: HOSPADM

## 2019-05-29 RX ORDER — POTASSIUM CHLORIDE 750 MG/1
20-40 TABLET, EXTENDED RELEASE ORAL
Status: DISCONTINUED | OUTPATIENT
Start: 2019-05-29 | End: 2019-06-06 | Stop reason: HOSPADM

## 2019-05-29 RX ORDER — ONDANSETRON 4 MG/1
4 TABLET, ORALLY DISINTEGRATING ORAL EVERY 6 HOURS PRN
Status: DISCONTINUED | OUTPATIENT
Start: 2019-05-29 | End: 2019-06-06 | Stop reason: HOSPADM

## 2019-05-29 RX ORDER — POTASSIUM CHLORIDE 1.5 G/1.58G
20-40 POWDER, FOR SOLUTION ORAL
Status: DISCONTINUED | OUTPATIENT
Start: 2019-05-29 | End: 2019-06-06 | Stop reason: HOSPADM

## 2019-05-29 RX ORDER — LACTULOSE 10 G/15ML
20 SOLUTION ORAL
Status: DISCONTINUED | OUTPATIENT
Start: 2019-05-29 | End: 2019-05-30

## 2019-05-29 RX ORDER — BISACODYL 10 MG
10 SUPPOSITORY, RECTAL RECTAL DAILY PRN
Status: DISCONTINUED | OUTPATIENT
Start: 2019-05-29 | End: 2019-06-03

## 2019-05-29 RX ORDER — POTASSIUM CHLORIDE 7.45 MG/ML
10 INJECTION INTRAVENOUS
Status: DISCONTINUED | OUTPATIENT
Start: 2019-05-29 | End: 2019-06-06 | Stop reason: HOSPADM

## 2019-05-29 RX ORDER — FOLIC ACID 5 MG/ML
1 INJECTION, SOLUTION INTRAMUSCULAR; INTRAVENOUS; SUBCUTANEOUS DAILY
Status: DISCONTINUED | OUTPATIENT
Start: 2019-05-29 | End: 2019-05-29

## 2019-05-29 RX ORDER — MAGNESIUM SULFATE HEPTAHYDRATE 40 MG/ML
4 INJECTION, SOLUTION INTRAVENOUS EVERY 4 HOURS PRN
Status: DISCONTINUED | OUTPATIENT
Start: 2019-05-29 | End: 2019-06-06 | Stop reason: HOSPADM

## 2019-05-29 RX ORDER — AMOXICILLIN 250 MG
1 CAPSULE ORAL 2 TIMES DAILY PRN
Status: DISCONTINUED | OUTPATIENT
Start: 2019-05-29 | End: 2019-06-06 | Stop reason: HOSPADM

## 2019-05-29 RX ORDER — LANOLIN ALCOHOL/MO/W.PET/CERES
100 CREAM (GRAM) TOPICAL DAILY
Status: DISCONTINUED | OUTPATIENT
Start: 2019-05-29 | End: 2019-06-06 | Stop reason: HOSPADM

## 2019-05-29 RX ORDER — POTASSIUM CL/LIDO/0.9 % NACL 10MEQ/0.1L
10 INTRAVENOUS SOLUTION, PIGGYBACK (ML) INTRAVENOUS
Status: DISCONTINUED | OUTPATIENT
Start: 2019-05-29 | End: 2019-06-06 | Stop reason: HOSPADM

## 2019-05-29 RX ORDER — THIAMINE HYDROCHLORIDE 100 MG/ML
100 INJECTION, SOLUTION INTRAMUSCULAR; INTRAVENOUS DAILY
Status: DISCONTINUED | OUTPATIENT
Start: 2019-05-29 | End: 2019-05-29

## 2019-05-29 RX ORDER — AMOXICILLIN 250 MG
2 CAPSULE ORAL 2 TIMES DAILY PRN
Status: DISCONTINUED | OUTPATIENT
Start: 2019-05-29 | End: 2019-06-06 | Stop reason: HOSPADM

## 2019-05-29 RX ORDER — FOLIC ACID 1 MG/1
1 TABLET ORAL DAILY
Status: DISCONTINUED | OUTPATIENT
Start: 2019-05-29 | End: 2019-06-06 | Stop reason: HOSPADM

## 2019-05-29 RX ORDER — PROCHLORPERAZINE 25 MG
25 SUPPOSITORY, RECTAL RECTAL EVERY 12 HOURS PRN
Status: DISCONTINUED | OUTPATIENT
Start: 2019-05-29 | End: 2019-06-06 | Stop reason: HOSPADM

## 2019-05-29 RX ORDER — POTASSIUM CHLORIDE 29.8 MG/ML
20 INJECTION INTRAVENOUS
Status: DISCONTINUED | OUTPATIENT
Start: 2019-05-29 | End: 2019-06-06 | Stop reason: HOSPADM

## 2019-05-29 RX ORDER — PROCHLORPERAZINE MALEATE 5 MG
10 TABLET ORAL EVERY 6 HOURS PRN
Status: DISCONTINUED | OUTPATIENT
Start: 2019-05-29 | End: 2019-06-06 | Stop reason: HOSPADM

## 2019-05-29 RX ADMIN — NICOTINE 1 PATCH: 7 PATCH TRANSDERMAL at 08:41

## 2019-05-29 RX ADMIN — RIFAXIMIN 550 MG: 550 TABLET ORAL at 08:37

## 2019-05-29 RX ADMIN — LORAZEPAM 1 MG: 1 TABLET ORAL at 02:49

## 2019-05-29 RX ADMIN — RIFAXIMIN 550 MG: 550 TABLET ORAL at 20:13

## 2019-05-29 RX ADMIN — MULTIPLE VITAMINS W/ MINERALS TAB 1 TABLET: TAB at 08:36

## 2019-05-29 RX ADMIN — POTASSIUM PHOSPHATE, MONOBASIC AND POTASSIUM PHOSPHATE, DIBASIC 15 MMOL: 224; 236 INJECTION, SOLUTION INTRAVENOUS at 16:11

## 2019-05-29 RX ADMIN — LORAZEPAM 1 MG: 1 TABLET ORAL at 03:51

## 2019-05-29 RX ADMIN — MEDROXYPROGESTERONE ACETATE 20 MG: 10 TABLET ORAL at 20:14

## 2019-05-29 RX ADMIN — FOLIC ACID: 5 INJECTION, SOLUTION INTRAMUSCULAR; INTRAVENOUS; SUBCUTANEOUS at 00:22

## 2019-05-29 RX ADMIN — POTASSIUM PHOSPHATE, MONOBASIC AND POTASSIUM PHOSPHATE, DIBASIC 20 MMOL: 224; 236 INJECTION, SOLUTION INTRAVENOUS at 04:28

## 2019-05-29 RX ADMIN — Medication 100 MG: at 08:37

## 2019-05-29 RX ADMIN — LORAZEPAM 1 MG: 1 TABLET ORAL at 00:42

## 2019-05-29 RX ADMIN — FOLIC ACID 1 MG: 1 TABLET ORAL at 08:36

## 2019-05-29 RX ADMIN — LORAZEPAM 1 MG: 1 TABLET ORAL at 01:43

## 2019-05-29 RX ADMIN — OMEPRAZOLE 20 MG: 20 CAPSULE, DELAYED RELEASE ORAL at 08:36

## 2019-05-29 ASSESSMENT — ACTIVITIES OF DAILY LIVING (ADL)
ADLS_ACUITY_SCORE: 14
ADLS_ACUITY_SCORE: 14
ADLS_ACUITY_SCORE: 17
ADLS_ACUITY_SCORE: 14
ADLS_ACUITY_SCORE: 16
ADLS_ACUITY_SCORE: 17

## 2019-05-29 ASSESSMENT — PAIN DESCRIPTION - DESCRIPTORS
DESCRIPTORS: ACHING;CRAMPING
DESCRIPTORS: NUMBNESS

## 2019-05-29 NOTE — PROGRESS NOTES
Admission          5/28/2019  9:31 PM  -----------------------------------------------------------  Reason for admission: Fatigue, Lightheaded, Vaginal bleeding, Low HGB.  Primary team notified of pt arrival.  Admitted from: OSH  Via: stretcher  Accompanied by: EMTs  Belongings: Placed in closet; valuables sent home with family  Admission Profile: complete  Teaching: orientation to unit and call light- call light within reach, call don't fall, use of console, meal times, when to call for the RN, and enforced importance of safety   Access: PIV x2  Telemetry:Placed on pt  Ht./Wt.: complete  2 RN Skin Assessment Completed By: Lexus VAUGHN And Dayanara PEREZ  Pt status: VSS

## 2019-05-29 NOTE — PROGRESS NOTES
"   05/29/19 1618   Quick Adds   Type of Visit Initial PT Evaluation   Living Environment   Lives With significant other   Living Arrangements house   Home Accessibility stairs to enter home;stairs within home   Number of Stairs, Main Entrance 3  (No HR.)   Number of Stairs, Within Home, Primary other (see comments)  (Split entry - 6 upstairs (1 HR), 6 downstairs (1 HR).)   Transportation Anticipated family or friend will provide   Self-Care   Usual Activity Tolerance moderate   Current Activity Tolerance fair   Regular Exercise No   Equipment Currently Used at Home shower chair  (Owns 4WW though does not use at baseline.)   Functional Level Prior   Ambulation 0-->independent   Transferring 0-->independent   Toileting 0-->independent   Bathing 3-->assistive equipment and person   Communication 0-->understands/communicates without difficulty   Swallowing 0-->swallows foods/liquids without difficulty   Cognition 0 - no cognition issues reported   Fall history within last six months yes   Number of times patient has fallen within last six months 3   Which of the above functional risks had a recent onset or change? ambulation;transferring;fall history   Prior Functional Level Comment Pt does not use AD at baseline, though owns 4WW. Pt receives assist from S.O. for bathing, utilizes shower chair. Pt reports IND with dressing.   General Information   Onset of Illness/Injury or Date of Surgery - Date 05/28/19   Referring Physician Ricki Motley MD    Patient/Family Goals Statement Pt would like to return home.   Pertinent History of Current Problem (include personal factors and/or comorbidities that impact the POC) Per chart \"39F alcohol abuse complicated by alcoholic stage 4 liver disease c/b coagulopathy, encephalopathy, and polyneuropathy, positive lupus anticoagulant testing without evidence of antiphospholipid syndrome, gastritis, moderate cognitive impairment and tobacco abuse admitted with bleeding per " "vagina.\"   Precautions/Limitations no known precautions/limitations   General Observations Upon arrival, pt supine in bed and agreeable to PT session.   General Info Comments Activity - up with assist.   Cognitive Status Examination   Orientation orientation to person, place and time   Level of Consciousness alert   Follows Commands and Answers Questions 100% of the time   Personal Safety and Judgment intact   Pain Assessment   Patient Currently in Pain No   Posture    Posture Forward head position;Protracted shoulders   Range of Motion (ROM)   ROM Comment B LE ROM WFL.   Strength   Strength Comments B LE strength grossly at least 3+/5 or greater based on functional mobility assessment.   Bed Mobility   Bed Mobility Comments Pt completes supine <> sit with SBA.   Transfer Skills   Transfer Comments Pt transfers sit <> stand with SBA.   Gait   Gait Comments Pt ambulates ~100ft with 4WW + CGA. Pt ambulates additional ~100ft and ~200ft with no AD and CGA. Pt ambulates at decreased gait speed, no overt LOB though high level balance deficits noted when ambulating without AD. Pt ascends/descends 3 steps 3x with B HR and CGA.    Balance   Balance Comments Pt demonstrates IND sitting balance. Pt requires SBA for static standing balance. Pt requires CGA for ambulation, demonstrates high level balance deficits.   Sensory Examination   Sensory Perception Comments Pt reports neuropathy in B fingers and feet.   General Therapy Interventions   Planned Therapy Interventions balance training;bed mobility training;gait training;strengthening;transfer training;home program guidelines;progressive activity/exercise   Clinical Impression   Criteria for Skilled Therapeutic Intervention yes, treatment indicated   PT Diagnosis Impaired functional mobility   Influenced by the following impairments LE weakness, impaired balance, decreased activity tolerance   Functional limitations due to impairments Bed mobility, transfers, gait, stairs, " "functional endurance   Clinical Presentation Stable/Uncomplicated   Clinical Presentation Rationale Clinical judgement   Clinical Decision Making (Complexity) Low complexity   Therapy Frequency` 5 times/week   Predicted Duration of Therapy Intervention (days/wks) 1-2 weeks   Anticipated Equipment Needs at Discharge   (None anticipated)   Anticipated Discharge Disposition Home with Assist;Home with Outpatient Therapy   Risk & Benefits of therapy have been explained Yes   Patient, Family & other staff in agreement with plan of care Yes   [facility] TM \"6 Clicks\"   2016, Trustees of [facility], under license to [employer].  All rights reserved.   6 Clicks Short Forms Basic Mobility Inpatient Short Form   [facility]  \"6 Clicks\" V.2 Basic Mobility Inpatient Short Form   1. Turning from your back to your side while in a flat bed without using bedrails? 4 - None   2. Moving from lying on your back to sitting on the side of a flat bed without using bedrails? 4 - None   3. Moving to and from a bed to a chair (including a wheelchair)? 3 - A Little   4. Standing up from a chair using your arms (e.g., wheelchair, or bedside chair)? 3 - A Little   5. To walk in hospital room? 3 - A Little   6. Climbing 3-5 steps with a railing? 3 - A Little   Basic Mobility Raw Score (Score out of 24.Lower scores equate to lower levels of function) 20   Total Evaluation Time   Total Evaluation Time (Minutes) 8     "

## 2019-05-29 NOTE — CONSULTS
[facility], [place]    Hematology/Oncology Consult Note  Patient Name: Yenifer Maher   MRN: [id number]  YOB: 1980      Date of Service: May 29, 2019  Attending Physician: Dr. Hdez      Reason for Consult: Vaginal bleeding       Assessment & Plan   Yenifer Maher is a 39 year old female with a history significant for  Stage 4 liver disease secondary to alcohol abuse c/b encephalopathy, coagulapathy, positive lupus anticoagulant testing without evidence of antiphospholipid syndrome, and cognitive impairment who presented on 5/28/2019 with vaginal bleeding.     Agree with evaluation of abdominal bleeding by gynecology. Do not recommend progesterone use long term as there is a increased risk of thromboembolic complications. Risk is as high as 2-4%, sometimes higher depending on the agent. In this patient IUD would be more suitable. Ok for use of progesterone short term until IUD is able to be placed. Consider aspirin once bleeding is under control. No need to restart lovenox this admission or on discharge. Patient will follow up with primary Hematologist Dr. Zaragoza on discharge and at this time lovenox can be restarted.     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  # Polyneuropathy 2/2 nutritional deficiency      Summary of Recommendations:    When bleeding resolves start aspirin instead of lovenox. Patient will follow up with Dr. Jeffery outpatient for restarting lovenox.    Ok to use progesterone short term while patient awaits IUD placement.     Thank you for involving us in the care of this patient. We will continue to follow during the hospitalization.    Patient was seen and plan of care developed with Dr. Hdez.    Elizabeth Salas MD MS  Internal Medicine, PGY-1      ______________________________________________________________________      History of Present Illness   Yenifer Maher is a 39  year old female with a history significant for  Stage 4 liver disease secondary to alcohol abuse c/b encephalopathy, coagulapathy, positive lupus anticoagulant testing without evidence of antiphospholipid syndrome, and cognitive impairment who presented on 5/28/2019 with vaginal bleeding.     Started having vaginal bleeding consistent with regular menstruation. Normal menstrual period lasts 5-7 days, she is currently on day 16 of menstruation.   After noticing that bleeding was ongoing she contacted primary hematologist Dr. Jeffery and was instructed to discontinue enoxaparin use until bleeding stopped. She was initially started on apixaban and then switched to enoxaparin. Originally she was on enoxaparin 60mg daily, but this dose was decreased 2/7/2019 to 40mg daily.  She She was started on anticoagulation in response to combination of significant history of lupus anticoagulant, low protein C, and limited mobility secondary to weakness and neuropathy. Originally presented to outside hospital where she was found to have Hgb of 3.3 for which she received 2 units PRBC.     Per EMR she has no features consistent with antiphospholipid antibody syndrome despite positive testing. No evidence of history of CVA/TIA, PE, DVT, or miscarriage.     Of note she was then seen on 1/26/19 for epistaxis and bleeding in her mouth. She was started on aminocaproic acid 25% liquid, 2.5 swish and spit every 4 hours     Review of Systems    The 10 point Review of Systems is negative other than noted in the HPI.    Past Medical History    Past Medical History:   Diagnosis Date     Alcohol abuse      Alcoholic cirrhosis (H)      Alcoholic peripheral neuropathy (H)      Coagulopathy (H)      Gastritis      History of Clostridium difficile colitis     unknown date     Impairment of cognitive function     MOCA 2/2019 22/30     Leukocytosis 02/2019    persistent leukocytosis across 2/2019 hospitalization without evidence of source across multiple  diagnostics including LP, BCx, UCx      Lupus anticoagulant positive      Macrocytic anemia      Moderate protein-calorie malnutrition (H)      Paroxysmal A-fib (H) 02/2019    not on chronic anticoagulation     Peptic ulcer disease      Positive SMILEY (antinuclear antibody)      Subclinical hypothyroidism 02/2019    normal T3, T4     Tobacco dependence     0.5 PPD       Past Surgical History   No past surgical history on file.    Social History   Social History     Tobacco Use     Smoking status: Current Some Day Smoker     Smokeless tobacco: Never Used   Substance Use Topics     Alcohol use: Yes     Comment: rarely     Drug use: None       Family History   No family history on file.    Prior to Admission Medications   Prior to Admission Medications   Prescriptions Last Dose Informant Patient Reported? Taking?   Prenatal Vit-Fe Fumarate-FA (PRENATAL MULTIVITAMIN PLUS IRON) 27-0.8 MG TABS per tablet 5/27/2019 at Unknown time  Yes Yes   Sig: Take 1 tablet by mouth daily   enoxaparin (LOVENOX) 60 MG/0.6ML syringe Past Week at Unknown time  No Yes   Sig: Inject 0.6 mLs (60 mg) Subcutaneous every 12 hours   Patient taking differently: Inject 40 mg Subcutaneous every 12 hours    gabapentin (NEURONTIN) 800 MG tablet   No No   Sig: Take 1 tablet (800 mg) by mouth 3 times daily   loratadine (CLARITIN) 10 MG tablet   No No   Sig: Take 1 tablet (10 mg) by mouth daily   nortriptyline (PAMELOR) 10 MG capsule   No No   Sig: Take 1 capsule (10 mg) by mouth At Bedtime   pentoxifylline ER (TRENTAL) 400 MG CR tablet   No No   Sig: Take 2 tablets (800 mg) by mouth 3 times daily (with meals)   pentoxifylline ER (TRENTAL) 400 MG CR tablet 5/27/2019 at Unknown time  No Yes   Sig: Take 2 tablets (800 mg) by mouth 3 times daily (with meals)      Facility-Administered Medications: None       Allergies      Allergies   Allergen Reactions     Bengay Pain Relief [Menthol] Other (See Comments)     Skin turns red and feels extremely hot     Cats       Chocolate Dermatitis     Dicyclomine Other (See Comments)     Severe sedation     Dust Mites      Derm, resp     No Clinical Screening - See Comments      GI upset     Phenobarbital      Pollen Extract      Derm, resp.        Physical Exam   Vital Signs: Temp: 98.4  F (36.9  C) Temp src: Oral BP: 131/82 Pulse: 124 Heart Rate: 102 Resp: 20 SpO2: 97 % O2 Device: None (Room air) Oxygen Delivery: 15 LPM(Float RN and MD's at bedside)  Weight: 142 lbs 3.15 oz      GENERAL: Alert, interactive, NAD  HEENT: AT/NC,   RESP: clear to auscultation bilaterally, no crackles or wheezes  CARDIAC: regular rate and rhythm, normal S1 and S2, no murmur appreciated  ABDOMEN: Soft, nontender, nondistended. +BS, no HSM or masses, no rebound or guarding.  EXTREMITIES: No LE edema, 2+ DP and radial pulses bialterally  SKIN: Warm and dry, pigment changes from healing left medial thigh  NEURO: Speech intact   PSYCH: Affect and mood normal.       Data     Results for orders placed or performed during the hospital encounter of 05/28/19 (from the past 24 hour(s))   EKG 12-lead, tracing only   Result Value Ref Range    Interpretation ECG Click View Image link to view waveform and result    XR Chest Port 1 View    Narrative    EXAM: XR CHEST PORT 1 VW  5/28/2019 10:20 PM      HISTORY: dyspnea    COMPARISON: None    FINDINGS: No acute cardiac pulmonary abnormality. Unremarkable upper  abdomen and visualized osseous structures.       Impression    IMPRESSION: No acute cardiopulmonary abnormality.    I have personally reviewed the examination and initial interpretation  and I agree with the findings.    CHANTEL STOCKTON MD   Glucose by meter   Result Value Ref Range    Glucose 121 (H) 70 - 99 mg/dL   TSH with free T4 reflex   Result Value Ref Range    TSH 1.96 0.40 - 4.00 mU/L   Magnesium   Result Value Ref Range    Magnesium 1.7 1.6 - 2.3 mg/dL   Phosphorus   Result Value Ref Range    Phosphorus 1.9 (L) 2.5 - 4.5 mg/dL   CK total   Result  Value Ref Range    CK Total 78 30 - 225 U/L   CBC with platelets differential   Result Value Ref Range    WBC 10.7 4.0 - 11.0 10e9/L    RBC Count 1.99 (L) 3.8 - 5.2 10e12/L    Hemoglobin 5.8 (LL) 11.7 - 15.7 g/dL    Hematocrit 17.7 (L) 35.0 - 47.0 %    MCV 89 78 - 100 fl    MCH 29.1 26.5 - 33.0 pg    MCHC 32.8 31.5 - 36.5 g/dL    RDW 14.4 10.0 - 15.0 %    Platelet Count 242 150 - 450 10e9/L    Diff Method Automated Method     % Neutrophils 71.1 %    % Lymphocytes 13.5 %    % Monocytes 14.0 %    % Eosinophils 0.2 %    % Basophils 0.2 %    % Immature Granulocytes 1.0 %    Nucleated RBCs 1 (H) 0 /100    Absolute Neutrophil 7.6 1.6 - 8.3 10e9/L    Absolute Lymphocytes 1.4 0.8 - 5.3 10e9/L    Absolute Monocytes 1.5 (H) 0.0 - 1.3 10e9/L    Absolute Eosinophils 0.0 0.0 - 0.7 10e9/L    Absolute Basophils 0.0 0.0 - 0.2 10e9/L    Abs Immature Granulocytes 0.1 0 - 0.4 10e9/L    Absolute Nucleated RBC 0.1    Comprehensive metabolic panel   Result Value Ref Range    Sodium 130 (L) 133 - 144 mmol/L    Potassium 4.1 3.4 - 5.3 mmol/L    Chloride 100 94 - 109 mmol/L    Carbon Dioxide 23 20 - 32 mmol/L    Anion Gap 6 3 - 14 mmol/L    Glucose 104 (H) 70 - 99 mg/dL    Urea Nitrogen 7 7 - 30 mg/dL    Creatinine 0.33 (L) 0.52 - 1.04 mg/dL    GFR Estimate >90 >60 mL/min/[1.73_m2]    GFR Estimate If Black >90 >60 mL/min/[1.73_m2]    Calcium 7.5 (L) 8.5 - 10.1 mg/dL    Bilirubin Total 2.7 (H) 0.2 - 1.3 mg/dL    Albumin 2.5 (L) 3.4 - 5.0 g/dL    Protein Total 5.5 (L) 6.8 - 8.8 g/dL    Alkaline Phosphatase 195 (H) 40 - 150 U/L    ALT 77 (H) 0 - 50 U/L     (H) 0 - 45 U/L   Partial thromboplastin time   Result Value Ref Range    PTT 31 22 - 37 sec   INR   Result Value Ref Range    INR 1.29 (H) 0.86 - 1.14   Reticulocyte count   Result Value Ref Range    % Retic 6.6 (H) 0.5 - 2.0 %    Absolute Retic 131.5 (H) 25 - 95 10e9/L   Erythrocyte sedimentation rate auto   Result Value Ref Range    Sed Rate 63 (H) 0 - 20 mm/h   HCG quantitative  pregnancy   Result Value Ref Range    HCG Quantitative Serum <1 0 - 5 IU/L   ABO/Rh type and screen   Result Value Ref Range    Units Ordered 2     ABO A     RH(D) Pos     Antibody Screen Neg     Test Valid Only At          [facility]    Specimen Expires 05/31/2019     Crossmatch Red Blood Cells    TEG without Heparinase   Result Value Ref Range    R time until clot forms 3.8 (L) 5 - 10 Minute    K time to spec clot strength 0.8 (L) 1 - 3 Minute    Angle rate of clot strength 78.4 (H) 53 - 72 Degrees    MA maximum clot strength 69.8 50 - 70 mm    CI hypercoagulation index 3.9 (H) 0.0 - 3.0 Ratio    G actual clot strength 11.5 (H) 4.5 - 11.0 Kd/sc    LY30 lysis at 30 minutes 1.4 0 - 8 %    LY60 lysis at 60 minutes 5.2 0 - 15 %   Fibrinogen activity   Result Value Ref Range    Fibrinogen 255 200 - 420 mg/dL   Heparin 10a Level   Result Value Ref Range    Heparin 10A Level <0.10 IU/mL   Lactic acid whole blood   Result Value Ref Range    Lactic Acid 0.8 0.7 - 2.0 mmol/L   Blood component   Result Value Ref Range    Unit Number [id number]     Blood Component Type Red Blood Cells Leukocyte Reduced     Division Number 00     Status of Unit Released to care unit     Blood Product Code B2651G53     Unit Status ISS    Blood component   Result Value Ref Range    Unit Number [id number]     Blood Component Type Red Blood Cells Leukocyte Reduced     Division Number 00     Status of Unit Released to care unit 05/29/2019 0228     Blood Product Code [id number]     Unit Status ISS    UA with Microscopic reflex to Culture   Result Value Ref Range    Color Urine Yellow     Appearance Urine Clear     Glucose Urine Negative NEG^Negative mg/dL    Bilirubin Urine Small (A) NEG^Negative    Ketones Urine 5 (A) NEG^Negative mg/dL    Specific Gravity Urine 1.023 1.003 - 1.035    Blood Urine Large (A) NEG^Negative    pH Urine 6.0 5.0 - 7.0 pH    Protein Albumin Urine 30 (A) NEG^Negative mg/dL     Urobilinogen mg/dL 2.0 0.0 - 2.0 mg/dL    Nitrite Urine Negative NEG^Negative    Leukocyte Esterase Urine Negative NEG^Negative    Source Urine     WBC Urine 2 0 - 5 /HPF    RBC Urine >182 (H) 0 - 2 /HPF    Squamous Epithelial /HPF Urine 1 0 - 1 /HPF    Mucous Urine Present (A) NEG^Negative /LPF   Glucose by meter   Result Value Ref Range    Glucose 100 (H) 70 - 99 mg/dL   CBC with platelets differential   Result Value Ref Range    WBC 8.0 4.0 - 11.0 10e9/L    RBC Count 2.70 (L) 3.8 - 5.2 10e12/L    Hemoglobin 7.8 (L) 11.7 - 15.7 g/dL    Hematocrit 22.9 (L) 35.0 - 47.0 %    MCV 85 78 - 100 fl    MCH 28.9 26.5 - 33.0 pg    MCHC 34.1 31.5 - 36.5 g/dL    RDW 14.2 10.0 - 15.0 %    Platelet Count 145 (L) 150 - 450 10e9/L    Diff Method Automated Method     % Neutrophils 67.8 %    % Lymphocytes 17.5 %    % Monocytes 12.7 %    % Eosinophils 0.9 %    % Basophils 0.2 %    % Immature Granulocytes 0.9 %    Nucleated RBCs 1 (H) 0 /100    Absolute Neutrophil 5.4 1.6 - 8.3 10e9/L    Absolute Lymphocytes 1.4 0.8 - 5.3 10e9/L    Absolute Monocytes 1.0 0.0 - 1.3 10e9/L    Absolute Eosinophils 0.1 0.0 - 0.7 10e9/L    Absolute Basophils 0.0 0.0 - 0.2 10e9/L    Abs Immature Granulocytes 0.1 0 - 0.4 10e9/L    Absolute Nucleated RBC 0.0     Platelet Estimate Confirming automated cell count    Glucose by meter   Result Value Ref Range    Glucose 112 (H) 70 - 99 mg/dL         I have personally reviewed the following labs/imaging:  CBC  Recent Labs   Lab 05/29/19  0556 05/28/19  2233   WBC 8.0 10.7   RBC 2.70* 1.99*   HGB 7.8* 5.8*   HCT 22.9* 17.7*   MCV 85 89   MCH 28.9 29.1   MCHC 34.1 32.8   RDW 14.2 14.4   * 242     CMP  Recent Labs   Lab 05/28/19  2233   *   POTASSIUM 4.1   CHLORIDE 100   CO2 23   ANIONGAP 6   *   BUN 7   CR 0.33*   GFRESTIMATED >90   GFRESTBLACK >90   MARKO 7.5*   MAG 1.7   PHOS 1.9*   PROTTOTAL 5.5*   ALBUMIN 2.5*   BILITOTAL 2.7*   ALKPHOS 195*   *   ALT 77*     INR  Recent Labs   Lab  05/28/19  0257   INR 1.29*

## 2019-05-29 NOTE — PLAN OF CARE
Discharge Planner OT   Patient plan for discharge: not discussed  Current status: CGA for sit>stand, CGAx1-2 for transfer bed>chair with IV pole. Pt ambulated to bathroom with CGA, performing standing G/H with SBA. Min VCs for sequencing, with some attention issues and slow processing noted. Pt endorses mild cognitive issues at baseline. HR 120s at rest sitting EOB, up to 140s with activity, VSS otherwise.   Barriers to return to prior living situation: Decreased ADL independence and activity tolerance  Recommendations for discharge: likely home with assist vs rehab  Rationale for recommendations: With progress in therapies, pt should be able to discharge home, but will continue to assess.        Entered by: Regulo Del Real 05/29/2019 12:32 PM

## 2019-05-29 NOTE — PLAN OF CARE
/75   Pulse 107   Temp 98.2  F (36.8  C) (Axillary)   Resp 20   Wt 64.5 kg (142 lb 3.2 oz)   SpO2 97%   BMI 22.27 kg/m     Neuro: A&Ox4. Follows commands. Scoring 7-10 on CIWA and 0 and [place]. Oral ativan given. Numbness/tingling/pain/highly sensitive to touch on fingers and toes.   Cardiac: ST. HR 99 to 1-teens. HR got up to 150s with activity. Afebrile. SBP 1-teens to 120s. Port wine colored vaginal bleeding. Soaked through 2 tampons.  Respiratory: Sating > 96% on RA. Clear lungs.   GI/: Adequate urine output via commode. BM X0. Passing gas.  Diet/appetite: Vegan diet.   Activity:  Assist of 2 up to commode. Unsteady on feet.  Pain: Generalized, but mostly in fingers and toes. At acceptable level on current regimen.   Skin: No new deficits noted.  LDA's: Rt PIV x2 infusing and intact.  Two units of RBC given for low hgb. HGB recheck 7.8. Replacing phosphorus.   Thiamine going at 100 mL/hr. UA sent.  Plan:  Gynocology consult, hematology consult and transvaginal pelic ultrasound. Continue with POC. Notify primary team with changes.

## 2019-05-29 NOTE — CONSULTS
Gynecology Consult Note    HPI: .Yenifer Maher is 39 year old  who is admitted in the setting stage IV alcoholic liver disease with heavy vaginal bleeding. Patient states that until a few months ago she had regular monthly menses. No issues with heavy bleeding. Has been on Lovenox for quite a while without issue. The past few months her bleeding has been more irregular. Last period started 16 days ago and has been very, very heavy. States that she was using 18 average weight pads per day or a half a package of heavy pads. Had two syncopal episodes at home as well as significant lightheadedness and pallor and was brought to ED by her . Found to have HGB of three, has received multiple transfusions and is feeling a bit better. Continues to have bleeding, though states it has improved modestly. Size of clots has decreased. Otherwise has been feeling ok.     OBGYN Hx:  Hx of TAB x1  Denies hx of abnormal paps, thinks her last was a couple of years ago  Denies STIs   Reports regular monthly menses until a couple weeks ago.    ROS: 10 pt ROS neg other than HPI    PMH:   Past Medical History:   Diagnosis Date     Alcohol abuse      Alcoholic cirrhosis (H)      Alcoholic peripheral neuropathy (H)      Coagulopathy (H)      Gastritis      History of Clostridium difficile colitis     unknown date     Impairment of cognitive function     MOCA 2/2019 22/30     Leukocytosis 02/2019    persistent leukocytosis across 2/2019 hospitalization without evidence of source across multiple diagnostics including LP, BCx, UCx      Lupus anticoagulant positive      Macrocytic anemia      Moderate protein-calorie malnutrition (H)      Paroxysmal A-fib (H) 02/2019    not on chronic anticoagulation     Peptic ulcer disease      Positive SMILEY (antinuclear antibody)      Subclinical hypothyroidism 02/2019    normal T3, T4     Tobacco dependence     0.5 PPD       PSHx:   No past surgical history on file.    Medications:     No current  facility-administered medications on file prior to encounter.   Current Outpatient Medications on File Prior to Encounter:  enoxaparin (LOVENOX) 60 MG/0.6ML syringe Inject 0.6 mLs (60 mg) Subcutaneous every 12 hours (Patient taking differently: Inject 40 mg Subcutaneous every 12 hours )   pentoxifylline ER (TRENTAL) 400 MG CR tablet Take 2 tablets (800 mg) by mouth 3 times daily (with meals)   Prenatal Vit-Fe Fumarate-FA (PRENATAL MULTIVITAMIN PLUS IRON) 27-0.8 MG TABS per tablet Take 1 tablet by mouth daily   [] acetaminophen (TYLENOL) 325 MG tablet Take 2 tablets (650 mg) by mouth every 8 hours as needed for mild pain   gabapentin (NEURONTIN) 800 MG tablet Take 1 tablet (800 mg) by mouth 3 times daily   [] hydrOXYzine (ATARAX) 10 MG tablet Take 1 tablet (10 mg) by mouth 3 times daily as needed for itching   loratadine (CLARITIN) 10 MG tablet Take 1 tablet (10 mg) by mouth daily   [] melatonin 3 MG tablet Take 1 tablet (3 mg) by mouth nightly as needed for sleep   [] nitroFURantoin macrocrystal-monohydrate (MACROBID) 100 MG capsule Take 1 capsule (100 mg) by mouth 2 times daily for 7 days   nortriptyline (PAMELOR) 10 MG capsule Take 1 capsule (10 mg) by mouth At Bedtime   [] ondansetron (ZOFRAN-ODT) 4 MG ODT tab Take 1 tablet (4 mg) by mouth every 6 hours as needed for nausea or vomiting   [] pantoprazole (PROTONIX) 40 MG EC tablet Take 1 tablet (40 mg) by mouth every morning (before breakfast)   pentoxifylline ER (TRENTAL) 400 MG CR tablet Take 2 tablets (800 mg) by mouth 3 times daily (with meals)   [] sennosides (SENOKOT) 8.6 MG tablet Take 1 tablet by mouth 2 times daily as needed for constipation   [] traMADol (ULTRAM) 50 MG tablet Take 1 tablet (50 mg) by mouth 3 times daily as needed for severe pain   [] vitamin B complex with vitamin C (STRESS TAB) tablet Take 1 tablet by mouth daily   [] vitamin B1 (THIAMINE) 100 MG tablet Take 1 tablet  (100 mg) by mouth daily       Allergies:    Allergies   Allergen Reactions     Bengay Pain Relief [Menthol] Other (See Comments)     Skin turns red and feels extremely hot     Cats      Chocolate Dermatitis     Dicyclomine Other (See Comments)     Severe sedation     Dust Mites      Derm, resp     No Clinical Screening - See Comments      GI upset     Phenobarbital      Pollen Extract      Derm, resp.        Social History:  Hx of heavy alcohol use, smokes tobacco. No drug use.     Physical Exam:   Vitals:    05/29/19 0600 05/29/19 0700 05/29/19 0805 05/29/19 1036   BP: 116/74 114/74 115/74 122/86   BP Location:   Left arm    Pulse: 102 100  124   Resp:   20    Temp:   98.4  F (36.9  C)    TempSrc:   Oral    SpO2: 97% 97% 97% 97%   Weight:          Gen: lying in bed, AND  CV: reg rate, well perfused  Pulm: CTAB, no increased work of breathing  Abd: non-tender, mildly distended  Pelvis: normal appearing external genitalia, vaginal mucosa, cervix. Moderate amount of clotted blood in vaginal vault. Small amount of ongoing bleeding noted from cervical os.  Extremities: non-tender, no erythema; trace edema    Labs:  CBC RESULTS:   Recent Labs   Lab Test 05/29/19  1259 05/29/19  0556   WBC  --  8.0   RBC  --  2.70*   HGB 8.3* 7.8*   HCT  --  22.9*   MCV  --  85   MCH  --  28.9   MCHC  --  34.1   RDW  --  14.2   PLT  --  145*   INR: 1.29  PTT 31  Fibrinogen 255  Hep Xa: <0.10    A&P: Yenifer Maher is 39 year old  who is admitted in the setting stage IV alcoholic liver disease with heavy vaginal bleeding. On exam, it does appear that patient's bleeding is likely improving some. Did remove a moderate amount of clot but ongoing bleeding was quite slow. Given patient's comorbidities, she is not a candidate for TXA or Estrogen. Would recommend use of Provera oral in the short term with transition to IUD for more long term management in clinic. Pelvic US is still pending to rule out structural cause but exam no concerning  for large fibroid uterus. Therefore would recommend initiation of oral provera 20 mg daily. If bleeding failing to improve overnight or tomorrow morning can increase dose to 30-40 mg daily.  Would recommend continuation of oral provera at time of discharge until patient follows up in gynecology clinic for IUD consultation/placement. If a higher dose (ie the 30-40 mg) is needed to gain control of her symptoms, would attempt to decrease back to 20 mg daily at time of discharge if able. If bleeding is stable on the provera medication, do feel it would be reasonable to restart her ppx lovenox but would wait until current level of bleeding is improved if able.     Emilie Abebe PGY4  5/29/2019 1:56 PM     Thank you for this consult.  Please do not hesitate to contact us with concerns or questions.       Staff MD Note    I appreciate the note by Dr. Abebe.  Any necessary changes have been made by me.  I evaluated the patient with the resident and agree with the assessment and plan.    Lexus Sheffield MD

## 2019-05-29 NOTE — PLAN OF CARE
Discharge Planner PT  - 6B - PT evaluation completed, treatment initiated.  Patient plan for discharge: Home with assist.  Current status: Pt completes supine <> sit with SBA. Pt transfers sit <> stand with SBA. Pt ambulates ~100ft with 4WW + CGA. Pt ambulates additional ~100ft and ~200ft with no AD and CGA. Pt ambulates at decreased gait speed, no overt LOB though high level balance deficits noted when ambulating without AD. Pt ascends/descends 3 steps 3x with B HR and CGA. Pt requires occasional seated rest breaks throughout PT session 2/2 fatigue.  Barriers to return to prior living situation: Medical needs, deconditioning, high level balance deficits.  Recommendations for discharge: Home with assist and OP PT.  Rationale for recommendations: Pending continued progress in therapies, anticipate pt would be appropriate to discharge home with assist once medically cleared. Pt would benefit from OP PT for progression of strength, high level balance, and endurance.

## 2019-05-29 NOTE — PROGRESS NOTES
05/29/19 1045   Quick Adds   Type of Visit Initial Occupational Therapy Evaluation   Living Environment   Lives With significant other   Living Arrangements house   Living Environment Comment 3 LB. Split level entry, full flight up/down. Tub shower with sliding doors   Self-Care   Usual Activity Tolerance moderate   Current Activity Tolerance fair   Regular Exercise No   Equipment Currently Used at Home   (shower chair, has walker if needed)   Functional Level   Ambulation 0-->independent   Transferring 0-->independent   Toileting 0-->independent   Bathing 3-->assistive equipment and person   Dressing 0-->independent   Eating 0-->independent   Cognition 1 - attention or memory deficits   Fall history within last six months yes   Number of times patient has fallen within last six months 4   Prior Functional Level Comment Pt was I in most BADLs, but has assist with tub transfers and bathing from SO. Has used walker in the past, but does not use currently. SO sets up her pillbox. She completes some housework and cooking. Just started driving again, but SO can provide.    General Information   Referring Physician Ricki Motley MD   Patient/Family Goals Statement [facility] to [facility].   Additional Occupational Profile Info/Pertinent History of Current Problem 39F alcohol abuse complicated by alcoholic stage 4 liver disease c/b coagulopathy, encephalopathy, and polyneuropathy, positive lupus anticoagulant testing without evidence of antiphospholipid syndrome, gastritis, moderate cognitive impairment and tobacco abuse admitted with bleeding per vagina.   Precautions/Limitations fall precautions   Cognitive Status Examination   Cognitive Comment Mild baseline cognitive impairment that waxes/wanes   Visual Perception   Visual Acuity Wears glasses for distance   Sensory Examination   Sensory Comments Neuropathy in B hands/feet   Transfer Skill: Bed to Chair/Chair to Bed   Level of [place]: Bed to Chair contact  guard   Physical Assist/Nonphysical Assist: Bed to Chair set-up required;supervision;verbal cues;nonverbal cues (demo/gestures);2 persons   Transfer Skill: Sit to Stand   Level of [place]: Sit/Stand contact guard   Physical Assist/Nonphysical Assist: Sit/Stand set-up required;supervision;verbal cues;nonverbal cues (demo/gestures);1 person assist   Grooming   Level of [place]: Grooming contact guard   Physical Assist/Nonphysical Assist: Grooming set-up required;supervision;verbal cues;nonverbal cues (demo/gestures);1 person assist   Clinical Impression   Criteria for Skilled Therapeutic Interventions Met yes, treatment indicated   OT Diagnosis Decreased I in ADLs due to deconditioning   Assessment of Occupational Performance 3-5 Performance Deficits   Identified Performance Deficits cognition, dressing, bathing, toileting, functional endurance   Clinical Decision Making (Complexity) Low complexity   Therapy Frequency 5 times/wk   Predicted Duration of Therapy Intervention (days/wks) 2 weeks   Anticipated Discharge Disposition Home with Assist;Home with Home Therapy   Risks and Benefits of Treatment have been explained. Yes   Patient, Family & other staff in agreement with plan of care Yes   Total Evaluation Time   Total Evaluation Time (Minutes) 10

## 2019-05-29 NOTE — H&P
[facility], [place]    History and Physical -  Service        Date of Admission:  5/28/2019    Assessment & Plan   39F alcohol abuse complicated by alcoholic stage 4 liver disease c/b coagulopathy, encephalopathy, and polyneuropathy, positive lupus anticoagulant testing without evidence of antiphospholipid syndrome, gastritis, moderate cognitive impairment and tobacco abuse admitted with bleeding per vagina.    #acute blood loss anemia due to vaginal bleeding of occult etiology  #stage 4 alcoholic liver disease   Hemodynamically stable. Concern for coagulopathy likely due to liver disease +/-/vs enoxaparin use, primary hematologic coagulopathy, or gynecologic etiology. No prior history of bleeding from vagina and otherwise normal gynecologic history would make this maybe less likely although without adequate exam and evaluation is unable to be ruled out. Lack of significant hypercoagulable history argues against newly developed APLS. Enoxaparin use has been maintained for several months prior to current episode without issue however likely exacerbated current issues. Minimally elevated INR would seem unusual to cause such significant blood loss however likely contributed to current issue to some extent. Will need further exam, imaging, and likely laboratory exam given co-morbidities and risks if issue progresses/persists.   -hematology consult, appreciate recommendations  -gynecology consult, appreciate recommendations  -blood products as needed to goal hemoglobin >7, platelets >50k, INR <1.5, fibrinogen >150k.   -consider additional hematologic workup per hematology   -transvaginal/pelvic ultrasound, consider further work-up per gynecology  -little utility for reversal of enoxaparin given 5 days since last usage per discussion with hematology fellow 5/28/19 PM.   -hold pentoxifylline while actively bleeding     #non-uremic calciphylaxis  -hold pentoxifylline while actively  bleeding  -WOCN consult, appreciate recommendations     #polysubstance abuse  #history of alcohol withdrawal  -alcohol withdrawal protocol   -nicotine replacement therapy  -consider chemical dependency treatment/health psychology given ongoing substance abuse with numerous medicolegal (SHA 2016) complications.     #history of gastritis  -continue omeprazole 20mg PO every day      Diet: Regular Diet Adult    Fluids: hemodynamically stable, not indicated   DVT Prophylaxis: VTE Prophylaxis contraindicated due to active bleeding  Chavez Catheter: not present  Code Status: Full Code      Disposition Plan   Expected discharge: 4 - 7 days, recommended to prior living arrangement once hemoglobin stable.  Entered: Ricki Motley MD 05/29/2019, 2:06 AM       The patient's care was discussed with the Attending Physician, Dr. Moore.    Ricki Motley MD  [facility], [place]  Pager: [phone]  Please see sticky note for cross cover information  ______________________________________________________________________    Chief Complaint   bleeding    History is obtained from the patient  EMR    History of Present Illness   39F alcohol abuse complicated by alcoholic stage 4 liver disease c/b coagulopathy, encephalopathy, and polyneuropathy, positive lupus anticoagulant testing without evidence of antiphospholipid syndrome, gastritis, moderate cognitive impairment and tobacco abuse admitted with bleeding per vagina. Initially presented to OSH found to have hgb of 3.3 for which she received 2 units of PRBC. She reports no prior episodes of vaginal bleeding or edilia/metrorhaggia. She reports onset of menstrual period 16 days after which she has persistently heavy and constant vaginal bleeding for which she contacted her hematologist Dr Jeffery who recommended she discontinue her enoxaparin after which she has noted a decrease in her vaginal blood output. Of note, she was on enoxaparin at  prophylactic (40mg every day) dosing given her history of positive lupus anticoagulant and low protein C with limited mobility due to weakness and her severe neuropathy. She has never been on birth control. She has no family history of coagulopathy or hypercoagulable disorder. Notably, she has no features consistent with antiphospholipid antibody syndrome, that is, she has no history of CVA/TIA, PE, DVT, or miscarriage. She is known to have biopsy proven (2/2019 transjugular liver biopsy) stage 4 alcoholic liver disease and has had coagulopathy resultant from this previously. She notes no other mucosal or other bleeding or skin bruising. She does have gastritis on a prior EGD, report of ascites, and other findings consistent with cirrhosis historically although it is unclear if she has varices.     She does also notes skin lesions attributed to non-uremic calciphylaxis which are manifold in character and progression/regression for which she sees [facility] dermatology Dr Marti who had previously recommended the patient for apixaban which was deferred for unclear reasons 2/2019. She is managed by wound care for these and they are biopsy proven to be most consistent with non uremic calciphylaxis. She had prior evaluation for vasculitis 12/2018 at [facility] without supportive findings for vasculitic etiology.     On exam, she is nontoxic and appears chronically ill. She is tearful which she attributes to anxiety. She is unable to participate neurologic exam given her degree of pain from neuropathy but is grossly appropriate to exam. She is unable to be assessed for asterixis. Her abdomen is distended but non-tender and likely has a fluid wave. She has 4+ lower extremity edema but grossly equal in size lower extremities. The rest of her exam is remarkable for heterotropic skin findings of telangectasias, palmar erythema, and chronic wounds/eschars involving mainly her lower extremities in various stages of disease without active  bleeding. She wishes to defer vaginal exam at this time.     Review of Systems    The 10 point Review of Systems is negative other than noted in the HPI or here.      Past Medical History    I have reviewed this patient's medical history and updated it with pertinent information if needed.   Past Medical History:   Diagnosis Date     Alcohol abuse      Stage 4 alcoholic liver disease       Alcoholic peripheral neuropathy (H)      Coagulopathy (H)      Gastritis      History of Clostridium difficile colitis     unknown date     Impairment of cognitive function     MOCA 2019     Leukocytosis 2019    persistent leukocytosis across 2019 hospitalization without evidence of source across multiple diagnostics including LP, BCx, UCx      Lupus anticoagulant positive      Macrocytic anemia      Moderate protein-calorie malnutrition (H)      Paroxysmal A-fib (H) 2019    not on chronic anticoagulation     Peptic ulcer disease      Positive SMILEY (antinuclear antibody)      Subclinical hypothyroidism 2019    normal T3, T4     Tobacco dependence     0.5 PPD     Past Surgical History   I have reviewed this patient's surgical history and updated it with pertinent information if needed.  History of prior elective      Social History   I have reviewed this patient's social history and updated it with pertinent information if needed. Yenifer ANAIS Maher  reports that she has been smoking.  She has never used smokeless tobacco. She reports that she drinks alcohol. Reported as 2-3 drinks per day with more per day depending on social interactions. Concern for much higher intake. History of ETOH withdrawal, never DTs or seizures.     Family History   I have reviewed this patient's family history and updated it with pertinent information if needed.   No family history on file.      Prior to Admission Medications   Prior to Admission Medications   Prescriptions Last Dose Informant Patient Reported? Taking?   Prenatal  Vit-Fe Fumarate-FA (PRENATAL MULTIVITAMIN PLUS IRON) 27-0.8 MG TABS per tablet 5/27/2019 at Unknown time  Yes Yes   Sig: Take 1 tablet by mouth daily   enoxaparin (LOVENOX) 60 MG/0.6ML syringe Past Week at Unknown time  No Yes   Sig: Inject 0.6 mLs (60 mg) Subcutaneous every 12 hours   Patient taking differently: Inject 40 mg Subcutaneous every 12 hours    gabapentin (NEURONTIN) 800 MG tablet   No No   Sig: Take 1 tablet (800 mg) by mouth 3 times daily   loratadine (CLARITIN) 10 MG tablet   No No   Sig: Take 1 tablet (10 mg) by mouth daily   nortriptyline (PAMELOR) 10 MG capsule   No No   Sig: Take 1 capsule (10 mg) by mouth At Bedtime   pentoxifylline ER (TRENTAL) 400 MG CR tablet   No No   Sig: Take 2 tablets (800 mg) by mouth 3 times daily (with meals)   pentoxifylline ER (TRENTAL) 400 MG CR tablet 5/27/2019 at Unknown time  No Yes   Sig: Take 2 tablets (800 mg) by mouth 3 times daily (with meals)      Facility-Administered Medications: None     Allergies   Allergies   Allergen Reactions     Bengay Pain Relief [Menthol] Other (See Comments)     Skin turns red and feels extremely hot     Cats      Chocolate Dermatitis     Dicyclomine Other (See Comments)     Severe sedation     Dust Mites      Derm, resp     No Clinical Screening - See Comments      GI upset     Phenobarbital      Pollen Extract      Derm, resp.        Physical Exam   Vital Signs: Temp: 98.3  F (36.8  C) Temp src: Oral BP: 133/75 Pulse: 110 Heart Rate: 117 Resp: 20 SpO2: 97 % O2 Device: None (Room air) Oxygen Delivery: 15 LPM(Float RN and MD's at bedside)    Gen: NAD, alert, cooperative, non-toxic  HEENT: EOMI, no scleral icterus  Resp: CTAB, no crackles or wheezes, no increased WOB  Cardiac: RRR, no S3/S4, no M/R/G appreciated  GI: see HPI  Vascular: WWP, +4 pitting edema   Neuro: see HPI  MSK: see HPI  Psych: affect appropriate; mood appropriate   Integumentary: see HPI    Data   Data reviewed today: I reviewed all medications, new labs and  imaging results over the last 24 hours.     Recent Labs   Lab 05/28/19 2233   WBC 10.7   HGB 5.8*   MCV 89      INR 1.29*   *   POTASSIUM 4.1   CHLORIDE 100   CO2 23   BUN 7   CR 0.33*   ANIONGAP 6   MARKO 7.5*   *   ALBUMIN 2.5*   PROTTOTAL 5.5*   BILITOTAL 2.7*   ALKPHOS 195*   ALT 77*   *     Most Recent 3 CBC's:  Recent Labs   Lab Test 05/28/19 2233 02/07/19  0947 01/26/19  1437   WBC 10.7 6.6 6.3   HGB 5.8* 11.1* 11.0*   MCV 89 91 90    304 242     Most Recent 3 BMP's:  Recent Labs   Lab Test 05/28/19 2233 02/07/19  0947 01/26/19  1437   * 137 137   POTASSIUM 4.1 4.4 5.9*   CHLORIDE 100 102 104   CO2 23 25 24   BUN 7 8 7   CR 0.33* 0.47* 0.40*   ANIONGAP 6 10 9   MARKO 7.5* 10.5* 9.8   * 96 87     Most Recent 2 LFT's:  Recent Labs   Lab Test 05/28/19 2233 02/07/19  0947   * 66*   ALT 77* 27   ALKPHOS 195* 128   BILITOTAL 2.7* 1.9*     Most Recent 3 INR's:  Recent Labs   Lab Test 05/28/19 2233 02/07/19  0947 01/26/19  1437   INR 1.29* 1.28* 1.26*     Anticoagulation Dose History     Recent Dosing and Labs Latest Ref Rng & Units 12/11/2018 12/13/2018 12/14/2018 1/26/2019 2/7/2019 5/28/2019    INR 0.86 - 1.14 1.5(H) 1.39(H) 1.35(H) 1.26(H) 1.28(H) 1.29(H)          Most Recent 3 Creatinines:  Recent Labs   Lab Test 05/28/19 2233 02/07/19  0947 01/26/19  1437   CR 0.33* 0.47* 0.40*     Most Recent 3 Hemoglobins:  Recent Labs   Lab Test 05/28/19 2233 02/07/19  0947 01/26/19  1437   HGB 5.8* 11.1* 11.0*     Most Recent 3 Troponin's:No lab results found.  Most Recent 3 BNP's:No lab results found.  Most Recent D-dimer:No lab results found.  Most Recent Cholesterol Panel:No lab results found.  Most Recent 6 Bacteria Isolates From Any Culture (See EPIC Reports for Culture Details):  Recent Labs   Lab Test 01/06/19  2335 01/06/19  2255 01/06/19  2158 12/13/18  0609   CULT >100,000 colonies/mL  Escherichia coli  *  <10,000 colonies/mL  urogenital anthony   No growth No  growth >100,000 colonies/mL  Klebsiella pneumoniae  *     Most Recent TSH and T4:  Recent Labs   Lab Test 05/28/19 2233   TSH 1.96     Most Recent Hemoglobin A1c:No lab results found.  Most Recent 6 glucoses:  Recent Labs   Lab Test 05/28/19 2233 02/07/19  0947 01/26/19  1437 01/06/19 2022 12/19/18  0707 12/18/18  0623   * 96 87 118* 83 101*     Most Recent Urinalysis:  Recent Labs   Lab Test 01/06/19 2335   COLOR Dark Yellow   APPEARANCE Cloudy   URINEGLC Negative   URINEBILI Negative   URINEKETONE Negative   SG 1.029   UBLD Moderate*   URINEPH 5.5   PROTEIN 30*   NITRITE Negative   LEUKEST Large*   RBCU 13*   WBCU 36*     Most Recent ABG:No lab results found.  Most Recent ESR & CRP:  Recent Labs   Lab Test 05/28/19 2233 12/13/18  0833   SED 63* 97*   CRP  --  3.2     Most Recent Anemia Panel:  Recent Labs   Lab Test 05/28/19 2233 02/07/19  0947  12/13/18  0833   WBC 10.7 6.6   < > 6.2   HGB 5.8* 11.1*   < > 10.1*   HCT 17.7* 32.6*   < > 30.6*   MCV 89 91   < > 92    304   < > 246   IRON  --   --   --  68   IRONSAT  --   --   --  17   RETICABSCT 131.5* 100.9*  --  75.5   RETP 6.6* 2.8*  --  2.3*   FEB  --   --   --  393   JUSTO  --  79  --  69   B12  --   --   --  875    < > = values in this interval not displayed.     Most Recent CPK:  Recent Labs   Lab Test 05/28/19 2233   CKT 78     No results found for this or any previous visit (from the past 24 hour(s)).

## 2019-05-29 NOTE — PROGRESS NOTES
D/I: Hgb improved 8.3 Hematology and Gynecology consulted.  Pelvic exam and US completed along with abdominal US. New orders for provera. Vaginal bleeding improving; saturated 1 pad (light pink). Large clots present this morning (team aware); smaller clots this evening. Worked with therapy and ambulated in hallways. Per medicine team, Dr. Soto, glucose checks to be discontinued.     Neuro- a/ox4. Scoring below 8 via CIWA. [place] 0  CV- ST  Pulm- RA; denies SOB  GI- Adequate PO. Small BM x 1  - Adequate UOP  Gtts- Banana bag x 1. Phos infusing. Re-check in AM  Skin- No issues  Pain- denies    *See flow sheets for further interventions and assessments.    A: stable  P: Continue to monitor pt closely. Notify MD of significant changes.

## 2019-05-30 ENCOUNTER — APPOINTMENT (OUTPATIENT)
Dept: OCCUPATIONAL THERAPY | Facility: CLINIC | Age: 39
DRG: 760 | End: 2019-05-30
Attending: PEDIATRICS
Payer: COMMERCIAL

## 2019-05-30 LAB
ALBUMIN SERPL-MCNC: 2.4 G/DL (ref 3.4–5)
ALP SERPL-CCNC: 221 U/L (ref 40–150)
ALT SERPL W P-5'-P-CCNC: 71 U/L (ref 0–50)
ANION GAP SERPL CALCULATED.3IONS-SCNC: 6 MMOL/L (ref 3–14)
AST SERPL W P-5'-P-CCNC: 221 U/L (ref 0–45)
BILIRUB DIRECT SERPL-MCNC: 1.1 MG/DL (ref 0–0.2)
BILIRUB SERPL-MCNC: 1.9 MG/DL (ref 0.2–1.3)
BLD PROD TYP BPU: NORMAL
BLD UNIT ID BPU: 0
BLOOD PRODUCT CODE: NORMAL
BPU ID: NORMAL
BUN SERPL-MCNC: 7 MG/DL (ref 7–30)
CALCIUM SERPL-MCNC: 7.8 MG/DL (ref 8.5–10.1)
CHLORIDE SERPL-SCNC: 102 MMOL/L (ref 94–109)
CO2 SERPL-SCNC: 24 MMOL/L (ref 20–32)
CREAT SERPL-MCNC: 0.43 MG/DL (ref 0.52–1.04)
ERYTHROCYTE [DISTWIDTH] IN BLOOD BY AUTOMATED COUNT: 14.8 % (ref 10–15)
ERYTHROCYTE [DISTWIDTH] IN BLOOD BY AUTOMATED COUNT: 14.9 % (ref 10–15)
FACT X ACT/NOR PPP CHRO: NORMAL % (ref 70–130)
GFR SERPL CREATININE-BSD FRML MDRD: >90 ML/MIN/{1.73_M2}
GLUCOSE BLDC GLUCOMTR-MCNC: 102 MG/DL (ref 70–99)
GLUCOSE SERPL-MCNC: 101 MG/DL (ref 70–99)
HCT VFR BLD AUTO: 22.5 % (ref 35–47)
HCT VFR BLD AUTO: 24 % (ref 35–47)
HGB BLD-MCNC: 6.6 G/DL (ref 11.7–15.7)
HGB BLD-MCNC: 7.1 G/DL (ref 11.7–15.7)
HGB BLD-MCNC: 7.6 G/DL (ref 11.7–15.7)
HGB BLD-MCNC: 7.6 G/DL (ref 11.7–15.7)
INR PPP: 1.36 (ref 0.86–1.14)
LACTATE BLD-SCNC: 1 MMOL/L (ref 0.7–2)
MAGNESIUM SERPL-MCNC: 1.9 MG/DL (ref 1.6–2.3)
MCH RBC QN AUTO: 28.5 PG (ref 26.5–33)
MCH RBC QN AUTO: 28.6 PG (ref 26.5–33)
MCHC RBC AUTO-ENTMCNC: 31.6 G/DL (ref 31.5–36.5)
MCHC RBC AUTO-ENTMCNC: 31.7 G/DL (ref 31.5–36.5)
MCV RBC AUTO: 90 FL (ref 78–100)
MCV RBC AUTO: 91 FL (ref 78–100)
PHOSPHATE SERPL-MCNC: 2.8 MG/DL (ref 2.5–4.5)
PLATELET # BLD AUTO: 208 10E9/L (ref 150–450)
PLATELET # BLD AUTO: 209 10E9/L (ref 150–450)
POTASSIUM SERPL-SCNC: 4 MMOL/L (ref 3.4–5.3)
PROT SERPL-MCNC: 5.5 G/DL (ref 6.8–8.8)
RBC # BLD AUTO: 2.48 10E12/L (ref 3.8–5.2)
RBC # BLD AUTO: 2.67 10E12/L (ref 3.8–5.2)
SODIUM SERPL-SCNC: 132 MMOL/L (ref 133–144)
TRANSFUSION STATUS PATIENT QL: NORMAL
TRANSFUSION STATUS PATIENT QL: NORMAL
WBC # BLD AUTO: 6.8 10E9/L (ref 4–11)
WBC # BLD AUTO: 7.6 10E9/L (ref 4–11)

## 2019-05-30 PROCEDURE — 85018 HEMOGLOBIN: CPT | Performed by: HOSPITALIST

## 2019-05-30 PROCEDURE — 36415 COLL VENOUS BLD VENIPUNCTURE: CPT

## 2019-05-30 PROCEDURE — 12000001 ZZH R&B MED SURG/OB UMMC

## 2019-05-30 PROCEDURE — 25000132 ZZH RX MED GY IP 250 OP 250 PS 637: Performed by: HOSPITALIST

## 2019-05-30 PROCEDURE — 83735 ASSAY OF MAGNESIUM: CPT | Performed by: INTERNAL MEDICINE

## 2019-05-30 PROCEDURE — 83605 ASSAY OF LACTIC ACID: CPT | Performed by: INTERNAL MEDICINE

## 2019-05-30 PROCEDURE — 00000146 ZZHCL STATISTIC GLUCOSE BY METER IP

## 2019-05-30 PROCEDURE — 99233 SBSQ HOSP IP/OBS HIGH 50: CPT | Mod: GC | Performed by: INTERNAL MEDICINE

## 2019-05-30 PROCEDURE — 36415 COLL VENOUS BLD VENIPUNCTURE: CPT | Performed by: INTERNAL MEDICINE

## 2019-05-30 PROCEDURE — 25000132 ZZH RX MED GY IP 250 OP 250 PS 637: Performed by: SURGERY

## 2019-05-30 PROCEDURE — 80076 HEPATIC FUNCTION PANEL: CPT | Performed by: HOSPITALIST

## 2019-05-30 PROCEDURE — 85260 CLOT FACTOR X STUART-POWER: CPT | Performed by: INTERNAL MEDICINE

## 2019-05-30 PROCEDURE — 40000257 ZZH STATISTIC CONSULT NO CHARGE VASC ACCESS

## 2019-05-30 PROCEDURE — 97535 SELF CARE MNGMENT TRAINING: CPT | Mod: GO

## 2019-05-30 PROCEDURE — 80048 BASIC METABOLIC PNL TOTAL CA: CPT | Performed by: HOSPITALIST

## 2019-05-30 PROCEDURE — 85027 COMPLETE CBC AUTOMATED: CPT | Performed by: SURGERY

## 2019-05-30 PROCEDURE — 36415 COLL VENOUS BLD VENIPUNCTURE: CPT | Performed by: HOSPITALIST

## 2019-05-30 PROCEDURE — 83735 ASSAY OF MAGNESIUM: CPT | Performed by: HOSPITALIST

## 2019-05-30 PROCEDURE — 85027 COMPLETE CBC AUTOMATED: CPT | Performed by: HOSPITALIST

## 2019-05-30 PROCEDURE — 85610 PROTHROMBIN TIME: CPT | Performed by: HOSPITALIST

## 2019-05-30 PROCEDURE — P9016 RBC LEUKOCYTES REDUCED: HCPCS | Performed by: SURGERY

## 2019-05-30 RX ORDER — NORTRIPTYLINE HCL 10 MG
10 CAPSULE ORAL AT BEDTIME
Status: DISCONTINUED | OUTPATIENT
Start: 2019-05-30 | End: 2019-06-06 | Stop reason: HOSPADM

## 2019-05-30 RX ORDER — SIMETHICONE 80 MG
80 TABLET,CHEWABLE ORAL EVERY 6 HOURS PRN
Status: DISCONTINUED | OUTPATIENT
Start: 2019-05-30 | End: 2019-05-30

## 2019-05-30 RX ORDER — PREGABALIN 50 MG/1
50 CAPSULE ORAL 3 TIMES DAILY
Status: DISCONTINUED | OUTPATIENT
Start: 2019-05-30 | End: 2019-06-06 | Stop reason: HOSPADM

## 2019-05-30 RX ORDER — SIMETHICONE 80 MG
80 TABLET,CHEWABLE ORAL EVERY 6 HOURS PRN
Status: DISCONTINUED | OUTPATIENT
Start: 2019-05-30 | End: 2019-06-05

## 2019-05-30 RX ADMIN — FOLIC ACID 1 MG: 1 TABLET ORAL at 09:23

## 2019-05-30 RX ADMIN — NICOTINE 1 PATCH: 7 PATCH TRANSDERMAL at 09:23

## 2019-05-30 RX ADMIN — SIMETHICONE CHEW TAB 80 MG 80 MG: 80 TABLET ORAL at 20:46

## 2019-05-30 RX ADMIN — MEDROXYPROGESTERONE ACETATE 20 MG: 10 TABLET ORAL at 09:23

## 2019-05-30 RX ADMIN — MULTIPLE VITAMINS W/ MINERALS TAB 1 TABLET: TAB at 09:21

## 2019-05-30 RX ADMIN — NORTRIPTYLINE HYDROCHLORIDE 10 MG: 10 CAPSULE ORAL at 22:35

## 2019-05-30 RX ADMIN — PREGABALIN 50 MG: 50 CAPSULE ORAL at 20:09

## 2019-05-30 RX ADMIN — Medication 100 MG: at 09:23

## 2019-05-30 RX ADMIN — RIFAXIMIN 550 MG: 550 TABLET ORAL at 09:21

## 2019-05-30 RX ADMIN — OMEPRAZOLE 20 MG: 20 CAPSULE, DELAYED RELEASE ORAL at 09:23

## 2019-05-30 ASSESSMENT — ACTIVITIES OF DAILY LIVING (ADL)
ADLS_ACUITY_SCORE: 17

## 2019-05-30 ASSESSMENT — PAIN DESCRIPTION - DESCRIPTORS: DESCRIPTORS: ACHING;CRAMPING

## 2019-05-30 NOTE — PROVIDER NOTIFICATION
-------------------CRITICAL LAB VALUE-------------------    Lab Value: Hgb 6.6   Time of notification: 12:48 PM  MD notified: Mandy Main   Patient status: VSS. Resting in bed. Vaginal bleeding slowed   Temp:  [98.5  F (36.9  C)-99.3  F (37.4  C)] 98.9  F (37.2  C)  Heart Rate:  [106-125] 120  Resp:  [18-22] 18  BP: (101-126)/(57-76) 126/75  SpO2:  [93 %-99 %] 93 %  Orders received:  1 unit of blood ordered.

## 2019-05-30 NOTE — CONSULTS
Transplant Social Work Services Progress Note      Data: This is to acknowledge the social work consult for discharge planning, which was written for transplant social work, if  is listed for a liver transplant. She is not.   Intervention: Chart review.   Assessment:  is not currently being evaluated for a liver transplant nor is she listed. Please contact the medicine  for assistance with disposition planning.   Plan:    Discharge Plans in Progress: Unknown.     Barriers to d/c plan: Medical status.     Follow up Plan: No follow up by this writer, unless her potential transplant status changes. The medicine  will be available to follow.     SOPHIA Antonio, LICSW

## 2019-05-30 NOTE — PLAN OF CARE
PT Cancel: Pt with low hemoglobin value of 6.6 upon check in today, receiving products this afternoon. Will rescehdule

## 2019-05-30 NOTE — PLAN OF CARE
Transfer  Transferred to: 5B  Via:Wheelchair  Reason for transfer:Pt no longer appropriate for 6B- improved patient condition  Family: Aware of transfer  Belongings: Packed and sent with pt  Chart: Delivered with pt to next unit  Medications: Meds sent to new unit with pt  Report given to: MADDIE RN  Pt status: Pt tolerated the one unit of blood, vitals are stable except tachycardic and anxious. -130's provider aware. Pt needs Hgb recheck this evening.

## 2019-05-30 NOTE — PLAN OF CARE
Discharge Planner OT   Patient plan for discharge: Home  Current status: SBA/independent for functional transfers and bed mobility. SBA for in-room mobility without assistive device, no LOB. SBA toilet transfer, LB dressing, and hand hygiene. Completed MOCA for assessment of cognition, pt scoring 29/30 with normal score being 26+/30 indicating cognition WNL.  Barriers to return to prior living situation: deconditioning, medical status, impaired ADLs  Recommendations for discharge: Home with assist as needed  Rationale for recommendations: Pt demonstrates safety and independence with basic ADLs, functional transfers, and mobility, anticipate pt will be safe to discharge home with assist as needed.        Entered by: Fiona Mora 05/30/2019 11:15 AM

## 2019-05-30 NOTE — PLAN OF CARE
Neuro: A&Ox4. Forgetful at times. Baseline numbness/tingling to hands and feet.   Cardiac: ST, HR 100s-130s. SBP 100s-110s.            Respiratory: Sating 95% on RA, denies SOB.   GI/: Adequate urine output, Bm x1. Vaginal bleeding improving, serosang/light red drainage with minimal clots. No N/V.   Diet/appetite: Tolerating regular diet.   Activity:  1 person to ambulate with patient in room.  Pain: At acceptable level on current regimen. Intermittent abd cramping.   Skin: No new deficits noted.  LDA's: 2 PIVs saline locked.      Plan: Continue with POC. Notify primary team with changes

## 2019-05-30 NOTE — PROGRESS NOTES
[facility]    Progress Note - Sundar 5 Service        Date of Admission:  5/28/2019    Assessment & Plan   Yenifer Maher is a 39 year old female with a past medical history of alcoholic hepatitis, alcohol abuse and non-uremic calciphylaxis (on lovenox) that presents with vaginal bleeding.     # acute blood loss anemia 2/2 vaginal bleeding  # non-uremic calciphylaxis  Presented with a one month history of vaginal bleeding in the setting of prophylactic lovenox for calciphylaxis.  Hgb 3.3 on presentation to outside hospital.  Pelvic ultrasound without acute findings.    - gynecology consulted   - outpatient follow up with placement of IUD   - oncology consulted   - trend hgb Q6H   - transfusion history:    pRBC's: 2u (@ OSH), 2u (5/29), 1u (5/30)   - provera 20 mg daily (uptitrate if needed)   - holding PTA pentoxifylline     # alcoholic hepatitis   Hx of alcoholic hepatitis in the past.  No prior diagnosis of cirrhosis. LFT's elevated on presentation w/  and ALT of 77.  Abdominal US findings concerning for cirrhosis with superimposed hepatic steatosis.    - Gastroenterology referral at discharge    # alcohol use disorder  - thiamine 100 mg daily   - folate 1 mg daily   - CIWA protocol (d/c in AM if CIWA scores remain low)   - declined chemical dependency consult at this time     # polyneuropathy - continue PTA lyrica 50 mg TID, nortriptyline 10 mg at bedtime     Diet: Vegetarian Diet    Fluids: None  Lines:  PIV  DVT Prophylaxis: Pneumatic Compression Devices  Chavez Catheter: not present  Code Status: Full Code      Disposition Plan   Expected discharge: 2 - 3 days, recommended to prior living arrangement once bleeding controlled.  Entered: Mandy Marti MD 05/30/2019, 4:18 PM     The patient's care was discussed with the Attending Physician, Dr. Soto.    MD Sundar Minor 5 Service  [facility]  Pager: [phone]  Please see  sticky note for cross cover information  ______________________________________________________________________    Interval History   Continued to pass blood clots this morning.  Also, with cramping abdominal discomfort.  Concerned about use of progesterone as she is interested in having children in the future.  Discussed ongoing alcohol use and liver injury.  Declined chemical dependency.     4 - point ROS otherwise negative.      Physical Exam   Vital Signs: Temp: 98.1  F (36.7  C) Temp src: Axillary BP: 114/79   Heart Rate: 129 Resp: 24 SpO2: 97 % O2 Device: None (Room air)    Weight: 147 lbs 14.86 oz   General: laying in bed, no acute distress  HEENT: nc/at, mmm  CV: tachycardic, regular  Pulmonary: CTAB, normal respiratory effort on room air  Abdomen: soft, non-tender, non-distended    Data   Recent Labs   Lab 05/30/19  1224 05/30/19  0410 05/30/19  0031  05/29/19  0556 05/28/19  2233   WBC  --  6.8 7.6  --  8.0 10.7   HGB 6.6* 7.1* 7.6*   < > 7.8* 5.8*   MCV  --  91 90  --  85 89   PLT  --  209 208  --  145* 242   INR  --  1.36*  --   --   --  1.29*   NA  --  132*  --   --   --  130*   POTASSIUM  --  4.0  --   --   --  4.1   CHLORIDE  --  102  --   --   --  100   CO2  --  24  --   --   --  23   BUN  --  7  --   --   --  7   CR  --  0.43*  --   --   --  0.33*   ANIONGAP  --  6  --   --   --  6   MARKO  --  7.8*  --   --   --  7.5*   GLC  --  101*  --   --   --  104*   ALBUMIN  --  2.4*  --   --   --  2.5*   PROTTOTAL  --  5.5*  --   --   --  5.5*   BILITOTAL  --  1.9*  --   --   --  2.7*   ALKPHOS  --  221*  --   --   --  195*   ALT  --  71*  --   --   --  77*   AST  --  221*  --   --   --  289*    < > = values in this interval not displayed.     No results found for this or any previous visit (from the past 24 hour(s)).

## 2019-05-30 NOTE — PLAN OF CARE
/72   Pulse 124   Temp 98.5  F (36.9  C)   Resp 16   Wt 67.1 kg (147 lb 14.9 oz)   SpO2 93%   BMI 23.16 kg/m      Neuro: A&Ox4.   Cardiac: ST. VSS.   Respiratory: Sating 98% on RA.  GI/: Adequate urine output. Pink/red vaginal bleeding, Large clots passing this morning.   Diet/appetite: Tolerating Regular diet.   Activity:  Assist of 1-SBA, up to chair and in halls.  Pain: At acceptable level on current regimen.   Skin: No new deficits noted.  LDA's: PIV x1- infusing 1 unit RBCs (Hgb 6.6)     Plan: Continue with POC. Notify primary team with changes. Transfer orders in to 5A/B.

## 2019-05-31 ENCOUNTER — APPOINTMENT (OUTPATIENT)
Dept: PHYSICAL THERAPY | Facility: CLINIC | Age: 39
DRG: 760 | End: 2019-05-31
Attending: PEDIATRICS
Payer: COMMERCIAL

## 2019-05-31 LAB
ABO + RH BLD: NORMAL
ABO + RH BLD: NORMAL
ALBUMIN SERPL-MCNC: 2.2 G/DL (ref 3.4–5)
ALP SERPL-CCNC: 192 U/L (ref 40–150)
ALT SERPL W P-5'-P-CCNC: 56 U/L (ref 0–50)
ANION GAP SERPL CALCULATED.3IONS-SCNC: 7 MMOL/L (ref 3–14)
AST SERPL W P-5'-P-CCNC: 164 U/L (ref 0–45)
BILIRUB DIRECT SERPL-MCNC: 0.8 MG/DL (ref 0–0.2)
BILIRUB SERPL-MCNC: 1.6 MG/DL (ref 0.2–1.3)
BLD GP AB SCN SERPL QL: NORMAL
BLD PROD TYP BPU: NORMAL
BLD PROD TYP BPU: NORMAL
BLD UNIT ID BPU: 0
BLOOD BANK CMNT PATIENT-IMP: NORMAL
BLOOD PRODUCT CODE: NORMAL
BPU ID: NORMAL
BUN SERPL-MCNC: 6 MG/DL (ref 7–30)
CALCIUM SERPL-MCNC: 7.9 MG/DL (ref 8.5–10.1)
CHLORIDE SERPL-SCNC: 104 MMOL/L (ref 94–109)
CO2 SERPL-SCNC: 22 MMOL/L (ref 20–32)
CREAT SERPL-MCNC: 0.43 MG/DL (ref 0.52–1.04)
ERYTHROCYTE [DISTWIDTH] IN BLOOD BY AUTOMATED COUNT: 15 % (ref 10–15)
FACT X ACT/NOR PPP CHRO: 63 % (ref 70–130)
GFR SERPL CREATININE-BSD FRML MDRD: >90 ML/MIN/{1.73_M2}
GLUCOSE BLDC GLUCOMTR-MCNC: 122 MG/DL (ref 70–99)
GLUCOSE SERPL-MCNC: 97 MG/DL (ref 70–99)
HCT VFR BLD AUTO: 21 % (ref 35–47)
HGB BLD-MCNC: 6.6 G/DL (ref 11.7–15.7)
HGB BLD-MCNC: 7.4 G/DL (ref 11.7–15.7)
HGB BLD-MCNC: 7.8 G/DL (ref 11.7–15.7)
INR PPP: 1.31 (ref 0.86–1.14)
MCH RBC QN AUTO: 28.3 PG (ref 26.5–33)
MCHC RBC AUTO-ENTMCNC: 31.4 G/DL (ref 31.5–36.5)
MCV RBC AUTO: 90 FL (ref 78–100)
NUM BPU REQUESTED: 4
PLATELET # BLD AUTO: 210 10E9/L (ref 150–450)
POTASSIUM SERPL-SCNC: 4 MMOL/L (ref 3.4–5.3)
PROT SERPL-MCNC: 5 G/DL (ref 6.8–8.8)
RBC # BLD AUTO: 2.33 10E12/L (ref 3.8–5.2)
SODIUM SERPL-SCNC: 132 MMOL/L (ref 133–144)
SPECIMEN EXP DATE BLD: NORMAL
TRANSFUSION STATUS PATIENT QL: NORMAL
TRANSFUSION STATUS PATIENT QL: NORMAL
WBC # BLD AUTO: 7.2 10E9/L (ref 4–11)

## 2019-05-31 PROCEDURE — 12000001 ZZH R&B MED SURG/OB UMMC

## 2019-05-31 PROCEDURE — 25000132 ZZH RX MED GY IP 250 OP 250 PS 637: Performed by: HOSPITALIST

## 2019-05-31 PROCEDURE — 97530 THERAPEUTIC ACTIVITIES: CPT | Mod: GP

## 2019-05-31 PROCEDURE — 85610 PROTHROMBIN TIME: CPT | Performed by: HOSPITALIST

## 2019-05-31 PROCEDURE — 99232 SBSQ HOSP IP/OBS MODERATE 35: CPT | Mod: GC | Performed by: INTERNAL MEDICINE

## 2019-05-31 PROCEDURE — 85027 COMPLETE CBC AUTOMATED: CPT | Performed by: HOSPITALIST

## 2019-05-31 PROCEDURE — 99233 SBSQ HOSP IP/OBS HIGH 50: CPT | Mod: GC | Performed by: INTERNAL MEDICINE

## 2019-05-31 PROCEDURE — 80076 HEPATIC FUNCTION PANEL: CPT | Performed by: HOSPITALIST

## 2019-05-31 PROCEDURE — 97110 THERAPEUTIC EXERCISES: CPT | Mod: GP

## 2019-05-31 PROCEDURE — 25000131 ZZH RX MED GY IP 250 OP 636 PS 637: Performed by: SURGERY

## 2019-05-31 PROCEDURE — 25000132 ZZH RX MED GY IP 250 OP 250 PS 637: Performed by: OBSTETRICS & GYNECOLOGY

## 2019-05-31 PROCEDURE — 25000132 ZZH RX MED GY IP 250 OP 250 PS 637: Performed by: SURGERY

## 2019-05-31 PROCEDURE — 85018 HEMOGLOBIN: CPT | Performed by: HOSPITALIST

## 2019-05-31 PROCEDURE — 00000146 ZZHCL STATISTIC GLUCOSE BY METER IP

## 2019-05-31 PROCEDURE — P9016 RBC LEUKOCYTES REDUCED: HCPCS | Performed by: SURGERY

## 2019-05-31 PROCEDURE — 80048 BASIC METABOLIC PNL TOTAL CA: CPT | Performed by: HOSPITALIST

## 2019-05-31 PROCEDURE — 36415 COLL VENOUS BLD VENIPUNCTURE: CPT | Performed by: HOSPITALIST

## 2019-05-31 RX ORDER — MEDROXYPROGESTERONE ACETATE 10 MG
20 TABLET ORAL DAILY
Status: DISCONTINUED | OUTPATIENT
Start: 2019-05-31 | End: 2019-06-04

## 2019-05-31 RX ORDER — MEDROXYPROGESTERONE ACETATE 10 MG
20 TABLET ORAL DAILY
Status: DISCONTINUED | OUTPATIENT
Start: 2019-05-31 | End: 2019-05-31

## 2019-05-31 RX ORDER — MEDROXYPROGESTERONE ACETATE 10 MG
30 TABLET ORAL DAILY
Status: DISCONTINUED | OUTPATIENT
Start: 2019-05-31 | End: 2019-05-31

## 2019-05-31 RX ORDER — MEDROXYPROGESTERONE ACETATE 10 MG
10 TABLET ORAL EVERY EVENING
Status: DISCONTINUED | OUTPATIENT
Start: 2019-05-31 | End: 2019-05-31

## 2019-05-31 RX ORDER — ESTRADIOL 0.1 MG/D
1 FILM, EXTENDED RELEASE TRANSDERMAL
Status: DISCONTINUED | OUTPATIENT
Start: 2019-05-31 | End: 2019-06-06 | Stop reason: HOSPADM

## 2019-05-31 RX ADMIN — ESTRADIOL 1 PATCH: 0.1 PATCH TRANSDERMAL at 12:12

## 2019-05-31 RX ADMIN — FOLIC ACID 1 MG: 1 TABLET ORAL at 08:11

## 2019-05-31 RX ADMIN — PREGABALIN 50 MG: 50 CAPSULE ORAL at 19:59

## 2019-05-31 RX ADMIN — ONDANSETRON 4 MG: 4 TABLET, ORALLY DISINTEGRATING ORAL at 02:45

## 2019-05-31 RX ADMIN — SIMETHICONE CHEW TAB 80 MG 80 MG: 80 TABLET ORAL at 08:26

## 2019-05-31 RX ADMIN — Medication 100 MG: at 08:11

## 2019-05-31 RX ADMIN — SIMETHICONE CHEW TAB 80 MG 80 MG: 80 TABLET ORAL at 19:59

## 2019-05-31 RX ADMIN — OMEPRAZOLE 20 MG: 20 CAPSULE, DELAYED RELEASE ORAL at 08:11

## 2019-05-31 RX ADMIN — NICOTINE 1 PATCH: 7 PATCH TRANSDERMAL at 08:12

## 2019-05-31 RX ADMIN — SIMETHICONE CHEW TAB 80 MG 80 MG: 80 TABLET ORAL at 14:01

## 2019-05-31 RX ADMIN — POTASSIUM CHLORIDE 20 MEQ: 750 TABLET, EXTENDED RELEASE ORAL at 08:11

## 2019-05-31 RX ADMIN — MEDROXYPROGESTERONE ACETATE 20 MG: 10 TABLET ORAL at 11:01

## 2019-05-31 RX ADMIN — PREGABALIN 50 MG: 50 CAPSULE ORAL at 14:01

## 2019-05-31 RX ADMIN — PREGABALIN 50 MG: 50 CAPSULE ORAL at 08:12

## 2019-05-31 RX ADMIN — NORTRIPTYLINE HYDROCHLORIDE 10 MG: 10 CAPSULE ORAL at 21:29

## 2019-05-31 RX ADMIN — MULTIPLE VITAMINS W/ MINERALS TAB 1 TABLET: TAB at 08:11

## 2019-05-31 RX ADMIN — SIMETHICONE CHEW TAB 80 MG 80 MG: 80 TABLET ORAL at 02:49

## 2019-05-31 ASSESSMENT — ACTIVITIES OF DAILY LIVING (ADL)
ADLS_ACUITY_SCORE: 16
ADLS_ACUITY_SCORE: 17
ADLS_ACUITY_SCORE: 16
ADLS_ACUITY_SCORE: 16
ADLS_ACUITY_SCORE: 17
ADLS_ACUITY_SCORE: 17

## 2019-05-31 NOTE — PROGRESS NOTES
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     # acute blood loss anemia 2/2 vaginal bleeding  # non-uremic calciphylaxis  Presented with a one month history of vaginal bleeding in the setting of prophylactic lovenox for calciphylaxis.  Hgb 3.3 on presentation to outside hospital.  Pelvic ultrasound without acute findings.  No improvement with initiation of progesterone.  Started on estradiol 5/31.   - gynecology consulted  - oncology consulted   - trend hgb Q8H   - provera 20 mg daily   - estradiol patch 0.1 mg daily  - plan to add tranexamic acid 1,300 BID if patient continues to have bleeding this evening  - holding PTA pentoxifylline   - transfusion history:    pRBC's: 2u (@ OSH), 2u (5/29), 1u (5/30), 1u 5/31    # alcoholic hepatitis   Hx of alcoholic hepatitis in the past.  No prior diagnosis of cirrhosis. LFT's elevated on presentation w/  and ALT of 77.  Abdominal US findings concerning for cirrhosis with superimposed hepatic steatosis.    - Gastroenterology referral at discharge    # alcohol use disorder  - thiamine 100 mg daily   - folate 1 mg daily   - CIWA protocol (d/c in AM if CIWA scores remain low)   - declined chemical dependency consult at this time     # polyneuropathy - continue PTA lyrica 50 mg TID, nortriptyline 10 mg at bedtime     Diet: Vegetarian Diet    Fluids: None  Lines:  PIV  DVT Prophylaxis: Pneumatic Compression Devices  Chavez Catheter: not present  Code Status: Full Code      Disposition Plan   Expected discharge: 2 - 3 days, recommended to prior living arrangement once bleeding controlled.  Entered: Mandy Marti MD 05/31/2019, 3:13 PM     The patient's care was discussed with the Attending  Physician, Dr. Soto.    MD Sundar Minor 5 Service  [facility], [place]  Pager: [phone]  Please see sticky note for cross cover information  ______________________________________________________________________    Interval History   Bleeding persisted overnight.  Also, having gas pain and bloating.     4 - point ROS otherwise negative.      Physical Exam   Vital Signs: Temp: 98.9  F (37.2  C) Temp src: Oral BP: 118/70 Pulse: 113 Heart Rate: 117 Resp: 16 SpO2: 98 % O2 Device: None (Room air)    Weight: 147 lbs 14.86 oz   General: laying in bed, no acute distress  HEENT: nc/at, mmm  CV: tachycardic, regular  Pulmonary: CTAB, normal respiratory effort on room air  Abdomen: soft, mild diffuse tenderness, no guarding    Data   Recent Labs   Lab 05/31/19  1341 05/31/19  0520 05/30/19  2042  05/30/19  0410 05/30/19  0031  05/28/19  2233   WBC  --  7.2  --   --  6.8 7.6   < > 10.7   HGB 7.4* 6.6* 7.6*   < > 7.1* 7.6*   < > 5.8*   MCV  --  90  --   --  91 90   < > 89   PLT  --  210  --   --  209 208   < > 242   INR  --  1.31*  --   --  1.36*  --   --  1.29*   NA  --  132*  --   --  132*  --   --  130*   POTASSIUM  --  4.0  --   --  4.0  --   --  4.1   CHLORIDE  --  104  --   --  102  --   --  100   CO2  --  22  --   --  24  --   --  23   BUN  --  6*  --   --  7  --   --  7   CR  --  0.43*  --   --  0.43*  --   --  0.33*   ANIONGAP  --  7  --   --  6  --   --  6   MARKO  --  7.9*  --   --  7.8*  --   --  7.5*   GLC  --  97  --   --  101*  --   --  104*   ALBUMIN  --  2.2*  --   --  2.4*  --   --  2.5*   PROTTOTAL  --  5.0*  --   --  5.5*  --   --  5.5*   BILITOTAL  --  1.6*  --   --  1.9*  --   --  2.7*   ALKPHOS  --  192*  --   --  221*  --   --  195*   ALT  --  56*  --   --  71*  --   --  77*   AST  --  164*  --   --  221*  --   --  289*    < > = values in this interval not displayed.     No results found for this or any previous visit (from the past 24 hour(s)).

## 2019-05-31 NOTE — PLAN OF CARE
REceived patient from  via bed at 1900. Came in with bCC of Dizziness, lightheadedness and Vaginal bleeding. With PMHx of alcohol hepatitis, alc abuse and calciphylaxis. Made comfortable in bed. Orientation to room, call light and staff done. Assist of 1 to SBA with ambulation. Ate 100% of supper with fair appetite.She was tearful after failed blood draw attempt due to bilateral upper ext neuropathy. Will use US guided blood draw tonight for special coagulation level. Hgb level recheck resulted to 7.6 from 6.6. Had received 1 Unit of PRBC prior to transfer. Resting in bed as of this time. No other calls made.  P: Continue to monitor for excessive vaginal bleeding and notify MD promptly.

## 2019-05-31 NOTE — PLAN OF CARE
OT 5B: Cancel this AM - pt with Hgb 6.6 and awaiting transfusion, will check back in the PM if appropriate.

## 2019-05-31 NOTE — CONSULTS
Progress Note/Ongoing Consult Note from Gynecology    S: states was up all night due to continued/increased vaginal bleeding, loud roommate, and new onset (2 days) severe abdominal distension and pain.   O: Temp: 99.1  F (37.3  C) Temp src: Oral BP: 116/64 Pulse: 127 Heart Rate: 115 Resp: 16 SpO2: 99 % O2 Device: None (Room air)     Exam:  Constitutional: alert and mild distress  Gastrointestinal: positive findings: distended, soft, tympanitic, diffusely tender  : Deferred  Musculoskeletal: extremities normal- no gross deformities noted, gait normal and normal muscle tone  Skin: no suspicious lesions or rashes  Neurologic: Gait normal. Reflexes normal and symmetric. Sensation grossly WNL.  Psychiatric: mentation appears normal and affect normal/bright    Reviewed CXR and Pelvic Ultrasound with patient.   Hemoglobin   Date Value Ref Range Status   05/31/2019 6.6 (LL) 11.7 - 15.7 g/dL Final     Comment:     This result has been called to DANIELE PINA RN by Alize Quiroz on   05 31 2019 at 0608, and has been read back.      05/30/2019 7.6 (L) 11.7 - 15.7 g/dL Final     A/P:   40yo  who desires future fertility in setting Stage IV alcoholic liver disease and coagulopathy with ongoing menorrhagia not responsive to Provera (progesterone) therapy.     Upon admission endometrial stripe 2.6mm (very thin); progesterone has the effect of further thinning the endometrium and given menorrhagia can result from too thick or too thin endometrium this intervention may NOT be helping.     Patient has developed severe gas pain and bloating likely due to the high dose progesterone.     Options from gynecology include:   1. Could we add estrogen? Oral or transdermal? This may stabilize the endometrium.  2. Could she use TXA (tranexemic acid)?   3. Dilation and Curettage is unlikely to be helpful in the setting of thin endometrium  4. Hysterectomy is currently not desired by the patient    IMPORTANT:  Even though the  current progesterone intervention may not be working it is imperative to know stopping it abruptly will temporarily worsen vaginal bleeding (withdrawal bleed). If estrogen can be added we would recommmend tapering the progesterone to a maintenance dose or transitioning to a combination therapy such as a birth control pill.     All of this information was discussed with the patient and Mandy Marti.     Thank you for this consult and do not hesitate to contact us.    Leah Fagan MD, FACOG  [phone]      After discussion between Dr. Marti and heme-onc we will proceed with estradiol patch (0.1mg) and add TXA oral (1300mg BID) tonight if still bleeding. Continue Provera 20mg daily until bleeding controlled then gyn can recommend a maintenance/taper dose.     Leah Fagan

## 2019-05-31 NOTE — PROGRESS NOTES
Hematology progress note.  S: continued vaginal bleeding despite progesterone. No more syncope. Anxious about starting hormonal therapy due to a concern of DVT risk.     O:   /70 (BP Location: Left arm)   Pulse 113   Temp 98.9  F (37.2  C) (Oral)   Resp 16   Wt 67.1 kg (147 lb 14.9 oz)   SpO2 98%   BMI 23.16 kg/m    Wt Readings from Last 3 Encounters:   05/30/19 67.1 kg (147 lb 14.9 oz)   01/26/19 63 kg (139 lb)   01/06/19 65.8 kg (145 lb)     General:  no acute distress, NAD  HEENT: NCAT. anicteric sclera.   Neck: Neck supple.   Respiratory: Non-labored breathing, lungs clear to auscultation bilaterally.  Cardiovascular: Regular rate and rhythm. No murmur or rub.   Abdomen: Normoactive bowel sounds. Abdomen soft, non-distended, and non-tender.   Skin: pigment changes from healing left medial thigh  Extremities: grossly normal, non-tender, no edema. Good strength and ROM.    Lab Results   Component Value Date    HGB 7.4 (L) 05/31/2019    HGB 6.6 (LL) 05/31/2019    HGB 7.6 (L) 05/30/2019    HGB 6.6 (LL) 05/30/2019    HGB 7.1 (L) 05/30/2019     Assessment and plans:  # Acute blood loss anemia 2/2 to vaginal bleeding  # Non-uremic calciphylaxis diagnosed December 2018.  # Polyneuropathy 2/2 nutritional deficiency    Yenifer Maher is a 39 year old female with a history significant for  Stage 4 liver disease secondary to alcohol abuse c/b encephalopathy, coagulapathy, positive lupus anticoagulant testing without evidence of antiphospholipid syndrome, and cognitive impairment who presented on 5/28/2019 with vaginal bleeding.     She has been on progesterone for past 48 hrs but does not work well. She continue to require transfusions. Primary and Gyn asked us about options oral estrogen VS amicar.    First, she has ongoing severe symptomatic anemia from vaginal bleeding to require multiple transfusions. Thus, she needs more interventions to achieve hemostasis. It is not a question to add something to stop  bleeding (patient agreed).    Second, both oral estrogen and amicar increased the risk of clotting. Which one is greater risk, there is no evidence - risk of long term oral contraceptives or estrogen is well studied but I do not know there is any study to look into comparing both or combined treatment VS oral estrogen alone, particularly in a short term.     Third, her previous indication for anticoagulation was increased risk of DVT (immobilization from neuropathy + calciphylaxis or LA positivity). There is not enough data calciphylaxis is a strong risk factor or not. In absence of arterial/venous clot or miscarriage hx, the significance of LA is unknown. Limited mobility in the hospital is a risk for DVT.     Finally, she agreed with taking estrogen patch.     Summary of Recommendations:  - Agree with use estrogen patch short term per gyn recommendation  - When bleeding resolves start aspirin instead of lovenox. Patient will follow up with Dr. Jeffery outpatient for restarting lovenox.    The patient was seen and care plans discussed with Dr. Hdez.    Se tasha Quarles MD  [facility]  Hematology oncology and transplantation fellow  Pager [phone]

## 2019-05-31 NOTE — PLAN OF CARE
A:  patient states feels bloated and gassy.  Sipping on gingerale and taking simethicone when available with relief.  Zofran given times one.  Up to bathroom independently.  States frequent trips to bathroom with  loose stools and passing large blood clots vaginally.  Did note one clot at beginning of shift size of half baseball.  Hemoglobin 6.6 this morning and new orders received.  Patient updated need to receive blood today.  R:  continue to monitor and treat per plan of care.

## 2019-05-31 NOTE — PLAN OF CARE
Discharge Planner PT 5B  Patient plan for discharge: home  Current status: pt declining OOB activity upon attempt due to not feeling well. Reports minimal activity out of bed the last few days as well. Reviewed supine/seated LE exercises to perform when not tolerating any OOB activity and to complete outside of therapy.   Barriers to return to prior living situation: medical status  Recommendations for discharge: home with assist, OP PT  Rationale for recommendations: pt will benefit from ongoing therapy to improve strength and functional mobility.        Entered by: Norberto Montejo 05/31/2019 3:55 PM

## 2019-05-31 NOTE — PLAN OF CARE
/70 (BP Location: Left arm)   Pulse 113   Temp 98.9  F (37.2  C) (Oral)   Resp 16   Wt 67.1 kg (147 lb 14.9 oz)   SpO2 98%   BMI 23.16 kg/m      Time: 5653-6766.   Reason for admission: Vaginal bleeding.   VS: VSS on RA with O2 sats in high 90s. Tmax 99.1, decreased to 98.7 this afternoon with no interventions.   Activity: Up with SBA, steady on feet. Calls appropriately.   Neuros: A&Ox4. Anxious at times. Forgetful, BUE tremors. C/o lower back pain, declined interventions.   Cardiac: Tachycardia with HR in 110-120s. Denies chest pain.   Respiratory: WDL. LS clear. Denies SOB.   GI/: Voiding without difficulty, not saving. Vaginal bleeding copious amounts, pt reports clots, going through multiple pads this shift. Loose BMs, stool softeners held. Abdomen distended, pt reports feeling bloated. +BS< +gas. Denies nausea or vomiting. C/o gas pain managed with PRN PO Simethicone.   Diet: Vegetarian diet, tolerating well. BG stable this morning at 97.   Skin: Nicotine patch on right deltoid. Estradiol on left deltoid.   Lines: Right PIV SL'd.   Labs: Hgb low at 6.6, received one unit of PRBCs, recheck this afternoon Hgb 7.4. K+ low at 4.0, replaced, recheck tomorrow morning.   New changes this shift/Plan: Estradiol patch added today. Received one unit of RBCs.   Will continue to monitor & follow POC.

## 2019-06-01 LAB
ABO + RH BLD: NORMAL
ABO + RH BLD: NORMAL
ALBUMIN SERPL-MCNC: 2 G/DL (ref 3.4–5)
ALP SERPL-CCNC: 192 U/L (ref 40–150)
ALT SERPL W P-5'-P-CCNC: 52 U/L (ref 0–50)
ANION GAP SERPL CALCULATED.3IONS-SCNC: 6 MMOL/L (ref 3–14)
AST SERPL W P-5'-P-CCNC: 150 U/L (ref 0–45)
BILIRUB DIRECT SERPL-MCNC: 0.8 MG/DL (ref 0–0.2)
BILIRUB SERPL-MCNC: 2 MG/DL (ref 0.2–1.3)
BLD GP AB SCN SERPL QL: NORMAL
BLD PROD TYP BPU: NORMAL
BLD PROD TYP BPU: NORMAL
BLD UNIT ID BPU: 0
BLOOD BANK CMNT PATIENT-IMP: NORMAL
BLOOD PRODUCT CODE: NORMAL
BPU ID: NORMAL
BUN SERPL-MCNC: 7 MG/DL (ref 7–30)
CALCIUM SERPL-MCNC: 8 MG/DL (ref 8.5–10.1)
CHLORIDE SERPL-SCNC: 105 MMOL/L (ref 94–109)
CO2 SERPL-SCNC: 22 MMOL/L (ref 20–32)
CREAT SERPL-MCNC: 0.43 MG/DL (ref 0.52–1.04)
ERYTHROCYTE [DISTWIDTH] IN BLOOD BY AUTOMATED COUNT: 15.2 % (ref 10–15)
GFR SERPL CREATININE-BSD FRML MDRD: >90 ML/MIN/{1.73_M2}
GLUCOSE SERPL-MCNC: 95 MG/DL (ref 70–99)
HCT VFR BLD AUTO: 22.7 % (ref 35–47)
HGB BLD-MCNC: 6.8 G/DL (ref 11.7–15.7)
HGB BLD-MCNC: 7.2 G/DL (ref 11.7–15.7)
HGB BLD-MCNC: 8.2 G/DL (ref 11.7–15.7)
INR PPP: 1.25 (ref 0.86–1.14)
MAGNESIUM SERPL-MCNC: 2.1 MG/DL (ref 1.6–2.3)
MCH RBC QN AUTO: 28.5 PG (ref 26.5–33)
MCHC RBC AUTO-ENTMCNC: 31.7 G/DL (ref 31.5–36.5)
MCV RBC AUTO: 90 FL (ref 78–100)
NUM BPU REQUESTED: 1
PHOSPHATE SERPL-MCNC: 3.2 MG/DL (ref 2.5–4.5)
PLATELET # BLD AUTO: 254 10E9/L (ref 150–450)
POTASSIUM SERPL-SCNC: 4 MMOL/L (ref 3.4–5.3)
PROT SERPL-MCNC: 5.2 G/DL (ref 6.8–8.8)
RBC # BLD AUTO: 2.53 10E12/L (ref 3.8–5.2)
SODIUM SERPL-SCNC: 132 MMOL/L (ref 133–144)
SPECIMEN EXP DATE BLD: NORMAL
TRANSFUSION STATUS PATIENT QL: NORMAL
TRANSFUSION STATUS PATIENT QL: NORMAL
WBC # BLD AUTO: 6.7 10E9/L (ref 4–11)

## 2019-06-01 PROCEDURE — 25000132 ZZH RX MED GY IP 250 OP 250 PS 637: Performed by: SURGERY

## 2019-06-01 PROCEDURE — P9016 RBC LEUKOCYTES REDUCED: HCPCS | Performed by: INTERNAL MEDICINE

## 2019-06-01 PROCEDURE — 86901 BLOOD TYPING SEROLOGIC RH(D): CPT | Performed by: INTERNAL MEDICINE

## 2019-06-01 PROCEDURE — 85018 HEMOGLOBIN: CPT | Performed by: SURGERY

## 2019-06-01 PROCEDURE — 86923 COMPATIBILITY TEST ELECTRIC: CPT | Performed by: INTERNAL MEDICINE

## 2019-06-01 PROCEDURE — 80048 BASIC METABOLIC PNL TOTAL CA: CPT | Performed by: HOSPITALIST

## 2019-06-01 PROCEDURE — 86900 BLOOD TYPING SEROLOGIC ABO: CPT | Performed by: INTERNAL MEDICINE

## 2019-06-01 PROCEDURE — 84100 ASSAY OF PHOSPHORUS: CPT | Performed by: HOSPITALIST

## 2019-06-01 PROCEDURE — 83735 ASSAY OF MAGNESIUM: CPT | Performed by: HOSPITALIST

## 2019-06-01 PROCEDURE — 12000001 ZZH R&B MED SURG/OB UMMC

## 2019-06-01 PROCEDURE — 99233 SBSQ HOSP IP/OBS HIGH 50: CPT | Mod: GC | Performed by: INTERNAL MEDICINE

## 2019-06-01 PROCEDURE — 86850 RBC ANTIBODY SCREEN: CPT | Performed by: INTERNAL MEDICINE

## 2019-06-01 PROCEDURE — 25000132 ZZH RX MED GY IP 250 OP 250 PS 637: Performed by: HOSPITALIST

## 2019-06-01 PROCEDURE — 83735 ASSAY OF MAGNESIUM: CPT | Performed by: INTERNAL MEDICINE

## 2019-06-01 PROCEDURE — 36415 COLL VENOUS BLD VENIPUNCTURE: CPT | Performed by: HOSPITALIST

## 2019-06-01 PROCEDURE — 36415 COLL VENOUS BLD VENIPUNCTURE: CPT | Performed by: SURGERY

## 2019-06-01 PROCEDURE — 85027 COMPLETE CBC AUTOMATED: CPT | Performed by: HOSPITALIST

## 2019-06-01 PROCEDURE — 85018 HEMOGLOBIN: CPT | Performed by: HOSPITALIST

## 2019-06-01 PROCEDURE — 85610 PROTHROMBIN TIME: CPT | Performed by: HOSPITALIST

## 2019-06-01 PROCEDURE — 25000132 ZZH RX MED GY IP 250 OP 250 PS 637: Performed by: STUDENT IN AN ORGANIZED HEALTH CARE EDUCATION/TRAINING PROGRAM

## 2019-06-01 PROCEDURE — 80076 HEPATIC FUNCTION PANEL: CPT | Performed by: HOSPITALIST

## 2019-06-01 RX ORDER — TRANEXAMIC ACID 650 MG/1
1300 TABLET ORAL 2 TIMES DAILY
Status: DISCONTINUED | OUTPATIENT
Start: 2019-06-01 | End: 2019-06-04

## 2019-06-01 RX ADMIN — Medication 25 MG: at 23:31

## 2019-06-01 RX ADMIN — MEDROXYPROGESTERONE ACETATE 20 MG: 10 TABLET ORAL at 08:08

## 2019-06-01 RX ADMIN — SENNOSIDES AND DOCUSATE SODIUM 2 TABLET: 8.6; 5 TABLET ORAL at 20:45

## 2019-06-01 RX ADMIN — SIMETHICONE CHEW TAB 80 MG 80 MG: 80 TABLET ORAL at 04:29

## 2019-06-01 RX ADMIN — PREGABALIN 50 MG: 50 CAPSULE ORAL at 20:45

## 2019-06-01 RX ADMIN — Medication 100 MG: at 08:09

## 2019-06-01 RX ADMIN — OMEPRAZOLE 20 MG: 20 CAPSULE, DELAYED RELEASE ORAL at 08:09

## 2019-06-01 RX ADMIN — Medication 1 TABLET: at 20:45

## 2019-06-01 RX ADMIN — PREGABALIN 50 MG: 50 CAPSULE ORAL at 13:57

## 2019-06-01 RX ADMIN — NICOTINE 1 PATCH: 7 PATCH TRANSDERMAL at 08:09

## 2019-06-01 RX ADMIN — FOLIC ACID 1 MG: 1 TABLET ORAL at 08:09

## 2019-06-01 RX ADMIN — NORTRIPTYLINE HYDROCHLORIDE 10 MG: 10 CAPSULE ORAL at 21:22

## 2019-06-01 RX ADMIN — MAGNESIUM HYDROXIDE 30 ML: 400 SUSPENSION ORAL at 14:12

## 2019-06-01 RX ADMIN — PREGABALIN 50 MG: 50 CAPSULE ORAL at 08:09

## 2019-06-01 RX ADMIN — SENNOSIDES AND DOCUSATE SODIUM 2 TABLET: 8.6; 5 TABLET ORAL at 13:57

## 2019-06-01 RX ADMIN — TRANEXAMIC ACID 1300 MG: 650 TABLET ORAL at 17:52

## 2019-06-01 RX ADMIN — TRANEXAMIC ACID 1300 MG: 650 TABLET ORAL at 05:50

## 2019-06-01 RX ADMIN — POTASSIUM CHLORIDE 20 MEQ: 750 TABLET, EXTENDED RELEASE ORAL at 14:13

## 2019-06-01 RX ADMIN — SENNOSIDES AND DOCUSATE SODIUM 2 TABLET: 8.6; 5 TABLET ORAL at 04:29

## 2019-06-01 RX ADMIN — MULTIPLE VITAMINS W/ MINERALS TAB 1 TABLET: TAB at 08:09

## 2019-06-01 RX ADMIN — SIMETHICONE CHEW TAB 80 MG 80 MG: 80 TABLET ORAL at 16:17

## 2019-06-01 ASSESSMENT — ACTIVITIES OF DAILY LIVING (ADL)
ADLS_ACUITY_SCORE: 14
ADLS_ACUITY_SCORE: 14
ADLS_ACUITY_SCORE: 16
ADLS_ACUITY_SCORE: 16
ADLS_ACUITY_SCORE: 14
ADLS_ACUITY_SCORE: 14

## 2019-06-01 NOTE — PLAN OF CARE
Heart rate 111-115, other vitals stable.  Alert and oriented x4.  Continues to have menses with large clots.  Hgb was 7.2 this am and 6.8 by 12 noon.  Blood transfusion started as ordered.  Patient tolerating regular diet.  She complains of abdominal fullness and constipation.  Senna tablets given for constipation.  Plan to monitor Hgb closely.

## 2019-06-01 NOTE — PROGRESS NOTES
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     # acute blood loss anemia 2/2 vaginal bleeding  # non-uremic calciphylaxis  Presented with a one month history of vaginal bleeding in the setting of prophylactic lovenox for calciphylaxis.  Hgb 3.3 on presentation to outside hospital.  Pelvic ultrasound without acute findings.  No improvement with initiation of progesterone and estradiol 5/31.  TXA added 6/1.    - gynecology consulted  - oncology consulted   - trend hgb Q8H   - provera 20 mg daily   - estradiol patch 0.1 mg daily  - tranexamic acid 1,300 BID  - holding PTA pentoxifylline   - transfusion history:    pRBC's: 2u (@ OSH), 2u (5/29), 1u (5/30), 1u 5/31, 1u 6/1    # alcoholic hepatitis   Hx of alcoholic hepatitis in the past.  No prior diagnosis of cirrhosis. LFT's elevated on presentation w/  and ALT of 77.  Abdominal US findings concerning for cirrhosis with superimposed hepatic steatosis.    - Gastroenterology referral at discharge    # alcohol use disorder  # alcohol withdrawal  Required treatment for alcohol withdrawal over the first 24 hours of admission.  Now stable and CIWA discontinued.  Declined chemical dependency consultation.  Discussed importance of alcohol cessation over the course of the admission.   - thiamine 100 mg daily   - folate 1 mg daily     # polyneuropathy - continue PTA lyrica 50 mg TID, nortriptyline 10 mg at bedtime     Diet: Vegetarian Diet    Fluids: None  Lines:  PIV  DVT Prophylaxis: Pneumatic Compression Devices  Chavez Catheter: not present  Code Status: Full Code      Disposition Plan   Expected discharge: 2 - 3 days, recommended to prior living arrangement once bleeding  controlled.  Entered: Mandy Marti MD 06/01/2019, 1:01 PM     The patient's care was discussed with the Attending Physician, Dr. Soto.    Mandy Marti MD  [address], [place]  Pager: [phone]  Please see sticky note for cross cover information  ______________________________________________________________________    Interval History   No change in bleeding with initiation of estradiol yesterday.  TXA started this morning.  Continues to have ongoing abdominal bloating.      4 - point ROS otherwise negative.      Physical Exam   Vital Signs: Temp: 97.9  F (36.6  C) Temp src: Oral BP: 114/60 Pulse: 115 Heart Rate: 100 Resp: 16 SpO2: 100 % O2 Device: None (Room air)    Weight: 152 lbs 12.8 oz   General: laying in bed, no acute distress  HEENT: nc/at, mmm  CV: tachycardic, regular  Pulmonary: CTAB, normal respiratory effort on room air  Abdomen: soft, mild distension, tympanic, non-tender    Data   Recent Labs   Lab 06/01/19  1143 06/01/19  0502 05/31/19  2240  05/31/19  0520  05/30/19  0410   WBC  --  6.7  --   --  7.2  --  6.8   HGB 6.8* 7.2* 7.8*   < > 6.6*   < > 7.1*   MCV  --  90  --   --  90  --  91   PLT  --  254  --   --  210  --  209   INR  --  1.25*  --   --  1.31*  --  1.36*   NA  --  132*  --   --  132*  --  132*   POTASSIUM  --  4.0  --   --  4.0  --  4.0   CHLORIDE  --  105  --   --  104  --  102   CO2  --  22  --   --  22  --  24   BUN  --  7  --   --  6*  --  7   CR  --  0.43*  --   --  0.43*  --  0.43*   ANIONGAP  --  6  --   --  7  --  6   MARKO  --  8.0*  --   --  7.9*  --  7.8*   GLC  --  95  --   --  97  --  101*   ALBUMIN  --  2.0*  --   --  2.2*  --  2.4*   PROTTOTAL  --  5.2*  --   --  5.0*  --  5.5*   BILITOTAL  --  2.0*  --   --  1.6*  --  1.9*   ALKPHOS  --  192*  --   --  192*  --  221*   ALT  --  52*  --   --  56*  --  71*   AST  --  150*  --   --  164*  --  221*    < > = values in this interval not displayed.     No results found for this or any  previous visit (from the past 24 hour(s)).

## 2019-06-01 NOTE — PROGRESS NOTES
Care from 15:00-19:00     /72 (BP Location: Left arm)   Pulse 109   Temp 98.2  F (36.8  C) (Oral)   Resp 16   Wt 69.3 kg (152 lb 12.8 oz)   SpO2 97%   BMI 23.93 kg/m      Pt alert, oriented x 4. VSS ex tachycardia. Pt finished 1 unit of PRBC with no signs of transfusion reaction. Will continue to monitor. Pt has c/o worsening abdominal discomfort related to gas and bloating. Rec'd orders for GI cocktail, awaiting medication from pharmacy. Pt eating slowly and has been up out of bed x 1 to go to the bathroom. Pt is independent with ambulation and most self-care. Continue plan of care.

## 2019-06-01 NOTE — PLAN OF CARE
Patient condition improving. Patient alert and able to engage longer conversation compared to yesterday. Denies pain and breathing comfortably in room air. Ate 100% of supper with good appetite. She, however, complained of abdominal fullness due to gas and requested for simethicone which offered good relief. Reported she had changed 3 soaked sanitary pads this shift. Estradiol  and nicotine patch in place. Resting in bed, no other calls made.  P: Continue to monitor for Vaginal bleed, trend Hgb levels.

## 2019-06-01 NOTE — PLAN OF CARE
Pt A&Ox4. VSS. Up independently. Voiding adequately. Pt c/o feeling bloated relieved minimally w/ PRN senna and mylicon. PIV intact. Pt reports large clots and a saturated pad x4 from 4027-6488 (4 pads reported saturated from 3487-8926 by previous nurse). Pt reports increase in size and frequency of clots. MD notified and tranexamic acid tablet given. Continue to monitor blood flow.     Continue with plan of care.

## 2019-06-02 ENCOUNTER — APPOINTMENT (OUTPATIENT)
Dept: OCCUPATIONAL THERAPY | Facility: CLINIC | Age: 39
DRG: 760 | End: 2019-06-02
Attending: PEDIATRICS
Payer: COMMERCIAL

## 2019-06-02 LAB
ALBUMIN SERPL-MCNC: 2.2 G/DL (ref 3.4–5)
ALP SERPL-CCNC: 186 U/L (ref 40–150)
ALT SERPL W P-5'-P-CCNC: 55 U/L (ref 0–50)
ANION GAP SERPL CALCULATED.3IONS-SCNC: 8 MMOL/L (ref 3–14)
AST SERPL W P-5'-P-CCNC: 144 U/L (ref 0–45)
BILIRUB DIRECT SERPL-MCNC: 0.9 MG/DL (ref 0–0.2)
BILIRUB SERPL-MCNC: 2.3 MG/DL (ref 0.2–1.3)
BUN SERPL-MCNC: 6 MG/DL (ref 7–30)
CALCIUM SERPL-MCNC: 8 MG/DL (ref 8.5–10.1)
CHLORIDE SERPL-SCNC: 103 MMOL/L (ref 94–109)
CO2 SERPL-SCNC: 22 MMOL/L (ref 20–32)
CREAT SERPL-MCNC: 0.42 MG/DL (ref 0.52–1.04)
ERYTHROCYTE [DISTWIDTH] IN BLOOD BY AUTOMATED COUNT: 15 % (ref 10–15)
ERYTHROCYTE [DISTWIDTH] IN BLOOD BY AUTOMATED COUNT: 15.4 % (ref 10–15)
GFR SERPL CREATININE-BSD FRML MDRD: >90 ML/MIN/{1.73_M2}
GLUCOSE SERPL-MCNC: 70 MG/DL (ref 70–99)
HCT VFR BLD AUTO: 22.2 % (ref 35–47)
HCT VFR BLD AUTO: 27.7 % (ref 35–47)
HGB BLD-MCNC: 7.3 G/DL (ref 11.7–15.7)
HGB BLD-MCNC: 7.7 G/DL (ref 11.7–15.7)
HGB BLD-MCNC: 8.6 G/DL (ref 11.7–15.7)
MCH RBC QN AUTO: 28.7 PG (ref 26.5–33)
MCH RBC QN AUTO: 28.9 PG (ref 26.5–33)
MCHC RBC AUTO-ENTMCNC: 31 G/DL (ref 31.5–36.5)
MCHC RBC AUTO-ENTMCNC: 32.9 G/DL (ref 31.5–36.5)
MCV RBC AUTO: 88 FL (ref 78–100)
MCV RBC AUTO: 92 FL (ref 78–100)
PLATELET # BLD AUTO: 261 10E9/L (ref 150–450)
PLATELET # BLD AUTO: 308 10E9/L (ref 150–450)
POTASSIUM SERPL-SCNC: 3.8 MMOL/L (ref 3.4–5.3)
PROT SERPL-MCNC: 5.8 G/DL (ref 6.8–8.8)
RBC # BLD AUTO: 2.53 10E12/L (ref 3.8–5.2)
RBC # BLD AUTO: 3 10E12/L (ref 3.8–5.2)
SODIUM SERPL-SCNC: 134 MMOL/L (ref 133–144)
WBC # BLD AUTO: 7.3 10E9/L (ref 4–11)
WBC # BLD AUTO: 8.6 10E9/L (ref 4–11)

## 2019-06-02 PROCEDURE — 85018 HEMOGLOBIN: CPT | Performed by: INTERNAL MEDICINE

## 2019-06-02 PROCEDURE — 85027 COMPLETE CBC AUTOMATED: CPT | Performed by: SURGERY

## 2019-06-02 PROCEDURE — 25000132 ZZH RX MED GY IP 250 OP 250 PS 637: Performed by: SURGERY

## 2019-06-02 PROCEDURE — 25000132 ZZH RX MED GY IP 250 OP 250 PS 637: Performed by: HOSPITALIST

## 2019-06-02 PROCEDURE — 40000556 ZZH STATISTIC PERIPHERAL IV START W US GUIDANCE

## 2019-06-02 PROCEDURE — 97530 THERAPEUTIC ACTIVITIES: CPT | Mod: GO

## 2019-06-02 PROCEDURE — 97110 THERAPEUTIC EXERCISES: CPT | Mod: GO

## 2019-06-02 PROCEDURE — 25000132 ZZH RX MED GY IP 250 OP 250 PS 637: Performed by: STUDENT IN AN ORGANIZED HEALTH CARE EDUCATION/TRAINING PROGRAM

## 2019-06-02 PROCEDURE — 80048 BASIC METABOLIC PNL TOTAL CA: CPT | Performed by: HOSPITALIST

## 2019-06-02 PROCEDURE — 36415 COLL VENOUS BLD VENIPUNCTURE: CPT | Performed by: HOSPITALIST

## 2019-06-02 PROCEDURE — 12000001 ZZH R&B MED SURG/OB UMMC

## 2019-06-02 PROCEDURE — 80076 HEPATIC FUNCTION PANEL: CPT | Performed by: HOSPITALIST

## 2019-06-02 PROCEDURE — 36415 COLL VENOUS BLD VENIPUNCTURE: CPT | Performed by: INTERNAL MEDICINE

## 2019-06-02 PROCEDURE — 99233 SBSQ HOSP IP/OBS HIGH 50: CPT | Performed by: INTERNAL MEDICINE

## 2019-06-02 PROCEDURE — 85027 COMPLETE CBC AUTOMATED: CPT | Performed by: HOSPITALIST

## 2019-06-02 PROCEDURE — 97535 SELF CARE MNGMENT TRAINING: CPT | Mod: GO

## 2019-06-02 RX ORDER — BISACODYL 5 MG
10 TABLET, DELAYED RELEASE (ENTERIC COATED) ORAL DAILY PRN
Status: DISCONTINUED | OUTPATIENT
Start: 2019-06-02 | End: 2019-06-06 | Stop reason: HOSPADM

## 2019-06-02 RX ORDER — HYDROXYZINE HYDROCHLORIDE 10 MG/1
10 TABLET, FILM COATED ORAL ONCE
Status: COMPLETED | OUTPATIENT
Start: 2019-06-02 | End: 2019-06-02

## 2019-06-02 RX ORDER — OXYCODONE HYDROCHLORIDE 5 MG/1
5 TABLET ORAL ONCE
Status: COMPLETED | OUTPATIENT
Start: 2019-06-02 | End: 2019-06-02

## 2019-06-02 RX ORDER — LIDOCAINE 4 G/G
2 PATCH TOPICAL
Status: DISCONTINUED | OUTPATIENT
Start: 2019-06-02 | End: 2019-06-06 | Stop reason: HOSPADM

## 2019-06-02 RX ADMIN — SIMETHICONE CHEW TAB 80 MG 80 MG: 80 TABLET ORAL at 20:10

## 2019-06-02 RX ADMIN — Medication 1 TABLET: at 16:05

## 2019-06-02 RX ADMIN — NICOTINE 1 PATCH: 7 PATCH TRANSDERMAL at 08:38

## 2019-06-02 RX ADMIN — FOLIC ACID 1 MG: 1 TABLET ORAL at 08:39

## 2019-06-02 RX ADMIN — PREGABALIN 50 MG: 50 CAPSULE ORAL at 14:18

## 2019-06-02 RX ADMIN — SIMETHICONE CHEW TAB 80 MG 80 MG: 80 TABLET ORAL at 14:20

## 2019-06-02 RX ADMIN — TRANEXAMIC ACID 1300 MG: 650 TABLET ORAL at 17:35

## 2019-06-02 RX ADMIN — HYDROXYZINE HYDROCHLORIDE 10 MG: 10 TABLET ORAL at 14:18

## 2019-06-02 RX ADMIN — PREGABALIN 50 MG: 50 CAPSULE ORAL at 20:09

## 2019-06-02 RX ADMIN — SIMETHICONE CHEW TAB 80 MG 80 MG: 80 TABLET ORAL at 08:39

## 2019-06-02 RX ADMIN — NORTRIPTYLINE HYDROCHLORIDE 10 MG: 10 CAPSULE ORAL at 22:17

## 2019-06-02 RX ADMIN — LIDOCAINE 2 PATCH: 560 PATCH PERCUTANEOUS; TOPICAL; TRANSDERMAL at 20:11

## 2019-06-02 RX ADMIN — MEDROXYPROGESTERONE ACETATE 20 MG: 10 TABLET ORAL at 08:39

## 2019-06-02 RX ADMIN — PREGABALIN 50 MG: 50 CAPSULE ORAL at 08:39

## 2019-06-02 RX ADMIN — TRANEXAMIC ACID 1300 MG: 650 TABLET ORAL at 06:32

## 2019-06-02 RX ADMIN — BISACODYL 10 MG: 5 TABLET, COATED ORAL at 02:14

## 2019-06-02 RX ADMIN — OMEPRAZOLE 20 MG: 20 CAPSULE, DELAYED RELEASE ORAL at 08:39

## 2019-06-02 RX ADMIN — POTASSIUM CHLORIDE 20 MEQ: 750 TABLET, EXTENDED RELEASE ORAL at 16:05

## 2019-06-02 RX ADMIN — Medication 1 TABLET: at 08:38

## 2019-06-02 RX ADMIN — OXYCODONE HYDROCHLORIDE 5 MG: 5 TABLET ORAL at 02:15

## 2019-06-02 RX ADMIN — MULTIPLE VITAMINS W/ MINERALS TAB 1 TABLET: TAB at 08:39

## 2019-06-02 RX ADMIN — Medication 100 MG: at 08:39

## 2019-06-02 RX ADMIN — SIMETHICONE CHEW TAB 80 MG 80 MG: 80 TABLET ORAL at 02:15

## 2019-06-02 ASSESSMENT — ACTIVITIES OF DAILY LIVING (ADL)
ADLS_ACUITY_SCORE: 14

## 2019-06-02 NOTE — PLAN OF CARE
Discharge Planner OT   Patient plan for discharge: not discussed   Current status: Pt declining ambulation OOR due to bleeding/toilet urgency. Pt educated on importance of ambulating within room and provided with standing UE/LE exercises to reduce risk of DVT/PE and prevent deconditioning impacting ADL I. Pt observed ambulating in room and completed toileting task independently. Encouraged pt to sit up in chair to reduce low back pain.   Barriers to return to prior living situation: medical status, at risk for deconditioning   Recommendations for discharge: home with assist   Rationale for recommendations: anticipate home with assist when medically stable, pt to benefit from continued OT intervention to address deconditioning leading to decreased tolerance for ADLs/IADLs.        Entered by: Salina Cantu 06/02/2019 2:06 PM

## 2019-06-02 NOTE — PLAN OF CARE
Pt A&Ox4. VSS. Up independently. Voiding adequately. Pt c/o feeling bloated relieved minimally w/ PRN senna and mylicon. PIV clotted and to be replaced. Pt reports large clots and a saturated pad x4 from 0467-7163. Pt reports q30 minutes the need to go to the bathroom due to loss of silver dollar sized clot. Continue to monitor blood flow and Hgb levels. Provider notified of increased pain level and 1x doses of tramadol and oxycodone were tried. Pt reports constipation and bloating so PRN stool softeners, gus setlzer, and simethicone are available.      Continue with plan of care.

## 2019-06-02 NOTE — PROGRESS NOTES
Care from 15:00 - 19:00   /69 (BP Location: Left arm)   Pulse 106   Temp 98.5  F (36.9  C) (Oral)   Resp 16   Wt 69.2 kg (152 lb 8.9 oz)   SpO2 98%   BMI 23.89 kg/m       Pt alert, oriented x 4. VSS on room air. Hgb recheck 7.7, down from 8.4 earlier today. Another CBC is ordered for 21:00. Potassium replaced per orders. Pt reports no blood-soaked pads during this period but at least one clot passed. Bloating/gas pain managed with simethicone and gus seltzer tabs. No other acute changes and few requests. Pt enjoyed visit from family member and is eating her dinner slowly.

## 2019-06-02 NOTE — PLAN OF CARE
VSS, tachy. A&Ox4. Up ad marcelo in room. Reporting abdominal discomfort; PRN simethicone x2. Patient reported 4 saturated pads this shift. Hgb check 8.6. Fair appetite. Last BM today. Will report to oncoming RN.

## 2019-06-02 NOTE — PROGRESS NOTES
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     acute blood loss anemia 2/2 vaginal bleeding    No improvement with initiation of progesterone and estradiol 5/31.  TXA added 6/1.  Hgb stable on last 2 checks despite continued clots (possible that endometrium has stabilized with estrogen administration and she is passing clots from within uterine cavity and/or TXA is doing its job), but she also appears a bit hemoconcentrated on labs today. Encouraged walks, even short ones, to reduce risk of DVT  - gynecology consulted  - hematology consulted   - trend hgb Q12H today, given slight stability  - provera 20 mg daily; will discuss with gyn on 6/3 whether to decrease dose vs switch to combined contraceptive   - estradiol patch 0.1 mg daily  - continue tranexamic acid 1,300 BID; if bleeding worsens, increase to TID  - holding PTA pentoxifylline   - transfusion history:    pRBC's: 2u (@ OSH), 2u (5/29), 1u (5/30), 1u 5/31, 1u 6/1    H/o alcoholic hepatitis, with ultrasound now concerning for cirrhosis: Abdominal US findings also with superimposed hepatic steatosis.    - Gastroenterology referral at discharge    Alcohol use disorder: Declined chemical dependency consultation.  Discussed importance of alcohol cessation over the course of the admission.   - thiamine 100 mg daily   - folate 1 mg daily     H/o polyneuropathy - continue PTA pregabalin 50 mg TID, nortriptyline 10 mg at bedtime     Diet: Vegetarian Diet    DVT Prophylaxis: VTE Prophylaxis contraindicated due to ongoing bleeding  Chavez Catheter: not present  Code Status: Full Code      Disposition Plan   Expected discharge: 2 - 3 days, recommended to prior  living arrangement once hemoglobin stable.  Entered: Dale Soto MD 06/02/2019, 12:18 PM       The patient's care was discussed with the Patient.    Dale Soto MD  Hospitalist Service, [address], [place]  Pager: [phone]  Please see sticky note for cross cover information  ______________________________________________________________________    Interval History   Still passing frequent clots--more clots, less free flowing blood today. No lightheadedness, no CP. + abdominal distension so decreased appetite. Now having loose stools.  No SOB. Up with to bathroom a lot overnight so slept poorly    Data reviewed today: I reviewed all medications, new labs and imaging results over the last 24 hours. I personally reviewed no images or EKG's today.    Physical Exam   Vital Signs: Temp: 96.1  F (35.6  C) Temp src: Oral BP: 125/70 Pulse: 109 Heart Rate: 100 Resp: 16 SpO2: 100 % O2 Device: None (Room air)    Weight: 152 lbs 8.93 oz  General appearance: in no apparent distress.  Neck: supple  CV: regular rate and rhythm and normal S1 S2, no murmur  Resp: clear to ausculation bilaterally, normal respiratory effort  Abd: + BS, soft, NT, distended, tympanic no masses   Extr: WWP, trace edema  Skin: warm and dry      Data   Recent Labs   Lab 06/02/19  0626 06/01/19  1913 06/01/19  1143 06/01/19  0502  05/31/19  0520  05/30/19  0410   WBC 8.6  --   --  6.7  --  7.2  --  6.8   HGB 8.6* 8.2* 6.8* 7.2*   < > 6.6*   < > 7.1*   MCV 92  --   --  90  --  90  --  91     --   --  254  --  210  --  209   INR  --   --   --  1.25*  --  1.31*  --  1.36*     --   --  132*  --  132*  --  132*   POTASSIUM 3.8  --   --  4.0  --  4.0  --  4.0   CHLORIDE 103  --   --  105  --  104  --  102   CO2 22  --   --  22  --  22  --  24   BUN 6*  --   --  7  --  6*  --  7   CR 0.42*  --   --  0.43*  --  0.43*  --  0.43*   ANIONGAP 8  --   --  6  --  7  --  6   MARKO 8.0*  --   --  8.0*  --  7.9*  --  7.8*   GLC  70  --   --  95  --  97  --  101*   ALBUMIN 2.2*  --   --  2.0*  --  2.2*  --  2.4*   PROTTOTAL 5.8*  --   --  5.2*  --  5.0*  --  5.5*   BILITOTAL 2.3*  --   --  2.0*  --  1.6*  --  1.9*   ALKPHOS 186*  --   --  192*  --  192*  --  221*   ALT 55*  --   --  52*  --  56*  --  71*   *  --   --  150*  --  164*  --  221*    < > = values in this interval not displayed.

## 2019-06-03 ENCOUNTER — APPOINTMENT (OUTPATIENT)
Dept: PHYSICAL THERAPY | Facility: CLINIC | Age: 39
DRG: 760 | End: 2019-06-03
Attending: PEDIATRICS
Payer: COMMERCIAL

## 2019-06-03 ENCOUNTER — APPOINTMENT (OUTPATIENT)
Dept: OCCUPATIONAL THERAPY | Facility: CLINIC | Age: 39
DRG: 760 | End: 2019-06-03
Attending: PEDIATRICS
Payer: COMMERCIAL

## 2019-06-03 ENCOUNTER — APPOINTMENT (OUTPATIENT)
Dept: GENERAL RADIOLOGY | Facility: CLINIC | Age: 39
DRG: 760 | End: 2019-06-03
Attending: PEDIATRICS
Payer: COMMERCIAL

## 2019-06-03 LAB
BASOPHILS # BLD AUTO: 0 10E9/L (ref 0–0.2)
BASOPHILS NFR BLD AUTO: 0.6 %
DIFFERENTIAL METHOD BLD: ABNORMAL
EOSINOPHIL # BLD AUTO: 0.1 10E9/L (ref 0–0.7)
EOSINOPHIL NFR BLD AUTO: 1.9 %
ERYTHROCYTE [DISTWIDTH] IN BLOOD BY AUTOMATED COUNT: 15.3 % (ref 10–15)
FERRITIN SERPL-MCNC: 50 NG/ML (ref 12–150)
HCT VFR BLD AUTO: 25 % (ref 35–47)
HGB BLD-MCNC: 7.8 G/DL (ref 11.7–15.7)
IMM GRANULOCYTES # BLD: 0.1 10E9/L (ref 0–0.4)
IMM GRANULOCYTES NFR BLD: 1.1 %
IRON SATN MFR SERPL: 4 % (ref 15–46)
IRON SERPL-MCNC: 11 UG/DL (ref 35–180)
LYMPHOCYTES # BLD AUTO: 1.4 10E9/L (ref 0.8–5.3)
LYMPHOCYTES NFR BLD AUTO: 19 %
MCH RBC QN AUTO: 28 PG (ref 26.5–33)
MCHC RBC AUTO-ENTMCNC: 31.2 G/DL (ref 31.5–36.5)
MCV RBC AUTO: 90 FL (ref 78–100)
MONOCYTES # BLD AUTO: 1 10E9/L (ref 0–1.3)
MONOCYTES NFR BLD AUTO: 13.9 %
NEUTROPHILS # BLD AUTO: 4.6 10E9/L (ref 1.6–8.3)
NEUTROPHILS NFR BLD AUTO: 63.5 %
NRBC # BLD AUTO: 0 10*3/UL
NRBC BLD AUTO-RTO: 0 /100
PLATELET # BLD AUTO: 327 10E9/L (ref 150–450)
RBC # BLD AUTO: 2.79 10E12/L (ref 3.8–5.2)
TIBC SERPL-MCNC: 294 UG/DL (ref 240–430)
TRANSFERRIN SERPL-MCNC: 234 MG/DL (ref 210–360)
VIT B12 SERPL-MCNC: 1146 PG/ML (ref 193–986)
WBC # BLD AUTO: 7.2 10E9/L (ref 4–11)

## 2019-06-03 PROCEDURE — 12000001 ZZH R&B MED SURG/OB UMMC

## 2019-06-03 PROCEDURE — 83540 ASSAY OF IRON: CPT | Performed by: INTERNAL MEDICINE

## 2019-06-03 PROCEDURE — 82728 ASSAY OF FERRITIN: CPT | Performed by: INTERNAL MEDICINE

## 2019-06-03 PROCEDURE — 85025 COMPLETE CBC W/AUTO DIFF WBC: CPT | Performed by: INTERNAL MEDICINE

## 2019-06-03 PROCEDURE — 97530 THERAPEUTIC ACTIVITIES: CPT | Mod: GP

## 2019-06-03 PROCEDURE — 97116 GAIT TRAINING THERAPY: CPT | Mod: GP

## 2019-06-03 PROCEDURE — 99233 SBSQ HOSP IP/OBS HIGH 50: CPT | Mod: GC | Performed by: INTERNAL MEDICINE

## 2019-06-03 PROCEDURE — 84466 ASSAY OF TRANSFERRIN: CPT | Performed by: INTERNAL MEDICINE

## 2019-06-03 PROCEDURE — 36415 COLL VENOUS BLD VENIPUNCTURE: CPT | Performed by: INTERNAL MEDICINE

## 2019-06-03 PROCEDURE — 25000132 ZZH RX MED GY IP 250 OP 250 PS 637: Performed by: OBSTETRICS & GYNECOLOGY

## 2019-06-03 PROCEDURE — 97535 SELF CARE MNGMENT TRAINING: CPT | Mod: GO

## 2019-06-03 PROCEDURE — 83550 IRON BINDING TEST: CPT | Performed by: INTERNAL MEDICINE

## 2019-06-03 PROCEDURE — 25000132 ZZH RX MED GY IP 250 OP 250 PS 637: Performed by: HOSPITALIST

## 2019-06-03 PROCEDURE — 74018 RADEX ABDOMEN 1 VIEW: CPT

## 2019-06-03 PROCEDURE — 25000132 ZZH RX MED GY IP 250 OP 250 PS 637: Performed by: SURGERY

## 2019-06-03 PROCEDURE — 82607 VITAMIN B-12: CPT | Performed by: INTERNAL MEDICINE

## 2019-06-03 PROCEDURE — 25000128 H RX IP 250 OP 636: Performed by: INTERNAL MEDICINE

## 2019-06-03 PROCEDURE — 25800030 ZZH RX IP 258 OP 636: Performed by: INTERNAL MEDICINE

## 2019-06-03 PROCEDURE — 25000132 ZZH RX MED GY IP 250 OP 250 PS 637: Performed by: STUDENT IN AN ORGANIZED HEALTH CARE EDUCATION/TRAINING PROGRAM

## 2019-06-03 PROCEDURE — 25000132 ZZH RX MED GY IP 250 OP 250 PS 637: Performed by: INTERNAL MEDICINE

## 2019-06-03 RX ORDER — OXYCODONE HYDROCHLORIDE 5 MG/1
5 TABLET ORAL ONCE
Status: COMPLETED | OUTPATIENT
Start: 2019-06-03 | End: 2019-06-03

## 2019-06-03 RX ORDER — BISACODYL 10 MG
10 SUPPOSITORY, RECTAL RECTAL DAILY
Status: DISCONTINUED | OUTPATIENT
Start: 2019-06-03 | End: 2019-06-06 | Stop reason: HOSPADM

## 2019-06-03 RX ORDER — BISACODYL 5 MG
10 TABLET, DELAYED RELEASE (ENTERIC COATED) ORAL ONCE
Status: DISCONTINUED | OUTPATIENT
Start: 2019-06-03 | End: 2019-06-03

## 2019-06-03 RX ORDER — DIPHENHYDRAMINE HYDROCHLORIDE 50 MG/ML
50 INJECTION INTRAMUSCULAR; INTRAVENOUS
Status: DISCONTINUED | OUTPATIENT
Start: 2019-06-03 | End: 2019-06-06 | Stop reason: HOSPADM

## 2019-06-03 RX ORDER — METHYLPREDNISOLONE SODIUM SUCCINATE 125 MG/2ML
125 INJECTION, POWDER, LYOPHILIZED, FOR SOLUTION INTRAMUSCULAR; INTRAVENOUS
Status: DISCONTINUED | OUTPATIENT
Start: 2019-06-03 | End: 2019-06-06 | Stop reason: HOSPADM

## 2019-06-03 RX ORDER — POLYETHYLENE GLYCOL 3350 17 G/17G
17 POWDER, FOR SOLUTION ORAL DAILY
Status: DISCONTINUED | OUTPATIENT
Start: 2019-06-03 | End: 2019-06-06 | Stop reason: HOSPADM

## 2019-06-03 RX ADMIN — Medication 1 TABLET: at 20:47

## 2019-06-03 RX ADMIN — FOLIC ACID 1 MG: 1 TABLET ORAL at 08:30

## 2019-06-03 RX ADMIN — SENNOSIDES AND DOCUSATE SODIUM 1 TABLET: 8.6; 5 TABLET ORAL at 08:31

## 2019-06-03 RX ADMIN — MEDROXYPROGESTERONE ACETATE 20 MG: 10 TABLET ORAL at 08:30

## 2019-06-03 RX ADMIN — TRANEXAMIC ACID 1300 MG: 650 TABLET ORAL at 20:44

## 2019-06-03 RX ADMIN — IRON SUCROSE 200 MG: 20 INJECTION, SOLUTION INTRAVENOUS at 18:45

## 2019-06-03 RX ADMIN — SENNOSIDES AND DOCUSATE SODIUM 2 TABLET: 8.6; 5 TABLET ORAL at 20:44

## 2019-06-03 RX ADMIN — BISACODYL 10 MG: 5 TABLET, COATED ORAL at 20:44

## 2019-06-03 RX ADMIN — MULTIPLE VITAMINS W/ MINERALS TAB 1 TABLET: TAB at 08:30

## 2019-06-03 RX ADMIN — LIDOCAINE 2 PATCH: 560 PATCH PERCUTANEOUS; TOPICAL; TRANSDERMAL at 20:44

## 2019-06-03 RX ADMIN — NORTRIPTYLINE HYDROCHLORIDE 10 MG: 10 CAPSULE ORAL at 22:52

## 2019-06-03 RX ADMIN — OMEPRAZOLE 20 MG: 20 CAPSULE, DELAYED RELEASE ORAL at 08:30

## 2019-06-03 RX ADMIN — IRON SUCROSE 50 MG: 20 INJECTION, SOLUTION INTRAVENOUS at 16:37

## 2019-06-03 RX ADMIN — SIMETHICONE CHEW TAB 80 MG 80 MG: 80 TABLET ORAL at 03:08

## 2019-06-03 RX ADMIN — PREGABALIN 50 MG: 50 CAPSULE ORAL at 08:30

## 2019-06-03 RX ADMIN — POLYETHYLENE GLYCOL 3350 17 G: 17 POWDER, FOR SOLUTION ORAL at 15:02

## 2019-06-03 RX ADMIN — OXYCODONE HYDROCHLORIDE 5 MG: 5 TABLET ORAL at 04:07

## 2019-06-03 RX ADMIN — NICOTINE 1 PATCH: 7 PATCH TRANSDERMAL at 08:36

## 2019-06-03 RX ADMIN — PREGABALIN 50 MG: 50 CAPSULE ORAL at 13:58

## 2019-06-03 RX ADMIN — ESTRADIOL 1 PATCH: 0.1 PATCH TRANSDERMAL at 08:36

## 2019-06-03 RX ADMIN — SIMETHICONE CHEW TAB 80 MG 80 MG: 80 TABLET ORAL at 09:10

## 2019-06-03 RX ADMIN — PREGABALIN 50 MG: 50 CAPSULE ORAL at 20:44

## 2019-06-03 RX ADMIN — BISACODYL 10 MG: 10 SUPPOSITORY RECTAL at 15:03

## 2019-06-03 RX ADMIN — SIMETHICONE CHEW TAB 80 MG 80 MG: 80 TABLET ORAL at 20:47

## 2019-06-03 RX ADMIN — Medication 1 TABLET: at 06:00

## 2019-06-03 RX ADMIN — TRANEXAMIC ACID 1300 MG: 650 TABLET ORAL at 06:00

## 2019-06-03 RX ADMIN — Medication 100 MG: at 08:30

## 2019-06-03 RX ADMIN — MAGNESIUM HYDROXIDE 30 ML: 400 SUSPENSION ORAL at 15:02

## 2019-06-03 ASSESSMENT — ACTIVITIES OF DAILY LIVING (ADL)
ADLS_ACUITY_SCORE: 14

## 2019-06-03 NOTE — PLAN OF CARE
Pt A&Ox4. VSS. Up independently. Voiding adequately. PIV intact. Pt c/o stomach bloating, diarrhea before start of shift, and back pain. Pt reports large clots and a saturated pad x3 from 8937-1481. Continue to monitor blood flow and Hgb levels (last Hgb was 7.4). Provider notified of increased pain level and 1x dose of oxycodone was administered. Pt reports PRN gus setlzer and simethicone given for bloating.      Continue with plan of care.

## 2019-06-03 NOTE — PROGRESS NOTES
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      acute blood loss anemia 2/2 vaginal bleeding resulting in iron deficiency anemia    No improvement with initiation of progesterone and estradiol 5/31.  TXA added 6/1. The patient has had a decrease in blood clots passed on 6/3 consistent with improvement in her bleeding. Encouraged walks, even short ones, to reduce risk of DVT. Iron panel was checked that was consistent with iron deficiency and with her low hgb she will require IV iron for replacement   - gynecology consulted  - hematology consulted   - trend hgb Q12H today, given slight stability  - Ferrous Sucrose administration to help promote reticulocytosis in the setting of iron deficiency   - provera 20 mg daily, gyn will consider transition to PO contraceptives on 6/4  - estradiol patch 0.1 mg daily  - continue tranexamic acid 1,300 BID; if bleeding worsens, increase to TID  - holding PTA pentoxifylline   - transfusion history:               pRBC's: 2u (@ OSH), 2u (5/29), 1u (5/30), 1u 5/31, 1u 6/1     H/o alcoholic hepatitis, with ultrasound now concerning for cirrhosis: Abdominal US findings also with superimposed hepatic steatosis.    - Gastroenterology referral at discharge     Alcohol use disorder: Declined chemical dependency consultation.  Discussed importance of alcohol cessation over the course of the admission.   - thiamine 100 mg daily   - folate 1 mg daily      H/o polyneuropathy - continue PTA pregabalin 50 mg TID, nortriptyline 10 mg at bedtime    Abdominal Cramping  The patient has been complaining of abdominal cramping and bloating that is concerning for constipation  - KUB today to eval for stool burden         Diet: Vegetarian Diet    DVT Prophylaxis: VTE Prophylaxis contraindicated due to ongoing bleeding  Chavez Catheter: not present  Code Status: Full Code        Disposition Plan   Expected discharge: 2 - 3 days, recommended to prior living arrangement once hemoglobin stable.  Entered: Flako Davies MD 06/03/2019, 1:12 PM       The patient's care was discussed with the Attending Physician, Dr. Soto.    Flako Davies MD  [address], [place]  Pager: [phone]  Please see sticky note for cross cover information  ______________________________________________________________________    Interval History   The patient has noted improvement in her vaginal bleeding with only 1 pad soaked overnight. She noted that she continues to have abdominal bloating and feels constipated. This was only mildly alleviated by oxycodone     Data reviewed today: I reviewed all medications, new labs and imaging results over the last 24 hours. I personally reviewed no images or EKG's today.    Physical Exam   Vital Signs: Temp: 98.8  F (37.1  C) Temp src: Oral BP: 120/61 Pulse: 110 Heart Rate: 115 Resp: 16 SpO2: 96 % O2 Device: None (Room air)    Weight: 152 lbs 8.93 oz    General: Patient lying in bed in no acute distress  HEENT: No Scleral icterus, MMM. EOMI, No JVD  Cardiac: Tachycardic rate, regular rhythm. No m/r/g. Normal S1, S2.  Pulm: CTAB, no wheezes, or crackles. Normal respiratory effort  Abd: Soft, distended abdomen   Skin: No jaundice, No rash  Extremities: No LE edema  Joints: No inflammation noted on joints  Neuro: A&Ox3, no focal deficits    Data   Recent Labs   Lab 06/03/19  0739 06/02/19  1917 06/02/19  1548 06/02/19  0626  06/01/19  0502  05/31/19  0520  05/30/19  0410   WBC 7.2 7.3  --  8.6  --  6.7  --  7.2  --  6.8   HGB 7.8* 7.3* 7.7* 8.6*   < > 7.2*   < > 6.6*   < > 7.1*   MCV 90 88  --  92  --  90  --  90  --  91    261  --  308  --  254  --  210  --  209    INR  --   --   --   --   --  1.25*  --  1.31*  --  1.36*   NA  --   --   --  134  --  132*  --  132*  --  132*   POTASSIUM  --   --   --  3.8  --  4.0  --  4.0  --  4.0   CHLORIDE  --   --   --  103  --  105  --  104  --  102   CO2  --   --   --  22  --  22  --  22  --  24   BUN  --   --   --  6*  --  7  --  6*  --  7   CR  --   --   --  0.42*  --  0.43*  --  0.43*  --  0.43*   ANIONGAP  --   --   --  8  --  6  --  7  --  6   MARKO  --   --   --  8.0*  --  8.0*  --  7.9*  --  7.8*   GLC  --   --   --  70  --  95  --  97  --  101*   ALBUMIN  --   --   --  2.2*  --  2.0*  --  2.2*  --  2.4*   PROTTOTAL  --   --   --  5.8*  --  5.2*  --  5.0*  --  5.5*   BILITOTAL  --   --   --  2.3*  --  2.0*  --  1.6*  --  1.9*   ALKPHOS  --   --   --  186*  --  192*  --  192*  --  221*   ALT  --   --   --  55*  --  52*  --  56*  --  71*   AST  --   --   --  144*  --  150*  --  164*  --  221*    < > = values in this interval not displayed.

## 2019-06-03 NOTE — PLAN OF CARE
Discharge Planner OT   Patient plan for discharge: home  Current status: Pt performs functional transfers and bed mobility with SBA. Pt completed bathing, and donning and doffing briefs and gown with SBA. Pt educated on utilizing shower chair for safety and energy conservation. Pt required maxA to don socks 2/2 neuropathy and back pain.   Barriers to return to prior living situation: deconditioning, fatigue, medical status  Recommendations for discharge: home with assist as needed  Rationale for recommendations: Assist as needed for heavier household tasks and IADLs.       Entered by: Marlen Aragon 06/03/2019 3:12 PM

## 2019-06-03 NOTE — PLAN OF CARE
Discharge Planner PT   Patient plan for discharge: Home with continued PT as able pending insurance  Current status: Patient agreeable to PT session with encouragement. Patient on RA with HR at 120 while resting in bed and increasing to 140 with activity. Patient reporting lightheadedness with activity that resolves with long seated rest break and HR dropping to 110 with seated rest break. Patient participating in ambulation OOR without an assistive device and static standing balance challenges in her room. Mom and aunt arriving at end of session and RN notified of elevated HR during session.   Barriers to return to prior living situation: medical status.  Recommendations for discharge: Home with home PT  Rationale for recommendations: Patient reporting she previously received PT in her home but was unable to continue with sessions due to insurance coverage. Patient would benefit from continued PT to address deficits in balance, activity tolerance and gait mechanics.        Entered by: Millicent Bills 06/03/2019 9:57 AM

## 2019-06-03 NOTE — PLAN OF CARE
VSS, except tachy. A&Ox4. Up ad marcelo in room. Reporting abdominal discomfort; PRN simethicone given with no improvement per pt. Abdominal xray showing large stool burden. Started on bowel regimen.Hgb check 7.8. Patient to start daily iron infusions. Worked w/ PT & OT. Spent time visiting with family this morning.  Will report to oncoming RN.

## 2019-06-04 ENCOUNTER — APPOINTMENT (OUTPATIENT)
Dept: OCCUPATIONAL THERAPY | Facility: CLINIC | Age: 39
DRG: 760 | End: 2019-06-04
Attending: PEDIATRICS
Payer: COMMERCIAL

## 2019-06-04 LAB
ALBUMIN SERPL-MCNC: 2.2 G/DL (ref 3.4–5)
ALP SERPL-CCNC: 172 U/L (ref 40–150)
ALT SERPL W P-5'-P-CCNC: 45 U/L (ref 0–50)
ANION GAP SERPL CALCULATED.3IONS-SCNC: 5 MMOL/L (ref 3–14)
AST SERPL W P-5'-P-CCNC: 120 U/L (ref 0–45)
BASOPHILS # BLD AUTO: 0 10E9/L (ref 0–0.2)
BASOPHILS NFR BLD AUTO: 0.7 %
BILIRUB SERPL-MCNC: 1.1 MG/DL (ref 0.2–1.3)
BUN SERPL-MCNC: 5 MG/DL (ref 7–30)
CALCIUM SERPL-MCNC: 8 MG/DL (ref 8.5–10.1)
CHLORIDE SERPL-SCNC: 106 MMOL/L (ref 94–109)
CO2 SERPL-SCNC: 24 MMOL/L (ref 20–32)
CREAT SERPL-MCNC: 0.4 MG/DL (ref 0.52–1.04)
DIFFERENTIAL METHOD BLD: ABNORMAL
EOSINOPHIL # BLD AUTO: 0.2 10E9/L (ref 0–0.7)
EOSINOPHIL NFR BLD AUTO: 2.6 %
ERYTHROCYTE [DISTWIDTH] IN BLOOD BY AUTOMATED COUNT: 15.1 % (ref 10–15)
GFR SERPL CREATININE-BSD FRML MDRD: >90 ML/MIN/{1.73_M2}
GLUCOSE SERPL-MCNC: 104 MG/DL (ref 70–99)
HCT VFR BLD AUTO: 25.3 % (ref 35–47)
HGB BLD-MCNC: 7.9 G/DL (ref 11.7–15.7)
IMM GRANULOCYTES # BLD: 0.1 10E9/L (ref 0–0.4)
IMM GRANULOCYTES NFR BLD: 0.8 %
LYMPHOCYTES # BLD AUTO: 1.3 10E9/L (ref 0.8–5.3)
LYMPHOCYTES NFR BLD AUTO: 21.5 %
MCH RBC QN AUTO: 27.8 PG (ref 26.5–33)
MCHC RBC AUTO-ENTMCNC: 31.2 G/DL (ref 31.5–36.5)
MCV RBC AUTO: 89 FL (ref 78–100)
MONOCYTES # BLD AUTO: 0.9 10E9/L (ref 0–1.3)
MONOCYTES NFR BLD AUTO: 14.8 %
NEUTROPHILS # BLD AUTO: 3.6 10E9/L (ref 1.6–8.3)
NEUTROPHILS NFR BLD AUTO: 59.6 %
NRBC # BLD AUTO: 0 10*3/UL
NRBC BLD AUTO-RTO: 0 /100
PLATELET # BLD AUTO: 330 10E9/L (ref 150–450)
POTASSIUM SERPL-SCNC: 3.6 MMOL/L (ref 3.4–5.3)
PROT SERPL-MCNC: 5.7 G/DL (ref 6.8–8.8)
RBC # BLD AUTO: 2.84 10E12/L (ref 3.8–5.2)
SODIUM SERPL-SCNC: 135 MMOL/L (ref 133–144)
WBC # BLD AUTO: 6.1 10E9/L (ref 4–11)

## 2019-06-04 PROCEDURE — 36415 COLL VENOUS BLD VENIPUNCTURE: CPT | Performed by: INTERNAL MEDICINE

## 2019-06-04 PROCEDURE — 99233 SBSQ HOSP IP/OBS HIGH 50: CPT | Mod: GC | Performed by: INTERNAL MEDICINE

## 2019-06-04 PROCEDURE — 12000001 ZZH R&B MED SURG/OB UMMC

## 2019-06-04 PROCEDURE — 25000132 ZZH RX MED GY IP 250 OP 250 PS 637: Performed by: INTERNAL MEDICINE

## 2019-06-04 PROCEDURE — 25000132 ZZH RX MED GY IP 250 OP 250 PS 637: Performed by: HOSPITALIST

## 2019-06-04 PROCEDURE — 85025 COMPLETE CBC W/AUTO DIFF WBC: CPT | Performed by: INTERNAL MEDICINE

## 2019-06-04 PROCEDURE — 25000132 ZZH RX MED GY IP 250 OP 250 PS 637: Performed by: STUDENT IN AN ORGANIZED HEALTH CARE EDUCATION/TRAINING PROGRAM

## 2019-06-04 PROCEDURE — 97535 SELF CARE MNGMENT TRAINING: CPT | Mod: GO

## 2019-06-04 PROCEDURE — 97530 THERAPEUTIC ACTIVITIES: CPT | Mod: GO

## 2019-06-04 PROCEDURE — 25000132 ZZH RX MED GY IP 250 OP 250 PS 637: Performed by: SURGERY

## 2019-06-04 PROCEDURE — 80053 COMPREHEN METABOLIC PANEL: CPT | Performed by: INTERNAL MEDICINE

## 2019-06-04 PROCEDURE — 25800030 ZZH RX IP 258 OP 636: Performed by: INTERNAL MEDICINE

## 2019-06-04 PROCEDURE — 25000128 H RX IP 250 OP 636: Performed by: INTERNAL MEDICINE

## 2019-06-04 RX ORDER — PREGABALIN 50 MG/1
50 CAPSULE ORAL 3 TIMES DAILY
COMMUNITY
End: 2019-06-06

## 2019-06-04 RX ORDER — FEXOFENADINE HCL 180 MG/1
180 TABLET ORAL DAILY PRN
COMMUNITY
End: 2019-06-06

## 2019-06-04 RX ORDER — LANOLIN ALCOHOL/MO/W.PET/CERES
400 CREAM (GRAM) TOPICAL DAILY
COMMUNITY
End: 2019-06-06

## 2019-06-04 RX ORDER — CALCIUM CARBONATE 500 MG/1
500 TABLET, CHEWABLE ORAL DAILY PRN
Status: DISCONTINUED | OUTPATIENT
Start: 2019-06-04 | End: 2019-06-06 | Stop reason: HOSPADM

## 2019-06-04 RX ORDER — MEDROXYPROGESTERONE ACETATE 10 MG
10 TABLET ORAL DAILY
Status: DISCONTINUED | OUTPATIENT
Start: 2019-06-05 | End: 2019-06-06 | Stop reason: HOSPADM

## 2019-06-04 RX ORDER — LACTULOSE 10 G/15ML
20 SOLUTION ORAL DAILY
Status: DISCONTINUED | OUTPATIENT
Start: 2019-06-04 | End: 2019-06-06 | Stop reason: HOSPADM

## 2019-06-04 RX ORDER — MULTIVITAMIN WITH IRON
1 TABLET ORAL DAILY
COMMUNITY
End: 2019-06-06

## 2019-06-04 RX ORDER — PANTOPRAZOLE SODIUM 20 MG/1
20 TABLET, DELAYED RELEASE ORAL DAILY
COMMUNITY
End: 2019-06-06

## 2019-06-04 RX ADMIN — NORTRIPTYLINE HYDROCHLORIDE 10 MG: 10 CAPSULE ORAL at 22:03

## 2019-06-04 RX ADMIN — PREGABALIN 50 MG: 50 CAPSULE ORAL at 20:48

## 2019-06-04 RX ADMIN — LACTULOSE 20 G: 20 SOLUTION ORAL at 08:40

## 2019-06-04 RX ADMIN — BISACODYL 10 MG: 10 SUPPOSITORY RECTAL at 09:04

## 2019-06-04 RX ADMIN — SENNOSIDES AND DOCUSATE SODIUM 2 TABLET: 8.6; 5 TABLET ORAL at 08:43

## 2019-06-04 RX ADMIN — MEDROXYPROGESTERONE ACETATE 20 MG: 10 TABLET ORAL at 08:42

## 2019-06-04 RX ADMIN — TRANEXAMIC ACID 1300 MG: 650 TABLET ORAL at 07:08

## 2019-06-04 RX ADMIN — SIMETHICONE CHEW TAB 80 MG 80 MG: 80 TABLET ORAL at 04:52

## 2019-06-04 RX ADMIN — MAGNESIUM HYDROXIDE 30 ML: 400 SUSPENSION ORAL at 08:40

## 2019-06-04 RX ADMIN — LIDOCAINE 2 PATCH: 560 PATCH PERCUTANEOUS; TOPICAL; TRANSDERMAL at 20:48

## 2019-06-04 RX ADMIN — POLYETHYLENE GLYCOL 3350 17 G: 17 POWDER, FOR SOLUTION ORAL at 08:40

## 2019-06-04 RX ADMIN — POTASSIUM CHLORIDE 20 MEQ: 750 TABLET, EXTENDED RELEASE ORAL at 14:30

## 2019-06-04 RX ADMIN — OMEPRAZOLE 20 MG: 20 CAPSULE, DELAYED RELEASE ORAL at 08:43

## 2019-06-04 RX ADMIN — Medication 1 TABLET: at 23:08

## 2019-06-04 RX ADMIN — MULTIPLE VITAMINS W/ MINERALS TAB 1 TABLET: TAB at 08:43

## 2019-06-04 RX ADMIN — PREGABALIN 50 MG: 50 CAPSULE ORAL at 08:44

## 2019-06-04 RX ADMIN — PREGABALIN 50 MG: 50 CAPSULE ORAL at 14:30

## 2019-06-04 RX ADMIN — SIMETHICONE CHEW TAB 80 MG 80 MG: 80 TABLET ORAL at 23:08

## 2019-06-04 RX ADMIN — CALCIUM CARBONATE (ANTACID) CHEW TAB 500 MG 500 MG: 500 CHEW TAB at 01:58

## 2019-06-04 RX ADMIN — FOLIC ACID 1 MG: 1 TABLET ORAL at 08:43

## 2019-06-04 RX ADMIN — NICOTINE 1 PATCH: 7 PATCH TRANSDERMAL at 08:44

## 2019-06-04 RX ADMIN — IRON SUCROSE 200 MG: 20 INJECTION, SOLUTION INTRAVENOUS at 16:50

## 2019-06-04 RX ADMIN — Medication 100 MG: at 08:43

## 2019-06-04 ASSESSMENT — ACTIVITIES OF DAILY LIVING (ADL)
ADLS_ACUITY_SCORE: 14

## 2019-06-04 NOTE — PROGRESS NOTES
Discussed patient with primary medicine team. Bleeding has continued to be improved with HGB today stable. Given multiple medications and risk of clots, plan had been to attempt to titrate down medications. Do feel provera can be decreased to 10 mg from 20 mg without significant increased risk of bleeding given thin EMS and previous lack of improvement on provera alone. Regarding decreasing TXA vs Estrogen, medicine team has discussed with hematology and would prefer to decrease TXA first over Estrogen. Therefore would recommend continuing estradiol patch at current dose with discontinuation of TXA. Plan to continue to monitor bleeding-- recommend pad weights. Gyn team has plans to meet with patient tomorrow to further discuss IUD placement with potential placement in hospital if patient is agreeable given prolonged hospital course requiring multiple blood transfusion, not responsive to oral progesterone alone.     Emilie Abebe PGY4  6/4/2019 3:45 PM

## 2019-06-04 NOTE — DISCHARGE SUMMARY
Medicine Discharge Summary  Yenifer Maher MRN: [id number]  1980  Primary care provider: No Ref-Primary, Physician  ___________________________________          Date of Admission:  5/28/2019  Date of Discharge:  6/6/2019   Admitting Physician:  Charles Martin MD  Discharge Physician:  Shara Mujica MD   Discharging Service:  Internal Medicine, [place] [address]     Primary Provider: No Ref-Primary, Physician         Reason for Admission:   Vaginal bleeding for ~16 days resulting in hemoglobin of 3.3          Discharge Diagnosis:   Acute Blood Loss Anemia 2/2 Vaginal Bleeding  Iron Deficiency Anemia  Hx of Alcoholic Hepatitis w/Possible Cirrhosis  Alcohol Use Disorder  Hx of Polyneuropathy           Procedures & Significant Findings:   Blood Transfusion         Consultations:   Hematology  OB/GYN  Social Work      Brief HPI:  Adapted from H&P on 5/28/2019.  Please refer to it for further details.  She initially presented to OSH for ~ 2 weeks of vaginal bleeding and was found to have hgb of 3.3. She reported no prior episodes of vaginal bleeding or edilia/metrorhaggia. She described her vaginal bleeding as persistently heavy and constant. She contacted her hematologist Dr Jeffery who recommended discontinuing her enoxaparin. This resulted in some decrease in bleeding. Of note, she was on enoxaparin at prophylactic (40mg every day) dosing given her history of positive lupus anticoagulant and low protein C with limited mobility due to weakness and her severe neuropathy. She has no history of CVA/TIA, PE, DVT, or miscarriage.              Hospital Course by Problem:    Yenifer Maher is a 39 year old female with a past medical history of alcoholic hepatitis, alcohol abuse and non-uremic calciphylaxis (previously on enoxaparin) that presents with vaginal bleeding. Bleeding became stable on provera 10 mg daily and estradiol patch 0.1 mg daily.      Acute blood  loss anemia 2/2 vaginal bleeding resulting in iron deficiency anemia  History of non-uremic calciphylaxis   Positive antiphospholipid antibody, without antiphospholipid syndrome   Pt received blood transfusions and iron panel showed iron deficiency anemia. She received four infusions of iron. Hematology and Gynecology were consulted and ultimately, 10 mg of provera and 0.1 estradiol patch were required for bleeding to stop. She was treated with tranexamic acid for a few days (stopped 6/4) and she did not have repeat bleeding after stopping this.   In the context of her +antiphospholipid antibody (without signs of APS) and history of non-uremic calciphylaxis (which is improved significantly), she was taken off lovenox due to the severity of bleeding and will resume aspirin as an outpatient.   Plan:  -f/u with PCP for CBC in 2 weeks  -f/u with gynecology in 1 week.   -cont provera 10 mg daily and estradiol patch 0.1 mg daily  -holding PTA pentoxifylline and stop enoxaparin  - transfusion history:               pRBC's: 2u (@ OSH), 2u (5/29), 1u (5/30), 1u (5/31), 1u (6/1). Hgb stable at discharge.   - can consider 5th and final IV iron infusion per PCP   - start ASA 81 mg at discharge, can follow-up with dermatology regarding calciphylaxis.      Abdominal Distension  Constipation  Pt was initially having diarrhea, likely overflow due to significant stool burden on KUB. With bowel regimen, by discharge she was having solid stools but continued to have gas and cramping discomfort without signs of obstruction. POC U/S revealed small pocket of ascites, not large enough to account for distension.   Plan:  -f/u with PCP  -simethicone and hyoscyamine prn     H/o alcoholic hepatitis, with ultrasound now concerning for cirrhosis: Abdominal US findings also with superimposed hepatic steatosis.  Patient is generally unconcerned about her alcohol use, and declined chemical dependency counseling.   -Gastroenterology referral placed  at discharge     Alcohol use disorder: Declined chemical dependency consultation.  Discussed importance of alcohol cessation over the course of the admission.   - on discharge, cont thiamine 100 mg and folate 1 mg daily      H/o polyneuropathy - continue PTA pregabalin 50 mg TID, nortriptyline 10 mg at bedtime         Physical Exam on day of Discharge:  Blood pressure 126/73, pulse 110, temperature 97.8  F (36.6  C), temperature source Oral, resp. rate 18, weight 72 kg (158 lb 12.8 oz), SpO2 97 %.  General: AAOx3, NAD  HEENT: NC/AT, MMM, oropharynx clear, anicteric sclera, conjunctiva normal  CV: RRR, normal S1S2, no murmur, clicks, rubs appreciated  Resp: breathing comfortably on room air  Abd: distended, non-tender, normoactive bs  Extremities: wwp,  no pedal edema  Skin: No obvious rashes or lesions  Neuro: A/Ox3, no lateralizing symptoms or focal neurologic deficits  Psych: Appropriate mood    Lines/Tubes: none         Pending Results:   None         Discharge Medications:     Current Discharge Medication List      START taking these medications    Details   aspirin (ASA) 81 MG EC tablet Take 1 tablet (81 mg) by mouth daily  Qty: 90 tablet, Refills: 0    Associated Diagnoses: Calciphylaxis      bisacodyl (DULCOLAX) 10 MG suppository Place 1 suppository (10 mg) rectally daily  Qty: 10 suppository, Refills: 0    Associated Diagnoses: Constipation, unspecified constipation type      bisacodyl (DULCOLAX) 5 MG EC tablet Take 2 tablets (10 mg) by mouth daily as needed for constipation  Qty: 30 tablet, Refills: 0    Associated Diagnoses: Constipation, unspecified constipation type      estradiol (VIVELLE-DOT) 0.1 MG/24HR bi-weekly patch Place 1 patch onto the skin twice a week  Qty: 15 patch, Refills: 0    Associated Diagnoses: Bleeding      folic acid (FOLVITE) 1 MG tablet Take 1 tablet (1 mg) by mouth daily  Qty: 30 tablet, Refills: 0    Associated Diagnoses: Alcohol abuse      hyoscyamine (ANASPAZ/LEVSIN) 0.125 MG  tablet Take 1 tablet (125 mcg) by mouth every 4 hours as needed for cramping  Qty: 30 tablet, Refills: 0    Associated Diagnoses: Bloating      medroxyPROGESTERone (PROVERA) 10 MG tablet Take 1 tablet (10 mg) by mouth daily  Qty: 30 tablet, Refills: 0    Associated Diagnoses: Bleeding      simethicone (MYLICON) 80 MG chewable tablet Take 1 tablet (80 mg) by mouth every 6 hours as needed for flatulence or cramping  Qty: 30 tablet, Refills: 0    Associated Diagnoses: Bloating      vitamin B1 (THIAMINE) 100 MG tablet Take 1 tablet (100 mg) by mouth daily  Qty: 30 tablet, Refills: 0    Associated Diagnoses: Alcohol abuse         CONTINUE these medications which have NOT CHANGED    Details   fexofenadine (ALLEGRA) 180 MG tablet Take 180 mg by mouth daily as needed for allergies      magnesium oxide (MAG-OX) 400 (240 Mg) MG tablet Take 400 mg by mouth daily      nortriptyline (PAMELOR) 10 MG capsule Take 1 capsule (10 mg) by mouth At Bedtime  Qty: 30 capsule, Refills: 0    Associated Diagnoses: Neuropathy      pantoprazole (PROTONIX) 20 MG EC tablet Take 20 mg by mouth daily      pregabalin (LYRICA) 50 MG capsule Take 50 mg by mouth 3 times daily      Prenatal Vit-Fe Fumarate-FA (PRENATAL MULTIVITAMIN PLUS IRON) 27-0.8 MG TABS per tablet Take 1 tablet by mouth daily      vitamin (B COMPLEX-C) tablet Take 1 tablet by mouth daily         STOP taking these medications       enoxaparin (LOVENOX) 40 MG/0.4ML syringe Comments:   Reason for Stopping:         pentoxifylline ER (TRENTAL) 400 MG CR tablet Comments:   Reason for Stopping:                    Discharge Instructions and Follow-Up:     Discharge Procedure Orders   Physical Therapy Referral   Standing Status: Future   Referral Priority: Routine Referral Type: Rehab Therapy Physical Therapy   Number of Visits Requested: 1     Ob/Gyn Referral     GASTROENTEROLOGY ADULT REF CONSULT ONLY   Referral Priority: Routine   Number of Visits Requested: 1     Reason for your  hospital stay   Order Comments: Vaginal bleeding-now controlled with oral progesterone and estrogen.     Adult [facility] Follow-up and recommended labs and tests   Order Comments: Follow up with primary care provider, Physician No Ref-Primary, within 7 days to evaluate medication change.  The following labs/tests are recommended: CBC in 2 weeks.  Follow up with Gynecology.     Appointments on [place] and/or [facility] (with [facility] or [facility] provider or service). Call [phone] if you haven't heard regarding these appointments within 7 days of discharge.     Activity   Order Comments: Your activity upon discharge: activity as tolerated     Order Specific Question Answer Comments   Is discharge order? Yes      Full Code     Order Specific Question Answer Comments   Code status determined by: Discussion with patient/legal decision maker      Diet   Order Comments: Follow this diet upon discharge: Orders Placed This Encounter      Room Service      Vegetarian Diet     Order Specific Question Answer Comments   Is discharge order? Yes                Discharge Disposition:   Home         Condition on Discharge:     Discharge condition: Good   Code status on discharge: Full Code        Date of service: 6/4/2019    The patient was discussed with Dr. Mujica.    Corinna Piña MD PhD   Internal Medicine, PGY 2  p        Physician Attestation   I, Shara Mujica, saw and evaluated this patient prior to discharge.  I discussed the patient with the resident/fellow and agree with plan of care as documented in the note.      I personally reviewed vital signs, medications and labs.    I personally spent 35 minutes on discharge activities.    Shara Mujica MD  Date of Service (when I saw the patient): 6/6/19

## 2019-06-04 NOTE — PHARMACY-ADMISSION MEDICATION HISTORY
Admission medication history interview status for the 5/28/2019 admission is complete. See Epic admission navigator for allergy information, pharmacy, prior to admission medications and immunization status.     Medication history interview sources:  patient, Care Everywhere, Pharmacy Fill history    Changes made to PTA medication list (reason)  Added: Mag Ox, B Complex, Lyrica, Pantoprazole  Deleted: loratadine, gabapentin  Changed: enoxaparin - most recently doing 40mg subcutaneous daily    Additional medication history information (including reliability of information, actions taken by pharmacist):     Pt was switched to pregabalin from gabapentin earlier this year    There was a recent rx from 5/10/19 for hydroxyzine 10mg tid prn __  - pt didn't recognize, doesn't think she's taking      Prior to Admission medications    Medication Sig Last Dose Taking? Auth Provider   enoxaparin (LOVENOX) 40 MG/0.4ML syringe Inject 40 mg Subcutaneous every 24 hours 5/27/2019 Yes Unknown, Entered By History   fexofenadine (ALLEGRA) 180 MG tablet Take 180 mg by mouth daily as needed for allergies  Yes Unknown, Entered By History   magnesium oxide (MAG-OX) 400 (240 Mg) MG tablet Take 400 mg by mouth daily 5/27/2019 Yes Unknown, Entered By History   nortriptyline (PAMELOR) 10 MG capsule Take 1 capsule (10 mg) by mouth At Bedtime 5/27/2019 Yes Juli Faustin MD   pantoprazole (PROTONIX) 20 MG EC tablet Take 20 mg by mouth daily 5/27/2019 Yes Unknown, Entered By History   pentoxifylline ER (TRENTAL) 400 MG CR tablet Take 2 tablets (800 mg) by mouth 3 times daily (with meals) 5/27/2019 at Unknown time Yes Norberto Marti MD   pregabalin (LYRICA) 50 MG capsule Take 50 mg by mouth 3 times daily 5/27/2019 Yes Unknown, Entered By History   Prenatal Vit-Fe Fumarate-FA (PRENATAL MULTIVITAMIN PLUS IRON) 27-0.8 MG TABS per tablet Take 1 tablet by mouth daily 5/27/2019 at Unknown time Yes Reported, Patient   vitamin (B COMPLEX-C)  tablet Take 1 tablet by mouth daily 5/27/2019 Yes Unknown, Entered By History         Medication history completed by:      Veto GarciaD, [facility]    [phone]  Pager [phone]

## 2019-06-04 NOTE — PLAN OF CARE
Discharge Planner OT   Patient plan for discharge: Home.  Current status: Patient wanting to stay near bathroom this afternoon due to loose stools per lactulose. SBA required for sit to stand transfers from bed and toilet.  Barriers to return to prior living situation: Endurance.  Recommendations for discharge: Home with assistance from fiance' as needed.  Rationale for recommendations: Patient will continue to benefit from IP OT to progress functional ease and independence.       Entered by: Diamond Blandon 06/04/2019 4:01 PM

## 2019-06-04 NOTE — PLAN OF CARE
Patient A&O x 4 this shift. VSS on RA. Patient up with SBA. Patient started on  5 day course of Iron Sucrose. Tolerated infusion well. Lidocaine patches to lower back for low back pain. Continue with current plan of care.

## 2019-06-04 NOTE — PROGRESS NOTES
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      Acute blood loss anemia 2/2 vaginal bleeding resulting in iron deficiency anemia    No improvement with initiation of progesterone and estradiol 5/31.  TXA added 6/1. The patient has had a decrease in blood clots passed on 6/3 consistent with improvement in her bleeding. Encouraged walks, even short ones, to reduce risk of DVT. Iron panel was checked that was consistent with iron deficiency and with her low hgb she will require IV iron for replacement   - gynecology consulted  - hematology consulted, follow up with hematology 2 weeks after discharge   - Ferrous Sucrose administration for a total of 5 days to help promote reticulocytosis in the setting of iron deficiency   - provera 20 mg daily, gyn will consider transition to PO contraceptives on 6/4  - estradiol patch 0.1 mg daily  - Discontinued tranexamic acid 1,300 BID on 6/4 as she has stopped bleeding  - holding PTA pentoxifylline   - transfusion history:               pRBC's: 2u (@ OSH), 2u (5/29), 1u (5/30), 1u 5/31, 1u 6/1     H/o alcoholic hepatitis, with ultrasound now concerning for cirrhosis: Abdominal US findings also with superimposed hepatic steatosis.    - Gastroenterology referral at discharge     Alcohol use disorder: Declined chemical dependency consultation.  Discussed importance of alcohol cessation over the course of the admission.   - thiamine 100 mg daily   - folate 1 mg daily      H/o polyneuropathy - continue PTA pregabalin 50 mg TID, nortriptyline 10 mg at bedtime     Constipation  KUB showed constipation. She was given miralax, milk of magnesium, and bisacodyl suppository that were not  entirely helpful with alleviating constipation  - Lactulose  - Bisacodyl suppository         Diet: Vegetarian Diet    DVT Prophylaxis: VTE Prophylaxis contraindicated due to ongoing bleeding  Chavez Catheter: not present  Code Status: Full Code            Disposition Plan   Expected discharge: Tomorrow, recommended to prior living arrangement once hemoglobin stable.  Entered: Flako Davies MD 06/04/2019, 11:34 AM       The patient's care was discussed with the Attending Physician, Dr. Mujica.    Flako Davies MD  [address], [place]  Pager: [phone]  Please see sticky note for cross cover information  ______________________________________________________________________    Interval History   The patient noted that she has had minimal spotting of her pad without overt bleeding. She had a small bowel movement overnight, but continues to have significant constipation despite bisacodyl suppository, miralax, and milk of magnesium     Data reviewed today: I reviewed all medications, new labs and imaging results over the last 24 hours. I personally reviewed no images or EKG's today.    Physical Exam   Vital Signs: Temp: 98.9  F (37.2  C) Temp src: Oral BP: 120/67 Pulse: 106 Heart Rate: 101 Resp: 16 SpO2: 97 % O2 Device: None (Room air)    Weight: 159 lbs 4.8 oz    General: Patient lying in bed in no acute distress  HEENT: No Scleral icterus, MMM. EOMI  Cardiac: Tachycardic rate, regular rhythm. No m/r/g. Normal S1, S2.  Pulm: CTAB, no wheezes, or crackles. Normal respiratory effort  Abd: Soft, distended, non tender abdomen  Skin: No jaundice, No rash  Extremities: No LE edema  Joints: No inflammation noted on joints  Neuro: A&Ox3, no focal deficits    Data   Recent Labs   Lab 06/04/19  0434 06/03/19  0739 06/02/19  1917  06/02/19  0626  06/01/19  0502  05/31/19  0520  05/30/19  0410   WBC 6.1 7.2 7.3  --  8.6  --  6.7  --  7.2  --  6.8   HGB 7.9* 7.8* 7.3*   < > 8.6*   <  > 7.2*   < > 6.6*   < > 7.1*   MCV 89 90 88  --  92  --  90  --  90  --  91    327 261  --  308  --  254  --  210  --  209   INR  --   --   --   --   --   --  1.25*  --  1.31*  --  1.36*     --   --   --  134  --  132*  --  132*  --  132*   POTASSIUM 3.6  --   --   --  3.8  --  4.0  --  4.0  --  4.0   CHLORIDE 106  --   --   --  103  --  105  --  104  --  102   CO2 24  --   --   --  22  --  22  --  22  --  24   BUN 5*  --   --   --  6*  --  7  --  6*  --  7   CR 0.40*  --   --   --  0.42*  --  0.43*  --  0.43*  --  0.43*   ANIONGAP 5  --   --   --  8  --  6  --  7  --  6   MARKO 8.0*  --   --   --  8.0*  --  8.0*  --  7.9*  --  7.8*   *  --   --   --  70  --  95  --  97  --  101*   ALBUMIN 2.2*  --   --   --  2.2*  --  2.0*  --  2.2*  --  2.4*   PROTTOTAL 5.7*  --   --   --  5.8*  --  5.2*  --  5.0*  --  5.5*   BILITOTAL 1.1  --   --   --  2.3*  --  2.0*  --  1.6*  --  1.9*   ALKPHOS 172*  --   --   --  186*  --  192*  --  192*  --  221*   ALT 45  --   --   --  55*  --  52*  --  56*  --  71*   *  --   --   --  144*  --  150*  --  164*  --  221*    < > = values in this interval not displayed.

## 2019-06-04 NOTE — PROGRESS NOTES
CLINICAL NUTRITION SERVICES    Reviewed nutrition risk factors due to LOS. Pt is tolerating diet, eating well per nursing documentation. No nutrition issues identified at this time. RD will follow per routine at this time, unless consulted.    Leda Bacon RD/GERARD  Pager [phone]

## 2019-06-04 NOTE — PLAN OF CARE
Alert and oriented x 4. Complained of pain and bloating in the abdomen. Abdomen is distended. Tums and simethicone given. Patient stated that she does not like to take regular pain medications. Up independently. Stated that her vaginal bleeding has significantly decreased.

## 2019-06-04 NOTE — PLAN OF CARE
"VSS, tachy. A&Ox4. Up ad marcelo in room. Hgb check  7.9. Continues on daily iron infusions. Worked w/ OT. Patient reports having one \"significant\" stool & one \"runny\" stool this shift. Continues to report abdominal discomfort & feeling gassy. Poor appetite. Ate bites from breakfast tray but declined lunch d/t abdominal discomfort. K+ 3.6; replaced per protocol. Plan for tentative discharge tomorrow. Will report to oncoming RN.    "

## 2019-06-04 NOTE — PLAN OF CARE
PT 5B: cancel - pt politely declining therapy 2/2 fear of incontinence; unable to return later in PM; pt will ambulate outside of therapy with staff.

## 2019-06-05 ENCOUNTER — APPOINTMENT (OUTPATIENT)
Dept: OCCUPATIONAL THERAPY | Facility: CLINIC | Age: 39
DRG: 760 | End: 2019-06-05
Attending: PEDIATRICS
Payer: COMMERCIAL

## 2019-06-05 LAB
BASOPHILS # BLD AUTO: 0 10E9/L (ref 0–0.2)
BASOPHILS NFR BLD AUTO: 0.6 %
DIFFERENTIAL METHOD BLD: ABNORMAL
EOSINOPHIL # BLD AUTO: 0.2 10E9/L (ref 0–0.7)
EOSINOPHIL NFR BLD AUTO: 2.3 %
ERYTHROCYTE [DISTWIDTH] IN BLOOD BY AUTOMATED COUNT: 15.3 % (ref 10–15)
HCT VFR BLD AUTO: 23.1 % (ref 35–47)
HGB BLD-MCNC: 7.2 G/DL (ref 11.7–15.7)
IMM GRANULOCYTES # BLD: 0.1 10E9/L (ref 0–0.4)
IMM GRANULOCYTES NFR BLD: 1 %
LYMPHOCYTES # BLD AUTO: 1.2 10E9/L (ref 0.8–5.3)
LYMPHOCYTES NFR BLD AUTO: 17.2 %
MCH RBC QN AUTO: 28 PG (ref 26.5–33)
MCHC RBC AUTO-ENTMCNC: 31.2 G/DL (ref 31.5–36.5)
MCV RBC AUTO: 90 FL (ref 78–100)
MONOCYTES # BLD AUTO: 1.1 10E9/L (ref 0–1.3)
MONOCYTES NFR BLD AUTO: 15.1 %
NEUTROPHILS # BLD AUTO: 4.5 10E9/L (ref 1.6–8.3)
NEUTROPHILS NFR BLD AUTO: 63.8 %
NRBC # BLD AUTO: 0 10*3/UL
NRBC BLD AUTO-RTO: 0 /100
PLATELET # BLD AUTO: 326 10E9/L (ref 150–450)
RBC # BLD AUTO: 2.57 10E12/L (ref 3.8–5.2)
WBC # BLD AUTO: 7.1 10E9/L (ref 4–11)

## 2019-06-05 PROCEDURE — 25000132 ZZH RX MED GY IP 250 OP 250 PS 637: Performed by: INTERNAL MEDICINE

## 2019-06-05 PROCEDURE — 25000132 ZZH RX MED GY IP 250 OP 250 PS 637: Performed by: SURGERY

## 2019-06-05 PROCEDURE — 85025 COMPLETE CBC W/AUTO DIFF WBC: CPT | Performed by: INTERNAL MEDICINE

## 2019-06-05 PROCEDURE — 25000128 H RX IP 250 OP 636: Performed by: INTERNAL MEDICINE

## 2019-06-05 PROCEDURE — 99233 SBSQ HOSP IP/OBS HIGH 50: CPT | Mod: GC | Performed by: INTERNAL MEDICINE

## 2019-06-05 PROCEDURE — 99231 SBSQ HOSP IP/OBS SF/LOW 25: CPT | Mod: GC | Performed by: INTERNAL MEDICINE

## 2019-06-05 PROCEDURE — 25000132 ZZH RX MED GY IP 250 OP 250 PS 637: Performed by: HOSPITALIST

## 2019-06-05 PROCEDURE — 25800030 ZZH RX IP 258 OP 636: Performed by: INTERNAL MEDICINE

## 2019-06-05 PROCEDURE — 12000001 ZZH R&B MED SURG/OB UMMC

## 2019-06-05 PROCEDURE — 97535 SELF CARE MNGMENT TRAINING: CPT | Mod: GO

## 2019-06-05 RX ORDER — SIMETHICONE 80 MG
160 TABLET,CHEWABLE ORAL 4 TIMES DAILY
Status: DISCONTINUED | OUTPATIENT
Start: 2019-06-05 | End: 2019-06-06 | Stop reason: HOSPADM

## 2019-06-05 RX ORDER — HYOSCYAMINE SULFATE 0.125 MG
125 TABLET ORAL EVERY 4 HOURS PRN
Status: DISCONTINUED | OUTPATIENT
Start: 2019-06-05 | End: 2019-06-06 | Stop reason: HOSPADM

## 2019-06-05 RX ADMIN — SIMETHICONE CHEW TAB 80 MG 80 MG: 80 TABLET ORAL at 12:14

## 2019-06-05 RX ADMIN — BISACODYL 10 MG: 10 SUPPOSITORY RECTAL at 20:05

## 2019-06-05 RX ADMIN — SENNOSIDES AND DOCUSATE SODIUM 1 TABLET: 8.6; 5 TABLET ORAL at 18:39

## 2019-06-05 RX ADMIN — Medication 100 MG: at 07:41

## 2019-06-05 RX ADMIN — LIDOCAINE 2 PATCH: 560 PATCH PERCUTANEOUS; TOPICAL; TRANSDERMAL at 19:45

## 2019-06-05 RX ADMIN — Medication 1 TABLET: at 18:25

## 2019-06-05 RX ADMIN — PREGABALIN 50 MG: 50 CAPSULE ORAL at 13:59

## 2019-06-05 RX ADMIN — HYOSCYAMINE SULFATE 125 MCG: 0.12 TABLET ORAL at 22:27

## 2019-06-05 RX ADMIN — NORTRIPTYLINE HYDROCHLORIDE 10 MG: 10 CAPSULE ORAL at 22:27

## 2019-06-05 RX ADMIN — SENNOSIDES AND DOCUSATE SODIUM 1 TABLET: 8.6; 5 TABLET ORAL at 22:28

## 2019-06-05 RX ADMIN — Medication 1 TABLET: at 08:37

## 2019-06-05 RX ADMIN — PREGABALIN 50 MG: 50 CAPSULE ORAL at 19:45

## 2019-06-05 RX ADMIN — SIMETHICONE CHEW TAB 80 MG 160 MG: 80 TABLET ORAL at 22:27

## 2019-06-05 RX ADMIN — IRON SUCROSE 200 MG: 20 INJECTION, SOLUTION INTRAVENOUS at 18:25

## 2019-06-05 RX ADMIN — MEDROXYPROGESTERONE ACETATE 10 MG: 10 TABLET ORAL at 07:40

## 2019-06-05 RX ADMIN — FOLIC ACID 1 MG: 1 TABLET ORAL at 07:40

## 2019-06-05 RX ADMIN — PREGABALIN 50 MG: 50 CAPSULE ORAL at 07:41

## 2019-06-05 RX ADMIN — SIMETHICONE CHEW TAB 80 MG 160 MG: 80 TABLET ORAL at 17:02

## 2019-06-05 RX ADMIN — MULTIPLE VITAMINS W/ MINERALS TAB 1 TABLET: TAB at 07:41

## 2019-06-05 RX ADMIN — OMEPRAZOLE 20 MG: 20 CAPSULE, DELAYED RELEASE ORAL at 07:41

## 2019-06-05 RX ADMIN — HYOSCYAMINE SULFATE 125 MCG: 0.12 TABLET ORAL at 17:02

## 2019-06-05 RX ADMIN — NICOTINE 1 PATCH: 7 PATCH TRANSDERMAL at 07:40

## 2019-06-05 RX ADMIN — MAGNESIUM HYDROXIDE 30 ML: 400 SUSPENSION ORAL at 20:11

## 2019-06-05 RX ADMIN — SIMETHICONE CHEW TAB 80 MG 80 MG: 80 TABLET ORAL at 05:37

## 2019-06-05 ASSESSMENT — ACTIVITIES OF DAILY LIVING (ADL)
ADLS_ACUITY_SCORE: 14

## 2019-06-05 NOTE — PLAN OF CARE
Patient A&O x 4 this shift. VSS on RA. Patient up independently in room. Patient having loose BMs this shift, so Senna S held. No c/o pain, but states she feels more bloated. Patient requesting page to MD team for imaging of belly to be redone. Will page team. Continue with current plan of care.

## 2019-06-05 NOTE — PROGRESS NOTES
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     Changes Today:  -scheduled simethicone and prn hyoscyamine     Acute blood loss anemia 2/2 vaginal bleeding resulting in iron deficiency anemia    No improvement with initiation of progesterone and estradiol 5/31.  TXA added 6/1. The patient has had a decrease in blood clots passed on 6/3 consistent with improvement in her bleeding. Encouraged walks, even short ones, to reduce risk of DVT. Iron panel was checked that was consistent with iron deficiency and with her low hgb she will require IV iron for replacement   - gynecology consulted  - hematology consulted, follow up with hematology 2 weeks after discharge   - Ferrous Sucrose administration for a total of 5 days to help promote reticulocytosis in the setting of iron deficiency   - provera 10 mg daily, gyn does not want to transition to PO contraceptives at this time  - estradiol patch 0.1 mg daily  - Discontinued tranexamic acid 1,300 BID on 6/4 as she has stopped bleeding  - holding PTA pentoxifylline   - transfusion history:               pRBC's: 2u (@ OSH), 2u (5/29), 1u (5/30), 1u 5/31, 1u 6/1    Abdominal Distension  Pt is having diarrhea, does not feel constipated. POC U/S revealed small pocket of ascites, not large enough to account for distension. Does note some gassiness.   Plan:  -scheduled simethicone  -prn hyoscyamine     H/o alcoholic hepatitis, with ultrasound now concerning for cirrhosis: Abdominal US findings also with superimposed hepatic steatosis.    - Gastroenterology referral at discharge     Alcohol use disorder: Declined chemical dependency consultation.  Discussed importance of alcohol  cessation over the course of the admission.   - thiamine 100 mg daily   - folate 1 mg daily      H/o polyneuropathy - continue PTA pregabalin 50 mg TID, nortriptyline 10 mg at bedtime     Constipation  KUB showed constipation. She was given miralax, milk of magnesium, and bisacodyl suppository that were not entirely helpful with alleviating constipation  - Lactulose  - Bisacodyl suppository         Diet: Vegetarian Diet    DVT Prophylaxis: VTE Prophylaxis contraindicated due to ongoing bleeding  Chavez Catheter: not present  Code Status: Full Code            Disposition Plan   Expected discharge: Tomorrow, recommended to prior living arrangement once hemoglobin stable.  Entered: Corinna Piña MD 06/05/2019, 5:07 PM       The patient's care was discussed with the Attending Physician, Dr. Mujica.    Corinna Piña MD PhD   Internal Medicine, PGY 2  p     Please see sticky note for cross cover information  ______________________________________________________________________    Interval History   No bleeding since yesterday morning. Denies Abd pain, chest pain, SOB. No dizziness.    Data reviewed today: I reviewed all medications, new labs and imaging results over the last 24 hours. I personally reviewed no images or EKG's today.    Physical Exam   Vital Signs: Temp: 99  F (37.2  C) Temp src: Oral BP: 131/71 Pulse: 110 Heart Rate: 116 Resp: 18 SpO2: 96 % O2 Device: None (Room air)    Weight: 158 lbs 12.8 oz    General: Patient lying in bed in no acute distress  HEENT: No Scleral icterus, MMM. EOMI  Cardiac: Tachycardic rate, regular rhythm. No m/r/g. Normal S1, S2.  Pulm: CTAB, no wheezes, or crackles. Normal respiratory effort  Abd: Soft, distended, non tender abdomen  Skin: No jaundice, No rash  Extremities: No LE edema  Joints: No inflammation noted on joints  Neuro: A&Ox3, no focal deficits    Data   Recent Labs   Lab 06/05/19  0500 06/04/19  0434 06/03/19  0739  06/02/19  0626   06/01/19  0502  05/31/19  0520  05/30/19  0410   WBC 7.1 6.1 7.2   < > 8.6  --  6.7  --  7.2  --  6.8   HGB 7.2* 7.9* 7.8*   < > 8.6*   < > 7.2*   < > 6.6*   < > 7.1*   MCV 90 89 90   < > 92  --  90  --  90  --  91    330 327   < > 308  --  254  --  210  --  209   INR  --   --   --   --   --   --  1.25*  --  1.31*  --  1.36*   NA  --  135  --   --  134  --  132*  --  132*  --  132*   POTASSIUM  --  3.6  --   --  3.8  --  4.0  --  4.0  --  4.0   CHLORIDE  --  106  --   --  103  --  105  --  104  --  102   CO2  --  24  --   --  22  --  22  --  22  --  24   BUN  --  5*  --   --  6*  --  7  --  6*  --  7   CR  --  0.40*  --   --  0.42*  --  0.43*  --  0.43*  --  0.43*   ANIONGAP  --  5  --   --  8  --  6  --  7  --  6   MARKO  --  8.0*  --   --  8.0*  --  8.0*  --  7.9*  --  7.8*   GLC  --  104*  --   --  70  --  95  --  97  --  101*   ALBUMIN  --  2.2*  --   --  2.2*  --  2.0*  --  2.2*  --  2.4*   PROTTOTAL  --  5.7*  --   --  5.8*  --  5.2*  --  5.0*  --  5.5*   BILITOTAL  --  1.1  --   --  2.3*  --  2.0*  --  1.6*  --  1.9*   ALKPHOS  --  172*  --   --  186*  --  192*  --  192*  --  221*   ALT  --  45  --   --  55*  --  52*  --  56*  --  71*   AST  --  120*  --   --  144*  --  150*  --  164*  --  221*    < > = values in this interval not displayed.

## 2019-06-05 NOTE — PLAN OF CARE
Alert and oriented x 4. Abdomen distended and still complained of discomfort but did not ask for anything. Up independently to the BR. Patient stated no vaginal bleeding.

## 2019-06-05 NOTE — PROGRESS NOTES
Hematology - Inpatient Consult Service  Progress Note   Date of Service: 06/05/2019    Patient: Yenifer Maher  MRN: [id number]  Admission Date: 5/28/2019  Hospital Day # 8    Reason for Consult: History of non-uremic calciphylaxis previously on enoxaparin prophylaxis, admitted with severe vaginal bleeding.      Interval History:  Doing better over the last few days. Vaginal bleeding has stopped. Dealing with more abdominal discomfort today. Had some diarrhea yesterday. Denies any other bleeding. Has stopped taking Amicar.     Physical Exam:    Blood pressure 131/71, pulse 110, temperature 99  F (37.2  C), temperature source Oral, resp. rate 18, weight 72 kg (158 lb 12.8 oz), SpO2 96 %.  General: alert and cooperative, lying in bed, no acute distress  HEENT: sclera anicteric, EOMI, MMM  Resp: normal respiratory effort on ambient air  GI: soft, mildly distended, tender to palpation  MSK: warm and well-perfused, no edema  Skin: previously noted skin lesions are markedly improved  Neuro: AOx3, moves all extremities equally, no focal deficits    Inpatient Medications:  Current Facility-Administered Medications   Medication     bisacodyl (DULCOLAX) EC tablet 10 mg     bisacodyl (DULCOLAX) Suppository 10 mg     calcium carbonate (TUMS) chewable tablet 500 mg     Daily 2 GRAM acetaminophen limit, unless fulminent liver failure or transaminases greater than or equal to 300 - 400, then none     diphenhydrAMINE (BENADRYL) injection 50 mg     EPINEPHrine (ADRENALIN) kit 0.3 mg     estradiol (VIVELLE-DOT) 0.1 MG/24HR BIW patch 1 patch     estradiol biweekly (VIVELLE-DOT ) patch REMOVAL     estradiol biweekly (VIVELLE-DOT) Patch in Place     folic acid (FOLVITE) tablet 1 mg     hyoscyamine (ANASPAZ/LEVSIN) tablet 125 mcg     iron sucrose (VENOFER) 200 mg in sodium chloride 0.9 % 100 mL intermittent infusion     lactulose (CHRONULAC) solution 20 g     Lidocaine (LIDOCARE) 4 % Patch 2 patch    And     lidocaine patch REMOVAL     And     lidocaine patch in PLACE     lidocaine (LMX4) cream     lidocaine 1 % 0.1-1 mL     magnesium hydroxide (MILK OF MAGNESIA) suspension 30 mL     magnesium sulfate 2 g in NS intermittent infusion ([employer]ar[employer] or [facility] Cmpd)     magnesium sulfate 4 g in 100 mL sterile water (premade)     medroxyPROGESTERone (PROVERA) tablet 10 mg     melatonin tablet 1 mg     methylPREDNISolone sodium succinate (solu-MEDROL) injection 125 mg     multivitamin w/minerals (THERA-VIT-M) tablet 1 tablet     naloxone (NARCAN) injection 0.1-0.4 mg     nicotine (NICODERM CQ) 7 MG/24HR 24 hr patch 1 patch     nicotine Patch in Place     nicotine patch REMOVAL     nortriptyline (PAMELOR) capsule 10 mg     omeprazole (priLOSEC) CR capsule 20 mg     ondansetron (ZOFRAN-ODT) ODT tab 4 mg    Or     ondansetron (ZOFRAN) injection 4 mg     polyethylene glycol (MIRALAX/GLYCOLAX) Packet 17 g     potassium chloride (KLOR-CON) Packet 20-40 mEq     potassium chloride 10 mEq in 100 mL intermittent infusion with 10 mg lidocaine     potassium chloride 10 mEq in 100 mL sterile water intermittent infusion (premix)     potassium chloride 20 mEq in 50 mL intermittent infusion     potassium chloride ER (K-DUR/KLOR-CON M) CR tablet 20-40 mEq     potassium phosphate 10 mmol in D5W 250 mL intermittent infusion     potassium phosphate 15 mmol in D5W 250 mL intermittent infusion     potassium phosphate 20 mmol in D5W 250 mL intermittent infusion     potassium phosphate 20 mmol in D5W 500 mL intermittent infusion     potassium phosphate 25 mmol in D5W 500 mL intermittent infusion     pregabalin (LYRICA) capsule 50 mg     prochlorperazine (COMPAZINE) injection 10 mg    Or     prochlorperazine (COMPAZINE) tablet 10 mg    Or     prochlorperazine (COMPAZINE) Suppository 25 mg     ranitidine (ZANTAC) injection 50 mg     senna-docusate (SENOKOT-S/PERICOLACE) 8.6-50 MG per tablet 1 tablet    Or     senna-docusate (SENOKOT-S/PERICOLACE) 8.6-50 MG per tablet 2 tablet      senna-docusate (SENOKOT-S/PERICOLACE) 8.6-50 MG per tablet 1 tablet    Or     senna-docusate (SENOKOT-S/PERICOLACE) 8.6-50 MG per tablet 2 tablet     simethicone (MYLICON) chewable tablet 160 mg     sodium chloride (PF) 0.9% PF flush 3 mL     sodium chloride (PF) 0.9% PF flush 3 mL     sodium-potassium bicarbonate-citric acid (FRIDA-SELTZER GOLD) solu-tab 1 tablet     traMADol (ULTRAM) half-tab 25 mg     vitamin B1 (THIAMINE) tablet 100 mg       Labs & Studies: I personally reviewed the following studies:  ROUTINE LABS (Last four results):  CMP  Recent Labs   Lab 06/04/19  0434 06/02/19  0626 06/01/19  0502 05/31/19  0520 05/30/19  0410  05/29/19  2349    134 132* 132* 132*   < >  --    POTASSIUM 3.6 3.8 4.0 4.0 4.0   < >  --    CHLORIDE 106 103 105 104 102   < >  --    CO2 24 22 22 22 24   < >  --    ANIONGAP 5 8 6 7 6   < >  --    * 70 95 97 101*   < >  --    BUN 5* 6* 7 6* 7   < >  --    CR 0.40* 0.42* 0.43* 0.43* 0.43*   < >  --    GFRESTIMATED >90 >90 >90 >90 >90   < >  --    GFRESTBLACK >90 >90 >90 >90 >90   < >  --    MARKO 8.0* 8.0* 8.0* 7.9* 7.8*   < >  --    MAG  --   --  2.1  --  1.9  --   --    PHOS  --   --  3.2  --   --   --  2.8   PROTTOTAL 5.7* 5.8* 5.2* 5.0* 5.5*   < >  --    ALBUMIN 2.2* 2.2* 2.0* 2.2* 2.4*   < >  --    BILITOTAL 1.1 2.3* 2.0* 1.6* 1.9*   < >  --    ALKPHOS 172* 186* 192* 192* 221*   < >  --    * 144* 150* 164* 221*   < >  --    ALT 45 55* 52* 56* 71*   < >  --     < > = values in this interval not displayed.     CBC  Recent Labs   Lab 06/05/19  0500 06/04/19  0434 06/03/19  0739 06/02/19  1917   WBC 7.1 6.1 7.2 7.3   RBC 2.57* 2.84* 2.79* 2.53*   HGB 7.2* 7.9* 7.8* 7.3*   HCT 23.1* 25.3* 25.0* 22.2*   MCV 90 89 90 88   MCH 28.0 27.8 28.0 28.9   MCHC 31.2* 31.2* 31.2* 32.9   RDW 15.3* 15.1* 15.3* 15.0    330 327 261     INR  Recent Labs   Lab 06/01/19  0502 05/31/19  0520 05/30/19  0410   INR 1.25* 1.31* 1.36*       Assessment & Plan:   Yenifer rivera a  39 year old woman with history notable for alcoholic hepatitis, alcohol abuse and non-uremic calciphylaxis. She was previously on enoxaparin prophylaxis, but was admitted with heavy menstrual bleeding which required pRBC transfusions. Gynecology consulted for assistance, now vaginal bleeding has stopped with Provera and an estradiol patch, and Amicar. Now stable and off Amicar though not back on any anticoagulation yet.    Recommendations:    No further Amicar at this time.    Appreciate assistance of the gynecology team in work-up of menorrhagia. Agree that IUD may be helpful.    Would not resume enoxaparin at this time. Recommend aspirin 81 mg daily when menstrual bleeding is resolved.      The hematology team will sign off. Please do not hesitate to page with any questions or concerns.    Patient was seen and plan of care was discussed with attending physician Dr. Jeffery.    Soumya Barr MD/PhD  Heme/Onc Fellow  Pager [phone]    Attending Note:  I have reviewed the patient chart, and interviewed and examined the patient.  I agree with the assessment and plan. She was on enoxaparin for non-uremic calciphylaxis, which has resolved. The prophylactic dose of enoxaparin she was receiving (40 mg subcutaneous daily in the setting of normal renal function) will not cause 12 days of continuous heavy menses. There were clearly mainly gyn factors contributing. Regardless, the calciphylaxis has nearly completely resolved, with just some small patches of erythema. She can be started on aspirin 81 mg daily. No f/u in hematology clinic is needed.     Aleida Jeffery MD  Hematology

## 2019-06-05 NOTE — PLAN OF CARE
Patient A&O x 4 this shift. VSS on RA. Patient up independently. PIV saline locked. Patient c/o increased bloating and abdominal distention this shift. Patient states she had an abdominal ultrasound this shift, but no order seen. Prn Simethicone and Mayda Everett given x 1 this shift. Reports vaginal bleeding has stopped. Continue with current plan of care.

## 2019-06-05 NOTE — PROGRESS NOTES
Gynecology Progress Note    Subjective:  Patient is doing okay. No bleeding for 2 days. Complaining of abdominal distension today. States that she has been having bowel movements though not today. Passing flatus. States someone told her there was fluid in her abdomen on ultrasound. Is engaged and hopes to start a family shortly after their marriage.     Objective:  Vitals:    06/04/19 1528 06/04/19 2150 06/05/19 0751 06/05/19 1405   BP:  124/76 122/69 131/71   BP Location:  Left arm Left arm Left arm   Pulse:  110     Resp:  16 18 18   Temp:  98.4  F (36.9  C) 98.9  F (37.2  C) 99  F (37.2  C)   TempSrc:  Oral Oral Oral   SpO2:  97% 97% 96%   Weight: 72 kg (158 lb 12.8 oz)        General:  A&Ox3. NAD. Resting in bed  CV: RRR.  Pulm: Normal respiratory effort.  Abd: Moderately-distended, tympanic, non tender.    Assessment/Plan:  Yenifer Maher is a 39 year old with pmh alcoholic hepatitis with possible cirrhosis admitted for heavy vaginal bleeding. Patient has had no further bleeding for 2 days and hemoglobin is stable. TXA discontinued yesterday without issue. Discussed with Medicine team who plans discharge home tomorrow 6/6. Recommended discharge home on current regimen - Vivelle 0.1mg/24hr, Provera 10mg per day. Discussed IUD insertion with patient, she is somewhat hesitant for this and has concerns about it affecting future fertility despite extensive counseling. She will think about the IUD and is agreeable to close follow up in clinic. Will send a message to our schedulers to have her scheduled next week. Will also need to coordinate with hematology on when/if they would want to restart lovenox.    With regards to her new abdominal distension, progesterone can cause bloating though would not expect this degree of distension to be from the progesterone. Would consider other etiologies.     Patient also had questions about future fertility. We discussed that would not advise pregnancy at this time. Recommended  working on her active health issues and optimizing her health. When she would like to start trying for pregnancy recommended appointment with Maternal-Fetal-Medicine for preconception counseling. We also briefly discussed her alcohol use which she did not feel like would be an issue if she were to achieve pregnancy.    Discussed with Dr. Sin Guerrero MD  Ob/Gyn PGY-1  June 5, 2019 4:16 PM    [facility] staff:  Appreciate note by Dr. Guerrero.  I have reviewed, edited, and agree with the note.      Zuleyma Vogt MD, FACOG

## 2019-06-06 ENCOUNTER — PATIENT OUTREACH (OUTPATIENT)
Dept: CARE COORDINATION | Facility: CLINIC | Age: 39
End: 2019-06-06

## 2019-06-06 ENCOUNTER — APPOINTMENT (OUTPATIENT)
Dept: PHYSICAL THERAPY | Facility: CLINIC | Age: 39
DRG: 760 | End: 2019-06-06
Attending: PEDIATRICS
Payer: COMMERCIAL

## 2019-06-06 VITALS
RESPIRATION RATE: 18 BRPM | TEMPERATURE: 97.8 F | BODY MASS INDEX: 24.87 KG/M2 | DIASTOLIC BLOOD PRESSURE: 73 MMHG | SYSTOLIC BLOOD PRESSURE: 126 MMHG | WEIGHT: 158.8 LBS | OXYGEN SATURATION: 97 % | HEART RATE: 110 BPM

## 2019-06-06 LAB
ERYTHROCYTE [DISTWIDTH] IN BLOOD BY AUTOMATED COUNT: 15.5 % (ref 10–15)
HCT VFR BLD AUTO: 24.5 % (ref 35–47)
HGB BLD-MCNC: 7.4 G/DL (ref 11.7–15.7)
MCH RBC QN AUTO: 27.9 PG (ref 26.5–33)
MCHC RBC AUTO-ENTMCNC: 30.2 G/DL (ref 31.5–36.5)
MCV RBC AUTO: 93 FL (ref 78–100)
PLATELET # BLD AUTO: 335 10E9/L (ref 150–450)
RBC # BLD AUTO: 2.65 10E12/L (ref 3.8–5.2)
WBC # BLD AUTO: 7.2 10E9/L (ref 4–11)

## 2019-06-06 PROCEDURE — 36415 COLL VENOUS BLD VENIPUNCTURE: CPT | Performed by: STUDENT IN AN ORGANIZED HEALTH CARE EDUCATION/TRAINING PROGRAM

## 2019-06-06 PROCEDURE — 25000132 ZZH RX MED GY IP 250 OP 250 PS 637: Performed by: INTERNAL MEDICINE

## 2019-06-06 PROCEDURE — 85027 COMPLETE CBC AUTOMATED: CPT | Performed by: STUDENT IN AN ORGANIZED HEALTH CARE EDUCATION/TRAINING PROGRAM

## 2019-06-06 PROCEDURE — 97530 THERAPEUTIC ACTIVITIES: CPT | Mod: GP

## 2019-06-06 PROCEDURE — 25000132 ZZH RX MED GY IP 250 OP 250 PS 637: Performed by: OBSTETRICS & GYNECOLOGY

## 2019-06-06 PROCEDURE — 99239 HOSP IP/OBS DSCHRG MGMT >30: CPT | Mod: GC | Performed by: INTERNAL MEDICINE

## 2019-06-06 PROCEDURE — 25000132 ZZH RX MED GY IP 250 OP 250 PS 637: Performed by: HOSPITALIST

## 2019-06-06 PROCEDURE — 25000132 ZZH RX MED GY IP 250 OP 250 PS 637: Performed by: SURGERY

## 2019-06-06 PROCEDURE — 25000132 ZZH RX MED GY IP 250 OP 250 PS 637: Performed by: STUDENT IN AN ORGANIZED HEALTH CARE EDUCATION/TRAINING PROGRAM

## 2019-06-06 RX ORDER — SIMETHICONE 80 MG
80 TABLET,CHEWABLE ORAL EVERY 6 HOURS PRN
Qty: 30 TABLET | Refills: 0 | Status: ON HOLD | OUTPATIENT
Start: 2019-06-06 | End: 2019-08-13

## 2019-06-06 RX ORDER — BISACODYL 10 MG
10 SUPPOSITORY, RECTAL RECTAL DAILY
Qty: 10 SUPPOSITORY | Refills: 0 | Status: ON HOLD | OUTPATIENT
Start: 2019-06-07 | End: 2019-08-13

## 2019-06-06 RX ORDER — LANOLIN ALCOHOL/MO/W.PET/CERES
100 CREAM (GRAM) TOPICAL DAILY
Qty: 30 TABLET | Refills: 0 | Status: ON HOLD | OUTPATIENT
Start: 2019-06-06 | End: 2020-11-27

## 2019-06-06 RX ORDER — FEXOFENADINE HCL 180 MG/1
180 TABLET ORAL DAILY PRN
Qty: 30 TABLET | Refills: 0 | Status: ON HOLD | OUTPATIENT
Start: 2019-06-06 | End: 2020-06-11

## 2019-06-06 RX ORDER — LANOLIN ALCOHOL/MO/W.PET/CERES
400 CREAM (GRAM) TOPICAL DAILY
Qty: 30 TABLET | Refills: 0 | Status: ON HOLD | OUTPATIENT
Start: 2019-06-06 | End: 2020-11-27

## 2019-06-06 RX ORDER — FOLIC ACID 1 MG/1
1 TABLET ORAL DAILY
Qty: 30 TABLET | Refills: 0 | Status: ON HOLD | OUTPATIENT
Start: 2019-06-07 | End: 2020-12-06

## 2019-06-06 RX ORDER — PREGABALIN 50 MG/1
50 CAPSULE ORAL 3 TIMES DAILY
Qty: 30 CAPSULE | Refills: 0 | Status: ON HOLD | OUTPATIENT
Start: 2019-06-06 | End: 2020-11-27

## 2019-06-06 RX ORDER — PANTOPRAZOLE SODIUM 20 MG/1
20 TABLET, DELAYED RELEASE ORAL DAILY
Qty: 30 TABLET | Refills: 0 | Status: ON HOLD | OUTPATIENT
Start: 2019-06-06 | End: 2019-08-14

## 2019-06-06 RX ORDER — FOLIC ACID 1 MG/1
1 TABLET ORAL DAILY
Qty: 30 TABLET | Refills: 0 | Status: SHIPPED | OUTPATIENT
Start: 2019-06-07 | End: 2019-06-06

## 2019-06-06 RX ORDER — MULTIVITAMIN WITH IRON
1 TABLET ORAL DAILY
Qty: 30 TABLET | Refills: 0 | Status: ON HOLD | OUTPATIENT
Start: 2019-06-06 | End: 2020-11-27

## 2019-06-06 RX ORDER — ESTRADIOL 0.1 MG/D
1 FILM, EXTENDED RELEASE TRANSDERMAL
Qty: 15 PATCH | Refills: 0 | Status: SHIPPED | OUTPATIENT
Start: 2019-06-10 | End: 2019-12-23

## 2019-06-06 RX ORDER — BISACODYL 5 MG
10 TABLET, DELAYED RELEASE (ENTERIC COATED) ORAL DAILY PRN
Qty: 30 TABLET | Refills: 0 | Status: ON HOLD | OUTPATIENT
Start: 2019-06-06 | End: 2019-08-13

## 2019-06-06 RX ORDER — HYOSCYAMINE SULFATE 0.125 MG
125 TABLET ORAL EVERY 4 HOURS PRN
Qty: 30 TABLET | Refills: 0 | Status: SHIPPED | OUTPATIENT
Start: 2019-06-06 | End: 2019-06-06

## 2019-06-06 RX ORDER — LANOLIN ALCOHOL/MO/W.PET/CERES
100 CREAM (GRAM) TOPICAL DAILY
Qty: 30 TABLET | Refills: 0 | Status: SHIPPED | OUTPATIENT
Start: 2019-06-06 | End: 2019-06-06

## 2019-06-06 RX ORDER — NORTRIPTYLINE HCL 10 MG
10 CAPSULE ORAL AT BEDTIME
Qty: 30 CAPSULE | Refills: 0 | Status: ON HOLD | OUTPATIENT
Start: 2019-06-06 | End: 2020-12-06

## 2019-06-06 RX ORDER — MEDROXYPROGESTERONE ACETATE 10 MG
10 TABLET ORAL DAILY
Qty: 30 TABLET | Refills: 0 | Status: ON HOLD | OUTPATIENT
Start: 2019-06-07 | End: 2019-08-13

## 2019-06-06 RX ORDER — HYOSCYAMINE SULFATE 0.125 MG
125 TABLET ORAL EVERY 4 HOURS PRN
Qty: 30 TABLET | Refills: 0 | Status: ON HOLD | OUTPATIENT
Start: 2019-06-06 | End: 2019-08-13

## 2019-06-06 RX ORDER — PRENATAL VIT/IRON FUM/FOLIC AC 27MG-0.8MG
1 TABLET ORAL DAILY
Qty: 30 TABLET | Refills: 0 | Status: ON HOLD | OUTPATIENT
Start: 2019-06-06 | End: 2020-11-27

## 2019-06-06 RX ORDER — ESTRADIOL 0.1 MG/D
1 FILM, EXTENDED RELEASE TRANSDERMAL
Qty: 15 PATCH | Refills: 0 | Status: SHIPPED | OUTPATIENT
Start: 2019-06-10 | End: 2019-06-06

## 2019-06-06 RX ORDER — BISACODYL 5 MG
10 TABLET, DELAYED RELEASE (ENTERIC COATED) ORAL DAILY PRN
Qty: 30 TABLET | Refills: 0 | Status: SHIPPED | OUTPATIENT
Start: 2019-06-06 | End: 2019-06-06

## 2019-06-06 RX ORDER — BISACODYL 10 MG
10 SUPPOSITORY, RECTAL RECTAL DAILY
Qty: 10 SUPPOSITORY | Refills: 0 | Status: SHIPPED | OUTPATIENT
Start: 2019-06-07 | End: 2019-06-06

## 2019-06-06 RX ORDER — ASPIRIN 81 MG/1
81 TABLET ORAL DAILY
Status: DISCONTINUED | OUTPATIENT
Start: 2019-06-06 | End: 2019-06-06 | Stop reason: HOSPADM

## 2019-06-06 RX ADMIN — Medication 100 MG: at 07:59

## 2019-06-06 RX ADMIN — ASPIRIN 81 MG: 81 TABLET, COATED ORAL at 08:32

## 2019-06-06 RX ADMIN — NICOTINE 1 PATCH: 7 PATCH TRANSDERMAL at 07:58

## 2019-06-06 RX ADMIN — MAGNESIUM HYDROXIDE 30 ML: 400 SUSPENSION ORAL at 07:57

## 2019-06-06 RX ADMIN — Medication 1 TABLET: at 13:06

## 2019-06-06 RX ADMIN — PREGABALIN 50 MG: 50 CAPSULE ORAL at 14:12

## 2019-06-06 RX ADMIN — ESTRADIOL 1 PATCH: 0.1 PATCH TRANSDERMAL at 08:00

## 2019-06-06 RX ADMIN — PREGABALIN 50 MG: 50 CAPSULE ORAL at 07:59

## 2019-06-06 RX ADMIN — SIMETHICONE CHEW TAB 80 MG 160 MG: 80 TABLET ORAL at 07:59

## 2019-06-06 RX ADMIN — SIMETHICONE CHEW TAB 80 MG 160 MG: 80 TABLET ORAL at 11:40

## 2019-06-06 RX ADMIN — SENNOSIDES AND DOCUSATE SODIUM 2 TABLET: 8.6; 5 TABLET ORAL at 07:59

## 2019-06-06 RX ADMIN — OMEPRAZOLE 20 MG: 20 CAPSULE, DELAYED RELEASE ORAL at 07:59

## 2019-06-06 RX ADMIN — LACTULOSE 20 G: 20 SOLUTION ORAL at 07:57

## 2019-06-06 RX ADMIN — BISACODYL 10 MG: 10 SUPPOSITORY RECTAL at 08:00

## 2019-06-06 RX ADMIN — POLYETHYLENE GLYCOL 3350 17 G: 17 POWDER, FOR SOLUTION ORAL at 07:57

## 2019-06-06 RX ADMIN — MEDROXYPROGESTERONE ACETATE 10 MG: 10 TABLET ORAL at 07:58

## 2019-06-06 RX ADMIN — MULTIPLE VITAMINS W/ MINERALS TAB 1 TABLET: TAB at 07:59

## 2019-06-06 RX ADMIN — HYOSCYAMINE SULFATE 125 MCG: 0.12 TABLET ORAL at 16:16

## 2019-06-06 RX ADMIN — FOLIC ACID 1 MG: 1 TABLET ORAL at 07:59

## 2019-06-06 RX ADMIN — SIMETHICONE CHEW TAB 80 MG 160 MG: 80 TABLET ORAL at 16:16

## 2019-06-06 ASSESSMENT — ACTIVITIES OF DAILY LIVING (ADL)
ADLS_ACUITY_SCORE: 14
ADLS_ACUITY_SCORE: 15
ADLS_ACUITY_SCORE: 14

## 2019-06-06 NOTE — PLAN OF CARE
Discharge Planner PT 5B  Patient plan for discharge: home  Current status: pt endorsing increased abdominal discomfort this AM; is up IND in room. Declines additional activity with PT in AM; denies concern with return home.   Barriers to return to prior living situation: medical status  Recommendations for discharge: home with assist  Rationale for recommendations: pt mobilizing independently, will benefit from assist for heavy ADLs.        Entered by: Norberto Montejo 06/06/2019 3:46 PM

## 2019-06-06 NOTE — PROVIDER NOTIFICATION
"Web page sent to luke titus ([id number]):    SHIRA in 32-1 on 5B: Pt c/o worsening abd pain.  Very distended and tender, has taken available PRNs (declining tramadol, \"ineffective\"). BS dim. Can you come see her?     MD returned page, believes pain is r/t large stool burden, will encourage pt to take bowel meds (including suppository).  MD said that he will stop by to see pt this evening.    "

## 2019-06-06 NOTE — PLAN OF CARE
A&Ox4, tachycardic (110s) otherwise VSS on RA.  Pt c/o worsening abd pain/fullness, received hycosamine and simethicone which were not effective.  XR shows large stool burden - given senna, milk of mag, and bisacodyl suppository.  Small BM x2, reports minor relief of abd pain.  Poor appetite.  Denies vag bleeding.  Up ad marcelo.  Continue to monitor and follow POC.

## 2019-06-06 NOTE — PLAN OF CARE
Pt discharged around 1630 from .  Pt cancelled transport, so CNA took pt to discharge pharmacy via wheelchair to get meds.  Pt says that pharmacy is missing some of her home meds, so Sundar LORA updated and will address issue.  Pt dropped off at front entrance where her father will pick her up in private vehicle.      Addendum:  After being discharged from unit, pt realized that MD did not reorder her home meds. Per Discharge pharmacy, pt called them, was under the impression that tonight a  could bring her these meds and discharge paperwork tonight (pt states she left AVS in bed, but not found in pt's room).  Pharmacist will have pt have scripts transferred to home pharm tomorrow.  Pt can access her AVS on Octopart.

## 2019-06-06 NOTE — PLAN OF CARE
Discharge Planner OT   Patient plan for discharge: Home.  Current status: Abdominal pain with increased movement limiting this morning. Patient able to demonstrate independence with simple transitions and repositioning, however, unable to perform mobility tasks out of bed due to pain and distention through abdomen.  Barriers to return to prior living situation: Endurance, pain.  Recommendations for discharge: Home with assistance from fiance' as needed.  Rationale for recommendations: Patient will continue to benefit from skilled OT with IP to progress functional independence.       Entered by: Diamond Blandon 06/05/2019 10:40 PM

## 2019-06-06 NOTE — PLAN OF CARE
Patient A&O x 4 this shift. VSS on RA. Patient up independently in room. Patient c/o increased bloating and distention and was educated about importance of maintaining bowel regimen and increasing activity in order to move bowels. All discharge paperwork reviewed with patient, all questions answered and all belongings sent. Patient advised to stop by discharge pharmacy on way out to  medications. Patient in agreement with discharge plan, but is stalling in regards to letting ride know she is ready. Continue to encourage her to pack up her belongings and call her ride.

## 2019-06-06 NOTE — PLAN OF CARE
Assumed cares at 2300. Pt A/O, VSS. Pt up ad marcelo on unit, to bathroom, no stool output during shift. Belly appears to have become more distended, pt c/o discomfort but was able to rest. She was observed to snack on fruit and candy during shift, still has some food in pt refrigerator. Hgb was 7.4 this a.m., pt will likely discharge in a.m. Will continue to monitor.

## 2019-06-07 NOTE — PLAN OF CARE
Physical Therapy Discharge Summary    Reason for therapy discharge:    Discharged to home.    Progress towards therapy goal(s). See goals on Care Plan in [facility] electronic health record for goal details.  Goals partially met.  Barriers to achieving goals:   discharge from facility.    Therapy recommendation(s):    No further therapy is recommended.

## 2019-06-11 ENCOUNTER — DOCUMENTATION ONLY (OUTPATIENT)
Dept: OTHER | Facility: CLINIC | Age: 39
End: 2019-06-11

## 2019-06-19 DIAGNOSIS — R58 HEMORRHAGE: Primary | ICD-10-CM

## 2019-06-21 ENCOUNTER — APPOINTMENT (OUTPATIENT)
Dept: GENERAL RADIOLOGY | Facility: CLINIC | Age: 39
End: 2019-06-21
Attending: EMERGENCY MEDICINE
Payer: COMMERCIAL

## 2019-06-21 ENCOUNTER — APPOINTMENT (OUTPATIENT)
Dept: ULTRASOUND IMAGING | Facility: CLINIC | Age: 39
End: 2019-06-21
Attending: EMERGENCY MEDICINE
Payer: COMMERCIAL

## 2019-06-21 ENCOUNTER — HOSPITAL ENCOUNTER (EMERGENCY)
Facility: CLINIC | Age: 39
Discharge: HOME | End: 2019-06-21
Admitting: EMERGENCY MEDICINE
Payer: COMMERCIAL

## 2019-06-21 ENCOUNTER — APPOINTMENT (OUTPATIENT)
Dept: CT IMAGING | Facility: CLINIC | Age: 39
End: 2019-06-21
Attending: EMERGENCY MEDICINE
Payer: COMMERCIAL

## 2019-06-21 VITALS
DIASTOLIC BLOOD PRESSURE: 83 MMHG | OXYGEN SATURATION: 97 % | TEMPERATURE: 99.2 F | WEIGHT: 155 LBS | SYSTOLIC BLOOD PRESSURE: 138 MMHG | HEART RATE: 119 BPM | HEIGHT: 67 IN | RESPIRATION RATE: 16 BRPM | BODY MASS INDEX: 24.33 KG/M2

## 2019-06-21 DIAGNOSIS — D12.6 ADENOMATOSIS: ICD-10-CM

## 2019-06-21 DIAGNOSIS — M79.89 SWELLING OF BOTH LOWER EXTREMITIES: ICD-10-CM

## 2019-06-21 DIAGNOSIS — K82.8 SLUDGE IN GALLBLADDER: ICD-10-CM

## 2019-06-21 DIAGNOSIS — R18.8 CIRRHOSIS OF LIVER WITH ASCITES, UNSPECIFIED HEPATIC CIRRHOSIS TYPE (H): ICD-10-CM

## 2019-06-21 DIAGNOSIS — K76.6 PORTAL HYPERTENSION (H): ICD-10-CM

## 2019-06-21 DIAGNOSIS — R19.00 SWELLING OF ABDOMEN: ICD-10-CM

## 2019-06-21 DIAGNOSIS — K74.60 CIRRHOSIS OF LIVER WITH ASCITES, UNSPECIFIED HEPATIC CIRRHOSIS TYPE (H): ICD-10-CM

## 2019-06-21 LAB
ALBUMIN SERPL-MCNC: 2.6 G/DL (ref 3.4–5)
ALBUMIN UR-MCNC: NEGATIVE MG/DL
ALP SERPL-CCNC: 293 U/L (ref 40–150)
ALT SERPL W P-5'-P-CCNC: 38 U/L (ref 0–50)
AMMONIA PLAS-SCNC: 41 UMOL/L (ref 10–50)
ANION GAP SERPL CALCULATED.3IONS-SCNC: 10 MMOL/L (ref 3–14)
APPEARANCE UR: CLEAR
APTT PPP: 45 SEC (ref 22–37)
AST SERPL W P-5'-P-CCNC: 171 U/L (ref 0–45)
BASOPHILS # BLD AUTO: 0.1 10E9/L (ref 0–0.2)
BASOPHILS NFR BLD AUTO: 0.8 %
BILIRUB SERPL-MCNC: 0.8 MG/DL (ref 0.2–1.3)
BILIRUB UR QL STRIP: NEGATIVE
BUN SERPL-MCNC: 3 MG/DL (ref 7–30)
CALCIUM SERPL-MCNC: 8.6 MG/DL (ref 8.5–10.1)
CHLORIDE SERPL-SCNC: 108 MMOL/L (ref 94–109)
CO2 SERPL-SCNC: 21 MMOL/L (ref 20–32)
COLOR UR AUTO: ABNORMAL
CREAT SERPL-MCNC: 0.38 MG/DL (ref 0.52–1.04)
DIFFERENTIAL METHOD BLD: ABNORMAL
EOSINOPHIL # BLD AUTO: 0 10E9/L (ref 0–0.7)
EOSINOPHIL NFR BLD AUTO: 0.5 %
ERYTHROCYTE [DISTWIDTH] IN BLOOD BY AUTOMATED COUNT: 17 % (ref 10–15)
GFR SERPL CREATININE-BSD FRML MDRD: >90 ML/MIN/{1.73_M2}
GLUCOSE SERPL-MCNC: 98 MG/DL (ref 70–99)
GLUCOSE UR STRIP-MCNC: NEGATIVE MG/DL
HCG SERPL QL: NEGATIVE
HCT VFR BLD AUTO: 31.7 % (ref 35–47)
HGB BLD-MCNC: 9.8 G/DL (ref 11.7–15.7)
HGB UR QL STRIP: NEGATIVE
IMM GRANULOCYTES # BLD: 0 10E9/L (ref 0–0.4)
IMM GRANULOCYTES NFR BLD: 0.3 %
INR PPP: 1.25 (ref 0.86–1.14)
KETONES UR STRIP-MCNC: NEGATIVE MG/DL
LEUKOCYTE ESTERASE UR QL STRIP: NEGATIVE
LIPASE SERPL-CCNC: 141 U/L (ref 73–393)
LYMPHOCYTES # BLD AUTO: 1 10E9/L (ref 0.8–5.3)
LYMPHOCYTES NFR BLD AUTO: 15.7 %
MCH RBC QN AUTO: 28 PG (ref 26.5–33)
MCHC RBC AUTO-ENTMCNC: 30.9 G/DL (ref 31.5–36.5)
MCV RBC AUTO: 91 FL (ref 78–100)
MONOCYTES # BLD AUTO: 0.4 10E9/L (ref 0–1.3)
MONOCYTES NFR BLD AUTO: 6.4 %
NEUTROPHILS # BLD AUTO: 5 10E9/L (ref 1.6–8.3)
NEUTROPHILS NFR BLD AUTO: 76.3 %
NITRATE UR QL: NEGATIVE
NRBC # BLD AUTO: 0 10*3/UL
NRBC BLD AUTO-RTO: 0 /100
PH UR STRIP: 7.5 PH (ref 5–7)
PLATELET # BLD AUTO: 279 10E9/L (ref 150–450)
POTASSIUM SERPL-SCNC: 3.9 MMOL/L (ref 3.4–5.3)
PROT SERPL-MCNC: 7.7 G/DL (ref 6.8–8.8)
RBC # BLD AUTO: 3.5 10E12/L (ref 3.8–5.2)
SODIUM SERPL-SCNC: 139 MMOL/L (ref 133–144)
SOURCE: ABNORMAL
SP GR UR STRIP: 1.02 (ref 1–1.03)
TSH SERPL DL<=0.005 MIU/L-ACNC: 1.41 MU/L (ref 0.4–4)
UROBILINOGEN UR STRIP-MCNC: NORMAL MG/DL (ref 0–2)
WBC # BLD AUTO: 6.6 10E9/L (ref 4–11)

## 2019-06-21 PROCEDURE — 83690 ASSAY OF LIPASE: CPT | Performed by: EMERGENCY MEDICINE

## 2019-06-21 PROCEDURE — 85730 THROMBOPLASTIN TIME PARTIAL: CPT | Performed by: EMERGENCY MEDICINE

## 2019-06-21 PROCEDURE — 85610 PROTHROMBIN TIME: CPT | Performed by: EMERGENCY MEDICINE

## 2019-06-21 PROCEDURE — 80053 COMPREHEN METABOLIC PANEL: CPT | Performed by: EMERGENCY MEDICINE

## 2019-06-21 PROCEDURE — 71045 X-RAY EXAM CHEST 1 VIEW: CPT

## 2019-06-21 PROCEDURE — 25000128 H RX IP 250 OP 636: Performed by: EMERGENCY MEDICINE

## 2019-06-21 PROCEDURE — 96360 HYDRATION IV INFUSION INIT: CPT | Mod: 59 | Performed by: EMERGENCY MEDICINE

## 2019-06-21 PROCEDURE — 84443 ASSAY THYROID STIM HORMONE: CPT | Performed by: EMERGENCY MEDICINE

## 2019-06-21 PROCEDURE — 82140 ASSAY OF AMMONIA: CPT | Performed by: EMERGENCY MEDICINE

## 2019-06-21 PROCEDURE — 84703 CHORIONIC GONADOTROPIN ASSAY: CPT | Performed by: EMERGENCY MEDICINE

## 2019-06-21 PROCEDURE — 76700 US EXAM ABDOM COMPLETE: CPT

## 2019-06-21 PROCEDURE — 99285 EMERGENCY DEPT VISIT HI MDM: CPT | Mod: 25 | Performed by: EMERGENCY MEDICINE

## 2019-06-21 PROCEDURE — 74177 CT ABD & PELVIS W/CONTRAST: CPT

## 2019-06-21 PROCEDURE — 99284 EMERGENCY DEPT VISIT MOD MDM: CPT | Mod: Z6 | Performed by: EMERGENCY MEDICINE

## 2019-06-21 PROCEDURE — 93970 EXTREMITY STUDY: CPT

## 2019-06-21 PROCEDURE — 81003 URINALYSIS AUTO W/O SCOPE: CPT | Performed by: EMERGENCY MEDICINE

## 2019-06-21 PROCEDURE — 96361 HYDRATE IV INFUSION ADD-ON: CPT | Performed by: EMERGENCY MEDICINE

## 2019-06-21 PROCEDURE — 25800030 ZZH RX IP 258 OP 636: Performed by: EMERGENCY MEDICINE

## 2019-06-21 PROCEDURE — 85025 COMPLETE CBC W/AUTO DIFF WBC: CPT | Performed by: EMERGENCY MEDICINE

## 2019-06-21 RX ORDER — SODIUM CHLORIDE 9 MG/ML
1000 INJECTION, SOLUTION INTRAVENOUS CONTINUOUS
Status: DISCONTINUED | OUTPATIENT
Start: 2019-06-21 | End: 2019-06-22 | Stop reason: HOSPADM

## 2019-06-21 RX ORDER — IOPAMIDOL 755 MG/ML
95 INJECTION, SOLUTION INTRAVASCULAR ONCE
Status: COMPLETED | OUTPATIENT
Start: 2019-06-21 | End: 2019-06-21

## 2019-06-21 RX ADMIN — IOPAMIDOL 95 ML: 755 INJECTION, SOLUTION INTRAVENOUS at 21:09

## 2019-06-21 RX ADMIN — SODIUM CHLORIDE 1000 ML: 9 INJECTION, SOLUTION INTRAVENOUS at 22:28

## 2019-06-21 RX ADMIN — SODIUM CHLORIDE 1000 ML: 9 INJECTION, SOLUTION INTRAVENOUS at 20:35

## 2019-06-21 ASSESSMENT — MIFFLIN-ST. JEOR: SCORE: 1420.71

## 2019-06-21 ASSESSMENT — ENCOUNTER SYMPTOMS: ABDOMINAL DISTENTION: 1

## 2019-06-21 NOTE — ED TRIAGE NOTES
"Yenifer comes in with concerns for her hypertension at home which is a SBP of 148. She also has worsening swelling of her feet and a swollen belly \"that the doctors don't understand. I am getting the lesions on my thighs again-calcifilaxis. I have also had diarrhea all week and I think I am dehydrated.\"   "

## 2019-06-21 NOTE — ED PROVIDER NOTES
[facility]ERGENCY DEPARTMENT ([facility])  June 21, 2019    History     Chief Complaint   Patient presents with     Hypertension     HPI  Yenifer Maher is a 39 year old female with history notable for calciphylaxis and lupus anticoagulant positive (on Lovenox) who presents to the ED for bilateral leg swelling.  Patient also complains of subjective paresthesias in her bilateral hands and feet again and notes that she had a subjective fever.  She reports that she was hospitalized at the end of May for menorrhagia which was treated with hormones to control the bleeding.  She also received blood transfusions during that time.  Patient also currently complains of abdominal distention and lesions appearing on her inner thighs which is typical of her calciphylaxis.  She otherwise denies previous history of DVT or PE or previous abdominal surgeries.     Per chart review, patient was hospitalized here from 5/4 acute blood loss anemia secondary to vaginal bleeding.  With regards to her abdominal distention, they did do a point-of-care ultrasound that showed small pocket of ascites, but not large enough to account for distention.  Her ultrasound findings were notable for superimposed hepatic steatosis which they were concerned for cirrhosis.  Patient does have previous history of alcohol abuse, but patient had reported she was not concerned about her alcohol use and denied chemical dependency counseling.        PAST MEDICAL HISTORY  Past Medical History:   Diagnosis Date     Alcohol abuse      Alcoholic cirrhosis (H)      Alcoholic peripheral neuropathy (H)      Coagulopathy (H)      Gastritis      History of Clostridium difficile colitis     unknown date     Impairment of cognitive function     MOCA 2/2019 22/30     Leukocytosis 02/2019    persistent leukocytosis across 2/2019 hospitalization without evidence of source across multiple diagnostics including LP, BCx, UCx      Lupus anticoagulant positive       Macrocytic anemia      Moderate protein-calorie malnutrition (H)      Paroxysmal A-fib (H) 02/2019    not on chronic anticoagulation     Peptic ulcer disease      Positive SMILEY (antinuclear antibody)      Subclinical hypothyroidism 02/2019    normal T3, T4     Tobacco dependence     0.5 PPD     PAST SURGICAL HISTORY  No past surgical history on file.  FAMILY HISTORY  No family history on file.  SOCIAL HISTORY  Social History     Tobacco Use     Smoking status: Current Some Day Smoker     Smokeless tobacco: Never Used   Substance Use Topics     Alcohol use: Yes     Comment: rarely     MEDICATIONS  Current Facility-Administered Medications   Medication     sodium chloride 0.9% infusion     Current Outpatient Medications   Medication     aspirin (ASA) 81 MG EC tablet     bisacodyl (DULCOLAX) 10 MG suppository     bisacodyl (DULCOLAX) 5 MG EC tablet     estradiol (VIVELLE-DOT) 0.1 MG/24HR bi-weekly patch     fexofenadine (ALLEGRA) 180 MG tablet     folic acid (FOLVITE) 1 MG tablet     hyoscyamine (ANASPAZ/LEVSIN) 0.125 MG tablet     magnesium oxide (MAG-OX) 400 (240 Mg) MG tablet     medroxyPROGESTERone (PROVERA) 10 MG tablet     nortriptyline (PAMELOR) 10 MG capsule     nortriptyline (PAMELOR) 10 MG capsule     pantoprazole (PROTONIX) 20 MG EC tablet     pregabalin (LYRICA) 50 MG capsule     Prenatal Vit-Fe Fumarate-FA (PRENATAL MULTIVITAMIN W/IRON) 27-0.8 MG tablet     simethicone (MYLICON) 80 MG chewable tablet     vitamin (B COMPLEX-C) tablet     vitamin B1 (THIAMINE) 100 MG tablet     ALLERGIES  Allergies   Allergen Reactions     Bengay Pain Relief [Menthol] Other (See Comments)     Skin turns red and feels extremely hot     Cats      Chocolate Dermatitis     Dicyclomine Other (See Comments)     Severe sedation     Dust Mites      Derm, resp     No Clinical Screening - See Comments      GI upset     Phenobarbital      Pollen Extract      Derm, resp.        I have reviewed the Medications, Allergies, Past Medical and  "Surgical History, and Social History in the Epic system.    Review of Systems   Cardiovascular: Positive for leg swelling (bilateral).   Gastrointestinal: Positive for abdominal distention.   All other systems reviewed and are negative.      Physical Exam   BP: 132/83  Pulse: 128  Temp: 99.5  F (37.5  C)  Resp: 18  Height: 170.2 cm (5' 7\")  Weight: 70.3 kg (155 lb)  SpO2: 95 %      Physical Exam   Constitutional: No distress.   HENT:   Head: Atraumatic.   Mouth/Throat: Oropharynx is clear and moist. No oropharyngeal exudate.   Eyes: Pupils are equal, round, and reactive to light. No scleral icterus.   Cardiovascular: Normal heart sounds and intact distal pulses.   Pulmonary/Chest: Breath sounds normal. No respiratory distress.   Abdominal: Soft. Bowel sounds are normal. There is tenderness (mild diffuse tenderness to palpation).   Musculoskeletal: She exhibits no edema or tenderness.   Skin: Skin is warm. No rash noted. She is not diaphoretic.       ED Course   Due to the fact that there are nonspecific findings on the ultrasound and the CT scan I went ahead and called GI to ensure as a disposition for this patient.  GI feels that she can safely follow-up with outpatient for pathology for further evaluation and management.  Clinically this patient does not have encephalopathy in her abdominal pain seems to be baseline also due to the fact that her liver function test only mildly elevated and she does not have a fever is been assumed that she can follow-up with pathology for further evaluation and treatment     Procedures   6:57 PM  The patient was seen and examined by Dr. Dunlap in [facility] .     None     Assessments & Plan (with Medical Decision Making)   This is a 39-year-old female with medical history of alcoholic abuse as well as a history of bilateral lower swelling edema that comes into the emergency department for acute evaluation.  Patient denied any other pathology other than the lower extremity edema as " well as the fact that her abdominal discomfort was mildly getting worse due to the swelling.. He does have a history of calciphylaxis and SLE, and does state Lovenox.  Patient's work-up reveals a normal urinalysis along with mildly abnormal AST as well as mildly elevated alk phos.  Does not have a white count and her anemia has been improved previous evaluations.  Moreover she does not have a white count does have does not have any evidence of DVTs by ultrasound.  Patient CT scan does reveal pathology on the liver likely cirrhosis with different nonspecific findings which need to be treated as outpatient by hepatology.  I have called the fellow for GI and explained all of the findings for this patient and he feels that this patient is stable to follow-up with GI/hepatology.  History physical examination and the work-up I plan to discharge to home with follow-up      I have reviewed the nursing notes.    I have reviewed the findings, diagnosis, plan and need for follow up with the patient.       Medication List      There are no discharge medications for this visit.         Final diagnoses:   Swelling of abdomen   Swelling of both lower extremities   Cirrhosis of liver with ascites, unspecified hepatic cirrhosis type (H)   Portal hypertension (H)   Sludge in gallbladder   Adenomatosis     I, Pratik Lewis, am serving as a trained medical scribe to document services personally performed by Rikki Dunlap MD, based on the provider's statements to me.      I, Rikki Dunlap MD, was physically present and have reviewed and verified the accuracy of this note documented by Pratik Lewis.     6/21/2019   [facility], [place], EMERGENCY DEPARTMENT     Rikki Dunlap MD  06/21/19 5875

## 2019-06-21 NOTE — ED AVS SNAPSHOT
[facility], [place], Emergency Department  [address] [place] [id number]  Phone:  [phone]                                    Yenifer Maher   MRN: [id number]    Department:  [facility], Emergency Department   Date of Visit:  6/21/2019           After Visit Summary Signature Page    I have received my discharge instructions, and my questions have been answered. I have discussed any challenges I see with this plan with the nurse or doctor.    ..........................................................................................................................................  Patient/Patient Representative Signature      ..........................................................................................................................................  Patient Representative Print Name and Relationship to Patient    ..................................................               ................................................  Date                                   Time    ..........................................................................................................................................  Reviewed by Signature/Title    ...................................................              ..............................................  Date                                               Time          22EPIC Rev 08/18

## 2019-06-22 NOTE — DISCHARGE INSTRUCTIONS
Make sure to follow-up with your primary care doctor in 2 days, if fevers if you get worse or if any concerns develop please return to emergency department as soon as possible.    It is important that you follow-up with the pathology clinic for further evaluation and treatment of your condition as well as for the right lower lobe subcapsular region in the liver.    Please make an appointment to follow up with GI Clinic (hepatology clinic) (phone: [phone]) as soon as possible even if entirely better.

## 2019-06-27 DIAGNOSIS — R79.89 ELEVATED LFTS: Primary | ICD-10-CM

## 2019-07-02 ENCOUNTER — TELEPHONE (OUTPATIENT)
Dept: GASTROENTEROLOGY | Facility: CLINIC | Age: 39
End: 2019-07-02

## 2019-07-02 NOTE — TELEPHONE ENCOUNTER
Patient contacted and reminded of upcoming appointment.  Patient confirmed they will be attending.  Patient instructed to bring updated medications list to appointment.    Eva Resendez CMA  7/2/2019 3:26 PM

## 2019-07-02 NOTE — TELEPHONE ENCOUNTER
RECORDS RECEIVED FROM: ER f/u, seen 6/21 (recs in chart). Concern for cirrhosis, recent pathology findings. Referral also attached from primary care. No outside records, scheduled per pt's mother Stacie   DATE RECEIVED: 07.05.2019   NOTES STATUS DETAILS   OFFICE NOTE from referring provider Internal 05.28.2019   OFFICE NOTES from other specialists Received 05.28.2019 Graemelópez   DISCHARGE SUMMARY from hospital Internal 05.28.2019 to 06.06.2019   MEDICATION LIST Internal    LIVER BIOSPY (IF APPLICABLE)      PATHOLOGY REPORTS  N/A    IMAGING     ENDOSCOPY (IF AVAILABLE) N/A    COLONOSCOPY (IF AVAILABLE) N/A    ULTRASOUND LIVER Internal 06.21.2019   CT OF ABDOMEN Internal 06.21.2019   MRI OF LIVER N/A    FIBROSCAN, US ELASTOGRAPHY, FIBROSIS SCAN, MR ELASTOGRAPHY N/A    LABS     HEPATIC PANEL (LIVER PANEL) In process 06.27.2019   BASIC METABOLIC PANEL In process 06.27.2019   COMPLETE METABOLIC PANEL Internal 06.21.2019   COMPLETE BLOOD COUNT (CBC) In process 06.27.2019   INTERNATIONAL NORMALIZED RATIO (INR) In process 06.27.2019   HEPATITIS C ANTIBODY N/A    HEPATITIS C VIRAL LOAD/PCR N/A    HEPATITIS C GENOTYPE N/A    HEPATITIS B SURFACE ANTIGEN N/A    HEPATITIS B SURFACE ANTIBODY N/A    HEPATITIS B DNA QUANT LEVEL N/A    HEPATITIS B CORE ANTIBODY N/A

## 2019-07-05 ENCOUNTER — OFFICE VISIT (OUTPATIENT)
Dept: GASTROENTEROLOGY | Facility: CLINIC | Age: 39
End: 2019-07-05
Attending: INTERNAL MEDICINE
Payer: COMMERCIAL

## 2019-07-05 ENCOUNTER — PRE VISIT (OUTPATIENT)
Dept: GASTROENTEROLOGY | Facility: CLINIC | Age: 39
End: 2019-07-05

## 2019-07-05 VITALS
BODY MASS INDEX: 22.21 KG/M2 | TEMPERATURE: 98.8 F | SYSTOLIC BLOOD PRESSURE: 134 MMHG | WEIGHT: 141.8 LBS | DIASTOLIC BLOOD PRESSURE: 87 MMHG | HEART RATE: 130 BPM | OXYGEN SATURATION: 95 %

## 2019-07-05 DIAGNOSIS — E63.9 NUTRITIONAL NEUROPATHY (H): ICD-10-CM

## 2019-07-05 DIAGNOSIS — R58 HEMORRHAGE: ICD-10-CM

## 2019-07-05 DIAGNOSIS — G63 NUTRITIONAL NEUROPATHY (H): ICD-10-CM

## 2019-07-05 DIAGNOSIS — K70.11 ALCOHOLIC HEPATITIS WITH ASCITES (H): Primary | ICD-10-CM

## 2019-07-05 DIAGNOSIS — F10.10 ALCOHOL ABUSE: ICD-10-CM

## 2019-07-05 DIAGNOSIS — R79.89 ELEVATED LFTS: ICD-10-CM

## 2019-07-05 LAB
ALBUMIN SERPL-MCNC: 2.8 G/DL (ref 3.4–5)
ALP SERPL-CCNC: 442 U/L (ref 40–150)
ALT SERPL W P-5'-P-CCNC: 40 U/L (ref 0–50)
ANION GAP SERPL CALCULATED.3IONS-SCNC: 7 MMOL/L (ref 3–14)
AST SERPL W P-5'-P-CCNC: 250 U/L (ref 0–45)
BASOPHILS # BLD AUTO: 0.1 10E9/L (ref 0–0.2)
BASOPHILS NFR BLD AUTO: 0.7 %
BILIRUB DIRECT SERPL-MCNC: 0.7 MG/DL (ref 0–0.2)
BILIRUB SERPL-MCNC: 1 MG/DL (ref 0.2–1.3)
BUN SERPL-MCNC: 4 MG/DL (ref 7–30)
CALCIUM SERPL-MCNC: 8.1 MG/DL (ref 8.5–10.1)
CHLORIDE SERPL-SCNC: 107 MMOL/L (ref 94–109)
CO2 SERPL-SCNC: 23 MMOL/L (ref 20–32)
CREAT SERPL-MCNC: 0.3 MG/DL (ref 0.52–1.04)
DIFFERENTIAL METHOD BLD: ABNORMAL
ENA SS-A IGG SER IA-ACNC: <0.2 AI (ref 0–0.9)
EOSINOPHIL # BLD AUTO: 0.1 10E9/L (ref 0–0.7)
EOSINOPHIL NFR BLD AUTO: 1.1 %
ERYTHROCYTE [DISTWIDTH] IN BLOOD BY AUTOMATED COUNT: 16.5 % (ref 10–15)
FERRITIN SERPL-MCNC: 182 NG/ML (ref 12–150)
GFR SERPL CREATININE-BSD FRML MDRD: >90 ML/MIN/{1.73_M2}
GLUCOSE SERPL-MCNC: 117 MG/DL (ref 70–99)
HCT VFR BLD AUTO: 31.6 % (ref 35–47)
HGB BLD-MCNC: 10.3 G/DL (ref 11.7–15.7)
IMM GRANULOCYTES # BLD: 0 10E9/L (ref 0–0.4)
IMM GRANULOCYTES NFR BLD: 0.4 %
INR PPP: 1.21 (ref 0.86–1.14)
LYMPHOCYTES # BLD AUTO: 1.5 10E9/L (ref 0.8–5.3)
LYMPHOCYTES NFR BLD AUTO: 17.6 %
MCH RBC QN AUTO: 28.3 PG (ref 26.5–33)
MCHC RBC AUTO-ENTMCNC: 32.6 G/DL (ref 31.5–36.5)
MCV RBC AUTO: 87 FL (ref 78–100)
MONOCYTES # BLD AUTO: 0.6 10E9/L (ref 0–1.3)
MONOCYTES NFR BLD AUTO: 7.2 %
NEUTROPHILS # BLD AUTO: 6.2 10E9/L (ref 1.6–8.3)
NEUTROPHILS NFR BLD AUTO: 73 %
NRBC # BLD AUTO: 0 10*3/UL
NRBC BLD AUTO-RTO: 0 /100
PLATELET # BLD AUTO: 216 10E9/L (ref 150–450)
POTASSIUM SERPL-SCNC: 3.4 MMOL/L (ref 3.4–5.3)
PROT SERPL-MCNC: 8.4 G/DL (ref 6.8–8.8)
RBC # BLD AUTO: 3.64 10E12/L (ref 3.8–5.2)
RETICS # AUTO: 48 10E9/L (ref 25–95)
RETICS/RBC NFR AUTO: 1.3 % (ref 0.5–2)
SODIUM SERPL-SCNC: 137 MMOL/L (ref 133–144)
WBC # BLD AUTO: 8.5 10E9/L (ref 4–11)

## 2019-07-05 PROCEDURE — 84425 ASSAY OF VITAMIN B-1: CPT | Performed by: PSYCHIATRY & NEUROLOGY

## 2019-07-05 PROCEDURE — G0463 HOSPITAL OUTPT CLINIC VISIT: HCPCS | Mod: ZF

## 2019-07-05 PROCEDURE — 36415 COLL VENOUS BLD VENIPUNCTURE: CPT | Performed by: INTERNAL MEDICINE

## 2019-07-05 PROCEDURE — 86235 NUCLEAR ANTIGEN ANTIBODY: CPT | Performed by: PSYCHIATRY & NEUROLOGY

## 2019-07-05 PROCEDURE — 80076 HEPATIC FUNCTION PANEL: CPT | Performed by: INTERNAL MEDICINE

## 2019-07-05 PROCEDURE — 80048 BASIC METABOLIC PNL TOTAL CA: CPT | Performed by: INTERNAL MEDICINE

## 2019-07-05 RX ORDER — SPIRONOLACTONE 50 MG/1
50 TABLET, FILM COATED ORAL DAILY
Qty: 90 TABLET | Refills: 3 | Status: SHIPPED | OUTPATIENT
Start: 2019-07-05 | End: 2020-11-30

## 2019-07-05 RX ORDER — FUROSEMIDE 20 MG
20 TABLET ORAL DAILY
Qty: 90 TABLET | Refills: 3 | Status: SHIPPED | OUTPATIENT
Start: 2019-07-05 | End: 2020-11-30

## 2019-07-05 SDOH — HEALTH STABILITY: MENTAL HEALTH: HOW OFTEN DO YOU HAVE A DRINK CONTAINING ALCOHOL?: 2-3 TIMES A WEEK

## 2019-07-05 ASSESSMENT — PAIN SCALES - GENERAL: PAINLEVEL: MILD PAIN (3)

## 2019-07-05 NOTE — LETTER
7/5/2019      RE: Yenifer Maher  [address] [place] [address]       GI CLINIC VISIT    CC/REFERRING PROVIDER:  Corinna Piña  REASON FOR CONSULTATION: Alcohol Liver Disease    HPI: 39 year old female past medical history of lupus anticoagulant, blood loss anemia, alcohol abuse and calciphylaxis presents today to follow-up recent diagnosis of liver cirrhosis.  Patient noted to be admitted for alcoholic hepatitis in August of last year due to jaundice, abdominal swelling and confusion.  During that admission she had an upper and lower endoscopy, without evidence of esophageal varices.  She reports having a paracentesis that admission.  She was also hospitalized in December for neuropathic pain, calciphylaxis.  She was then recently admitted with profuse vaginal bleeding while on Lovenox, requiring several blood transfusions.  Lovenox has since been discontinued.  She was seen in the ED on June 21 for worsening abdominal distention, lower extremity edema and worsening neuropathic pain.  During that evaluation she had an abdominal ultrasound noted a cirrhotic appearing liver with a possible 0.6 cm nodule in the right liver lobe.  Follow-up CT demonstrated hepatic cirrhosis with evidence of portal hypertension, moderate volume ascites as well as some superimposed hepatic steatosis.     Today she notes her abdomen remains distended painful.  She denies confusion, denies recent illness including fevers, cough, pain with urination.  She still is having intermittent diarrhea.  She denies blood in her stool although is having particularly heavy period this month.  Since stopping the Lovenox the rash on her inner thighs is become more prominent and more painful.  Denies recent confusion.  Tired today she was up late watching fireworks.     ROS: 10pt ROS performed and otherwise negative.    PERTINENT PAST MEDICAL/SURGICAL HISTORY:  As noted above.    PERTINENT MEDICATIONS:  Estradiol  "patch  Pregabalin  Nortriptyline  Pantoprazole 20 mg daily    Medications reviewed with patient today, see Medication List/Assessment for details.  No other NSAID/anticoagulation reported by patient.  No other OTC/herbal/supplements reported by patient.    SOCIAL HISTORY: Tobacco: Half pack per day  Alcohol: Currently drinking several glasses of wine several days per week, history of heavier use quantified as \"tumblers\" of wine    FAMILY HISTORY:  Maternal grandfather with unspecified \"liver cancer\"     PHYSICAL EXAMINATION:  Vitals reviewed, AFVSS  Gen: aaox3, cooperative, pleasant, not diaphoretic, nad  HEENT: Sclera pale but non icteric, MMM. No oropharyngeal erythema or exudate  CV: S1/S2, tachycardic, RR and no murmur   Resp-CTAB, no wheezing or rales  Abd: Distended, mildly tender. No HSM appreciated.   Ext: No lower extremity edema, warm   Skin: warm, perfused, no jaundice  Neuro: Oriented x3, no asterixis noted.     PERTINENT STUDIES:  Creatinine 0.3  Alkaline phosphatase 442  ALT 40    Bilirubin 1  Direct bilirubin 0.7  INR 1.21  Hepatitis C/HIV non reactive  Hepatitis B surface AB 2.1   Mitochondrial antibody within normal limits  IgM 387    MELD-Na score: 8 at 7/5/2019  7:21 AM  MELD score: 8 at 7/5/2019  7:21 AM  Calculated from:  Serum Creatinine: 0.30 mg/dL (Rounded to 1 mg/dL) at 7/5/2019  7:21 AM  Serum Sodium: 137 mmol/L at 7/5/2019  7:21 AM  Total Bilirubin: 1.0 mg/dL at 7/5/2019  7:21 AM  INR(ratio): 1.21 at 7/5/2019  7:21 AM  Age: 39 years    ASSESSMENT/PLAN:    1.  Liver cirrhosis presumed alcohol-related  Patient presents today for initial evaluation. She has known history of acute likely alcoholic hepatitis, complicated by hepatic encephalopathy and ascites.  This point she has had a fairly thorough evaluation, including an upper endoscopy without evidence of varices, negative mitchondrial antibody and imaging.  Abdominal ultrasound did show a small hepatic nodule although this was not " visualized on CT scan.  Her CT scan did show evidence of ongoing inflammation, her AST is quite elevated to 250 clinic today.  Her alkaline phosphatase is also quite elevated.  Currently she endorses drinking about 3 times per week, we discussed the importance of absolute cessation of alcohol use.  We will reassess her liver enzymes in 3 months with a follow-up appointment following a period of complete abstinence.  She continues to have elevated liver enzymes and we may consider further evaluation such as an MRCP if these remain elevated after complete cessation of alcohol use.  She did tell us she has had a liver biopsy, supposedly in December although we do not have available records. We will attempt to obtain report and potentially slides for an in-house read. We will start Lasix and Aldactone today to help control her ascites.      RTC in 3 months with labs and imaging.     Thank you for this consultation. It was a pleasure to participate in the care of this patient; please contact us with any further questions.     Patient was seen and discussed with Dr. Hernández.     Satnam Guardado MD  Internal Medicine PGY-3  [phone]    The patient was seen and examined.  The above assessment and plan was developed jointly with the resident.       William Hernández MD      Professor of Medicine  [employer]      Executive Medical Director, Solid Organ Transplant Program  [facility]

## 2019-07-05 NOTE — LETTER
7/5/2019       RE: Yenifer Maher  [address] [place] [address]     Dear Colleague,    Thank you for referring your patient, Yenifer Maher, to the [facility] HEPATOLOGY at [facility]. Please see a copy of my visit note below.    GI CLINIC VISIT    CC/REFERRING PROVIDER:  Corinna Piña  REASON FOR CONSULTATION: Alcohol Liver Disease    HPI: 39 year old female past medical history of lupus anticoagulant, blood loss anemia, alcohol abuse and calciphylaxis presents today to follow-up recent diagnosis of liver cirrhosis.  Patient noted to be admitted for alcoholic hepatitis in August of last year due to jaundice, abdominal swelling and confusion.  During that admission she had an upper and lower endoscopy, without evidence of esophageal varices.  She reports having a paracentesis that admission.  She was also hospitalized in December for neuropathic pain, calciphylaxis.  She was then recently admitted with profuse vaginal bleeding while on Lovenox, requiring several blood transfusions.  Lovenox has since been discontinued.  She was seen in the ED on June 21 for worsening abdominal distention, lower extremity edema and worsening neuropathic pain.  During that evaluation she had an abdominal ultrasound noted a cirrhotic appearing liver with a possible 0.6 cm nodule in the right liver lobe.  Follow-up CT demonstrated hepatic cirrhosis with evidence of portal hypertension, moderate volume ascites as well as some superimposed hepatic steatosis.     Today she notes her abdomen remains distended painful.  She denies confusion, denies recent illness including fevers, cough, pain with urination.  She still is having intermittent diarrhea.  She denies blood in her stool although is having particularly heavy period this month.  Since stopping the Lovenox the rash on her inner thighs is become more prominent and more painful.  Denies recent confusion.  Tired today she was up  "late watching fireworks.     ROS: 10pt ROS performed and otherwise negative.    PERTINENT PAST MEDICAL/SURGICAL HISTORY:  As noted above.    PERTINENT MEDICATIONS:  Estradiol patch  Pregabalin  Nortriptyline  Pantoprazole 20 mg daily    Medications reviewed with patient today, see Medication List/Assessment for details.  No other NSAID/anticoagulation reported by patient.  No other OTC/herbal/supplements reported by patient.    SOCIAL HISTORY: Tobacco: Half pack per day  Alcohol: Currently drinking several glasses of wine several days per week, history of heavier use quantified as \"tumblers\" of wine    FAMILY HISTORY:  Maternal grandfather with unspecified \"liver cancer\"     PHYSICAL EXAMINATION:  Vitals reviewed, AFVSS  Gen: aaox3, cooperative, pleasant, not diaphoretic, nad  HEENT: Sclera pale but non icteric, MMM. No oropharyngeal erythema or exudate  CV: S1/S2, tachycardic, RR and no murmur   Resp-CTAB, no wheezing or rales  Abd: Distended, mildly tender. No HSM appreciated.   Ext: No lower extremity edema, warm   Skin: warm, perfused, no jaundice  Neuro: Oriented x3, no asterixis noted.     PERTINENT STUDIES:  Creatinine 0.3  Alkaline phosphatase 442  ALT 40    Bilirubin 1  Direct bilirubin 0.7  INR 1.21  Hepatitis C/HIV non reactive  Hepatitis B surface AB 2.1   Mitochondrial antibody within normal limits  IgM 387    MELD-Na score: 8 at 7/5/2019  7:21 AM  MELD score: 8 at 7/5/2019  7:21 AM  Calculated from:  Serum Creatinine: 0.30 mg/dL (Rounded to 1 mg/dL) at 7/5/2019  7:21 AM  Serum Sodium: 137 mmol/L at 7/5/2019  7:21 AM  Total Bilirubin: 1.0 mg/dL at 7/5/2019  7:21 AM  INR(ratio): 1.21 at 7/5/2019  7:21 AM  Age: 39 years    ASSESSMENT/PLAN:    1.  Liver cirrhosis presumed alcohol-related  Patient presents today for initial evaluation. She has known history of acute likely alcoholic hepatitis, complicated by hepatic encephalopathy and ascites.  This point she has had a fairly thorough evaluation, " including an upper endoscopy without evidence of varices, negative mitchondrial antibody and imaging.  Abdominal ultrasound did show a small hepatic nodule although this was not visualized on CT scan.  Her CT scan did show evidence of ongoing inflammation, her AST is quite elevated to 250 clinic today.  Her alkaline phosphatase is also quite elevated.  Currently she endorses drinking about 3 times per week, we discussed the importance of absolute cessation of alcohol use.  We will reassess her liver enzymes in 3 months with a follow-up appointment following a period of complete abstinence.  She continues to have elevated liver enzymes and we may consider further evaluation such as an MRCP if these remain elevated after complete cessation of alcohol use.  She did tell us she has had a liver biopsy, supposedly in December although we do not have available records. We will attempt to obtain report and potentially slides for an in-house read. We will start Lasix and Aldactone today to help control her ascites.      RTC in 3 months with labs and imaging.     Thank you for this consultation. It was a pleasure to participate in the care of this patient; please contact us with any further questions.     Patient was seen and discussed with Dr. Hernández.     Satnam Guardado MD  Internal Medicine PGY-3  [phone]    The patient was seen and examined.  The above assessment and plan was developed jointly with the resident.       William Hernández MD      Professor of Medicine  [employer]      Executive Medical Director, Solid Organ Transplant Program  [facility]          Again, thank you for allowing me to participate in the care of your patient.      Sincerely,    William Hernández MD

## 2019-07-05 NOTE — NURSING NOTE
Chief Complaint   Patient presents with     RECHECK     ER follow up, Cirrhosis     /87 (BP Location: Right arm, Patient Position: Sitting, Cuff Size: Adult Regular)   Pulse 130   Temp 98.8  F (37.1  C)   Wt 64.3 kg (141 lb 12.8 oz)   SpO2 95%   BMI 22.21 kg/m      Maurilio Freed  EMT

## 2019-07-05 NOTE — PROGRESS NOTES
GI CLINIC VISIT    CC/REFERRING PROVIDER:  Corinna Piña  REASON FOR CONSULTATION: Alcohol Liver Disease    HPI: 39 year old female past medical history of lupus anticoagulant, blood loss anemia, alcohol abuse and calciphylaxis presents today to follow-up recent diagnosis of liver cirrhosis.  Patient noted to be admitted for alcoholic hepatitis in August of last year due to jaundice, abdominal swelling and confusion.  During that admission she had an upper and lower endoscopy, without evidence of esophageal varices.  She reports having a paracentesis that admission.  She was also hospitalized in December for neuropathic pain, calciphylaxis.  She was then recently admitted with profuse vaginal bleeding while on Lovenox, requiring several blood transfusions.  Lovenox has since been discontinued.  She was seen in the ED on June 21 for worsening abdominal distention, lower extremity edema and worsening neuropathic pain.  During that evaluation she had an abdominal ultrasound noted a cirrhotic appearing liver with a possible 0.6 cm nodule in the right liver lobe.  Follow-up CT demonstrated hepatic cirrhosis with evidence of portal hypertension, moderate volume ascites as well as some superimposed hepatic steatosis.     Today she notes her abdomen remains distended painful.  She denies confusion, denies recent illness including fevers, cough, pain with urination.  She still is having intermittent diarrhea.  She denies blood in her stool although is having particularly heavy period this month.  Since stopping the Lovenox the rash on her inner thighs is become more prominent and more painful.  Denies recent confusion.  Tired today she was up late watching fireworks.     ROS: 10pt ROS performed and otherwise negative.    PERTINENT PAST MEDICAL/SURGICAL HISTORY:  As noted above.    PERTINENT MEDICATIONS:  Estradiol patch  Pregabalin  Nortriptyline  Pantoprazole 20 mg daily    Medications reviewed with patient today,  "see Medication List/Assessment for details.  No other NSAID/anticoagulation reported by patient.  No other OTC/herbal/supplements reported by patient.    SOCIAL HISTORY: Tobacco: Half pack per day  Alcohol: Currently drinking several glasses of wine several days per week, history of heavier use quantified as \"tumblers\" of wine    FAMILY HISTORY:  Maternal grandfather with unspecified \"liver cancer\"     PHYSICAL EXAMINATION:  Vitals reviewed, AFVSS  Gen: aaox3, cooperative, pleasant, not diaphoretic, nad  HEENT: Sclera pale but non icteric, MMM. No oropharyngeal erythema or exudate  CV: S1/S2, tachycardic, RR and no murmur   Resp-CTAB, no wheezing or rales  Abd: Distended, mildly tender. No HSM appreciated.   Ext: No lower extremity edema, warm   Skin: warm, perfused, no jaundice  Neuro: Oriented x3, no asterixis noted.     PERTINENT STUDIES:  Creatinine 0.3  Alkaline phosphatase 442  ALT 40    Bilirubin 1  Direct bilirubin 0.7  INR 1.21  Hepatitis C/HIV non reactive  Hepatitis B surface AB 2.1   Mitochondrial antibody within normal limits  IgM 387    MELD-Na score: 8 at 7/5/2019  7:21 AM  MELD score: 8 at 7/5/2019  7:21 AM  Calculated from:  Serum Creatinine: 0.30 mg/dL (Rounded to 1 mg/dL) at 7/5/2019  7:21 AM  Serum Sodium: 137 mmol/L at 7/5/2019  7:21 AM  Total Bilirubin: 1.0 mg/dL at 7/5/2019  7:21 AM  INR(ratio): 1.21 at 7/5/2019  7:21 AM  Age: 39 years    ASSESSMENT/PLAN:    1.  Liver cirrhosis presumed alcohol-related  Patient presents today for initial evaluation. She has known history of acute likely alcoholic hepatitis, complicated by hepatic encephalopathy and ascites.  This point she has had a fairly thorough evaluation, including an upper endoscopy without evidence of varices, negative mitchondrial antibody and imaging.  Abdominal ultrasound did show a small hepatic nodule although this was not visualized on CT scan.  Her CT scan did show evidence of ongoing inflammation, her AST is quite " elevated to 250 clinic today.  Her alkaline phosphatase is also quite elevated.  Currently she endorses drinking about 3 times per week, we discussed the importance of absolute cessation of alcohol use.  We will reassess her liver enzymes in 3 months with a follow-up appointment following a period of complete abstinence.  She continues to have elevated liver enzymes and we may consider further evaluation such as an MRCP if these remain elevated after complete cessation of alcohol use.  She did tell us she has had a liver biopsy, supposedly in December although we do not have available records. We will attempt to obtain report and potentially slides for an in-house read. We will start Lasix and Aldactone today to help control her ascites.      RTC in 3 months with labs and imaging.     Thank you for this consultation. It was a pleasure to participate in the care of this patient; please contact us with any further questions.     Patient was seen and discussed with Dr. Hernández.     Satnam Guardado MD  Internal Medicine PGY-3  [phone]    The patient was seen and examined.  The above assessment and plan was developed jointly with the resident.       William Hernández MD      Professor of Medicine  [employer]      Executive Medical Director, Solid Organ Transplant Program  [facility]

## 2019-07-09 LAB — VIT B1 BLD-MCNC: 156 NMOL/L (ref 70–180)

## 2019-08-09 ENCOUNTER — HOSPITAL ENCOUNTER (INPATIENT)
Facility: CLINIC | Age: 39
LOS: 5 days | Discharge: HOME | DRG: 432 | End: 2019-08-14
Payer: COMMERCIAL

## 2019-08-09 ENCOUNTER — TRANSFERRED RECORDS (OUTPATIENT)
Dept: HEALTH INFORMATION MANAGEMENT | Facility: CLINIC | Age: 39
End: 2019-08-09

## 2019-08-09 ENCOUNTER — APPOINTMENT (OUTPATIENT)
Dept: ULTRASOUND IMAGING | Facility: CLINIC | Age: 39
DRG: 432 | End: 2019-08-09
Attending: STUDENT IN AN ORGANIZED HEALTH CARE EDUCATION/TRAINING PROGRAM
Payer: COMMERCIAL

## 2019-08-09 ENCOUNTER — APPOINTMENT (OUTPATIENT)
Dept: GENERAL RADIOLOGY | Facility: CLINIC | Age: 39
DRG: 432 | End: 2019-08-09
Payer: COMMERCIAL

## 2019-08-09 ENCOUNTER — APPOINTMENT (OUTPATIENT)
Dept: GENERAL RADIOLOGY | Facility: CLINIC | Age: 39
DRG: 432 | End: 2019-08-09
Attending: STUDENT IN AN ORGANIZED HEALTH CARE EDUCATION/TRAINING PROGRAM
Payer: COMMERCIAL

## 2019-08-09 DIAGNOSIS — R14.1 FLATULENCE, ERUCTATION AND GAS PAIN: ICD-10-CM

## 2019-08-09 DIAGNOSIS — K92.2 UPPER GI BLEED: Primary | ICD-10-CM

## 2019-08-09 DIAGNOSIS — R14.2 FLATULENCE, ERUCTATION AND GAS PAIN: ICD-10-CM

## 2019-08-09 DIAGNOSIS — R14.3 FLATULENCE, ERUCTATION AND GAS PAIN: ICD-10-CM

## 2019-08-09 LAB
ALBUMIN SERPL-MCNC: 1.6 G/DL (ref 3.4–5)
ALP SERPL-CCNC: 300 U/L (ref 40–150)
ALT SERPL W P-5'-P-CCNC: 124 U/L (ref 0–50)
AMMONIA PLAS-SCNC: 143 UMOL/L (ref 10–50)
ANION GAP SERPL CALCULATED.3IONS-SCNC: 14 MMOL/L (ref 3–14)
ANION GAP SERPL CALCULATED.3IONS-SCNC: 9 MMOL/L (ref 3–14)
APAP SERPL-MCNC: NORMAL MG/L (ref 10–20)
APAP SERPL-MCNC: NORMAL MG/L (ref 10–20)
APTT PPP: 43 SEC (ref 22–37)
AST SERPL W P-5'-P-CCNC: 1015 U/L (ref 0–45)
BASE DEFICIT BLDV-SCNC: 2.8 MMOL/L
BILIRUB SERPL-MCNC: 2.9 MG/DL (ref 0.2–1.3)
BLD PROD TYP BPU: NORMAL
BLD PROD TYP BPU: NORMAL
BLD UNIT ID BPU: 0
BLD UNIT ID BPU: 0
BLOOD PRODUCT CODE: NORMAL
BLOOD PRODUCT CODE: NORMAL
BPU ID: NORMAL
BPU ID: NORMAL
BUN SERPL-MCNC: 13 MG/DL (ref 7–30)
BUN SERPL-MCNC: 20 MG/DL (ref 7–30)
CA-I BLD-MCNC: 4 MG/DL (ref 4.4–5.2)
CA-I BLD-MCNC: 4.2 MG/DL (ref 4.4–5.2)
CALCIUM SERPL-MCNC: 6.8 MG/DL (ref 8.5–10.1)
CALCIUM SERPL-MCNC: 7 MG/DL (ref 8.5–10.1)
CHLORIDE SERPL-SCNC: 109 MMOL/L (ref 94–109)
CHLORIDE SERPL-SCNC: 110 MMOL/L (ref 94–109)
CK SERPL-CCNC: 167 U/L (ref 30–225)
CO2 SERPL-SCNC: 15 MMOL/L (ref 20–32)
CO2 SERPL-SCNC: 21 MMOL/L (ref 20–32)
CREAT SERPL-MCNC: 0.62 MG/DL (ref 0.52–1.04)
CREAT SERPL-MCNC: 0.74 MG/DL (ref 0.52–1.04)
ERYTHROCYTE [DISTWIDTH] IN BLOOD BY AUTOMATED COUNT: 18.4 % (ref 10–15)
ERYTHROCYTE [DISTWIDTH] IN BLOOD BY AUTOMATED COUNT: 19.7 % (ref 10–15)
ERYTHROCYTE [DISTWIDTH] IN BLOOD BY AUTOMATED COUNT: 20 % (ref 10–15)
FIBRINOGEN PPP-MCNC: 310 MG/DL (ref 200–420)
GFR SERPL CREATININE-BSD FRML MDRD: >90 ML/MIN/{1.73_M2}
GFR SERPL CREATININE-BSD FRML MDRD: >90 ML/MIN/{1.73_M2}
GGT SERPL-CCNC: 503 U/L (ref 0–40)
GLUCOSE SERPL-MCNC: 105 MG/DL (ref 70–99)
GLUCOSE SERPL-MCNC: 136 MG/DL (ref 70–99)
HCO3 BLDV-SCNC: 22 MMOL/L (ref 21–28)
HCT VFR BLD AUTO: 17.9 % (ref 35–47)
HCT VFR BLD AUTO: 18.7 % (ref 35–47)
HCT VFR BLD AUTO: 20.7 % (ref 35–47)
HGB BLD-MCNC: 5.7 G/DL (ref 11.7–15.7)
HGB BLD-MCNC: 5.9 G/DL (ref 11.7–15.7)
HGB BLD-MCNC: 6.8 G/DL (ref 11.7–15.7)
INR PPP: 1.69 (ref 0.86–1.14)
LACTATE BLD-SCNC: 4.3 MMOL/L (ref 0.7–2)
LACTATE BLD-SCNC: 8.5 MMOL/L (ref 0.7–2)
LDH SERPL L TO P-CCNC: 998 U/L (ref 81–234)
MAGNESIUM SERPL-MCNC: 1.1 MG/DL (ref 1.6–2.3)
MAGNESIUM SERPL-MCNC: 1.2 MG/DL (ref 1.6–2.3)
MCH RBC QN AUTO: 29.1 PG (ref 26.5–33)
MCH RBC QN AUTO: 29.8 PG (ref 26.5–33)
MCH RBC QN AUTO: 30.8 PG (ref 26.5–33)
MCHC RBC AUTO-ENTMCNC: 31.6 G/DL (ref 31.5–36.5)
MCHC RBC AUTO-ENTMCNC: 31.8 G/DL (ref 31.5–36.5)
MCHC RBC AUTO-ENTMCNC: 32.9 G/DL (ref 31.5–36.5)
MCV RBC AUTO: 92 FL (ref 78–100)
MCV RBC AUTO: 94 FL (ref 78–100)
MCV RBC AUTO: 94 FL (ref 78–100)
MRSA DNA SPEC QL NAA+PROBE: NEGATIVE
O2/TOTAL GAS SETTING VFR VENT: 30 %
PCO2 BLDV: 37 MM HG (ref 40–50)
PH BLDV: 7.39 PH (ref 7.32–7.43)
PHOSPHATE SERPL-MCNC: 2.3 MG/DL (ref 2.5–4.5)
PHOSPHATE SERPL-MCNC: 2.4 MG/DL (ref 2.5–4.5)
PLATELET # BLD AUTO: 112 10E9/L (ref 150–450)
PLATELET # BLD AUTO: 125 10E9/L (ref 150–450)
PLATELET # BLD AUTO: 129 10E9/L (ref 150–450)
PO2 BLDV: 39 MM HG (ref 25–47)
POTASSIUM SERPL-SCNC: 4.9 MMOL/L (ref 3.4–5.3)
POTASSIUM SERPL-SCNC: 5.5 MMOL/L (ref 3.4–5.3)
PROT SERPL-MCNC: 6 G/DL (ref 6.8–8.8)
RBC # BLD AUTO: 1.91 10E12/L (ref 3.8–5.2)
RBC # BLD AUTO: 2.03 10E12/L (ref 3.8–5.2)
RBC # BLD AUTO: 2.21 10E12/L (ref 3.8–5.2)
SALICYLATES SERPL-MCNC: <2 MG/DL
SODIUM SERPL-SCNC: 139 MMOL/L (ref 133–144)
SODIUM SERPL-SCNC: 140 MMOL/L (ref 133–144)
SPECIMEN SOURCE: NORMAL
TRANSFUSION STATUS PATIENT QL: NORMAL
WBC # BLD AUTO: 13.6 10E9/L (ref 4–11)
WBC # BLD AUTO: 13.9 10E9/L (ref 4–11)
WBC # BLD AUTO: 15.8 10E9/L (ref 4–11)

## 2019-08-09 PROCEDURE — 86850 RBC ANTIBODY SCREEN: CPT | Performed by: STUDENT IN AN ORGANIZED HEALTH CARE EDUCATION/TRAINING PROGRAM

## 2019-08-09 PROCEDURE — P9016 RBC LEUKOCYTES REDUCED: HCPCS | Performed by: STUDENT IN AN ORGANIZED HEALTH CARE EDUCATION/TRAINING PROGRAM

## 2019-08-09 PROCEDURE — 82330 ASSAY OF CALCIUM: CPT | Performed by: STUDENT IN AN ORGANIZED HEALTH CARE EDUCATION/TRAINING PROGRAM

## 2019-08-09 PROCEDURE — 86901 BLOOD TYPING SEROLOGIC RH(D): CPT | Performed by: STUDENT IN AN ORGANIZED HEALTH CARE EDUCATION/TRAINING PROGRAM

## 2019-08-09 PROCEDURE — 25000128 H RX IP 250 OP 636: Performed by: STUDENT IN AN ORGANIZED HEALTH CARE EDUCATION/TRAINING PROGRAM

## 2019-08-09 PROCEDURE — 85027 COMPLETE CBC AUTOMATED: CPT | Performed by: STUDENT IN AN ORGANIZED HEALTH CARE EDUCATION/TRAINING PROGRAM

## 2019-08-09 PROCEDURE — 83735 ASSAY OF MAGNESIUM: CPT | Performed by: STUDENT IN AN ORGANIZED HEALTH CARE EDUCATION/TRAINING PROGRAM

## 2019-08-09 PROCEDURE — 83605 ASSAY OF LACTIC ACID: CPT | Performed by: STUDENT IN AN ORGANIZED HEALTH CARE EDUCATION/TRAINING PROGRAM

## 2019-08-09 PROCEDURE — 20000004 ZZH R&B ICU UMMC

## 2019-08-09 PROCEDURE — 80329 ANALGESICS NON-OPIOID 1 OR 2: CPT | Performed by: STUDENT IN AN ORGANIZED HEALTH CARE EDUCATION/TRAINING PROGRAM

## 2019-08-09 PROCEDURE — 5A1935Z RESPIRATORY VENTILATION, LESS THAN 24 CONSECUTIVE HOURS: ICD-10-PCS

## 2019-08-09 PROCEDURE — 25000125 ZZHC RX 250: Performed by: STUDENT IN AN ORGANIZED HEALTH CARE EDUCATION/TRAINING PROGRAM

## 2019-08-09 PROCEDURE — 80053 COMPREHEN METABOLIC PANEL: CPT | Performed by: STUDENT IN AN ORGANIZED HEALTH CARE EDUCATION/TRAINING PROGRAM

## 2019-08-09 PROCEDURE — 99291 CRITICAL CARE FIRST HOUR: CPT | Mod: 25

## 2019-08-09 PROCEDURE — 25000128 H RX IP 250 OP 636

## 2019-08-09 PROCEDURE — A7035 POS AIRWAY PRESS HEADGEAR: HCPCS

## 2019-08-09 PROCEDURE — 40000986 XR CHEST PORT 1 VW

## 2019-08-09 PROCEDURE — 31500 INSERT EMERGENCY AIRWAY: CPT

## 2019-08-09 PROCEDURE — 87641 MR-STAPH DNA AMP PROBE: CPT | Performed by: STUDENT IN AN ORGANIZED HEALTH CARE EDUCATION/TRAINING PROGRAM

## 2019-08-09 PROCEDURE — 82550 ASSAY OF CK (CPK): CPT | Performed by: STUDENT IN AN ORGANIZED HEALTH CARE EDUCATION/TRAINING PROGRAM

## 2019-08-09 PROCEDURE — 80329 ANALGESICS NON-OPIOID 1 OR 2: CPT

## 2019-08-09 PROCEDURE — 02HV33Z INSERTION OF INFUSION DEVICE INTO SUPERIOR VENA CAVA, PERCUTANEOUS APPROACH: ICD-10-PCS | Performed by: ANESTHESIOLOGY

## 2019-08-09 PROCEDURE — 40000275 ZZH STATISTIC RCP TIME EA 10 MIN

## 2019-08-09 PROCEDURE — 86900 BLOOD TYPING SEROLOGIC ABO: CPT | Performed by: STUDENT IN AN ORGANIZED HEALTH CARE EDUCATION/TRAINING PROGRAM

## 2019-08-09 PROCEDURE — 84100 ASSAY OF PHOSPHORUS: CPT | Performed by: STUDENT IN AN ORGANIZED HEALTH CARE EDUCATION/TRAINING PROGRAM

## 2019-08-09 PROCEDURE — 43235 EGD DIAGNOSTIC BRUSH WASH: CPT | Performed by: INTERNAL MEDICINE

## 2019-08-09 PROCEDURE — 83010 ASSAY OF HAPTOGLOBIN QUANT: CPT | Performed by: STUDENT IN AN ORGANIZED HEALTH CARE EDUCATION/TRAINING PROGRAM

## 2019-08-09 PROCEDURE — 86923 COMPATIBILITY TEST ELECTRIC: CPT | Performed by: STUDENT IN AN ORGANIZED HEALTH CARE EDUCATION/TRAINING PROGRAM

## 2019-08-09 PROCEDURE — 85384 FIBRINOGEN ACTIVITY: CPT | Performed by: STUDENT IN AN ORGANIZED HEALTH CARE EDUCATION/TRAINING PROGRAM

## 2019-08-09 PROCEDURE — 93010 ELECTROCARDIOGRAM REPORT: CPT | Performed by: INTERNAL MEDICINE

## 2019-08-09 PROCEDURE — 36415 COLL VENOUS BLD VENIPUNCTURE: CPT | Performed by: STUDENT IN AN ORGANIZED HEALTH CARE EDUCATION/TRAINING PROGRAM

## 2019-08-09 PROCEDURE — 82977 ASSAY OF GGT: CPT | Performed by: STUDENT IN AN ORGANIZED HEALTH CARE EDUCATION/TRAINING PROGRAM

## 2019-08-09 PROCEDURE — 25800030 ZZH RX IP 258 OP 636: Performed by: STUDENT IN AN ORGANIZED HEALTH CARE EDUCATION/TRAINING PROGRAM

## 2019-08-09 PROCEDURE — 83615 LACTATE (LD) (LDH) ENZYME: CPT | Performed by: STUDENT IN AN ORGANIZED HEALTH CARE EDUCATION/TRAINING PROGRAM

## 2019-08-09 PROCEDURE — 82803 BLOOD GASES ANY COMBINATION: CPT | Performed by: STUDENT IN AN ORGANIZED HEALTH CARE EDUCATION/TRAINING PROGRAM

## 2019-08-09 PROCEDURE — 87640 STAPH A DNA AMP PROBE: CPT | Performed by: STUDENT IN AN ORGANIZED HEALTH CARE EDUCATION/TRAINING PROGRAM

## 2019-08-09 PROCEDURE — 76700 US EXAM ABDOM COMPLETE: CPT

## 2019-08-09 PROCEDURE — 71045 X-RAY EXAM CHEST 1 VIEW: CPT

## 2019-08-09 PROCEDURE — 82140 ASSAY OF AMMONIA: CPT | Performed by: STUDENT IN AN ORGANIZED HEALTH CARE EDUCATION/TRAINING PROGRAM

## 2019-08-09 PROCEDURE — 85730 THROMBOPLASTIN TIME PARTIAL: CPT | Performed by: STUDENT IN AN ORGANIZED HEALTH CARE EDUCATION/TRAINING PROGRAM

## 2019-08-09 PROCEDURE — 80048 BASIC METABOLIC PNL TOTAL CA: CPT | Performed by: STUDENT IN AN ORGANIZED HEALTH CARE EDUCATION/TRAINING PROGRAM

## 2019-08-09 PROCEDURE — 85610 PROTHROMBIN TIME: CPT | Performed by: STUDENT IN AN ORGANIZED HEALTH CARE EDUCATION/TRAINING PROGRAM

## 2019-08-09 PROCEDURE — C9113 INJ PANTOPRAZOLE SODIUM, VIA: HCPCS | Performed by: STUDENT IN AN ORGANIZED HEALTH CARE EDUCATION/TRAINING PROGRAM

## 2019-08-09 PROCEDURE — 94002 VENT MGMT INPAT INIT DAY: CPT

## 2019-08-09 PROCEDURE — 93005 ELECTROCARDIOGRAM TRACING: CPT

## 2019-08-09 RX ORDER — FENTANYL CITRATE 50 UG/ML
INJECTION, SOLUTION INTRAMUSCULAR; INTRAVENOUS
Status: COMPLETED
Start: 2019-08-09 | End: 2019-08-09

## 2019-08-09 RX ORDER — POTASSIUM CHLORIDE 7.45 MG/ML
10 INJECTION INTRAVENOUS
Status: DISCONTINUED | OUTPATIENT
Start: 2019-08-09 | End: 2019-08-14 | Stop reason: HOSPADM

## 2019-08-09 RX ORDER — DIPHENHYDRAMINE HYDROCHLORIDE 50 MG/ML
INJECTION INTRAMUSCULAR; INTRAVENOUS
Status: COMPLETED
Start: 2019-08-09 | End: 2019-08-09

## 2019-08-09 RX ORDER — ONDANSETRON 2 MG/ML
4 INJECTION INTRAMUSCULAR; INTRAVENOUS EVERY 6 HOURS PRN
Status: DISCONTINUED | OUTPATIENT
Start: 2019-08-09 | End: 2019-08-14 | Stop reason: HOSPADM

## 2019-08-09 RX ORDER — PROPOFOL 10 MG/ML
5-75 INJECTION, EMULSION INTRAVENOUS CONTINUOUS
Status: DISCONTINUED | OUTPATIENT
Start: 2019-08-09 | End: 2019-08-11

## 2019-08-09 RX ORDER — NALOXONE HYDROCHLORIDE 0.4 MG/ML
.1-.4 INJECTION, SOLUTION INTRAMUSCULAR; INTRAVENOUS; SUBCUTANEOUS
Status: DISCONTINUED | OUTPATIENT
Start: 2019-08-09 | End: 2019-08-14 | Stop reason: HOSPADM

## 2019-08-09 RX ORDER — POTASSIUM CL/LIDO/0.9 % NACL 10MEQ/0.1L
10 INTRAVENOUS SOLUTION, PIGGYBACK (ML) INTRAVENOUS
Status: DISCONTINUED | OUTPATIENT
Start: 2019-08-09 | End: 2019-08-14 | Stop reason: HOSPADM

## 2019-08-09 RX ORDER — CEFTRIAXONE 2 G/1
2 INJECTION, POWDER, FOR SOLUTION INTRAMUSCULAR; INTRAVENOUS EVERY 24 HOURS
Status: DISCONTINUED | OUTPATIENT
Start: 2019-08-09 | End: 2019-08-13

## 2019-08-09 RX ORDER — NALOXONE HYDROCHLORIDE 0.4 MG/ML
.1-.4 INJECTION, SOLUTION INTRAMUSCULAR; INTRAVENOUS; SUBCUTANEOUS
Status: ACTIVE | OUTPATIENT
Start: 2019-08-09 | End: 2019-08-10

## 2019-08-09 RX ORDER — FLUMAZENIL 0.1 MG/ML
0.2 INJECTION, SOLUTION INTRAVENOUS
Status: ACTIVE | OUTPATIENT
Start: 2019-08-09 | End: 2019-08-11

## 2019-08-09 RX ORDER — ROCURONIUM BROMIDE 50 MG/5 ML
35 SYRINGE (ML) INTRAVENOUS ONCE
Status: DISCONTINUED | OUTPATIENT
Start: 2019-08-09 | End: 2019-08-09

## 2019-08-09 RX ORDER — DIPHENHYDRAMINE HYDROCHLORIDE 50 MG/ML
25-50 INJECTION INTRAMUSCULAR; INTRAVENOUS ONCE
Status: COMPLETED | OUTPATIENT
Start: 2019-08-09 | End: 2019-08-09

## 2019-08-09 RX ORDER — FENTANYL CITRATE 50 UG/ML
50-100 INJECTION, SOLUTION INTRAMUSCULAR; INTRAVENOUS
Status: DISCONTINUED | OUTPATIENT
Start: 2019-08-09 | End: 2019-08-11

## 2019-08-09 RX ORDER — FENTANYL CITRATE 50 UG/ML
50-100 INJECTION, SOLUTION INTRAMUSCULAR; INTRAVENOUS
Status: COMPLETED | OUTPATIENT
Start: 2019-08-09 | End: 2019-08-09

## 2019-08-09 RX ORDER — POTASSIUM CHLORIDE 1.5 G/1.58G
20-40 POWDER, FOR SOLUTION ORAL
Status: DISCONTINUED | OUTPATIENT
Start: 2019-08-09 | End: 2019-08-14 | Stop reason: HOSPADM

## 2019-08-09 RX ORDER — MAGNESIUM SULFATE HEPTAHYDRATE 40 MG/ML
4 INJECTION, SOLUTION INTRAVENOUS EVERY 4 HOURS PRN
Status: DISCONTINUED | OUTPATIENT
Start: 2019-08-09 | End: 2019-08-14 | Stop reason: HOSPADM

## 2019-08-09 RX ORDER — PROPOFOL 10 MG/ML
10-20 INJECTION, EMULSION INTRAVENOUS EVERY 30 MIN PRN
Status: DISCONTINUED | OUTPATIENT
Start: 2019-08-09 | End: 2019-08-11

## 2019-08-09 RX ORDER — POTASSIUM CHLORIDE 750 MG/1
20-40 TABLET, EXTENDED RELEASE ORAL
Status: DISCONTINUED | OUTPATIENT
Start: 2019-08-09 | End: 2019-08-14 | Stop reason: HOSPADM

## 2019-08-09 RX ORDER — FENTANYL CITRATE 50 UG/ML
25-50 INJECTION, SOLUTION INTRAMUSCULAR; INTRAVENOUS
Status: DISCONTINUED | OUTPATIENT
Start: 2019-08-09 | End: 2019-08-10 | Stop reason: CLARIF

## 2019-08-09 RX ORDER — POTASSIUM CHLORIDE 29.8 MG/ML
20 INJECTION INTRAVENOUS
Status: DISCONTINUED | OUTPATIENT
Start: 2019-08-09 | End: 2019-08-14 | Stop reason: HOSPADM

## 2019-08-09 RX ORDER — ETOMIDATE 2 MG/ML
20 INJECTION INTRAVENOUS ONCE
Status: COMPLETED | OUTPATIENT
Start: 2019-08-09 | End: 2019-08-09

## 2019-08-09 RX ORDER — CEFTRIAXONE 1 G/1
1 INJECTION, POWDER, FOR SOLUTION INTRAMUSCULAR; INTRAVENOUS EVERY 24 HOURS
Status: DISCONTINUED | OUTPATIENT
Start: 2019-08-09 | End: 2019-08-09

## 2019-08-09 RX ADMIN — PROPOFOL 20 MCG/KG/MIN: 10 INJECTION, EMULSION INTRAVENOUS at 17:31

## 2019-08-09 RX ADMIN — PROPOFOL 20 MG: 10 INJECTION, EMULSION INTRAVENOUS at 17:52

## 2019-08-09 RX ADMIN — MIDAZOLAM 1 MG: 1 INJECTION INTRAMUSCULAR; INTRAVENOUS at 16:45

## 2019-08-09 RX ADMIN — MIDAZOLAM 1 MG: 1 INJECTION INTRAMUSCULAR; INTRAVENOUS at 16:41

## 2019-08-09 RX ADMIN — ROCURONIUM BROMIDE 35 MG: 10 INJECTION INTRAVENOUS at 17:21

## 2019-08-09 RX ADMIN — MIDAZOLAM 8 MG: 1 INJECTION INTRAMUSCULAR; INTRAVENOUS at 19:00

## 2019-08-09 RX ADMIN — DIPHENHYDRAMINE HYDROCHLORIDE 50 MG: 50 INJECTION INTRAMUSCULAR; INTRAVENOUS at 16:48

## 2019-08-09 RX ADMIN — SODIUM CHLORIDE 8 MG/HR: 9 INJECTION, SOLUTION INTRAVENOUS at 14:52

## 2019-08-09 RX ADMIN — DIPHENHYDRAMINE HYDROCHLORIDE 50 MG: 50 INJECTION, SOLUTION INTRAMUSCULAR; INTRAVENOUS at 16:48

## 2019-08-09 RX ADMIN — Medication 2 G: at 21:49

## 2019-08-09 RX ADMIN — OCTREOTIDE ACETATE 50 MCG/HR: 200 INJECTION, SOLUTION INTRAVENOUS; SUBCUTANEOUS at 21:38

## 2019-08-09 RX ADMIN — ACETYLCYSTEINE 6.25 MG/KG/HR: 200 INJECTION, SOLUTION INTRAVENOUS at 21:20

## 2019-08-09 RX ADMIN — MIDAZOLAM 1 MG: 1 INJECTION INTRAMUSCULAR; INTRAVENOUS at 16:44

## 2019-08-09 RX ADMIN — OCTREOTIDE ACETATE 50 MCG/HR: 200 INJECTION, SOLUTION INTRAVENOUS; SUBCUTANEOUS at 14:52

## 2019-08-09 RX ADMIN — CEFTRIAXONE SODIUM 2 G: 2 INJECTION, POWDER, FOR SOLUTION INTRAMUSCULAR; INTRAVENOUS at 21:24

## 2019-08-09 RX ADMIN — CALCIUM GLUCONATE 2 G: 98 INJECTION, SOLUTION INTRAVENOUS at 21:50

## 2019-08-09 RX ADMIN — FENTANYL CITRATE 50 MCG: 50 INJECTION INTRAMUSCULAR; INTRAVENOUS at 16:46

## 2019-08-09 RX ADMIN — OCTREOTIDE ACETATE 50 MCG/HR: 200 INJECTION, SOLUTION INTRAVENOUS; SUBCUTANEOUS at 20:55

## 2019-08-09 RX ADMIN — MIDAZOLAM 8 MG: 1 INJECTION INTRAMUSCULAR; INTRAVENOUS at 18:57

## 2019-08-09 RX ADMIN — MIDAZOLAM 2 MG: 1 INJECTION INTRAMUSCULAR; INTRAVENOUS at 16:40

## 2019-08-09 RX ADMIN — MIDAZOLAM 1 MG: 1 INJECTION INTRAMUSCULAR; INTRAVENOUS at 16:51

## 2019-08-09 RX ADMIN — PROPOFOL 20 MG: 10 INJECTION, EMULSION INTRAVENOUS at 18:36

## 2019-08-09 RX ADMIN — POTASSIUM PHOSPHATE, MONOBASIC AND POTASSIUM PHOSPHATE, DIBASIC 15 MMOL: 224; 236 INJECTION, SOLUTION INTRAVENOUS at 22:24

## 2019-08-09 RX ADMIN — PROCHLORPERAZINE EDISYLATE 5 MG: 5 INJECTION INTRAMUSCULAR; INTRAVENOUS at 15:15

## 2019-08-09 RX ADMIN — FENTANYL CITRATE 100 MCG: 50 INJECTION INTRAMUSCULAR; INTRAVENOUS at 18:16

## 2019-08-09 RX ADMIN — FENTANYL CITRATE 50 MCG: 50 INJECTION, SOLUTION INTRAMUSCULAR; INTRAVENOUS at 16:41

## 2019-08-09 RX ADMIN — FENTANYL CITRATE 50 MCG: 50 INJECTION INTRAMUSCULAR; INTRAVENOUS at 16:41

## 2019-08-09 RX ADMIN — ETOMIDATE 20 MG: 2 INJECTION INTRAVENOUS at 17:21

## 2019-08-09 RX ADMIN — PROPOFOL 100 MCG/KG/MIN: 10 INJECTION, EMULSION INTRAVENOUS at 20:23

## 2019-08-09 ASSESSMENT — ACTIVITIES OF DAILY LIVING (ADL)
COGNITION: 0 - NO COGNITION ISSUES REPORTED
RETIRED_COMMUNICATION: 0-->UNDERSTANDS/COMMUNICATES WITHOUT DIFFICULTY
RETIRED_EATING: 0-->INDEPENDENT
TRANSFERRING: 0-->INDEPENDENT
COGNITION: 0 - NO COGNITION ISSUES REPORTED
AMBULATION: 0-->INDEPENDENT
FALL_HISTORY_WITHIN_LAST_SIX_MONTHS: NO
ADLS_ACUITY_SCORE: 12
ADLS_ACUITY_SCORE: 12
TOILETING: 0-->INDEPENDENT
BATHING: 0-->INDEPENDENT
BATHING: 0-->INDEPENDENT
NUMBER_OF_TIMES_PATIENT_HAS_FALLEN_WITHIN_LAST_SIX_MONTHS: 0
FALL_HISTORY_WITHIN_LAST_SIX_MONTHS: NO
RETIRED_COMMUNICATION: 0-->UNDERSTANDS/COMMUNICATES WITHOUT DIFFICULTY
RETIRED_EATING: 0-->INDEPENDENT
TRANSFERRING: 0-->INDEPENDENT
WHICH_OF_THE_ABOVE_FUNCTIONAL_RISKS_HAD_A_RECENT_ONSET_OR_CHANGE?: AMBULATION
DRESS: 0-->INDEPENDENT
SWALLOWING: 0-->SWALLOWS FOODS/LIQUIDS WITHOUT DIFFICULTY
TOILETING: 0-->INDEPENDENT
SWALLOWING: 0-->SWALLOWS FOODS/LIQUIDS WITHOUT DIFFICULTY
WHICH_OF_THE_ABOVE_FUNCTIONAL_RISKS_HAD_A_RECENT_ONSET_OR_CHANGE?: AMBULATION
DRESS: 0-->INDEPENDENT
AMBULATION: 0-->INDEPENDENT

## 2019-08-09 ASSESSMENT — MIFFLIN-ST. JEOR: SCORE: 1364.63

## 2019-08-09 ASSESSMENT — PAIN DESCRIPTION - DESCRIPTORS: DESCRIPTORS: CRAMPING;NUMBNESS;TINGLING

## 2019-08-09 NOTE — PROGRESS NOTES
[facility]  Transfer Triage Note    Date of call: 08/09/19  Time of call: 11:56 AM    Reason for Transfer:Patient has established care here  Diagnosis: GI bleed    Outside Records: Not available  Additional records requested to be faxed to [phone].    Stability of Patient: Stable to emergent transfer to ICU    Expected Time of Arrival for Transfer: 0-8 hours    Recommendations for Management and Stabilization: Given    Additional Comments   38yo woman in [place] ED. Patient in ED there, on lovenox, has known liver disease and calciphylaxis. HR 140s-150s. Diaphoretic. Vomiting bright red blood since yesterday. Called paramedics. BP 88 on site. No current vomiting. Last /71. . RR 24. ECG regular. No vomiting there. Started octreotide, 2 large bore IVs, Ativan. Hgb still pending--5.2. Lactate 14.2.  Not sure when she last took the LMWH. PRBC. INR 1.6. Cr pending still.   -Will accept patient to ICU--accepted by Dr. De Guzman  -Dr. Johnston in GI aware patient is coming and recommended starting IV Protonix, which was relayed to provider  -Per GI no indication for FFP      Yanira Alegria MD

## 2019-08-09 NOTE — PROGRESS NOTES
RT Note:    Patient intubated for EDG procedure with a 7.5 ETT, 22cm at the teeth. Bilateral breath sounds, chest xray pending.     Tracey España, RT

## 2019-08-09 NOTE — PROCEDURES
Endotracheal Intubation:  RSI with glidescope. Full view of vocal cords. 20 mg etomidate, 35 mg of rocuronium.  Propofol infusion started after intubation.    7.5 ETT placed. Bilateral breath sounds.  Normal color change on end tidal CO2.   ET tube secured at 22 cm.      Hernan De Guzman MD

## 2019-08-09 NOTE — CONSULTS
GASTROENTEROLOGY CONSULTATION      Date of Admission:  8/9/2019          ASSESSMENT AND RECOMMENDATIONS:   Assessment:  Yenifer Maher is a 39 year old female with a past medical history of lupus anti-coagulant on enoxaparin anti-coagulation, calciphylaxis, decompensated EtOH cirrhosis c/b ascites with no prior variceal bleeding/history of varices but ongoing alcohol abuse who presented to [place] ED with complaints of 12 hours of hematemesis. Found to have hemoglobin of 5.2 with lactic acid of 14.7. Transferred to [facility] MICU for further evaluation and management.     1. Hematemesis  Acute onset last evening following two days of black stool. DDx includes variceal hemorrhage, PUD, MW tear, Jackson's lesion (in setting of recent hiatal hernia), dieulafoy lesion, etc.     2. Decompensated Cirrhosis  Etiology: EtOH, actively drinking. MELD-Na: 16.   - Hepatic Encephalopathy: No known history of; not on lactulose at home   - Esophageal Varices: No history of       *Most recent endoscopy: 8/2018 - no varices.    - Ascites: Present.       *Diuretics - On furosemide and spironolactone at home.   - SBP: No known history of.   - Coagulopathy: Present, INR 1.6. Also on enoxaparin anti-coagulation.    - Hepatoma: 0.6 cm lesion noted on 6/21 ultrasound; not noted on CT A/P   - Transplant Evaluation: Not currently transplant candidate 2/2 active EtOH.     3. Elevated LFTs  Most likely related to alcoholic hepatitis in setting of known decompensated cirrhosis, however, AST >1000 not typical of alcoholic hepatitis/cirrhosis - ?toxin/acetaminophen vs ischemic hepatitis.      Recommendations:   - Octreotide and PPI gtt   - Ensure two large bore PIVs   - Ceftriaxone for GIB in cirrhotic patient   - Start NAC; add on/obtain acetaminophen level   - NPO   - EGD today for further evaluation   - Trend hemoglobin; conservative transfusion strategy from hepatology perspective (transfuse for hemoglobin <7)   - Hold anti-coagulation   -  Obtain U/S Abdomen with dopplers, diagnostic paracentesis, blood cultures, CXR, UA/UCx   - Vitamin K IV 10 mg daily x 3 days   - Gastroenterology team will continue to follow with you.    Gastroenterology follow up recommendations: Pending clinical course; follows with Dr. Hernández in clinic.     Thank you for involving us in this patient's care. Please do not hesitate to contact the GI service with any questions or concerns.     Pt care plan discussed with Dr. Ibanez, GI staff physician.    Judit Pineda MD  Gastroenterology/Hepatology Fellow  PGY-6, [id number]  -------------------------------------------------------------------------------------------------------------------          Chief Complaint:   We were asked by Dr. De Guzman of MICU to evaluate this patient with hematemesis in setting of known cirrhosis.    History is obtained from the patient and the medical record.          History of Present Illness:   Yenifer Maher is a 39 year old female with a past medical history of lupus anti-coagulant on enoxaparin anti-coagulation, calciphylaxis, decompensated EtOH cirrhosis c/b ascites with no prior variceal bleeding/history of varices but ongoing alcohol abuse who presented to [place] ED with complaints of 12 hours of hematemesis. Found to have hemoglobin of 5.2 with lactic acid of 14.7. Transferred to [facility] MICU for further evaluation and management.      Patient reports being in her usual state of health until last night when she developed hematemesis. Reports otherwise has felt ok aside from increasing neuropathic pain in her feet which she attributed to the warm weather and dark stool over the past two days. She felt feverish this morning. She reports she is always shaking. She has had some abdominal pain today. Her last oral intake was yesterday (food) and this morning (ginger ale).     She currently drinks two glasses of wine per day in the evening. Last drank yesterday evening. She is on disability for work.  No new medications that she is aware of.     IN the [place] ED she was found to have a hemoglobin of 5.8 and lactic acid of 14. She was administered one unit of blood, started on octreotide/ppi gtt and transferred to [facility].             Past Medical History:   Reviewed and edited as appropriate  Past Medical History:   Diagnosis Date     Alcohol abuse      Alcoholic cirrhosis (H)      Alcoholic peripheral neuropathy (H)      Coagulopathy (H)      Gastritis      History of Clostridium difficile colitis     unknown date     Impairment of cognitive function     MOCA 2/2019 22/30     Leukocytosis 02/2019    persistent leukocytosis across 2/2019 hospitalization without evidence of source across multiple diagnostics including LP, BCx, UCx      Lupus anticoagulant positive      Macrocytic anemia      Moderate protein-calorie malnutrition (H)      Paroxysmal A-fib (H) 02/2019    not on chronic anticoagulation     Peptic ulcer disease      Positive SMILEY (antinuclear antibody)      Subclinical hypothyroidism 02/2019    normal T3, T4     Tobacco dependence     0.5 PPD            Past Surgical History:   Reviewed and edited as appropriate   No past surgical history on file.         Previous Endoscopy:   No results found for this or any previous visit.         Social History:   Reviewed and edited as appropriate  Social History     Socioeconomic History     Marital status: Single     Spouse name: Not on file     Number of children: Not on file     Years of education: Not on file     Highest education level: Not on file   Occupational History     Not on file   Social Needs     Financial resource strain: Not on file     Food insecurity:     Worry: Not on file     Inability: Not on file     Transportation needs:     Medical: Not on file     Non-medical: Not on file   Tobacco Use     Smoking status: Current Some Day Smoker     Types: Cigarettes     Smokeless tobacco: Never Used     Tobacco comment: 6-8 per day   Substance and Sexual  Activity     Alcohol use: Yes     Alcohol/week: 1.2 oz     Types: 2 Glasses of wine per week     Frequency: 2-3 times a week     Drug use: Not on file     Sexual activity: Not on file   Lifestyle     Physical activity:     Days per week: Not on file     Minutes per session: Not on file     Stress: Not on file   Relationships     Social connections:     Talks on phone: Not on file     Gets together: Not on file     Attends Confucianism service: Not on file     Active member of club or organization: Not on file     Attends meetings of clubs or organizations: Not on file     Relationship status: Not on file     Intimate partner violence:     Fear of current or ex partner: Not on file     Emotionally abused: Not on file     Physically abused: Not on file     Forced sexual activity: Not on file   Other Topics Concern     Parent/sibling w/ CABG, MI or angioplasty before 65F 55M? Not Asked   Social History Narrative     Not on file            Family History:   Reviewed and edited as appropriate  No family history on file.        Allergies:   Reviewed and edited as appropriate     Allergies   Allergen Reactions     Bengay Pain Relief [Menthol] Other (See Comments)     Skin turns red and feels extremely hot     Cats      Chocolate Dermatitis     Dicyclomine Other (See Comments)     Severe sedation     Dust Mites      Derm, resp     No Clinical Screening - See Comments      GI upset     Phenobarbital      Pollen Extract      Derm, resp.             Medications:     Medications Prior to Admission   Medication Sig Dispense Refill Last Dose     aspirin (ASA) 81 MG EC tablet Take 1 tablet (81 mg) by mouth daily (Patient not taking: Reported on 7/5/2019) 90 tablet 0 Not Taking     bisacodyl (DULCOLAX) 10 MG suppository Place 1 suppository (10 mg) rectally daily (Patient not taking: Reported on 7/5/2019) 10 suppository 0 Not Taking     bisacodyl (DULCOLAX) 5 MG EC tablet Take 2 tablets (10 mg) by mouth daily as needed for constipation  (Patient not taking: Reported on 7/5/2019) 30 tablet 0 Not Taking     estradiol (VIVELLE-DOT) 0.1 MG/24HR bi-weekly patch Place 1 patch onto the skin twice a week 15 patch 0 Taking     fexofenadine (ALLEGRA) 180 MG tablet Take 1 tablet (180 mg) by mouth daily as needed for allergies (Patient not taking: Reported on 7/5/2019) 30 tablet 0 Not Taking     folic acid (FOLVITE) 1 MG tablet Take 1 tablet (1 mg) by mouth daily 30 tablet 0 Taking     furosemide (LASIX) 20 MG tablet Take 1 tablet (20 mg) by mouth daily 90 tablet 3      hyoscyamine (ANASPAZ/LEVSIN) 0.125 MG tablet Take 1 tablet (125 mcg) by mouth every 4 hours as needed for cramping (Patient not taking: Reported on 7/5/2019) 30 tablet 0 Not Taking     magnesium oxide (MAG-OX) 400 (240 Mg) MG tablet Take 1 tablet (400 mg) by mouth daily 30 tablet 0 Taking     medroxyPROGESTERone (PROVERA) 10 MG tablet Take 1 tablet (10 mg) by mouth daily 30 tablet 0 Taking     nortriptyline (PAMELOR) 10 MG capsule Take 1 capsule (10 mg) by mouth At Bedtime (Patient not taking: Reported on 7/5/2019) 30 capsule 0 Not Taking     nortriptyline (PAMELOR) 10 MG capsule Take 1 capsule (10 mg) by mouth At Bedtime 30 capsule 0 5/27/2019     pantoprazole (PROTONIX) 20 MG EC tablet Take 1 tablet (20 mg) by mouth daily 30 tablet 0      pregabalin (LYRICA) 50 MG capsule Take 1 capsule (50 mg) by mouth 3 times daily 30 capsule 0 Taking     Prenatal Vit-Fe Fumarate-FA (PRENATAL MULTIVITAMIN W/IRON) 27-0.8 MG tablet Take 1 tablet by mouth daily 30 tablet 0 Taking     simethicone (MYLICON) 80 MG chewable tablet Take 1 tablet (80 mg) by mouth every 6 hours as needed for flatulence or cramping 30 tablet 0 Taking     spironolactone (ALDACTONE) 50 MG tablet Take 1 tablet (50 mg) by mouth daily 90 tablet 3      vitamin (B COMPLEX-C) tablet Take 1 tablet by mouth daily 30 tablet 0 Taking     vitamin B1 (THIAMINE) 100 MG tablet Take 1 tablet (100 mg) by mouth daily 30 tablet 0 Taking              Review of Systems:     A complete 10-point review of systems was performed and is negative except as noted in the HPI           Physical Exam:   /78   Pulse 150   Temp 100.4  F (38  C) (Axillary)   Resp 18   Wt 64.7 kg (142 lb 10.2 oz)   SpO2 99%   BMI 22.34 kg/m    Wt:   Wt Readings from Last 2 Encounters:   08/09/19 64.7 kg (142 lb 10.2 oz)   07/05/19 64.3 kg (141 lb 12.8 oz)      Constitutional: cooperative, pleasant, not dyspneic/diaphoretic, no acute distress  Eyes: Sclera anicteric/injected  Ears/nose/mouth/throat: Normal oropharynx without ulcers or exudate, mucus membranes moist, hearing intact  Neck: supple  CV: No edema  Respiratory: Unlabored breathing  Abd: Nondistended, +bs, + enlarged liver, diffuse TTP, no peritoneal signs  Skin: warm, perfused, no jaundice; + spider angiomata  Neuro: AAO x 3; tremulous   Psych: Normal affect  MSK: WWP  : dark red blood in bedside commode          Data:   Labs and imaging below were independently reviewed and interpreted    BMP  Recent Labs   Lab 08/09/19  1546      POTASSIUM 5.5*   CHLORIDE 110*   MARKO 7.0*   CO2 15*   BUN 13   CR 0.62   *     CBC  Recent Labs   Lab 08/09/19  1546   WBC 15.8*   RBC 2.03*   HGB 5.9*   HCT 18.7*   MCV 92   MCH 29.1   MCHC 31.6   RDW 19.7*   *     INR  Recent Labs   Lab 08/09/19  1546   INR 1.69*     LFTs  Recent Labs   Lab 08/09/19  1546   ALKPHOS 300*   AST 1,015*   *   BILITOTAL 2.9*   PROTTOTAL 6.0*   ALBUMIN 1.6*      PANCNo lab results found in last 7 days.     Reviewed outside laboratory data (see above).    MELD-Na score: 16 at 8/9/2019  3:46 PM  MELD score: 16 at 8/9/2019  3:46 PM  Calculated from:  Serum Creatinine: 0.62 mg/dL (Rounded to 1 mg/dL) at 8/9/2019  3:46 PM  Serum Sodium: 139 mmol/L (Rounded to 137 mmol/L) at 8/9/2019  3:46 PM  Total Bilirubin: 2.9 mg/dL at 8/9/2019  3:46 PM  INR(ratio): 1.69 at 8/9/2019  3:46 PM  Age: 39 years    Imaging: Reviewed.   ATTENDING NOTE,  GASTROENTEROLOGY/HEPATOLOGY    I saw and discussed this patient with the fellow and participated in the decision making. I agree with the fellow's note. Sowmya Ibanez MD

## 2019-08-09 NOTE — PROGRESS NOTES
Admitted/transferred from: [place] ED  Reason for admission/transfer: hematemesis  Patient status upon admission/transfer: A/Ox4, tachycardic 150s, BP stable. Continent x1 dark red/maroon stool, no emesis.  Interventions: Start protonix, octreotide gtt. EGD at bedside, pt not adequately sedated despite 6mg IV versed, 100mcg fentanyl and 50mg bendaryl and GI unable to band. Pt intubated by MICU and central line being placed due to lack of IV access.   Plan: Once access obtained and adequately sedated, repeat EGD with possible banding. Transfuse PRBC. Replace electrolytes.  2 RN skin assessment: needs to be completed, NOC RN aware.  Result of skin assessment and interventions/actions: N/A  Height, weight, drug calc weight: done  Patient belongings (see Flowsheet - Adult Profile for details): Remain with patient

## 2019-08-10 ENCOUNTER — APPOINTMENT (OUTPATIENT)
Dept: GENERAL RADIOLOGY | Facility: CLINIC | Age: 39
DRG: 432 | End: 2019-08-10
Attending: STUDENT IN AN ORGANIZED HEALTH CARE EDUCATION/TRAINING PROGRAM
Payer: COMMERCIAL

## 2019-08-10 LAB
ALBUMIN SERPL-MCNC: 1.5 G/DL (ref 3.4–5)
ALBUMIN SERPL-MCNC: 1.5 G/DL (ref 3.4–5)
ALP SERPL-CCNC: 274 U/L (ref 40–150)
ALP SERPL-CCNC: 279 U/L (ref 40–150)
ALT SERPL W P-5'-P-CCNC: 242 U/L (ref 0–50)
ALT SERPL W P-5'-P-CCNC: 269 U/L (ref 0–50)
ANION GAP SERPL CALCULATED.3IONS-SCNC: 8 MMOL/L (ref 3–14)
ANION GAP SERPL CALCULATED.3IONS-SCNC: 9 MMOL/L (ref 3–14)
APAP SERPL-MCNC: <5 UG/ML (ref 10–30)
APAP SERPL-MCNC: <5 UG/ML (ref 10–30)
APAP SERPL-MCNC: NORMAL MG/L (ref 10–20)
APTT PPP: 39 SEC (ref 22–37)
AST SERPL W P-5'-P-CCNC: 1915 U/L (ref 0–45)
AST SERPL W P-5'-P-CCNC: 2412 U/L (ref 0–45)
BASE DEFICIT BLDV-SCNC: 0.8 MMOL/L
BILIRUB DIRECT SERPL-MCNC: 2.3 MG/DL (ref 0–0.2)
BILIRUB SERPL-MCNC: 2.7 MG/DL (ref 0.2–1.3)
BILIRUB SERPL-MCNC: 2.9 MG/DL (ref 0.2–1.3)
BUN SERPL-MCNC: 24 MG/DL (ref 7–30)
BUN SERPL-MCNC: 28 MG/DL (ref 7–30)
CA-I BLD-MCNC: 4.4 MG/DL (ref 4.4–5.2)
CALCIUM SERPL-MCNC: 7.3 MG/DL (ref 8.5–10.1)
CALCIUM SERPL-MCNC: 7.4 MG/DL (ref 8.5–10.1)
CHLORIDE SERPL-SCNC: 109 MMOL/L (ref 94–109)
CHLORIDE SERPL-SCNC: 109 MMOL/L (ref 94–109)
CO2 SERPL-SCNC: 21 MMOL/L (ref 20–32)
CO2 SERPL-SCNC: 22 MMOL/L (ref 20–32)
CREAT SERPL-MCNC: 0.73 MG/DL (ref 0.52–1.04)
CREAT SERPL-MCNC: 0.87 MG/DL (ref 0.52–1.04)
ERYTHROCYTE [DISTWIDTH] IN BLOOD BY AUTOMATED COUNT: 17.7 % (ref 10–15)
ERYTHROCYTE [DISTWIDTH] IN BLOOD BY AUTOMATED COUNT: 18.3 % (ref 10–15)
ERYTHROCYTE [DISTWIDTH] IN BLOOD BY AUTOMATED COUNT: 18.5 % (ref 10–15)
ERYTHROCYTE [DISTWIDTH] IN BLOOD BY AUTOMATED COUNT: 18.6 % (ref 10–15)
FIBRINOGEN PPP-MCNC: 281 MG/DL (ref 200–420)
GFR SERPL CREATININE-BSD FRML MDRD: 85 ML/MIN/{1.73_M2}
GFR SERPL CREATININE-BSD FRML MDRD: >90 ML/MIN/{1.73_M2}
GLUCOSE SERPL-MCNC: 117 MG/DL (ref 70–99)
GLUCOSE SERPL-MCNC: 136 MG/DL (ref 70–99)
HCO3 BLDV-SCNC: 24 MMOL/L (ref 21–28)
HCT VFR BLD AUTO: 22.8 % (ref 35–47)
HCT VFR BLD AUTO: 24.1 % (ref 35–47)
HCT VFR BLD AUTO: 24.2 % (ref 35–47)
HCT VFR BLD AUTO: 24.6 % (ref 35–47)
HGB BLD-MCNC: 7.3 G/DL (ref 11.7–15.7)
HGB BLD-MCNC: 7.5 G/DL (ref 11.7–15.7)
HGB BLD-MCNC: 7.8 G/DL (ref 11.7–15.7)
HGB BLD-MCNC: 7.9 G/DL (ref 11.7–15.7)
INR PPP: 1.63 (ref 0.86–1.14)
LACTATE BLD-SCNC: 1.1 MMOL/L (ref 0.7–2)
LACTATE BLD-SCNC: 1.3 MMOL/L (ref 0.7–2)
MAGNESIUM SERPL-MCNC: 2.4 MG/DL (ref 1.6–2.3)
MCH RBC QN AUTO: 29.3 PG (ref 26.5–33)
MCH RBC QN AUTO: 29.3 PG (ref 26.5–33)
MCH RBC QN AUTO: 29.4 PG (ref 26.5–33)
MCH RBC QN AUTO: 29.9 PG (ref 26.5–33)
MCHC RBC AUTO-ENTMCNC: 31 G/DL (ref 31.5–36.5)
MCHC RBC AUTO-ENTMCNC: 32 G/DL (ref 31.5–36.5)
MCHC RBC AUTO-ENTMCNC: 32.1 G/DL (ref 31.5–36.5)
MCHC RBC AUTO-ENTMCNC: 32.4 G/DL (ref 31.5–36.5)
MCV RBC AUTO: 91 FL (ref 78–100)
MCV RBC AUTO: 92 FL (ref 78–100)
MCV RBC AUTO: 93 FL (ref 78–100)
MCV RBC AUTO: 95 FL (ref 78–100)
O2/TOTAL GAS SETTING VFR VENT: 30 %
PCO2 BLDV: 38 MM HG (ref 40–50)
PH BLDV: 7.4 PH (ref 7.32–7.43)
PHOSPHATE SERPL-MCNC: 2.5 MG/DL (ref 2.5–4.5)
PLATELET # BLD AUTO: 104 10E9/L (ref 150–450)
PLATELET # BLD AUTO: 105 10E9/L (ref 150–450)
PLATELET # BLD AUTO: 107 10E9/L (ref 150–450)
PLATELET # BLD AUTO: 111 10E9/L (ref 150–450)
PO2 BLDV: 40 MM HG (ref 25–47)
POTASSIUM SERPL-SCNC: 3.8 MMOL/L (ref 3.4–5.3)
POTASSIUM SERPL-SCNC: 4.5 MMOL/L (ref 3.4–5.3)
PROT SERPL-MCNC: 5.8 G/DL (ref 6.8–8.8)
PROT SERPL-MCNC: 5.8 G/DL (ref 6.8–8.8)
RBC # BLD AUTO: 2.48 10E12/L (ref 3.8–5.2)
RBC # BLD AUTO: 2.56 10E12/L (ref 3.8–5.2)
RBC # BLD AUTO: 2.64 10E12/L (ref 3.8–5.2)
RBC # BLD AUTO: 2.66 10E12/L (ref 3.8–5.2)
SODIUM SERPL-SCNC: 138 MMOL/L (ref 133–144)
SODIUM SERPL-SCNC: 139 MMOL/L (ref 133–144)
UPPER GI ENDOSCOPY: NORMAL
WBC # BLD AUTO: 12.6 10E9/L (ref 4–11)
WBC # BLD AUTO: 12.9 10E9/L (ref 4–11)
WBC # BLD AUTO: 13.7 10E9/L (ref 4–11)
WBC # BLD AUTO: 14.3 10E9/L (ref 4–11)

## 2019-08-10 PROCEDURE — 71045 X-RAY EXAM CHEST 1 VIEW: CPT

## 2019-08-10 PROCEDURE — 85027 COMPLETE CBC AUTOMATED: CPT | Performed by: STUDENT IN AN ORGANIZED HEALTH CARE EDUCATION/TRAINING PROGRAM

## 2019-08-10 PROCEDURE — 80053 COMPREHEN METABOLIC PANEL: CPT | Performed by: STUDENT IN AN ORGANIZED HEALTH CARE EDUCATION/TRAINING PROGRAM

## 2019-08-10 PROCEDURE — 85610 PROTHROMBIN TIME: CPT | Performed by: STUDENT IN AN ORGANIZED HEALTH CARE EDUCATION/TRAINING PROGRAM

## 2019-08-10 PROCEDURE — 85384 FIBRINOGEN ACTIVITY: CPT | Performed by: STUDENT IN AN ORGANIZED HEALTH CARE EDUCATION/TRAINING PROGRAM

## 2019-08-10 PROCEDURE — 25000128 H RX IP 250 OP 636: Performed by: STUDENT IN AN ORGANIZED HEALTH CARE EDUCATION/TRAINING PROGRAM

## 2019-08-10 PROCEDURE — 85730 THROMBOPLASTIN TIME PARTIAL: CPT | Performed by: STUDENT IN AN ORGANIZED HEALTH CARE EDUCATION/TRAINING PROGRAM

## 2019-08-10 PROCEDURE — 40000281 ZZH STATISTIC TRANSPORT TIME EA 15 MIN

## 2019-08-10 PROCEDURE — 20000004 ZZH R&B ICU UMMC

## 2019-08-10 PROCEDURE — 94003 VENT MGMT INPAT SUBQ DAY: CPT

## 2019-08-10 PROCEDURE — 82330 ASSAY OF CALCIUM: CPT | Performed by: STUDENT IN AN ORGANIZED HEALTH CARE EDUCATION/TRAINING PROGRAM

## 2019-08-10 PROCEDURE — 82248 BILIRUBIN DIRECT: CPT | Performed by: STUDENT IN AN ORGANIZED HEALTH CARE EDUCATION/TRAINING PROGRAM

## 2019-08-10 PROCEDURE — 84100 ASSAY OF PHOSPHORUS: CPT | Performed by: STUDENT IN AN ORGANIZED HEALTH CARE EDUCATION/TRAINING PROGRAM

## 2019-08-10 PROCEDURE — 25800030 ZZH RX IP 258 OP 636: Performed by: STUDENT IN AN ORGANIZED HEALTH CARE EDUCATION/TRAINING PROGRAM

## 2019-08-10 PROCEDURE — 25000132 ZZH RX MED GY IP 250 OP 250 PS 637: Performed by: STUDENT IN AN ORGANIZED HEALTH CARE EDUCATION/TRAINING PROGRAM

## 2019-08-10 PROCEDURE — 80329 ANALGESICS NON-OPIOID 1 OR 2: CPT | Performed by: STUDENT IN AN ORGANIZED HEALTH CARE EDUCATION/TRAINING PROGRAM

## 2019-08-10 PROCEDURE — 80329 ANALGESICS NON-OPIOID 1 OR 2: CPT

## 2019-08-10 PROCEDURE — 82803 BLOOD GASES ANY COMBINATION: CPT | Performed by: STUDENT IN AN ORGANIZED HEALTH CARE EDUCATION/TRAINING PROGRAM

## 2019-08-10 PROCEDURE — 25000125 ZZHC RX 250: Performed by: STUDENT IN AN ORGANIZED HEALTH CARE EDUCATION/TRAINING PROGRAM

## 2019-08-10 PROCEDURE — C9113 INJ PANTOPRAZOLE SODIUM, VIA: HCPCS | Performed by: STUDENT IN AN ORGANIZED HEALTH CARE EDUCATION/TRAINING PROGRAM

## 2019-08-10 PROCEDURE — 99291 CRITICAL CARE FIRST HOUR: CPT | Mod: GC | Performed by: INTERNAL MEDICINE

## 2019-08-10 PROCEDURE — 83735 ASSAY OF MAGNESIUM: CPT | Performed by: STUDENT IN AN ORGANIZED HEALTH CARE EDUCATION/TRAINING PROGRAM

## 2019-08-10 PROCEDURE — 06L38CZ OCCLUSION OF ESOPHAGEAL VEIN WITH EXTRALUMINAL DEVICE, VIA NATURAL OR ARTIFICIAL OPENING ENDOSCOPIC: ICD-10-PCS | Performed by: INTERNAL MEDICINE

## 2019-08-10 PROCEDURE — 40000275 ZZH STATISTIC RCP TIME EA 10 MIN

## 2019-08-10 PROCEDURE — 83605 ASSAY OF LACTIC ACID: CPT | Performed by: STUDENT IN AN ORGANIZED HEALTH CARE EDUCATION/TRAINING PROGRAM

## 2019-08-10 RX ORDER — LACTULOSE 10 G/15ML
200 SOLUTION ORAL EVERY 4 HOURS
Status: DISCONTINUED | OUTPATIENT
Start: 2019-08-10 | End: 2019-08-10

## 2019-08-10 RX ORDER — FOLIC ACID 1 MG/1
1 TABLET ORAL DAILY
Status: DISCONTINUED | OUTPATIENT
Start: 2019-08-11 | End: 2019-08-11

## 2019-08-10 RX ORDER — LORAZEPAM 1 MG/1
1-2 TABLET ORAL EVERY 30 MIN PRN
Status: DISCONTINUED | OUTPATIENT
Start: 2019-08-10 | End: 2019-08-11

## 2019-08-10 RX ORDER — ESTRADIOL 0.1 MG/D
1 FILM, EXTENDED RELEASE TRANSDERMAL
Status: DISCONTINUED | OUTPATIENT
Start: 2019-08-10 | End: 2019-08-14 | Stop reason: HOSPADM

## 2019-08-10 RX ORDER — LORAZEPAM 2 MG/ML
0.25 INJECTION INTRAMUSCULAR EVERY 4 HOURS PRN
Status: DISCONTINUED | OUTPATIENT
Start: 2019-08-10 | End: 2019-08-10

## 2019-08-10 RX ORDER — LANOLIN ALCOHOL/MO/W.PET/CERES
100 CREAM (GRAM) TOPICAL DAILY
Status: DISCONTINUED | OUTPATIENT
Start: 2019-08-10 | End: 2019-08-14 | Stop reason: HOSPADM

## 2019-08-10 RX ORDER — LACTULOSE 10 G/15ML
100 SOLUTION ORAL
Status: DISCONTINUED | OUTPATIENT
Start: 2019-08-10 | End: 2019-08-13

## 2019-08-10 RX ORDER — LACTULOSE 10 G/15ML
20 SOLUTION ORAL
Status: DISCONTINUED | OUTPATIENT
Start: 2019-08-10 | End: 2019-08-13

## 2019-08-10 RX ORDER — MULTIPLE VITAMINS W/ MINERALS TAB 9MG-400MCG
1 TAB ORAL DAILY
Status: DISCONTINUED | OUTPATIENT
Start: 2019-08-11 | End: 2019-08-11

## 2019-08-10 RX ORDER — THIAMINE HYDROCHLORIDE 100 MG/ML
100 INJECTION, SOLUTION INTRAMUSCULAR; INTRAVENOUS DAILY
Status: DISCONTINUED | OUTPATIENT
Start: 2019-08-10 | End: 2019-08-13

## 2019-08-10 RX ADMIN — PROPOFOL 55 MCG/KG/MIN: 10 INJECTION, EMULSION INTRAVENOUS at 04:50

## 2019-08-10 RX ADMIN — PROPOFOL 20 MG: 10 INJECTION, EMULSION INTRAVENOUS at 05:00

## 2019-08-10 RX ADMIN — ACETYLCYSTEINE 6.25 MG/KG/HR: 200 INJECTION, SOLUTION INTRAVENOUS at 12:45

## 2019-08-10 RX ADMIN — Medication 100 MG: at 13:03

## 2019-08-10 RX ADMIN — SODIUM CHLORIDE, POTASSIUM CHLORIDE, SODIUM LACTATE AND CALCIUM CHLORIDE 500 ML: 600; 310; 30; 20 INJECTION, SOLUTION INTRAVENOUS at 22:32

## 2019-08-10 RX ADMIN — PROPOFOL 20 MG: 10 INJECTION, EMULSION INTRAVENOUS at 03:05

## 2019-08-10 RX ADMIN — POTASSIUM CHLORIDE 20 MEQ: 400 INJECTION, SOLUTION INTRAVENOUS at 21:55

## 2019-08-10 RX ADMIN — SODIUM CHLORIDE 8 MG/HR: 9 INJECTION, SOLUTION INTRAVENOUS at 11:39

## 2019-08-10 RX ADMIN — LORAZEPAM 0.25 MG: 2 INJECTION INTRAMUSCULAR; INTRAVENOUS at 23:36

## 2019-08-10 RX ADMIN — PROPOFOL 55 MCG/KG/MIN: 10 INJECTION, EMULSION INTRAVENOUS at 00:22

## 2019-08-10 RX ADMIN — OCTREOTIDE ACETATE 50 MCG/HR: 200 INJECTION, SOLUTION INTRAVENOUS; SUBCUTANEOUS at 17:19

## 2019-08-10 RX ADMIN — LACTULOSE 200 G: 10 SOLUTION ORAL; RECTAL at 03:11

## 2019-08-10 RX ADMIN — POTASSIUM PHOSPHATE, MONOBASIC AND POTASSIUM PHOSPHATE, DIBASIC 10 MMOL: 224; 236 INJECTION, SOLUTION INTRAVENOUS at 06:44

## 2019-08-10 RX ADMIN — CEFTRIAXONE SODIUM 2 G: 2 INJECTION, POWDER, FOR SOLUTION INTRAMUSCULAR; INTRAVENOUS at 20:03

## 2019-08-10 RX ADMIN — PHYTONADIONE 10 MG: 10 INJECTION, EMULSION INTRAMUSCULAR; INTRAVENOUS; SUBCUTANEOUS at 09:22

## 2019-08-10 RX ADMIN — Medication 2 G: at 00:21

## 2019-08-10 RX ADMIN — ESTRADIOL 1 PATCH: 0.1 PATCH TRANSDERMAL at 10:32

## 2019-08-10 RX ADMIN — SODIUM CHLORIDE 8 MG/HR: 9 INJECTION, SOLUTION INTRAVENOUS at 04:08

## 2019-08-10 RX ADMIN — SODIUM CHLORIDE, POTASSIUM CHLORIDE, SODIUM LACTATE AND CALCIUM CHLORIDE 500 ML: 600; 310; 30; 20 INJECTION, SOLUTION INTRAVENOUS at 11:39

## 2019-08-10 RX ADMIN — LACTULOSE 20 G: 20 POWDER, FOR SOLUTION ORAL at 13:03

## 2019-08-10 RX ADMIN — FENTANYL CITRATE 25 MCG: 50 INJECTION INTRAMUSCULAR; INTRAVENOUS at 03:15

## 2019-08-10 RX ADMIN — PROPOFOL 65 MCG/KG/MIN: 10 INJECTION, EMULSION INTRAVENOUS at 08:34

## 2019-08-10 ASSESSMENT — ACTIVITIES OF DAILY LIVING (ADL)
ADLS_ACUITY_SCORE: 13

## 2019-08-10 ASSESSMENT — MIFFLIN-ST. JEOR: SCORE: 1377.63

## 2019-08-10 ASSESSMENT — PAIN DESCRIPTION - DESCRIPTORS
DESCRIPTORS: CRAMPING
DESCRIPTORS: ACHING
DESCRIPTORS: PINS AND NEEDLES

## 2019-08-10 NOTE — H&P
MICU HPI  Yenifer Maher ([id number]) admitted on 8/9/2019  Primary care provider: Flako Gaona          ASSESSMENT & PLAN     Yenifer Maher is a 39 year old female with h/o alcoholic cirrhosis, non-uremic calciphylaxis, and ongoing alcohol use who is admitted with acute upper GI bleed and found on EGD to have multiple bleeding ulcers. She was intubated for the EGD and is now s/p banding with GI.      ===NEURO===  Sedation/Analgesia: fentanyl gtt, propofol gtt, versed PRNs  Significant agitation during procedures. High tolerance for sedation medications.    # Concern for encephalopathy  She was oriented on arrival. More altered later though she had received several sedating medications. No overt signs, but concern for possible withdrawal as she is still using alcohol. No known history of hepatic encephalopathy and she is not taking rifaximin and lactulose outpatient, but will consider this if she does not wake up easily.      ===CARDIOVASCULAR===  # Tachycardia, history of atrial fibrillation  HR ~150. EKG showing sinus tach. Suspect that it is related to how anemic and hypovolemic she is. Question of possible withdrawal as she is still drinking alcohol.  - Resuscitate and continue to monitor  - Consider adenosine to r/o a-flutter if there is no improvement    # Hypovolemia  In the setting of acute GI bleed. Blood pressures have been stable.  - Resuscitate with blood products  - Consider levophed if becomes hemodynamically unstable      ===RESPIRATORY===  # Ventilator Dependence due to procedure and medications:  Intubated for EGD as she was unable to tolerate banding while she was awake.       ===GASTROINTESTINAL===  # Acute upper GI bleed  Admitted with hematemesis and Hgb of 5.2 and found on EGD to have multiple bleeding esophageal varices.  - Banding with GI tonight after intubation  - Received 1 unit PRBC before arrival  - 2 additional units ordered now  - Trend CBC q6h and transfuse for Hgb <7  - Octreotide  gtt  - Protonix gtt    # Alcoholic hepatitis  Had workup including hepatitis panel, autoimmune studies, and imaging at prior hospitalizations (12/2018 and 6/2019). Saw GI outpatient in early July. Hepatoma noted on 6/21 US. AST on admission >1000.  - Will try to find a pocket for diagnostic para after done with procedures (she doesn't have large volume ascites on exam)  - Ceftriaxone  - Vitamin K  - Started NAC for greatly elevated AST on admission  - Checking salicylate, acetaminophen level  - GI consulted, appreciate recs      ===RENAL / ELECTROLYTES===  # Anion gap metabolic acidosis  Lactic acid of 14 at OSH, remained elevated to 8.5 on admission.  - Resuscitating with blood products  - Trend lactate    # SAPPHIRE  Suspect secondary to hypovolemia from the acute GIB.  - S/p 1 L NS before arrival  - Resuscitating with blood products  - Recheck BMP; would give additional blood over crystalloids if needed    # Hyperkalemia  Elevated to 5.5 on admission in the setting of SAPPHIRE. Plan to recheck BMP after procedures and initial resuscitation.  - If K remains elevated, shift upon recheck  - Trend BMP      ===INFECTIOUS DISEASE===     # Leukocytosis  # Fever  Hemodynamically stable.  - Treating with ceftriaxone for possible SBP in cirrhotic with GIB  - Will try to obtain diagnostic para once procedures are done if there is an adequate pocket  - Checking blood cultures    Antimicrobials/Antivirals:  - Ceftriaxone (8/9-*)      ===HEMATOLOGY===  # Acute blood loss anemia  See GI    ===ENDOCRINE===  No acute issues.    ===SKIN/MSK===  No acute issues.      Prophylaxis:  DVT: Holding in the setting of bleed GI: On PPI gtt  Lines: L internal jugular CVC  Family:  estrellaance updated at bedside, mother talked to over the phone (she is person to get consent from)  Disposition: Critically ill, admitted to MICU  Code Status: Full     Patient was discussed with staff attending, Dr. Storm De Guzman.    Dominique Quintana MD  Internal  Medicine, PGY-1           History of Present Illness     Yenifer Maher is a 39 year old female with h/o alcoholic cirrhosis and non-uremic calciphylaxis ho is still drinking who is admitted after having hematemesis at home.    She was in normal state of health yesterday and reports that she had wine with dinner last night (usually has a glass or two of wine at night). She also took all of her meds as usual yesterday. Started feeling sick overnight and had several episodes of vomiting bright red blood. She ate nothing today and took no medications this morning. Her fiance called 911 and she was brought to the ED. She reports that she usually has a little diarrhea and that hasn't changed recently but it has perhaps been darker than usual the last day or so before this happened.    She has not had any episodes of vomiting since arriving at the ED. Before transfer here, she received 1 L NS and 1 unit PRBC. On arrival she was alert and oriented, though she was still very nauseated. She was also having pain in her abdomen and was highly sensitive to all contact especially with IV placements and blood draws.         Past Medical History     Past Medical History:   Diagnosis Date     Alcohol abuse      Alcoholic cirrhosis (H)      Alcoholic peripheral neuropathy (H)      Coagulopathy (H)      Gastritis      History of Clostridium difficile colitis     unknown date     Impairment of cognitive function     MOCA 2/2019 22/30     Leukocytosis 02/2019    persistent leukocytosis across 2/2019 hospitalization without evidence of source across multiple diagnostics including LP, BCx, UCx      Lupus anticoagulant positive      Macrocytic anemia      Moderate protein-calorie malnutrition (H)      Paroxysmal A-fib (H) 02/2019    not on chronic anticoagulation     Peptic ulcer disease      Positive SMILEY (antinuclear antibody)      Subclinical hypothyroidism 02/2019    normal T3, T4     Tobacco dependence     0.5 PPD         Past  Surgical History     No past surgical history on file.       Medications     No current outpatient medications on file.          Allergies     Allergies   Allergen Reactions     Bengay Pain Relief [Menthol] Other (See Comments)     Skin turns red and feels extremely hot     Cats      Chocolate Dermatitis     Dicyclomine Other (See Comments)     Severe sedation     Dust Mites      Derm, resp     No Clinical Screening - See Comments      GI upset     Phenobarbital      Pollen Extract      Derm, resp.           Social History     Social History     Socioeconomic History     Marital status: Single     Spouse name: Not on file     Number of children: Not on file     Years of education: Not on file     Highest education level: Not on file   Occupational History     Not on file   Social Needs     Financial resource strain: Not on file     Food insecurity:     Worry: Not on file     Inability: Not on file     Transportation needs:     Medical: Not on file     Non-medical: Not on file   Tobacco Use     Smoking status: Current Some Day Smoker     Types: Cigarettes     Smokeless tobacco: Never Used     Tobacco comment: 6-8 per day   Substance and Sexual Activity     Alcohol use: Yes     Alcohol/week: 1.2 oz     Types: 2 Glasses of wine per week     Frequency: 2-3 times a week     Drug use: Not on file     Sexual activity: Not on file   Lifestyle     Physical activity:     Days per week: Not on file     Minutes per session: Not on file     Stress: Not on file   Relationships     Social connections:     Talks on phone: Not on file     Gets together: Not on file     Attends Druze service: Not on file     Active member of club or organization: Not on file     Attends meetings of clubs or organizations: Not on file     Relationship status: Not on file     Intimate partner violence:     Fear of current or ex partner: Not on file     Emotionally abused: Not on file     Physically abused: Not on file     Forced sexual activity:  "Not on file   Other Topics Concern     Parent/sibling w/ CABG, MI or angioplasty before 65F 55M? Not Asked   Social History Narrative     Not on file          Family History     No family history on file.       Review of Systems     10 Point ROS negative unless mentioned in HPI     Review Of Systems  Skin:  No bruising or purpura  Eyes: No eye pain or acute vision loss  Ears/Nose/Throat: No tinnitus or epistaxis  Respiratory: No wheezing or hemoptysis   Cardiovascular: No chest pain or palpitations  Gastrointestinal: Chronic diarrhea, dark stools; hematemesis  Genitourinary: No hesitancy or dysuria  Musculoskeletal: No joint redness or swelling  Neurologic: Chronic neuropathic pain          OBJECTIVE   VITAL SIGNS:  /66 (BP Location: Right arm)   Pulse 138   Temp 99.3  F (37.4  C) (Oral)   Resp 16   Ht 1.702 m (5' 7\")   Wt 64.7 kg (142 lb 10.2 oz)   SpO2 100%   BMI 22.34 kg/m      Intake/Output Summary (Last 24 hours) at 8/9/2019 1644  Last data filed at 8/9/2019 1400  Gross per 24 hour   Intake --   Output 350 ml   Net -350 ml     Wt:   Wt Readings from Last 2 Encounters:   08/09/19 64.7 kg (142 lb 10.2 oz)   07/05/19 64.3 kg (141 lb 12.8 oz)        Vent settins:   Resp: 16    BP - Mean:  [78-91] 78    Gen: lying in bed, not in distress  HEENT: no icterus, MMM  Cardio: tachycardic but regular   Pulm: lungs sound clear, breathing comfortably  Abd: soft, diffusely tender to palpation R>L side  Ext: no peripheral edema  Skin: Normal color, texture and turgor; No rashes over exposed areas   Neuro: alert oriented, speech fluent/appropriate, motor grossly nonfocal      DIAGNOSTIC STUDIES:   BMP  Recent Labs   Lab Test 08/09/19  1547 08/09/19  1546 07/05/19  0721 06/21/19  1854 06/04/19  0434  06/01/19  0502  05/28/19  2233  01/06/19  2349   NA  --  139 137 139 135   < > 132*   < > 130*   < >  --    POTASSIUM  --  5.5* 3.4 3.9 3.6   < > 4.0   < > 4.1   < >  --    CHLORIDE  --  110* 107 108 106   < > 105   < > " 100   < >  --    CO2  --  15* 23 21 24   < > 22   < > 23   < >  --    ANIONGAP  --  14 7 10 5   < > 6   < > 6   < >  --    LACT 8.5*  --   --   --   --   --   --   --  0.8  --  2.1   BUN  --  13 4* 3* 5*   < > 7   < > 7   < >  --    CR  --  0.62 0.30* 0.38* 0.40*   < > 0.43*   < > 0.33*   < >  --    GLC  --  105* 117* 98 104*   < > 95   < > 104*   < >  --    MARKO  --  7.0* 8.1* 8.6 8.0*   < > 8.0*   < > 7.5*   < >  --    MAG  --  1.1*  --   --   --   --  2.1   < > 1.7  --   --    PHOS  --  2.4*  --   --   --   --  3.2   < > 1.9*   < >  --     < > = values in this interval not displayed.     Heme   Recent Labs   Lab Test 08/09/19  1546 07/05/19  0721 06/21/19  1854   WBC 15.8* 8.5 6.6   ANEU  --  6.2 5.0   ALYM  --  1.5 1.0   AEOS  --  0.1 0.0   HGB 5.9* 10.3* 9.8*   MCV 92 87 91   * 216 279   INR 1.69* 1.21* 1.25*     Hepatic   Recent Labs   Lab 08/09/19  1546   ALKPHOS 300*   AST 1,015*   *   BILITOTAL 2.9*   PROTTOTAL 6.0*   ALBUMIN 1.6*      Iron   Recent Labs   Lab Test 07/05/19  0721 06/03/19  0739 02/07/19  0947 12/13/18  0833   IRON  --  11*  --  68   FEB  --  294  --  393   IRONSAT  --  4*  --  17   JUSTO 182* 50 79 69     ABG/VBG   Recent Labs   Lab Test 01/06/19  2349   PHV 7.43   PCO2V 31*       Urine Studies:   Recent Labs   Lab Test 06/21/19  2136 05/29/19  0315 01/06/19  2335   SG 1.017 1.023 1.029   URINEPH 7.5* 6.0 5.5   NITRITE Negative Negative Negative   LEUKEST Negative Negative Large*   WBCU  --  2 36*   RBCU  --  >182* 13*   PROTEIN Negative 30* 30*       Microbiology:  No results found for: RS, FLUAG    Recent Labs   Lab Test 01/06/19  2335 01/06/19  2315 01/06/19  2255 01/06/19  2158 12/13/18  0609   CULT >100,000 colonies/mL  Escherichia coli  *  <10,000 colonies/mL  urogenital anthony    --  No growth No growth >100,000 colonies/mL  Klebsiella pneumoniae  *   SDES Unspecified Urine Feces Blood Left Arm Blood Left Arm Unspecified Urine       Imaging:  Recent Results (from the past  24 hour(s))   XR Chest Port 1 View    Narrative     Exam: XR CHEST PORT 1 VW, 8/9/2019 4:13 PM    Indication: concern for aspiration or pneumonia    Comparison: Chest x-ray 6/21/2019    Findings:   Portable semiupright AP radiograph the chest. The cardiac silhouette  and pulmonary vasculature are within normal limits. No pneumothorax or  pleural effusion. No focal airspace opacities. No acute bony  abnormalities. Upper abdomen is unremarkable.      Impression    Impression:No acute airspace disease.    I have personally reviewed the examination and initial interpretation  and I agree with the findings.    NAYELI HARRIS MD

## 2019-08-10 NOTE — OR NURSING
EGD completed at the bedside in the ICU. Pt initially scoped with fentanyl and versed as sedation. Pt could not tolerate the scope again when the banding equipment was added to the scope. Staff decided to intubate and also placed  a central line. 6-7 bands were placed, 9 fired.

## 2019-08-10 NOTE — PROCEDURES
PROCEDURE:   Ultrasound guided right internal jugular central venous catheter.     INDICATION:  Hypovolemic shock.    PROCEDURE :   Dr. Laine Espana    SUPERVISOR:  Dr. Gale    CONSENT:  Obtained and in the paper chart    UNIVERSAL PROTOCOL: Patient Identification was verified, time out was performed, site marking was done. Imaging data reviewed. Full Barrier precaution done: Hands washed, mask, gloves, gown, cap and eye protection all used.     PROCEDURE SUMMARY:   A time-out was performed. The patient's right neck region was prepped and draped in sterile fashion. Anesthesia was achieved with 1% lidocaine. The introduced needle was inserted and visualized in the vein on ultrasound and venous blood was withdrawn. However, patient became agitated and reached her arms up near her neck. The fellow, Dr. Miles, then attempted but patient was also not well enough sedated. Fentanyl drip was started, propofol drip was increased, and 8mg of versed was given. Dr. Gale then re-prepped and draped the area. The introducer needle was then inserted and visualized to be in the vein on ultrasound and venous blood was withdrawn into the syringe. The syringe was removed and the guidewire was advanced through the introducer needle. Ultrasound again documented a wire within the vein.  The introducer needle was removed and a small incision was made with a scalpel over the wire.  A dilator was advanced over the guidewire until appropriate dilation was obtained. The dilator was removed and a triple-lumen catheter was advanced over the guidewire and secured into place with 4 sutures at 15 cm. At time of procedure completion, all ports aspirated and flushed properly. Post-procedure x-ray showed the catheter in the SVC.    Dr. Gale was available for the key portions of the procedure.    COMPLICATIONS:  No acute complcations    ESTIMATED BLOOD LOSS: 5-10mL    Laine Espana MD  Internal Medicine & Pediatrics,  PGY-3  [phone]

## 2019-08-10 NOTE — PROGRESS NOTES
[facility], [place]  Procedure Note          Extubation:       Yenifer Maher  MRN# [id number]   August 10, 2019, 10:54 AM         Patient extubated at: August 10, 2019, 10:54 AM   Supplemental Oxygen: Via nasal cannula at 3 liters per minute   Cough: The cough is strong   Secretion Mode: Able to clear   Secretion Amount: Scant amount, thin and clear in color   Respiratory Exam:: Breath sounds: equal and clear     Location: all lobes   Skin Exam:: Patient color: natural   Patient Status: Currently appears comfortable   Arterial Blood Gasses:          Recorded by Bill Garcia

## 2019-08-10 NOTE — PROGRESS NOTES
MICU Progress Note    Yenifer Maher MRN: [id number]  1980  Date of Admission:8/9/2019  Primary care provider: Flako Gaona      Assessment and Plan     Yenifer Maher is a 39 year old female with h/o alcoholic cirrhosis, non-uremic calciphylaxis, and ongoing alcohol use who is admitted with acute upper GI bleed and found on EGD to have multiple bleeding ulcers. She was intubated for the EGD and is now s/p banding with GI.        Today:  - extubated  - lactulose PO  - advance diet as tolerated    ===NEURO===  Sedation/Analgesia: fentanyl gtt, propofol gtt, Ativan PRN  Significant agitation during procedures. High tolerance for sedation medications.  - Switch versed to Ativan given hepatic metabolism and active metabolite     # Acute encephalopathy  She was oriented on arrival. More altered later though she had received several sedating medications. No overt signs, but concern for possible withdrawal as she is still using alcohol. No known history of hepatic encephalopathy and she is not taking rifaximin and lactulose outpatient. Ammonia elevated and oral lactulose started.        ===CARDIOVASCULAR===  # Tachycardia, history of atrial fibrillation  HR ~130. EKG showing sinus tach. Suspect that it is related to hypovolemia versus withdrawal.  - Resuscitate and continue to monitor    - will obtain bedside IVC and consider IVF bolus  - Consider adenosine to r/o a-flutter if there is no improvement     # Hypovolemia  In the setting of acute GI bleed. Blood pressures have been stable.  - Resuscitated with blood products: consider IVF bolus  - Consider levophed if becomes hemodynamically unstable        ===RESPIRATORY===  # Intubation & Mechanical Ventilation  Intubated for EGD as she was unable to tolerate banding while she was awake.   - Extubated today to 2L NC        ===GASTROINTESTINAL===  # Acute upper GI bleed  Admitted with hematemesis and Hgb of 5.2 and found on EGD to  have multiple bleeding grade 3 esophageal varices. Transfused total 3u PRBCs.  - S/p banding 8/9  - Trend CBC q8h and transfuse for Hgb <7  - Octreotide gtt  - Protonix gtt       # Alcoholic hepatitis  Had workup including hepatitis panel, autoimmune studies, and imaging at prior hospitalizations (12/2018 and 6/2019). Saw GI outpatient in early July. Hepatoma noted on 6/21 US. AST uptrending now >2000.  - Ceftriaxone for SBP prophylaxis in setting of variceal bleed  - Vitamin K 10mg IV x3 doses  - Continue NAC until AST peaks  - Checking acetaminophen level  - GI consulted, appreciate recs        ===RENAL / ELECTROLYTES===  # Anion gap metabolic acidosis  Lactic acidosis: improving. Lactate now normal. In setting of blood loss and hypovolemia.  - Resuscitating with blood products  - Recheck lactate if clinical change     # SAPPHIRE  Suspect prerenal secondary to hypovolemia from the acute GIB.  - Resuscitating with blood products: will bolus with IV fluids  - Recheck BMP       ===INFECTIOUS DISEASE===     # Leukocytosis  # Fever  Hemodynamically stable.  - Treating with ceftriaxone for possible SBP in cirrhotic with variceal bleed  - Blood cultures: NGTD     Antimicrobials/Antivirals:  - Ceftriaxone (8/9-*)        ===HEMATOLOGY===  # Acute blood loss anemia  See GI     ===ENDOCRINE===  No acute issues.     ===SKIN/MSK===  No acute issues.        Prophylaxis:  DVT: Holding in the setting of bleed GI: On PPI gtt  Lines: R internal jugular CVC  Family: update later in day  Disposition: MICU; possible transfer to floor tomorrow    Laine Espana MD  Internal Medicine & Pediatrics, PGY-3  [phone]    MICU STAFF CRITICAL CARE PROGRESS NOTE:    I saw and examined the patient with the MICU team and concur with findings and A&P as detailed in Dr. Espana's note of today    See my independent note of today.    Sushant White MD  MICU Staff  2280        Events of last 24 hrs:     Underwent banding last night. Remained intubated  "overnight. Intermittently agitated with cares. Tachycardic overnight. Received total 3u PRBCs with stable hemoglobin.     OBJECTIVE   Ventilator  Ventilation Mode: CMV/AC  (Continuous Mandatory Ventilation/ Assist Control)  FiO2 (%): 30 %  Rate Set (breaths/minute): 14 breaths/min  Tidal Volume Set (mL): 450 mL  PEEP (cm H2O): 5 cmH2O  Oxygen Concentration (%): 30 %  Resp: 15         Intake/Output    Intake/Output Summary (Last 24 hours) at 8/10/2019 0936  Last data filed at 8/10/2019 0900  Gross per 24 hour   Intake 2728 ml   Output 1525 ml   Net 1203 ml       Vital Signs    Temp: 98.3  F (36.8  C) Temp src: Axillary BP: 100/63 Pulse: 135 Heart Rate: 135 Resp: 15 SpO2: 97 % O2 Device: Mechanical Ventilator   Height: 170.2 cm (5' 7\") Weight: 66 kg (145 lb 8.1 oz)  Estimated body mass index is 22.79 kg/m  as calculated from the following:    Height as of this encounter: 1.702 m (5' 7\").    Weight as of this encounter: 66 kg (145 lb 8.1 oz).               Physical Exam  GEN     NEURO:  Sedated but agitated with exam, intubated, in no acute distress  HEENT   NCAT, PERRL, no scleral icteris  RESP   CTA bilateral breath sounds anteriorly, No W/R/R  CV   Tachycardic, regular rhythm, No M/R/G  ABD   Soft, NT, ND, BS, no significant asicites on exam  EXT   Warm, no LE edema, equal pulses  SKIN       LINES: RIJ   ROUTINE ICU LABS:     Labs Reviewed  Pertinent for:  AST > 2000, Hgb 7.9      Microbiology   Blood cx NGTD   Imaging     CXR:   IMPRESSION: Endotracheal tube tip projects over the midthoracic trachea. No focal infiltrates.                    "

## 2019-08-10 NOTE — CONSULTS
"    GASTROENTEROLOGY CONSULTATION      Date of Admission:  8/9/2019         SUBJECTIVE/OBJECTIVE   - Patient intubated and sedated, unable to obtain ROS  - Overnight underwent EGD: notable for grade III EV (with red whale signs and nipple signs), no evidence of gastric varices, s/p banding  - Off pressors          Medications:       cefTRIAXone  2 g Intravenous Q24H     lactulose  200 g Rectal Q4H     midazolam  8 mg Intravenous Once     phytonadione  10 mg Intravenous Daily     rocuronium  50 mg Intravenous Once   [COMPLETED] fentaNYL **FOLLOWED BY** fentaNYL, fentaNYL, flumazenil, magnesium sulfate, magnesium sulfate, [COMPLETED] midazolam **FOLLOWED BY** midazolam, midazolam, naloxone, ondansetron, potassium chloride, potassium chloride with lidocaine, potassium chloride, potassium chloride, potassium chloride, potassium phosphate (KPHOS) in D5W IV, potassium phosphate (KPHOS) in D5W IV, potassium phosphate (KPHOS) in D5W IV, potassium phosphate (KPHOS) in D5W IV, potassium phosphate (KPHOS) in D5W IV, prochlorperazine, propofol (DIPRIVAN) infusion **AND** propofol **AND** CK total **AND** Triglycerides         Physical Exam:   /82   Pulse 156   Temp 99.3  F (37.4  C) (Oral)   Resp 16   Ht 1.702 m (5' 7\")   Wt 64.7 kg (142 lb 10.2 oz)   SpO2 96%   BMI 22.34 kg/m    Wt:   Wt Readings from Last 2 Encounters:   08/09/19 64.7 kg (142 lb 10.2 oz)   07/05/19 64.3 kg (141 lb 12.8 oz)      Constitutional: Intubated, sedated   Eyes: Sclera anicteric/injected  Ears/nose/mouth/throat: ET tube present  Neck: supple  CV: Mildly tachycardic  Respiratory: Ventilated, clear to anterior auscultation bilaterally.   Abd: Nondistended, NABS, + enlarged liver. No tenderness to palpation throughout, but sedated.   Skin: warm, perfused, no jaundice; + spider angiomata  : Chavez in place  Rectal: Rectal tube in place, bright red blood present  Neuro: Sedated  Psych: Normal affect  Skin: R internal jugular in place         " Data:   Labs and imaging below were independently reviewed and interpreted    BMP  Recent Labs   Lab 08/10/19  0417 08/09/19  2005 08/09/19  1546    140 139   POTASSIUM 4.5 4.9 5.5*   CHLORIDE 109 109 110*   MARKO 7.3* 6.8* 7.0*   CO2 21 21 15*   BUN 28 20 13   CR 0.87 0.74 0.62   * 136* 105*     CBC  Recent Labs   Lab 08/10/19  0417 08/09/19  2220 08/09/19  2005 08/09/19  1546   WBC 12.9* 13.6* 13.9* 15.8*   RBC 2.66* 2.21* 1.91* 2.03*   HGB 7.8* 6.8* 5.7* 5.9*   HCT 24.1* 20.7* 17.9* 18.7*   MCV 91 94 94 92   MCH 29.3 30.8 29.8 29.1   MCHC 32.4 32.9 31.8 31.6   RDW 17.7* 18.4* 20.0* 19.7*   * 112* 129* 125*     INR  Recent Labs   Lab 08/10/19  0417 08/09/19  1546   INR 1.63* 1.69*     LFTs  Recent Labs   Lab 08/10/19  0417 08/09/19  1546   ALKPHOS 279* 300*   AST 2,412* 1,015*   * 124*   BILITOTAL 2.7* 2.9*   PROTTOTAL 5.8* 6.0*   ALBUMIN 1.5* 1.6*      PANCNo lab results found in last 7 days.    MELD-Na score: 16 at 8/9/2019  3:46 PM  MELD score: 16 at 8/9/2019  3:46 PM  Calculated from:  Serum Creatinine: 0.62 mg/dL (Rounded to 1 mg/dL) at 8/9/2019  3:46 PM  Serum Sodium: 139 mmol/L (Rounded to 137 mmol/L) at 8/9/2019  3:46 PM  Total Bilirubin: 2.9 mg/dL at 8/9/2019  3:46 PM  INR(ratio): 1.69 at 8/9/2019  3:46 PM  Age: 39 years    IMAGING:   US Abdomen 8/9/2019:  1.  Cirrhotic configuration of the liver with evidence of portal  hypertension, including retrograde flow in the portal venous system.  Of note, there is to and fro flow within left portal vein, which  previously demonstrated retrograde flow. No visualized portal vein  thrombus.  2.  Biliary sludge with mild gallbladder wall thickening, nonspecific  in the setting of intrinsic hepatic parenchymal disease.     3. Previously seen subcapsular right lobe subcentimeter lesion  described on 6/21/2019 is not visualized today.  Nonemergent MRI  previously advised.    CXR 8/10/2019:  FINDINGS: A single AP view of the chest is obtained.  Endotracheal tube  tip projects over the midthoracic trachea. Right IJ central venous  catheter tip projects over the low SVC.     Trachea is midline. The cardiomediastinal silhouette is within normal  limits. Pulmonary vasculature is distinct. No pleural effusion or  pneumothorax. No focal pulmonary opacities. No acute bony  abnormalities. The upper abdomen is unremarkable.                                                                      IMPRESSION: Endotracheal tube tip projects over the midthoracic  trachea.    ASSESSMENT AND RECOMMENDATIONS:   Assessment:  Yenifer Maher is a 39 year old female with a past medical history of lupus anti-coagulant on enoxaparin anti-coagulation, calciphylaxis, decompensated EtOH cirrhosis c/b ascites and EV wwith ongoing alcohol abuse who presented to [place] ED with hematemesis. Found to have hemoglobin of 5.2 with lactic acid of 14.7. Transferred to [facility] MICU for further evaluation and management. EGD done w/evidence of variceal bleed.     1. Acute on chronic blood loss anemia: Presented with hematemesis 2/2 EV bleeding.    2. Decompensated Cirrhosis  Etiology: EtOH, actively drinking. MELD-Na: 16.   - Hepatic Encephalopathy: No known history of; not on lactulose at home   - Ascites, present on admission. On furosemide and spironolactone at home   - SBP: No known history of.   - Coagulopathy: Present, INR 1.6. On enoxaparin anti-coagulation given lupus anti-coagulant    - Hepatoma: 0.6 cm lesion noted on 6/21 ultrasound; not noted on CT A/P   - Transplant Evaluation: Not currently transplant candidate 2/2 active EtOH.     3. Elevated LFTs  Most likely related to alcoholic hepatitis in setting of known decompensated cirrhosis, however, AST >1000 not typical of alcoholic hepatitis/cirrhosis - ?toxin/acetaminophen vs ischemic hepatitis. Unable to obtain tylenol level due to icteric specimen x2.   AST increasing to 2k, but Tb stable and ALT down-trending. Lactate improved to  1.3 today    Recommendations:  - Continue octreotide x 5 days  - Continue abx for bleeding in the setting of cirrhosis/EV x 7 days, continue IV CTX at this time  - Continue IV PPI at this time  - Transfuse for Hgb < 7 or active bleeding; keep type and screen up to date  - Maintain 2 large bore IVs (18-20g)  - Fluid resuscitation per primary team  - Plan for repeat EGD in 4-6 weeks  - Once able to eat, ok for CLD  - Continue NAC given concern for tylenol vs other toxic ingestions (although pt denies)  - Hold anti-coagulation  - Vitamin K IV 10 mg daily x 3 days  - Okay to hold diuretics for ascites given variceal bleed  - Recommend diagnostic paracentesis   - Recommend blood cultures & UA/UCx to evaluate for infecion  - Recommend alcohol counseling, / consultation    Gastroenterology follow up recommendations: Pending clinical course; follows with Dr. Hernández in clinic.     Thank you for involving us in this patient's care. Please do not hesitate to contact the GI service with any questions or concerns.     Pt care plan discussed with Dr. Ibanez, GI staff physician.    Mary Ellington MD (Lizzie)  Gastroenterology/Hepatology Fellow  [phone]  ATTENDING NOTE, GASTROENTEROLOGY/HEPATOLOGY    I saw and discussed this patient with the fellow and participated in the decision making. I agree with the fellow's note. Sowmya Ibanez MD

## 2019-08-10 NOTE — PLAN OF CARE
ICU End of Shift Summary. See flowsheets for vital signs and detailed assessment.    Changes this shift: Pt labile, easily agitated. Received prn propofol and fentanyl. EGD performed by GI. Pt tolerated well. Propofol weaned down to 55 mcg/kg/min and Fentanyl at 50 mcg/hr. Tremors noted this AM when pt became agitated. Pt remains intubated. VBG drawn - see results. ST 130s-140s throughout night, MICU aware. Tmax 100.1. 2 units PRBC given. Recheck hgb this AM 7.8. Ammonia 143 - MICU notified - Lactulose enema ordered and given. Rectal pouch placed. Large amount of stool output - see I/os. NPO. Chavez placed.   Magnesium, phos, and calcium replaced per orders. 2 RN skin assessment completed - see noted. Estradiol patch to left lower quadrant of abdomen- MICU aware and stated to leave in place at this time. Yellow bracelet, grey ring, and grey ring with stone sent home with pt's significant other.     Plan: Continue to follow POC, cbc Q6hr, monitor for s/s of bleeding

## 2019-08-10 NOTE — PLAN OF CARE
ICU End of Shift Summary. See flowsheets for vital signs and detailed assessment.    Changes this shift: Extubated at 1050 to 2L NC. A/Ox4, following commands. Remains tachycardic, 500cc LR bolus administered with no improvement in HR. On scheduled PO lactulose for goal stool output ~1L, pt has met goal today. Having loose liquid dark red stools, no s/sx of active bleeding. Hgb checks stable. On protonix/octreotide gtts. Chavez removed, voided x1 on BSC. Advancing diet as tolerated.     Plan: Continue to trend Hgb and monitor for signs of bleeding. Anticipate transfer out of ICU tomorrow.

## 2019-08-10 NOTE — PROGRESS NOTES
Two RN skin check completed with Lexus Almaguer. Small scratch noted to patients right back,  healing scar with pink tissue noted to right inner thigh, small red spots noted to chest, and dime sized black blister to ring finger.

## 2019-08-10 NOTE — PROGRESS NOTES
MICU STAFF ATTENDING PROGRESS  Case reviewed with MICU team  Pt seen and examined by me with MICU team and labs/images reviewed.  Agree with today's detailed note of Dr. Espana.    38 yo woman with cirrhosis and seen as outpt by Dr Hernández and presumed EtOH etiology.  No h/o HSE/SBP    Case reviewed with MICU team.  Pt admitted yesterday with acute UGI Bleed in setting of cirrhosis and scoped and had bleeding Grade 3 varices - banded    Events overnight reviewed.    On propofol .65, fentanyl 50, octreotide protonix and NAc  Intubated restless woman not following commands ACMV 14 450 5 30%  Afebrile -150; normotensive MAPs 70s  internal jugular central line, Chavez, no NG  Clear lungs  RRR tachy no murmur  Ascites nontender  No pitting    Hgb 7.8  WBC 12.9 (13) Plt1 107K  INR 1.6  Na 139 K 4.5  Creat 0.87  PO4 nl Ca I 4.4  Bili ~ 2.5  AlkPhos 259  Rising transaminases  VBG 7.4/38  Tylenol sendout due to Bili  CXR reviewed - possible small L pleural effusion layering    EtOH Hepatitis and probable Cirrhosis  On NAc for theoretical possibility of tylenol overdose (no history)  No h/o HSE but given abnormal MS, I believe it is worth treating with lactulose    Variceal Bleed s/p Banding  Following Hgb  Tx to Hgb > 7  Continue octreotide and protonix    Tachycardia  Could be volume depletion and/or EtOH withdrawal  P:  Fluid bolus and image IVC    Intubation & Mechanical Ventilation  Intubated for airway protection given: (1) likely bleeding varices; (2) abnl MS; and (3) high risk of aspiration siddharth during UGI endoscopy  Tolerated PSV 10/5 and some gag present while sedated  Will extubate this AM and observe  Then start oral lactulose      I spent a cumulative 45 minutes dedicated critical care time in the patient's care thus far today, exclusive of procedures and family conference time.    Sushant White MD  [phone]

## 2019-08-10 NOTE — PROGRESS NOTES
Attending ICU Admit Note:  39 year old female with cirrhosis, still drinking alcohol, presents with hematemesis, lactic acid 14, renal failure.  GI has already performed endoscopy demonstrating esophageal varices.  Plan to band, but needs higher sedation due to agitation.      On exam, resting in bed, lethargic.   , /70, 99% on RA  Diffuse abdominal tenderness.    Warm extremities    Hgb 5.9, AST 1015, , Lactate 8.5, Creatinine 0.6 (usually 0.3). INR 1.7, PTT 43.     UGI bleed from esophageal varices: GI consulted.  Plan for banding. PPI, octreotide, 2 units PRBCs.     Delirium - unable to adequately sedate for banding. Intubated with 7.5 ETT tube. Volume control ventilation    Tachycardia - persistent at 150.  A flutter? If heart rate does not decrease after sedation and resuscitation, try adenosine with continuous ECG.     Hepatitis - suspect alcohol related, but AST does seem high.  Checking tylenol and putting on NAC. Check CK, LDH, haptoglobin looking for other reasons for AST elevation.  Get RUQ ultrasound with doppler    Plan to place central venous catheter, propofol for sedation, PRN fentanyl and versed.  Keep hgb > 7 with q 6 accucheck.  Look for ascites and get diagnostic paracentesis.    Critical care time 45 minutes excluding procedures.     Hernan De Guzman MD

## 2019-08-11 LAB
ABO + RH BLD: NORMAL
ABO + RH BLD: NORMAL
ALBUMIN SERPL-MCNC: 1.4 G/DL (ref 3.4–5)
ALP SERPL-CCNC: 258 U/L (ref 40–150)
ALT SERPL W P-5'-P-CCNC: 213 U/L (ref 0–50)
ANION GAP SERPL CALCULATED.3IONS-SCNC: 8 MMOL/L (ref 3–14)
AST SERPL W P-5'-P-CCNC: 1584 U/L (ref 0–45)
BILIRUB SERPL-MCNC: 3.2 MG/DL (ref 0.2–1.3)
BLD GP AB SCN SERPL QL: NORMAL
BLD PROD TYP BPU: NORMAL
BLD PROD TYP BPU: NORMAL
BLD UNIT ID BPU: 0
BLOOD BANK CMNT PATIENT-IMP: NORMAL
BLOOD PRODUCT CODE: NORMAL
BPU ID: NORMAL
BUN SERPL-MCNC: 18 MG/DL (ref 7–30)
CA-I BLD-MCNC: 4.5 MG/DL (ref 4.4–5.2)
CALCIUM SERPL-MCNC: 7.4 MG/DL (ref 8.5–10.1)
CHLORIDE SERPL-SCNC: 109 MMOL/L (ref 94–109)
CO2 SERPL-SCNC: 23 MMOL/L (ref 20–32)
CREAT SERPL-MCNC: 0.69 MG/DL (ref 0.52–1.04)
ERYTHROCYTE [DISTWIDTH] IN BLOOD BY AUTOMATED COUNT: 17.8 % (ref 10–15)
ERYTHROCYTE [DISTWIDTH] IN BLOOD BY AUTOMATED COUNT: 18.7 % (ref 10–15)
GFR SERPL CREATININE-BSD FRML MDRD: >90 ML/MIN/{1.73_M2}
GLUCOSE SERPL-MCNC: 88 MG/DL (ref 70–99)
HCT VFR BLD AUTO: 21.3 % (ref 35–47)
HCT VFR BLD AUTO: 24.1 % (ref 35–47)
HGB BLD-MCNC: 6.8 G/DL (ref 11.7–15.7)
HGB BLD-MCNC: 7.7 G/DL (ref 11.7–15.7)
MAGNESIUM SERPL-MCNC: 1.8 MG/DL (ref 1.6–2.3)
MAGNESIUM SERPL-MCNC: 2 MG/DL (ref 1.6–2.3)
MCH RBC QN AUTO: 29.4 PG (ref 26.5–33)
MCH RBC QN AUTO: 29.7 PG (ref 26.5–33)
MCHC RBC AUTO-ENTMCNC: 31.9 G/DL (ref 31.5–36.5)
MCHC RBC AUTO-ENTMCNC: 32 G/DL (ref 31.5–36.5)
MCV RBC AUTO: 92 FL (ref 78–100)
MCV RBC AUTO: 93 FL (ref 78–100)
NUM BPU REQUESTED: 3
PHOSPHATE SERPL-MCNC: 1.2 MG/DL (ref 2.5–4.5)
PHOSPHATE SERPL-MCNC: 1.3 MG/DL (ref 2.5–4.5)
PLATELET # BLD AUTO: 102 10E9/L (ref 150–450)
PLATELET # BLD AUTO: 95 10E9/L (ref 150–450)
POTASSIUM SERPL-SCNC: 3.7 MMOL/L (ref 3.4–5.3)
POTASSIUM SERPL-SCNC: 3.9 MMOL/L (ref 3.4–5.3)
PROT SERPL-MCNC: 5.6 G/DL (ref 6.8–8.8)
RBC # BLD AUTO: 2.29 10E12/L (ref 3.8–5.2)
RBC # BLD AUTO: 2.62 10E12/L (ref 3.8–5.2)
SODIUM SERPL-SCNC: 139 MMOL/L (ref 133–144)
SPECIMEN EXP DATE BLD: NORMAL
TRANSFUSION STATUS PATIENT QL: NORMAL
TRANSFUSION STATUS PATIENT QL: NORMAL
WBC # BLD AUTO: 10.8 10E9/L (ref 4–11)
WBC # BLD AUTO: 11.7 10E9/L (ref 4–11)

## 2019-08-11 PROCEDURE — 83735 ASSAY OF MAGNESIUM: CPT | Performed by: STUDENT IN AN ORGANIZED HEALTH CARE EDUCATION/TRAINING PROGRAM

## 2019-08-11 PROCEDURE — P9016 RBC LEUKOCYTES REDUCED: HCPCS | Performed by: STUDENT IN AN ORGANIZED HEALTH CARE EDUCATION/TRAINING PROGRAM

## 2019-08-11 PROCEDURE — 80053 COMPREHEN METABOLIC PANEL: CPT | Performed by: STUDENT IN AN ORGANIZED HEALTH CARE EDUCATION/TRAINING PROGRAM

## 2019-08-11 PROCEDURE — 25000132 ZZH RX MED GY IP 250 OP 250 PS 637: Performed by: STUDENT IN AN ORGANIZED HEALTH CARE EDUCATION/TRAINING PROGRAM

## 2019-08-11 PROCEDURE — 84100 ASSAY OF PHOSPHORUS: CPT | Performed by: STUDENT IN AN ORGANIZED HEALTH CARE EDUCATION/TRAINING PROGRAM

## 2019-08-11 PROCEDURE — C9113 INJ PANTOPRAZOLE SODIUM, VIA: HCPCS | Performed by: STUDENT IN AN ORGANIZED HEALTH CARE EDUCATION/TRAINING PROGRAM

## 2019-08-11 PROCEDURE — 25800030 ZZH RX IP 258 OP 636: Performed by: STUDENT IN AN ORGANIZED HEALTH CARE EDUCATION/TRAINING PROGRAM

## 2019-08-11 PROCEDURE — 99233 SBSQ HOSP IP/OBS HIGH 50: CPT | Mod: GC | Performed by: INTERNAL MEDICINE

## 2019-08-11 PROCEDURE — 82330 ASSAY OF CALCIUM: CPT | Performed by: STUDENT IN AN ORGANIZED HEALTH CARE EDUCATION/TRAINING PROGRAM

## 2019-08-11 PROCEDURE — 25000125 ZZHC RX 250: Performed by: STUDENT IN AN ORGANIZED HEALTH CARE EDUCATION/TRAINING PROGRAM

## 2019-08-11 PROCEDURE — 25000128 H RX IP 250 OP 636: Performed by: STUDENT IN AN ORGANIZED HEALTH CARE EDUCATION/TRAINING PROGRAM

## 2019-08-11 PROCEDURE — 12000001 ZZH R&B MED SURG/OB UMMC

## 2019-08-11 PROCEDURE — 99291 CRITICAL CARE FIRST HOUR: CPT | Mod: GC | Performed by: INTERNAL MEDICINE

## 2019-08-11 PROCEDURE — 85027 COMPLETE CBC AUTOMATED: CPT | Performed by: STUDENT IN AN ORGANIZED HEALTH CARE EDUCATION/TRAINING PROGRAM

## 2019-08-11 PROCEDURE — 84132 ASSAY OF SERUM POTASSIUM: CPT | Performed by: STUDENT IN AN ORGANIZED HEALTH CARE EDUCATION/TRAINING PROGRAM

## 2019-08-11 RX ORDER — PREGABALIN 50 MG/1
50 CAPSULE ORAL 3 TIMES DAILY
Status: DISCONTINUED | OUTPATIENT
Start: 2019-08-11 | End: 2019-08-14 | Stop reason: HOSPADM

## 2019-08-11 RX ORDER — NORTRIPTYLINE HCL 10 MG
10 CAPSULE ORAL AT BEDTIME
Status: DISCONTINUED | OUTPATIENT
Start: 2019-08-11 | End: 2019-08-14 | Stop reason: HOSPADM

## 2019-08-11 RX ADMIN — FOLIC ACID 1 MG: 1 TABLET ORAL at 08:27

## 2019-08-11 RX ADMIN — Medication 2 G: at 08:27

## 2019-08-11 RX ADMIN — PREGABALIN 50 MG: 50 CAPSULE ORAL at 19:45

## 2019-08-11 RX ADMIN — LACTULOSE 20 G: 20 POWDER, FOR SOLUTION ORAL at 15:34

## 2019-08-11 RX ADMIN — ACETYLCYSTEINE 6.25 MG/KG/HR: 200 INJECTION, SOLUTION INTRAVENOUS at 06:10

## 2019-08-11 RX ADMIN — PANTOPRAZOLE SODIUM 40 MG: 40 INJECTION, POWDER, FOR SOLUTION INTRAVENOUS at 19:45

## 2019-08-11 RX ADMIN — SODIUM CHLORIDE 8 MG/HR: 9 INJECTION, SOLUTION INTRAVENOUS at 01:44

## 2019-08-11 RX ADMIN — Medication 100 MG: at 08:27

## 2019-08-11 RX ADMIN — CEFTRIAXONE SODIUM 2 G: 2 INJECTION, POWDER, FOR SOLUTION INTRAMUSCULAR; INTRAVENOUS at 19:46

## 2019-08-11 RX ADMIN — Medication 2 G: at 21:29

## 2019-08-11 RX ADMIN — LACTULOSE 20 G: 20 POWDER, FOR SOLUTION ORAL at 08:27

## 2019-08-11 RX ADMIN — POTASSIUM PHOSPHATE, MONOBASIC AND POTASSIUM PHOSPHATE, DIBASIC 20 MMOL: 224; 236 INJECTION, SOLUTION INTRAVENOUS at 09:50

## 2019-08-11 RX ADMIN — POTASSIUM PHOSPHATE, MONOBASIC AND POTASSIUM PHOSPHATE, DIBASIC 20 MMOL: 224; 236 INJECTION, SOLUTION INTRAVENOUS at 22:13

## 2019-08-11 RX ADMIN — PREGABALIN 50 MG: 50 CAPSULE ORAL at 15:34

## 2019-08-11 RX ADMIN — PANTOPRAZOLE SODIUM 40 MG: 40 INJECTION, POWDER, FOR SOLUTION INTRAVENOUS at 09:55

## 2019-08-11 RX ADMIN — PHYTONADIONE 10 MG: 10 INJECTION, EMULSION INTRAMUSCULAR; INTRAVENOUS; SUBCUTANEOUS at 07:50

## 2019-08-11 RX ADMIN — OCTREOTIDE ACETATE 50 MCG/HR: 200 INJECTION, SOLUTION INTRAVENOUS; SUBCUTANEOUS at 17:58

## 2019-08-11 RX ADMIN — MULTIPLE VITAMINS W/ MINERALS TAB 1 TABLET: TAB at 08:27

## 2019-08-11 RX ADMIN — POTASSIUM CHLORIDE 20 MEQ: 400 INJECTION, SOLUTION INTRAVENOUS at 09:50

## 2019-08-11 RX ADMIN — NORTRIPTYLINE HYDROCHLORIDE 10 MG: 10 CAPSULE ORAL at 22:13

## 2019-08-11 RX ADMIN — SODIUM CHLORIDE, POTASSIUM CHLORIDE, SODIUM LACTATE AND CALCIUM CHLORIDE 1000 ML: 600; 310; 30; 20 INJECTION, SOLUTION INTRAVENOUS at 09:53

## 2019-08-11 ASSESSMENT — ACTIVITIES OF DAILY LIVING (ADL)
ADLS_ACUITY_SCORE: 13
ADLS_ACUITY_SCORE: 13
ADLS_ACUITY_SCORE: 15
ADLS_ACUITY_SCORE: 13
ADLS_ACUITY_SCORE: 13
ADLS_ACUITY_SCORE: 15

## 2019-08-11 NOTE — PROGRESS NOTES
MICU Progress Note    Yenifer Maher MRN: [id number]  1980  Date of Admission:8/9/2019  Primary care provider: Flako Gaona      Assessment and Plan     Yenifer Maher is a 39 year old female with h/o alcoholic cirrhosis, non-uremic calciphylaxis, and ongoing alcohol use who is admitted with acute upper GI bleed and found on EGD to have multiple bleeding ulcers. She was intubated for the EGD and is now s/p banding with GI.        Today:  - Discontinue CIWA  - 1 L LR  - Replete phosphate  - Transfer to the floor      ===NEURO===  Sedation/Analgesia: fentanyl gtt, propofol gtt, Ativan PRN  Significant agitation during procedures. High tolerance for sedation medications.  - Switch versed to Ativan given hepatic metabolism and active metabolite    # Concern for withdrawal  Agitation in the setting of active alcohol use. Last drink was evening of 8/8. Responded well to 0.25 mg ativan and want to minimize benzo use given her liver dysfunction. Low suspicion at this time for active withdrawal.  - Discontinue CIWA protocol        ===CARDIOVASCULAR===  # Tachycardia, history of atrial fibrillation  HR ~130. EKG showing sinus tach. Suspect that it is related to hypovolemia versus withdrawal.  - 1 L LR today        ===RESPIRATORY===  # Acute hypoxic respiratory failure  Intubated for EGD as she was unable to tolerate banding while she was awake. Extubated 8/10 to 2L NC, now on room air.  - No acute issues        ===GASTROINTESTINAL===  # Acute upper GI bleed  Admitted with hematemesis and Hgb of 5.2 and found on EGD to have multiple bleeding grade 3 esophageal varices. S/p banding 8/9.  - Trend CBC q8h and transfuse for Hgb <7  - Octreotide gtt for 5 days  - Protonix changed from gtt to BID       # Alcoholic hepatitis  Had workup including hepatitis panel, autoimmune studies, and imaging at prior hospitalizations (12/2018 and 6/2019). Saw GI outpatient in early July. Hepatoma noted on  6/21 . AST peaked at 2400, now downtrending.  - Ceftriaxone for SBP prophylaxis in setting of variceal bleed  - Vitamin K 10mg IV x3 doses  - Discontinue NAC as AST is downtrending  - GI consulted, appreciate recs  - Lactulose to reduce risk of HE        ===RENAL / ELECTROLYTES===  # Anion gap metabolic acidosis  Lactic acidosis: improving. Lactate now normal. In setting of blood loss and hypovolemia.  - Resuscitating with blood products and IVF  - Recheck lactate if clinical change     # SAPPHIRE  Suspect prerenal secondary to hypovolemia from the acute GIB.  - Resuscitating with blood products: will bolus with IV fluids today    # Hypophosphatemia  Phos 1.3 on 8/11.  - Replete today       ===INFECTIOUS DISEASE===     # Leukocytosis  # Fever  Hemodynamically stable.  - Treating with ceftriaxone for possible SBP in cirrhotic with variceal bleed  - Blood cultures: NGTD     Antimicrobials/Antivirals:  - Ceftriaxone (8/9-*)        ===HEMATOLOGY===  # Acute blood loss anemia  See GI     ===ENDOCRINE===  No acute issues.     ===SKIN/MSK===  No acute issues.        Prophylaxis:  DVT: Holding in the setting of bleed GI: On PPI gtt  Lines: R internal jugular CVC  Family: update later in day  Disposition: Transfer to floor    This patient was seen and discussed with attending, Dr. White.    Dominique Quintana MD  Internal Medicine, PGY-1    MICU STAFF ATTENDING PROGRESS  Case reviewed with MICU team  Pt seen and examined by me with MICU team and labs/images reviewed.  Agree with today's detailed note of Dr. Quintana    See my independent note of today    Sushant White MD  [phone]          Events of last 24 hrs:     Tremulous and agitated overnight, so she was started on the CIWA protocol out of concern for withdrawal. She received 0.25 mg ativan and then slept well the rest of the night. She also received 1 unit PRBC for a Hgb of 6.8.     OBJECTIVE   Ventilator  Ventilation Mode: CMV/AC  (Continuous Mandatory Ventilation/ Assist  "Control)  FiO2 (%): 30 %  Rate Set (breaths/minute): 14 breaths/min  Tidal Volume Set (mL): 450 mL  PEEP (cm H2O): 5 cmH2O  Oxygen Concentration (%): 30 %  Resp: 12       Intake/Output    Intake/Output Summary (Last 24 hours) at 8/10/2019 0936  Last data filed at 8/10/2019 0900  Gross per 24 hour   Intake 2728 ml   Output 1525 ml   Net 1203 ml       Vital Signs    Temp: 99.1  F (37.3  C) Temp src: Oral BP: 115/63 Pulse: 120 Heart Rate: 122 Resp: 12 SpO2: 96 % O2 Device: None (Room air) Oxygen Delivery: 2 LPM Height: 170.2 cm (5' 7\") Weight: 66 kg (145 lb 8.1 oz)  Estimated body mass index is 22.79 kg/m  as calculated from the following:    Height as of this encounter: 1.702 m (5' 7\").    Weight as of this encounter: 66 kg (145 lb 8.1 oz).         Physical Exam  GEN     NEURO:  Sitting in bed in no acute distress. Interactive.  HEENT   NCAT, PERRL, no scleral icteris  RESP   CTA bilateral breath sounds anteriorly, No W/R/R  CV   Tachycardic, regular rhythm, No M/R/G  ABD   Soft, mildly distended, mildly tender to palpation  EXT   Warm, no LE edema, equal pulses     LINES: [facility]   ROUTINE ICU LABS:     CMP  Recent Labs   Lab 08/11/19  0415 08/10/19  2010 08/10/19  0417 08/09/19  2005 08/09/19  1546    138 139 140 139   POTASSIUM 3.7 3.8 4.5 4.9 5.5*   CHLORIDE 109 109 109 109 110*   CO2 23 22 21 21 15*   ANIONGAP 8 8 9 9 14   GLC 88 136* 117* 136* 105*   BUN 18 24 28 20 13   CR 0.69 0.73 0.87 0.74 0.62   GFRESTIMATED >90 >90 85 >90 >90   GFRESTBLACK >90 >90 >90 >90 >90   MARKO 7.4* 7.4* 7.3* 6.8* 7.0*   MAG  --   --  2.4* 1.2* 1.1*   PHOS  --   --  2.5 2.3* 2.4*   PROTTOTAL 5.6* 5.8* 5.8*  --  6.0*   ALBUMIN 1.4* 1.5* 1.5*  --  1.6*   BILITOTAL 3.2* 2.9* 2.7*  --  2.9*   ALKPHOS 258* 274* 279*  --  300*   AST 1,584* 1,915* 2,412*  --  1,015*   * 242* 269*  --  124*     CBC  Recent Labs   Lab 08/11/19  0415 08/10/19  2010 08/10/19  1142 08/10/19  0952   WBC 11.7* 13.7* 14.3* 12.6*   RBC 2.29* 2.48* 2.64* 2.56* "   HGB 6.8* 7.3* 7.9* 7.5*   HCT 21.3* 22.8* 24.6* 24.2*   MCV 93 92 93 95   MCH 29.7 29.4 29.9 29.3   MCHC 31.9 32.0 32.1 31.0*   RDW 18.7* 18.3* 18.5* 18.6*   * 105* 111* 104*     INR  Recent Labs   Lab 08/10/19  0417 08/09/19  1546   INR 1.63* 1.69*     Arterial Blood Gas  Recent Labs   Lab 08/10/19  0417 08/09/19  2005   O2PER 30 30.0         Microbiology   Blood cx NGTD   Imaging     No new.

## 2019-08-11 NOTE — PROGRESS NOTES
[facility] Internal Medicine Progress  Note            Interval History:   Transferred to internal medicine team    Plan for Today  - CIWA discontinued  - Transfer to the floor          Assessment and Plan:   Yenifer Maher is a 39 year old female with h/o alcoholic cirrhosis, non-uremic calciphylaxis, and ongoing alcohol use who is admitted with acute upper GI bleed and found on EGD to have multiple bleeding ulcers. She was intubated for the EGD and is now s/p banding with GI.    # Acute upper GI bleed  Admitted with hematemesis and Hgb of 5.2 and found on EGD to have multiple bleeding grade 3 esophageal varices. S/p banding 8/9.  - Trend CBC q8h and transfuse for Hgb <7  - Octreotide gtt for 5 days  - Protonix changed from gtt to BID  - Repeat EGD in 4-6 weeks  - Continuing to hold anticoagulation    # Alcoholic hepatitis  Had workup including hepatitis panel, autoimmune studies, and imaging at prior hospitalizations (12/2018 and 6/2019). Saw GI outpatient in early July. Hepatoma noted on 6/21 US. AST peaked at 2400, now downtrending.  - Ceftriaxone for SBP prophylaxis in setting of variceal bleed  - Vitamin K 10mg IV x3 doses  - Discontinue NAC as AST is downtrending  - GI consulted, appreciate recs  - Lactulose to reduce risk of HE    # Elevated LFTs  Most likely related to alcoholic hepatitis in setting of known decompensated cirrhosis, however, AST >1000 not typical of alcoholic hepatitis/cirrhosis - ?toxin/acetaminophen vs ischemic hepatitis. Unable to obtain tylenol level due to icteric specimen x2.   AST increasing to 2k, but Tb stable and ALT down-trending. Lactate improved to 1.3 today  - CTM    # Concern for withdrawal  Agitation in the setting of active alcohol use. Last drink was evening of 8/8. Responded well to 0.25 mg ativan and want to minimize benzo use given her liver dysfunction. Low suspicion at this time for active withdrawal.  - Discontinue CIWA protocol  - Chem dep consult    Diet: Regular  "Diet  Fluids: PO  DVT: None, GI Bleed  Code Status: Full    Ernesto Jackson MD MPH  PGY2              Objective:   /85 (BP Location: Right arm)   Pulse 114   Temp 98.4  F (36.9  C) (Oral)   Resp 19   Ht 1.702 m (5' 7\")   Wt 66 kg (145 lb 8.1 oz)   SpO2 96%   BMI 22.79 kg/m        Intake/Output Summary (Last 24 hours) at 8/11/2019 1736  Last data filed at 8/11/2019 1700  Gross per 24 hour   Intake 3928.83 ml   Output 1700 ml   Net 2228.83 ml       PHYSICAL EXAMINATION  GEN       Sitting in bed in no acute distress. Interactive.  HEENT   NCAT, PERRL, no scleral icteris  RESP  CTA bilateral breath sounds anteriorly, No W/R/R  CV  Tachycardic, regular rhythm, No M/R/G  ABD   Soft, mildly distended, mildly tender to palpation  EXT     Warm, no LE edema, equal pulses           Labs, Imaging, & Medications:   All labs, images, and medications reviewed by me.           "

## 2019-08-11 NOTE — PLAN OF CARE
"ICU End of Shift Summary. See flowsheets for vital signs and detailed assessment.    Changes this shift: Pt remained AOx4. Placed on RA, Spo2 >92%. ST 120s-130s. MICU aware, ordered 500 ml LR bolus. HR low 120s. Pt also reported having a headache along with tremors and reported that \"my ears hurt when I drink water and it hurts in my teeth\". Prn Ativan given x1 per MICU for possible ETOH withdrawal. Pt appears to have been more restful after ativan given. CIWA protocol ordered. hgb this AM 6.8, 1 unit of PRBC transfused. KCL 3.8 at 2000, replaced per orders. Multiple mixed stool/urine occurrences throughout night. Pt tolerating regular diet. Voiding using commode.     Plan: Continue POC, recheck CBC    "

## 2019-08-11 NOTE — PLAN OF CARE
ICU End of Shift Summary. See flowsheets for vital signs and detailed assessment.    Changes this shift: Remains neurologically intact. No s/sx active bleeding. Hgb rechecked after 1u PRBC. 1L LR bolus administered for persistent tachycardia, HR improved to 90-110s this shift. Electrolytes supplemented. NAC/protonix gtt discontinued.     Plan: Awaiting transfer out of ICU. PT/OT ordered. Chem dependency consult ordered.

## 2019-08-11 NOTE — PROGRESS NOTES
MICU STAFF ATTENDING PROGRESS  Case reviewed with MICU team  Pt seen and examined by me with MICU team and labs/images reviewed.  Agree with today's detailed note of Dr. Quintana    Events overnight reviewed - some agitation and got 0.25 ativan  Extubated yesterday w/o difficulty  Transfused 1U RBC for Hgb 6.8  Stool dark red melenic    Afebrile -120; MAPs 70-80s (w/o pressor);  Good O2 sat on RA  Sitting in bed alert & oriented  C/o sore throat; less abd pain & N  Lungs clear  Cor tachy RRR  Mildly distended belly, diffuse mild tenderness  No edema    Hgb 6.8 preTx  WBC 11.7 (down) Plts 102  Nl lytes;  Creat 0.69 (0.8)  Mg 1.8; PO4 1.3 (2.5);  Ca7.4  Bili 3.2  AlkPhos 358 AST 1500 (2400)    EtOH Hepatitis and probable Cirrhosis  On ceftriaxone SBP prophylaxis  Vit K  discharge NAC drip - nl tylenol and pt denies    Neuro / Agitation   Withdrawal possible, but not clearly occurring   Will take off protocol  Continue lactulose    Variceal Bleed s/p Banding  Bleeding varices due to portal HTN due to cirrhosis  Following Hgb  Tx to Hgb > 7  Continue octreotide and protonix     Tachycardia  Still could be volume depletion and/or EtOH withdrawal  P:  check bedside image IVC    SAPPHIRE - resolved     Hypophosphatemia    I spent a cumulative 45 minutes dedicated critical care time in the patient's care thus far today, exclusive of procedures and family conference time.    Sushant White MD  [phone]

## 2019-08-12 ENCOUNTER — APPOINTMENT (OUTPATIENT)
Dept: OCCUPATIONAL THERAPY | Facility: CLINIC | Age: 39
DRG: 432 | End: 2019-08-12
Attending: STUDENT IN AN ORGANIZED HEALTH CARE EDUCATION/TRAINING PROGRAM
Payer: COMMERCIAL

## 2019-08-12 ENCOUNTER — APPOINTMENT (OUTPATIENT)
Dept: PHYSICAL THERAPY | Facility: CLINIC | Age: 39
DRG: 432 | End: 2019-08-12
Attending: STUDENT IN AN ORGANIZED HEALTH CARE EDUCATION/TRAINING PROGRAM
Payer: COMMERCIAL

## 2019-08-12 LAB
ALBUMIN SERPL-MCNC: 1.7 G/DL (ref 3.4–5)
ALP SERPL-CCNC: 269 U/L (ref 40–150)
ALT SERPL W P-5'-P-CCNC: 177 U/L (ref 0–50)
ANION GAP SERPL CALCULATED.3IONS-SCNC: 6 MMOL/L (ref 3–14)
AST SERPL W P-5'-P-CCNC: 1152 U/L (ref 0–45)
BILIRUB SERPL-MCNC: 6.4 MG/DL (ref 0.2–1.3)
BUN SERPL-MCNC: 8 MG/DL (ref 7–30)
CALCIUM SERPL-MCNC: 7.4 MG/DL (ref 8.5–10.1)
CHLORIDE SERPL-SCNC: 102 MMOL/L (ref 94–109)
CO2 SERPL-SCNC: 23 MMOL/L (ref 20–32)
CREAT SERPL-MCNC: 0.58 MG/DL (ref 0.52–1.04)
ERYTHROCYTE [DISTWIDTH] IN BLOOD BY AUTOMATED COUNT: 18.2 % (ref 10–15)
GFR SERPL CREATININE-BSD FRML MDRD: >90 ML/MIN/{1.73_M2}
GLUCOSE SERPL-MCNC: 128 MG/DL (ref 70–99)
HAPTOGLOB SERPL-MCNC: 87 MG/DL (ref 15–200)
HCT VFR BLD AUTO: 24.1 % (ref 35–47)
HGB BLD-MCNC: 7.7 G/DL (ref 11.7–15.7)
INR PPP: 1.52 (ref 0.86–1.14)
INTERPRETATION ECG - MUSE: NORMAL
LACTATE BLD-SCNC: 1.8 MMOL/L (ref 0.7–2)
MAGNESIUM SERPL-MCNC: 1.9 MG/DL (ref 1.6–2.3)
MCH RBC QN AUTO: 30 PG (ref 26.5–33)
MCHC RBC AUTO-ENTMCNC: 32 G/DL (ref 31.5–36.5)
MCV RBC AUTO: 94 FL (ref 78–100)
PHOSPHATE SERPL-MCNC: 1.7 MG/DL (ref 2.5–4.5)
PLATELET # BLD AUTO: 146 10E9/L (ref 150–450)
POTASSIUM SERPL-SCNC: 4 MMOL/L (ref 3.4–5.3)
POTASSIUM SERPL-SCNC: 4 MMOL/L (ref 3.4–5.3)
PROT SERPL-MCNC: 6 G/DL (ref 6.8–8.8)
RBC # BLD AUTO: 2.57 10E12/L (ref 3.8–5.2)
SODIUM SERPL-SCNC: 131 MMOL/L (ref 133–144)
WBC # BLD AUTO: 8 10E9/L (ref 4–11)

## 2019-08-12 PROCEDURE — 83605 ASSAY OF LACTIC ACID: CPT | Performed by: INTERNAL MEDICINE

## 2019-08-12 PROCEDURE — 25800030 ZZH RX IP 258 OP 636: Performed by: STUDENT IN AN ORGANIZED HEALTH CARE EDUCATION/TRAINING PROGRAM

## 2019-08-12 PROCEDURE — 25000128 H RX IP 250 OP 636: Performed by: STUDENT IN AN ORGANIZED HEALTH CARE EDUCATION/TRAINING PROGRAM

## 2019-08-12 PROCEDURE — 97165 OT EVAL LOW COMPLEX 30 MIN: CPT | Mod: GO

## 2019-08-12 PROCEDURE — 83735 ASSAY OF MAGNESIUM: CPT | Performed by: INTERNAL MEDICINE

## 2019-08-12 PROCEDURE — 80053 COMPREHEN METABOLIC PANEL: CPT

## 2019-08-12 PROCEDURE — 99233 SBSQ HOSP IP/OBS HIGH 50: CPT | Mod: GC | Performed by: INTERNAL MEDICINE

## 2019-08-12 PROCEDURE — 84132 ASSAY OF SERUM POTASSIUM: CPT | Performed by: INTERNAL MEDICINE

## 2019-08-12 PROCEDURE — 85027 COMPLETE CBC AUTOMATED: CPT

## 2019-08-12 PROCEDURE — 12000001 ZZH R&B MED SURG/OB UMMC

## 2019-08-12 PROCEDURE — 97161 PT EVAL LOW COMPLEX 20 MIN: CPT | Mod: GP

## 2019-08-12 PROCEDURE — 97530 THERAPEUTIC ACTIVITIES: CPT | Mod: GP

## 2019-08-12 PROCEDURE — 36592 COLLECT BLOOD FROM PICC: CPT

## 2019-08-12 PROCEDURE — C9113 INJ PANTOPRAZOLE SODIUM, VIA: HCPCS | Performed by: STUDENT IN AN ORGANIZED HEALTH CARE EDUCATION/TRAINING PROGRAM

## 2019-08-12 PROCEDURE — 97110 THERAPEUTIC EXERCISES: CPT | Mod: GP

## 2019-08-12 PROCEDURE — 36592 COLLECT BLOOD FROM PICC: CPT | Performed by: INTERNAL MEDICINE

## 2019-08-12 PROCEDURE — 25000132 ZZH RX MED GY IP 250 OP 250 PS 637: Performed by: STUDENT IN AN ORGANIZED HEALTH CARE EDUCATION/TRAINING PROGRAM

## 2019-08-12 PROCEDURE — 85610 PROTHROMBIN TIME: CPT

## 2019-08-12 PROCEDURE — 97530 THERAPEUTIC ACTIVITIES: CPT | Mod: GO

## 2019-08-12 PROCEDURE — 84100 ASSAY OF PHOSPHORUS: CPT | Performed by: INTERNAL MEDICINE

## 2019-08-12 PROCEDURE — 25000125 ZZHC RX 250

## 2019-08-12 PROCEDURE — 25000125 ZZHC RX 250: Performed by: STUDENT IN AN ORGANIZED HEALTH CARE EDUCATION/TRAINING PROGRAM

## 2019-08-12 RX ORDER — LIDOCAINE HYDROCHLORIDE 10 MG/ML
INJECTION, SOLUTION EPIDURAL; INFILTRATION; INTRACAUDAL; PERINEURAL
Status: COMPLETED
Start: 2019-08-12 | End: 2019-08-12

## 2019-08-12 RX ORDER — LIDOCAINE HYDROCHLORIDE 10 MG/ML
5 INJECTION, SOLUTION EPIDURAL; INFILTRATION; INTRACAUDAL; PERINEURAL ONCE
Status: COMPLETED | OUTPATIENT
Start: 2019-08-12 | End: 2019-08-12

## 2019-08-12 RX ORDER — LIDOCAINE HYDROCHLORIDE 5 MG/ML
5 INJECTION, SOLUTION INFILTRATION; INTRAVENOUS ONCE
Status: DISCONTINUED | OUTPATIENT
Start: 2019-08-12 | End: 2019-08-12

## 2019-08-12 RX ADMIN — PREGABALIN 50 MG: 50 CAPSULE ORAL at 20:46

## 2019-08-12 RX ADMIN — LIDOCAINE HYDROCHLORIDE 2 ML: 10 INJECTION, SOLUTION EPIDURAL; INFILTRATION; INTRACAUDAL; PERINEURAL at 14:00

## 2019-08-12 RX ADMIN — PANTOPRAZOLE SODIUM 40 MG: 40 INJECTION, POWDER, FOR SOLUTION INTRAVENOUS at 20:47

## 2019-08-12 RX ADMIN — PHYTONADIONE 10 MG: 10 INJECTION, EMULSION INTRAMUSCULAR; INTRAVENOUS; SUBCUTANEOUS at 09:04

## 2019-08-12 RX ADMIN — Medication 100 MG: at 08:53

## 2019-08-12 RX ADMIN — CEFTRIAXONE SODIUM 2 G: 2 INJECTION, POWDER, FOR SOLUTION INTRAMUSCULAR; INTRAVENOUS at 20:48

## 2019-08-12 RX ADMIN — PREGABALIN 50 MG: 50 CAPSULE ORAL at 13:57

## 2019-08-12 RX ADMIN — NORTRIPTYLINE HYDROCHLORIDE 10 MG: 10 CAPSULE ORAL at 23:34

## 2019-08-12 RX ADMIN — POTASSIUM PHOSPHATE, MONOBASIC AND POTASSIUM PHOSPHATE, DIBASIC 20 MMOL: 224; 236 INJECTION, SOLUTION INTRAVENOUS at 23:37

## 2019-08-12 RX ADMIN — LIDOCAINE HYDROCHLORIDE 20 MG: 10 INJECTION, SOLUTION EPIDURAL; INFILTRATION; INTRACAUDAL; PERINEURAL at 15:32

## 2019-08-12 RX ADMIN — PREGABALIN 50 MG: 50 CAPSULE ORAL at 08:53

## 2019-08-12 RX ADMIN — PANTOPRAZOLE SODIUM 40 MG: 40 INJECTION, POWDER, FOR SOLUTION INTRAVENOUS at 08:54

## 2019-08-12 RX ADMIN — ESTRADIOL 1 PATCH: 0.1 PATCH TRANSDERMAL at 11:44

## 2019-08-12 ASSESSMENT — MIFFLIN-ST. JEOR: SCORE: 1445.65

## 2019-08-12 ASSESSMENT — ACTIVITIES OF DAILY LIVING (ADL)
ADLS_ACUITY_SCORE: 15
ADLS_ACUITY_SCORE: 14
ADLS_ACUITY_SCORE: 15
ADLS_ACUITY_SCORE: 14
ADLS_ACUITY_SCORE: 14
ADLS_ACUITY_SCORE: 15
PREVIOUS_RESPONSIBILITIES: MEAL PREP;HOUSEKEEPING;LAUNDRY;SHOPPING;DRIVING

## 2019-08-12 NOTE — PLAN OF CARE
Discharge Planner OT   Patient plan for discharge: Home with A  Current status: OT eval completed, tx initiated. SBA for supine <> sit, CGA for STS and amb to/from bathroom with no AE and close CGA 2/2 to pt unsteady on feet and grabbing for handholds on wall, amb ~175ft in halls with 2WW and CGA/SBA with increased steadiness, SBA for LB dressing, verbalized proper tehcnique for tub transfer and techniques to reduce falls risks at home  Barriers to return to prior living situation: medical status, balance, falls risk  Recommendations for discharge: Home with A from family and HH/OP PT  Rationale for recommendations: Pt appears to be near baseline for ADL performance with pt reporting she had a HH OT provide a home safety evaluation and her spouse recently installed railings in her home. Pt is below baseline for amb and may benefit from HH/OP PT to increase functional mobility. IP PT to initiate.        Entered by: Odilia Bernabe 08/12/2019 10:19 AM

## 2019-08-12 NOTE — PROGRESS NOTES
Transferred to:  at 2330  Status at time of transfer: AOx4, on RA, ST 110s, GARCIA  Belongings: One brown bag with magazines and snacks, a purse with tablet, phone, two chargers, bra,underwear, pants, Medication organizer with medications sealed in security belongings envelope - security notified to .   Chavez removed? (if no, why?): yes  Chart and medications: sent with patient  Family notified: notified by patient

## 2019-08-12 NOTE — PROGRESS NOTES
[facility] Progress  Note            Interval History:   Said that she did not sleep very well, today pulled her central venous catheter.  Otherwise doing well, no acute events overnight    Plan for Today  - Will see chem dep   -Gastroenterology requesting diagnostic paracentesis, however prior imaging does not show that she has ascites.  Placed Consult (CAPS) asked that they scan her and see if there is a pocket amenable to tapping.  - Removed central line          Assessment and Plan:   Yenifer Maher is a 39 year old female with h/o alcoholic cirrhosis, non-uremic calciphylaxis, and ongoing alcohol use who is admitted with acute upper GI bleed and found on EGD to have multiple bleeding ulcers. She was intubated for the EGD and is now s/p banding with GI.    # Acute upper GI bleed  Admitted with hematemesis and Hgb of 5.2 and found on EGD to have multiple bleeding grade 3 esophageal varices. S/p banding 8/9.  - Trend CBC and transfuse for Hgb <7  - Octreotide gtt for 5 days  - Protonix changed from gtt to BID  - Repeat EGD in 4-6 weeks  - Continuing to hold anticoagulation    # Alcoholic hepatitis  Had workup including hepatitis panel, autoimmune studies, and imaging at prior hospitalizations (12/2018 and 6/2019). Saw GI outpatient in early July. Hepatoma noted on 6/21 US. AST peaked at 2400, now downtrending.  NAC discontinued.  Lactulose discontinued as she does not have hepatic encephalopathy.  - Ceftriaxone for SBP prophylaxis in setting of variceal bleed  - Vitamin K 10mg IV x3 doses  - Discontinue NAC as AST is downtrending  - GI consulted, appreciate recs    # Elevated LFTs  Most likely related to alcoholic hepatitis in setting of known decompensated cirrhosis, however, AST >1000 not typical of alcoholic hepatitis/cirrhosis - ?toxin/acetaminophen vs ischemic hepatitis. Unable to obtain tylenol level due to icteric specimen x2.   AST increasing to 2k, but Tb stable and ALT down-trending.  "Lactate improved to 1.3 today  - CTM    # Concern for withdrawal  Agitation in the setting of active alcohol use. Last drink was evening of 8/8. Responded well to 0.25 mg ativan and want to minimize benzo use given her liver dysfunction. Low suspicion at this time for active withdrawal. CIWA discontinued.  - Chem dep consult    Diet: Regular Diet  Fluids: PO  DVT: None, GI Bleed  Code Status: Full    Ernesto Jackson MD MPH  PGY2  [phone]            Objective:   /76 (BP Location: Left arm)   Pulse 104   Temp 98.6  F (37  C) (Oral)   Resp 18   Ht 1.702 m (5' 7\")   Wt 66 kg (145 lb 8.1 oz)   SpO2 97%   BMI 22.79 kg/m        Intake/Output Summary (Last 24 hours) at 8/11/2019 1736  Last data filed at 8/11/2019 1700  Gross per 24 hour   Intake 3928.83 ml   Output 1700 ml   Net 2228.83 ml       PHYSICAL EXAMINATION  GEN       Sitting in bed in no acute distress. Interactive.  HEENT   NCAT, PERRL, no scleral icteris  RESP  CTA bilateral breath sounds anteriorly, No W/R/R  CV  rrr, No M/R/G  ABD   Soft, mildly distended, mildly tender to palpation  EXT     Warm, no LE edema, equal pulses           Labs, Imaging, & Medications:   All labs, images, and medications reviewed by me.           "

## 2019-08-12 NOTE — CONSULTS
"    GASTROENTEROLOGY CONSULTATION      Date of Admission:  8/9/2019         SUBJECTIVE/OBJECTIVE   - Extubated.Afebrile but remains tachycardic  - NAC stopped per primary team  - Reports abdominal discomfort, stable. Denies fevers, chills or n/v.           Medications:       cefTRIAXone  2 g Intravenous Q24H     estradiol  1 patch Transdermal Once per day on Mon Thu     estradiol biweekly   Transdermal Once per day on Mon Thu     estradiol biweekly   Transdermal Q8H     lactulose  20 g Oral TID     nortriptyline  10 mg Oral At Bedtime     pantoprazole (PROTONIX) IV  40 mg Intravenous BID     phytonadione  10 mg Intravenous Daily     pregabalin  50 mg Oral TID     vitamin B1  100 mg Oral Daily    Or     thiamine  100 mg Intravenous Daily   benzocaine 20%, lactulose **OR** lactulose, magnesium sulfate, magnesium sulfate, naloxone, ondansetron, potassium chloride, potassium chloride with lidocaine, potassium chloride, potassium chloride, potassium chloride, potassium phosphate (KPHOS) in D5W IV, potassium phosphate (KPHOS) in D5W IV, potassium phosphate (KPHOS) in D5W IV, potassium phosphate (KPHOS) in D5W IV, potassium phosphate (KPHOS) in D5W IV, prochlorperazine         Physical Exam:   /82   Pulse 156   Temp 99.3  F (37.4  C) (Oral)   Resp 16   Ht 1.702 m (5' 7\")   Wt 64.7 kg (142 lb 10.2 oz)   SpO2 96%   BMI 22.34 kg/m    Wt:   Wt Readings from Last 2 Encounters:   08/09/19 64.7 kg (142 lb 10.2 oz)   07/05/19 64.3 kg (141 lb 12.8 oz)      Constitutional: Alert and oriented x3  Neck: supple  CV: Mildly tachycardic, no murmurs  Respiratory: Ventilated, clear to anterior auscultation bilaterally.   Abd: Nondistended, NABS, + enlarged liver. No tenderness to palpation throughout, but sedated.   Skin: warm, perfused, no jaundice; + spider angiomata  Psych: Normal affect           Data:   Labs and imaging below were independently reviewed and interpreted    BMP  Recent Labs   Lab 08/11/19  0415 " 08/10/19  2010 08/10/19  0417 08/09/19  2005    138 139 140   POTASSIUM 3.7 3.8 4.5 4.9   CHLORIDE 109 109 109 109   MARKO 7.4* 7.4* 7.3* 6.8*   CO2 23 22 21 21   BUN 18 24 28 20   CR 0.69 0.73 0.87 0.74   GLC 88 136* 117* 136*     CBC  Recent Labs   Lab 08/11/19  1013 08/11/19  0415 08/10/19  2010 08/10/19  1142   WBC 10.8 11.7* 13.7* 14.3*   RBC 2.62* 2.29* 2.48* 2.64*   HGB 7.7* 6.8* 7.3* 7.9*   HCT 24.1* 21.3* 22.8* 24.6*   MCV 92 93 92 93   MCH 29.4 29.7 29.4 29.9   MCHC 32.0 31.9 32.0 32.1   RDW 17.8* 18.7* 18.3* 18.5*   PLT 95* 102* 105* 111*     INR  Recent Labs   Lab 08/10/19  0417 08/09/19  1546   INR 1.63* 1.69*     LFTs  Recent Labs   Lab 08/11/19  0415 08/10/19  2010 08/10/19  0417 08/09/19  1546   ALKPHOS 258* 274* 279* 300*   AST 1,584* 1,915* 2,412* 1,015*   * 242* 269* 124*   BILITOTAL 3.2* 2.9* 2.7* 2.9*   PROTTOTAL 5.6* 5.8* 5.8* 6.0*   ALBUMIN 1.4* 1.5* 1.5* 1.6*      PANCNo lab results found in last 7 days.    MELD-Na score: 16 at 8/9/2019  3:46 PM  MELD score: 16 at 8/9/2019  3:46 PM  Calculated from:  Serum Creatinine: 0.62 mg/dL (Rounded to 1 mg/dL) at 8/9/2019  3:46 PM  Serum Sodium: 139 mmol/L (Rounded to 137 mmol/L) at 8/9/2019  3:46 PM  Total Bilirubin: 2.9 mg/dL at 8/9/2019  3:46 PM  INR(ratio): 1.69 at 8/9/2019  3:46 PM  Age: 39 years    IMAGING:   US Abdomen 8/9/2019:  1.  Cirrhotic configuration of the liver with evidence of portal  hypertension, including retrograde flow in the portal venous system.  Of note, there is to and fro flow within left portal vein, which  previously demonstrated retrograde flow. No visualized portal vein  thrombus.  2.  Biliary sludge with mild gallbladder wall thickening, nonspecific  in the setting of intrinsic hepatic parenchymal disease.     3. Previously seen subcapsular right lobe subcentimeter lesion  described on 6/21/2019 is not visualized today.  Nonemergent MRI  previously advised.    CXR 8/10/2019:  FINDINGS: A single AP view of the  chest is obtained. Endotracheal tube  tip projects over the midthoracic trachea. Right IJ central venous  catheter tip projects over the low SVC.     Trachea is midline. The cardiomediastinal silhouette is within normal  limits. Pulmonary vasculature is distinct. No pleural effusion or  pneumothorax. No focal pulmonary opacities. No acute bony  abnormalities. The upper abdomen is unremarkable.                                                                      IMPRESSION: Endotracheal tube tip projects over the midthoracic  trachea.    ASSESSMENT AND RECOMMENDATIONS:   Assessment:  Yenifer Maher is a 39 year old female with a past medical history of lupus anti-coagulant on enoxaparin anti-coagulation, calciphylaxis, decompensated EtOH cirrhosis c/b ascites and EV wwith ongoing alcohol abuse who presented to [place] ED with hematemesis. Found to have hemoglobin of 5.2 with lactic acid of 14.7. Transferred to [facility] MICU for further evaluation and management. EGD done w/evidence of variceal bleed.     1. Acute on chronic blood loss anemia: Presented with hematemesis 2/2 EV bleeding.    2. Decompensated Cirrhosis  Etiology: EtOH, actively drinking. MELD-Na: 16.   - Hepatic Encephalopathy: No known history of; not on lactulose at home   - Ascites, present on admission. On furosemide and spironolactone at home   - SBP: No known history of.   - Coagulopathy: Present, INR 1.6. On enoxaparin anti-coagulation given lupus anti-coagulant    - Hepatoma: 0.6 cm lesion noted on 6/21 ultrasound; not noted on CT A/P   - Transplant Evaluation: Not currently transplant candidate 2/2 active EtOH.     3. Elevated LFTs  Most likely related to alcoholic hepatitis in setting of known decompensated cirrhosis, however, AST >1000 not typical of alcoholic hepatitis/cirrhosis - ?toxin/acetaminophen vs ischemic hepatitis. Unable to obtain tylenol level due to icteric specimen x2.   AST increasing to 2k, but Tb stable and ALT down-trending.  Lactate improved to 1.3 today    Recommendations:  - Continue octreotide x 5 days  - Continue abx for bleeding in the setting of cirrhosis/EV x 7 days, continue IV CTX at this time  - Continue IV PPI at this time, ok to transition to IV BID  - Transfuse for Hgb < 7 or active bleeding; keep type and screen up to date  - Maintain 2 large bore IVs (18-20g)  - Plan for repeat EGD in 4-6 weeks (early Sept 2019)  - Okay to d/c NAC per primary team  - Hold anti-coagulation  - Vitamin K IV 10 mg daily x 3 days  - Okay to hold diuretics for ascites given variceal bleed  - Recommend diagnostic paracentesis   - Recommend alcohol counseling, / consultation    Gastroenterology follow up recommendations: Pending clinical course; follows with Dr. Hernández in clinic.     Thank you for involving us in this patient's care. Please do not hesitate to contact the GI service with any questions or concerns.     Pt care plan discussed with Dr. Ibanez, Hepatology staff physician.    Mary Ellington MD (Lizzie)  Gastroenterology/Hepatology Fellow  [phone]    ATTENDING NOTE, GASTROENTEROLOGY/HEPATOLOGY    I discussed this patient with the fellow and participated in the decision making. I agree with the fellow's note. Sowmya Ibanez MD

## 2019-08-12 NOTE — PROGRESS NOTES
08/12/19 1000   Quick Adds   Type of Visit Initial PT Evaluation       Present no   Living Environment   Lives With significant other   Living Arrangements house   Home Accessibility stairs to enter home;stairs within home   Stair Railings, Main Entrance railings on both sides of stairs   Number of Stairs, Within Home, Primary other (see comments)  (8+8, split entry)   Stair Railings, Within Home, Primary railing on left side (ascending)  (only left side for one flight, both sides for one flight.)   Transportation Anticipated family or friend will provide;car, drives self   Living Environment Comment Pt lives in a house with her fijoanne and has some assist from her mother who used to be her PCA. Pt has stairs to enter house and has to go down 2 sets of 8 stairs to do the laundry. Her fiance works 2 jobs and is not around very often to help.    Self-Care   Usual Activity Tolerance moderate   Current Activity Tolerance fair   Regular Exercise No   Equipment Currently Used at Home shower chair   Activity/Exercise/Self-Care Comment Pt has a 4WW but does not use it currently. They are trying to get grab bars installed in the bathroom.   Functional Level Prior   Ambulation 0-->independent   Transferring 0-->independent   Toileting 0-->independent   Bathing 0-->independent   Communication 0-->understands/communicates without difficulty   Swallowing 0-->swallows foods/liquids without difficulty   Cognition 0 - no cognition issues reported   Fall history within last six months no   Which of the above functional risks had a recent onset or change? ambulation   Prior Functional Level Comment Pt feels that her gait has become more unsteady due to neuropathy in her feet but does not use a device regularly.   General Information   Onset of Illness/Injury or Date of Surgery - Date 08/09/19   Referring Physician Laine Espana MD   Patient/Family Goals Statement steady gait, improve overall stength    Pertinent History of Current Problem (include personal factors and/or comorbidities that impact the POC) Yenifer Maher is a 39 year old female with h/o alcoholic cirrhosis, non-uremic calciphylaxis, and ongoing alcohol use who is admitted with acute upper GI bleed and found on EGD to have multiple bleeding ulcers. She was intubated for the EGD and is now s/p banding with GI.   Precautions/Limitations fall precautions   Weight-Bearing Status - LUE full weight-bearing   Weight-Bearing Status - RUE full weight-bearing   Weight-Bearing Status - LLE full weight-bearing   Weight-Bearing Status - RLE full weight-bearing   General Info Comments Activity: Up with assist   Cognitive Status Examination   Orientation orientation to person, place and time   Level of Consciousness alert   Follows Commands and Answers Questions 100% of the time   Personal Safety and Judgment intact   Memory intact   Pain Assessment   Patient Currently in Pain No   Integumentary/Edema   Integumentary/Edema no deficits were identifed   Posture    Posture Forward head position;Protracted shoulders   Range of Motion (ROM)   ROM Comment WNL per functional mobility in all extremities   Strength   Strength Comments B LE >3+/5 per functional mobility   Bed Mobility   Bed Mobility Comments Pt completed all bed mobility IND   Transfer Skills   Transfer Comments CGA for STS   Gait   Gait Comments Abnormal gait pattern due to neuropathy, but pt appears steady with FWW.   Balance   Balance Comments Good sitting and standing balance unsupported. No LOB with gait using FWW. Pt reports that she feels unsteady due to impaired sensation in feet   Sensory Examination   Sensory Perception Comments Neuropathy in B LEs   General Therapy Interventions   Planned Therapy Interventions balance training;gait training;neuromuscular re-education;strengthening;home program guidelines;progressive activity/exercise   Clinical Impression   Criteria for Skilled Therapeutic  "Intervention yes, treatment indicated   PT Diagnosis Impaired functional mobility   Influenced by the following impairments weakness, neuropathy   Functional limitations due to impairments gait   Clinical Presentation Stable/Uncomplicated   Clinical Presentation Rationale pt presentation, clinical judgement   Clinical Decision Making (Complexity) Low complexity   Therapy Frequency 5x/week   Predicted Duration of Therapy Intervention (days/wks) 1 week   Anticipated Discharge Disposition Home with Assist;Home with Outpatient Therapy   Risk & Benefits of therapy have been explained Yes   Patient, Family & other staff in agreement with plan of care Yes   Clinical Impression Comments evaluation completed, treatment initiated   [facility] TM \"6 Clicks\"   2016, Trustees of [facility], under license to [employer].  All rights reserved.   6 Clicks Short Forms Basic Mobility Inpatient Short Form   [facility]  \"6 Clicks\" V.2 Basic Mobility Inpatient Short Form   1. Turning from your back to your side while in a flat bed without using bedrails? 4 - None   2. Moving from lying on your back to sitting on the side of a flat bed without using bedrails? 4 - None   3. Moving to and from a bed to a chair (including a wheelchair)? 4 - None   4. Standing up from a chair using your arms (e.g., wheelchair, or bedside chair)? 4 - None   5. To walk in hospital room? 4 - None   6. Climbing 3-5 steps with a railing? 3 - A Little   Basic Mobility Raw Score (Score out of 24.Lower scores equate to lower levels of function) 23   Total Evaluation Time   Total Evaluation Time (Minutes) 8     "

## 2019-08-12 NOTE — PLAN OF CARE
AxO x4. VSS on Ra. Pt is SBA. Pt denies pain and nausea. Pt on regular diet and tolerating well. Infusing octreotide @ 10mL/hr via RPIV. Pt phos level of 1.7, med ordered. IJ line removed. Continue to monitor and follow plan of care.

## 2019-08-12 NOTE — PROGRESS NOTES
"Social Work Services Progress Note    Hospital Day: 4  Collaborated with:  Patient, pt's mother    Data:  Pt is a 39-year-old female admitted for acute upper GI bleed and found to have multiple bleeding ulcers.     Intervention:  Received call from RN stating pt's mother requesting to meet with SW regarding CD treatment options. Met with pt and pt's mother at bedside. Pt's mother states pt needs to go to inpatient treatment for alcohol use, and states pt has cirrhosis and that alcohol caused her current \"throat problems.\" Pt denies that this admission is related to alcohol use, but states she is interested in treatment options near her home. Provided pt with list of treatment programs near her home in [place], [place]. Pt's mother states she is particularly interested in [facility]. Updated pt that primary team entered a CD consult and an LADC can come complete an assessment with pt and begin referrals. Pt interested in this and agreeable. Contacted LADC to determine availability for assessment.    Assessment:  Pt interested in alcohol treatment- resources provided; CD consult pending    Plan:    Anticipated Disposition:  Home, no needs identified    Barriers to d/c plan:  Medical stability    Follow Up:  SW to follow and assist as needed    SOPHIA Lopez, LGSW  5th Floor   Pager [phone]  Phone [phone]        "

## 2019-08-12 NOTE — PLAN OF CARE
Discharge Planner PT 5B Evaluation  Patient plan for discharge: Home  Current status: PT evaluation completed and treatment initiated. Pt completed all bed mobility IND and transfers completed with CGA. Ambulated 200 ft with FWW and CGA. No fatigue noted, but pt feels unsteady due to neuropathy in B LE's. VSS on RA.   Barriers to return to prior living situation: weakness, balance, medical status  Recommendations for discharge: Home with assist from mother and OP PT  Rationale for recommendations: Pt is close to baseline and is safe to return home but will need assist for some tasks around the house. Pt will benefit from continued therapy to improve strength and balance for functional mobility.       Entered by: Genoveva Mccray 08/12/2019 11:12 AM

## 2019-08-12 NOTE — PROGRESS NOTES
08/12/19 0845   Quick Adds   Type of Visit Initial Occupational Therapy Evaluation   Living Environment   Lives With significant other   Living Arrangements house   Home Accessibility stairs to enter home;stairs within home   Number of Stairs, Within Home, Primary other (see comments)  (2 flights of ~8 stairs)   Stair Railings, Within Home, Primary railings safe and in good condition;railings on both sides of stairs;railing on right side (ascending)  (one flight on both sides and one with only one side )   Transportation Anticipated car, drives self;family or friend will provide   Living Environment Comment Pt currently lives with her fiance and her mother lives nearby. Tub shower at home.   Self-Care   Usual Activity Tolerance moderate   Current Activity Tolerance fair   Equipment Currently Used at Home shower chair;walker, rolling   Activity/Exercise/Self-Care Comment Pt has a 4WW but does not use at baseline. Currently uses shower chair with SO present during bathing to ensure safety.   Functional Level   Ambulation 0-->independent   Transferring 0-->independent   Toileting 0-->independent   Bathing 2-->assistive person  (for supervision)   Dressing 0-->independent   Eating 0-->independent   Communication 0-->understands/communicates without difficulty   Swallowing 0-->swallows foods/liquids without difficulty   Cognition 0 - no cognition issues reported   Fall history within last six months no   Number of times patient has fallen within last six months 0   Which of the above functional risks had a recent onset or change? ambulation;transferring   Prior Functional Level Comment Does not use a device at baseline and supervision for bathing. Reports increased instability during amb.   General Information   Onset of Illness/Injury or Date of Surgery - Date 08/09/19   Referring Physician Laine Espana MD   Patient/Family Goals Statement To d/c home   Additional Occupational Profile Info/Pertinent History  of Current Problem Yenifer Maher is a 39 year old female with h/o alcoholic cirrhosis, non-uremic calciphylaxis, and ongoing alcohol use who is admitted with acute upper GI bleed and found on EGD to have multiple bleeding ulcers. She was intubated for the EGD and is now s/p banding with GI.   Precautions/Limitations fall precautions   General Info Comments Up with A   Cognitive Status Examination   Orientation orientation to person, place and time   Level of Consciousness alert   Follows Commands (Cognition) WNL   Memory intact   Visual Perception   Visual Perception Wears glasses   Sensory Examination   Sensory Comments Neuropathy in B hands and feet at baseline   Pain Assessment   Patient Currently in Pain No   Integumentary/Edema   Integumentary/Edema no deficits were identifed   Posture   Posture not impaired   Range of Motion (ROM)   ROM Comment WFL   Strength   Strength Comments WNL - grossly 4/5 BUE   Mobility   Bed Mobility Bed mobility skill: Sit to supine;Bed mobility skill: Supine to sit   Bed Mobility Skill: Sit to Supine   Level of [place]: Sit/Supine stand-by assist   Bed Mobility Skill: Supine to Sit   Level of [place]: Supine/Sit stand-by assist   Transfer Skill: Sit to Stand   Level of [place]: Sit/Stand contact guard   Lower Body Dressing   Level of [place]: Dress Lower Body stand-by assist   Instrumental Activities of Daily Living (IADL)   Previous Responsibilities meal prep;housekeeping;laundry;shopping;driving   IADL Comments Currently on disability from work. Recieves A with all IADLs although does A with them (ie fiance brings laundry into baseline but pt folds the laundry).   Activities of Daily Living Analysis   Impairments Contributing to Impaired Activities of Daily Living balance impaired   General Therapy Interventions   Planned Therapy Interventions ADL retraining;IADL retraining;transfer training   Clinical Impression   Criteria for Skilled Therapeutic  "Interventions Met yes, treatment indicated   OT Diagnosis Decreased safety when performing ADL/IADLs   Influenced by the following impairments balance, medical status   Assessment of Occupational Performance 1-3 Performance Deficits   Identified Performance Deficits transfers, amb   Clinical Decision Making (Complexity) Low complexity   Therapy Frequency 3x/week   Predicted Duration of Therapy Intervention (days/wks) 1 week   Anticipated Discharge Disposition Home with Assist;Home with Outpatient Therapy;Home with Home Therapy   Risks and Benefits of Treatment have been explained. Yes   Patient, Family & other staff in agreement with plan of care Yes   Clinical Impression Comments Will continue to benefit from skilled IP OT to increase safety/reduce falls risks during ADL/IADL tasks   [facility] AM-PAC  \"6 Clicks\" Daily Activity Inpatient Short Form   1. Putting on and taking off regular lower body clothing? 4 - None   2. Bathing (including washing, rinsing, drying)? 3 - A Little   3. Toileting, which includes using toilet, bedpan or urinal? 3 - A Little   4. Putting on and taking off regular upper body clothing? 4 - None   5. Taking care of personal grooming such as brushing teeth? 4 - None   6. Eating meals? 4 - None   Daily Activity Raw Score (Score out of 24.Lower scores equate to lower levels of function) 22   Total Evaluation Time   Total Evaluation Time (Minutes) 5     "

## 2019-08-12 NOTE — PROGRESS NOTES
Brief GI Note  08/12/19    Chart reviewed. Patient briefly seen while working with PT in [facility]. No further episodes/evidence of bleeding. Diet being advanced. 72 hours of octreotide up this evening.    Recommendations:  1. Please obtain diagnostic paracentesis  2. Complete 7-day course of antibiotics for GIB in cirrhotic  3. PPI PO daily once tolerating diet  4. Advance diet to low sodium diet with protein supplementation before meals and at bedtime  5. Likely ok to restart home diuretics tomorrow if renal function continues to improve and no further evidence of bleeding  6. Repeat EGD in 4-6 weeks      Discussed with Dr. Ibanez. Call with questions.     Judit Pineda MD  GI Fellow; PGY-6  [phone]

## 2019-08-12 NOTE — PLAN OF CARE
Arrived from  this shift. A&Ox4. VSS on RA except tachy. Pt denies pain/nausea. Up SBA to BR. Triggered sepsis, lactic 1.8, no interventions needed. Central line and PIVs intact, infusing continuous octreotide. Slept most of night. Continue to monitor and POC.

## 2019-08-12 NOTE — SUMMARY OF CARE
Pt arrived to  8/11 23:30 A&Ox4 on RA. Triggered sepsis before vitals taken by writer, lactate and vitals ordered. New set of vitals: T 98.7, , /75, O2 96% of RA. Med rec complete.    Pt belongings include: pillow, stuffed animal, clothes, magazines, food, cell phone with , tablet with , passport, credit cards, cash ($164), cigarettes, purse, glasses. Jewelry (ring x2) taken home by todd. Bag of home meds taken by security.

## 2019-08-13 ENCOUNTER — APPOINTMENT (OUTPATIENT)
Dept: GENERAL RADIOLOGY | Facility: CLINIC | Age: 39
DRG: 432 | End: 2019-08-13
Payer: COMMERCIAL

## 2019-08-13 LAB
ALBUMIN SERPL-MCNC: 1.6 G/DL (ref 3.4–5)
ALP SERPL-CCNC: 277 U/L (ref 40–150)
ALT SERPL W P-5'-P-CCNC: 140 U/L (ref 0–50)
ANION GAP SERPL CALCULATED.3IONS-SCNC: 7 MMOL/L (ref 3–14)
AST SERPL W P-5'-P-CCNC: 821 U/L (ref 0–45)
BILIRUB SERPL-MCNC: 7.2 MG/DL (ref 0.2–1.3)
BUN SERPL-MCNC: 5 MG/DL (ref 7–30)
CA-I BLD-MCNC: 4.1 MG/DL (ref 4.4–5.2)
CALCIUM SERPL-MCNC: 7.5 MG/DL (ref 8.5–10.1)
CHLORIDE SERPL-SCNC: 102 MMOL/L (ref 94–109)
CO2 SERPL-SCNC: 22 MMOL/L (ref 20–32)
CREAT SERPL-MCNC: 0.63 MG/DL (ref 0.52–1.04)
ERYTHROCYTE [DISTWIDTH] IN BLOOD BY AUTOMATED COUNT: 18 % (ref 10–15)
GFR SERPL CREATININE-BSD FRML MDRD: >90 ML/MIN/{1.73_M2}
GLUCOSE SERPL-MCNC: 100 MG/DL (ref 70–99)
HCT VFR BLD AUTO: 24 % (ref 35–47)
HGB BLD-MCNC: 7.6 G/DL (ref 11.7–15.7)
INR PPP: 1.68 (ref 0.86–1.14)
MAGNESIUM SERPL-MCNC: 1.8 MG/DL (ref 1.6–2.3)
MCH RBC QN AUTO: 30 PG (ref 26.5–33)
MCHC RBC AUTO-ENTMCNC: 31.7 G/DL (ref 31.5–36.5)
MCV RBC AUTO: 95 FL (ref 78–100)
PHOSPHATE SERPL-MCNC: 2.4 MG/DL (ref 2.5–4.5)
PLATELET # BLD AUTO: 143 10E9/L (ref 150–450)
POTASSIUM SERPL-SCNC: 3.6 MMOL/L (ref 3.4–5.3)
PROT SERPL-MCNC: 5.8 G/DL (ref 6.8–8.8)
RBC # BLD AUTO: 2.53 10E12/L (ref 3.8–5.2)
SODIUM SERPL-SCNC: 131 MMOL/L (ref 133–144)
WBC # BLD AUTO: 7 10E9/L (ref 4–11)

## 2019-08-13 PROCEDURE — C9113 INJ PANTOPRAZOLE SODIUM, VIA: HCPCS | Performed by: STUDENT IN AN ORGANIZED HEALTH CARE EDUCATION/TRAINING PROGRAM

## 2019-08-13 PROCEDURE — 82330 ASSAY OF CALCIUM: CPT | Performed by: INTERNAL MEDICINE

## 2019-08-13 PROCEDURE — 25000128 H RX IP 250 OP 636: Performed by: STUDENT IN AN ORGANIZED HEALTH CARE EDUCATION/TRAINING PROGRAM

## 2019-08-13 PROCEDURE — 25000125 ZZHC RX 250: Performed by: STUDENT IN AN ORGANIZED HEALTH CARE EDUCATION/TRAINING PROGRAM

## 2019-08-13 PROCEDURE — 12000001 ZZH R&B MED SURG/OB UMMC

## 2019-08-13 PROCEDURE — 25800030 ZZH RX IP 258 OP 636: Performed by: STUDENT IN AN ORGANIZED HEALTH CARE EDUCATION/TRAINING PROGRAM

## 2019-08-13 PROCEDURE — 25000132 ZZH RX MED GY IP 250 OP 250 PS 637

## 2019-08-13 PROCEDURE — 85610 PROTHROMBIN TIME: CPT | Performed by: INTERNAL MEDICINE

## 2019-08-13 PROCEDURE — 87040 BLOOD CULTURE FOR BACTERIA: CPT | Performed by: STUDENT IN AN ORGANIZED HEALTH CARE EDUCATION/TRAINING PROGRAM

## 2019-08-13 PROCEDURE — 25000132 ZZH RX MED GY IP 250 OP 250 PS 637: Performed by: STUDENT IN AN ORGANIZED HEALTH CARE EDUCATION/TRAINING PROGRAM

## 2019-08-13 PROCEDURE — 36415 COLL VENOUS BLD VENIPUNCTURE: CPT | Performed by: INTERNAL MEDICINE

## 2019-08-13 PROCEDURE — 84100 ASSAY OF PHOSPHORUS: CPT | Performed by: INTERNAL MEDICINE

## 2019-08-13 PROCEDURE — 83735 ASSAY OF MAGNESIUM: CPT | Performed by: INTERNAL MEDICINE

## 2019-08-13 PROCEDURE — 71046 X-RAY EXAM CHEST 2 VIEWS: CPT

## 2019-08-13 PROCEDURE — 76705 ECHO EXAM OF ABDOMEN: CPT | Mod: 26 | Performed by: PEDIATRICS

## 2019-08-13 PROCEDURE — 80053 COMPREHEN METABOLIC PANEL: CPT | Performed by: INTERNAL MEDICINE

## 2019-08-13 PROCEDURE — 36415 COLL VENOUS BLD VENIPUNCTURE: CPT | Performed by: STUDENT IN AN ORGANIZED HEALTH CARE EDUCATION/TRAINING PROGRAM

## 2019-08-13 PROCEDURE — 40000141 ZZH STATISTIC PERIPHERAL IV START W/O US GUIDANCE

## 2019-08-13 PROCEDURE — 85027 COMPLETE CBC AUTOMATED: CPT | Performed by: INTERNAL MEDICINE

## 2019-08-13 PROCEDURE — 40000007 ZZH STATISTIC ADULT CD FACE TO FACE-NO CHRG

## 2019-08-13 PROCEDURE — 99233 SBSQ HOSP IP/OBS HIGH 50: CPT | Mod: GC | Performed by: INTERNAL MEDICINE

## 2019-08-13 RX ORDER — CALCIUM CARBONATE 500 MG/1
500 TABLET, CHEWABLE ORAL DAILY PRN
Status: DISCONTINUED | OUTPATIENT
Start: 2019-08-13 | End: 2019-08-14 | Stop reason: HOSPADM

## 2019-08-13 RX ORDER — SIMETHICONE 80 MG
80 TABLET,CHEWABLE ORAL EVERY 6 HOURS PRN
Status: DISCONTINUED | OUTPATIENT
Start: 2019-08-13 | End: 2019-08-13

## 2019-08-13 RX ORDER — SIMETHICONE 80 MG
80 TABLET,CHEWABLE ORAL EVERY 6 HOURS PRN
Status: DISCONTINUED | OUTPATIENT
Start: 2019-08-13 | End: 2019-08-14 | Stop reason: HOSPADM

## 2019-08-13 RX ORDER — FUROSEMIDE 20 MG
20 TABLET ORAL DAILY
Status: DISCONTINUED | OUTPATIENT
Start: 2019-08-13 | End: 2019-08-14 | Stop reason: HOSPADM

## 2019-08-13 RX ORDER — SPIRONOLACTONE 50 MG/1
50 TABLET, FILM COATED ORAL DAILY
Status: DISCONTINUED | OUTPATIENT
Start: 2019-08-13 | End: 2019-08-14 | Stop reason: HOSPADM

## 2019-08-13 RX ORDER — CIPROFLOXACIN 500 MG/1
500 TABLET, FILM COATED ORAL EVERY 12 HOURS SCHEDULED
Status: DISCONTINUED | OUTPATIENT
Start: 2019-08-13 | End: 2019-08-14 | Stop reason: HOSPADM

## 2019-08-13 RX ADMIN — NORTRIPTYLINE HYDROCHLORIDE 10 MG: 10 CAPSULE ORAL at 21:38

## 2019-08-13 RX ADMIN — CALCIUM CARBONATE (ANTACID) CHEW TAB 500 MG 500 MG: 500 CHEW TAB at 17:07

## 2019-08-13 RX ADMIN — PREGABALIN 50 MG: 50 CAPSULE ORAL at 14:50

## 2019-08-13 RX ADMIN — CIPROFLOXACIN HYDROCHLORIDE 500 MG: 500 TABLET, FILM COATED ORAL at 20:22

## 2019-08-13 RX ADMIN — OMEPRAZOLE 40 MG: 20 CAPSULE, DELAYED RELEASE ORAL at 17:07

## 2019-08-13 RX ADMIN — Medication 100 MG: at 08:51

## 2019-08-13 RX ADMIN — Medication 2 G: at 11:07

## 2019-08-13 RX ADMIN — PREGABALIN 50 MG: 50 CAPSULE ORAL at 20:22

## 2019-08-13 RX ADMIN — PANTOPRAZOLE SODIUM 40 MG: 40 INJECTION, POWDER, FOR SOLUTION INTRAVENOUS at 08:51

## 2019-08-13 RX ADMIN — SIMETHICONE CHEW TAB 80 MG 80 MG: 80 TABLET ORAL at 21:38

## 2019-08-13 RX ADMIN — POTASSIUM CHLORIDE 20 MEQ: 10 TABLET, EXTENDED RELEASE ORAL at 08:51

## 2019-08-13 RX ADMIN — SPIRONOLACTONE 50 MG: 50 TABLET ORAL at 17:07

## 2019-08-13 RX ADMIN — PREGABALIN 50 MG: 50 CAPSULE ORAL at 08:51

## 2019-08-13 RX ADMIN — POTASSIUM PHOSPHATE, MONOBASIC AND POTASSIUM PHOSPHATE, DIBASIC 15 MMOL: 224; 236 INJECTION, SOLUTION INTRAVENOUS at 13:18

## 2019-08-13 RX ADMIN — FUROSEMIDE 20 MG: 20 TABLET ORAL at 17:08

## 2019-08-13 ASSESSMENT — ACTIVITIES OF DAILY LIVING (ADL)
ADLS_ACUITY_SCORE: 12
ADLS_ACUITY_SCORE: 15
ADLS_ACUITY_SCORE: 12
ADLS_ACUITY_SCORE: 14
ADLS_ACUITY_SCORE: 15
ADLS_ACUITY_SCORE: 12

## 2019-08-13 ASSESSMENT — MIFFLIN-ST. JEOR: SCORE: 1450.64

## 2019-08-13 NOTE — PLAN OF CARE
AxO x4. VSS on RA. Pt is SBA. Pt denies pain and nausea. Pt on regular diet and tolerating well. Potassium, magnesium, and phosphate replaced this shift. Octreotide gtt and IV antibiotics discontinued by provider; PO antibiotics ordered for this evening. Continue to monitor and follow plan of care.

## 2019-08-13 NOTE — PLAN OF CARE
A&O, VSS on RA. PIV infusing Phosphorus replacement. Up SBA to bathroom. Lasix and spironolactone given (due to void) stated she has been voiding through out the day. Small BM today. Reg diet, good appetite. TUMs given for stomach discomfort. Calls to make needs known.

## 2019-08-13 NOTE — PROGRESS NOTES
"[facility]  08/13/19    Hepatology Follow-up    CC: Hematemesis    Dx: Variceal Hemorrhage   Hemorrhagic Shock   Decompensated EtOH Cirrhosis    Alcohol Abuse    24 hour events: Febrile to 101.2*F    Subjective:   - Didn't sleep well last night   - Tolerating diet, no nausea/vomiting   - Hasn't been up and walking around yet    - Abdomen feels gassy   - Reports burning ear when eating   - Cough with some sputum production    Medications  Current Facility-Administered Medications   Medication Dose Route Frequency     ciprofloxacin  500 mg Oral Q12H [facility]     estradiol  1 patch Transdermal Once per day on Mon Thu     estradiol biweekly   Transdermal Once per day on Mon Thu     estradiol biweekly   Transdermal Q8H     nortriptyline  10 mg Oral At Bedtime     pantoprazole (PROTONIX) IV  40 mg Intravenous BID     pregabalin  50 mg Oral TID     sodium chloride (PF)  3 mL Intracatheter Q8H     vitamin B1  100 mg Oral Daily    Or     thiamine  100 mg Intravenous Daily       Review of systems  A 10-point review of systems was negative.    Examination  /61 (BP Location: Left arm)   Pulse 104   Temp 99.1  F (37.3  C) (Oral)   Resp 18   Ht 1.702 m (5' 7\")   Wt 72.8 kg (160 lb 8 oz)   SpO2 93%   BMI 25.14 kg/m      Intake/Output Summary (Last 24 hours) at 8/13/2019 1420  Last data filed at 8/13/2019 1309  Gross per 24 hour   Intake 1673.33 ml   Output --   Net 1673.33 ml       Gen- well, NAD, A+Ox3  CVS- RRR  RS- CTA  Abd- Distended, tympanic, NT  Extr- WWP, no edema  Neuro- No asterixis  Skin- no rash; jaundiced  Psych- normal mood    Laboratory  Lab Results   Component Value Date     08/13/2019    POTASSIUM 3.6 08/13/2019    CHLORIDE 102 08/13/2019    CO2 22 08/13/2019    BUN 5 08/13/2019    CR 0.63 08/13/2019       Lab Results   Component Value Date    BILITOTAL 7.2 08/13/2019     08/13/2019     08/13/2019    ALKPHOS 277 08/13/2019       Lab Results   Component Value " Date    WBC 7.0 08/13/2019    HGB 7.6 08/13/2019    MCV 95 08/13/2019     08/13/2019       Lab Results   Component Value Date    INR 1.68 08/13/2019     MELD-Na score: 24 at 8/13/2019  6:10 AM  MELD score: 20 at 8/13/2019  6:10 AM  Calculated from:  Serum Creatinine: 0.63 mg/dL (Rounded to 1 mg/dL) at 8/13/2019  6:10 AM  Serum Sodium: 131 mmol/L at 8/13/2019  6:10 AM  Total Bilirubin: 7.2 mg/dL at 8/13/2019  6:10 AM  INR(ratio): 1.68 at 8/13/2019  6:10 AM  Age: 39 years    Radiology: Reviewed.     Assessment  39 year old female with h/o decompensated EtOH cirrhosis c/b ascites and EoV, ongoing alcohol abuse, lupus anti-coagulant admitted with hemorrhagic shock 2/2 esophageal varices bleeding s/p banding. No further evidence of bleeding, tolerating diet. Febrile overnight.     Recommendations   - Infectious work-up: blood cultures x 2, UA/UCx, CXR, diagnostic paracentesis if able   - Discontinue octreotide infusion   - PPI PO once daily   - Low sodium diet with protein supplementations between meals and at bedtime   - 7 day course of antibiotics for GIB in cirrhotic (ceftriaxone)   - Ok to hold diuretics again today given fever   - Repeat EGD in 4-6 weeks    Staffed with Dr. Ibanez, hepatology attending.     Judit Pineda MD  Hepatology  #[id number]  ATTENDING NOTE, GASTROENTEROLOGY/HEPATOLOGY    I saw and discussed this patient with the fellow and participated in the decision making. I agree with the fellow's note. Sowmya Ibanez MD

## 2019-08-13 NOTE — PHARMACY-ADMISSION MEDICATION HISTORY
Admission medication history interview status for the 8/9/2019 admission is complete. See Epic admission navigator for allergy information, pharmacy, prior to admission medications and immunization status.     Medication history interview sources:  Pharmacy filling history, patient, Dr. Hernández's clinic record    Changes made to PTA medication list (reason)  Added: Rolaids/Tums and gus-seltzer gold prn heartburn  Deleted: bisacodl tabs, bisacodl suppositories, hyoscyamin 0.125mg tabs Q4H prn (doesn't work at all for her so she's not taking them anymore), medroxyprogesterone 10mg tabs, simethicone  Changed:  none    Additional medication history information (including reliability of information, actions taken by pharmacist):     -Pt was a  Pretty good historian, BUT COULD NOT REMEMBER THE FUROSEMIDE OR SPIRONOLACTONE prescriptions at all. It looks like these were started when she had an appointment with Dr. Hernández of GI on 7/5/19. Furosemide 20mg po daily was filled on 7/5/19 along with spironolactone 50mg daily. Each rx was filled for 30 days. THE FUROSEMIDE WAS REFILLED on 7/30, BUT SPIRONOLACTONE was NOT. So, unclear if she was taking recently.    Was told not to take NSAIDS (ibuprofen per pt). Does not take acetaminophen as it doesn't work at all. She says fexofenadine is helpful for allergies, but hasn't been taking as she said she was told not to by providers for either a drug interaction or a problem with her condition.       Prior to Admission medications    Medication Sig Last Dose Taking? Auth Provider   aspirin (ASA) 81 MG EC tablet Take 1 tablet (81 mg) by mouth daily Past Week at Unknown time Yes Corinna Piña MD   Ca Carbonate-Mag Hydroxide (ROLAIDS EXTRA STRENGTH PO) Take 1-2 tablets by mouth every 6 hours as needed (heartburn)  Yes Unknown, Entered By History   estradiol (VIVELLE-DOT) 0.1 MG/24HR bi-weekly patch Place 1 patch onto the skin twice a week 8/11/2019 at Unknown time Yes Ayana  Corinna Acuna MD   folic acid (FOLVITE) 1 MG tablet Take 1 tablet (1 mg) by mouth daily Past Week at Unknown time Yes Corinna Piña MD   furosemide (LASIX) 20 MG tablet Take 1 tablet (20 mg) by mouth daily Past Week at Unknown time Yes William Hernández MD   magnesium oxide (MAG-OX) 400 (240 Mg) MG tablet Take 1 tablet (400 mg) by mouth daily Past Week at Unknown time Yes Corinna Piña MD   nortriptyline (PAMELOR) 10 MG capsule Take 1 capsule (10 mg) by mouth At Bedtime Past Week at Unknown time Yes Corinna Piña MD   pantoprazole (PROTONIX) 20 MG EC tablet Take 1 tablet (20 mg) by mouth daily 8/12/2019 at Unknown time Yes Corinna Piña MD   pregabalin (LYRICA) 50 MG capsule Take 1 capsule (50 mg) by mouth 3 times daily 8/12/2019 at Unknown time Yes Corinna Piña MD   Prenatal Vit-Fe Fumarate-FA (PRENATAL MULTIVITAMIN W/IRON) 27-0.8 MG tablet Take 1 tablet by mouth daily Past Week at Unknown time Yes Corinna Piña MD   sodium-potassium bicarbonate (FRIDA-SELTZER GOLD) TBEF solu-tab Take 1-2 tablets by mouth 2 times daily as needed for heartburn  Yes Unknown, Entered By History   spironolactone (ALDACTONE) 50 MG tablet Take 1 tablet (50 mg) by mouth daily  Yes William Hernández MD   vitamin (B COMPLEX-C) tablet Take 1 tablet by mouth daily Past Week at Unknown time Yes Corinna Piña MD   vitamin B1 (THIAMINE) 100 MG tablet Take 1 tablet (100 mg) by mouth daily Past Week at Unknown time Yes Corinna Piña MD   fexofenadine (ALLEGRA) 180 MG tablet Take 1 tablet (180 mg) by mouth daily as needed for allergies More than a month at Unknown time  Corinna Piña MD         Medication history completed by:      Veto Mora PharmD, [facility]    [phone]  Pager [phone]

## 2019-08-13 NOTE — PROVIDER NOTIFICATION
Fever:  Pt felt warm to the touch around 2130, she reports feeling cold. Fever charted at 2130 to 101.2 by nursing assistant. Now rechecked at shift change and noted to be 99.4. No new complaints by pt. Md notified at pager [phone] of fever, Dr. Vitaly Sethi. Blodd cultures ordered and pt updated of need for lab draw.

## 2019-08-13 NOTE — PROGRESS NOTES
Limited point of care abdominal ultrasound to evaluate for paracentesis.     Indication: Ascites, suspected     Views:   Hepatorenal: Adequate  RLQ: Adequate  Perisplenic: Adequate  LLQ: Adequate     Findings:  Hepatorenal: Trace fluid in hepatorenal recess  Perisplenic: Small fluid in splenorenal recess  RLQ: Small fluid less than 1cm in depth  LLQ: trace fluid     I was asked to perform a focused abdominal ultrasound exclusively to determine whether paracentesis could be performed. After evaluating the areas where a paracentesis would be safe it was determined that there was insufficient fluid for safe bedside paracentesis.      Albin Panda MD  Internal Medicine Procedure Service  P

## 2019-08-13 NOTE — DISCHARGE SUMMARY
[facility], [place]    Internal Medicine Discharge Summary- [facility] Service    Date of Admission:  8/9/2019  Date of Discharge:  8/14  Discharging Attending Provider: Dr Martins  Discharge Team: Sundar 3    Discharge Diagnoses   Acute upper GI Bleed  Alcoholic hepatitis    Follow-ups Needed After Discharge   Repeat EGD in 4-6 weeks  Follow up with gastroenterology  Follow-up with primary care within 2 weeks posthospitalization.  Discuss risk benefit of aspirin    Hospital Course   Yenifer Maher is a 39 year old female with h/o alcoholic cirrhosis, non-uremic calciphylaxis, and ongoing alcohol use who is admitted with hemorrhagic shock secondary to esophageal variceal bleeding s/p banding, transferred to floor once stable w/o any further evidence of bleeding. She did not have significant ascites, received IV ceftriaxone and then transitioned to ciprofloxacin prior to discharge. She did have significant AST elevation (peaked at 2400), was given NAC given that acetaminophen lab draw was not satisfactory, repeat lab showed no acetaminophen elevation, salicylate negative. She hasn't had suicidal ideation. Was seen by chemical dependency and given list of resources but was not committed to alcohol cessation treatment. While she was in the ICU she was given lactulose to prevent hepatic encephalopathy, this was stopped after she was extubated and clinically without HE.    # Esophageal varices secondary to Alcoholic Cirrhosis, with Alcoholic Hepatitis and Portal HTN   # Acute upper GI bleed, resolved  # Hemmorhagic shock  Admitted with hematemesis and Hgb of 5.2 and found on EGD to have multiple bleeding grade 3 esophageal varices. S/p banding 8/9. Received several units of blood transfusion, received 4 days of octreotide, IV ceftriaxone.  Ceftriaxone was transitioned to ciprofloxacin prior to discharge.  She does have a history of antiphospholipid and has been on Lovenox in the past, however from  chart unclear if she is ever had a VTE.  Has been on aspirin at home, this is been held during admission and held on discharge.  Can follow-up as outpatient to discuss risk-benefit of aspirin.  -Repeat EGD in 4 to 6 weeks  -500 mg ciprofloxacin twice daily for 2 days  - Protonix 40 mg twice daily for 8 weeks  - Repeat EGD in 4-6 weeks  - Hold aspirin on discharge  - Can discuss with primary care risk benefits of aspirin given GI bleed    # Alcoholic hepatitis  Has had prior workup including hepatitis panel, autoimmune studies, and imaging at prior hospitalizations (12/2018 and 6/2019). Saw GI outpatient in early July. Hepatoma noted on 6/21 US. AST peaked at 2400, continues to downtrend during her stay.  NAC (initially started given unable to check tylenol level) discontinued.  Lactulose discontinued as she does not have hepatic encephalopathy.  She did not receive steroids for alcohol hepatitis during this hospitalization.    #Alcohol use disorder  Her last drink prior to this admission was 8/8, no overt withdrawal symptoms during this admission.  Chemical dependency was consulted who provided her with resources for alcohol cessation.  Patient refused assessment for alcohol cessation treatment and has minimal insight into her use disorder.  She was given resources of AA and treatment centers to follow-up on her own if she is interested.    # Hospital deconditioning  # Weakness  Seen by physical therapy who recommend home with assist from mother and outpatient physical therapy        Consultations This Hospital Stay   GI HEPATOLOGY ADULT IP CONSULT  VASCULAR ACCESS CARE ADULT IP CONSULT  PHYSICAL THERAPY ADULT IP CONSULT  OCCUPATIONAL THERAPY ADULT IP CONSULT  PSYCHIATRY IP CONSULT  CHEMICAL DEPENDENCY IP CONSULT  INTERNAL MEDICINE PROCEDURE TEAM ADULT IP CONSULT [facility]  VASCULAR ACCESS CARE ADULT IP CONSULT  MEDICATION HISTORY IP PHARMACY CONSULT  VASCULAR ACCESS CARE ADULT IP CONSULT  VASCULAR  ACCESS CARE ADULT IP CONSULT    Code Status   Full Code     The patient was discussed with Dr. Eliezer Jackson MD  [facility]  Pager: [phone]    Physician Attestation   I, Melyssa Martins, saw and evaluated this patient prior to discharge.  I discussed the patient with the resident and agree with plan of care as documented in the resident note.      I personally reviewed vital signs, medications, labs and imaging.    I personally spent 35 minutes on discharge activities.    Melyssa Martins  Date of Service (when I saw the patient): 8/14/19    ______________________________________________________________________    Physical Exam   Vital Signs: Temp: 99.6  F (37.6  C) Temp src: Oral BP: 116/69   Heart Rate: 106 Resp: 18 SpO2: 94 % O2 Device: None (Room air)    Weight: 161 lbs 9.6 oz      GEN       Sitting in bed in no acute distress. Interactive.  HEENT   NCAT, PERRL, no scleral icteris  RESP  CTA bilateral breath sounds anteriorly, No W/R/R  CV  rrr, No M/R/G  ABD   Soft, mildly distended, mildly tender to palpation  EXT     Warm, no LE edema, equal pulses         Significant Results and Procedures   Results for orders placed or performed during the hospital encounter of 08/09/19   XR Chest Port 1 View    Narrative     Exam: XR CHEST PORT 1 VW, 8/9/2019 4:13 PM    Indication: concern for aspiration or pneumonia    Comparison: Chest x-ray 6/21/2019    Findings:   Portable semiupright AP radiograph the chest. The cardiac silhouette  and pulmonary vasculature are within normal limits. No pneumothorax or  pleural effusion. No focal airspace opacities. No acute bony  abnormalities. Upper abdomen is unremarkable.      Impression    Impression:No acute airspace disease.    I have personally reviewed the examination and initial interpretation  and I agree with the findings.    NAYELI HARRIS MD   US Abdomen Complete w Doppler Complete    Narrative    EXAMINATION: US ABDOMEN COMPLETE WITH DOPPLER COMPLETE  8/9/2019 10:01  PM     COMPARISON: 6/21/2019    HISTORY: Concern for portal vein thrombosis    TECHNIQUE: The abdomen was scanned in standard fashion with  specialized ultrasound transducer(s) using both gray-scale, color  Doppler, and spectral flow techniques.    Findings:  Liver: Coarsened nodular hepatic echotexture, consistent with  cirrhosis. No visualized hepatic mass.  Previously seen subcapsular  right lobe subcentimeter lesion described on 6/21/2019 is not  visualized today.    Extrahepatic portal vein flow is retrograde at 15 cm/s.  Right portal vein flow is retrograde, measuring 18 cm/s.  Left portal vein flow is to and fro, measuring 15 cm/s. There is no  visualized filling defect in the portal veins.    Flow in the hepatic artery is towards the liver and:  74 cm/s peak systolic velocity  0.39 resistive index.     The splenic vein is patent with retrograde flow at 25 cm/s. The left,  middle, and right hepatic veins are patent with flow towards the IVC.  The IVC is patent with flow towards the heart. The visualized aorta is  not dilated.    Gallbladder: Biliary sludge. Ringdown artifact from the gallbladder  wall, similar to prior, can be seen with adenomyomatosis. Mild wall  thickening measuring 5 mm. No pericholecystic fluid or evidence for  cholelithiasis    Bile Ducts: Both the intra- and extrahepatic biliary system are of  normal caliber.  The common bile duct measures 5 mm.    Pancreas: Visualized portions of the head and body of the pancreas are  unremarkable.     Kidneys: The right kidney demonstrates normal echotexture and measures  11.6 cm. No mass or hydronephrosis.     Fluid: No evidence of ascites or pleural effusions.      Impression    Impression:   1.  Cirrhotic configuration of the liver with evidence of portal  hypertension, including retrograde flow in the portal venous system.  Of note, there is to and fro flow within left portal vein, which  previously demonstrated retrograde flow. No  visualized portal vein  thrombus.  2.  Biliary sludge with mild gallbladder wall thickening, nonspecific  in the setting of intrinsic hepatic parenchymal disease.     3. Previously seen subcapsular right lobe subcentimeter lesion  described on 6/21/2019 is not visualized today.  Nonemergent MRI  previously advised.    I have personally reviewed the examination and initial interpretation  and I agree with the findings.    MATTHEW PALM MD   XR Chest Port 1 View    Narrative    Exam: XR CHEST PORT 1 VW, 8/9/2019 6:45 PM    Indication: verification of ETT placement    Comparison: 8/9/2018    Findings:   Enteric tube tip in the mid thoracic trachea. Lungs are clear. Heart  within normal limits. No significant pulmonary congestion on this  supine film.      Impression    Impression: Endotracheal tube in the mid thoracic trachea  approximately 2 to 3 cm above the jaylon. Lungs are clear.    LYUDMILA RENDON MD   XR Chest Port 1 View    Narrative    Exam: XR CHEST PORT 1 VW, 8/9/2019 8:10 PM    Indication: Right IJ central line placement    Comparison: X-ray 8/9/2019    Findings:   AP single view of the chest. Tip of the endotracheal tube projects  with the midthoracic trachea. Right IJ central venous catheter  projects over the mid/distal SVC. No significant pneumothoraces. Clear  lungs. No significant pleural effusion. Cardiomediastinal silhouette  is within normal limits.      Impression    Impression: Tip of the right IJ central venous catheter projects over  the mid/distal SVC. No pneumothoraces.    I have personally reviewed the examination and initial interpretation  and I agree with the findings.    LYUDMILA RENDON MD   XR Chest Port 1 View    Narrative    EXAM: XR CHEST PORT 1 VW  8/10/2019 5:11 AM     HISTORY:  ETT placement in intubated patient    COMPARISON: Chest radiograph dated 8/9/2019    FINDINGS: A single AP view of the chest is obtained. Endotracheal tube  tip projects over the midthoracic trachea. Right IJ  central venous  catheter tip projects over the low SVC.    Trachea is midline. The cardiomediastinal silhouette is within normal  limits. Pulmonary vasculature is distinct. No pleural effusion or  pneumothorax. No focal pulmonary opacities. No acute bony  abnormalities. The upper abdomen is unremarkable.      Impression    IMPRESSION: Endotracheal tube tip projects over the midthoracic  trachea.    I have personally reviewed the examination and initial interpretation  and I agree with the findings.    MATTHEW PALM MD   POC US Guide for Paracentesis    Impression    Limited point of care abdominal ultrasound to evaluate for paracentesis.     Indication: Ascites, suspected     Views:   Hepatorenal: Adequate  RLQ: Adequate  Perisplenic: Adequate  LLQ: Adequate     Findings:  Hepatorenal: Trace fluid in hepatorenal recess  Perisplenic: Small fluid in splenorenal recess  RLQ: Small fluid less than 1cm in depth  LLQ: trace fluid     I was asked to perform a focused abdominal ultrasound exclusively to determine whether paracentesis could be performed. After evaluating the areas where a paracentesis would be safe it was determined that there was insufficient fluid for safe bedside paracentesis.      Albin Panda MD  Internal Medicine Procedure Service  P                 XR Chest 2 Views    Narrative    XR CHEST 2 VW  2019 3:04 PM      HISTORY: Fever overnight, persistent cough    COMPARISON: Chest x-ray 8/10/2019, 2019.    FINDINGS:   PA and lateral views of the chest. Cardiac mediastinal silhouette is  normal limits. Pulmonary vasculature is distinct. Normal lung volumes.  Trachea is midline. No focal airspace opacities or pneumothorax  appreciated. Small bilateral pleural effusions with overlying streaky  bibasilar opacities.      Impression    IMPRESSION:   Small bilateral pleural effusions with overlying streaky bibasilar  opacities concerning for pneumonia vs atelectasis.    I have personally  reviewed the examination and initial interpretation  and I agree with the findings.    NAYELI HARRIS MD       Pending Results   These results will be followed up by primary care  Unresulted Labs Ordered in the Past 30 Days of this Admission     Date and Time Order Name Status Description    8/13/2019 0003 Blood culture Preliminary     8/13/2019 0003 Blood culture Preliminary              Primary Care Physician   Flako Gaona    Discharge Disposition   Discharged to home  Condition at discharge: Stable    Discharge Orders     Discharge Medications   Discharge Medication List as of 8/14/2019 11:30 AM      START taking these medications    Details   ciprofloxacin (CIPRO) 500 MG tablet Take 1 tablet (500 mg) by mouth every 12 hours for 2 days, Disp-5 tablet, R-0, E-Prescribe      omeprazole (PRILOSEC) 40 MG DR capsule Take 1 capsule (40 mg) by mouth 2 times daily (before meals), Disp-112 capsule, R-0, E-Prescribe      simethicone (MYLICON) 80 MG chewable tablet Take 1 tablet (80 mg) by mouth every 6 hours as needed for cramping, Disp-30 tablet, R-0, E-Prescribe         CONTINUE these medications which have NOT CHANGED    Details   Ca Carbonate-Mag Hydroxide (ROLAIDS EXTRA STRENGTH PO) Take 1-2 tablets by mouth every 6 hours as needed (heartburn), Historical      estradiol (VIVELLE-DOT) 0.1 MG/24HR bi-weekly patch Place 1 patch onto the skin twice a week, Disp-15 patch, R-0, E-Prescribe      fexofenadine (ALLEGRA) 180 MG tablet Take 1 tablet (180 mg) by mouth daily as needed for allergies, Disp-30 tablet, R-0, E-Prescribe      folic acid (FOLVITE) 1 MG tablet Take 1 tablet (1 mg) by mouth daily, Disp-30 tablet, R-0, E-Prescribe      furosemide (LASIX) 20 MG tablet Take 1 tablet (20 mg) by mouth daily, Disp-90 tablet, R-3, E-Prescribe      magnesium oxide (MAG-OX) 400 (240 Mg) MG tablet Take 1 tablet (400 mg) by mouth daily, Disp-30 tablet, R-0, E-Prescribe      nortriptyline (PAMELOR) 10 MG capsule Take 1 capsule (10 mg)  by mouth At Bedtime, Disp-30 capsule, R-0, E-Prescribe      pregabalin (LYRICA) 50 MG capsule Take 1 capsule (50 mg) by mouth 3 times daily, Disp-30 capsule, R-0, Local Print      Prenatal Vit-Fe Fumarate-FA (PRENATAL MULTIVITAMIN W/IRON) 27-0.8 MG tablet Take 1 tablet by mouth daily, Disp-30 tablet, R-0, E-Prescribe      sodium-potassium bicarbonate (FRIDA-SELTZER GOLD) TBEF solu-tab Take 1-2 tablets by mouth 2 times daily as needed for heartburn, Historical      spironolactone (ALDACTONE) 50 MG tablet Take 1 tablet (50 mg) by mouth daily, Disp-90 tablet, R-3, E-Prescribe      vitamin (B COMPLEX-C) tablet Take 1 tablet by mouth daily, Disp-30 tablet, R-0, E-Prescribe      vitamin B1 (THIAMINE) 100 MG tablet Take 1 tablet (100 mg) by mouth daily, Disp-30 tablet, R-0, E-Prescribe         STOP taking these medications       aspirin (ASA) 81 MG EC tablet Comments:   Reason for Stopping:         pantoprazole (PROTONIX) 20 MG EC tablet Comments:   Reason for Stopping:             Allergies   Allergies   Allergen Reactions     Bengay Pain Relief [Menthol] Other (See Comments)     Skin turns red and feels extremely hot     Cats      Chocolate Dermatitis     Dicyclomine Other (See Comments)     Severe sedation     Dust Mites      Derm, resp     No Clinical Screening - See Comments      GI upset     Phenobarbital      Pollen Extract      Derm, resp.

## 2019-08-13 NOTE — PLAN OF CARE
Pt is alert and oriented times 4 but doesn't seem to understand some of her medications. One PIV infiltrated and pt is refusing 2nd PIV to be placed. She had one normal colored BM. Eating 50% of food on her tray. Appears pale. Only c/o is gas pains. Is pretty unsteady when she gets up. Did not call and got up to BR alone a shift change so writer set bed alarm on at HS. Has cont Octreotide drip on. Also has phosphorus being replaced over 5 hours via IV. Now having blood cultures for fever. IS receiving rocephin already daily.

## 2019-08-13 NOTE — PROGRESS NOTES
[facility] Internal Medicine Progress  Note            Interval History:   Fever overnight, had chest x-ray today.  Blood cultures were taken by night crosscover, no growth to date.  She feels that overall she is doing about the same, no abdominal pain, headache    Plan for Today  - CXR given her fever and cough (cough is been going on for several days and is not productive)  - Chem dep to see her today  - Octreotide stopped  - Ceftriaxone transitioned to ciprofloxacin  - Transitioned to oral PPI  - US of abdomen w/o pocket for paracentesis          Assessment and Plan:   Yenifer Maher is a 39 year old female with h/o alcoholic cirrhosis, non-uremic calciphylaxis, and ongoing alcohol use who is admitted with hemorrhagic shock secondary to esophageal variceal bleeding s/p banding. Now shock has resolved. No further evidence of bleeding following the procedure.    # Acute upper GI bleed, resolved  Admitted with hematemesis and Hgb of 5.2 and found on EGD to have multiple bleeding grade 3 esophageal varices. S/p banding 8/9. Received several units of blood transfusion. Hgb now stable.   - Trend CBC and transfuse for Hgb <7.  - Octreotide stopped  - Protonix changed from gtt to BID  - Repeat EGD in 4-6 weeks  - Continuing to hold anticoagulation    # Fever: Febrile last night. No obvious source of infection other than reported cough for several days. No dysuria, no ascites to tap on bedside US (already on CTX for SBP prophy)  - Blood cx NGTD.   - Obtain CXR  - Continue SBP prophy with cipro, No other empiric antibiotics for now but consider broadening if clinical decompensation.     # Alcoholic hepatitis  Had workup including hepatitis panel, autoimmune studies, and imaging at prior hospitalizations (12/2018 and 6/2019). Saw GI outpatient in early July. Hepatoma noted on 6/21 US. AST peaked at 2400, now downtrending.  NAC (initially started given unable to check tylenol level) discontinued.  Lactulose discontinued as  "she does not have hepatic encephalopathy.  - Vitamin K 10mg IV x3 doses  - Discontinue NAC as AST is downtrending  - GI consulted, appreciate recs    # Concern for withdrawal  Agitation in the setting of active alcohol use. Last drink was evening of 8/8. Responded well to 0.25 mg ativan and want to minimize benzo use given her liver dysfunction. Low suspicion at this time for active withdrawal. CIWA discontinued.  - Chem dep consult    Diet: Regular Diet  Fluids: PO  DVT: None, GI Bleed  Code Status: Full    Ernesto Jackson MD MPH  PGY2      Physician Attestation  I, Melyssa Martins MD, saw this patient with the resident and agree with the resident s findings and plan of care as documented in the resident s note with my edits.     I personally reviewed vital signs, medications, labs and imaging.    Melyssa Martins MD  Date of Service (when I saw the patient): 08/13/19                    Objective:   /62 (BP Location: Left arm)   Pulse 104   Temp 98.8  F (37.1  C) (Oral)   Resp 18   Ht 1.702 m (5' 7\")   Wt 72.8 kg (160 lb 8 oz)   SpO2 96%   BMI 25.14 kg/m        Intake/Output Summary (Last 24 hours) at 8/11/2019 1736  Last data filed at 8/11/2019 1700  Gross per 24 hour   Intake 3928.83 ml   Output 1700 ml   Net 2228.83 ml       PHYSICAL EXAMINATION  GEN       Sitting in bed in no acute distress. Interactive.  HEENT   NCAT, PERRL, no scleral icteris  RESP  CTA bilateral breath sounds anteriorly, No W/R/R  CV  rrr, No M/R/G  ABD   Soft, mildly distended, mildly tender to palpation  EXT     Warm, no LE edema, equal pulses           Labs, Imaging, & Medications:   All labs, images, and medications reviewed by me.           "

## 2019-08-13 NOTE — CONSULTS
"8/13/2019    Writer met with pt to discuss CD options. Pt reported she was interested in treatment maybe, but that all the treatment centers are \"far from my house.\" Pt explained she knew there was a [facility] near her house and she'd like to go there. Writer explained the difference of treatment and AA and how AA she can attend on her own, while if she wanted treatment, she would need an assessment. Writer offered to do an assessment with her (a few times during the consult) and pt was not interested. One reason for not doing treatment was her car and how she doesn't trust it. Writer explained with her insurance they do medical rides and it would be covered. Pt later on stated she knew that her insurance provided medical rides but wasn't sure treatment was covered. Writer again explained from her experience, that insurance would provide rides to outpatient treatment. Pt again seemed hesitant to complete an assessment. Writer emailed pt a list of AA meetings near her house, and the treatment  which showed a list of treatment centers near her with their numbers. Pt appeared confused a few times, as she asked if AA needed an assessment or if they do them. Writer explained the difference again. Pt asked if an outside center did the assessment, if writer would need a copy. Writer explained that she would not need a copy of the assessment. Pt reported she didn't have any further questions. She said she was going to continue to talk to Ranulfo (her S/O) about attending meetings together because if he keeps drinking and has it available, it will be harder for her to stop. That appears to be extent of insight on her use, unsure of how willing she is to stop drinking at this time based on her reasons for not attending outpatient treatment and writer proving options for her. Pt declined doing a CD assessment multiple times throughout the consult. She was given resources of AA and treatment centers to follow up on her " own if she is interested.     Erum Zhao, [facility]  [phone]

## 2019-08-14 ENCOUNTER — PATIENT OUTREACH (OUTPATIENT)
Dept: CARE COORDINATION | Facility: CLINIC | Age: 39
End: 2019-08-14

## 2019-08-14 VITALS
WEIGHT: 161.6 LBS | HEIGHT: 67 IN | SYSTOLIC BLOOD PRESSURE: 116 MMHG | DIASTOLIC BLOOD PRESSURE: 69 MMHG | TEMPERATURE: 99.6 F | OXYGEN SATURATION: 94 % | BODY MASS INDEX: 25.36 KG/M2 | HEART RATE: 104 BPM | RESPIRATION RATE: 18 BRPM

## 2019-08-14 LAB
ALBUMIN SERPL-MCNC: 1.6 G/DL (ref 3.4–5)
ALP SERPL-CCNC: 290 U/L (ref 40–150)
ALT SERPL W P-5'-P-CCNC: 113 U/L (ref 0–50)
ANION GAP SERPL CALCULATED.3IONS-SCNC: 8 MMOL/L (ref 3–14)
AST SERPL W P-5'-P-CCNC: 554 U/L (ref 0–45)
BILIRUB SERPL-MCNC: 8.1 MG/DL (ref 0.2–1.3)
BUN SERPL-MCNC: 6 MG/DL (ref 7–30)
CA-I BLD-MCNC: 4.2 MG/DL (ref 4.4–5.2)
CALCIUM SERPL-MCNC: 7.6 MG/DL (ref 8.5–10.1)
CHLORIDE SERPL-SCNC: 101 MMOL/L (ref 94–109)
CO2 SERPL-SCNC: 22 MMOL/L (ref 20–32)
CREAT SERPL-MCNC: 0.68 MG/DL (ref 0.52–1.04)
ERYTHROCYTE [DISTWIDTH] IN BLOOD BY AUTOMATED COUNT: 17.9 % (ref 10–15)
GFR SERPL CREATININE-BSD FRML MDRD: >90 ML/MIN/{1.73_M2}
GLUCOSE SERPL-MCNC: 117 MG/DL (ref 70–99)
HCT VFR BLD AUTO: 24.3 % (ref 35–47)
HGB BLD-MCNC: 7.8 G/DL (ref 11.7–15.7)
INR PPP: 1.71 (ref 0.86–1.14)
MAGNESIUM SERPL-MCNC: 1.9 MG/DL (ref 1.6–2.3)
MCH RBC QN AUTO: 30.2 PG (ref 26.5–33)
MCHC RBC AUTO-ENTMCNC: 32.1 G/DL (ref 31.5–36.5)
MCV RBC AUTO: 94 FL (ref 78–100)
PHOSPHATE SERPL-MCNC: 2.2 MG/DL (ref 2.5–4.5)
PLATELET # BLD AUTO: 182 10E9/L (ref 150–450)
POTASSIUM SERPL-SCNC: 3.6 MMOL/L (ref 3.4–5.3)
PROT SERPL-MCNC: 6.1 G/DL (ref 6.8–8.8)
RBC # BLD AUTO: 2.58 10E12/L (ref 3.8–5.2)
SODIUM SERPL-SCNC: 130 MMOL/L (ref 133–144)
WBC # BLD AUTO: 8.7 10E9/L (ref 4–11)

## 2019-08-14 PROCEDURE — 85027 COMPLETE CBC AUTOMATED: CPT

## 2019-08-14 PROCEDURE — 84100 ASSAY OF PHOSPHORUS: CPT

## 2019-08-14 PROCEDURE — 25000132 ZZH RX MED GY IP 250 OP 250 PS 637: Performed by: STUDENT IN AN ORGANIZED HEALTH CARE EDUCATION/TRAINING PROGRAM

## 2019-08-14 PROCEDURE — 36415 COLL VENOUS BLD VENIPUNCTURE: CPT | Performed by: INTERNAL MEDICINE

## 2019-08-14 PROCEDURE — 83735 ASSAY OF MAGNESIUM: CPT

## 2019-08-14 PROCEDURE — 25000132 ZZH RX MED GY IP 250 OP 250 PS 637

## 2019-08-14 PROCEDURE — 80053 COMPREHEN METABOLIC PANEL: CPT

## 2019-08-14 PROCEDURE — 40000556 ZZH STATISTIC PERIPHERAL IV START W US GUIDANCE

## 2019-08-14 PROCEDURE — 85610 PROTHROMBIN TIME: CPT

## 2019-08-14 PROCEDURE — 82330 ASSAY OF CALCIUM: CPT | Performed by: INTERNAL MEDICINE

## 2019-08-14 PROCEDURE — 99239 HOSP IP/OBS DSCHRG MGMT >30: CPT | Mod: GC | Performed by: INTERNAL MEDICINE

## 2019-08-14 RX ORDER — OMEPRAZOLE 40 MG/1
40 CAPSULE, DELAYED RELEASE ORAL
Qty: 112 CAPSULE | Refills: 0 | Status: ON HOLD | OUTPATIENT
Start: 2019-08-14 | End: 2019-08-28

## 2019-08-14 RX ORDER — CIPROFLOXACIN 500 MG/1
500 TABLET, FILM COATED ORAL EVERY 12 HOURS
Qty: 5 TABLET | Refills: 0 | Status: ON HOLD | OUTPATIENT
Start: 2019-08-14 | End: 2019-08-28

## 2019-08-14 RX ORDER — SIMETHICONE 80 MG
80 TABLET,CHEWABLE ORAL EVERY 6 HOURS PRN
Qty: 30 TABLET | Refills: 0 | Status: ON HOLD | OUTPATIENT
Start: 2019-08-14 | End: 2019-08-28

## 2019-08-14 RX ADMIN — PREGABALIN 50 MG: 50 CAPSULE ORAL at 08:43

## 2019-08-14 RX ADMIN — POTASSIUM CHLORIDE 20 MEQ: 10 TABLET, EXTENDED RELEASE ORAL at 08:42

## 2019-08-14 RX ADMIN — SIMETHICONE CHEW TAB 80 MG 80 MG: 80 TABLET ORAL at 02:49

## 2019-08-14 RX ADMIN — FUROSEMIDE 20 MG: 20 TABLET ORAL at 08:43

## 2019-08-14 RX ADMIN — CIPROFLOXACIN HYDROCHLORIDE 500 MG: 500 TABLET, FILM COATED ORAL at 08:43

## 2019-08-14 RX ADMIN — SIMETHICONE CHEW TAB 80 MG 80 MG: 80 TABLET ORAL at 08:52

## 2019-08-14 RX ADMIN — SPIRONOLACTONE 50 MG: 50 TABLET ORAL at 08:43

## 2019-08-14 RX ADMIN — OMEPRAZOLE 40 MG: 20 CAPSULE, DELAYED RELEASE ORAL at 08:43

## 2019-08-14 RX ADMIN — Medication 100 MG: at 08:43

## 2019-08-14 ASSESSMENT — ACTIVITIES OF DAILY LIVING (ADL)
ADLS_ACUITY_SCORE: 12

## 2019-08-14 NOTE — PLAN OF CARE
Physical Therapy Discharge Summary    Reason for therapy discharge:    Discharged to home with outpatient therapy.    Progress towards therapy goal(s). See goals on Care Plan in [facility] electronic health record for goal details.  Goals partially met.  Barriers to achieving goals:   discharge from facility.    Therapy recommendation(s):    Continued therapy is recommended.  Rationale/Recommendations:  to improve functional strength, balance and safety with mobility.

## 2019-08-14 NOTE — TELEPHONE ENCOUNTER
RECORDS RECEIVED FROM: ER f/u, seen 8-9 (recs in chart).   DATE RECEIVED: 8-26   NOTES STATUS DETAILS   OFFICE NOTE from referring provider Internal 05.28.2019   OFFICE NOTES from other specialists Received 05.28.2019 [facility]   DISCHARGE SUMMARY from hospital Internal 8-9-19 to 8-14-19   MEDICATION LIST Internal     LIVER BIOSPY (IF APPLICABLE)        PATHOLOGY REPORTS  N/A     IMAGING       ENDOSCOPY (IF AVAILABLE) Internal  8-9-19   COLONOSCOPY (IF AVAILABLE) N/A     ULTRASOUND LIVER Internal 8-9-19 06.21.2019   CT OF ABDOMEN Internal 06.21.2019   MRI OF LIVER N/A     FIBROSCAN, US ELASTOGRAPHY, FIBROSIS SCAN, MR ELASTOGRAPHY N/A     LABS       HEPATIC PANEL (LIVER PANEL) Internal 7-5-19 06.27.2019   BASIC METABOLIC PANEL Internal 8-9-19 06.27.2019   COMPLETE METABOLIC PANEL Internal 8-14-19 06.21.2019   COMPLETE BLOOD COUNT (CBC) Internal 8-14-19 06.27.2019   INTERNATIONAL NORMALIZED RATIO (INR) Internal 8-13-19 06.27.2019   HEPATITIS C ANTIBODY N/A  12-13-18   HEPATITIS C VIRAL LOAD/PCR N/A     HEPATITIS C GENOTYPE N/A     HEPATITIS B SURFACE ANTIGEN N/A     HEPATITIS B SURFACE ANTIBODY Internal  12-13-18   HEPATITIS B DNA QUANT LEVEL N/A     HEPATITIS B CORE ANTIBODY Internal  12-13-18

## 2019-08-14 NOTE — PROGRESS NOTES
Pt discharged with family to home. Pt educated on follow up appointments, medications, and further care needed. Pt received belongings from security.

## 2019-08-14 NOTE — PLAN OF CARE
"/69 (BP Location: Left arm)   Pulse 104   Temp 99.6  F (37.6  C) (Oral)   Resp 18   Ht 1.702 m (5' 7\")   Wt 73.3 kg (161 lb 9.6 oz)   SpO2 94%   BMI 25.31 kg/m   Full code. Alert/ox3. Patient slept between cares until her IV infiltrated and new IV needed to be placed. At that time, she was awoke when Vascular access awoke her and she was wide awake for most of the remainder of the shift. Pt up to BR x1. Pt denies pain and sob, but did have episode of gas and received simethicone x1 with relief.  Plan:  Hourly rounding complete, call light within reach.  Continue to monitor and follow Plan of care.  "

## 2019-08-14 NOTE — PROGRESS NOTES
Brief GI Note  08/14/19    Paged by primary team patient will be discharging today, requesting final recommendations.      - PPI PO once daily   - Low sodium diet with protein supplementations between meals and at bedtime   - 7 day course of antibiotics for GIB in cirrhotic (ciprofloxacin)   - Resume home diuretics; labs with PCP in 1-2 weeks   - Repeat EGD in 4-6 weeks; OP follow-up with Ashvin Messina in hepatology clinic in 1-2 weeks (we will arrange both)    GI will sign off. Call with questions.     Judit Pineda MD  GI Fellow  Pager

## 2019-08-14 NOTE — PLAN OF CARE
Occupational Therapy Discharge Summary    Reason for therapy discharge:    Discharged to home.    Progress towards therapy goal(s). See goals on Care Plan in [facility] electronic health record for goal details.  Goals not met.  Barriers to achieving goals:   discharge from facility.    Therapy recommendation(s):    Continued therapy is recommended.  Rationale/Recommendations:  Home with  PT/OT.

## 2019-08-14 NOTE — PLAN OF CARE
AVSS. C/o abdominal discomfort and gas pains- got simethicone ordered. Phos recheck needed in the AM. Voided x3 after getting lasix prior to start of this shift. Up with SBA. No other complaints. Continue to monitor and follow POC.  Problem: Adult Inpatient Plan of Care  Goal: Plan of Care Review  8/13/2019 2223 by Bess Eisenberg, RN  Outcome: No Change  Flowsheets (Taken 8/13/2019 2223)  Plan of Care Reviewed With: patient  Progress: no change     Problem: Adult Inpatient Plan of Care  Goal: Optimal Comfort and Wellbeing  8/13/2019 2223 by Bess Eisenberg, RN  Outcome: No Change     Problem: Gastrointestinal Condition Comorbidity  Goal: Gastrointestinal Condition  Description  Patient comorbidity will be monitored for signs and symptoms of Gastrointestinal condition.  Problems will be absent, minimized or managed by discharge/transition of care.  8/13/2019 2223 by Bess Eisenberg, RN  Outcome: No Change

## 2019-08-15 ENCOUNTER — TELEPHONE (OUTPATIENT)
Dept: GASTROENTEROLOGY | Facility: CLINIC | Age: 39
End: 2019-08-15

## 2019-08-15 NOTE — TELEPHONE ENCOUNTER
Per staff message, called patient to schedule repeat EGD in 4-6 weeks.  Patient states she thought they were going to hold off on scheduling as she had a lot of issues (still resolving) with the last procedure.  Patient did not want to schedule at this time.  Message sent to requesting provider informing her of this, and asked that they follow up with patient regarding her concerns.

## 2019-08-22 ENCOUNTER — TELEPHONE (OUTPATIENT)
Dept: GASTROENTEROLOGY | Facility: CLINIC | Age: 39
End: 2019-08-22

## 2019-08-22 NOTE — TELEPHONE ENCOUNTER
Left message for pt reminding them of upcoming appointment.  Instructed pt to bring updated medications list.  Anna Thacker on 8/22/2019 at 9:52 AM

## 2019-08-25 ENCOUNTER — TRANSFERRED RECORDS (OUTPATIENT)
Dept: HEALTH INFORMATION MANAGEMENT | Facility: CLINIC | Age: 39
End: 2019-08-25

## 2019-08-25 ENCOUNTER — HOSPITAL ENCOUNTER (INPATIENT)
Facility: CLINIC | Age: 39
LOS: 4 days | Discharge: HOME HEALTH | DRG: 369 | End: 2019-08-29
Attending: INTERNAL MEDICINE
Payer: COMMERCIAL

## 2019-08-25 DIAGNOSIS — K59.00 CONSTIPATION, UNSPECIFIED CONSTIPATION TYPE: ICD-10-CM

## 2019-08-25 DIAGNOSIS — K92.2 UPPER GI BLEED: ICD-10-CM

## 2019-08-25 DIAGNOSIS — K70.31 ALCOHOLIC CIRRHOSIS OF LIVER WITH ASCITES (H): Primary | ICD-10-CM

## 2019-08-25 DIAGNOSIS — K92.2 GASTROINTESTINAL HEMORRHAGE, UNSPECIFIED GASTROINTESTINAL HEMORRHAGE TYPE: ICD-10-CM

## 2019-08-25 LAB
ABO + RH BLD: NORMAL
ABO + RH BLD: NORMAL
ALBUMIN SERPL-MCNC: 1.3 G/DL (ref 3.4–5)
ALP SERPL-CCNC: 236 U/L (ref 40–150)
ALT SERPL W P-5'-P-CCNC: 26 U/L (ref 0–50)
ALT SERPL-CCNC: 35 U/L (ref 14–63)
AMMONIA PLAS-SCNC: 69 UMOL/L (ref 10–50)
ANION GAP SERPL CALCULATED.3IONS-SCNC: 8 MMOL/L (ref 3–14)
APTT PPP: 38 SEC (ref 22–37)
AST SERPL W P-5'-P-CCNC: 194 U/L (ref 0–45)
AST SERPL-CCNC: 219 U/L (ref 15–37)
BILIRUB SERPL-MCNC: 4.1 MG/DL (ref 0.2–1.3)
BLD GP AB SCN SERPL QL: NORMAL
BLD PROD TYP BPU: NORMAL
BLD UNIT ID BPU: 0
BLD UNIT ID BPU: 0
BLOOD BANK CMNT PATIENT-IMP: NORMAL
BLOOD PRODUCT CODE: NORMAL
BLOOD PRODUCT CODE: NORMAL
BPU ID: NORMAL
BPU ID: NORMAL
BUN SERPL-MCNC: 10 MG/DL (ref 7–30)
CA-I BLD-MCNC: 4.5 MG/DL (ref 4.4–5.2)
CALCIUM SERPL-MCNC: 7.7 MG/DL (ref 8.5–10.1)
CHLORIDE SERPL-SCNC: 103 MMOL/L (ref 94–109)
CO2 SERPL-SCNC: 23 MMOL/L (ref 20–32)
CREAT SERPL-MCNC: 0.52 MG/DL (ref 0.51–0.95)
CREAT SERPL-MCNC: 0.58 MG/DL (ref 0.52–1.04)
ERYTHROCYTE [DISTWIDTH] IN BLOOD BY AUTOMATED COUNT: 18.5 % (ref 10–15)
GFR SERPL CREATININE-BSD FRML MDRD: >60 ML/MIN/1.73ME2 (ref 60–150)
GFR SERPL CREATININE-BSD FRML MDRD: >90 ML/MIN/{1.73_M2}
GLUCOSE SERPL-MCNC: 145 MG/DL (ref 70–99)
GLUCOSE SERPL-MCNC: 162 MG/DL (ref 74–100)
HCT VFR BLD AUTO: 17.3 % (ref 35–47)
HGB BLD-MCNC: 5.3 G/DL (ref 11.7–15.7)
INR PPP: 1.66 (ref 0.86–1.14)
INR PPP: 1.9 (ref 0.9–1.1)
LACTATE BLD-SCNC: 1.1 MMOL/L (ref 0.7–2)
MAGNESIUM SERPL-MCNC: 1.5 MG/DL (ref 1.6–2.3)
MCH RBC QN AUTO: 29.3 PG (ref 26.5–33)
MCHC RBC AUTO-ENTMCNC: 30.6 G/DL (ref 31.5–36.5)
MCV RBC AUTO: 96 FL (ref 78–100)
MRSA DNA SPEC QL NAA+PROBE: NEGATIVE
NUM BPU REQUESTED: 2
PHOSPHATE SERPL-MCNC: 2.9 MG/DL (ref 2.5–4.5)
PLATELET # BLD AUTO: 179 10E9/L (ref 150–450)
POTASSIUM SERPL-SCNC: 3.8 MMOL/L (ref 3.4–5.3)
POTASSIUM SERPL-SCNC: 3.9 MMOL/L (ref 3.5–5.1)
PROT SERPL-MCNC: 5.7 G/DL (ref 6.8–8.8)
RBC # BLD AUTO: 1.81 10E12/L (ref 3.8–5.2)
SODIUM SERPL-SCNC: 135 MMOL/L (ref 133–144)
SPECIMEN EXP DATE BLD: NORMAL
SPECIMEN SOURCE: NORMAL
TRANSFUSION STATUS PATIENT QL: NORMAL
WBC # BLD AUTO: 15.1 10E9/L (ref 4–11)

## 2019-08-25 PROCEDURE — 36415 COLL VENOUS BLD VENIPUNCTURE: CPT | Performed by: STUDENT IN AN ORGANIZED HEALTH CARE EDUCATION/TRAINING PROGRAM

## 2019-08-25 PROCEDURE — 93010 ELECTROCARDIOGRAM REPORT: CPT | Performed by: INTERNAL MEDICINE

## 2019-08-25 PROCEDURE — 25800030 ZZH RX IP 258 OP 636: Performed by: INTERNAL MEDICINE

## 2019-08-25 PROCEDURE — 86900 BLOOD TYPING SEROLOGIC ABO: CPT | Performed by: STUDENT IN AN ORGANIZED HEALTH CARE EDUCATION/TRAINING PROGRAM

## 2019-08-25 PROCEDURE — 87641 MR-STAPH DNA AMP PROBE: CPT | Performed by: STUDENT IN AN ORGANIZED HEALTH CARE EDUCATION/TRAINING PROGRAM

## 2019-08-25 PROCEDURE — 25000128 H RX IP 250 OP 636: Performed by: INTERNAL MEDICINE

## 2019-08-25 PROCEDURE — 87640 STAPH A DNA AMP PROBE: CPT | Performed by: STUDENT IN AN ORGANIZED HEALTH CARE EDUCATION/TRAINING PROGRAM

## 2019-08-25 PROCEDURE — 80053 COMPREHEN METABOLIC PANEL: CPT | Performed by: STUDENT IN AN ORGANIZED HEALTH CARE EDUCATION/TRAINING PROGRAM

## 2019-08-25 PROCEDURE — 25800030 ZZH RX IP 258 OP 636: Performed by: STUDENT IN AN ORGANIZED HEALTH CARE EDUCATION/TRAINING PROGRAM

## 2019-08-25 PROCEDURE — 85610 PROTHROMBIN TIME: CPT | Performed by: STUDENT IN AN ORGANIZED HEALTH CARE EDUCATION/TRAINING PROGRAM

## 2019-08-25 PROCEDURE — 20000004 ZZH R&B ICU UMMC

## 2019-08-25 PROCEDURE — 85027 COMPLETE CBC AUTOMATED: CPT | Performed by: STUDENT IN AN ORGANIZED HEALTH CARE EDUCATION/TRAINING PROGRAM

## 2019-08-25 PROCEDURE — 83735 ASSAY OF MAGNESIUM: CPT | Performed by: STUDENT IN AN ORGANIZED HEALTH CARE EDUCATION/TRAINING PROGRAM

## 2019-08-25 PROCEDURE — 86923 COMPATIBILITY TEST ELECTRIC: CPT | Performed by: STUDENT IN AN ORGANIZED HEALTH CARE EDUCATION/TRAINING PROGRAM

## 2019-08-25 PROCEDURE — 84100 ASSAY OF PHOSPHORUS: CPT | Performed by: STUDENT IN AN ORGANIZED HEALTH CARE EDUCATION/TRAINING PROGRAM

## 2019-08-25 PROCEDURE — 85730 THROMBOPLASTIN TIME PARTIAL: CPT | Performed by: STUDENT IN AN ORGANIZED HEALTH CARE EDUCATION/TRAINING PROGRAM

## 2019-08-25 PROCEDURE — 86901 BLOOD TYPING SEROLOGIC RH(D): CPT | Performed by: STUDENT IN AN ORGANIZED HEALTH CARE EDUCATION/TRAINING PROGRAM

## 2019-08-25 PROCEDURE — 25000128 H RX IP 250 OP 636: Performed by: STUDENT IN AN ORGANIZED HEALTH CARE EDUCATION/TRAINING PROGRAM

## 2019-08-25 PROCEDURE — 25000125 ZZHC RX 250: Performed by: STUDENT IN AN ORGANIZED HEALTH CARE EDUCATION/TRAINING PROGRAM

## 2019-08-25 PROCEDURE — 25000132 ZZH RX MED GY IP 250 OP 250 PS 637: Performed by: STUDENT IN AN ORGANIZED HEALTH CARE EDUCATION/TRAINING PROGRAM

## 2019-08-25 PROCEDURE — 82330 ASSAY OF CALCIUM: CPT | Performed by: STUDENT IN AN ORGANIZED HEALTH CARE EDUCATION/TRAINING PROGRAM

## 2019-08-25 PROCEDURE — C9113 INJ PANTOPRAZOLE SODIUM, VIA: HCPCS | Performed by: INTERNAL MEDICINE

## 2019-08-25 PROCEDURE — 82140 ASSAY OF AMMONIA: CPT | Performed by: STUDENT IN AN ORGANIZED HEALTH CARE EDUCATION/TRAINING PROGRAM

## 2019-08-25 PROCEDURE — 93005 ELECTROCARDIOGRAM TRACING: CPT

## 2019-08-25 PROCEDURE — 86850 RBC ANTIBODY SCREEN: CPT | Performed by: STUDENT IN AN ORGANIZED HEALTH CARE EDUCATION/TRAINING PROGRAM

## 2019-08-25 PROCEDURE — P9016 RBC LEUKOCYTES REDUCED: HCPCS | Performed by: STUDENT IN AN ORGANIZED HEALTH CARE EDUCATION/TRAINING PROGRAM

## 2019-08-25 PROCEDURE — 99223 1ST HOSP IP/OBS HIGH 75: CPT | Mod: GC

## 2019-08-25 PROCEDURE — 83605 ASSAY OF LACTIC ACID: CPT | Performed by: STUDENT IN AN ORGANIZED HEALTH CARE EDUCATION/TRAINING PROGRAM

## 2019-08-25 RX ORDER — MAGNESIUM SULFATE HEPTAHYDRATE 40 MG/ML
4 INJECTION, SOLUTION INTRAVENOUS EVERY 4 HOURS PRN
Status: DISCONTINUED | OUTPATIENT
Start: 2019-08-25 | End: 2019-08-29 | Stop reason: HOSPADM

## 2019-08-25 RX ORDER — POTASSIUM CHLORIDE 1.5 G/1.58G
20-40 POWDER, FOR SOLUTION ORAL
Status: DISCONTINUED | OUTPATIENT
Start: 2019-08-25 | End: 2019-08-26

## 2019-08-25 RX ORDER — ONDANSETRON 2 MG/ML
4 INJECTION INTRAMUSCULAR; INTRAVENOUS EVERY 6 HOURS PRN
Status: DISCONTINUED | OUTPATIENT
Start: 2019-08-25 | End: 2019-08-29 | Stop reason: HOSPADM

## 2019-08-25 RX ORDER — CEFTRIAXONE 1 G/1
1 INJECTION, POWDER, FOR SOLUTION INTRAMUSCULAR; INTRAVENOUS EVERY 24 HOURS
Status: DISCONTINUED | OUTPATIENT
Start: 2019-08-26 | End: 2019-08-26

## 2019-08-25 RX ORDER — LIDOCAINE 4 G/G
1 PATCH TOPICAL
Status: DISCONTINUED | OUTPATIENT
Start: 2019-08-25 | End: 2019-08-29 | Stop reason: HOSPADM

## 2019-08-25 RX ORDER — ONDANSETRON 4 MG/1
4 TABLET, ORALLY DISINTEGRATING ORAL EVERY 6 HOURS PRN
Status: DISCONTINUED | OUTPATIENT
Start: 2019-08-25 | End: 2019-08-29 | Stop reason: HOSPADM

## 2019-08-25 RX ORDER — NALOXONE HYDROCHLORIDE 0.4 MG/ML
.1-.4 INJECTION, SOLUTION INTRAMUSCULAR; INTRAVENOUS; SUBCUTANEOUS
Status: DISCONTINUED | OUTPATIENT
Start: 2019-08-25 | End: 2019-08-29 | Stop reason: HOSPADM

## 2019-08-25 RX ORDER — POTASSIUM CHLORIDE 7.45 MG/ML
10 INJECTION INTRAVENOUS
Status: DISCONTINUED | OUTPATIENT
Start: 2019-08-25 | End: 2019-08-26

## 2019-08-25 RX ORDER — POTASSIUM CHLORIDE 29.8 MG/ML
20 INJECTION INTRAVENOUS
Status: DISCONTINUED | OUTPATIENT
Start: 2019-08-25 | End: 2019-08-26

## 2019-08-25 RX ORDER — POTASSIUM CHLORIDE 750 MG/1
20-40 TABLET, EXTENDED RELEASE ORAL
Status: DISCONTINUED | OUTPATIENT
Start: 2019-08-25 | End: 2019-08-26

## 2019-08-25 RX ORDER — POTASSIUM CL/LIDO/0.9 % NACL 10MEQ/0.1L
10 INTRAVENOUS SOLUTION, PIGGYBACK (ML) INTRAVENOUS
Status: DISCONTINUED | OUTPATIENT
Start: 2019-08-25 | End: 2019-08-26

## 2019-08-25 RX ADMIN — LIDOCAINE 1 PATCH: 560 PATCH PERCUTANEOUS; TOPICAL; TRANSDERMAL at 22:33

## 2019-08-25 RX ADMIN — OCTREOTIDE ACETATE 50 MCG/HR: 200 INJECTION, SOLUTION INTRAVENOUS; SUBCUTANEOUS at 17:44

## 2019-08-25 RX ADMIN — PANTOPRAZOLE SODIUM 8 MG/HR: 40 INJECTION, POWDER, FOR SOLUTION INTRAVENOUS at 17:44

## 2019-08-25 ASSESSMENT — MIFFLIN-ST. JEOR: SCORE: 1415.75

## 2019-08-25 ASSESSMENT — ACTIVITIES OF DAILY LIVING (ADL): ADLS_ACUITY_SCORE: 12

## 2019-08-25 NOTE — PROGRESS NOTES
Admitted/transferred from: [place] [facility]  Reason for admission/transfer: upper GI bleed  Patient status upon admission/transfer: stable  Interventions: see notes  Plan: GI consult for upper GI bleed  2 RN skin assessment: completed by Cassandra Khan  Result of skin assessment and interventions/actions:  Small amount of bruising on upper extremities, scarring on thigh, blanchable redness on bottom; barrier appplied  Height, weight, drug calc weight: done  Patient belongings (see Flowsheet - Adult Profile for details): with pt  MDRO education (if applicable): n/a

## 2019-08-25 NOTE — PROGRESS NOTES
[facility]  Transfer Triage Note  39 year old female with h/o alcoholic cirrhosis, non-uremic calciphylaxis, and ongoing alcohol recently discharged from here     Now in  [place] ED with variceal bleeding.  BP: 110/60 HR: 40s, sating well on RA. Hgb dropped to 4.7, LA 2.6  Started on octreotide and PPI drip  -s/p one unit of PRBC, will continue to transfuse   -Dose of ceftriaxone and vitamin k challenge   -Will consult GI for urgent EGD on arrival.   stepdown bed     Shawn Tran MD  Hospitalist/nocturnist  [facility]    Departments of Medicine   Pager: [phone]

## 2019-08-25 NOTE — H&P
MICU H&I  Yenifer Maher ([id number]) admitted on 8/25/2019  Primary care provider: Flako Gaona          ASSESSMENT & PLAN   38yo female with history of alcoholic cirrhosis (actively drinking), non-uremic calciphylaxis, and recent esophageal variceal bleed s/p banding 8/9/2019 who is admitted to the MICU after having hematemesis at home and being found to have a hemoglobin <5.      ===NEURO===  # Alcohol use disorder  Active drinking, continued since last admission. Typically drinks wine almost every night, last drink was wine yesterday evening. Her fiance also drinks and she thinks it will be difficult to abstain unless he is more supportive.  - Consider chem dep consult prior to discharge once more stable  - IV folate, thiamine, Mg on admission    Patient does not take lactulose and rifaximin at home and does not have a history of hepatic encephalopathy. Will consider initiating these therapies if concern for mental status, but she is alert and oriented at this time, able to recount detailed history.    ===CARDIOVASCULAR===  # Tachycardia  Per records, history of A-fib though currently with regular rhythm. She was tachycardic throughout last admission as well. It did trend down slightly as anemia was corrected from 150s to 100s, but remained mildly tachycardic on discharge. Suspect that the tachycardia currently is related to her severe anemia.  - Check EKG    ===PULMONARY===  No acute issues.    ===GASTROINTESTINAL===  # Acute upper GI bleed  Recently admitted 8/9-8/14 for GIB 2/2 esophageal varices s/p banding. Presented today to OSH after several episodes of hematemesis at home with bright red blood and clots. Hgb in ED 4s down from 7.8 at discharge on 8/14. Hemodynamically stable on transfer after 1 unit PRBC and IVF. Possible sources of bleed include re-bleed of her known varices, bleeding from post-banding ulcer, MW tear.  - Transfuse 2 units PRBC now  - Hgb trend overnight  - Goal Hgb >7  - Octreotide and  "protonix gtt  - Ceftriaxone for SBP ppx  - Discussed with GI tonight, they will formally see in AM; appreciate the recommendations    # Alcoholic hepatitis  LFTs downtrending from her last admission. Significant interval increase in ascites volume.  - Check diagnostic paracentesis    # Nutrition   - NPO    ===RENAL===  No acute issues.    ===HEME/ONC===  # Acute blood loss anemia  See GI    ===ENDOCRINE===  No acute issues.     ===INFECTIOUS DISEASE===  # Leukocytosis  Afebrile and without localizing signs of infection. Could be reactive in the setting of bleed. She received antibiotics for possible SBP in the ED. Will try to obtain diagnostic para to evaluate further. No respiratory or urinary symptoms.  - Trend CBC    # SBP prophylaxis  - Ceftriaxone 1 g daily    ===SKIN/MSK===  No acute issues.      FEN: NPO  Lines: PIVs  Prophylaxis:  DVT: PCDs   GI: Protonix gtt  Disposition: Critically ill, admit to MICU  Code Status: Full     Patient was seen and discussed with attending physician Dr. De Guzman, who agrees with the above assessment and plan.    Dominique Quintana MD  Internal Medicine, PGY-1           History of Present Illness     Yenifer Maher is a 39 year old female with h/o alcoholic cirrhosis (actively drinking), non-uremic calciphylaxis, and recent esophageal variceal bleed s/p banding 8/9/2019 who presented to an outside ED after having hematemesis at home.    She reports that after discharging about a week and a half ago she has been essentially at her baseline in terms of activity and health prior to the last couple of days. The last two days she has been feeling more fatigued and \"slower\" requiring breaks during the day to lie down or rest. She attributed this to the heat or possibly to menstrual cramps and didn't think much of it. Last evening she had dinner as usual with wine and then later started feeling more nauseated. Overnight she vomited once and it looked possibly like blood, though she says she " had been drinking a red sports drink prior so she wasn't sure. Early in the morning she had two additional episodes of vomiting and these both had bright red blood with clots, so she was brought to the ED by her fiance.    In the ED, she reports (did not see a paper record come with her) that she got IV fluids and felt much better. No additional episodes of emesis in the ED or since. On the way over she also received a transfusion of 1 unit of PRBCs. On arrival, she reports that she feels much better and is no longer nauseated. She feels dehydrated and thirsty. No pain, though she has discomfort in her abdomen due to distention from ascites. She says that the swelling in her abdomen has gotten worse over the last couple days as well.         Past Medical History     Past Medical History:   Diagnosis Date     Alcohol abuse      Alcoholic cirrhosis (H)      Alcoholic peripheral neuropathy (H)      Coagulopathy (H)      Gastritis      History of Clostridium difficile colitis     unknown date     Impairment of cognitive function     MOCA 2/2019 22/30     Leukocytosis 02/2019    persistent leukocytosis across 2/2019 hospitalization without evidence of source across multiple diagnostics including LP, BCx, UCx      Lupus anticoagulant positive      Macrocytic anemia      Moderate protein-calorie malnutrition (H)      Paroxysmal A-fib (H) 02/2019    not on chronic anticoagulation     Peptic ulcer disease      Positive SMILEY (antinuclear antibody)      Subclinical hypothyroidism 02/2019    normal T3, T4     Tobacco dependence     0.5 PPD         Past Surgical History     Past Surgical History:   Procedure Laterality Date     ESOPHAGOSCOPY, GASTROSCOPY, DUODENOSCOPY (EGD), COMBINED N/A 8/9/2019    Procedure: ESOPHAGOGASTRODUODENOSCOPY (EGD);  Surgeon: Sowmya Ibanez MD;  Location:  GI     UPPER GI ENDOSCOPY  8/9/2019               Medications     No current outpatient medications on file.          Allergies      Allergies   Allergen Reactions     Bengay Pain Relief [Menthol] Other (See Comments)     Skin turns red and feels extremely hot     Cats      Chocolate Dermatitis     Dicyclomine Other (See Comments)     Severe sedation     Dust Mites      Derm, resp     No Clinical Screening - See Comments      GI upset     Phenobarbital      Pollen Extract      Derm, resp.           Social History     Social History     Socioeconomic History     Marital status: Single     Spouse name: Not on file     Number of children: Not on file     Years of education: Not on file     Highest education level: Not on file   Occupational History     Not on file   Social Needs     Financial resource strain: Not on file     Food insecurity:     Worry: Not on file     Inability: Not on file     Transportation needs:     Medical: Not on file     Non-medical: Not on file   Tobacco Use     Smoking status: Current Some Day Smoker     Types: Cigarettes     Smokeless tobacco: Never Used     Tobacco comment: 6-8 per day   Substance and Sexual Activity     Alcohol use: Yes     Alcohol/week: 1.2 oz     Types: 2 Glasses of wine per week     Frequency: 2-3 times a week     Drug use: Not on file     Sexual activity: Not on file   Lifestyle     Physical activity:     Days per week: Not on file     Minutes per session: Not on file     Stress: Not on file   Relationships     Social connections:     Talks on phone: Not on file     Gets together: Not on file     Attends Sabianist service: Not on file     Active member of club or organization: Not on file     Attends meetings of clubs or organizations: Not on file     Relationship status: Not on file     Intimate partner violence:     Fear of current or ex partner: Not on file     Emotionally abused: Not on file     Physically abused: Not on file     Forced sexual activity: Not on file   Other Topics Concern     Parent/sibling w/ CABG, MI or angioplasty before 65F 55M? Not Asked   Social History Narrative     Not  on file          Family History     No family history on file.       Review of Systems     10 Point ROS negative unless mentioned in HPI         OBJECTIVE   VITAL SIGNS:  /81   Pulse 121   Temp 99.1  F (37.3  C) (Axillary)   Resp 18   Wt 69.8 kg (153 lb 14.1 oz)   SpO2 99%   BMI 24.10 kg/m    No intake or output data in the 24 hours ending 08/25/19 4324       EXAM:  General: Sitting up in bed, not in any distress, communicative  Skin: Not jaundiced, no rash, no ecchymoses  HEENT: MMM, PERRLA, EOM intact  CV: RRR, normal S1S2, 2/6 systolic flow murmur  Resp: Clear to auscultation bilaterally, no wheezes or crackles  Abd: Markedly distended, tense ascites, nontender though she reports generalized discomfort with deep palpation  Extremities: Warm and well perfused, palpable pulses, no edema  Neuro: A&Ox3, no lateralizing symptoms or focal neurologic deficits      LABS:   BMP  Lab Test 08/25/19  1752 08/14/19  0508    130*   POTASSIUM 3.8 3.6   CHLORIDE 103 101   CO2 23 22   ANIONGAP 8 8   LACT 1.1  --    BUN 10 6*   CR 0.58 0.68   * 117*   MARKO 7.7* 7.6*   MAG 1.5* 1.9   PHOS 2.9 2.2*     Heme   Lab Test 08/25/19  1752   WBC 15.1*   ANEU  --    ALYM  --    AEOS  --    HGB 5.3*   MCV 96      INR 1.66*     Hepatic   Recent Labs   Lab 08/25/19  1752   ALKPHOS 236*   *   ALT 26   BILITOTAL 4.1*   PROTTOTAL 5.7*   ALBUMIN 1.3*        IMAGING:  None.

## 2019-08-25 NOTE — PROGRESS NOTES
Brief GI Note  08/25/19    Called regarding Ms. Maher admitted to ICU for hematemesis. She is known to me from her recent admission for variceal hemorrhage.    Per ICU resident, patient reports feeling well until the past couple of days when she started feeling more fatigued and attributed this to the heat and her menstrual period. She has been drinking wine since discharge. Last night had episode of ?nonbloody emesis. This morning two episodes of hematemesis and nausea.    Today she presented to [facility] ED. Hemoglobin in 4s from 7s. She was given IVF and one unit of blood and feels much improved. Her hemoglobin responded to 5s. Her HR is in 130s. BP 130s/80s. Lactic acid is normal. LFTs down-trending from last hospitalization.     DDx includes recurrent variceal bleed, post-banding ulcer bleed, MW tear, PHG, esophagitis, etc. Likely component of EtOH withdrawal driving tachycardia.    Recommend resuscitation by primary ICU team. Goal hemoglobin 7-8. Octreotide and PPI gtt. Ceftriaxone 1 gm daily for GIB in cirrhotic. NPO. Will need EGD for further evaluation.     Please call overnight if patient has recurrent hematemesis, hemodynamically significant GI bleeding, changes in clinical status.    Formal consult note in AM.      Judit Pineda MD  GI Fellow, PGY-6  [phone]

## 2019-08-26 ENCOUNTER — PRE VISIT (OUTPATIENT)
Dept: GASTROENTEROLOGY | Facility: CLINIC | Age: 39
End: 2019-08-26

## 2019-08-26 ENCOUNTER — APPOINTMENT (OUTPATIENT)
Dept: OCCUPATIONAL THERAPY | Facility: CLINIC | Age: 39
DRG: 369 | End: 2019-08-26
Attending: INTERNAL MEDICINE
Payer: COMMERCIAL

## 2019-08-26 DIAGNOSIS — K70.11 ALCOHOLIC HEPATITIS WITH ASCITES (H): Primary | ICD-10-CM

## 2019-08-26 LAB
ALBUMIN SERPL-MCNC: 1.3 G/DL (ref 3.4–5)
ALP SERPL-CCNC: 210 U/L (ref 40–150)
ALT SERPL W P-5'-P-CCNC: 25 U/L (ref 0–50)
ANION GAP SERPL CALCULATED.3IONS-SCNC: 7 MMOL/L (ref 3–14)
AST SERPL W P-5'-P-CCNC: 181 U/L (ref 0–45)
BILIRUB SERPL-MCNC: 3.8 MG/DL (ref 0.2–1.3)
BUN SERPL-MCNC: 14 MG/DL (ref 7–30)
CA-I BLD-MCNC: 4.1 MG/DL (ref 4.4–5.2)
CALCIUM SERPL-MCNC: 7.3 MG/DL (ref 8.5–10.1)
CHLORIDE SERPL-SCNC: 106 MMOL/L (ref 94–109)
CO2 SERPL-SCNC: 23 MMOL/L (ref 20–32)
CREAT SERPL-MCNC: 0.61 MG/DL (ref 0.52–1.04)
ERYTHROCYTE [DISTWIDTH] IN BLOOD BY AUTOMATED COUNT: 17.3 % (ref 10–15)
GFR SERPL CREATININE-BSD FRML MDRD: >90 ML/MIN/{1.73_M2}
GLUCOSE BLDC GLUCOMTR-MCNC: 117 MG/DL (ref 70–99)
GLUCOSE SERPL-MCNC: 95 MG/DL (ref 70–99)
HCT VFR BLD AUTO: 21.9 % (ref 35–47)
HGB BLD-MCNC: 7 G/DL (ref 11.7–15.7)
HGB BLD-MCNC: 7.2 G/DL (ref 11.7–15.7)
HGB BLD-MCNC: 7.5 G/DL (ref 11.7–15.7)
INTERPRETATION ECG - MUSE: NORMAL
MAGNESIUM SERPL-MCNC: 1.7 MG/DL (ref 1.6–2.3)
MCH RBC QN AUTO: 29.5 PG (ref 26.5–33)
MCHC RBC AUTO-ENTMCNC: 32 G/DL (ref 31.5–36.5)
MCV RBC AUTO: 92 FL (ref 78–100)
PHOSPHATE SERPL-MCNC: 2.5 MG/DL (ref 2.5–4.5)
PLATELET # BLD AUTO: 150 10E9/L (ref 150–450)
POTASSIUM SERPL-SCNC: 3.7 MMOL/L (ref 3.4–5.3)
PROT SERPL-MCNC: 5.4 G/DL (ref 6.8–8.8)
RBC # BLD AUTO: 2.37 10E12/L (ref 3.8–5.2)
SODIUM SERPL-SCNC: 136 MMOL/L (ref 133–144)
UPPER GI ENDOSCOPY: NORMAL
WBC # BLD AUTO: 12.6 10E9/L (ref 4–11)

## 2019-08-26 PROCEDURE — 85027 COMPLETE CBC AUTOMATED: CPT | Performed by: STUDENT IN AN ORGANIZED HEALTH CARE EDUCATION/TRAINING PROGRAM

## 2019-08-26 PROCEDURE — 83735 ASSAY OF MAGNESIUM: CPT | Performed by: STUDENT IN AN ORGANIZED HEALTH CARE EDUCATION/TRAINING PROGRAM

## 2019-08-26 PROCEDURE — 25000128 H RX IP 250 OP 636: Performed by: STUDENT IN AN ORGANIZED HEALTH CARE EDUCATION/TRAINING PROGRAM

## 2019-08-26 PROCEDURE — 84100 ASSAY OF PHOSPHORUS: CPT | Performed by: STUDENT IN AN ORGANIZED HEALTH CARE EDUCATION/TRAINING PROGRAM

## 2019-08-26 PROCEDURE — 25000128 H RX IP 250 OP 636

## 2019-08-26 PROCEDURE — 43235 EGD DIAGNOSTIC BRUSH WASH: CPT | Performed by: INTERNAL MEDICINE

## 2019-08-26 PROCEDURE — 25800030 ZZH RX IP 258 OP 636: Performed by: STUDENT IN AN ORGANIZED HEALTH CARE EDUCATION/TRAINING PROGRAM

## 2019-08-26 PROCEDURE — 97535 SELF CARE MNGMENT TRAINING: CPT | Mod: GO | Performed by: OCCUPATIONAL THERAPIST

## 2019-08-26 PROCEDURE — 25000132 ZZH RX MED GY IP 250 OP 250 PS 637: Performed by: STUDENT IN AN ORGANIZED HEALTH CARE EDUCATION/TRAINING PROGRAM

## 2019-08-26 PROCEDURE — 99233 SBSQ HOSP IP/OBS HIGH 50: CPT | Mod: GC | Performed by: INTERNAL MEDICINE

## 2019-08-26 PROCEDURE — 20000004 ZZH R&B ICU UMMC

## 2019-08-26 PROCEDURE — 25000128 H RX IP 250 OP 636: Performed by: INTERNAL MEDICINE

## 2019-08-26 PROCEDURE — 36415 COLL VENOUS BLD VENIPUNCTURE: CPT | Performed by: STUDENT IN AN ORGANIZED HEALTH CARE EDUCATION/TRAINING PROGRAM

## 2019-08-26 PROCEDURE — 00000146 ZZHCL STATISTIC GLUCOSE BY METER IP

## 2019-08-26 PROCEDURE — 0DJ08ZZ INSPECTION OF UPPER INTESTINAL TRACT, VIA NATURAL OR ARTIFICIAL OPENING ENDOSCOPIC: ICD-10-PCS | Performed by: INTERNAL MEDICINE

## 2019-08-26 PROCEDURE — 85018 HEMOGLOBIN: CPT | Performed by: STUDENT IN AN ORGANIZED HEALTH CARE EDUCATION/TRAINING PROGRAM

## 2019-08-26 PROCEDURE — 80053 COMPREHEN METABOLIC PANEL: CPT | Performed by: STUDENT IN AN ORGANIZED HEALTH CARE EDUCATION/TRAINING PROGRAM

## 2019-08-26 PROCEDURE — 25800030 ZZH RX IP 258 OP 636: Performed by: INTERNAL MEDICINE

## 2019-08-26 PROCEDURE — 82330 ASSAY OF CALCIUM: CPT | Performed by: STUDENT IN AN ORGANIZED HEALTH CARE EDUCATION/TRAINING PROGRAM

## 2019-08-26 PROCEDURE — C9113 INJ PANTOPRAZOLE SODIUM, VIA: HCPCS | Performed by: INTERNAL MEDICINE

## 2019-08-26 PROCEDURE — 97165 OT EVAL LOW COMPLEX 30 MIN: CPT | Mod: GO | Performed by: OCCUPATIONAL THERAPIST

## 2019-08-26 PROCEDURE — 25000131 ZZH RX MED GY IP 250 OP 636 PS 637: Performed by: STUDENT IN AN ORGANIZED HEALTH CARE EDUCATION/TRAINING PROGRAM

## 2019-08-26 RX ORDER — CIPROFLOXACIN 500 MG/1
500 TABLET, FILM COATED ORAL EVERY 12 HOURS SCHEDULED
Status: DISCONTINUED | OUTPATIENT
Start: 2019-08-27 | End: 2019-08-26

## 2019-08-26 RX ORDER — NORTRIPTYLINE HCL 10 MG
10 CAPSULE ORAL AT BEDTIME
Status: DISCONTINUED | OUTPATIENT
Start: 2019-08-26 | End: 2019-08-29 | Stop reason: HOSPADM

## 2019-08-26 RX ORDER — DIPHENHYDRAMINE HYDROCHLORIDE 50 MG/ML
25-50 INJECTION INTRAMUSCULAR; INTRAVENOUS ONCE
Status: COMPLETED | OUTPATIENT
Start: 2019-08-26 | End: 2019-08-26

## 2019-08-26 RX ORDER — FOLIC ACID 1 MG/1
1 TABLET ORAL DAILY
Status: DISCONTINUED | OUTPATIENT
Start: 2019-08-26 | End: 2019-08-29 | Stop reason: HOSPADM

## 2019-08-26 RX ORDER — FENTANYL CITRATE 50 UG/ML
INJECTION, SOLUTION INTRAMUSCULAR; INTRAVENOUS
Status: COMPLETED
Start: 2019-08-26 | End: 2019-08-26

## 2019-08-26 RX ORDER — NALOXONE HYDROCHLORIDE 0.4 MG/ML
.1-.4 INJECTION, SOLUTION INTRAMUSCULAR; INTRAVENOUS; SUBCUTANEOUS
Status: DISCONTINUED | OUTPATIENT
Start: 2019-08-26 | End: 2019-08-26

## 2019-08-26 RX ORDER — FENTANYL CITRATE 50 UG/ML
50-100 INJECTION, SOLUTION INTRAMUSCULAR; INTRAVENOUS
Status: COMPLETED | OUTPATIENT
Start: 2019-08-26 | End: 2019-08-26

## 2019-08-26 RX ORDER — PANTOPRAZOLE SODIUM 40 MG/1
40 TABLET, DELAYED RELEASE ORAL 2 TIMES DAILY
Status: DISCONTINUED | OUTPATIENT
Start: 2019-08-26 | End: 2019-08-29 | Stop reason: HOSPADM

## 2019-08-26 RX ORDER — LANOLIN ALCOHOL/MO/W.PET/CERES
100 CREAM (GRAM) TOPICAL DAILY
Status: DISCONTINUED | OUTPATIENT
Start: 2019-08-26 | End: 2019-08-29 | Stop reason: HOSPADM

## 2019-08-26 RX ORDER — FLUMAZENIL 0.1 MG/ML
0.2 INJECTION, SOLUTION INTRAVENOUS
Status: ACTIVE | OUTPATIENT
Start: 2019-08-26 | End: 2019-08-28

## 2019-08-26 RX ORDER — SPIRONOLACTONE 50 MG/1
50 TABLET, FILM COATED ORAL DAILY
Status: DISCONTINUED | OUTPATIENT
Start: 2019-08-26 | End: 2019-08-29 | Stop reason: HOSPADM

## 2019-08-26 RX ORDER — FENTANYL CITRATE 50 UG/ML
25-50 INJECTION, SOLUTION INTRAMUSCULAR; INTRAVENOUS
Status: DISCONTINUED | OUTPATIENT
Start: 2019-08-26 | End: 2019-08-26

## 2019-08-26 RX ORDER — PREGABALIN 25 MG/1
50 CAPSULE ORAL 3 TIMES DAILY
Status: DISCONTINUED | OUTPATIENT
Start: 2019-08-26 | End: 2019-08-29 | Stop reason: HOSPADM

## 2019-08-26 RX ORDER — LIDOCAINE 40 MG/G
CREAM TOPICAL
Status: CANCELLED | OUTPATIENT
Start: 2019-08-26

## 2019-08-26 RX ORDER — CEFTRIAXONE 1 G/1
1 INJECTION, POWDER, FOR SOLUTION INTRAMUSCULAR; INTRAVENOUS EVERY 24 HOURS
Status: DISCONTINUED | OUTPATIENT
Start: 2019-08-27 | End: 2019-08-28

## 2019-08-26 RX ORDER — FUROSEMIDE 20 MG
20 TABLET ORAL DAILY
Status: DISCONTINUED | OUTPATIENT
Start: 2019-08-26 | End: 2019-08-29 | Stop reason: HOSPADM

## 2019-08-26 RX ORDER — MULTIPLE VITAMINS W/ MINERALS TAB 9MG-400MCG
1 TAB ORAL DAILY
Status: DISCONTINUED | OUTPATIENT
Start: 2019-08-26 | End: 2019-08-29 | Stop reason: HOSPADM

## 2019-08-26 RX ADMIN — FENTANYL CITRATE 100 MCG: 50 INJECTION INTRAMUSCULAR; INTRAVENOUS at 08:25

## 2019-08-26 RX ADMIN — PANTOPRAZOLE SODIUM 40 MG: 40 TABLET, DELAYED RELEASE ORAL at 12:47

## 2019-08-26 RX ADMIN — PREGABALIN 50 MG: 25 CAPSULE ORAL at 16:17

## 2019-08-26 RX ADMIN — FENTANYL CITRATE 100 MCG: 50 INJECTION, SOLUTION INTRAMUSCULAR; INTRAVENOUS at 08:25

## 2019-08-26 RX ADMIN — Medication 100 MG: at 16:17

## 2019-08-26 RX ADMIN — FENTANYL CITRATE 100 MCG: 50 INJECTION INTRAMUSCULAR; INTRAVENOUS at 08:30

## 2019-08-26 RX ADMIN — PANTOPRAZOLE SODIUM 40 MG: 40 TABLET, DELAYED RELEASE ORAL at 20:11

## 2019-08-26 RX ADMIN — FUROSEMIDE 20 MG: 20 TABLET ORAL at 12:47

## 2019-08-26 RX ADMIN — MIDAZOLAM 2 MG: 1 INJECTION INTRAMUSCULAR; INTRAVENOUS at 08:30

## 2019-08-26 RX ADMIN — PANTOPRAZOLE SODIUM 8 MG/HR: 40 INJECTION, POWDER, FOR SOLUTION INTRAVENOUS at 03:25

## 2019-08-26 RX ADMIN — LIDOCAINE 1 PATCH: 560 PATCH PERCUTANEOUS; TOPICAL; TRANSDERMAL at 20:14

## 2019-08-26 RX ADMIN — FOLIC ACID 1 MG: 1 TABLET ORAL at 16:17

## 2019-08-26 RX ADMIN — NORTRIPTYLINE HYDROCHLORIDE 10 MG: 10 CAPSULE ORAL at 22:04

## 2019-08-26 RX ADMIN — CEFTRIAXONE 1 G: 1 INJECTION, POWDER, FOR SOLUTION INTRAMUSCULAR; INTRAVENOUS at 09:25

## 2019-08-26 RX ADMIN — ONDANSETRON 4 MG: 4 TABLET, ORALLY DISINTEGRATING ORAL at 19:48

## 2019-08-26 RX ADMIN — PREGABALIN 50 MG: 25 CAPSULE ORAL at 20:11

## 2019-08-26 RX ADMIN — MIDAZOLAM 2 MG: 1 INJECTION INTRAMUSCULAR; INTRAVENOUS at 08:25

## 2019-08-26 RX ADMIN — DIPHENHYDRAMINE HYDROCHLORIDE 50 MG: 50 INJECTION, SOLUTION INTRAMUSCULAR; INTRAVENOUS at 08:24

## 2019-08-26 RX ADMIN — CALCIUM GLUCONATE 1 G: 98 INJECTION, SOLUTION INTRAVENOUS at 04:00

## 2019-08-26 RX ADMIN — SPIRONOLACTONE 50 MG: 50 TABLET ORAL at 12:47

## 2019-08-26 RX ADMIN — MULTIPLE VITAMINS W/ MINERALS TAB 1 TABLET: TAB at 16:17

## 2019-08-26 ASSESSMENT — ACTIVITIES OF DAILY LIVING (ADL)
ADLS_ACUITY_SCORE: 16
WHICH_OF_THE_ABOVE_FUNCTIONAL_RISKS_HAD_A_RECENT_ONSET_OR_CHANGE?: AMBULATION
ADLS_ACUITY_SCORE: 15
TRANSFERRING: 0-->INDEPENDENT
BATHING: 2-->ASSISTIVE PERSON
PREVIOUS_RESPONSIBILITIES: MEAL PREP;HOUSEKEEPING;LAUNDRY;MEDICATION MANAGEMENT
ADLS_ACUITY_SCORE: 16
ADLS_ACUITY_SCORE: 16
SWALLOWING: 0-->SWALLOWS FOODS/LIQUIDS WITHOUT DIFFICULTY
COGNITION: 0 - NO COGNITION ISSUES REPORTED
RETIRED_EATING: 0-->INDEPENDENT
AMBULATION: 0-->INDEPENDENT
ADLS_ACUITY_SCORE: 12
DRESS: 0-->INDEPENDENT
ADLS_ACUITY_SCORE: 12
FALL_HISTORY_WITHIN_LAST_SIX_MONTHS: NO
TOILETING: 0-->INDEPENDENT
RETIRED_COMMUNICATION: 0-->UNDERSTANDS/COMMUNICATES WITHOUT DIFFICULTY

## 2019-08-26 ASSESSMENT — PAIN DESCRIPTION - DESCRIPTORS
DESCRIPTORS: CONSTANT;ACHING;BURNING
DESCRIPTORS: CONSTANT;BURNING;ACHING;DISCOMFORT

## 2019-08-26 NOTE — PLAN OF CARE
PT 4C: Cancel- PT orders received. Patient underwent Upper Endoscopy this morning, then was seen by OT this afternoon. Will reschedule PT evaluation.

## 2019-08-26 NOTE — BRIEF OP NOTE
Gastroenterology Endoscopy Suite Brief Operative Note    Procedure:  Upper endoscopy   Post-operative diagnosis:  Post Banding Ulceration   Staff Physician:  Dr. Thomas Leventhal   Fellow/Assistant(s):  Dr. Judit Pineda    Specimens:  Please see final procedure note for further details.   Findings:  Four post-banding ulcerations in mid-distal esophagus (clean based), residual large varices without stigmata of recent bleeding; mild portal hypertensive gastropathy (some moderate PHG with oozing in posterior body), clot in stomach; Normal duodenum. Source of bleeding likely esophageal ulcers.     Complications:  None.   Condition:  Stable   Recommendations  Diet:  Low sodium diet with protein supplementation between meals and QHS  PPI:  PPI po BID  Anti-coagulants/platelets:  N/A  Octreotide:  Stop octreotide drip  Discharge Planning:   Per primary team; repeat EGD in 2 weeks for retreatment of varices

## 2019-08-26 NOTE — PROGRESS NOTES
[facility], [place]  Transfer from MICU to      Progress Note - [facility] Service        Date of Admission:  8/25/2019    Assessment & Plan   Yenifer Maher is a 39 year old female admitted on 8/25/2019. She has a past medical history of lupus anticoagulant, alcohol abuse, calciphylaxis, alcoholic liver cirrhosis with history of alcoholic hepatitis in August 2018.  She presents for hematemesis initially to the MICU, now transferred to the medicine service.  She is hemodynamically stable, and most recent hemoglobin is 7.2.  She underwent upper endoscopy as of 8/26/2019 which showed recently bleeding, large greater than 5 mm esophageal varices, small hiatal hernia and portal hypertensive gastropathy.    Hematemesis, acute blood loss anemia due to stigmata of liver disease, EV    Initially in the MICU for hematemesis with presenting Hgb at 5.3 (received a total of 2u pRBC) , given octreotide GTT as well as pantoprazole drip.  Underwent upper endoscopy showing recently bleeding large esophageal varices.  -Twice daily pantoprazole for 2 weeks from 8/26/2019 to 9/2/2019.  Then once daily pantoprazole.  -Repeat upper endoscopy around 9/26/2019.    -Repeat hemoglobin in p.m.  -CTX 1gm x7d for GI bleed in cirrhotic.      Decompensated (presumably alcoholic) liver cirrhosis complicated by esophageal varices, ascites.  Continues active alcohol use with last drink over the weekend.  Follows with    Volume: Appears somewhat hypervolemic. On diuretics at Lasix 20mg every day  And Spironolactone 50mg Qday.    Infection: No hx of infections or SBP. Not on PPX as OP.   Bleeding: As above.    Encephalopathy: no hx of encephalopathy. Oriented x4 at this time   Screening/transplant: Not tx candidate. ongoing etoh use.   ---  - HOLD diuretics for now   - chem dep consult placed.   - Had recent abdominal ultrasound with dopplers as of 8/9/19 and labs have not significantly changes (no marked  elevation in bili or INR)   - Dx para in upcoming days   - Appreciate hepatology recs.         Diet: Snacks/Supplements Adult: Other; protein supplement, patient preference okay; Between Meals  Advance Diet as Tolerated: Regular Diet Adult; 2 gm NA Diet    Lines: PIV  DVT Prophylaxis: Mechanical given recent bleed   Chavez Catheter: not present  Code Status: Full Code      Disposition Plan   Expected discharge: 2 - 3 days, recommended to prior living arrangement once hemoglobin stable .  Entered: Nica Bartlett MD 08/26/2019, 1:49 PM     The patient's care to be staffed in AM by medicine service. Already staffed by MICU.     Nica Bartlett MD  [facility] 4 Service  [facility], [place]  Pager: [phone]  Please see sticky note for cross cover information  ______________________________________________________________________    Interval History   Transfer to medicine. Pt comfortable. Long discussion regarding how she wishes to receive chem dep services however she also states that resources are sparse in [place] where she lives. Significant other drinks as well and this is somewhat a deterrent for her as he states he would quit as well if she did.     Data reviewed today: I reviewed all medications, new labs and imaging results over the last 24 hours. I reviewed her EGD as above.     Physical Exam   Vital Signs: Temp: 98.1  F (36.7  C) Temp src: Oral BP: 129/84 Pulse: 103 Heart Rate: 105 Resp: 16 SpO2: 93 % O2 Device: Nasal cannula with humidification Oxygen Delivery: 5 LPM  Weight: 153 lbs 14.1 oz  Constitutional: awake  Respiratory: No increased work of breathing, good air exchange, clear to auscultation bilaterally, no crackles or wheezing  Cardiovascular: Normal apical impulse, regular rate and rhythm, normal S1 and S2, no S3 or S4, and no murmur noted  GI: normal bowel sounds, soft, distended, non-tender and ascites present based on fluids wave.   Skin: no bruising or bleeding  and no rashes  Neurologic: Awake, alert, oriented to name, place and time.  No asterixis notable on hand squeeze

## 2019-08-26 NOTE — PROGRESS NOTES
8/26/2019:    Social Work Services Progress Note    Hospital Day: 2  Date of Initial Social Work Evaluation:  TBD  Collaborated with:  Chart Review, patient at bedside, bedside RN, med maroon team, OT    Data:  Yenifer Maher is a 39 year old female patient admitted to [facility] 8/25/19 due to liver failure, anemia, and GI bleed.     Intervention:    Met with patient at bedside briefly to discuss CD assessment. She believed she had one last time she was here and would consider. She is interested in [facility]- the previous list she got did not include this and would like this information prior to discharge.     Per Chart Review- patient declined CD assessment on 8/13. Called CD , 7 people on list for CD consult. Possible, if consult entered, that it could be completed on Wednesday if patient agreeable.    Paged Med Maroon 4 team requesting CD Consult/CD assessment order to be entered with the hope that patient would be agreeable.     Per OT, TCU is currently recommended due to deconditioning. If patient is agreeable to discharge to TCU, currently considering, would prefer [facility] as she lives in [place], [place].    Previously discharged in January 2019 and December 2018 to [facility] TCU. History of other TCU referrals to: [facility], [facility], [facility], [facility], [facility], [facility], [facility], [facility].       Assessment:  Patient has floor orders- currently considering CD assessment. Current TCU recommendations, no referrals yet placed.    Plan:    Anticipated Disposition:  Facility:  TCU, current recommendation    Barriers to d/c plan:  Medical clearance, rehab assessments, patient agreement to discharge planning, discharge coordination/TCU referrals and bed availability    Follow Up:  SW following.      SOPHIA Gallahger, [facility]  ICU 4A & 4C   Ph: [phone]  Pager: [phone]

## 2019-08-26 NOTE — OR NURSING
Writer set up travel equipment for Upper Endoscopy in patients room on 4 C. Consent was obtained. After [place] precaution, Sedation was provided by staff nurses on 4C and they monitored the patient. Tolerated procedure well. No therapeutic intervention was provided.

## 2019-08-26 NOTE — PLAN OF CARE
ICU End of Shift Summary. See flowsheets for vital signs and detailed assessment.    Changes this shift: Remains AOx4, on RA. No BM during shift. Voided once 450 ml. NPO pending possible scope in AM. 2 units PRBC given - recheck this am 7.0. Mag 1.5 at 1800. Banana bag started. Recheck this AM 1.7. MICU aware, okay to hold off on mag replacement at this time. Pt high sensitive all over body, especially legs and feet. Stated that this was due to her neuropathy.  Lidocaine patches placed near IV sites for comfort. Ice pack given.     Plan:  Continue to follow POC, monitor for s/s of bleeding, recheck hgb, possible scope today

## 2019-08-26 NOTE — PLAN OF CARE
ICU End of Shift Summary. See flowsheets for vital signs and detailed assessment.    Changes this shift: Patient Alert & Oriented x4, RAAS 0. LS clear bilaterally. Sinus tachycardia,'s but asymptomatic .Abdomen is distended, small BM x1, tarry and maroon. Adequate urine output. Patient had an EGD done at bedside;Tolerated well. EGD was unremarkable. Gtts transitioned to PO meds (see MAR). Patient returned to regular diet with adequate PO intake. Standby assist to bedside commode/chair    Plan: Patient appropriate for transfer to floor. Potential therapeutic paracentesis for tomorrow. Chemical dependency consult     Problem: Hypertension Comorbidity  Goal: Blood Pressure in Desired Range  8/26/2019 1841 by Luis Alberto Mane, RN  Outcome: Improving     Problem: Gastrointestinal Condition Comorbidity  Goal: Gastrointestinal Condition  Description  Patient comorbidity will be monitored for signs and symptoms of Gastrointestinal condition.  Problems will be absent, minimized or managed by discharge/transition of care.  8/26/2019 1841 by Luis Alberto Mane, RN  Outcome: Improving

## 2019-08-26 NOTE — PLAN OF CARE
OT 4C: Evaluation complete and treatment initiated.  Discharge Planner OT   Patient plan for discharge: Pt hopeful to return to home but aware she will need to able to do stairs safely to return home  Current status: Pt is alert and appropriately conversational.  Min A for bed mobility, CGA for sit to stand (effortful and requiring use of BUE 2/2 weakness) and tolerates ambulating approx 35ft with CGA and UE support on IV, moves slowly, unsteady and c/o dizziness limiting activity.  VSS on RA  Barriers to return to prior living situation: weakness, impaired balance, deconditioning, stairs, pt home alone often  Recommendations for discharge: TCU  Rationale for recommendations: Pt is currently below baseline with regards to mobility and independence with self cares and will benefit from continued skilled therapy intervention to address deficits.         Entered by: Soumya Alfonso 08/26/2019 4:39 PM

## 2019-08-26 NOTE — PROGRESS NOTES
"   08/26/19 1600   Quick Adds   Type of Visit Initial Occupational Therapy Evaluation   Living Environment   Lives With significant other   Living Arrangements house   Home Accessibility stairs to enter home;stairs within home   Number of Stairs, Main Entrance 8   Stair Railings, Main Entrance railings on both sides of stairs   Number of Stairs, Within Home, Primary   (split level (8+ 8))   Transportation Anticipated car, drives self;family or friend will provide   Living Environment Comment Pt lives with her ida in a split level house, tub shower in bathroom, primary living areas on the upper level, laundry on the lower level.  pt ida works 2 jobs and therefore is only available to assist intermittently   Self-Care   Usual Activity Tolerance moderate   Current Activity Tolerance fair   Regular Exercise No   Equipment Currently Used at Home shower chair   Activity/Exercise/Self-Care Comment Pt reports she was independent with basic mobility and self cares, is able to navigate the stairs in her home but states she \"has to take them slow and use the railings\", pt requires assist to carry items up/down stairs, owns a 4WW but does not use, uses shower chair for bathing and SO often provides supervision for safety.   Pt reports she feels her gait has become more unsteady due to neuropathy but does not use AD   Functional Level   Ambulation 0-->independent   Transferring 0-->independent   Toileting 0-->independent   Bathing 1-->assistive equipment   Dressing 0-->independent   Eating 0-->independent   Cognition 0 - no cognition issues reported   Fall history within last six months no   Which of the above functional risks had a recent onset or change? ambulation;transferring;toileting;bathing   Prior Functional Level Comment Pt denies any recent falls       Present no   Language english   General Information   Onset of Illness/Injury or Date of Surgery - Date 08/25/19   Referring Physician Thais " Marina Sanchez MD   Patient/Family Goals Statement return to home   Additional Occupational Profile Info/Pertinent History of Current Problem Pt is a 38yo female with history of alcoholic cirrhosis (actively drinking), non-uremic calciphylaxis, and recent esophageal variceal bleed s/p banding 8/9/2019 who is admitted to the MICU after having hematemesis at home and being found to have a hemoglobin <5. EGD with GI on the morning after admission demonstrated post-banding ulcers with clean bases, no active bleeding. Appropriate Hgb response to transfusion, hemodynamically stable   Precautions/Limitations fall precautions   General Observations Pt is pleasant and agreeable; on RA   General Info Comments Activity: up with assist   Cognitive Status Examination   Orientation orientation to person, place and time   Level of Consciousness alert   Follows Commands (Cognition) WNL   Memory intact   Cognitive Comment pt is alert and oriented, generally appropriate in conversation; judgement appears somewhat impaired at times; however, may be more due to desire to return to home and therefore downplaying deficits   Visual Perception   Visual Perception Wears glasses   Visual Perception Comments no acute visual changes noted   Sensory Examination   Sensory Comments Pt with neuropathy in B feet and hands; reports onset in Jan 2019, gradually worsening and impacting balance   Pain Assessment   Patient Currently in Pain No   Range of Motion (ROM)   ROM Comment BUE WFL   Strength   Strength Comments BUE grossly 4/5;  Generalized weakness and deconditioning; effortful to complete sit to stand due to weakness   Bed Mobility Skill: Supine to Sit   Level of [place]: Supine/Sit minimum assist (75% patients effort)   Transfer Skill: Bed to Chair/Chair to Bed   Level of [place]: Bed to Chair contact guard   Assistive Device - Transfer Skill Bed to Chair Chair to Bed Rehab Eval   (UE support on IV pole)   Transfer  Skill: Sit to Stand   Level of Umpire: Sit/Stand contact guard   Tub/Shower Transfer   Tub/Shower Transfer Comments to be assessed   Balance   Balance Comments impaired; unsteady in standing/with mobility - benefits from 1 UE support on IV pole   Eating/Self Feeding   Level of Umpire: Eating independent   Instrumental Activities of Daily Living (IADL)   Previous Responsibilities meal prep;housekeeping;laundry;medication management   IADL Comments Pt reports SO able to assist with IADLs prn   Activities of Daily Living Analysis   Impairments Contributing to Impaired Activities of Daily Living balance impaired;sensation decreased;strength decreased   ADL Comments deconditioning   General Therapy Interventions   Planned Therapy Interventions ADL retraining;IADL retraining;bed mobility training;strengthening;transfer training   Clinical Impression   Criteria for Skilled Therapeutic Interventions Met yes, treatment indicated   OT Diagnosis decreased activity tolerance and independence with ADLs   Influenced by the following impairments weakness, deconditioning, multiple hospitalizations, impaired balance   Assessment of Occupational Performance 5 or more Performance Deficits   Identified Performance Deficits bed mobility, transfers, toileting, dressing, bathing, mobility, home management   Clinical Decision Making (Complexity) Low complexity   Therapy Frequency 5x/week   Predicted Duration of Therapy Intervention (days/wks) 9/1/19   Anticipated Discharge Disposition Transitional Care Facility   Risks and Benefits of Treatment have been explained. Yes   Patient, Family & other staff in agreement with plan of care Yes   Total Evaluation Time   Total Evaluation Time (Minutes) 5

## 2019-08-26 NOTE — PROGRESS NOTES
"CLINICAL NUTRITION SERVICES - ASSESSMENT NOTE     Nutrition Prescription    RECOMMENDATIONS FOR MDs/PROVIDERS TO ORDER:  Transition to PO Thiamin and Folate.    Malnutrition Status:    Non-severe malnutrition in the context of chronic illness.    Recommendations already ordered by Registered Dietitian (RD):  Oral supplement per pt preference (limited menu options given pt is a vegetarian).        REASON FOR ASSESSMENT  Yenifer Maher is a 39 year old female assessed by the dietitian for Admission Nutrition Risk Screen for reduced oral intake over the last month.    NUTRITION HISTORY  Pt known to our service d/t frequent hospitalizations for EtOH related liver failure. Pt typically follows a vegetarian diet. Pt drinks wine every night. Pt states she eats a lot of \"vinegary\" foods and high acid foods, wondering if this affected her having a bleed around the bands in her esophagus.     CURRENT NUTRITION ORDERS  Diet: Clear liquids--> adv to low sodium  Intake/Tolerance: diet just advanced from NPO    LABS  Labs reviewed    MEDICATIONS  Medications reviewed- receiving a \"banana bag\", should be transitioned to PO thiamin and folate    ANTHROPOMETRICS  Height:   Ht Readings from Last 2 Encounters:   08/09/19 1.702 m (5' 7\")   06/21/19 1.702 m (5' 7\")     Most Recent Weight: 69.8 kg (153 lb 14.1 oz)    IBW: 61.4 kg  BMI: Normal BMI  Weight History:   Wt Readings from Last 10 Encounters:   08/25/19 69.8 kg (153 lb 14.1 oz)   08/13/19 73.3 kg (161 lb 9.6 oz)   07/05/19 64.3 kg (141 lb 12.8 oz)   06/21/19 70.3 kg (155 lb)   06/04/19 72 kg (158 lb 12.8 oz)   01/26/19 63 kg (139 lb)   01/06/19 65.8 kg (145 lb)   12/12/18 61.8 kg (136 lb 3.9 oz)   11/20/18 61.2 kg (135 lb)     Weights vary greatly, likely r/t fluid.   Dosing Weight: 70 kg (adm wt)    ASSESSED NUTRITION NEEDS  Estimated Energy Needs: 4841-6547 kcals/day (25 - 30 kcals/kg)  Justification: Maintenance  Estimated Protein Needs:  g/day (1.2 - 1.5 " g/kg)  Justification: Repletion  Estimated Fluid Needs: Per provider pending fluid status    PHYSICAL FINDINGS  See malnutrition section below.  Cachexia  Ascites (significant)  Poor skin turgor     MALNUTRITION  % Intake: < 75% for >/= 1 month   % Weight Loss: difficult to tell given large fluctuation in weight trends  Subcutaneous Fat Loss: None observed  Muscle Loss: Thoracic region (clavicle, acromium bone, deltoid, trapezius, pectoral), Upper arm (bicep, tricep), Upper leg (quadricep, hamstring) and Posterior calf: moderate  Fluid Accumulation/Edema: Moderate- ascites is significant  Malnutrition Diagnosis: Non-severe malnutrition in the context of chronic illness.    NUTRITION DIAGNOSIS  Inadequate protein-energy intake related to EtOH abuse, early satiety, GI status inhibiting ability to take PO, frequent hospitalizations as evidenced by pt with fluctuating appetite and ability to consume food over the past >1 month, loss of lean body mass and significant abd ascites c/w non-severe malnutrition.     INTERVENTIONS  Implementation  Nutrition Education: See education flowsheet   Collaboration with other providers - MICU rounds  Medical food supplement therapy - MD entered generic PO supplement order while on rounds.    Goals  Pt to consume % of nutritionally adequate meal trays TID, or the equivalent with supplements/snacks.     Monitoring/Evaluation  Progress toward goals will be monitored and evaluated per protocol.    Lisa Alejandro RD, LD  ([facility] diet[facility], [phone]- [phone])

## 2019-08-26 NOTE — PROGRESS NOTES
[facility]      MICU Progress Note  Yenifer Maher MRN: [id number]  1980  Date of Admission:8/25/2019  Primary care provider: Flako Gaona      Assessment and Plan:     40yo female with history of alcoholic cirrhosis (actively drinking), non-uremic calciphylaxis, and recent esophageal variceal bleed s/p banding 8/9/2019 who is admitted to the MICU after having hematemesis at home and being found to have a hemoglobin <5. EGD with GI on the morning after admission demonstrated post-banding ulcers with clean bases, no active bleeding. Appropriate Hgb response to transfusion, hemodynamically stable.    Today:  - EGD with GI: 4 post-banding ulcers  - Discontinue octreotide gtt  - Transition protonix gtt to PO BID  - Restarted home diuretics -- lasix 20 mg and spironolactone 50 mg  - Restarted home nortriptyline and pregabalin  - Transfer to the floor     ===NEURO===  # Alcohol use disorder  Active drinking, continued since last admission. Typically drinks wine almost every night, last drink was wine yesterday evening. Her fiance also drinks and she thinks it will be difficult to abstain unless he is more supportive.  - Counseled in the ICU about the severity of her condition and that she needs to abstain completely from alcohol  - May consider chem dep consult prior to discharge vs as an outpatient    # Depression  Restarted her home nortiptyline and pregabalin today now that she is cleared for a diet.     Patient does not take lactulose and rifaximin at home and does not have a history of hepatic encephalopathy. Will consider initiating these therapies if concern for mental status, but she is alert and oriented at this time, able to recount detailed history.     ===CARDIOVASCULAR===  # Tachycardia  Per records, history of A-fib though currently with regular rhythm, EKG shows sinus tachycardia. She was tachycardic throughout last admission as well. It did trend down slightly as anemia was corrected from  150s to 100s, but remained mildly tachycardic on discharge. Suspect that the tachycardia currently is related to her anemia.     ===PULMONARY===  No acute issues.     ===GASTROINTESTINAL===  # Acute upper GI bleed  Recently admitted 8/9-8/14 for GIB 2/2 esophageal varices s/p banding. Presented to OSH after several episodes of hematemesis at home with bright red blood and clots. Hgb in ED 4s down from 7.8 at discharge on 8/14. Transferred to [facility] and received a total of 3 units PRBC with appropriate Hgb response (7.2 this AM). EGD with GI the morning after arrival demonstrated 4 post-banding ulcers and some residual blood in her stomach. No active bleeding at this time and hemodynamically stable.  - GI following, appreciate recs  - Discontinue octreotide gtt  - Discontinue protonix gtt and start BID PO dosing (will need 2-week course)   - Continue SBP ppx: transition from ceftri to cipro today  - Check Hgb daily and transfuse for <7     # Alcoholic hepatitis  LFTs downtrending from her last admission. Significant interval increase in ascites volume.  - Check diagnostic para  - Would likely benefit from therapeutic para     # Nutrition   - Advance diet as tolerated  - Low sodium diet with protein supplementation between meals and qHS     ===RENAL===  No acute issues.     ===HEME/ONC===  # Acute blood loss anemia  See GI     ===ENDOCRINE===  No acute issues.      ===INFECTIOUS DISEASE===  # Leukocytosis  Afebrile and without localizing signs of infection. Could be reactive in the setting of bleed; downtrending on the day after admission. She received antibiotics for possible SBP in the ED. Will try to obtain diagnostic para to evaluate further. No respiratory or urinary symptoms.  - Trend CBC     # SBP prophylaxis  - Transitioned from ceftriaxone to ciprofloxacin today (course 8/25-*)     ===SKIN/MSK===  No acute issues.        FEN: Low sodium diet, protein supplementation  Lines: PIVs  Prophylaxis:  DVT: PCDs   GI:  Protonix 40 mg BID  Disposition: Stable, transfer to the floor  Code Status: Full    Patient was seen and discussed with attending physician Dr. Perlman, who agrees with above assessment and plan.    Dominique Quintana MD  Internal Medicine, PGY-1         Interval History:   Received 2 units of PRBC on admission last night with Hgb response from 5.3 to 7. Remained hemodynamically stable without signs of acute bleed overnight. EGD early this morning showing 4 post-banding ulcers with clean bases, no active bleeding.         Physical Exam:   Vitals were reviewed.  Temp:  [98.6  F (37  C)-100.1  F (37.8  C)] 99.1  F (37.3  C)  Pulse:  [100-142] 100  Heart Rate:  [] 91  Resp:  [16-20] 16  BP: (120-144)/(66-88) 144/84  SpO2:  [96 %-100 %] 96 %    General: Sitting up in bed, not in any distress, communicative  Skin: Not jaundiced, no rash, no ecchymoses  HEENT: MMM, PERRLA, EOM intact  CV: RRR, normal S1S2, 2/6 systolic flow murmur  Resp: Clear to auscultation bilaterally, no wheezes or crackles  Abd: Markedly distended, tense ascites, nontender though she reports generalized discomfort with deep palpation  Extremities: Warm and well perfused, palpable pulses, no edema  Neuro: A&Ox3, no lateralizing symptoms or focal neurologic deficits         Data:   I/O last 3 completed shifts:  In: 445.34 [P.O.:60; I.V.:85.34]  Out: -     LABS:  CMP  Recent Labs   Lab 08/26/19 0107 08/25/19  1752    135   POTASSIUM 3.7 3.8   CHLORIDE 106 103   CO2 23 23   ANIONGAP 7 8   GLC 95 145*   BUN 14 10   CR 0.61 0.58   GFRESTIMATED >90 >90   GFRESTBLACK >90 >90   MARKO 7.3* 7.7*   MAG 1.7 1.5*   PHOS 2.5 2.9   PROTTOTAL 5.4* 5.7*   ALBUMIN 1.3* 1.3*   BILITOTAL 3.8* 4.1*   ALKPHOS 210* 236*   * 194*   ALT 25 26     CBC  Recent Labs   Lab 08/26/19 0107 08/25/19  1752   WBC 12.6* 15.1*   RBC 2.37* 1.81*   HGB 7.0* 5.3*   HCT 21.9* 17.3*   MCV 92 96   MCH 29.5 29.3   MCHC 32.0 30.6*   RDW 17.3* 18.5*    179     INR  Recent  Labs   Lab 08/25/19  3415   INR 1.66*       IMAGES:  Reviewed.

## 2019-08-26 NOTE — CONSULTS
GASTROENTEROLOGY CONSULTATION      Date of Admission:  8/25/2019          ASSESSMENT AND RECOMMENDATIONS:   Assessment:  Yenifer Maher is a 39 year old female with a past medical history of decompensated alcoholic cirrhosis complicated by ascites and esophageal varices with recent admission for variceal hemorrhage August 9, 2019 s/p banding with incomplete eradication, non-uremic calciphylaxis, and ongoing alcohol abuse who is presenting for evaluation of hematemesis.  She was found to have acute blood loss anemia with hemoglobin in the fours from sevens and tachycardia to 140s.  Her hemoglobin responded appropriately to transfusion and her heart rate decreased to the low 100s overnight with resuscitation.    1.  Hematemesis, acute blood loss anemia  In the setting of ongoing alcohol abuse with 2 to 3 glasses of wine per night and recent variceal hemorrhage s/p banding 8/9/19.  Differential diagnosis includes recurrent variceal hemorrhage, post banding ulceration, portal hypertensive gastropathy, peptic ulcer disease, etc.  She has been adequately resuscitated, started on octreotide, PPI, and ceftriaxone.  Will plan EGD today.    2. Decompensated Cirrhosis  Etiology: EtOH, actively drinking. MELD-Na: 18.   - Hepatic Encephalpathy: No history of.    - Esophageal Varices:        *Most recent endoscopy: 8/9/19 - large EoV s/p banding with incomplete eradication     - Ascites: History of.       *Diuretics - On furosemide 20 mg and spironolactone 50 mg daily at home.   - Coagulopathy: Present. INR 1.66.   - Hepatoma: No focal lesion on U/S 8/9/19   - Transplant Evaluation: Not currently a transplant candidate due to active EtOH.    Recommendations:   - Octreotide, PPI gtt   - Hold diuretics for now   - Ceftriaxone 1 gm daily x 7 days for GIB in cirrhotic   - EGD today, please see procedure note for post procedure recommendations   - Blood cultures, UA/UCx, diagnostic paracentesis   - U/S Abdomen with dopplers   -  Trend hemoglobin; goal hemoglobin 7-8, conservative transfusion strategy from GI perspective   - Gastroenterology team will continue to follow with you.    Gastroenterology follow up recommendations: Pending clinical course.    Thank you for involving us in this patient's care. Please do not hesitate to contact the GI service with any questions or concerns.     Pt care plan discussed with Dr. Leventhal, GI staff physician.    Judit Pineda MD, MD  Gastroenterology/Hepatology Fellow  PGY-6, [id number]  -------------------------------------------------------------------------------------------------------------------          Chief Complaint:   We were asked by Dr. Yeager of [facility] to evaluate this patient with hematemesis.    History is obtained from the patient and the medical record.          History of Present Illness:   Yenifer Maher is a 39 year old female with a past medical history of decompensated alcoholic cirrhosis complicated by ascites and esophageal varices status post recent variceal hemorrhage August 9, 2019 status post banding with incomplete eradication and ongoing alcohol abuse who is presenting for evaluation of hematemesis.      Patient reports feeling well after her most recent discharge until about 2 days ago when she developed abdominal cramping, nausea, and muscle cramping.  Around this time she also developed some diarrhea that appeared very dark, and had 1 episode of emesis that may have been bloody on Saturday night but she was not sure as this was preceded by drinking red Powerade.  She attributed her symptoms to the heat, but then yesterday morning she had 3 episodes of frankly bloody vomit and ongoing abdominal cramping.  She denies syncope.  She has felt shaky and diaphoretic and has had chills.  No fevers or chest pain.  She has had some shortness of breath.  Her last bowel movement and emesis was yesterday morning.    She does report that she was drinking 2 to 3 glasses of wine per day  after she discharged home.  She smokes 5 cigarettes/day.  She does not use recreational drugs.            Past Medical History:   Reviewed and edited as appropriate  Past Medical History:   Diagnosis Date     Alcohol abuse      Alcoholic cirrhosis (H)      Alcoholic peripheral neuropathy (H)      Coagulopathy (H)      Gastritis      History of Clostridium difficile colitis     unknown date     Impairment of cognitive function     MOCA 2/2019 22/30     Leukocytosis 02/2019    persistent leukocytosis across 2/2019 hospitalization without evidence of source across multiple diagnostics including LP, BCx, UCx      Lupus anticoagulant positive      Macrocytic anemia      Moderate protein-calorie malnutrition (H)      Paroxysmal A-fib (H) 02/2019    not on chronic anticoagulation     Peptic ulcer disease      Positive SMILEY (antinuclear antibody)      Subclinical hypothyroidism 02/2019    normal T3, T4     Tobacco dependence     0.5 PPD            Past Surgical History:   Reviewed and edited as appropriate   Past Surgical History:   Procedure Laterality Date     ESOPHAGOSCOPY, GASTROSCOPY, DUODENOSCOPY (EGD), COMBINED N/A 8/9/2019    Procedure: ESOPHAGOGASTRODUODENOSCOPY (EGD);  Surgeon: Sowmya Ibanez MD;  Location:  GI     UPPER GI ENDOSCOPY  8/9/2019                 Previous Endoscopy:   No results found for this or any previous visit.         Social History:   Reviewed and edited as appropriate  Social History     Socioeconomic History     Marital status: Single     Spouse name: Not on file     Number of children: Not on file     Years of education: Not on file     Highest education level: Not on file   Occupational History     Not on file   Social Needs     Financial resource strain: Not on file     Food insecurity:     Worry: Not on file     Inability: Not on file     Transportation needs:     Medical: Not on file     Non-medical: Not on file   Tobacco Use     Smoking status: Current Some Day Smoker      Types: Cigarettes     Smokeless tobacco: Never Used     Tobacco comment: 6-8 per day   Substance and Sexual Activity     Alcohol use: Yes     Alcohol/week: 1.2 oz     Types: 2 Glasses of wine per week     Frequency: 2-3 times a week     Drug use: Not on file     Sexual activity: Not on file   Lifestyle     Physical activity:     Days per week: Not on file     Minutes per session: Not on file     Stress: Not on file   Relationships     Social connections:     Talks on phone: Not on file     Gets together: Not on file     Attends Taoist service: Not on file     Active member of club or organization: Not on file     Attends meetings of clubs or organizations: Not on file     Relationship status: Not on file     Intimate partner violence:     Fear of current or ex partner: Not on file     Emotionally abused: Not on file     Physically abused: Not on file     Forced sexual activity: Not on file   Other Topics Concern     Parent/sibling w/ CABG, MI or angioplasty before 65F 55M? Not Asked   Social History Narrative     Not on file            Family History:   Reviewed and edited as appropriate  No family history on file.        Allergies:   Reviewed and edited as appropriate     Allergies   Allergen Reactions     Bengay Pain Relief [Menthol] Other (See Comments)     Skin turns red and feels extremely hot     Cats      Chocolate Dermatitis     Dicyclomine Other (See Comments)     Severe sedation     Dust Mites      Derm, resp     No Clinical Screening - See Comments      GI upset     Phenobarbital      Pollen Extract      Derm, resp.             Medications:     Medications Prior to Admission   Medication Sig Dispense Refill Last Dose     Ca Carbonate-Mag Hydroxide (ROLAIDS EXTRA STRENGTH PO) Take 1-2 tablets by mouth every 6 hours as needed (heartburn)        [] ciprofloxacin (CIPRO) 500 MG tablet Take 1 tablet (500 mg) by mouth every 12 hours for 2 days 5 tablet 0      estradiol (VIVELLE-DOT) 0.1 MG/24HR  "bi-weekly patch Place 1 patch onto the skin twice a week 15 patch 0 2019 at Unknown time     fexofenadine (ALLEGRA) 180 MG tablet Take 1 tablet (180 mg) by mouth daily as needed for allergies 30 tablet 0 More than a month at Unknown time     folic acid (FOLVITE) 1 MG tablet Take 1 tablet (1 mg) by mouth daily 30 tablet 0 Past Week at Unknown time     furosemide (LASIX) 20 MG tablet Take 1 tablet (20 mg) by mouth daily 90 tablet 3 Past Week at Unknown time     magnesium oxide (MAG-OX) 400 (240 Mg) MG tablet Take 1 tablet (400 mg) by mouth daily 30 tablet 0 Past Week at Unknown time     nortriptyline (PAMELOR) 10 MG capsule Take 1 capsule (10 mg) by mouth At Bedtime 30 capsule 0 Past Week at Unknown time     omeprazole (PRILOSEC) 40 MG DR capsule Take 1 capsule (40 mg) by mouth 2 times daily (before meals) 112 capsule 0      pregabalin (LYRICA) 50 MG capsule Take 1 capsule (50 mg) by mouth 3 times daily 30 capsule 0 2019 at Unknown time     Prenatal Vit-Fe Fumarate-FA (PRENATAL MULTIVITAMIN W/IRON) 27-0.8 MG tablet Take 1 tablet by mouth daily 30 tablet 0 Past Week at Unknown time     [] simethicone (MYLICON) 80 MG chewable tablet Take 1 tablet (80 mg) by mouth every 6 hours as needed for cramping 30 tablet 0      sodium-potassium bicarbonate (FRIDA-SELTZER GOLD) TBEF solu-tab Take 1-2 tablets by mouth 2 times daily as needed for heartburn        spironolactone (ALDACTONE) 50 MG tablet Take 1 tablet (50 mg) by mouth daily 90 tablet 3      vitamin (B COMPLEX-C) tablet Take 1 tablet by mouth daily 30 tablet 0 Past Week at Unknown time     vitamin B1 (THIAMINE) 100 MG tablet Take 1 tablet (100 mg) by mouth daily 30 tablet 0 Past Week at Unknown time             Review of Systems:     A complete 10-point review of systems was performed and is negative except as noted in the HPI           Physical Exam:   /84   Pulse 103   Temp 98.1  F (36.7  C) (Oral)   Resp 16   Ht 1.702 m (5' 7.01\")   Wt 69.8 " kg (153 lb 14.1 oz)   SpO2 93%   BMI 24.10 kg/m    Wt:   Wt Readings from Last 2 Encounters:   08/25/19 69.8 kg (153 lb 14.1 oz)   08/13/19 73.3 kg (161 lb 9.6 oz)      Constitutional: cooperative, pleasant, not dyspneic/diaphoretic, no acute distress  Eyes: Sclera icteric  Ears/nose/mouth/throat: Normal oropharynx without ulcers or exudate, mucus membranes moist, hearing intact  Neck: supple, thyroid normal size  CV: No edema  Respiratory: Unlabored breathing  Lymph: No axillary, submandibular, supraclavicular lymphadenopathy  Abd: Nondistended, +bs, no hepatosplenomegaly, nontender, no peritoneal signs  Skin:  No rash, + jaundice  Neuro: AAO x 3, No asterixis  Psych: Anxiousaffect  MSK: BALAJI         Data:   Labs and imaging below were independently reviewed and interpreted    BMP  Recent Labs   Lab 08/26/19 0107 08/25/19  1752    135   POTASSIUM 3.7 3.8   CHLORIDE 106 103   MARKO 7.3* 7.7*   CO2 23 23   BUN 14 10   CR 0.61 0.58   GLC 95 145*     CBC  Recent Labs   Lab 08/26/19  0759 08/26/19  0107 08/25/19  1752   WBC  --  12.6* 15.1*   RBC  --  2.37* 1.81*   HGB 7.2* 7.0* 5.3*   HCT  --  21.9* 17.3*   MCV  --  92 96   MCH  --  29.5 29.3   MCHC  --  32.0 30.6*   RDW  --  17.3* 18.5*   PLT  --  150 179     INR  Recent Labs   Lab 08/25/19  1752   INR 1.66*     LFTs  Recent Labs   Lab 08/26/19  0107 08/25/19  1752   ALKPHOS 210* 236*   * 194*   ALT 25 26   BILITOTAL 3.8* 4.1*   PROTTOTAL 5.4* 5.7*   ALBUMIN 1.3* 1.3*      PANCNo lab results found in last 7 days.    Imaging: Reviewed.

## 2019-08-27 ENCOUNTER — APPOINTMENT (OUTPATIENT)
Dept: OCCUPATIONAL THERAPY | Facility: CLINIC | Age: 39
DRG: 369 | End: 2019-08-27
Attending: INTERNAL MEDICINE
Payer: COMMERCIAL

## 2019-08-27 ENCOUNTER — APPOINTMENT (OUTPATIENT)
Dept: PHYSICAL THERAPY | Facility: CLINIC | Age: 39
DRG: 369 | End: 2019-08-27
Attending: INTERNAL MEDICINE
Payer: COMMERCIAL

## 2019-08-27 LAB
ALBUMIN FLD-MCNC: 0.2 G/DL
ALBUMIN SERPL-MCNC: 1.3 G/DL (ref 3.4–5)
ALP SERPL-CCNC: 204 U/L (ref 40–150)
ALT SERPL W P-5'-P-CCNC: 29 U/L (ref 0–50)
ANION GAP SERPL CALCULATED.3IONS-SCNC: 11 MMOL/L (ref 3–14)
APPEARANCE FLD: CLEAR
AST SERPL W P-5'-P-CCNC: 266 U/L (ref 0–45)
BILIRUB SERPL-MCNC: 3.2 MG/DL (ref 0.2–1.3)
BUN SERPL-MCNC: 10 MG/DL (ref 7–30)
CALCIUM SERPL-MCNC: 7.1 MG/DL (ref 8.5–10.1)
CHLORIDE SERPL-SCNC: 104 MMOL/L (ref 94–109)
CO2 SERPL-SCNC: 20 MMOL/L (ref 20–32)
COLOR FLD: YELLOW
CREAT SERPL-MCNC: 0.65 MG/DL (ref 0.52–1.04)
ERYTHROCYTE [DISTWIDTH] IN BLOOD BY AUTOMATED COUNT: 17.6 % (ref 10–15)
GFR SERPL CREATININE-BSD FRML MDRD: >90 ML/MIN/{1.73_M2}
GLUCOSE SERPL-MCNC: 119 MG/DL (ref 70–99)
GRAM STN SPEC: NORMAL
GRAM STN SPEC: NORMAL
HCT VFR BLD AUTO: 22.4 % (ref 35–47)
HGB BLD-MCNC: 7 G/DL (ref 11.7–15.7)
HGB BLD-MCNC: 7.9 G/DL (ref 11.7–15.7)
INR PPP: 1.4 (ref 0.86–1.14)
LYMPHOCYTES NFR FLD MANUAL: 12 %
MCH RBC QN AUTO: 29.2 PG (ref 26.5–33)
MCHC RBC AUTO-ENTMCNC: 31.3 G/DL (ref 31.5–36.5)
MCV RBC AUTO: 93 FL (ref 78–100)
NEUTS BAND NFR FLD MANUAL: 26 %
OTHER CELLS FLD MANUAL: 62 %
PLATELET # BLD AUTO: 172 10E9/L (ref 150–450)
POTASSIUM SERPL-SCNC: 3.2 MMOL/L (ref 3.4–5.3)
PROT FLD-MCNC: 0.6 G/DL
PROT SERPL-MCNC: 5.6 G/DL (ref 6.8–8.8)
RBC # BLD AUTO: 2.4 10E12/L (ref 3.8–5.2)
SODIUM SERPL-SCNC: 135 MMOL/L (ref 133–144)
SPECIMEN SOURCE FLD: NORMAL
SPECIMEN SOURCE: NORMAL
WBC # BLD AUTO: 10 10E9/L (ref 4–11)
WBC # FLD AUTO: 56 /UL

## 2019-08-27 PROCEDURE — 25000132 ZZH RX MED GY IP 250 OP 250 PS 637: Performed by: STUDENT IN AN ORGANIZED HEALTH CARE EDUCATION/TRAINING PROGRAM

## 2019-08-27 PROCEDURE — 25000128 H RX IP 250 OP 636: Performed by: STUDENT IN AN ORGANIZED HEALTH CARE EDUCATION/TRAINING PROGRAM

## 2019-08-27 PROCEDURE — 25000125 ZZHC RX 250

## 2019-08-27 PROCEDURE — 97161 PT EVAL LOW COMPLEX 20 MIN: CPT | Mod: GP

## 2019-08-27 PROCEDURE — 97110 THERAPEUTIC EXERCISES: CPT | Mod: GO | Performed by: OCCUPATIONAL THERAPIST

## 2019-08-27 PROCEDURE — 82042 OTHER SOURCE ALBUMIN QUAN EA: CPT | Performed by: STUDENT IN AN ORGANIZED HEALTH CARE EDUCATION/TRAINING PROGRAM

## 2019-08-27 PROCEDURE — 89051 BODY FLUID CELL COUNT: CPT | Performed by: STUDENT IN AN ORGANIZED HEALTH CARE EDUCATION/TRAINING PROGRAM

## 2019-08-27 PROCEDURE — 85018 HEMOGLOBIN: CPT | Performed by: INTERNAL MEDICINE

## 2019-08-27 PROCEDURE — 49083 ABD PARACENTESIS W/IMAGING: CPT | Mod: GC | Performed by: PEDIATRICS

## 2019-08-27 PROCEDURE — 97116 GAIT TRAINING THERAPY: CPT | Mod: GP

## 2019-08-27 PROCEDURE — 85027 COMPLETE CBC AUTOMATED: CPT | Performed by: STUDENT IN AN ORGANIZED HEALTH CARE EDUCATION/TRAINING PROGRAM

## 2019-08-27 PROCEDURE — 84157 ASSAY OF PROTEIN OTHER: CPT | Performed by: STUDENT IN AN ORGANIZED HEALTH CARE EDUCATION/TRAINING PROGRAM

## 2019-08-27 PROCEDURE — 36415 COLL VENOUS BLD VENIPUNCTURE: CPT | Performed by: INTERNAL MEDICINE

## 2019-08-27 PROCEDURE — 97530 THERAPEUTIC ACTIVITIES: CPT | Mod: GP

## 2019-08-27 PROCEDURE — 97162 PT EVAL MOD COMPLEX 30 MIN: CPT | Mod: GP

## 2019-08-27 PROCEDURE — 40000556 ZZH STATISTIC PERIPHERAL IV START W US GUIDANCE

## 2019-08-27 PROCEDURE — 99233 SBSQ HOSP IP/OBS HIGH 50: CPT | Mod: GC | Performed by: INTERNAL MEDICINE

## 2019-08-27 PROCEDURE — 87205 SMEAR GRAM STAIN: CPT | Performed by: STUDENT IN AN ORGANIZED HEALTH CARE EDUCATION/TRAINING PROGRAM

## 2019-08-27 PROCEDURE — 80053 COMPREHEN METABOLIC PANEL: CPT | Performed by: STUDENT IN AN ORGANIZED HEALTH CARE EDUCATION/TRAINING PROGRAM

## 2019-08-27 PROCEDURE — 0W9G3ZZ DRAINAGE OF PERITONEAL CAVITY, PERCUTANEOUS APPROACH: ICD-10-PCS | Performed by: PEDIATRICS

## 2019-08-27 PROCEDURE — 97535 SELF CARE MNGMENT TRAINING: CPT | Mod: GO | Performed by: OCCUPATIONAL THERAPIST

## 2019-08-27 PROCEDURE — 85610 PROTHROMBIN TIME: CPT | Performed by: STUDENT IN AN ORGANIZED HEALTH CARE EDUCATION/TRAINING PROGRAM

## 2019-08-27 PROCEDURE — 87070 CULTURE OTHR SPECIMN AEROBIC: CPT | Performed by: STUDENT IN AN ORGANIZED HEALTH CARE EDUCATION/TRAINING PROGRAM

## 2019-08-27 PROCEDURE — 36415 COLL VENOUS BLD VENIPUNCTURE: CPT | Performed by: STUDENT IN AN ORGANIZED HEALTH CARE EDUCATION/TRAINING PROGRAM

## 2019-08-27 PROCEDURE — 12000001 ZZH R&B MED SURG/OB UMMC

## 2019-08-27 RX ORDER — CALCIUM CARBONATE 500 MG/1
1000 TABLET, CHEWABLE ORAL 3 TIMES DAILY PRN
Status: DISCONTINUED | OUTPATIENT
Start: 2019-08-27 | End: 2019-08-29 | Stop reason: HOSPADM

## 2019-08-27 RX ORDER — LIDOCAINE HYDROCHLORIDE 10 MG/ML
INJECTION, SOLUTION EPIDURAL; INFILTRATION; INTRACAUDAL; PERINEURAL
Status: COMPLETED
Start: 2019-08-27 | End: 2019-08-27

## 2019-08-27 RX ORDER — ESTRADIOL 0.1 MG/D
1 FILM, EXTENDED RELEASE TRANSDERMAL
Status: DISCONTINUED | OUTPATIENT
Start: 2019-08-27 | End: 2019-08-29 | Stop reason: HOSPADM

## 2019-08-27 RX ORDER — POTASSIUM CHLORIDE 1500 MG/1
60 TABLET, EXTENDED RELEASE ORAL ONCE
Status: COMPLETED | OUTPATIENT
Start: 2019-08-27 | End: 2019-08-27

## 2019-08-27 RX ORDER — SIMETHICONE 80 MG
80 TABLET,CHEWABLE ORAL EVERY 6 HOURS PRN
Status: DISCONTINUED | OUTPATIENT
Start: 2019-08-27 | End: 2019-08-29 | Stop reason: HOSPADM

## 2019-08-27 RX ADMIN — PANTOPRAZOLE SODIUM 40 MG: 40 TABLET, DELAYED RELEASE ORAL at 20:51

## 2019-08-27 RX ADMIN — PREGABALIN 50 MG: 25 CAPSULE ORAL at 08:18

## 2019-08-27 RX ADMIN — FUROSEMIDE 20 MG: 20 TABLET ORAL at 08:18

## 2019-08-27 RX ADMIN — PREGABALIN 50 MG: 25 CAPSULE ORAL at 20:51

## 2019-08-27 RX ADMIN — PANTOPRAZOLE SODIUM 40 MG: 40 TABLET, DELAYED RELEASE ORAL at 08:18

## 2019-08-27 RX ADMIN — POTASSIUM CHLORIDE 40 MEQ: 20 TABLET, EXTENDED RELEASE ORAL at 08:17

## 2019-08-27 RX ADMIN — Medication 100 MG: at 08:18

## 2019-08-27 RX ADMIN — CEFTRIAXONE 1 G: 1 INJECTION, POWDER, FOR SOLUTION INTRAMUSCULAR; INTRAVENOUS at 08:24

## 2019-08-27 RX ADMIN — LIDOCAINE HYDROCHLORIDE 10 MG: 10 INJECTION, SOLUTION EPIDURAL; INFILTRATION; INTRACAUDAL; PERINEURAL at 10:53

## 2019-08-27 RX ADMIN — PREGABALIN 50 MG: 25 CAPSULE ORAL at 13:36

## 2019-08-27 RX ADMIN — MULTIPLE VITAMINS W/ MINERALS TAB 1 TABLET: TAB at 08:18

## 2019-08-27 RX ADMIN — ONDANSETRON 4 MG: 2 INJECTION INTRAMUSCULAR; INTRAVENOUS at 09:35

## 2019-08-27 RX ADMIN — NORTRIPTYLINE HYDROCHLORIDE 10 MG: 10 CAPSULE ORAL at 22:07

## 2019-08-27 RX ADMIN — FOLIC ACID 1 MG: 1 TABLET ORAL at 08:18

## 2019-08-27 RX ADMIN — SPIRONOLACTONE 50 MG: 50 TABLET ORAL at 08:18

## 2019-08-27 RX ADMIN — ESTRADIOL 1 PATCH: 0.1 PATCH TRANSDERMAL at 16:36

## 2019-08-27 ASSESSMENT — PAIN DESCRIPTION - DESCRIPTORS
DESCRIPTORS: ACHING;CONSTANT
DESCRIPTORS: ACHING;SORE

## 2019-08-27 ASSESSMENT — ACTIVITIES OF DAILY LIVING (ADL)
ADLS_ACUITY_SCORE: 15

## 2019-08-27 ASSESSMENT — MIFFLIN-ST. JEOR: SCORE: 1373.65

## 2019-08-27 NOTE — PROGRESS NOTES
8/27/2019    CD consult acknowledged. CD team will attempt to meet with pt on Wed 08/28.    Erum Zhao, [facility]  [phone]

## 2019-08-27 NOTE — PLAN OF CARE
Pt remains a/o x4, pleasant and cooperative. SBA, gait increasingly stable. Up to commode x3, urinary urgency observed. 1BM, maroon in color. VSS this shift. Sating high 90's on RA. Good appetite. Hgb stable. Cooperative with hygiene cares. Pt with floor orders, to transfer to 5AB upon room availability.    Continue to monitor, notify team with any changes in status.

## 2019-08-27 NOTE — PLAN OF CARE
"OT 4C: Discharge Planner OT   Patient plan for discharge: Pt is declining recommendations for TCU vs OP PT vs home PT; reports she \"thinks she will be fine at home\"   Current status: Pt is alert and conversationally appropriate.  SBA for bed mobility and toileting at bedside using commode; however, declines/is unable to tolerate additional activity 2/2 reported nausea.  Discussed with pt the need to be able to tolerate ambulating household distances and navigating stairs to safely return to home; however, pt states she thinks it will be fine but continues to decline activity at time of session.  Barriers to return to prior living situation: deconditioning, weakness, impaired balance  Recommendations for discharge: CD treatment vs TCU;  If pt is able to demonstrate safe independent ambulation of household distances and stair completion; may be able to discharge to home with home PT  Rationale for recommendations: see above         Entered by: Soumya Alfonso 08/27/2019 2:04 PM       "

## 2019-08-27 NOTE — PROGRESS NOTES
"[facility]  08/27/19    Hepatology Follow-up    CC: Hematemesis    Dx: Post-Varices Banding Ulcer Bleeding   Decompensated EtOH Cirrhosis   Alcohol Abuse    24 hour events: Nil     Subjective:   - One dark bowel movement overnight   - No emesis   - No fevers, chills    Medications  Current Facility-Administered Medications   Medication Dose Route Frequency     cefTRIAXone  1 g Intravenous Q24H     folic acid  1 mg Oral Daily     furosemide  20 mg Oral Daily     lidocaine  1 patch Transdermal Q24h    And     lidocaine   Transdermal Q24H    And     lidocaine   Transdermal Q8H     multivitamin w/minerals  1 tablet Oral Daily     nortriptyline  10 mg Oral At Bedtime     pantoprazole  40 mg Oral BID     pregabalin  50 mg Oral TID     spironolactone  50 mg Oral Daily     vitamin B1  100 mg Oral Daily       Review of systems  A 10-point review of systems was negative.    Examination  /77 (BP Location: Right arm)   Pulse 110   Temp 98.1  F (36.7  C) (Oral)   Resp 20   Ht 1.702 m (5' 7.01\")   Wt 69.8 kg (153 lb 14.1 oz)   SpO2 97%   BMI 24.10 kg/m      Intake/Output Summary (Last 24 hours) at 8/27/2019 0945  Last data filed at 8/26/2019 2000  Gross per 24 hour   Intake 1220 ml   Output 900 ml   Net 320 ml       Gen- NAD, jaundiced  CVS- Tachycardic  RS- No increased WOB  Abd- Distended  Neuro- AOx3  Skin- no rash  Psych- normal mood    Laboratory  Lab Results   Component Value Date     08/27/2019    POTASSIUM 3.2 08/27/2019    CHLORIDE 104 08/27/2019    CO2 20 08/27/2019    BUN 10 08/27/2019    CR 0.65 08/27/2019       Lab Results   Component Value Date    BILITOTAL 3.2 08/27/2019    ALT 29 08/27/2019     08/27/2019    ALKPHOS 204 08/27/2019       Lab Results   Component Value Date    WBC 10.0 08/27/2019    HGB 7.0 08/27/2019    MCV 93 08/27/2019     08/27/2019       Lab Results   Component Value Date    INR 1.40 08/27/2019     MELD-Na score: 17 at 8/27/2019  3:56 " AM  MELD score: 15 at 8/27/2019  3:56 AM  Calculated from:  Serum Creatinine: 0.65 mg/dL (Rounded to 1 mg/dL) at 8/27/2019  3:56 AM  Serum Sodium: 135 mmol/L at 8/27/2019  3:56 AM  Total Bilirubin: 3.2 mg/dL at 8/27/2019  3:56 AM  INR(ratio): 1.40 at 8/27/2019  3:56 AM  Age: 39 years    Radiology: Reviewed.     Assessment  39 year old female with h/o decompensated EtOH cirrhosis c/b ascites and EoV (bleed 8/9/19 s/p banding), non-uremic calciphylaxis, and ongoing alcohol abuse admitted with hematemesis, found to have post variceal banding ulceration on EGD 8/26/19 without recurrent bleeding.     Recommendations   - Low sodium diet with high protein supplement between meals and before bed   - Daily PPI x 2 weeks   - Repeat EGD for surveillance in 4 weeks (we will arrange); clinic follow-up with Dr. Hernández as previously scheduled (10/8/19)   - STRICT alcohol abstinence/cessation counseling     GI will sign off. Please call with questions.     Staffed with Dr. Leventhal, hepatology attending.     Judit Pineda MD  Hepatology  #[phone]

## 2019-08-27 NOTE — PROCEDURES
Consult and Procedure Service - Procedure Note    Attending: Santino Estes  Resident: Alison Lerman  Procedure: Diagnostic and therapeutic paracentesis  Indication: Ascites  Risk Assessment: Low risk, platelets 172, INR 1.4, no evidence of SAPPHIRE, stable BPs   Pre-procedure diagnosis: Ascites  Post-procedure diagnosis: Ascites    The risks and benefits of the procedure were explained to the patient who expressed understanding and opted to proceed.  Consent was obtained and placed in the chart.  A time out was performed.  An area of ascites was located and marked using ultrasound guidance in the left lower quadrant; the area was prepped and draped in the usual sterile fashion.  9 ml of 1% lidocaine was instilled and ascites located.  The initial catheter became bunched over the needle, and so a second site was located, re-anesthetized with an additional 4ml of 1% lidocaine was used to anesthetize the second site. The paracentesis catheter and needle were inserted under real-time guidance until ascites obtained then the needle removed and the catheter advanced.  The apparatus was connected to vacuum bottles and a total of 3.1 L of clear straw colored fluid removed.   A specimen was sent for analysis. The catheter was withdrawn and the area dressed.  Patient tolerated the procedure well with no immediate complications.  Please contact the Consult and Procedure Service if any complications or concerns arise.     Alison M. Lerman, MD  Internal Medicine/Pediatrics, PGY-4  DOS:  8/27/2019    I was present throughout.    Norberto Estes MD  Med-Peds Hospitalist, pager [phone]

## 2019-08-27 NOTE — PROGRESS NOTES
[facility], [place]    Progress Note - Sundar 4 Service        Date of Admission:  8/25/2019    Changes today   -Appreciate GI recs and procedure team assistance; SBP labs negative. For follow up has appt 10/8/19 w/ .   - Chem dep consult in place (to see Wed)   - Add gus seltzer, simethicone and tums for abdominal bloating, distention and overall nausea sensation.      Dispo: PT recommending TCU for dispo based on last note. Will discuss with patient and likley discharge in upcoming 1-2 days if placement appropriate.     Assessment & Plan   Yenifer Maher is a 39 year old female admitted on 8/25/2019. She has a past medical history of lupus anticoagulant, alcohol abuse, calciphylaxis, alcoholic liver cirrhosis with history of alcoholic hepatitis in August 2018.  She presents for hematemesis initially to the MICU, now transferred to the medicine service.  She is hemodynamically stable, and most recent hemoglobin is 7.2.  She underwent upper endoscopy as of 8/26/2019 which showed recently bleeding, large greater than 5 mm esophageal varices, small hiatal hernia and portal hypertensive gastropathy.    Hematemesis  acute blood loss anemia due to stigmata of liver disease, EV and recent banding   Initially in the MICU for hematemesis with presenting Hgb at 5.3 (received a total of 2u pRBC) , given octreotide GTT as well as pantoprazole drip.  Underwent upper endoscopy showing recently bleeding large esophageal varices, ulcer secondary to banding.   -Twice daily pantoprazole for 2 weeks from 8/26/2019 to 9/2/2019.  Then once daily pantoprazole.  -Repeat upper endoscopy around 9/26/2019.    -CTX 1gm x7d for GI bleed in cirrhotic.  Can consider switch to cipro on discharge to complete course.       Decompensated (presumably alcoholic) liver cirrhosis complicated by esophageal varices, ascites.   Continues active alcohol use with last drink over the weekend.  Follows with     Volume: Appears somewhat hypervolemic. On diuretics at Lasix 20mg every day and Spironolactone 50mg Qday.    Infection: No hx of infections or SBP. Not on PPX as OP.   Bleeding: As above.    Encephalopathy: no hx of encephalopathy. Oriented x4 at this time   Screening/transplant: Not tx candidate. ongoing etoh use.   ---  - Dx and therpueitc para today; much appreciate CAPS consult   - On PTA meds of lasix and spironolactone   - Appreciate Hepatology recs     Diet: Snacks/Supplements Adult: Other; protein supplement, patient preference okay; Between Meals  Advance Diet as Tolerated: Regular Diet Adult; 2 gm NA Diet    Fluids: None   Lines: PIV, large bore   DVT Prophylaxis: Pneumatic Compression Devices  Chavez Catheter: not present  Code Status: Full Code      Disposition Plan   Expected discharge: 2 - 3 days, recommended to tcu versus home pending ongoing PT recs  once Hgb stable, Liver disease evaluated and chem dep decision cm .  Entered: Nica Bartlett MD 08/27/2019, 6:35 AM       The patient's care was discussed with the Attending Physician, Dr. Pressley, Bedside Nurse and Patient.    MD Sundar [facility] 4 Service  [facility], [place]  Pager: [phone]  Please see sticky note for cross cover information  ______________________________________________________________________    Interval History   NAEO. Pt slept. 1 maroon colored stool overnight. Some urinary urgency per RN note.     Data reviewed today: I reviewed all medications, new labs and imaging results over the last 24 hours.No imaging.     Physical Exam   Vital Signs: Temp: 98.1  F (36.7  C) Temp src: Oral BP: 121/79 Pulse: 110 Heart Rate: 110 Resp: 18 SpO2: 99 % O2 Device: None (Room air) Oxygen Delivery: 2 LPM  Weight: 153 lbs 14.1 oz  General Appearance: Chronically ill appearing female resting in bed   Respiratory: CTA,   Cardiovascular: RRR, no mrg   GI: soft, non distended, para site not leaking. Non  tender  Skin: warm and well perfused. No edema in LE        Internal Medicine Staff Addendum  Date of Service: 8/27/2019  I have seen and examined Ms Maher, reviewed the data and discussed the plan of care with the care team on rounds.  I agree with the above documentation with the additions/changes to the ROS, HPI, Exam or data (including my edits in italics):    I discussed pt's care with bedside RN, case management/social work today.  I personally reviewed, labs, medications and past 24 hr notes.  Assessment/Plan/Diagnoses: plan/dx as above, which contains my edits and reflects our joint medical decision-making.     Manohar Pressley MD PhD  Internal Medicine Hospitalist & Staff Physician   of Internal Medicine   [facility]  Pager: [phone]

## 2019-08-28 ENCOUNTER — APPOINTMENT (OUTPATIENT)
Dept: PHYSICAL THERAPY | Facility: CLINIC | Age: 39
DRG: 369 | End: 2019-08-28
Attending: INTERNAL MEDICINE
Payer: COMMERCIAL

## 2019-08-28 DIAGNOSIS — I85.10 SECONDARY ESOPHAGEAL VARICES WITHOUT BLEEDING (H): Primary | ICD-10-CM

## 2019-08-28 LAB
ANION GAP SERPL CALCULATED.3IONS-SCNC: 8 MMOL/L (ref 3–14)
BUN SERPL-MCNC: 6 MG/DL (ref 7–30)
CALCIUM SERPL-MCNC: 7.4 MG/DL (ref 8.5–10.1)
CHLORIDE SERPL-SCNC: 104 MMOL/L (ref 94–109)
CO2 SERPL-SCNC: 23 MMOL/L (ref 20–32)
CREAT SERPL-MCNC: 0.65 MG/DL (ref 0.52–1.04)
ERYTHROCYTE [DISTWIDTH] IN BLOOD BY AUTOMATED COUNT: 17.8 % (ref 10–15)
GFR SERPL CREATININE-BSD FRML MDRD: >90 ML/MIN/{1.73_M2}
GLUCOSE SERPL-MCNC: 93 MG/DL (ref 70–99)
HCT VFR BLD AUTO: 22.2 % (ref 35–47)
HGB BLD-MCNC: 7 G/DL (ref 11.7–15.7)
MCH RBC QN AUTO: 29.7 PG (ref 26.5–33)
MCHC RBC AUTO-ENTMCNC: 31.5 G/DL (ref 31.5–36.5)
MCV RBC AUTO: 94 FL (ref 78–100)
PLATELET # BLD AUTO: 181 10E9/L (ref 150–450)
POTASSIUM SERPL-SCNC: 3.4 MMOL/L (ref 3.4–5.3)
RBC # BLD AUTO: 2.36 10E12/L (ref 3.8–5.2)
SODIUM SERPL-SCNC: 135 MMOL/L (ref 133–144)
WBC # BLD AUTO: 8.3 10E9/L (ref 4–11)

## 2019-08-28 PROCEDURE — 97116 GAIT TRAINING THERAPY: CPT | Mod: GP | Performed by: PHYSICAL THERAPIST

## 2019-08-28 PROCEDURE — 87040 BLOOD CULTURE FOR BACTERIA: CPT | Performed by: STUDENT IN AN ORGANIZED HEALTH CARE EDUCATION/TRAINING PROGRAM

## 2019-08-28 PROCEDURE — 25000128 H RX IP 250 OP 636: Performed by: STUDENT IN AN ORGANIZED HEALTH CARE EDUCATION/TRAINING PROGRAM

## 2019-08-28 PROCEDURE — 99232 SBSQ HOSP IP/OBS MODERATE 35: CPT | Mod: GC | Performed by: INTERNAL MEDICINE

## 2019-08-28 PROCEDURE — 80048 BASIC METABOLIC PNL TOTAL CA: CPT | Performed by: INTERNAL MEDICINE

## 2019-08-28 PROCEDURE — 25000132 ZZH RX MED GY IP 250 OP 250 PS 637: Performed by: STUDENT IN AN ORGANIZED HEALTH CARE EDUCATION/TRAINING PROGRAM

## 2019-08-28 PROCEDURE — 36415 COLL VENOUS BLD VENIPUNCTURE: CPT | Performed by: INTERNAL MEDICINE

## 2019-08-28 PROCEDURE — 97530 THERAPEUTIC ACTIVITIES: CPT | Mod: GP | Performed by: PHYSICAL THERAPIST

## 2019-08-28 PROCEDURE — 85027 COMPLETE CBC AUTOMATED: CPT | Performed by: INTERNAL MEDICINE

## 2019-08-28 PROCEDURE — 12000001 ZZH R&B MED SURG/OB UMMC

## 2019-08-28 PROCEDURE — 36415 COLL VENOUS BLD VENIPUNCTURE: CPT | Performed by: STUDENT IN AN ORGANIZED HEALTH CARE EDUCATION/TRAINING PROGRAM

## 2019-08-28 RX ORDER — LORAZEPAM 1 MG/1
1-2 TABLET ORAL EVERY 30 MIN PRN
Status: DISCONTINUED | OUTPATIENT
Start: 2019-08-28 | End: 2019-08-29 | Stop reason: HOSPADM

## 2019-08-28 RX ORDER — OMEPRAZOLE 40 MG/1
CAPSULE, DELAYED RELEASE ORAL
Qty: 112 CAPSULE | Refills: 0
Start: 2019-08-28 | End: 2019-08-29

## 2019-08-28 RX ORDER — CIPROFLOXACIN 500 MG/1
500 TABLET, FILM COATED ORAL EVERY 12 HOURS SCHEDULED
Status: DISCONTINUED | OUTPATIENT
Start: 2019-08-29 | End: 2019-08-29 | Stop reason: HOSPADM

## 2019-08-28 RX ADMIN — Medication 100 MG: at 08:07

## 2019-08-28 RX ADMIN — PREGABALIN 50 MG: 25 CAPSULE ORAL at 13:50

## 2019-08-28 RX ADMIN — FOLIC ACID 1 MG: 1 TABLET ORAL at 08:07

## 2019-08-28 RX ADMIN — MULTIPLE VITAMINS W/ MINERALS TAB 1 TABLET: TAB at 08:07

## 2019-08-28 RX ADMIN — PREGABALIN 50 MG: 25 CAPSULE ORAL at 08:07

## 2019-08-28 RX ADMIN — FUROSEMIDE 20 MG: 20 TABLET ORAL at 08:07

## 2019-08-28 RX ADMIN — PANTOPRAZOLE SODIUM 40 MG: 40 TABLET, DELAYED RELEASE ORAL at 08:07

## 2019-08-28 RX ADMIN — PANTOPRAZOLE SODIUM 40 MG: 40 TABLET, DELAYED RELEASE ORAL at 20:05

## 2019-08-28 RX ADMIN — CEFTRIAXONE 1 G: 1 INJECTION, POWDER, FOR SOLUTION INTRAMUSCULAR; INTRAVENOUS at 08:08

## 2019-08-28 RX ADMIN — PREGABALIN 50 MG: 25 CAPSULE ORAL at 20:04

## 2019-08-28 RX ADMIN — NORTRIPTYLINE HYDROCHLORIDE 10 MG: 10 CAPSULE ORAL at 22:06

## 2019-08-28 RX ADMIN — SPIRONOLACTONE 50 MG: 50 TABLET ORAL at 08:07

## 2019-08-28 ASSESSMENT — ACTIVITIES OF DAILY LIVING (ADL)
ADLS_ACUITY_SCORE: 15

## 2019-08-28 ASSESSMENT — MIFFLIN-ST. JEOR: SCORE: 1402.75

## 2019-08-28 NOTE — PROGRESS NOTES
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 about that.     Pt reports she is on disability, since about 2018.  Worked at [employer] and [employer] in the past.     2A. Describe a typical day; evening for you. Work, school, social, leisure, volunteer, spiritual practices. Include time spent obtaining, using, recovering from drugs or alcohol. (DSM)     Pt reports she does chores and watches TV, takes care of the pets.     Please describe what leisure activities have been associated with your substance abuse:     The patient denied having any leisure activities which had been associated with her substance abuse.    2B. How often do you spend more time than you planned using or use more than you planned? (DSM)     Pt reports rarely.     3. How important is using to your social connections? Do many of your family or friends use?     Pt reports her boyfriend uses.     4A. Are you currently in a significant relationship?     Yes.  4B. How long? 3.5 years              Please describe your significant other's use of mood altering chemicals? Pt reports he has a beer or more every night. He also smokes weed every once in awhile.    4C. Sexual Orientation:     Heterosexual    5A. Who do you live with?      Pt lives her boyfriend, cat, dog and are getting  kittens this week.     5B. Tell me about their alcohol/drug use and mental health issues.     Pt reports she wishes he would quit with her.    5C. Are you concerned for your safety there? No    5D. Are you concerned about the safety of anyone else who lives with you? No    6A. Do you have children who live with you?     The patient denied having any children.    6B. Do you have children who do not live with you?     The patient denied having any children.    7A. Who supports you in making changes in your alcohol or drug use? Would you like your family to participate in your treatment? If so, how? What are they willing to do to support you? Who is upset or angry about you making changes in your alcohol or drug use? How big a problem is this for you?     Pt reports her mom is supportive, dad, sister would be, boyfriend is supportive.     7B. This table is provided to record information about the person s relationships and available support It is not necessary to ask each item; only to get a comprehensive picture of their support system.  How often can you count on the following people when you need someone?   Partner / Spouse Usually supportive   Parent(s)/Aunt(s)/Uncle(s)/Grandparents Usually supportive   Sibling(s)/Cousin(s) Usually supportive   Child(kayla) The patient doesn't have any children.   Other relative(s) Usually supportive   Friend(s)/neighbor(s) Usually supportive   Child(kayla) s father(s)/mother(s) The patient doesn't have any children.   Support group member(s) The patient denied having any current involvement with 12-step or other support group meetings.   Community of venkatesh members The patient denied having any current involvement with community venkatesh members.   /counselor/therapist/healer The patient denied having any current involvement with a , counselor, therapist or healer.   Other (specify) No     8A. What is your current living situation?     Pt lives in a home she  owns.    8B. What is your long term plan for where you will be living?     Plans on staying where she lives.     8C. Tell me about your living environment/neighborhood? Ask enough follow up questions to determine safety, criminal activity, availability of alcohol and drugs, supportive or antagonistic to the person making changes.      Reports it is safe, no concerns.     9. Criminal justice history: Gather current/recent history and any significant history related to substance use--Arrests? Convictions? Circumstances? Alcohol or drug involvement? Sentences? Still on probation or parole? Expectations of the court? Current court order? Any sex offenses - lifetime? What level? (DSM)    DWI 2 or 3 years ago.   No current probation.     Commitment: Denies  Registered Sex Offender: Denies    10. What obstacles exist to participating in treatment? (Time off work, childcare, funding, transportation, pending [facility] time, living situation)     Pt reports transportation as an issue for outpatient, reports her pets as an issue for residential.      Dimension VI Ratings   Recovery environment - The placing authority must use the criteria in Dimension VI to determine a client s recovery environment.   RISK DESCRIPTIONS - Severity rating: 3 Client is not engaged in structured, meaningful activity and the client's peers, family, significant other, and living environment are unsupportive, or there is significant criminal justice system involvement.    REASONS SEVERITY WAS ASSIGNED - (What support does the person have for making changes? What structure/stability does the person have in his or her daily life that will increase the likelihood that changes can be sustained? What problems exist in the person s environment that will jeopardize getting/staying clean and sober?)     Patient lives in a house with her boyfriend and pets. Patient is not employed, reports she is on disability. Pt is in a romantic relationship, reports she has  been with her boyfriend for 3.5 years. Pt reports her boyfriend is supportive, but hasn't quit using alcohol too. Pt reports they have discussed it, and some days he is on board and some days he isn't. Pt reports it would be easier for her to quit if he did as well. Pt denies having any children. Pt reports her parents and her boyfriends parents help her out during the day as needed, but spends most of her day on own. Pt reports a past DWI, denies being on probation. Pt denies having structured, meaningful daily activity.        Client Choice/Exceptions   What is your cultural identification?  White    Would you like services specific to language, age, gender, culture, Jehovah's witness preference, race, ethnicity, sexual orientation or disability?  No    What particular treatment choices and options would you like to have? None    Do you have a preference for a particular treatment program? None    Criteria for Diagnosis     Criteria for Diagnosis  DSM-5 Criteria for Substance Use Disorder  Instructions: Determine whether the client currently meets the criteria for Substance Use Disorder using the diagnostic criteria in the DSM-V [id number].[phone]. Current means during the most recent 12 months outside a facility that controls access to substances    Category of Substance Severity (ICD-10 Code / DSM 5 Code)     Alcohol Use Disorder Severe  (10.20) (303.90)   Cannabis Use Disorder The patient does not meet the criteria for a Cannabis use disorder.   Hallucinogen Use Disorder The patient does not meet the criteria for a Hallucinogen use disorder.   Inhalant Use Disorder The patient does not meet the criteria for an Inhalant use disorder.   Opioid Use Disorder The patient does not meet the criteria for an Opioid use disorder.   Sedative, Hypnotic, or Anxiolytic Use Disorder The patient does not meet the criteria for a Sedative/Hypnotic use disorder.   Stimulant Related Disorder The patient does not meet the criteria for a Stimulant  use disorder.   Tobacco Use Disorder Mild    (Z72.0) (305.1)   Other (or unknown) Substance Use Disorder The patient does not meet the criteria for a Other (or unknown) Substance use disorder.       Collateral Contact Summary   Number of contacts made: 1    Contact with referring person:  No    If court related records were reviewed, summarize here: No court records had been reviewed at the time of this documentation.    Information from collateral contacts supported/largely agreed with information from the client and associated risk ratings.      Rule 25 Assessment Summary and Plan   's Recommendation    1) Abstain from alcohol and all illicit drugs and mood altering drugs unless prescribed by a healthcare giver familiar with their addiction.  2) Enter and complete IOP programming and follow counselor recommendations.  3) Develop a sober support network.  4) Continue to receive appropriate medical and psychiatric services as needed.  5) Attend a minimum of one 12 step or sober support group a week.       Collateral Contacts     Name:    EMR Relationship:    Medical Records   Phone Number:    None Releases:    Yes     MICU H&I  Yenifer Maher ([id number]) admitted on 8/25/2019  Primary care provider: Flako Gaona            ASSESSMENT & PLAN   40yo female with history of alcoholic cirrhosis (actively drinking), non-uremic calciphylaxis, and recent esophageal variceal bleed s/p banding 8/9/2019 who is admitted to the MICU after having hematemesis at home and being found to have a hemoglobin <5.        ===NEURO===  # Alcohol use disorder  Active drinking, continued since last admission. Typically drinks wine almost every night, last drink was wine yesterday evening. Her fiance also drinks and she thinks it will be difficult to abstain unless he is more supportive.  - Consider chem dep consult prior to discharge once more stable  - IV folate, thiamine, Mg on admission     Patient does not take lactulose and  rifaximin at home and does not have a history of hepatic encephalopathy. Will consider initiating these therapies if concern for mental status, but she is alert and oriented at this time, able to recount detailed history.     ===CARDIOVASCULAR===  # Tachycardia  Per records, history of A-fib though currently with regular rhythm. She was tachycardic throughout last admission as well. It did trend down slightly as anemia was corrected from 150s to 100s, but remained mildly tachycardic on discharge. Suspect that the tachycardia currently is related to her severe anemia.  - Check EKG     ===PULMONARY===  No acute issues.     ===GASTROINTESTINAL===  # Acute upper GI bleed  Recently admitted 8/9-8/14 for GIB 2/2 esophageal varices s/p banding. Presented today to OSH after several episodes of hematemesis at home with bright red blood and clots. Hgb in ED 4s down from 7.8 at discharge on 8/14. Hemodynamically stable on transfer after 1 unit PRBC and IVF. Possible sources of bleed include re-bleed of her known varices, bleeding from post-banding ulcer, MW tear.  - Transfuse 2 units PRBC now  - Hgb trend overnight  - Goal Hgb >7  - Octreotide and protonix gtt  - Ceftriaxone for SBP ppx  - Discussed with GI tonight, they will formally see in AM; appreciate the recommendations     # Alcoholic hepatitis  LFTs downtrending from her last admission. Significant interval increase in ascites volume.  - Check diagnostic paracentesis     # Nutrition   - NPO     ===RENAL===  No acute issues.     ===HEME/ONC===  # Acute blood loss anemia  See GI     ===ENDOCRINE===  No acute issues.      ===INFECTIOUS DISEASE===  # Leukocytosis  Afebrile and without localizing signs of infection. Could be reactive in the setting of bleed. She received antibiotics for possible SBP in the ED. Will try to obtain diagnostic para to evaluate further. No respiratory or urinary symptoms.  - Trend CBC     # SBP prophylaxis  - Ceftriaxone 1 g  "daily     ===SKIN/MSK===  No acute issues.     FEN: NPO  Lines: PIVs  Prophylaxis:  DVT: PCDs   GI: Protonix gtt  Disposition: Critically ill, admit to MICU  Code Status: Full     Patient was seen and discussed with attending physician Dr. De Guzman, who agrees with the above assessment and plan.     Dominique Quintana MD  Internal Medicine, PGY-1             History of Present Illness      Yenifer Maher is a 39 year old female with h/o alcoholic cirrhosis (actively drinking), non-uremic calciphylaxis, and recent esophageal variceal bleed s/p banding 8/9/2019 who presented to an outside ED after having hematemesis at home.     She reports that after discharging about a week and a half ago she has been essentially at her baseline in terms of activity and health prior to the last couple of days. The last two days she has been feeling more fatigued and \"slower\" requiring breaks during the day to lie down or rest. She attributed this to the heat or possibly to menstrual cramps and didn't think much of it. Last evening she had dinner as usual with wine and then later started feeling more nauseated. Overnight she vomited once and it looked possibly like blood, though she says she had been drinking a red sports drink prior so she wasn't sure. Early in the morning she had two additional episodes of vomiting and these both had bright red blood with clots, so she was brought to the ED by her fiance.     In the ED, she reports (did not see a paper record come with her) that she got IV fluids and felt much better. No additional episodes of emesis in the ED or since. On the way over she also received a transfusion of 1 unit of PRBCs. On arrival, she reports that she feels much better and is no longer nauseated. She feels dehydrated and thirsty. No pain, though she has discomfort in her abdomen due to distention from ascites. She says that the swelling in her abdomen has gotten worse over the last couple days as well.          Past " Medical History      Past Medical History        Past Medical History:   Diagnosis Date     Alcohol abuse       Alcoholic cirrhosis (H)       Alcoholic peripheral neuropathy (H)       Coagulopathy (H)       Gastritis       History of Clostridium difficile colitis       unknown date     Impairment of cognitive function       MOCA 2/2019 22/30     Leukocytosis 02/2019     persistent leukocytosis across 2/2019 hospitalization without evidence of source across multiple diagnostics including LP, BCx, UCx      Lupus anticoagulant positive       Macrocytic anemia       Moderate protein-calorie malnutrition (H)       Paroxysmal A-fib (H) 02/2019     not on chronic anticoagulation     Peptic ulcer disease       Positive SMILEY (antinuclear antibody)       Subclinical hypothyroidism 02/2019     normal T3, T4     Tobacco dependence       0.5 PPD             Past Surgical History      Past Surgical History         Past Surgical History:   Procedure Laterality Date     ESOPHAGOSCOPY, GASTROSCOPY, DUODENOSCOPY (EGD), COMBINED N/A 8/9/2019     Procedure: ESOPHAGOGASTRODUODENOSCOPY (EGD);  Surgeon: Sowmya Ibanez MD;  Location:  GI     UPPER GI ENDOSCOPY   8/9/2019                    Medications      Current Outpatient Prescriptions   No current outpatient medications on file.              Allergies            Allergies   Allergen Reactions     Bengay Pain Relief [Menthol] Other (See Comments)       Skin turns red and feels extremely hot     Cats       Chocolate Dermatitis     Dicyclomine Other (See Comments)       Severe sedation     Dust Mites         Derm, resp     No Clinical Screening - See Comments         GI upset     Phenobarbital       Pollen Extract         Derm, resp.            Social History      Social History   Social History            Socioeconomic History     Marital status: Single       Spouse name: Not on file     Number of children: Not on file     Years of education: Not on file     Highest  education level: Not on file   Occupational History     Not on file   Social Needs     Financial resource strain: Not on file     Food insecurity:       Worry: Not on file       Inability: Not on file     Transportation needs:       Medical: Not on file       Non-medical: Not on file   Tobacco Use     Smoking status: Current Some Day Smoker       Types: Cigarettes     Smokeless tobacco: Never Used     Tobacco comment: 6-8 per day   Substance and Sexual Activity     Alcohol use: Yes       Alcohol/week: 1.2 oz       Types: 2 Glasses of wine per week       Frequency: 2-3 times a week     Drug use: Not on file     Sexual activity: Not on file   Lifestyle     Physical activity:       Days per week: Not on file       Minutes per session: Not on file     Stress: Not on file   Relationships     Social connections:       Talks on phone: Not on file       Gets together: Not on file       Attends Druze service: Not on file       Active member of club or organization: Not on file       Attends meetings of clubs or organizations: Not on file       Relationship status: Not on file     Intimate partner violence:       Fear of current or ex partner: Not on file       Emotionally abused: Not on file       Physically abused: Not on file       Forced sexual activity: Not on file   Other Topics Concern     Parent/sibling w/ CABG, MI or angioplasty before 65F 55M? Not Asked   Social History Narrative     Not on file              Family History      Family History   No family history on file.          Review of Systems      10 Point ROS negative unless mentioned in HPI          OBJECTIVE   VITAL SIGNS:  /81   Pulse 121   Temp 99.1  F (37.3  C) (Axillary)   Resp 18   Wt 69.8 kg (153 lb 14.1 oz)   SpO2 99%   BMI 24.10 kg/m    No intake or output data in the 24 hours ending 08/25/19 5519     EXAM:  General: Sitting up in bed, not in any distress, communicative  Skin: Not jaundiced, no rash, no ecchymoses  HEENT: PAO, JANET,  EOM intact  CV: RRR, normal S1S2, 2/6 systolic flow murmur  Resp: Clear to auscultation bilaterally, no wheezes or crackles  Abd: Markedly distended, tense ascites, nontender though she reports generalized discomfort with deep palpation  Extremities: Warm and well perfused, palpable pulses, no edema  Neuro: A&Ox3, no lateralizing symptoms or focal neurologic deficits  ollateral Contacts      A problematic pattern of alcohol/drug use leading to clinically significant impairment or distress, as manifested by at least two of the following, occurring within a 12-month period:    2.) There is a persistent desire or unsuccessful efforts to cut down or control alcohol/drug use  4.) Craving, or a strong desire or urge to use alcohol/drug  5.) Recurrent alcohol/drug use resulting in a failure to fulfill major role obligations at work, school or home.  6.) Continued alcohol use despite having persistent or recurrent social or interpersonal problems caused or exacerbated by the effects of alcohol/drug.  8.) Recurrent alcohol/drug use in situations in which it is physically hazardous.  9.) Alcohol/drug use is continued despite knowledge of having a persistent or recurrent physical or psychological problem that is likely to have been caused or exacerbated by alcohol.  10.) Tolerance, as defined by either of the following: A markedly diminished effect with continued use of the same amount of alcohol/drug.      Specify if: In early remission:  After full criteria for alcohol/drug use disorder were previously met, none of the criteria for alcohol/drug use disorder have been met for at least 3 months but for less than 12 months (with the exception that Criterion A4,  Craving or a strong desire or urge to use alcohol/drug  may be met).     In sustained remission:   After full criteria for alcohol use disorder were previously met, none of the criteria for alcohol/drug use disorder have been met at any time during a period of 12 months  or longer (with the exception that Criterion A4,  Craving or strong desire or urge to use alcohol/drug  may be met).   Specify if:   This additional specifier is used if the individual is in an environment where access to alcohol is restricted.    Mild: Presence of 2-3 symptoms  Moderate: Presence of 4-5 symptoms  Severe: Presence of 6 or more symptoms

## 2019-08-28 NOTE — PLAN OF CARE
Time: 1500 - 2330  Vitals: Temp 98.4, , /72, O2 100% RA.     Activity: Pt remained in bed for duration of the shift. Ambulated to bathroom x 1.   Pain: Denies pain. Comfortably resting in bed.   Neuro: A&O x 4. Some forgetfulness regarding current hospital stay.   Cardiac: Mildly tachy. Denies chest pain.   Respiratory: WDL. Denies SOB.  GI/: Abdomen round and distended. Intermittent nausea. Voiding regularly. No BM this shift.   Diet: Regular diet - 2 g sodium.   Skin: Red/blotchy skin around chest. Paracentesis site covered. Stitches on (R) side of neck from previous hospital stay currently open to air.    LDAs: (R) PIV saline locked.   New change for this shift: Pt arrived on unit from 4C at 1700.  Plan: CD consult tomorrow. Continue with POC.

## 2019-08-28 NOTE — PROGRESS NOTES
[facility]  [address] [place] [id number]        ADULT CD ASSESSMENT ADDENDUM      Patient Name: Yenifer Maher  Cell Phone:   Home: [phone] (home)    Mobile:   Telephone Information:   Mobile [phone]       Email:  [email or web address]  Emergency Contact: joana Fine   Tel: [phone]    The patient reported being:  Living with a partner    With which race do you identify? White    Initial Screening Questions     1. Are you currently having severe withdrawal symptoms that are putting yourself or others in danger?  No    2. Are you currently having severe medical problems that require immediate attention?  Yes, explain: Admitted to [facility]    3. Are you currently having severe emotional or behavioral problems that are putting yourself or others at risk of harm?  No    4. Do you have sufficient reading skills that will enable you to understand written materials, including the program rules and client rights materials?  Yes     Family History and other additional information     Who raised you? (parents, grandparents, adoptive parents, step-parents, etc.)    Both Parents    Please tell me what it was like growing up in your family. (please include any history of substance abuse, mental health issues, emotional/physical/sexual abuse, forms of discipline, and support)     Who raised you? Mom and dad - got  when pt was 11. Has a relationship with both her parents currently.  Siblings: 1 sister - sister is 2 years older.      Do you have a family history of mental health issues? Depression - mom and sister  Do you have a family history of Substance Use Disorders? Mom went to AA when pt was really young.      Support/Discipline: Felt supported  Abuse: Denied abuse.     Do you have any children or Stepchildren? No    Are you being investigated by Child Protection Services? No    Do you have a child protection worker, probation office or ?   "No    How would you describe your current finances?  Doing okay    If you are having problems, (unpaid bills, bankruptcy, IRS problems) please explain:  No    If working or a student are you able to function appropriately in that setting? Yes     Describe your preferred learning style:  by reading, by hands-on practice and by watching someone else demonstrate    What are your some of your personal strengths?  \"Art, good at budgeting and Georgian, taking care of others\"     Do you currently participate in community venkatesh activities, such as attending Anglican, temple, Latter-day or Bahai services?  The patient denied currently being involved in any community venkatesh activities.    How does your spirituality impact your recovery?  Pt did not answer.     Do you currently self-administer your medications?  Yes    Have you ever had to lie to people important to you about how much you lopez?   No   Have you ever felt the need to bet more and more money?   No   Have you ever attempted treatment for a gambling problem?   No   Have you ever touched or fondled someone else inappropriately or forced them to have sex with you against their will?   No   Are you or have you ever been a registered sex offender?   No   Is there any history of sexual abuse in your family? No   Have you ever felt obsessed by your sexual behavior, such as having sex with many partners, masturbating often, using pornography often?   No     Have you ever received therapy or stayed in the hospital for mental health problems?   No     Have you ever hurt yourself, such as cutting, burning or hitting yourself?   No     Have you ever purged, binged or restricted yourself as a way to control your weight?   A little bit in high school, structured and was in ballet.      Are you on a special diet?   Yes, explain: Vegetarian      Do you have any concerns regarding your nutritional status?   No     Have you had any appetite changes in the last 3 months?   No   Have " you had weight loss or weight gain of more than 10 lbs in the last 3 months?   If patient gained or lost more than 10 lbs, then refer to program RN / attending Physician for assessment.   No   Was the patient informed of BMI?   No   Have you engaged in any risk-taking behavior that would put you at risk for exposure to blood-borne or sexually transmitted diseases?   No   Do you have any dental problems?   No   Have you ever lived through any trauma or stressful life events?   Yes, explain: Pt reports her parents divorce and also she has had multiple friends commit suicide.    In the past month, have you had any of the following symptoms related to the trauma listed above? (dreams, intense memories, flashbacks, physical reactions, etc.)   No   Have you ever believed people were spying on you, or that someone was plotting against you or trying to hurt you?   No   Have you ever believed someone was reading your mind or could hear your thoughts or that you could actually read someone's mind or hear what another person was thinking?   No   Have you ever believed that someone of some force outside of yourself was putting thoughts into your mind or made you act in a way that was not your usual self?  Have you ever though you were possessed?   No   Have you ever believed you were being sent special messages through the TV, radio or newspaper?   No   Have you ever heard things other people couldn't hear, such as voices or other noises?   No   Have you ever had visions when you were awake?  Or have you ever seen things other people couldn't see?   No   Do you have a valid 's license?    Yes     PHQ-9, SHAHLA-7 and Suicide Risk Assessment   PHQ-9 on 8/28/2019 SHAHLA-7 on 8/28/2019   The patient's PHQ-9 score was see flow sheet. The patient's SHAHLA-7 score was see psych notes.       Newton-Suicide Severity Rating Scale   Suicide Ideation   1.) Have you ever wished you were dead or that you could go to sleep and not wake up?      Lifetime:  No   Past Month:  No     2.) Have you actually had any thoughts of killing yourself?   Lifetime:  No   Past Month:  No     3.) Have you been thinking about how you might do this?     Lifetime:  No   Past Month:  No     4.) Have you had these thoughts and had some intention of acting on them?     Lifetime:  No   Past Month:  No     5.) Have you started to work out the details of how to kill yourself?   Lifetime:  No   Past Month:  No     6.) Do you intend to carry out this plan?      Lifetime:  No   Past Month:  No     Intensity of Ideation   Intensity of ideation (1 being least severe, 5 being most severe):     Lifetime:  The patient denied ever having any suicidal thoughts in life.   Past Month:  The patient denied ever having any suicidal thoughts in life.     How often do you have these thoughts?  The patient denied ever having any suicidal thoughts in life.     When you have the thoughts how long do they last?  The patient denied ever having any suicidal thoughts in life.     Can you stop thinking about killing yourself or wanting to die if you want to?  The patient denied ever having any suicidal thoughts in life.     Are there things - anyone or anything (i.e. family, Mormonism, pain of death) that stopped you from wanting to die or acting on thoughts of suicide?  Does not apply     What sort of reasons did you have for thinking about wanting to die or killing yourself (ie end pain, stop how you were feeling, get attention or reaction, revenge)?  Does not apply     Suicidal Behavior   (Suicide Attempt) - Have you made a suicide attempt?     Lifetime:  The patient had never made a suicide attempt.   Past Month:  The patient had never made a suicide attempt.     Have you engaged in self-harm (non-suicidal self-injury)?  The patient denied having any history of engaging in self-harm (non-suicidal self-injury).     (Interrupted Attempt) - Has there been a time when you started to do something to end  your life but someone or something stopped you before you actually did anything?  The patient denied having any history of an interrupted suicide attempt.     (Aborted or Self-Interrupted Attempt) - Has there been a time when you started to do something to try to end your life but you stopped yourself before you actually did anything?  The patient denied having any history of an aborted or self-interrupted suicide attempt.     (Preparatory Acts of Behavior) - Have you taken any steps towards making suicide attempt or preparing to kill yourself (such as collecting pills, getting a gun, giving valuables away or writing a suicide note)?  No     Actual Lethality/Medical Damage:  The patient denied ever making a suicidal attempt.       2008  [employer] [employer]  Used with permission by Eleonora Gannon, PhD.       Guide to C-SSRS Risk Ratings   NO IDEATION:  with no active thoughts IDEATION: with a wish to die. IDEATION: with active thoughts. Risk Ratings   If Yes No No 0 - Very Low Risk   If NA Yes No 1 - Low Risk   If NA Yes Yes 2 - Low/moderate risk   IDEATION: associated thoughts of methods without intent or plan INTENT: Intent to follow through on suicide PLAN: Plan to follow through on suicide Risk Ratings cont...   If Yes No No 3 - Moderate Risk   If Yes Yes No 4 - High Risk   If Yes Yes Yes 5 - High Risk   The patient's ADDITIONAL RISK FACTORS and lack of PROTECTIVE FACTORS may increase their overall suicide risk ratings.     Additional Risk Factors:    Someone close to the patient (family member/friend) completed a suicide     Significant history of physical illness or chronic medical problems   Protective Factors:    Having people in his/her life that would prevent the patient from considering a suicide attempt (i.e. young children, spouse, parents, etc.)     Having pet(s) that give companionship and/or would prevent the patient from considering a suicide attempt     Having easy access  "to supportive family members     Having a good community support network     Having restricted access to highly lethal means of suicide     Risk Status   Past month:0. - Very Low Risk:  Evaluation Counselors:  Document in Epic / SBAR to counselor \"Very Low Risk\".      Treatment Counselors:  Reassess upon admission as applicable, assess weekly in progress notes under Dimension 3 and summarize in Discharge / Treatment summary under Dimension 3.    Past 24 hours:0. - Very Low Risk:  Evaluation Counselors:  Document in Epic / SBAR to counselor \"Very Low Risk\".      Treatment Counselors:  Reassess upon admission as applicable, assess weekly in progress notes under Dimension 3 and summarize in Discharge / Treatment summary under Dimension 3.    Additional information to support suicide risk rating: There was no additional information to provide at this time.     Mental Health Status   Physical Appearance/Attire: Attire appropriate to age/situation   Hygiene: neglected grooming-unclean body, hair, teeth   Eye Contact: at examiner   Speech Rate:  regular   Speech Volume: regular   Speech Quality: fluid   Cognitive/Perceptual:  reality based   Cognition: memory intact    Judgment: intact and able to concentrate   Insight: intact and able to concentrate   Orientation:  time, place, person and situation   Thought: circumstantial   Hallucinations:  none   General Behavioral Tone: cooperative   Psychomotor Activity: tremors   Gait:  Unknown, pt laying in hospital bed   Mood: appropriate   Affect: congruence/appropriate   Counselor Notes: NA     Criteria for Diagnosis: DSM-5 Criteria for Substance Use Disorders      Alcohol Use Disorder Severe - 303.90 (F10.20)  Tobacco Use Disorder Mild - 305.10 (Z72.0)  Anxiety disorder NOS, per patient self-report    Level of Care   I.) Intoxication and Withdrawal: 0   II.) Biomedical:  2   III.) Emotional and Behavioral:  2   IV.) Readiness to Change:  3   V.) Relapse Potential: 3   VI.) " Recovery Environmental: 3     Initial Problem List     The patient lacks relapse prevention skills  The patient has poor coping skills  The patient has poor refusal skills   The patient lacks a sober peer support network    Patient/Client is willing to follow treatment recommendations.  Pt was unsure of treatment at time of signing of assessment.     Counselor: Erum Zhao [facility]    Vulnerable Adult Checklist for OUTPATIENTS     1.  Do you have a physical, emotional or mental infirmity or dysfunction?       No    2.  Does this issue impair your ability to provide for your own care without help, including providing yourself with food, shelter, clothing, healthcare or supervision?       No    3.  Because of this issue, I need assistance to protect myself from maltreatment by others.      No    Based on the above information:    This person is not a functional Vulnerable Adult according to [place] Statute 626.5572 subdivision 21.

## 2019-08-28 NOTE — PLAN OF CARE
Reason for Admission: GI Bleed    Vs: RR 16, , /74, O2 97% on RA Temp 99.4    Activity: SBA, patient reports feeling unsteady on feet and dizzy at times when ambulating.    Neuro: A&O x4. WDL.    Cardiac: WDL.     Respiratory: Patient denies shortness of breath. Lung sounds diminished in bases.    GI/: BS+, patient reports passing flatus. Last BM 8/26. Denies nausea/vomiting. Voiding spontaneously.     Skin: Skin blotchy on neck/chest and on lower legs. Paracentesis site covered. Stitches on neck covered with transparent dressing.    Diet: 2 gm Na+ restriction, regular diet.     Lines/Drains/Wounds: R PIV, SL.     Labs: K=3.2, recheck in morning. Hgb=7.9.     New Changes: Pain in abdomen but patient declined medications. CIWA scores of 6 and 5.    Plan: Continue to monitor hemoglobin. Continue with plan of care.

## 2019-08-28 NOTE — PROGRESS NOTES
"   08/27/19 1500   Quick Adds   Type of Visit Initial PT Evaluation   Living Environment   Lives With significant other   Living Arrangements house   Home Accessibility stairs to enter home;stairs within home   Number of Stairs, Main Entrance 8   Stair Railings, Main Entrance railings on both sides of stairs   Number of Stairs, Within Home, Primary   (split level 8+8)   Stair Railings, Within Home, Primary railing on left side (ascending)  (only left side for one flight, both sides for other flight)   Transportation Anticipated car, drives self;family or friend will provide   Living Environment Comment Pt lives with her ida in a split level house, tub shower in bathroom, primary living areas on the upper level, laundry on the lower level.  pt ida works 2 jobs and therefore is only available to assist intermittently   Self-Care   Usual Activity Tolerance moderate   Current Activity Tolerance fair   Regular Exercise No   Equipment Currently Used at Home shower chair  (owns 4WW)   Activity/Exercise/Self-Care Comment Pt reports she was independent with basic mobility and self cares, is able to navigate the stairs in her home but states she \"has to take them slow and use the railings\", pt requires assist to carry items up/down stairs, owns a 4WW but does not use, uses shower chair for bathing and SO often provides supervision for safety.   Pt reports she feels her gait has become more unsteady due to neuropathy but does not use AD   Functional Level Prior   Ambulation 0-->independent   Transferring 0-->independent   Toileting 0-->independent   Bathing 1-->assistive equipment   Communication 0-->understands/communicates without difficulty   Swallowing 0-->swallows foods/liquids without difficulty   Cognition 0 - no cognition issues reported   Fall history within last six months no   Which of the above functional risks had a recent onset or change? ambulation;transferring;toileting;bathing   Prior Functional Level " Comment Pt denies any recent falls   General Information   Onset of Illness/Injury or Date of Surgery - Date 08/25/19   Referring Physician Thais Sanchez MD   Patient/Family Goals Statement Thais Sanchez MD   Pertinent History of Current Problem (include personal factors and/or comorbidities that impact the POC) Yenifer Maher is a 39 year old female with h/o alcoholic cirrhosis, non-uremic calciphylaxis, and ongoing alcohol use who is admitted with acute upper GI bleed and found on EGD to have multiple bleeding ulcers. She was intubated for the EGD and is now s/p banding with GI.   Precautions/Limitations fall precautions   General Observations Patient greeted supine in bed, agreeable to PT with encouragement, RN ok'd session.   General Info Comments Activity: up with assist   Cognitive Status Examination   Orientation orientation to person, place and time   Level of Consciousness alert   Follows Commands and Answers Questions 100% of the time   Personal Safety and Judgment intact   Pain Assessment   Patient Currently in Pain Yes, see Vital Sign flowsheet   Integumentary/Edema   Integumentary/Edema Comments abdominal distension 2/2 ascites   Posture    Posture Forward head position;Protracted shoulders   Range of Motion (ROM)   ROM Comment BLE WFL   Strength   Strength Comments Generalized weakness and deconditioning, demonstrates at least 3+/5 BLE strength with functional mobility   Bed Mobility   Bed Mobility Comments supine>sit SBA   Transfer Skills   Transfer Comments sit<>stand SBA/CGA   Gait   Gait Comments Ambulates with FWW + CGA, demonstrates decreased percy and step length, WBOS, forward flexed posture   Balance   Balance Comments good sitting balance, standing balance fair-currently requiring FWW for stability   Sensory Examination   Sensory Perception Comments baseline neuropathy in BLEs   General Therapy Interventions   Planned Therapy Interventions balance  "training;gait training;bed mobility training;strengthening;transfer training;risk factor education;home program guidelines;progressive activity/exercise   Clinical Impression   Criteria for Skilled Therapeutic Intervention yes, treatment indicated   PT Diagnosis Impaired functional mobility   Influenced by the following impairments weakness, activity tolerance, pain, nausea, fatigue, balance   Functional limitations due to impairments transfers, gait, stairs, functional endurance   Clinical Presentation Evolving/Changing   Clinical Presentation Rationale PMH/comorbidities, clinical judgement, personal factors   Clinical Decision Making (Complexity) Low complexity   Therapy Frequency 5x/week   Predicted Duration of Therapy Intervention (days/wks) <1 week   Anticipated Discharge Disposition Home with Assist;Home with Home Therapy   Risk & Benefits of therapy have been explained Yes   Patient, Family & other staff in agreement with plan of care Yes   [facility] AM-PAC  \"6 Clicks\" V.2 Basic Mobility Inpatient Short Form   1. Turning from your back to your side while in a flat bed without using bedrails? 4 - None   2. Moving from lying on your back to sitting on the side of a flat bed without using bedrails? 4 - None   3. Moving to and from a bed to a chair (including a wheelchair)? 4 - None   4. Standing up from a chair using your arms (e.g., wheelchair, or bedside chair)? 4 - None   5. To walk in hospital room? 4 - None   6. Climbing 3-5 steps with a railing? 3 - A Little   Basic Mobility Raw Score (Score out of 24.Lower scores equate to lower levels of function) 23   Total Evaluation Time   Total Evaluation Time (Minutes) 5     "

## 2019-08-28 NOTE — PLAN OF CARE
OT: Cancel, Pt with another provider upon attempt, schedule did not allow a check back, will reschedule per POC.

## 2019-08-28 NOTE — PLAN OF CARE
Discharge Planner PT   Patient plan for discharge: feels she likely could return home and modify her activities to stay safe  Current status: PT 7B: Pt improved in activity tolerance from last visit, though yet with functional limitations and reduced activity tolerance. Pt SBA for short distance gait (reaches out for external supports when not using a walker), CGA-SBA with FWW x 75 ft. Pt required WC ride to rehab gym and was able to navigate 4 stairs x 3 reps with CGA, with need for seated rest breaks related to fatigue and dizziness. /69,  bpm and O2 100% immediately following stairs.    Barriers to return to prior living situation: medical status; pt displays reduced functional IND, standing activity tolerance and lacks 24/7 assist at home  Recommendations for discharge: TCU vs home with home PT, pending progress  Rationale for recommendations: see above barriers. Currently below safe functional baseline.        Entered by: Alize Johnson 08/28/2019 4:07 PM

## 2019-08-28 NOTE — PLAN OF CARE
"PT 4C: Discharge Planner PT   Patient plan for discharge: patient aware of TCU recommendations, however unwilling to further discuss, plans to \"see how it goes\"  Current status: PT evaluation completed.  Patient needs encouragement for participation in therapy, demonstrates self-limiting behaviors with varying reasons  why she does not want to participate in therapy including abdominal soreness 2/2 paracentesis this morning, nausea, dizziness, headache and weakness. Pt eventually agreeable with encouragement and education on need to demonstrate independent ambulation and stair navigation in order to discharge home safely. Patient completes bed mobility and transfers with SBA/CGA, ambulated 50ft x2 with FWW + CGA, requires prolonged seated rest break and c/o nausea, headache and dizziness limiting further ambulation. Strongly encouraged patient to increase level of activity and recommend ambulating at least 3x/day in order to progress mobility. VSS throughout session on RA, -120s, BP stable with position changes.  Barriers to return to prior living situation: deconditioning, weakness, stairs at home, alone during the day  Recommendations for discharge: currently patient would require TCU stay, however with increased level of activity and ability to demonstrate safe stair navigation anticipate patient will be appropriate to discharge to inpatient CD treatment (if patient wishes) vs Home with  PT.  Rationale for recommendations: see above  "

## 2019-08-28 NOTE — CONSULTS
8/28/2019    Writer met with pt and completed CD consult. Pt completed a CD evaluation, but still unsure about treatment. Pt reports she is willing to do outpatient treatment if she can find reliable transportation. Writer encouraged her to contact insurance since she has MA and they can provide rides. Writer will email pt with some treatment options and pt will contact writer when she decides about treatment. At this time, there are no formal referrals as writer will wait on pt's final decision.     Erum Zhao, [facility]  [phone]

## 2019-08-29 ENCOUNTER — TELEPHONE (OUTPATIENT)
Dept: BEHAVIORAL HEALTH | Facility: CLINIC | Age: 39
End: 2019-08-29

## 2019-08-29 ENCOUNTER — APPOINTMENT (OUTPATIENT)
Dept: PHYSICAL THERAPY | Facility: CLINIC | Age: 39
DRG: 369 | End: 2019-08-29
Attending: INTERNAL MEDICINE
Payer: COMMERCIAL

## 2019-08-29 VITALS
WEIGHT: 151.01 LBS | DIASTOLIC BLOOD PRESSURE: 76 MMHG | TEMPERATURE: 98.9 F | HEIGHT: 67 IN | HEART RATE: 110 BPM | RESPIRATION RATE: 16 BRPM | BODY MASS INDEX: 23.7 KG/M2 | SYSTOLIC BLOOD PRESSURE: 121 MMHG | OXYGEN SATURATION: 97 %

## 2019-08-29 LAB
ERYTHROCYTE [DISTWIDTH] IN BLOOD BY AUTOMATED COUNT: 17.4 % (ref 10–15)
HCT VFR BLD AUTO: 23.9 % (ref 35–47)
HGB BLD-MCNC: 7.5 G/DL (ref 11.7–15.7)
MCH RBC QN AUTO: 29.3 PG (ref 26.5–33)
MCHC RBC AUTO-ENTMCNC: 31.4 G/DL (ref 31.5–36.5)
MCV RBC AUTO: 93 FL (ref 78–100)
PLATELET # BLD AUTO: 215 10E9/L (ref 150–450)
RBC # BLD AUTO: 2.56 10E12/L (ref 3.8–5.2)
WBC # BLD AUTO: 9.1 10E9/L (ref 4–11)

## 2019-08-29 PROCEDURE — 40000007 ZZH STATISTIC ADULT CD FACE TO FACE-NO CHRG

## 2019-08-29 PROCEDURE — 25000132 ZZH RX MED GY IP 250 OP 250 PS 637: Performed by: INTERNAL MEDICINE

## 2019-08-29 PROCEDURE — 85027 COMPLETE CBC AUTOMATED: CPT | Performed by: STUDENT IN AN ORGANIZED HEALTH CARE EDUCATION/TRAINING PROGRAM

## 2019-08-29 PROCEDURE — 97530 THERAPEUTIC ACTIVITIES: CPT | Mod: GP

## 2019-08-29 PROCEDURE — 25000132 ZZH RX MED GY IP 250 OP 250 PS 637: Performed by: STUDENT IN AN ORGANIZED HEALTH CARE EDUCATION/TRAINING PROGRAM

## 2019-08-29 PROCEDURE — 97116 GAIT TRAINING THERAPY: CPT | Mod: GP

## 2019-08-29 PROCEDURE — 99239 HOSP IP/OBS DSCHRG MGMT >30: CPT | Mod: GC | Performed by: INTERNAL MEDICINE

## 2019-08-29 PROCEDURE — 36415 COLL VENOUS BLD VENIPUNCTURE: CPT | Performed by: STUDENT IN AN ORGANIZED HEALTH CARE EDUCATION/TRAINING PROGRAM

## 2019-08-29 RX ORDER — POLYETHYLENE GLYCOL 3350 17 G/17G
17 POWDER, FOR SOLUTION ORAL DAILY
Status: DISCONTINUED | OUTPATIENT
Start: 2019-08-29 | End: 2019-08-29 | Stop reason: HOSPADM

## 2019-08-29 RX ORDER — POLYETHYLENE GLYCOL 3350 17 G/17G
17 POWDER, FOR SOLUTION ORAL DAILY PRN
Qty: 10 PACKET | Refills: 0 | Status: ON HOLD | OUTPATIENT
Start: 2019-08-29 | End: 2020-11-27

## 2019-08-29 RX ORDER — CIPROFLOXACIN 500 MG/1
500 TABLET, FILM COATED ORAL EVERY 12 HOURS
Qty: 8 TABLET | Refills: 0 | Status: SHIPPED | OUTPATIENT
Start: 2019-08-29 | End: 2019-12-23

## 2019-08-29 RX ORDER — OMEPRAZOLE 40 MG/1
CAPSULE, DELAYED RELEASE ORAL
Qty: 112 CAPSULE | Refills: 0 | Status: SHIPPED | OUTPATIENT
Start: 2019-08-29 | End: 2020-11-26

## 2019-08-29 RX ADMIN — FOLIC ACID 1 MG: 1 TABLET ORAL at 07:56

## 2019-08-29 RX ADMIN — SIMETHICONE CHEW TAB 80 MG 80 MG: 80 TABLET ORAL at 15:19

## 2019-08-29 RX ADMIN — MULTIPLE VITAMINS W/ MINERALS TAB 1 TABLET: TAB at 11:44

## 2019-08-29 RX ADMIN — POLYETHYLENE GLYCOL 3350 17 G: 17 POWDER, FOR SOLUTION ORAL at 10:02

## 2019-08-29 RX ADMIN — PANTOPRAZOLE SODIUM 40 MG: 40 TABLET, DELAYED RELEASE ORAL at 07:56

## 2019-08-29 RX ADMIN — PREGABALIN 50 MG: 25 CAPSULE ORAL at 07:56

## 2019-08-29 RX ADMIN — CIPROFLOXACIN HYDROCHLORIDE 500 MG: 500 TABLET, FILM COATED ORAL at 07:55

## 2019-08-29 RX ADMIN — Medication 100 MG: at 07:56

## 2019-08-29 RX ADMIN — SPIRONOLACTONE 50 MG: 50 TABLET ORAL at 07:56

## 2019-08-29 RX ADMIN — FUROSEMIDE 20 MG: 20 TABLET ORAL at 07:56

## 2019-08-29 RX ADMIN — PREGABALIN 50 MG: 25 CAPSULE ORAL at 15:16

## 2019-08-29 ASSESSMENT — ACTIVITIES OF DAILY LIVING (ADL)
ADLS_ACUITY_SCORE: 15

## 2019-08-29 NOTE — PLAN OF CARE
Reason for Admission: GI Bleed    Vs: T 98.4, R 16, , /65, O2 99% on RA    Activity: Standby assist.     Neuro: Forgetful at times. Alert & Oriented x4. WDL.    Cardiac: Slighty tachycardic at rest. Apical pulse regular.     Respiratory: Lung sounds clear and equal bilaterally. Patient denies shortness of breath or chest pain.    GI/: Abdomen distended. Patient denies abdominal pain and nausea. Voiding spontaneously.    Skin: Blotchy skin.     Diet: 2 gm sodium restriction, regular diet.    Lines/Drains/Wounds: R PIV saline locked. Stitches on right side of neck, covered with transparent dressing.     Labs: Pending AM labs.     Plan: Continue plan of care.

## 2019-08-29 NOTE — PROGRESS NOTES
Care Coordinator Progress Note    Admission Date/Time:  8/25/2019  Attending MD:  Manohar Pressley, *    Data  Chart reviewed, discussed with interdisciplinary team.   Patient was admitted for:    Upper GI bleed  Alcoholic cirrhosis of liver with ascites (H)  Gastrointestinal hemorrhage, unspecified gastrointestinal hemorrhage type  Constipation, unspecified constipation type.    Concerns with insurance coverage for discharge needs: None.  Current Living Situation: Patient lives with significant other, Boris. .  Support System: Involved  Services Involved: None prior to admission.   Transportation at Discharge: MA transportation,  [facility] [employer] [facility] [phone]  Transportation to Medical Appointments:   - Transportation resources provided  Barriers to Discharge: Needs OP prn cassy arranged, home care services.     Coordination of Care and Referrals: Provided patient/family with options for Home Care.        Assessment  Patient is a 39yr old female with a prior medical history of lupus anticoagulant, alcohol abuse, calciphylaxis, alcoholic liver cirrhosis w/history of alcoholic hepatits who was admitted for hematemesis. Writer introduced self and role of RNCC. Patient confirms that she lives locally with her significant other. Patient notes that she is independent at baseline but that Boris does most of the heavy lifting for her. PT/OT have recommended TCU vs home w/home therapies. Patient is declining TCU placement and would like home care services. A referral for home RN/PT/OT have been sent to [facility]. Writer provided MA transportation benefit resources for patient to utilize at discharge and to medical appointments. Patient may need prn OP cassy at [facility] ([phone]), writer contacted [facility] and confirmed that they have the order, scheduling contact information added to discharge instructions. Bedside nurse updated. Patient will discharge to home today.     [id number]  Addendum:   Writer received call from PT regarding concerns of patient discharging to home. Per PT discussion with patient, patient will be home alone upon returning home and her significant other will not arrive home until 10pm tonight. Writer updated the providers regarding concerns. Per the teams discussion with patient, patient has been adamant on returning to home and continues to decline TCU placement. RNCC will continue to follow.      Plan  Anticipated Discharge Date:  8/29  Anticipated Discharge Plan:  Home w/home care services(RN/PT/OT), prn OP para.     Avril Prescott, BRAYANCC, BSN    [facility]  [address] [place] [id number]    [email or web address]    Office: [phone] Pager: [phone]  To contact weekend RNCC, dial * [phone] and enter pager number [phone] at prompt. This pager can not be contacted by text page or outside line.

## 2019-08-29 NOTE — DISCHARGE SUMMARY
[facility] [place]  Discharge Summary - Medicine & Pediatrics       Date of Admission:  8/25/2019  Date of Discharge:  8/29/2019  4:20 PM  Discharging Provider: Manohar Pressley MD  Discharge Service: Sundar 4    Discharge Diagnoses   - Upper GI bleed   - Acute blood loss anemia secondary to post esophageal variceal banding ulcer   - Alcohol use disorder   - Concern for alcohol withdrawal     Follow-ups Needed After Discharge   -Primary care provider, Flako Gaona, within 7 days for hospital follow- up.  Repeat CBC recommended     Discuss regarding possible referral for regular paracenteses and routine health maintenance. This appointment is imperative.   - Hepatology ( Dr. Hernández ) on 10/8/19 . Already scheduled  - Repeat endoscopy around 9/29/19. To be arranged by GI team.    Appointments on [place] and/or [facility] (with [facility] or [facility]   provider or service). Call [phone] if you haven't heard regarding   these appointments within 7 days of discharge.        Unresulted Labs Ordered in the Past 30 Days of this Admission     Date and Time Order Name Status Description    8/28/2019 0744 Blood culture Preliminary     8/28/2019 0744 Blood culture Preliminary     8/26/2019 2251 fluid culture - Aerobic Bacterial Preliminary       These results will be followed up by PCP    Discharge Disposition   Discharged to home  Condition at discharge: Fair    Hospital Course   Yenifer Maher was admitted on 8/25/2019. SHe has a PMHx of alcoholic cirrhosis complicated by ascites and esophageal varices with recent admission for variceal hemorrhage 9/9/19 s/p banding, ongoing alcohol use disorder who presented for evaluation of hematemesis.     Hematemesis   Acute blood loss anemia   Admitted to the ICU with Hgb of 5.1. Patient was resuscitated, received octreotide, PPI and Ceftriaxone and underwent EGD which showed large recently bleeding EV (>5mm) and clotted blood in fundus as well as concern for  post banding ulcer. Discharge Hgb was stable for >24h and in mid 7 range.   --  - PPI BID for 2 weeks, then PPI once daily   - Return for repeat EGD in 4 weeks (as above)   - Complete a 7 D course of antibiotics (to end on 9/1/19) with Ciprofloxacin for GIB in cirrhotic.     Decompensated Alcoholic CIrrhosis   Complicated by Varices and Ascites   MELD-Na score: 17    Volume: Furosemide 20mg every day , Spironolactone 50mg Qday. Remains hypervolemic with ongoing ascites. Referrals made for [facility] paracenteses PRN if needed   Infection: Underwent Paracentesis while inpatient with removal of 3.1L of clear straw colored fluid. WBC count 56.  Bleeding: Significant issue for patient. PPI as above.   Encephalopathy:Not present  --   - Alcohol cessation strongly recommended   - Follow up with  as above     Alcohol use disorder   Ongoing alcohol use and not a transplant candidate at his time due to the same. Was seen by chemical dependency and patient was somewhat contemplative of starting therapy however states he would decide as outpatient. Chem dep information provided to patient.     Deconditioning with non-severe protein-calorie malnutrition (present on admission, related to ETOH use and chronic liver disease).  Seen inpatient by physical therapy and was weak. Although she may benefit from rehab (short term) patient refused a number of times. Received nutrition consultation to improve dietary protein.       Consultations This Hospital Stay   GI LUMINAL ADULT IP CONSULT  GI HEPATOLOGY ADULT IP CONSULT  CHEMICAL DEPENDENCY IP CONSULT    Code Status   Full Code     The patient was discussed with [facility]  [facility], [place]  Pager: [phone]  ______________________________________________________________________    Physical Exam   Vital Signs: Temp: 98.9  F (37.2  C) Temp src: Oral BP: 121/76   Heart Rate: 111 Resp: 16 SpO2: 97 % O2  Device: None (Room air)    Weight: 151 lbs .24 oz  General Appearance: chronically ill appearing female resting in bed, alert and oriented x3. Mild scleral icterus   Respiratory: clear to auscultation throughout, no wheezes rales or rhonchi   Cardiovascular: rrr, no murmurs   GI: soft, non tender but somewhat distended.   Skin: warm. No new rashes   Other: Oriented without asterixis. Does have a baseline tremor in BL UE that appear similar to asterixis however per pt has been present since childhood.       Primary Care Physician   Flako Gaona    Discharge Orders      US Paracentesis     Home care nursing referral      Home Care PT Referral for Hospital Discharge      Home Care OT Referral for Hospital Discharge      Activity    Your activity upon discharge: activity as tolerated     When to contact your care team    If you have recurrent bleeding please return to the emergency department   If you have recurrent fevers, chills, abdominal pain please return to the ED and/or contact your PCP.     Adult [facility] Follow-up and recommended labs and tests    - Follow up with primary care provider, Flako Gaona, within 7 days for hospital follow- up.  Repeat CBC recommended     Discuss regarding possible referral for regular paracenteses and routine health maintenance. This is imperative       - Follow up with Dr. Hernández on 10/8/19      You will need a repeat endoscopy around 9/29/19 to reassess your varices.     Appointments on [place] and/or [facility] (with [facility] or [facility] provider or service). Call [phone] if you haven't heard regarding these appointments within 7 days of discharge.     Reason for your hospital stay    You were admitted with a bleed thought to be a post esophageal variceal banding ulcer. You were monitored in the ICU then transferred to the floor. Your hemoglobin remained stable and you were ultimately discharged to home.   We discussed at length with you that in light of your significant liver  disease, the only way to halt it's worsening is to stop drinking completely. You met with the chemical dependency counselors while you were here as well. We highly highly recommend you consider treatment for your alcohol use. You have their information     Liver disease is progressive and if you continue to drink, your liver will continue to scar over and eventually stop working and can lead to death.      We discussed that given the chronic nature of your problem, you will need to see a primary doctor regularly. This can be . We encourage you to set up an appointment within 7 days     Finally, we placed a referral for you to have paracenteses if you feel your abdomen is significantly growing in size so they can remove some of the fluid off your belly. This is called a paracentesis and you had one while admitted.     MD face to face encounter    Documentation of Face to Face and Certification for Home Health Services    I certify that patient: Yenifer Maher is under my care and that I, or a nurse practitioner or physician's assistant working with me, had a face-to-face encounter that meets the physician face-to-face encounter requirements with this patient on: 8/29/2019.    This encounter with the patient was in whole, or in part, for the following medical condition, which is the primary reason for home health care: liver failure.    I certify that, based on my findings, the following services are medically necessary home health services: Nursing, Occupational Therapy and Physical Therapy.    My clinical findings support the need for the above services because: Nurse is needed: To assess vital signs and weight, respiratory and cardiac status, patients ability to take and record daily blood pressure, temp and weight, pain level and activity tolerance, signs/symptoms of infection, hydration, nurtition and bowel status and home safety after changes in medications or other medical regimen, Occupational Therapy  Services are needed to assess and treat cognitive ability and address ADL safety due to impairment in reduced functional independence and Physical Therapy Services are needed to assess and treat the following functional impairments: activity tolerance.    Further, I certify that my clinical findings support that this patient is homebound (i.e. absences from home require considerable and taxing effort and are for medical reasons or Confucianist services or infrequently or of short duration when for other reasons) because: Requires assistance of another person or specialized equipment to access medical services because patient: Range of motion limitations prevents ability to exit home safely.    Based on the above findings. I certify that this patient is confined to the home and needs intermittent skilled nursing care, physical therapy and/or speech therapy.  The patient is under my care, and I have initiated the establishment of the plan of care.  This patient will be followed by a physician who will periodically review the plan of care.  Physician/Provider to provide follow up care: Flako Gaona    Attending hospital physician (the Medicare certified [place] provider): Dr. Pressley  Physician Signature: See electronic signature associated with these discharge orders.  Date: 8/29/2019     Cell count with differential fluid     Diet    Follow this diet upon discharge: Orders Placed This Encounter      Snacks/Supplements Adult: Other; protein supplement, patient preference okay; Between Meals      Advance Diet as Tolerated: Regular Diet Adult; 2 gm NA Diet  High protein diet also recommended       Significant Results and Procedures   Paracentesis 8/27/18 - Dr.Daniel Estes   Upper endoscopy 8/25/19 - Dr.Thomas Leventhal    Discharge Medications   Discharge Medication List as of 8/29/2019  2:04 PM      START taking these medications    Details   polyethylene glycol (MIRALAX/GLYCOLAX) packet Take 17 g by mouth daily as needed  for constipation, Disp-10 packet, R-0, E-Prescribe         CONTINUE these medications which have CHANGED    Details   ciprofloxacin (CIPRO) 500 MG tablet Take 1 tablet (500 mg) by mouth every 12 hours for 3 days, Disp-8 tablet, R-0, E-Prescribe      omeprazole (PRILOSEC) 40 MG DR capsule Take 1 tab twice daily until 9/2/19, then 1 tab daily, Disp-112 capsule, R-0, No Print Out         CONTINUE these medications which have NOT CHANGED    Details   Ca Carbonate-Mag Hydroxide (ROLAIDS EXTRA STRENGTH PO) Take 1-2 tablets by mouth every 6 hours as needed (heartburn), Historical      estradiol (VIVELLE-DOT) 0.1 MG/24HR bi-weekly patch Place 1 patch onto the skin twice a week, Disp-15 patch, R-0, E-Prescribe      fexofenadine (ALLEGRA) 180 MG tablet Take 1 tablet (180 mg) by mouth daily as needed for allergies, Disp-30 tablet, R-0, E-Prescribe      folic acid (FOLVITE) 1 MG tablet Take 1 tablet (1 mg) by mouth daily, Disp-30 tablet, R-0, E-Prescribe      furosemide (LASIX) 20 MG tablet Take 1 tablet (20 mg) by mouth daily, Disp-90 tablet, R-3, E-Prescribe      magnesium oxide (MAG-OX) 400 (240 Mg) MG tablet Take 1 tablet (400 mg) by mouth daily, Disp-30 tablet, R-0, E-Prescribe      nortriptyline (PAMELOR) 10 MG capsule Take 1 capsule (10 mg) by mouth At Bedtime, Disp-30 capsule, R-0, E-Prescribe      pregabalin (LYRICA) 50 MG capsule Take 1 capsule (50 mg) by mouth 3 times daily, Disp-30 capsule, R-0, Local Print      Prenatal Vit-Fe Fumarate-FA (PRENATAL MULTIVITAMIN W/IRON) 27-0.8 MG tablet Take 1 tablet by mouth daily, Disp-30 tablet, R-0, E-Prescribe      sodium-potassium bicarbonate (FRIDA-SELTZER GOLD) TBEF solu-tab Take 1-2 tablets by mouth 2 times daily as needed for heartburn, Historical      spironolactone (ALDACTONE) 50 MG tablet Take 1 tablet (50 mg) by mouth daily, Disp-90 tablet, R-3, E-Prescribe      vitamin (B COMPLEX-C) tablet Take 1 tablet by mouth daily, Disp-30 tablet, R-0, E-Prescribe      vitamin B1  (THIAMINE) 100 MG tablet Take 1 tablet (100 mg) by mouth daily, Disp-30 tablet, R-0, E-Prescribe         STOP taking these medications       simethicone (MYLICON) 80 MG chewable tablet Comments:   Reason for Stopping:             Allergies   Allergies   Allergen Reactions     Bengay Pain Relief [Menthol] Other (See Comments)     Skin turns red and feels extremely hot     Cats      Chocolate Dermatitis     Dicyclomine Other (See Comments)     Severe sedation     Dust Mites      Derm, resp     No Clinical Screening - See Comments      GI upset     Phenobarbital      Pollen Extract      Derm, resp.      Internal Medicine Staff Addendum  Date of Service: 8/29/2019  I have seen and examined Ms Maher, reviewed the data and discussed the plan of care with the care team on rounds.  I agree with the above documentation with the additions/changes to the ROS, HPI, Exam or data (including my edits in italics):    I discussed pt's care with bedside RN, case management/social work today.  I personally reviewed, labs, medications and past 24 hr notes.  Assessment/Plan/Diagnoses: plan/dx as above, which contains my edits and reflects our joint medical decision-making.   >50% of 45 minutes spent in direct patient care and coordinating discharge.    Manohar Pressley MD PhD  Internal Medicine Hospitalist & Staff Physician   of Internal Medicine   [facility]  Pager: [phone]

## 2019-08-29 NOTE — PROGRESS NOTES
[facility]  Met with pt to discuss plans for HC.  Pt to be discharged home 8/29 and has agreed to have [facility] follow with services of SN, PT and OT. Patient care support center processing referral.  Pt verbalized understanding that initial visit is scheduled for   8/30 or 8/31.   Pt has 24 hour phone number for [facility] for any questions or concerns.    Thank you  Ramandeep Echeverria RN, BSN  [facility] Liaison  [facility]  [phone]

## 2019-08-29 NOTE — PLAN OF CARE
VSS. Patient discharged at 1615 to home via  med ride.   Belongings sent with patient from security. PIV removed.   To [facility] [facility] for medications. AVS signed and printed.

## 2019-08-29 NOTE — PLAN OF CARE
"1900-2330    ../65 (BP Location: Left arm)   Pulse 110   Temp 98.4  F (36.9  C) (Oral)   Resp 16   Ht 1.702 m (5' 7.01\")   Wt 68.5 kg (151 lb 0.2 oz)   SpO2 99%   BMI 23.65 kg/m        ..Activity: up with standby assist   Neuros: alert and orientated x 4, but forgetful at times  Cardiac: tachy at times ()   Respiratory: O2 sats 99% on RA, unlabored   GI/: abdomen distended, last BM 8/26, voiding (not saving)   Diet: regular, low sodium   Skin: intact except for old paracentesis site   Lines: PIV   Incisions/Drains: old paracentesis site, no drains   Labs: pending   Pain/nausea: none   New changes this shift: none   Plan: possible discharge to home tomorrow     "

## 2019-08-29 NOTE — PLAN OF CARE
"Discharge Planner PT  7B  Patient plan for discharge: home with assist prn from ida (per pt ida works 2 jobs therefore, pt is anticipating being home for long periods of the day)  Current status: Progressed safety with transfers with  use of 4WW (per pt reports she has a 4WW at home that intermittently uses or else she \"furniture surfs\"). Progressed safe negotiation of 2 x 4 stairs with 1-2 rails - encouraged step to pattern to improve performance. Ambulated ~ 200 ft + ~ 200ft with 4WW, SBA-Leyla - steady on feet and demo'd adequate gait speed. Though pt's activity tolerance and balance has improved pt demonstrates poor insight and safety awareness with use of an assistive where she is a falls risk. Recommend nursing utilize FWW, SBA.     Barriers to return to prior living situation: medical status, current mob status, cognition, supervision, falls risk  Recommendations for discharge: Home with 24/7 supervision + 4WW + HH PT  Rationale for recommendations: Pt is improving with activity tolerance, strength, and balance though still is requiring supervision for all mobility while IP 2/2 to the pt demonstrating that she is a falls risk. Would recommend HH PT for balance, endurance, strength and a safe home assessment. If it is unable to receive 24/7 supervision would recommend as much supervision that can be arranged or ultimately TCU (pt declining TCU).        Entered by: Kristin Tesfaye 08/29/2019 12:54 PM       "

## 2019-08-29 NOTE — PLAN OF CARE
[id number]:  VSS. CIWA 2-4  GI/: voiding not saving  Diet: tolerating diet  Incisions/Drains: none  Skin: WDL  IV: PIV SL  Activity: up sba to BR many times, worked with PT  Labs: WDL  PRN meds: dneies pain  Changes this shift:   Plan: CONt POC. Possible discharge tomorrow.

## 2019-08-30 ENCOUNTER — PATIENT OUTREACH (OUTPATIENT)
Dept: CARE COORDINATION | Facility: CLINIC | Age: 39
End: 2019-08-30

## 2019-08-30 ENCOUNTER — TELEPHONE (OUTPATIENT)
Dept: GASTROENTEROLOGY | Facility: CLINIC | Age: 39
End: 2019-08-30

## 2019-08-30 NOTE — PLAN OF CARE
Occupational Therapy Discharge Summary    Reason for therapy discharge:    Discharged to home with home therapy.    Progress towards therapy goal(s). See goals on Care Plan in [facility] electronic health record for goal details.  Goals partially met.  Barriers to achieving goals:   discharge from facility.    Therapy recommendation(s):    Continued therapy is recommended.  Rationale/Recommendations:  TCU was previously recommended, would recommend increased supervision and assist for increased safety and IND with ADL/IADL upon return home.

## 2019-09-01 LAB
BACTERIA SPEC CULT: NO GROWTH
SPECIMEN SOURCE: NORMAL

## 2019-09-04 ENCOUNTER — TELEPHONE (OUTPATIENT)
Dept: GASTROENTEROLOGY | Facility: CLINIC | Age: 39
End: 2019-09-04

## 2019-09-25 DIAGNOSIS — K70.11 ALCOHOLIC HEPATITIS WITH ASCITES (H): Primary | ICD-10-CM

## 2019-10-05 ENCOUNTER — HEALTH MAINTENANCE LETTER (OUTPATIENT)
Age: 39
End: 2019-10-05

## 2019-10-08 ENCOUNTER — TELEPHONE (OUTPATIENT)
Dept: GASTROENTEROLOGY | Facility: CLINIC | Age: 39
End: 2019-10-08

## 2019-10-08 ENCOUNTER — OFFICE VISIT (OUTPATIENT)
Dept: GASTROENTEROLOGY | Facility: CLINIC | Age: 39
End: 2019-10-08
Attending: INTERNAL MEDICINE
Payer: MEDICAID

## 2019-10-08 ENCOUNTER — HOSPITAL ENCOUNTER (OUTPATIENT)
Facility: AMBULATORY SURGERY CENTER | Age: 39
End: 2019-10-08
Attending: INTERNAL MEDICINE
Payer: MEDICAID

## 2019-10-08 VITALS
TEMPERATURE: 98.9 F | HEIGHT: 67 IN | DIASTOLIC BLOOD PRESSURE: 88 MMHG | OXYGEN SATURATION: 100 % | BODY MASS INDEX: 21.91 KG/M2 | WEIGHT: 139.6 LBS | HEART RATE: 131 BPM | SYSTOLIC BLOOD PRESSURE: 145 MMHG

## 2019-10-08 DIAGNOSIS — K70.11 ALCOHOLIC HEPATITIS WITH ASCITES (H): ICD-10-CM

## 2019-10-08 DIAGNOSIS — K70.11 ALCOHOLIC HEPATITIS WITH ASCITES (H): Primary | ICD-10-CM

## 2019-10-08 LAB
ALBUMIN SERPL-MCNC: 2.3 G/DL (ref 3.4–5)
ALP SERPL-CCNC: 337 U/L (ref 40–150)
ALT SERPL W P-5'-P-CCNC: 36 U/L (ref 0–50)
ANION GAP SERPL CALCULATED.3IONS-SCNC: 7 MMOL/L (ref 3–14)
AST SERPL W P-5'-P-CCNC: 224 U/L (ref 0–45)
BILIRUB DIRECT SERPL-MCNC: 1.5 MG/DL (ref 0–0.2)
BILIRUB SERPL-MCNC: 1.9 MG/DL (ref 0.2–1.3)
BUN SERPL-MCNC: 5 MG/DL (ref 7–30)
CALCIUM SERPL-MCNC: 8.1 MG/DL (ref 8.5–10.1)
CHLORIDE SERPL-SCNC: 104 MMOL/L (ref 94–109)
CO2 SERPL-SCNC: 25 MMOL/L (ref 20–32)
CREAT SERPL-MCNC: 0.44 MG/DL (ref 0.52–1.04)
ERYTHROCYTE [DISTWIDTH] IN BLOOD BY AUTOMATED COUNT: 17 % (ref 10–15)
GFR SERPL CREATININE-BSD FRML MDRD: >90 ML/MIN/{1.73_M2}
GLUCOSE SERPL-MCNC: 113 MG/DL (ref 70–99)
HCT VFR BLD AUTO: 26.3 % (ref 35–47)
HGB BLD-MCNC: 8.5 G/DL (ref 11.7–15.7)
INR PPP: 1.59 (ref 0.86–1.14)
MCH RBC QN AUTO: 28.7 PG (ref 26.5–33)
MCHC RBC AUTO-ENTMCNC: 32.3 G/DL (ref 31.5–36.5)
MCV RBC AUTO: 89 FL (ref 78–100)
PLATELET # BLD AUTO: 110 10E9/L (ref 150–450)
POTASSIUM SERPL-SCNC: 3.4 MMOL/L (ref 3.4–5.3)
PROT SERPL-MCNC: 8.4 G/DL (ref 6.8–8.8)
RBC # BLD AUTO: 2.96 10E12/L (ref 3.8–5.2)
SODIUM SERPL-SCNC: 136 MMOL/L (ref 133–144)
WBC # BLD AUTO: 7.6 10E9/L (ref 4–11)

## 2019-10-08 PROCEDURE — 85027 COMPLETE CBC AUTOMATED: CPT | Performed by: INTERNAL MEDICINE

## 2019-10-08 PROCEDURE — 85610 PROTHROMBIN TIME: CPT | Performed by: INTERNAL MEDICINE

## 2019-10-08 PROCEDURE — 80076 HEPATIC FUNCTION PANEL: CPT | Performed by: INTERNAL MEDICINE

## 2019-10-08 PROCEDURE — 80048 BASIC METABOLIC PNL TOTAL CA: CPT | Performed by: INTERNAL MEDICINE

## 2019-10-08 PROCEDURE — 36415 COLL VENOUS BLD VENIPUNCTURE: CPT | Performed by: INTERNAL MEDICINE

## 2019-10-08 PROCEDURE — G0463 HOSPITAL OUTPT CLINIC VISIT: HCPCS | Mod: ZF

## 2019-10-08 ASSESSMENT — MIFFLIN-ST. JEOR: SCORE: 1350.85

## 2019-10-08 ASSESSMENT — PAIN SCALES - GENERAL: PAINLEVEL: MILD PAIN (3)

## 2019-10-08 NOTE — LETTER
10/8/2019      RE: Yenifer Maher  [address] [facility] [address] [id number]       I had the pleasure of seeing Yenifer Maher for followup in the Liver Clinic at the [facility] on 10/08/2019.  Ms. Maher returns for followup of alcoholic liver disease.  She had been diagnosed clinically with alcoholic hepatitis previously.  She unfortunately continues to drink at this time.  She drank as recently as last night and actually smells of alcohol this morning.  She said she just had a couple glasses of wine, but it certainly appears more than that.  We did finally get her biopsy result that she had done at [facility] that showed that she had alcoholic steatohepatitis with cirrhosis at that time.      She denies any abdominal pain, itching or skin rash and has mild fatigue.  She does complain of some increased abdominal girth, but no lower extremity edema.  She denies any fevers or chills, cough or shortness of breath.  She denies any nausea or vomiting and is having about 2 bowel movements per day.  Her appetite has been adequate, and her weight has been stable.     Current Outpatient Medications   Medication     Ca Carbonate-Mag Hydroxide (ROLAIDS EXTRA STRENGTH PO)     estradiol (VIVELLE-DOT) 0.1 MG/24HR bi-weekly patch     fexofenadine (ALLEGRA) 180 MG tablet     folic acid (FOLVITE) 1 MG tablet     furosemide (LASIX) 20 MG tablet     magnesium oxide (MAG-OX) 400 (240 Mg) MG tablet     nortriptyline (PAMELOR) 10 MG capsule     omeprazole (PRILOSEC) 40 MG DR capsule     polyethylene glycol (MIRALAX/GLYCOLAX) packet     pregabalin (LYRICA) 50 MG capsule     Prenatal Vit-Fe Fumarate-FA (PRENATAL MULTIVITAMIN W/IRON) 27-0.8 MG tablet     sodium-potassium bicarbonate (FRIDA-SELTZER GOLD) TBEF solu-tab     spironolactone (ALDACTONE) 50 MG tablet     vitamin (B COMPLEX-C) tablet     vitamin B1 (THIAMINE) 100 MG tablet     No current facility-administered medications for this visit.      BP (!) 145/88    "Pulse 131   Temp 98.9  F (37.2  C) (Oral)   Ht 1.702 m (5' 7\")   Wt 63.3 kg (139 lb 9.6 oz)   SpO2 100%   BMI 21.86 kg/m       PHYSICAL EXAMINATION:  In general, she does look chronically ill.  As I mentioned, she does have alcohol on her breath.  HEENT exam shows mild scleral icterus.  She has mild temporal muscle wasting.  Her chest is clear.  Her abdominal exam shows some increased abdominal girth.  It is difficult to appreciate whether ascites is present.  Her liver is 10-11 cm in span with a prominent left lobe.  No spleen tip is palpable.  Extremity exam shows no edema.  Skin exam shows numerous spider angiomas about her chest and some palmar erythema.  Neurologic exam shows some tremulousness, but no asterixis.     Recent Results (from the past 168 hour(s))   INR    Collection Time: 10/08/19  7:21 AM   Result Value Ref Range    INR 1.59 (H) 0.86 - 1.14   Hepatic panel    Collection Time: 10/08/19  7:21 AM   Result Value Ref Range    Bilirubin Direct 1.5 (H) 0.0 - 0.2 mg/dL    Bilirubin Total 1.9 (H) 0.2 - 1.3 mg/dL    Albumin 2.3 (L) 3.4 - 5.0 g/dL    Protein Total 8.4 6.8 - 8.8 g/dL    Alkaline Phosphatase 337 (H) 40 - 150 U/L    ALT 36 0 - 50 U/L     (H) 0 - 45 U/L   Basic metabolic panel    Collection Time: 10/08/19  7:21 AM   Result Value Ref Range    Sodium 136 133 - 144 mmol/L    Potassium 3.4 3.4 - 5.3 mmol/L    Chloride 104 94 - 109 mmol/L    Carbon Dioxide 25 20 - 32 mmol/L    Anion Gap 7 3 - 14 mmol/L    Glucose 113 (H) 70 - 99 mg/dL    Urea Nitrogen 5 (L) 7 - 30 mg/dL    Creatinine 0.44 (L) 0.52 - 1.04 mg/dL    GFR Estimate >90 >60 mL/min/[1.73_m2]    GFR Estimate If Black >90 >60 mL/min/[1.73_m2]    Calcium 8.1 (L) 8.5 - 10.1 mg/dL   CBC with platelets    Collection Time: 10/08/19  7:21 AM   Result Value Ref Range    WBC 7.6 4.0 - 11.0 10e9/L    RBC Count 2.96 (L) 3.8 - 5.2 10e12/L    Hemoglobin 8.5 (L) 11.7 - 15.7 g/dL    Hematocrit 26.3 (L) 35.0 - 47.0 %    MCV 89 78 - 100 fl    MCH " 28.7 26.5 - 33.0 pg    MCHC 32.3 31.5 - 36.5 g/dL    RDW 17.0 (H) 10.0 - 15.0 %    Platelet Count 110 (L) 150 - 450 10e9/L      IMPRESSION:  My impression is that Ms. Maher has alcoholic cirrhosis.  Her liver enzymes are still quite elevated and I am sure this reflects her ongoing alcohol use.  She is planning on entering an alcohol treatment program.  I did query them as to whether they would be able to manage her alcohol withdrawal, which I am sure she will develop.  She says they will.  I have encouraged her to follow through on that and again strongly advised her to stop all alcohol use.  As I mentioned, we do have confirmation that she does have alcoholic cirrhosis which counters what her belief was about her liver disease.      Thank you very much for allowing me to participate in the care of this patient.  If you have any questions regarding my recommendations, please do not hesitate to contact me.  My plan will be to see the patient back in the clinic in 3 months.       William Hernández MD      Professor of Medicine  [employer]      Executive Medical Director, Solid Organ Transplant Program  [facility]     William Hernández MD

## 2019-10-08 NOTE — NURSING NOTE
"Chief Complaint   Patient presents with     RECHECK     Alcoholic hepatitis with ascites     BP (!) 145/88   Pulse 131   Temp 98.9  F (37.2  C) (Oral)   Ht 1.702 m (5' 7\")   Wt 63.3 kg (139 lb 9.6 oz)   SpO2 100%   BMI 21.86 kg/m    Eva Resendez, JASPREET    "

## 2019-10-08 NOTE — PROGRESS NOTES
"I had the pleasure of seeing Yenifer Maher for followup in the Liver Clinic at the [facility] on 10/08/2019.  Ms. Maher returns for followup of alcoholic liver disease.  She had been diagnosed clinically with alcoholic hepatitis previously.  She unfortunately continues to drink at this time.  She drank as recently as last night and actually smells of alcohol this morning.  She said she just had a couple glasses of wine, but it certainly appears more than that.  We did finally get her biopsy result that she had done at [facility] that showed that she had alcoholic steatohepatitis with cirrhosis at that time.      She denies any abdominal pain, itching or skin rash and has mild fatigue.  She does complain of some increased abdominal girth, but no lower extremity edema.  She denies any fevers or chills, cough or shortness of breath.  She denies any nausea or vomiting and is having about 2 bowel movements per day.  Her appetite has been adequate, and her weight has been stable.     Current Outpatient Medications   Medication     Ca Carbonate-Mag Hydroxide (ROLAIDS EXTRA STRENGTH PO)     estradiol (VIVELLE-DOT) 0.1 MG/24HR bi-weekly patch     fexofenadine (ALLEGRA) 180 MG tablet     folic acid (FOLVITE) 1 MG tablet     furosemide (LASIX) 20 MG tablet     magnesium oxide (MAG-OX) 400 (240 Mg) MG tablet     nortriptyline (PAMELOR) 10 MG capsule     omeprazole (PRILOSEC) 40 MG DR capsule     polyethylene glycol (MIRALAX/GLYCOLAX) packet     pregabalin (LYRICA) 50 MG capsule     Prenatal Vit-Fe Fumarate-FA (PRENATAL MULTIVITAMIN W/IRON) 27-0.8 MG tablet     sodium-potassium bicarbonate (FRIDA-SELTZER GOLD) TBEF solu-tab     spironolactone (ALDACTONE) 50 MG tablet     vitamin (B COMPLEX-C) tablet     vitamin B1 (THIAMINE) 100 MG tablet     No current facility-administered medications for this visit.      BP (!) 145/88   Pulse 131   Temp 98.9  F (37.2  C) (Oral)   Ht 1.702 m (5' 7\")   Wt 63.3 kg " (139 lb 9.6 oz)   SpO2 100%   BMI 21.86 kg/m      PHYSICAL EXAMINATION:  In general, she does look chronically ill.  As I mentioned, she does have alcohol on her breath.  HEENT exam shows mild scleral icterus.  She has mild temporal muscle wasting.  Her chest is clear.  Her abdominal exam shows some increased abdominal girth.  It is difficult to appreciate whether ascites is present.  Her liver is 10-11 cm in span with a prominent left lobe.  No spleen tip is palpable.  Extremity exam shows no edema.  Skin exam shows numerous spider angiomas about her chest and some palmar erythema.  Neurologic exam shows some tremulousness, but no asterixis.     Recent Results (from the past 168 hour(s))   INR    Collection Time: 10/08/19  7:21 AM   Result Value Ref Range    INR 1.59 (H) 0.86 - 1.14   Hepatic panel    Collection Time: 10/08/19  7:21 AM   Result Value Ref Range    Bilirubin Direct 1.5 (H) 0.0 - 0.2 mg/dL    Bilirubin Total 1.9 (H) 0.2 - 1.3 mg/dL    Albumin 2.3 (L) 3.4 - 5.0 g/dL    Protein Total 8.4 6.8 - 8.8 g/dL    Alkaline Phosphatase 337 (H) 40 - 150 U/L    ALT 36 0 - 50 U/L     (H) 0 - 45 U/L   Basic metabolic panel    Collection Time: 10/08/19  7:21 AM   Result Value Ref Range    Sodium 136 133 - 144 mmol/L    Potassium 3.4 3.4 - 5.3 mmol/L    Chloride 104 94 - 109 mmol/L    Carbon Dioxide 25 20 - 32 mmol/L    Anion Gap 7 3 - 14 mmol/L    Glucose 113 (H) 70 - 99 mg/dL    Urea Nitrogen 5 (L) 7 - 30 mg/dL    Creatinine 0.44 (L) 0.52 - 1.04 mg/dL    GFR Estimate >90 >60 mL/min/[1.73_m2]    GFR Estimate If Black >90 >60 mL/min/[1.73_m2]    Calcium 8.1 (L) 8.5 - 10.1 mg/dL   CBC with platelets    Collection Time: 10/08/19  7:21 AM   Result Value Ref Range    WBC 7.6 4.0 - 11.0 10e9/L    RBC Count 2.96 (L) 3.8 - 5.2 10e12/L    Hemoglobin 8.5 (L) 11.7 - 15.7 g/dL    Hematocrit 26.3 (L) 35.0 - 47.0 %    MCV 89 78 - 100 fl    MCH 28.7 26.5 - 33.0 pg    MCHC 32.3 31.5 - 36.5 g/dL    RDW 17.0 (H) 10.0 - 15.0 %     Platelet Count 110 (L) 150 - 450 10e9/L      IMPRESSION:  My impression is that Ms. Maher has alcoholic cirrhosis.  Her liver enzymes are still quite elevated and I am sure this reflects her ongoing alcohol use.  She is planning on entering an alcohol treatment program.  I did query them as to whether they would be able to manage her alcohol withdrawal, which I am sure she will develop.  She says they will.  I have encouraged her to follow through on that and again strongly advised her to stop all alcohol use.  As I mentioned, we do have confirmation that she does have alcoholic cirrhosis which counters what her belief was about her liver disease.      Thank you very much for allowing me to participate in the care of this patient.  If you have any questions regarding my recommendations, please do not hesitate to contact me.  My plan will be to see the patient back in the clinic in 3 months.       William Hernández MD      Professor of Medicine  [employer]      Executive Medical Director, Solid Organ Transplant Program  [facility]

## 2019-10-16 ENCOUNTER — TELEPHONE (OUTPATIENT)
Dept: GASTROENTEROLOGY | Facility: CLINIC | Age: 39
End: 2019-10-16

## 2019-10-23 ENCOUNTER — HOSPITAL ENCOUNTER (OUTPATIENT)
Facility: AMBULATORY SURGERY CENTER | Age: 39
End: 2019-10-23
Attending: INTERNAL MEDICINE
Payer: MEDICAID

## 2019-10-23 DIAGNOSIS — K70.30 ALCOHOLIC CIRRHOSIS, UNSPECIFIED WHETHER ASCITES PRESENT (H): Primary | ICD-10-CM

## 2019-10-23 DIAGNOSIS — K92.2 UPPER GI BLEED: ICD-10-CM

## 2019-10-25 ENCOUNTER — TELEPHONE (OUTPATIENT)
Dept: GASTROENTEROLOGY | Facility: CLINIC | Age: 39
End: 2019-10-25

## 2019-10-25 NOTE — TELEPHONE ENCOUNTER
Patient Name: Yenifer Maher   : 1980  MRN: [id number]       : [x] N/A       VM et MyChart with information needed to complete pre-assessment call.  Request pt contact Endoscopy Pre-assessment RN to complete upcoming procedure information.  . Telephone call-back number provided.      Instructions resent via [email or web address]  - This includes Request to contact Pre-Assessment RN et information that may be complete by VM if necessary, EGD  instructions, Conscious Sedation policy Instructions, procedure date/time/location/provider.      Destinee Urena, RN, RN  [facility] End[facility]    Additional Information regarding appointment:      Patient scheduled for:  [x] EGD      Indication for procedure. [x] Alcoholic hepatitis with ascites     Sedation Type: [x] Conscious Sedation       Procedure Provider:  [place]      Referring Provider. Flako Lorenz (PCP); William Hernández    Arrival time verified: Wed / [phone]    Facility location verified:   [x][address], 5th floor       Prep Type:   [x]NPO /p 0300, No solid food /p 2200 the night before    Anticoagulants or blood thinners: [x]None               Electronic implanted devices: [x] No      H&P / Pre op physical completed: [x] N/A    Additional Information: None at this time.   _______________________________________________

## 2019-10-29 RX ORDER — ONDANSETRON 2 MG/ML
4 INJECTION INTRAMUSCULAR; INTRAVENOUS
Status: CANCELLED | OUTPATIENT
Start: 2019-10-29

## 2019-10-29 RX ORDER — LIDOCAINE 40 MG/G
CREAM TOPICAL
Status: CANCELLED | OUTPATIENT
Start: 2019-10-29

## 2019-12-23 ENCOUNTER — HOSPITAL ENCOUNTER (INPATIENT)
Facility: CLINIC | Age: 39
LOS: 5 days | Discharge: HOME | End: 2019-12-28
Attending: EMERGENCY MEDICINE | Admitting: SURGERY
Payer: MEDICAID

## 2019-12-23 DIAGNOSIS — R76.0 LUPUS ANTICOAGULANT POSITIVE: ICD-10-CM

## 2019-12-23 DIAGNOSIS — E83.59 CALCIPHYLAXIS: ICD-10-CM

## 2019-12-23 DIAGNOSIS — R53.81 PHYSICAL DECONDITIONING: ICD-10-CM

## 2019-12-23 DIAGNOSIS — F10.930 ALCOHOL WITHDRAWAL SYNDROME WITHOUT COMPLICATION (H): ICD-10-CM

## 2019-12-23 DIAGNOSIS — K92.2 UGIB (UPPER GASTROINTESTINAL BLEED): Primary | ICD-10-CM

## 2019-12-23 DIAGNOSIS — K92.0 HEMATEMESIS WITH NAUSEA: ICD-10-CM

## 2019-12-23 LAB
ALBUMIN SERPL-MCNC: 2 G/DL (ref 3.4–5)
ALBUMIN SERPL-MCNC: 2.2 G/DL (ref 3.4–5)
ALP SERPL-CCNC: 187 U/L (ref 40–150)
ALP SERPL-CCNC: 225 U/L (ref 40–150)
ALT SERPL W P-5'-P-CCNC: 29 U/L (ref 0–50)
ALT SERPL W P-5'-P-CCNC: 30 U/L (ref 0–50)
AMMONIA PLAS-SCNC: <10 UMOL/L (ref 10–50)
ANION GAP SERPL CALCULATED.3IONS-SCNC: 6 MMOL/L (ref 3–14)
ANION GAP SERPL CALCULATED.3IONS-SCNC: 8 MMOL/L (ref 3–14)
APTT PPP: 51 SEC (ref 22–37)
AST SERPL W P-5'-P-CCNC: 252 U/L (ref 0–45)
AST SERPL W P-5'-P-CCNC: 276 U/L (ref 0–45)
BASOPHILS # BLD AUTO: 0.1 10E9/L (ref 0–0.2)
BASOPHILS NFR BLD AUTO: 0.7 %
BILIRUB SERPL-MCNC: 2.6 MG/DL (ref 0.2–1.3)
BILIRUB SERPL-MCNC: 3.2 MG/DL (ref 0.2–1.3)
BUN SERPL-MCNC: 15 MG/DL (ref 7–30)
BUN SERPL-MCNC: 19 MG/DL (ref 7–30)
CALCIUM SERPL-MCNC: 7.5 MG/DL (ref 8.5–10.1)
CALCIUM SERPL-MCNC: 8.2 MG/DL (ref 8.5–10.1)
CHLORIDE SERPL-SCNC: 105 MMOL/L (ref 94–109)
CHLORIDE SERPL-SCNC: 108 MMOL/L (ref 94–109)
CO2 BLDCOV-SCNC: 26 MMOL/L (ref 21–28)
CO2 SERPL-SCNC: 23 MMOL/L (ref 20–32)
CO2 SERPL-SCNC: 24 MMOL/L (ref 20–32)
CREAT SERPL-MCNC: 0.5 MG/DL (ref 0.52–1.04)
CREAT SERPL-MCNC: 0.56 MG/DL (ref 0.52–1.04)
DIFFERENTIAL METHOD BLD: ABNORMAL
EOSINOPHIL # BLD AUTO: 0.1 10E9/L (ref 0–0.7)
EOSINOPHIL NFR BLD AUTO: 1.1 %
ERYTHROCYTE [DISTWIDTH] IN BLOOD BY AUTOMATED COUNT: 17.2 % (ref 10–15)
ERYTHROCYTE [DISTWIDTH] IN BLOOD BY AUTOMATED COUNT: 17.2 % (ref 10–15)
ETHANOL SERPL-MCNC: 0.01 G/DL
GFR SERPL CREATININE-BSD FRML MDRD: >90 ML/MIN/{1.73_M2}
GFR SERPL CREATININE-BSD FRML MDRD: >90 ML/MIN/{1.73_M2}
GLUCOSE BLDC GLUCOMTR-MCNC: 79 MG/DL (ref 70–99)
GLUCOSE SERPL-MCNC: 78 MG/DL (ref 70–99)
GLUCOSE SERPL-MCNC: 89 MG/DL (ref 70–99)
HCT VFR BLD AUTO: 19.3 % (ref 35–47)
HCT VFR BLD AUTO: 21.9 % (ref 35–47)
HGB BLD-MCNC: 6 G/DL (ref 11.7–15.7)
HGB BLD-MCNC: 7 G/DL (ref 11.7–15.7)
IMM GRANULOCYTES # BLD: 0.1 10E9/L (ref 0–0.4)
IMM GRANULOCYTES NFR BLD: 0.6 %
INR PPP: 1.5 (ref 0.86–1.14)
LACTATE BLD-SCNC: 1.2 MMOL/L (ref 0.7–2)
LACTATE BLD-SCNC: 1.8 MMOL/L (ref 0.7–2.1)
LYMPHOCYTES # BLD AUTO: 1.1 10E9/L (ref 0.8–5.3)
LYMPHOCYTES NFR BLD AUTO: 10.3 %
MAGNESIUM SERPL-MCNC: 1.4 MG/DL (ref 1.6–2.3)
MAGNESIUM SERPL-MCNC: 1.4 MG/DL (ref 1.6–2.3)
MCH RBC QN AUTO: 31.4 PG (ref 26.5–33)
MCH RBC QN AUTO: 31.8 PG (ref 26.5–33)
MCHC RBC AUTO-ENTMCNC: 31.1 G/DL (ref 31.5–36.5)
MCHC RBC AUTO-ENTMCNC: 32 G/DL (ref 31.5–36.5)
MCV RBC AUTO: 100 FL (ref 78–100)
MCV RBC AUTO: 101 FL (ref 78–100)
MONOCYTES # BLD AUTO: 1 10E9/L (ref 0–1.3)
MONOCYTES NFR BLD AUTO: 8.9 %
NEUTROPHILS # BLD AUTO: 8.4 10E9/L (ref 1.6–8.3)
NEUTROPHILS NFR BLD AUTO: 78.4 %
NRBC # BLD AUTO: 0 10*3/UL
NRBC BLD AUTO-RTO: 0 /100
PCO2 BLDV: 29 MM HG (ref 40–50)
PH BLDV: 7.57 PH (ref 7.32–7.43)
PHOSPHATE SERPL-MCNC: 2.2 MG/DL (ref 2.5–4.5)
PLATELET # BLD AUTO: 101 10E9/L (ref 150–450)
PLATELET # BLD AUTO: 77 10E9/L (ref 150–450)
PO2 BLDV: 46 MM HG (ref 25–47)
POTASSIUM SERPL-SCNC: 3.6 MMOL/L (ref 3.4–5.3)
POTASSIUM SERPL-SCNC: 3.8 MMOL/L (ref 3.4–5.3)
PROT SERPL-MCNC: 7.4 G/DL (ref 6.8–8.8)
PROT SERPL-MCNC: 8.6 G/DL (ref 6.8–8.8)
RBC # BLD AUTO: 1.91 10E12/L (ref 3.8–5.2)
RBC # BLD AUTO: 2.2 10E12/L (ref 3.8–5.2)
SAO2 % BLDV FROM PO2: 88 %
SODIUM SERPL-SCNC: 137 MMOL/L (ref 133–144)
SODIUM SERPL-SCNC: 138 MMOL/L (ref 133–144)
WBC # BLD AUTO: 10.7 10E9/L (ref 4–11)
WBC # BLD AUTO: 8.2 10E9/L (ref 4–11)

## 2019-12-23 PROCEDURE — 83735 ASSAY OF MAGNESIUM: CPT | Performed by: EMERGENCY MEDICINE

## 2019-12-23 PROCEDURE — 25000128 H RX IP 250 OP 636: Performed by: EMERGENCY MEDICINE

## 2019-12-23 PROCEDURE — 96365 THER/PROPH/DIAG IV INF INIT: CPT | Performed by: EMERGENCY MEDICINE

## 2019-12-23 PROCEDURE — 83605 ASSAY OF LACTIC ACID: CPT

## 2019-12-23 PROCEDURE — 83735 ASSAY OF MAGNESIUM: CPT | Performed by: STUDENT IN AN ORGANIZED HEALTH CARE EDUCATION/TRAINING PROGRAM

## 2019-12-23 PROCEDURE — 84100 ASSAY OF PHOSPHORUS: CPT | Performed by: STUDENT IN AN ORGANIZED HEALTH CARE EDUCATION/TRAINING PROGRAM

## 2019-12-23 PROCEDURE — 83605 ASSAY OF LACTIC ACID: CPT | Performed by: INTERNAL MEDICINE

## 2019-12-23 PROCEDURE — 85025 COMPLETE CBC W/AUTO DIFF WBC: CPT | Performed by: EMERGENCY MEDICINE

## 2019-12-23 PROCEDURE — 82803 BLOOD GASES ANY COMBINATION: CPT

## 2019-12-23 PROCEDURE — 20000004 ZZH R&B ICU UMMC

## 2019-12-23 PROCEDURE — 96361 HYDRATE IV INFUSION ADD-ON: CPT | Performed by: EMERGENCY MEDICINE

## 2019-12-23 PROCEDURE — HZ2ZZZZ DETOXIFICATION SERVICES FOR SUBSTANCE ABUSE TREATMENT: ICD-10-PCS | Performed by: INTERNAL MEDICINE

## 2019-12-23 PROCEDURE — 80320 DRUG SCREEN QUANTALCOHOLS: CPT | Performed by: EMERGENCY MEDICINE

## 2019-12-23 PROCEDURE — 96367 TX/PROPH/DG ADDL SEQ IV INF: CPT | Performed by: EMERGENCY MEDICINE

## 2019-12-23 PROCEDURE — 96376 TX/PRO/DX INJ SAME DRUG ADON: CPT | Performed by: EMERGENCY MEDICINE

## 2019-12-23 PROCEDURE — 25800030 ZZH RX IP 258 OP 636: Performed by: EMERGENCY MEDICINE

## 2019-12-23 PROCEDURE — 96366 THER/PROPH/DIAG IV INF ADDON: CPT | Performed by: EMERGENCY MEDICINE

## 2019-12-23 PROCEDURE — 86901 BLOOD TYPING SEROLOGIC RH(D): CPT | Performed by: STUDENT IN AN ORGANIZED HEALTH CARE EDUCATION/TRAINING PROGRAM

## 2019-12-23 PROCEDURE — 00000146 ZZHCL STATISTIC GLUCOSE BY METER IP

## 2019-12-23 PROCEDURE — 85610 PROTHROMBIN TIME: CPT | Performed by: EMERGENCY MEDICINE

## 2019-12-23 PROCEDURE — 99233 SBSQ HOSP IP/OBS HIGH 50: CPT | Mod: GC | Performed by: SURGERY

## 2019-12-23 PROCEDURE — 25000125 ZZHC RX 250: Performed by: EMERGENCY MEDICINE

## 2019-12-23 PROCEDURE — 99285 EMERGENCY DEPT VISIT HI MDM: CPT | Mod: 25 | Performed by: EMERGENCY MEDICINE

## 2019-12-23 PROCEDURE — 86900 BLOOD TYPING SEROLOGIC ABO: CPT | Performed by: STUDENT IN AN ORGANIZED HEALTH CARE EDUCATION/TRAINING PROGRAM

## 2019-12-23 PROCEDURE — 80053 COMPREHEN METABOLIC PANEL: CPT | Performed by: STUDENT IN AN ORGANIZED HEALTH CARE EDUCATION/TRAINING PROGRAM

## 2019-12-23 PROCEDURE — 85730 THROMBOPLASTIN TIME PARTIAL: CPT | Performed by: EMERGENCY MEDICINE

## 2019-12-23 PROCEDURE — 36415 COLL VENOUS BLD VENIPUNCTURE: CPT

## 2019-12-23 PROCEDURE — 86923 COMPATIBILITY TEST ELECTRIC: CPT | Performed by: EMERGENCY MEDICINE

## 2019-12-23 PROCEDURE — 85027 COMPLETE CBC AUTOMATED: CPT | Performed by: STUDENT IN AN ORGANIZED HEALTH CARE EDUCATION/TRAINING PROGRAM

## 2019-12-23 PROCEDURE — 86850 RBC ANTIBODY SCREEN: CPT | Performed by: STUDENT IN AN ORGANIZED HEALTH CARE EDUCATION/TRAINING PROGRAM

## 2019-12-23 PROCEDURE — 86850 RBC ANTIBODY SCREEN: CPT | Performed by: EMERGENCY MEDICINE

## 2019-12-23 PROCEDURE — 86900 BLOOD TYPING SEROLOGIC ABO: CPT | Performed by: EMERGENCY MEDICINE

## 2019-12-23 PROCEDURE — 80053 COMPREHEN METABOLIC PANEL: CPT | Performed by: EMERGENCY MEDICINE

## 2019-12-23 PROCEDURE — 99291 CRITICAL CARE FIRST HOUR: CPT | Mod: 25 | Performed by: EMERGENCY MEDICINE

## 2019-12-23 PROCEDURE — C9113 INJ PANTOPRAZOLE SODIUM, VIA: HCPCS | Performed by: EMERGENCY MEDICINE

## 2019-12-23 PROCEDURE — 86901 BLOOD TYPING SEROLOGIC RH(D): CPT | Performed by: EMERGENCY MEDICINE

## 2019-12-23 PROCEDURE — 25800025 ZZH RX 258: Performed by: EMERGENCY MEDICINE

## 2019-12-23 PROCEDURE — 82140 ASSAY OF AMMONIA: CPT | Performed by: EMERGENCY MEDICINE

## 2019-12-23 RX ORDER — LORAZEPAM 0.5 MG/1
1-2 TABLET ORAL EVERY 30 MIN PRN
Status: DISCONTINUED | OUTPATIENT
Start: 2019-12-23 | End: 2019-12-25

## 2019-12-23 RX ORDER — CALCIUM CARBONATE 750 MG/1
2-4 TABLET, CHEWABLE ORAL PRN
COMMUNITY

## 2019-12-23 RX ORDER — LORAZEPAM 2 MG/ML
1-2 INJECTION INTRAMUSCULAR EVERY 30 MIN PRN
Status: DISCONTINUED | OUTPATIENT
Start: 2019-12-23 | End: 2019-12-23

## 2019-12-23 RX ORDER — OCTREOTIDE ACETATE 50 UG/ML
50 INJECTION, SOLUTION INTRAVENOUS; SUBCUTANEOUS ONCE
Status: COMPLETED | OUTPATIENT
Start: 2019-12-23 | End: 2019-12-23

## 2019-12-23 RX ORDER — CEFTRIAXONE 2 G/1
2 INJECTION, POWDER, FOR SOLUTION INTRAMUSCULAR; INTRAVENOUS ONCE
Status: COMPLETED | OUTPATIENT
Start: 2019-12-23 | End: 2019-12-23

## 2019-12-23 RX ORDER — LORAZEPAM 1 MG/1
1-2 TABLET ORAL EVERY 30 MIN PRN
Status: DISCONTINUED | OUTPATIENT
Start: 2019-12-23 | End: 2019-12-23

## 2019-12-23 RX ORDER — LIDOCAINE 40 MG/G
CREAM TOPICAL
Status: DISCONTINUED | OUTPATIENT
Start: 2019-12-23 | End: 2019-12-28 | Stop reason: HOSPADM

## 2019-12-23 RX ORDER — SODIUM CHLORIDE 9 MG/ML
1000 INJECTION, SOLUTION INTRAVENOUS CONTINUOUS
Status: DISCONTINUED | OUTPATIENT
Start: 2019-12-23 | End: 2019-12-24

## 2019-12-23 RX ORDER — ONDANSETRON 2 MG/ML
4 INJECTION INTRAMUSCULAR; INTRAVENOUS EVERY 6 HOURS PRN
Status: DISCONTINUED | OUTPATIENT
Start: 2019-12-23 | End: 2019-12-25

## 2019-12-23 RX ORDER — THIAMINE HYDROCHLORIDE 100 MG/ML
100 INJECTION, SOLUTION INTRAMUSCULAR; INTRAVENOUS DAILY
Status: DISCONTINUED | OUTPATIENT
Start: 2019-12-24 | End: 2019-12-24

## 2019-12-23 RX ORDER — ONDANSETRON 4 MG/1
4 TABLET, ORALLY DISINTEGRATING ORAL EVERY 6 HOURS PRN
Status: DISCONTINUED | OUTPATIENT
Start: 2019-12-23 | End: 2019-12-28 | Stop reason: HOSPADM

## 2019-12-23 RX ORDER — DIAZEPAM 5 MG
10 TABLET ORAL EVERY 30 MIN PRN
Status: DISCONTINUED | OUTPATIENT
Start: 2019-12-23 | End: 2019-12-24

## 2019-12-23 RX ORDER — CEFTRIAXONE 1 G/1
1 INJECTION, POWDER, FOR SOLUTION INTRAMUSCULAR; INTRAVENOUS EVERY 24 HOURS
Status: DISCONTINUED | OUTPATIENT
Start: 2019-12-24 | End: 2019-12-24

## 2019-12-23 RX ORDER — HALOPERIDOL 5 MG/ML
2.5-5 INJECTION INTRAMUSCULAR EVERY 4 HOURS PRN
Status: DISCONTINUED | OUTPATIENT
Start: 2019-12-23 | End: 2019-12-28 | Stop reason: HOSPADM

## 2019-12-23 RX ORDER — DIAZEPAM 10 MG/2ML
5-10 INJECTION, SOLUTION INTRAMUSCULAR; INTRAVENOUS EVERY 30 MIN PRN
Status: DISCONTINUED | OUTPATIENT
Start: 2019-12-23 | End: 2019-12-24

## 2019-12-23 RX ORDER — NALOXONE HYDROCHLORIDE 0.4 MG/ML
.1-.4 INJECTION, SOLUTION INTRAMUSCULAR; INTRAVENOUS; SUBCUTANEOUS
Status: DISCONTINUED | OUTPATIENT
Start: 2019-12-23 | End: 2019-12-25

## 2019-12-23 RX ORDER — LORAZEPAM 2 MG/ML
1-2 INJECTION INTRAMUSCULAR EVERY 30 MIN PRN
Status: DISCONTINUED | OUTPATIENT
Start: 2019-12-23 | End: 2019-12-25

## 2019-12-23 RX ORDER — METOPROLOL TARTRATE 1 MG/ML
5 INJECTION, SOLUTION INTRAVENOUS EVERY 6 HOURS PRN
Status: DISCONTINUED | OUTPATIENT
Start: 2019-12-23 | End: 2019-12-25

## 2019-12-23 RX ADMIN — PANTOPRAZOLE SODIUM 80 MG: 40 INJECTION, POWDER, FOR SOLUTION INTRAVENOUS at 14:47

## 2019-12-23 RX ADMIN — FOLIC ACID: 5 INJECTION, SOLUTION INTRAMUSCULAR; INTRAVENOUS; SUBCUTANEOUS at 15:46

## 2019-12-23 RX ADMIN — CEFTRIAXONE SODIUM 2 G: 2 INJECTION, POWDER, FOR SOLUTION INTRAMUSCULAR; INTRAVENOUS at 14:51

## 2019-12-23 RX ADMIN — SODIUM CHLORIDE 8 MG/HR: 9 INJECTION, SOLUTION INTRAVENOUS at 15:33

## 2019-12-23 RX ADMIN — OCTREOTIDE ACETATE 50 MCG: 50 INJECTION, SOLUTION INTRAVENOUS; SUBCUTANEOUS at 15:13

## 2019-12-23 RX ADMIN — SODIUM CHLORIDE 1000 ML: 9 INJECTION, SOLUTION INTRAVENOUS at 14:45

## 2019-12-23 RX ADMIN — OCTREOTIDE ACETATE 50 MCG/HR: 200 INJECTION, SOLUTION INTRAVENOUS; SUBCUTANEOUS at 15:18

## 2019-12-23 ASSESSMENT — ACTIVITIES OF DAILY LIVING (ADL)
SWALLOWING: 0-->SWALLOWS FOODS/LIQUIDS WITHOUT DIFFICULTY
RETIRED_COMMUNICATION: 0-->UNDERSTANDS/COMMUNICATES WITHOUT DIFFICULTY
COGNITION: 0 - NO COGNITION ISSUES REPORTED
TRANSFERRING: 1-->ASSISTIVE EQUIPMENT
BATHING: 2-->ASSISTIVE PERSON
TOILETING: 0-->INDEPENDENT
RETIRED_EATING: 0-->INDEPENDENT
DRESS: 0-->INDEPENDENT
FALL_HISTORY_WITHIN_LAST_SIX_MONTHS: NO
AMBULATION: 1-->ASSISTIVE EQUIPMENT

## 2019-12-23 ASSESSMENT — ENCOUNTER SYMPTOMS
VOMITING: 1
ROS GI COMMENTS: POSITIVE FOR HEMATEMESIS
BLOOD IN STOOL: 1
CONFUSION: 1
NAUSEA: 1

## 2019-12-23 ASSESSMENT — MIFFLIN-ST. JEOR: SCORE: 1340.63

## 2019-12-23 NOTE — ED NOTES
[facility] [place]   ED Nurse to Floor Handoff     Yenifer Maher is a 39 year old female who speaks English and lives with a spouse,  in a home  They arrived in the ED by car from home    ED Chief Complaint: Melena    ED Dx;   Final diagnoses:   Hematemesis with nausea   Alcohol withdrawal syndrome without complication (H)         Needed?: No    Allergies:   Allergies   Allergen Reactions     Bengay Pain Relief [Menthol] Other (See Comments)     Skin turns red and feels extremely hot     Cats      Chocolate Dermatitis     Dicyclomine Other (See Comments)     Severe sedation     Dust Mites      Derm, resp     No Clinical Screening - See Comments      GI upset     Phenobarbital      Pollen Extract      Derm, resp.    .  Past Medical Hx:   Past Medical History:   Diagnosis Date     Alcohol abuse      Alcoholic cirrhosis (H)      Alcoholic peripheral neuropathy (H)      Coagulopathy (H)      Gastritis      History of Clostridium difficile colitis     unknown date     Impairment of cognitive function     MOCA 2/2019 22/30     Leukocytosis 02/2019    persistent leukocytosis across 2/2019 hospitalization without evidence of source across multiple diagnostics including LP, BCx, UCx      Lupus anticoagulant positive      Macrocytic anemia      Moderate protein-calorie malnutrition (H)      Paroxysmal A-fib (H) 02/2019    not on chronic anticoagulation     Peptic ulcer disease      Positive SMILEY (antinuclear antibody)      Subclinical hypothyroidism 02/2019    normal T3, T4     Tobacco dependence     0.5 PPD      Baseline Mental status: WDL  Current Mental Status changes: at basesline    Infection present or suspected this encounter: no  Sepsis suspected: No  Isolation type: No active isolations     Activity level - Baseline/Home:  Independent  Activity Level - Current:   Stand with Assist    Bariatric equipment needed?: No    In the ED these meds were given:   Medications   0.9%  sodium chloride BOLUS (0 mLs Intravenous Stopped 12/23/19 1546)     Followed by   sodium chloride 0.9% infusion (has no administration in time range)   pantoprazole (PROTONIX) 80 mg in sodium chloride 0.9 % 100 mL infusion (8 mg/hr Intravenous New Bag 12/23/19 1533)   octreotide (sandoSTATIN) 1,250 mcg in sodium chloride 0.9 % 250 mL (50 mcg/hr Intravenous New Bag 12/23/19 1518)   diazepam (VALIUM) tablet 10 mg (has no administration in time range)     Or   diazepam (VALIUM) injection 5-10 mg (has no administration in time range)   magnesium sulfate 2 g in NS intermittent infusion (PharMEDium or FV Cmpd) (has no administration in time range)   dextrose 5% and 0.45% NaCl 1,000 mL with Infuvite Adult 10 mL, thiamine 100 mg, folic acid 1 mg infusion ( Intravenous New Bag 12/23/19 1546)   pantoprazole (PROTONIX) 40 mg IV push injection (80 mg Intravenous Given 12/23/19 1447)   octreotide (sandoSTATIN) injection 50 mcg (50 mcg Intravenous Given 12/23/19 1513)   cefTRIAXone (ROCEPHIN) 2 g vial to attach to  ml bag for ADULTS or NS 50 ml bag for PEDS (0 g Intravenous Stopped 12/23/19 1527)       Drips running?  Yes    Home pump  No    Current LDAs  Peripheral IV 12/23/19 Left Wrist (Active)   Site Assessment [facility] 12/23/2019  2:37 PM   Line Status Saline locked;Checked every 1-2 hour 12/23/2019  2:37 PM   Number of days: 0       Peripheral IV 12/23/19 Right Hand (Active)   Site Assessment [facility] 12/23/2019  2:37 PM   Line Status Saline locked;Checked every 1-2 hour 12/23/2019  2:37 PM   Number of days: 0       Wound 12/12/18 Anterior;Right;Inner;Mid Thigh Ulceration (Active)   Number of days: 376       Wound 12/12/18 Anterior;Left;Mid;Inner Thigh Ulceration (Active)   Number of days: 376       Labs results:   Labs Ordered and Resulted from Time of ED Arrival Up to the Time of Departure from the ED   CBC WITH PLATELETS DIFFERENTIAL - Abnormal; Notable for the following components:       Result Value    RBC Count 2.20 (*)      Hemoglobin 7.0 (*)     Hematocrit 21.9 (*)     RDW 17.2 (*)     Platelet Count 101 (*)     Absolute Neutrophil 8.4 (*)     All other components within normal limits   COMPREHENSIVE METABOLIC PANEL - Abnormal; Notable for the following components:    Creatinine 0.50 (*)     Calcium 8.2 (*)     Bilirubin Total 3.2 (*)     Albumin 2.2 (*)     Alkaline Phosphatase 225 (*)      (*)     All other components within normal limits   INR - Abnormal; Notable for the following components:    INR 1.50 (*)     All other components within normal limits   PARTIAL THROMBOPLASTIN TIME - Abnormal; Notable for the following components:    PTT 51 (*)     All other components within normal limits   AMMONIA - Abnormal; Notable for the following components:    Ammonia <10 (*)     All other components within normal limits   ALCOHOL ETHYL - Abnormal; Notable for the following components:    Ethanol g/dL 0.01 (*)     All other components within normal limits   MAGNESIUM - Abnormal; Notable for the following components:    Magnesium 1.4 (*)     All other components within normal limits   ISTAT  GASES LACTATE HONG POCT - Abnormal; Notable for the following components:    Ph Venous 7.57 (*)     PCO2 Venous 29 (*)     All other components within normal limits   CARDIAC CONTINUOUS MONITORING   ISTAT CG4 GASES LACTATE HONG NURSING POCT   CIWA-AR SCALE AND VS   ABO/RH TYPE AND SCREEN       Imaging Studies: No results found for this or any previous visit (from the past 24 hour(s)).    Recent vital signs:   /89   Pulse 134   Temp 99.5  F (37.5  C) (Oral)   Resp 19   SpO2 99%     Aramis Coma Scale Score: 15 (12/23/19 1421)       Cardiac Rhythm: Tachycardia  Pt needs tele? Yes  Skin/wound Issues: None    Code Status: Full Code    Pain control: fair    Nausea control: fair    Abnormal labs/tests/findings requiring intervention: see results     Family present during ED course? No   Family Comments/Social Situation comments:     Tasks  needing completion: None    ELINOR JALLOH, RN  [phone] [facility]

## 2019-12-23 NOTE — H&P
[facility]      MICU History and Physicial  Yenifer Maher MRN: [id number]  1980  Date of Admission:12/23/2019  Primary care provider: Flako Gaona      Assessment and Plan:     39 year old female with alcoholic liver cirrhosis c/b ascites and esophageal varices s/p banding, ongoing alcohol abuse, C-diff, and non-uremic calciphylaxis who presents to ED with complains of vomiting blood. Given actively drinking alcohol with concerning for alcohol withdrawal, she was admitted to MICU.      PLAN:  ===NEURO===  # Sedation: none apart from CIWA protocol    # Alcohol use with possible alcohol withdrawal  Last drink 12/23, 2 pm. Actively shaking at ED. No h/o withdrawal seizure.   - on ICU [facility] protocol -- patient states that she could not get gabapentin which we will hold off gabapentin for now   - lorazepam IV   - clonidine    ===CARDIOVASCULAR===  # tachycardia, likely related to alcohol withdrawal vs acute anemia. Hemodynamic stable.    ===PULMONARY===  No issue.    ===GASTROINTESTINAL===  # Hematemesis  Unclear if this is upper GI bleeding as she also has molar and intermittent nose bleeding. Recent EGD 8/2019 large recently bleeding EV (>5mm) and clotted blood in fundus, banded. Hbg 7 from baseline 7.5-8.5.   - GI consult, not likely variceal bleeding given amount of bleeding    - pantoprazole gtt   - octreotide gtt   - ceftriaxone 1 g IV qday for SBP ppx  - NPO for now, observe bleeding    Decompensated Alcoholic CIrrhosis   Complicated by Varices and Ascites   MELD-Na score: 15  Follows with Dr. Hernández. Continue to use alcohol. Received paracentesis prn. Mild encephalopathic on admission.  - holding PTA Furosemide 20mg every day , Spironolactone 50mg Qday given acute anemia with acute bleeding  - Alcohol cessation strongly recommended   - consider paracentesis in am, no sign of active infection or pain at this time    MELD-Na score: 15 at 12/23/2019  2:36 PM  MELD score: 15 at 12/23/2019  2:36  PM  Calculated from:  Serum Creatinine: 0.50 mg/dL (Rounded to 1 mg/dL) at 12/23/2019  2:36 PM  Serum Sodium: 137 mmol/L at 12/23/2019  2:36 PM  Total Bilirubin: 3.2 mg/dL at 12/23/2019  2:36 PM  INR(ratio): 1.50 at 12/23/2019  2:36 PM  Age: 39 years    # Nutrition: poor nutrition  - NPO given concern for GI bleeding    ===RENAL===  No issue, producing urine.    # Fluids: 1 L NS at ED    ===HEME/ONC===  # Acute  on chronic anemia  Hgb 7.0 from prior baseline ~ 7.5-8.5. Likely related to epistaxis and oozing from her molars.  vs upper GI bleeding.   - Will continue to monitor.  - Hgb q6hr, transfuse if <7    ===ENDOCRINE===  No issue    ===INFECTIOUS DISEASE===  No issue.    # Antimicrobials:  - ceftriaxone 1 g IV q24hr for SBP ppx with UGIB    ===SKIN/MSK===  # non-uremic calciphylaxis  - no acute managment    Prophylaxis:  DVT: none given bleeding  GI: ppi ggt  Family:  Not updated, patient awake and alert  Disposition: Critically Ill, pending observe bleeding and alc withdrawal  Code Status: FULL    Patient was seen and discussed with attending physician Dr. Renee, who agrees with above assessment and plan.    Valentino John MD  Internal Medicine, PGY-2  Page [phone]         Chief Complaint:   Vomiting blood         History of Present Illness:   39 year old female with alcoholic liver cirrhosis c/b ascites and esophageal varices s/p banding, ongoing alcohol abuse, C-diff, and non-uremic calciphylaxis who presents to ED with complains of vomiting blood. Given actively drinking alcohol with concerning for alcohol withdrawal, she was admitted to MICU.      Started to vomit blood 2 days ago, small volume. Today, about one handful blood this morning. She felt dizzy, but not loss her conscious. Reported dark stool, but formed, once daily and she is taking peptobismal. Also has bleeding from left molar for 2 days. Has been intermittently bleeding from both molar for long time.    Actively drinking 3-4 narda per day,  last drink was 12/23 2am. She did not have recent fall or LOC. Has history of alcohol withdrawal but never has seizure.     Last admission was w/ UGIB, EGD showed large recently bleeding EV (>5mm) and clotted blood in fundus as well as concern for post banding ulcer. Patients last EGD was 8/25/2019.    ED course  - NS 1L IV  - ceftriaxone 2g IV  - thiamine folic IV  - octreotide gtt and pantoprazole ggt         Review of Systems:   Negative apart from above.         Past Medical History:   Medical History reviewed.   Past Medical History:   Diagnosis Date     Alcohol abuse      Alcoholic cirrhosis (H)      Alcoholic peripheral neuropathy (H)      Coagulopathy (H)      Gastritis      History of Clostridium difficile colitis     unknown date     Impairment of cognitive function     MOCA 2/2019 22/30     Leukocytosis 02/2019    persistent leukocytosis across 2/2019 hospitalization without evidence of source across multiple diagnostics including LP, BCx, UCx      Lupus anticoagulant positive      Macrocytic anemia      Moderate protein-calorie malnutrition (H)      Paroxysmal A-fib (H) 02/2019    not on chronic anticoagulation     Peptic ulcer disease      Positive SMILEY (antinuclear antibody)      Subclinical hypothyroidism 02/2019    normal T3, T4     Tobacco dependence     0.5 PPD             Past Surgical History:   Surgical History reviewed.   Past Surgical History:   Procedure Laterality Date     ESOPHAGOSCOPY, GASTROSCOPY, DUODENOSCOPY (EGD), COMBINED N/A 8/9/2019    Procedure: ESOPHAGOGASTRODUODENOSCOPY (EGD);  Surgeon: Sowmya Ibanez MD;  Location:  GI     ESOPHAGOSCOPY, GASTROSCOPY, DUODENOSCOPY (EGD), COMBINED N/A 8/26/2019    Procedure: ESOPHAGOGASTRODUODENOSCOPY (EGD);  Surgeon: Leventhal, Thomas Michael, MD;  Location:  GI     UPPER GI ENDOSCOPY  8/9/2019                  Social History:   Social History reviewed.  Social History     Tobacco Use     Smoking status: Current Some Day Smoker      Types: Cigarettes     Smokeless tobacco: Never Used     Tobacco comment: 6-8 per day   Substance Use Topics     Alcohol use: Yes     Alcohol/week: 2.0 standard drinks     Types: 2 Glasses of wine per week     Frequency: 2-3 times a week             Family History:   Family History reviewed.   No family history on file.          Allergies:     Allergies   Allergen Reactions     Bengay Pain Relief [Menthol] Other (See Comments)     Skin turns red and feels extremely hot     Cats      Chocolate Dermatitis     Dicyclomine Other (See Comments)     Severe sedation     Dust Mites      Derm, resp     No Clinical Screening - See Comments      GI upset     Phenobarbital      Pollen Extract      Derm, resp.              Medications:   Medications Reviewed.   Current Facility-Administered Medications   Medication     diazepam (VALIUM) tablet 10 mg    Or     diazepam (VALIUM) injection 5-10 mg     magnesium sulfate 2 g in NS intermittent infusion ([facility]ar[facility] or [facility])     octreotide (sandoSTATIN) 1,250 mcg in sodium chloride 0.9 % 250 mL     pantoprazole (PROTONIX) 80 mg in sodium chloride 0.9 % 100 mL infusion     sodium chloride 0.9% infusion     Current Outpatient Medications   Medication Sig     Ca Carbonate-Mag Hydroxide (ROLAIDS EXTRA STRENGTH PO) Take 1-2 tablets by mouth every 6 hours as needed (heartburn)     estradiol (VIVELLE-DOT) 0.1 MG/24HR bi-weekly patch Place 1 patch onto the skin twice a week     fexofenadine (ALLEGRA) 180 MG tablet Take 1 tablet (180 mg) by mouth daily as needed for allergies     folic acid (FOLVITE) 1 MG tablet Take 1 tablet (1 mg) by mouth daily     furosemide (LASIX) 20 MG tablet Take 1 tablet (20 mg) by mouth daily     magnesium oxide (MAG-OX) 400 (240 Mg) MG tablet Take 1 tablet (400 mg) by mouth daily     nortriptyline (PAMELOR) 10 MG capsule Take 1 capsule (10 mg) by mouth At Bedtime     omeprazole (PRILOSEC) 40 MG DR capsule Take 1 tab twice daily until 9/2/19, then 1 tab daily      polyethylene glycol (MIRALAX/GLYCOLAX) packet Take 17 g by mouth daily as needed for constipation     pregabalin (LYRICA) 50 MG capsule Take 1 capsule (50 mg) by mouth 3 times daily     Prenatal Vit-Fe Fumarate-FA (PRENATAL MULTIVITAMIN W/IRON) 27-0.8 MG tablet Take 1 tablet by mouth daily     sodium-potassium bicarbonate (FRIDA-SELTZER GOLD) TBEF solu-tab Take 1-2 tablets by mouth 2 times daily as needed for heartburn     spironolactone (ALDACTONE) 50 MG tablet Take 1 tablet (50 mg) by mouth daily     vitamin (B COMPLEX-C) tablet Take 1 tablet by mouth daily     vitamin B1 (THIAMINE) 100 MG tablet Take 1 tablet (100 mg) by mouth daily             Physical Exam:   Vitals were reviewed.  Blood pressure 138/89, pulse 134, temperature 99.5  F (37.5  C), temperature source Oral, resp. rate 19, SpO2 99 %, not currently breastfeeding.    General: AAOx3, NAD, thin, tremor equally both hands  Skin: mod jaundiced, no rash, no ecchymoses  HEENT: dry mucosa, left lower teeth with active blood oozing, not pulsatile  CV: RRR, normal S1S2, no murmur, clicks, rubs  Resp: Clear to auscultation bilaterally, no wheezes, rhonchi  Abd: Soft, non-tender, BS+, distended  Extremities: warm and well perfused, palpable pulses, no edema  Neuro: Equal motor movement all extremities, grade IV+, hyperasthesia both feet and tips of hands.     Rectal exam without stool, no blood or melena         Data:   No intake/output data recorded.    ROUTINE LABS (Last four results)  CMP  Recent Labs   Lab 12/23/19  1436      POTASSIUM 3.8   CHLORIDE 105   CO2 23   ANIONGAP 8   GLC 89   BUN 19   CR 0.50*   GFRESTIMATED >90   GFRESTBLACK >90   MARKO 8.2*   MAG 1.4*   PROTTOTAL 8.6   ALBUMIN 2.2*   BILITOTAL 3.2*   ALKPHOS 225*   *   ALT 29     CBC  Recent Labs   Lab 12/23/19  1436   WBC 10.7   RBC 2.20*   HGB 7.0*   HCT 21.9*      MCH 31.8   MCHC 32.0   RDW 17.2*   *     INR  Recent Labs   Lab 12/23/19  1436   INR 1.50*      Arterial Blood GasNo lab results found in last 7 days.    IMAGING none

## 2019-12-23 NOTE — CONSULTS
HEPATOLOGY CONSULTATION      Date of Admission:  12/23/2019          ASSESSMENT AND RECOMMENDATIONS:     39 year old female with a medical history including lupus anticoagulant (no longer on lovenox), calciphylaxis and decompensated alcoholic cirrhosis with steatosis (EV with bleeding 8/2019, ascites) with ongoing alcohol use (last drink 12/22 evening) being seen by the hepatology service for reported hematemesis.    #. Acute on chronic anemia:  Hgb 7.0 from prior baseline ~ 7.5-8.5. Likely related to epistaxis and oozing from her molars. Less convincing for brisk variceal bleeding at this time (reports only small volume vomitus that is much less in quantity compared to her prior UGIB). HUSSAIN with clean glove (no blood or stool). Currently tachycardic but this may be explained by alcohol withdrawal, hypertensive (140-150s) also consistent with alcohol withdrawal and tremors on exam. Will continue with supportive measures and monitor clinically. No plan for urgent or emergent EGD unless clinical course changes.    #. Alcohol use disorder with withdrawal:  Last drink 12/22 evening. Presently with s/sx of withdrawal. Primary team to monitor and treat withdrawal.    #. Decompensated alcoholic cirrhosis:  Etiology: Alcoholic steatohepatitis with cirrhosis by biopsy (9/13/19)  Hepatic encephalopathy: Type C, Grade 1 PSE, precipitated by alcohol and GI bleeding  Ascites: To be evaluated with US  TIPSS: Not done   Esophageal/Gastric varices: Last EGD was 8/26/19 with large EV and clean based banding ulcers, PHG and clotted blood in the fundus  Hepatocellular carcinoma: Last USS was 8/9/19 - reiterated subcapsular right lobe subcentimeter lesion (initially seen o n6/21/19) with recommendations for nonemergnt MRI  Transplant: Not presently due to active alcohol use  Nutrition: Weight is 63Kg  Coagulopathy: INR 1.5  Thrombocytopenia: Plts 101  MELD-Na = 15  RECOMMENDATIONS:  -- Ensure 2 large bore PIV (18g or larger)  --  Continue to monitor stool output, color and character  -- Trend hemoglobin, transfuse Hgb <7 from GI standpoint  -- Type & Cross  -- Patient to remain NPO (ice chips OK)  -- IV PPI drip  -- Continue IV Octreotide drip  -- Continue IV Ceftriaxone  -- Hold all anticoagulation  -- Obtain ultrasound with dopplers  -- Monitor transaminases, bilirubin, INR  -- Diagnostic (+/- therapeutic paracentesis) with cell count, gram stain and culture, protein and albumin, as well as cytology   Give 6-8g of albumin per liter if >5L is removed   -- Ensure sodium restriction to 2000 mg per day  -- Hold home lasix and spironolactone in setting of GIB  -- When appropriate recommend high protein diet (patient currently NPO)   - Recommend multiple meals during the day, Snacks and shakes during the day/night   - Avoid raw shellfish and oysters (risk of Vibrio vulnificus infection)  -- Trend daily MELD labs  -- Inpatient GI will follow with you, please do note hesitate to call if any hemodynamically significant bleeding      Gastroenterology follow up recommendations: TBD    Thank you for involving us in this patient's care. Please do not hesitate to contact the GI service with any questions or concerns.     Patient care plan discussed with Dr. Ibanez, GI staff physician.    Florence Finney MD  Gastroenterology - Hepatology  PGY 5 ([phone])         Chief Complaint:   We were asked by Dr. Felix of emergency medicine to evaluate this patient with reported hematemesis    History is obtained from the patient and the medical record.          History of Present Illness:   Yenifer Maher is a 39 year old female with a medical history including lupus anticoagulant (no longer on lovenox), calciphylaxis and decompensated alcoholic cirrhosis with steatosis (EV with bleeding 8/2019, ascites) with ongoing alcohol use (last drink 12/22 evening) being seen by the hepatology service for reported hematemesis.    Pt presents with 2 days of nausea and  reported hematemesis. States she has been having increased sinus pressure recently with episodes of epistaxis and oozing from her lower molars. Yesterday, she started to have bloody vomit that she described as clot with some red blood, several episodes with one medium sized and the rest small volume/spitting blood per patient. During this time she has had dark loose with small hard pellet stools. Some subjective chills but no fevers. No rashes, abdominal plain or other concerns. Last drink 12/22 evening.            Past Medical History:   Reviewed and edited as appropriate  Past Medical History:   Diagnosis Date     Alcohol abuse      Alcoholic cirrhosis (H)      Alcoholic peripheral neuropathy (H)      Coagulopathy (H)      Gastritis      History of Clostridium difficile colitis     unknown date     Impairment of cognitive function     MOCA 2/2019 22/30     Leukocytosis 02/2019    persistent leukocytosis across 2/2019 hospitalization without evidence of source across multiple diagnostics including LP, BCx, UCx      Lupus anticoagulant positive      Macrocytic anemia      Moderate protein-calorie malnutrition (H)      Paroxysmal A-fib (H) 02/2019    not on chronic anticoagulation     Peptic ulcer disease      Positive SMILEY (antinuclear antibody)      Subclinical hypothyroidism 02/2019    normal T3, T4     Tobacco dependence     0.5 PPD            Past Surgical History:   Reviewed and edited as appropriate   Past Surgical History:   Procedure Laterality Date     ESOPHAGOSCOPY, GASTROSCOPY, DUODENOSCOPY (EGD), COMBINED N/A 8/9/2019    Procedure: ESOPHAGOGASTRODUODENOSCOPY (EGD);  Surgeon: Sowmya Ibanez MD;  Location:  GI     ESOPHAGOSCOPY, GASTROSCOPY, DUODENOSCOPY (EGD), COMBINED N/A 8/26/2019    Procedure: ESOPHAGOGASTRODUODENOSCOPY (EGD);  Surgeon: Leventhal, Thomas Michael, MD;  Location:  GI     UPPER GI ENDOSCOPY  8/9/2019                 Previous Endoscopy:   EGD (8/25/19):  Findings:         Non-bleeding large (> 5 mm) varices were found in the middle third of        the esophagus and in the lower third of the esophagus, 35 cm from the        incisors. Stigmata of recent bleeding were evident and no red carmela signs        were present. Stigmata of prior treatment were evident. Scarring from        prior treatment was visible. There were 4 clean-based ulcers without        stigmata noted in lower esophagus.        A small hiatal hernia was present.        Mild portal hypertensive gastropathy was found in the entire examined        stomach.        Clotted blood was found in the gastric fundus.        The examined duodenum was normal.                                                                                     Impression:          - Recently bleeding large (> 5 mm) esophageal varices.                        - Small hiatal hernia.                        - Portal hypertensive gastropathy.                        - Clotted blood in the gastric fundus.                        - Normal examined duodenum.                        - No specimens collected.     EGD (8/10/19):  Findings:        Grade III varices were found in the middle third of the esophagus and in        the lower third of the esophagus. They were large in size. There were        read carmela and nipple signs. SOme slight oozing. Seven bands were        successfully placed with incomplete eradication of varices. Bleeding had        stopped at the end of the procedure.        Clotted blood was found in the entire examined stomach.        The examined duodenum was normal.                                                                                     Impression:          - Grade III esophageal varices. Incompletely eradicated.                        Banded.                        - Clotted blood in the entire stomach.                        - Normal examined duodenum.                        - No specimens collected.                        -  "Photo function not operation on cart.         Social History:   Reviewed and edited as appropriate  Social History     Socioeconomic History     Marital status: Single     Spouse name: Not on file     Number of children: Not on file     Years of education: Not on file     Highest education level: Not on file   Occupational History     Not on file   Social Needs     Financial resource strain: Not on file     Food insecurity:     Worry: Not on file     Inability: Not on file     Transportation needs:     Medical: Not on file     Non-medical: Not on file   Tobacco Use     Smoking status: Current Some Day Smoker     Types: Cigarettes     Smokeless tobacco: Never Used     Tobacco comment: 6-8 per day   Substance and Sexual Activity     Alcohol use: Yes     Alcohol/week: 2.0 standard drinks     Types: 2 Glasses of wine per week     Frequency: 2-3 times a week     Drug use: Not on file     Sexual activity: Not on file   Lifestyle     Physical activity:     Days per week: Not on file     Minutes per session: Not on file     Stress: Not on file   Relationships     Social connections:     Talks on phone: Not on file     Gets together: Not on file     Attends Tenriism service: Not on file     Active member of club or organization: Not on file     Attends meetings of clubs or organizations: Not on file     Relationship status: Not on file     Intimate partner violence:     Fear of current or ex partner: Not on file     Emotionally abused: Not on file     Physically abused: Not on file     Forced sexual activity: Not on file   Other Topics Concern     Parent/sibling w/ CABG, MI or angioplasty before 65F 55M? Not Asked   Social History Narrative     Not on file            Family History:   Reviewed and edited as appropriate  Maternal grandfather with unspecified \"liver cancer\".         Allergies:   Reviewed and edited as appropriate     Allergies   Allergen Reactions     Bengay Pain Relief [Menthol] Other (See Comments)     Skin " turns red and feels extremely hot     Cats      Chocolate Dermatitis     Dicyclomine Other (See Comments)     Severe sedation     Dust Mites      Derm, resp     No Clinical Screening - See Comments      GI upset     Phenobarbital      Pollen Extract      Derm, resp.             Medications:     Current Facility-Administered Medications   Medication     diazepam (VALIUM) tablet 10 mg    Or     diazepam (VALIUM) injection 5-10 mg     magnesium sulfate 2 g in NS intermittent infusion ([facility] or [facility])     octreotide (sandoSTATIN) 1,250 mcg in sodium chloride 0.9 % 250 mL     pantoprazole (PROTONIX) 80 mg in sodium chloride 0.9 % 100 mL infusion     sodium chloride 0.9% infusion     Current Outpatient Medications   Medication Sig     calcium carbonate (TUMS) 500 MG chewable tablet Take 1-2 chew tab by mouth as needed for heartburn     fexofenadine (ALLEGRA) 180 MG tablet Take 1 tablet (180 mg) by mouth daily as needed for allergies     folic acid (FOLVITE) 1 MG tablet Take 1 tablet (1 mg) by mouth daily     furosemide (LASIX) 20 MG tablet Take 1 tablet (20 mg) by mouth daily     magnesium oxide (MAG-OX) 400 (240 Mg) MG tablet Take 1 tablet (400 mg) by mouth daily     nortriptyline (PAMELOR) 10 MG capsule Take 1 capsule (10 mg) by mouth At Bedtime     omeprazole (PRILOSEC) 40 MG DR capsule Take 1 tab twice daily until 9/2/19, then 1 tab daily     polyethylene glycol (MIRALAX/GLYCOLAX) packet Take 17 g by mouth daily as needed for constipation     pregabalin (LYRICA) 50 MG capsule Take 1 capsule (50 mg) by mouth 3 times daily     Prenatal Vit-Fe Fumarate-FA (PRENATAL MULTIVITAMIN W/IRON) 27-0.8 MG tablet Take 1 tablet by mouth daily     sodium-potassium bicarbonate (FRIDA-SELTZER GOLD) TBEF solu-tab Take 1-2 tablets by mouth 2 times daily as needed for heartburn     spironolactone (ALDACTONE) 50 MG tablet Take 1 tablet (50 mg) by mouth daily     vitamin (B COMPLEX-C) tablet Take 1 tablet by mouth daily     vitamin B1  (THIAMINE) 100 MG tablet Take 1 tablet (100 mg) by mouth daily             Review of Systems:     A complete review of systems was performed and is negative except as noted in the HPI           Physical Exam:   /89   Pulse 134   Temp 99.5  F (37.5  C) (Oral)   Resp 19   SpO2 99%   Wt:   Wt Readings from Last 2 Encounters:   10/08/19 63.3 kg (139 lb 9.6 oz)   08/28/19 68.5 kg (151 lb 0.2 oz)      Constitutional: cooperative, pleasant, not dyspneic/diaphoretic, no acute distress  Eyes: Sclera anicteric/injected  Ears/nose/mouth/throat: Oropharynx notable for actively oozing and pooling blood in the space of two prior molars in the lower jaw, mucus membranes moist, hearing intact  CV: No edema  Respiratory: Unlabored breathing  Abdomen:  Nondistended, +bs, no hepatosplenomegaly, nontender, no peritoneal signs  Skin: warm, perfused, no jaundice  Neuro: Alert, slow to respond to questions, oriented to place and time, No asterixis  Psych: Flat affect  MSK: Normal gait         Data:   Labs and imaging below were independently reviewed and interpreted    MELD-Na score: 15 at 12/23/2019  2:36 PM  MELD score: 15 at 12/23/2019  2:36 PM  Calculated from:  Serum Creatinine: 0.50 mg/dL (Rounded to 1 mg/dL) at 12/23/2019  2:36 PM  Serum Sodium: 137 mmol/L at 12/23/2019  2:36 PM  Total Bilirubin: 3.2 mg/dL at 12/23/2019  2:36 PM  INR(ratio): 1.50 at 12/23/2019  2:36 PM  Age: 39 years     BMP  Recent Labs   Lab 12/23/19  1436      POTASSIUM 3.8   CHLORIDE 105   MARKO 8.2*   CO2 23   BUN 19   CR 0.50*   GLC 89     CBC  Recent Labs   Lab 12/23/19  1436   WBC 10.7   RBC 2.20*   HGB 7.0*   HCT 21.9*      MCH 31.8   MCHC 32.0   RDW 17.2*   *     INR  Recent Labs   Lab 12/23/19  1436   INR 1.50*     LFTs  Recent Labs   Lab 12/23/19  1436   ALKPHOS 225*   *   ALT 29   BILITOTAL 3.2*   PROTTOTAL 8.6   ALBUMIN 2.2*      PANCNo lab results found in last 7 days.    Imaging:  No interval imaging.    ATTENDING  NOTE, GASTROENTEROLOGY/HEPATOLOGY    I saw and discussed this patient with the fellow and participated in the decision making. I agree with the fellow's note. Sowmya Ibanez MD

## 2019-12-23 NOTE — ED TRIAGE NOTES
Triage Assessment & Note:    Pulse 134   Temp 99.5  F (37.5  C) (Oral)   Resp 18   SpO2 100%     Patient presents with: C/o Gi bleed dark colored stools. PT also reports vomiting blood. PT has a hx of esophageal verses     Home Treatments/Remedies: None      Febrile / Afebrile? Afebrile     Duration of C/o: 2 days     Mark Bennett RN  December 23, 2019

## 2019-12-23 NOTE — ED PROVIDER NOTES
[place] EMERGENCY DEPARTMENT ([facility])  12/23/19    History     Chief Complaint   Patient presents with     Melena     The history is provided by the patient, a significant other and medical records. The history is limited by the condition of the patient.     Yenifer Maher is a 39 year old female with a past medical history significant for alcoholic liver cirrhosis c/b ascites and esophageal varices s/p banding, ongoing alcohol abuse, C-diff, and non-uremic calciphylaxis who presents here to the Emergency Department due to melena and hematemesis. Patients significant other reports that she began having hematemesis yesterday. He notes that she has had 2 episodes of hematemesis that he is aware of. Noted that it we bright red blood mixed with some dark. Patient reports that she has had black tarry stools for the past 2 days. Patient is noted to be somewhat confused and slower to answer questions. Significant other reports the patient looks a little paler than normal. Reports that she has had ongoing nosebleeds due to the dryness of their house as well as occasional bleeding from a left molar due to pressure buildup. States that she last drank 2 days ago. Has had problems with withdrawal in the past. Has had previous paracentesis. Patients last EGD was 8/25/2019.    I have reviewed the Medications, Allergies, Past Medical and Surgical History, and Social History in the [email or web address] system.    Past Medical History:   Diagnosis Date     Alcohol abuse      Alcoholic cirrhosis (H)      Alcoholic peripheral neuropathy (H)      Coagulopathy (H)      Gastritis      History of Clostridium difficile colitis     unknown date     Impairment of cognitive function     MOCA 2/2019 22/30     Leukocytosis 02/2019    persistent leukocytosis across 2/2019 hospitalization without evidence of source across multiple diagnostics including LP, BCx, UCx      Lupus anticoagulant positive      Macrocytic anemia      Moderate  protein-calorie malnutrition (H)      Paroxysmal A-fib (H) 02/2019    not on chronic anticoagulation     Peptic ulcer disease      Positive SMILEY (antinuclear antibody)      Subclinical hypothyroidism 02/2019    normal T3, T4     Tobacco dependence     0.5 PPD       Past Surgical History:   Procedure Laterality Date     ESOPHAGOSCOPY, GASTROSCOPY, DUODENOSCOPY (EGD), COMBINED N/A 8/9/2019    Procedure: ESOPHAGOGASTRODUODENOSCOPY (EGD);  Surgeon: Sowmya Ibanez MD;  Location:  GI     ESOPHAGOSCOPY, GASTROSCOPY, DUODENOSCOPY (EGD), COMBINED N/A 8/26/2019    Procedure: ESOPHAGOGASTRODUODENOSCOPY (EGD);  Surgeon: Leventhal, Thomas Michael, MD;  Location:  GI     UPPER GI ENDOSCOPY  8/9/2019            No family history on file.    Social History     Tobacco Use     Smoking status: Current Some Day Smoker     Types: Cigarettes     Smokeless tobacco: Never Used     Tobacco comment: 6-8 per day   Substance Use Topics     Alcohol use: Yes     Alcohol/week: 2.0 standard drinks     Types: 2 Glasses of wine per week     Frequency: 2-3 times a week       Current Facility-Administered Medications   Medication     diazepam (VALIUM) tablet 10 mg    Or     diazepam (VALIUM) injection 5-10 mg     magnesium sulfate 2 g in NS intermittent infusion ([facility] or [facility])     octreotide (sandoSTATIN) 1,250 mcg in sodium chloride 0.9 % 250 mL     pantoprazole (PROTONIX) 80 mg in sodium chloride 0.9 % 100 mL infusion     sodium chloride 0.9% infusion     Current Outpatient Medications   Medication     calcium carbonate (TUMS) 500 MG chewable tablet     fexofenadine (ALLEGRA) 180 MG tablet     folic acid (FOLVITE) 1 MG tablet     furosemide (LASIX) 20 MG tablet     magnesium oxide (MAG-OX) 400 (240 Mg) MG tablet     nortriptyline (PAMELOR) 10 MG capsule     omeprazole (PRILOSEC) 40 MG DR capsule     polyethylene glycol (MIRALAX/GLYCOLAX) packet     pregabalin (LYRICA) 50 MG capsule     Prenatal Vit-Fe Fumarate-FA (PRENATAL  MULTIVITAMIN W/IRON) 27-0.8 MG tablet     sodium-potassium bicarbonate (FRIDA-SELTZER GOLD) TBEF solu-tab     spironolactone (ALDACTONE) 50 MG tablet     vitamin (B COMPLEX-C) tablet     vitamin B1 (THIAMINE) 100 MG tablet        Allergies   Allergen Reactions     Bengay Pain Relief [Menthol] Other (See Comments)     Skin turns red and feels extremely hot     Cats      Chocolate Dermatitis     Dicyclomine Other (See Comments)     Severe sedation     Dust Mites      Derm, resp     No Clinical Screening - See Comments      GI upset     Phenobarbital      Pollen Extract      Derm, resp.          Review of Systems   HENT: Positive for nosebleeds.    Gastrointestinal: Positive for blood in stool, nausea and vomiting.        Positive for hematemesis   Psychiatric/Behavioral: Positive for confusion.   All other systems reviewed and are negative.      Physical Exam   BP: (!) 150/89  Pulse: 134  Heart Rate: 116  Temp: 99.5  F (37.5  C)  Resp: 18  SpO2: 100 %      Physical Exam  Vitals signs and nursing note reviewed.   Constitutional:       General: She is not in acute distress.     Appearance: She is well-developed. She is ill-appearing. She is not toxic-appearing or diaphoretic.      Comments: Patient is awake and protecting her airway.  She seems somewhat slow to answer questions.  Dried blood noted around patient's mouth.   HENT:      Head: Normocephalic and atraumatic.      Mouth/Throat:      Lips: Pink.      Mouth: Mucous membranes are moist.      Pharynx: Oropharynx is clear. No oropharyngeal exudate.      Comments: Gingival bleeding noted.  Eyes:      General: Lids are normal. No scleral icterus.     Extraocular Movements: Extraocular movements intact.      Right eye: No nystagmus.      Left eye: No nystagmus.      Conjunctiva/sclera: Conjunctivae normal.      Pupils: Pupils are equal, round, and reactive to light.   Neck:      Musculoskeletal: Normal range of motion and neck supple. No erythema or neck rigidity.       Thyroid: No thyromegaly.      Vascular: No JVD.      Trachea: No tracheal deviation.   Cardiovascular:      Rate and Rhythm: Regular rhythm. Tachycardia present.      Pulses: Normal pulses.      Heart sounds: Normal heart sounds. No murmur. No friction rub. No gallop.    Pulmonary:      Effort: Pulmonary effort is normal. No respiratory distress.      Breath sounds: Normal breath sounds.   Abdominal:      General: Bowel sounds are normal. There is no distension.      Palpations: Abdomen is soft. There is no mass.      Tenderness: There is no abdominal tenderness. There is no guarding or rebound.   Musculoskeletal: Normal range of motion.         General: No tenderness.      Right lower leg: No edema.      Left lower leg: No edema.   Lymphadenopathy:      Cervical: No cervical adenopathy.   Skin:     General: Skin is warm and dry.      Capillary Refill: Capillary refill takes less than 2 seconds.      Coloration: Skin is pale.      Findings: No erythema or rash.   Neurological:      Mental Status: She is alert and oriented to person, place, and time.      Cranial Nerves: No cranial nerve deficit.      Sensory: No sensory deficit.      Motor: Tremor present.   Psychiatric:         Mood and Affect: Mood and affect normal.         Speech: Speech normal.         Behavior: Behavior normal.         ED Course   2:20 PM  The patient was seen and examined by Dada Felix MD in Room ED19.        Procedures            Critical Care time:  was 35 minutes for this patient excluding procedures.         Labs Ordered and Resulted from Time of ED Arrival Up to the Time of Departure from the ED   CBC WITH PLATELETS DIFFERENTIAL - Abnormal; Notable for the following components:       Result Value    RBC Count 2.20 (*)     Hemoglobin 7.0 (*)     Hematocrit 21.9 (*)     RDW 17.2 (*)     Platelet Count 101 (*)     Absolute Neutrophil 8.4 (*)     All other components within normal limits   COMPREHENSIVE METABOLIC PANEL - Abnormal;  Notable for the following components:    Creatinine 0.50 (*)     Calcium 8.2 (*)     Bilirubin Total 3.2 (*)     Albumin 2.2 (*)     Alkaline Phosphatase 225 (*)      (*)     All other components within normal limits   INR - Abnormal; Notable for the following components:    INR 1.50 (*)     All other components within normal limits   PARTIAL THROMBOPLASTIN TIME - Abnormal; Notable for the following components:    PTT 51 (*)     All other components within normal limits   AMMONIA - Abnormal; Notable for the following components:    Ammonia <10 (*)     All other components within normal limits   ALCOHOL ETHYL - Abnormal; Notable for the following components:    Ethanol g/dL 0.01 (*)     All other components within normal limits   MAGNESIUM - Abnormal; Notable for the following components:    Magnesium 1.4 (*)     All other components within normal limits   ISTAT  GASES LACTATE HONG POCT - Abnormal; Notable for the following components:    Ph Venous 7.57 (*)     PCO2 Venous 29 (*)     All other components within normal limits   CARDIAC CONTINUOUS MONITORING   ISTAT CG4 GASES LACTATE HONG NURSING POCT   CIWA-AR SCALE AND VS   ABO/RH TYPE AND SCREEN            Assessments & Plan (with Medical Decision Making)     This patient presented to the emergency department due to vomiting of blood and dark stools in the setting of known alcoholic cirrhosis and varices.  At presentation she was tachycardic and tremulous but was not hypotensive.  She did have some stigmata of oral bleeding from her gingiva.  Hemoglobin has decreased from 8.5 to 7.0 and she does have a coagulopathy with INR of 1.5.  Tremor I suspect is due to alcohol withdrawal as her ammonia level is less than 10 and she reports not having had a drink for anywhere from 24 to 48 hours.  She does report drinking 2 glasses of wine a day generally but I suspect that the intake may be greater than this.  She was started on Protonix drip after a bolus as well as  octreotide and was given Rocephin after I spoke with GI.  GI evaluated the patient and suspect that some of her bleeding may be due to the oral bleeding that she has been having but they wanted to keep her n.p.o. in case she did need more urgent endoscopy.  She was given a banana bag as well as a liter of normal saline.  Lactate was within normal limits.  Given the fact that she does have symptoms attributable to alcohol withdrawal and is at risk for withdrawal she will need she was placed on the CIWA protocol and will need IV benzodiazepines if she meets criteria for treatment.  As these can only be given in the ICU I did speak with the ICU staff on call and patient will be admitted to the ICU with GI following and consult.    This part of the document was transcribed by Leesa Lewis, Medical Scribe.      I have reviewed the nursing notes.    I have reviewed the findings, diagnosis, plan and need for follow up with the patient.    New Prescriptions    No medications on file       Final diagnoses:   Hematemesis with nausea   Alcohol withdrawal syndrome without complication (H)     IBerry, am serving as a trained medical scribe to document services personally performed by Dada Felix MD, based on the provider's statements to me.   IDada MD, was physically present and have reviewed and verified the accuracy of this note documented by Berry Cowan.    12/23/2019   [facility], [place], EMERGENCY DEPARTMENT     Dada Felix MD  12/28/19 0806

## 2019-12-24 ENCOUNTER — APPOINTMENT (OUTPATIENT)
Dept: PHYSICAL THERAPY | Facility: CLINIC | Age: 39
End: 2019-12-24
Attending: STUDENT IN AN ORGANIZED HEALTH CARE EDUCATION/TRAINING PROGRAM
Payer: MEDICAID

## 2019-12-24 ENCOUNTER — APPOINTMENT (OUTPATIENT)
Dept: ULTRASOUND IMAGING | Facility: CLINIC | Age: 39
End: 2019-12-24
Attending: STUDENT IN AN ORGANIZED HEALTH CARE EDUCATION/TRAINING PROGRAM
Payer: MEDICAID

## 2019-12-24 ENCOUNTER — RESULTS ONLY (OUTPATIENT)
Dept: GASTROENTEROLOGY | Facility: CLINIC | Age: 39
End: 2019-12-24

## 2019-12-24 LAB
ABO + RH BLD: NORMAL
ALBUMIN SERPL-MCNC: 2 G/DL (ref 3.4–5)
ALP SERPL-CCNC: 198 U/L (ref 40–150)
ALT SERPL W P-5'-P-CCNC: 60 U/L (ref 0–50)
ANION GAP SERPL CALCULATED.3IONS-SCNC: 6 MMOL/L (ref 3–14)
AST SERPL W P-5'-P-CCNC: 434 U/L (ref 0–45)
BILIRUB SERPL-MCNC: 3.7 MG/DL (ref 0.2–1.3)
BLD GP AB SCN SERPL QL: NORMAL
BLD GP AB SCN SERPL QL: NORMAL
BLD PROD TYP BPU: NORMAL
BLD PROD TYP BPU: NORMAL
BLD UNIT ID BPU: 0
BLOOD BANK CMNT PATIENT-IMP: NORMAL
BLOOD BANK CMNT PATIENT-IMP: NORMAL
BLOOD PRODUCT CODE: NORMAL
BPU ID: NORMAL
BUN SERPL-MCNC: 13 MG/DL (ref 7–30)
CALCIUM SERPL-MCNC: 7.8 MG/DL (ref 8.5–10.1)
CHLORIDE SERPL-SCNC: 107 MMOL/L (ref 94–109)
CO2 SERPL-SCNC: 23 MMOL/L (ref 20–32)
CREAT SERPL-MCNC: 0.5 MG/DL (ref 0.52–1.04)
ERYTHROCYTE [DISTWIDTH] IN BLOOD BY AUTOMATED COUNT: 17.5 % (ref 10–15)
ERYTHROCYTE [DISTWIDTH] IN BLOOD BY AUTOMATED COUNT: 18.1 % (ref 10–15)
ERYTHROCYTE [DISTWIDTH] IN BLOOD BY AUTOMATED COUNT: 18.3 % (ref 10–15)
FIBRINOGEN PPP-MCNC: 270 MG/DL (ref 200–420)
GFR SERPL CREATININE-BSD FRML MDRD: >90 ML/MIN/{1.73_M2}
GLUCOSE BLDC GLUCOMTR-MCNC: 80 MG/DL (ref 70–99)
GLUCOSE BLDC GLUCOMTR-MCNC: 84 MG/DL (ref 70–99)
GLUCOSE BLDC GLUCOMTR-MCNC: 86 MG/DL (ref 70–99)
GLUCOSE SERPL-MCNC: 84 MG/DL (ref 70–99)
HCT VFR BLD AUTO: 21.8 % (ref 35–47)
HCT VFR BLD AUTO: 23 % (ref 35–47)
HCT VFR BLD AUTO: 23.6 % (ref 35–47)
HGB BLD-MCNC: 7 G/DL (ref 11.7–15.7)
HGB BLD-MCNC: 7.2 G/DL (ref 11.7–15.7)
HGB BLD-MCNC: 7.4 G/DL (ref 11.7–15.7)
HGB BLD-MCNC: 7.5 G/DL (ref 11.7–15.7)
INR PPP: 1.62 (ref 0.86–1.14)
MAGNESIUM SERPL-MCNC: 2.6 MG/DL (ref 1.6–2.3)
MCH RBC QN AUTO: 30.6 PG (ref 26.5–33)
MCH RBC QN AUTO: 30.8 PG (ref 26.5–33)
MCH RBC QN AUTO: 31.3 PG (ref 26.5–33)
MCHC RBC AUTO-ENTMCNC: 31.4 G/DL (ref 31.5–36.5)
MCHC RBC AUTO-ENTMCNC: 32.1 G/DL (ref 31.5–36.5)
MCHC RBC AUTO-ENTMCNC: 32.6 G/DL (ref 31.5–36.5)
MCV RBC AUTO: 95 FL (ref 78–100)
MCV RBC AUTO: 96 FL (ref 78–100)
MCV RBC AUTO: 98 FL (ref 78–100)
MRSA DNA SPEC QL NAA+PROBE: NEGATIVE
NUM BPU REQUESTED: 1
PHOSPHATE SERPL-MCNC: 2.3 MG/DL (ref 2.5–4.5)
PHOSPHATE SERPL-MCNC: 2.3 MG/DL (ref 2.5–4.5)
PLATELET # BLD AUTO: 70 10E9/L (ref 150–450)
PLATELET # BLD AUTO: 81 10E9/L (ref 150–450)
PLATELET # BLD AUTO: 81 10E9/L (ref 150–450)
POTASSIUM SERPL-SCNC: 3.6 MMOL/L (ref 3.4–5.3)
PROT SERPL-MCNC: 7.5 G/DL (ref 6.8–8.8)
RBC # BLD AUTO: 2.29 10E12/L (ref 3.8–5.2)
RBC # BLD AUTO: 2.4 10E12/L (ref 3.8–5.2)
RBC # BLD AUTO: 2.4 10E12/L (ref 3.8–5.2)
SODIUM SERPL-SCNC: 136 MMOL/L (ref 133–144)
SPECIMEN EXP DATE BLD: NORMAL
SPECIMEN EXP DATE BLD: NORMAL
SPECIMEN SOURCE: NORMAL
TRANSFUSION STATUS PATIENT QL: NORMAL
TRANSFUSION STATUS PATIENT QL: NORMAL
WBC # BLD AUTO: 7.6 10E9/L (ref 4–11)
WBC # BLD AUTO: 8.2 10E9/L (ref 4–11)
WBC # BLD AUTO: 8.4 10E9/L (ref 4–11)

## 2019-12-24 PROCEDURE — C9113 INJ PANTOPRAZOLE SODIUM, VIA: HCPCS | Performed by: EMERGENCY MEDICINE

## 2019-12-24 PROCEDURE — 99233 SBSQ HOSP IP/OBS HIGH 50: CPT | Performed by: INTERNAL MEDICINE

## 2019-12-24 PROCEDURE — 85018 HEMOGLOBIN: CPT | Performed by: STUDENT IN AN ORGANIZED HEALTH CARE EDUCATION/TRAINING PROGRAM

## 2019-12-24 PROCEDURE — 84100 ASSAY OF PHOSPHORUS: CPT | Performed by: STUDENT IN AN ORGANIZED HEALTH CARE EDUCATION/TRAINING PROGRAM

## 2019-12-24 PROCEDURE — P9016 RBC LEUKOCYTES REDUCED: HCPCS | Performed by: EMERGENCY MEDICINE

## 2019-12-24 PROCEDURE — 97530 THERAPEUTIC ACTIVITIES: CPT | Mod: GP

## 2019-12-24 PROCEDURE — 97161 PT EVAL LOW COMPLEX 20 MIN: CPT | Mod: GP

## 2019-12-24 PROCEDURE — 25000132 ZZH RX MED GY IP 250 OP 250 PS 637: Performed by: INTERNAL MEDICINE

## 2019-12-24 PROCEDURE — 25000132 ZZH RX MED GY IP 250 OP 250 PS 637: Performed by: PHYSICIAN ASSISTANT

## 2019-12-24 PROCEDURE — 87641 MR-STAPH DNA AMP PROBE: CPT | Performed by: STUDENT IN AN ORGANIZED HEALTH CARE EDUCATION/TRAINING PROGRAM

## 2019-12-24 PROCEDURE — 25800030 ZZH RX IP 258 OP 636: Performed by: STUDENT IN AN ORGANIZED HEALTH CARE EDUCATION/TRAINING PROGRAM

## 2019-12-24 PROCEDURE — 40000556 ZZH STATISTIC PERIPHERAL IV START W US GUIDANCE

## 2019-12-24 PROCEDURE — 25000132 ZZH RX MED GY IP 250 OP 250 PS 637

## 2019-12-24 PROCEDURE — 25000128 H RX IP 250 OP 636: Performed by: STUDENT IN AN ORGANIZED HEALTH CARE EDUCATION/TRAINING PROGRAM

## 2019-12-24 PROCEDURE — 80053 COMPREHEN METABOLIC PANEL: CPT | Performed by: STUDENT IN AN ORGANIZED HEALTH CARE EDUCATION/TRAINING PROGRAM

## 2019-12-24 PROCEDURE — 43244 EGD VARICES LIGATION: CPT | Performed by: INTERNAL MEDICINE

## 2019-12-24 PROCEDURE — 85027 COMPLETE CBC AUTOMATED: CPT | Performed by: STUDENT IN AN ORGANIZED HEALTH CARE EDUCATION/TRAINING PROGRAM

## 2019-12-24 PROCEDURE — 25000125 ZZHC RX 250: Performed by: STUDENT IN AN ORGANIZED HEALTH CARE EDUCATION/TRAINING PROGRAM

## 2019-12-24 PROCEDURE — 25800030 ZZH RX IP 258 OP 636: Performed by: EMERGENCY MEDICINE

## 2019-12-24 PROCEDURE — 36415 COLL VENOUS BLD VENIPUNCTURE: CPT | Performed by: STUDENT IN AN ORGANIZED HEALTH CARE EDUCATION/TRAINING PROGRAM

## 2019-12-24 PROCEDURE — 97116 GAIT TRAINING THERAPY: CPT | Mod: GP

## 2019-12-24 PROCEDURE — 12000004 ZZH R&B IMCU UMMC

## 2019-12-24 PROCEDURE — 93975 VASCULAR STUDY: CPT | Mod: TC

## 2019-12-24 PROCEDURE — 06L38CZ OCCLUSION OF ESOPHAGEAL VEIN WITH EXTRALUMINAL DEVICE, VIA NATURAL OR ARTIFICIAL OPENING ENDOSCOPIC: ICD-10-PCS | Performed by: INTERNAL MEDICINE

## 2019-12-24 PROCEDURE — G0500 MOD SEDAT ENDO SERVICE >5YRS: HCPCS | Performed by: INTERNAL MEDICINE

## 2019-12-24 PROCEDURE — 25000132 ZZH RX MED GY IP 250 OP 250 PS 637: Performed by: STUDENT IN AN ORGANIZED HEALTH CARE EDUCATION/TRAINING PROGRAM

## 2019-12-24 PROCEDURE — 85384 FIBRINOGEN ACTIVITY: CPT | Performed by: STUDENT IN AN ORGANIZED HEALTH CARE EDUCATION/TRAINING PROGRAM

## 2019-12-24 PROCEDURE — 87640 STAPH A DNA AMP PROBE: CPT | Performed by: STUDENT IN AN ORGANIZED HEALTH CARE EDUCATION/TRAINING PROGRAM

## 2019-12-24 PROCEDURE — 25000125 ZZHC RX 250: Performed by: EMERGENCY MEDICINE

## 2019-12-24 PROCEDURE — 25000128 H RX IP 250 OP 636: Performed by: EMERGENCY MEDICINE

## 2019-12-24 PROCEDURE — 97110 THERAPEUTIC EXERCISES: CPT | Mod: GP

## 2019-12-24 PROCEDURE — 00000146 ZZHCL STATISTIC GLUCOSE BY METER IP

## 2019-12-24 PROCEDURE — 85610 PROTHROMBIN TIME: CPT | Performed by: STUDENT IN AN ORGANIZED HEALTH CARE EDUCATION/TRAINING PROGRAM

## 2019-12-24 PROCEDURE — 83735 ASSAY OF MAGNESIUM: CPT | Performed by: STUDENT IN AN ORGANIZED HEALTH CARE EDUCATION/TRAINING PROGRAM

## 2019-12-24 RX ORDER — OXYMETAZOLINE HYDROCHLORIDE 0.05 G/100ML
2 SPRAY NASAL ONCE
Status: DISCONTINUED | OUTPATIENT
Start: 2019-12-24 | End: 2019-12-28 | Stop reason: HOSPADM

## 2019-12-24 RX ORDER — PREGABALIN 25 MG/1
50 CAPSULE ORAL 3 TIMES DAILY
Status: DISCONTINUED | OUTPATIENT
Start: 2019-12-24 | End: 2019-12-28 | Stop reason: HOSPADM

## 2019-12-24 RX ORDER — FOLIC ACID 1 MG/1
1 TABLET ORAL DAILY
Status: DISCONTINUED | OUTPATIENT
Start: 2019-12-24 | End: 2019-12-28 | Stop reason: HOSPADM

## 2019-12-24 RX ORDER — CALCIUM CARBONATE 500 MG/1
500 TABLET, CHEWABLE ORAL 2 TIMES DAILY PRN
Status: DISCONTINUED | OUTPATIENT
Start: 2019-12-24 | End: 2019-12-25

## 2019-12-24 RX ORDER — TRAMADOL HYDROCHLORIDE 50 MG/1
50 TABLET ORAL ONCE
Status: COMPLETED | OUTPATIENT
Start: 2019-12-24 | End: 2019-12-24

## 2019-12-24 RX ORDER — POTASSIUM CHLORIDE 1.5 G/1.58G
20-40 POWDER, FOR SOLUTION ORAL
Status: DISCONTINUED | OUTPATIENT
Start: 2019-12-24 | End: 2019-12-28 | Stop reason: HOSPADM

## 2019-12-24 RX ORDER — POTASSIUM CHLORIDE 29.8 MG/ML
20 INJECTION INTRAVENOUS
Status: DISCONTINUED | OUTPATIENT
Start: 2019-12-24 | End: 2019-12-28 | Stop reason: HOSPADM

## 2019-12-24 RX ORDER — MAGNESIUM SULFATE HEPTAHYDRATE 40 MG/ML
4 INJECTION, SOLUTION INTRAVENOUS EVERY 4 HOURS PRN
Status: DISCONTINUED | OUTPATIENT
Start: 2019-12-24 | End: 2019-12-28 | Stop reason: HOSPADM

## 2019-12-24 RX ORDER — DIPHENHYDRAMINE HYDROCHLORIDE 50 MG/ML
50 INJECTION INTRAMUSCULAR; INTRAVENOUS
Status: COMPLETED | OUTPATIENT
Start: 2019-12-24 | End: 2019-12-24

## 2019-12-24 RX ORDER — FENTANYL CITRATE 50 UG/ML
200 INJECTION, SOLUTION INTRAMUSCULAR; INTRAVENOUS
Status: COMPLETED | OUTPATIENT
Start: 2019-12-24 | End: 2019-12-24

## 2019-12-24 RX ORDER — MAGNESIUM OXIDE 400 MG/1
400 TABLET ORAL DAILY
Status: DISCONTINUED | OUTPATIENT
Start: 2019-12-24 | End: 2019-12-28 | Stop reason: HOSPADM

## 2019-12-24 RX ORDER — POTASSIUM CHLORIDE 7.45 MG/ML
10 INJECTION INTRAVENOUS
Status: DISCONTINUED | OUTPATIENT
Start: 2019-12-24 | End: 2019-12-28 | Stop reason: HOSPADM

## 2019-12-24 RX ORDER — LANOLIN ALCOHOL/MO/W.PET/CERES
3 CREAM (GRAM) TOPICAL
Status: DISCONTINUED | OUTPATIENT
Start: 2019-12-24 | End: 2019-12-28 | Stop reason: HOSPADM

## 2019-12-24 RX ORDER — POTASSIUM CHLORIDE 750 MG/1
20-40 TABLET, EXTENDED RELEASE ORAL
Status: DISCONTINUED | OUTPATIENT
Start: 2019-12-24 | End: 2019-12-28 | Stop reason: HOSPADM

## 2019-12-24 RX ORDER — LIDOCAINE 40 MG/G
CREAM TOPICAL
Status: DISCONTINUED | OUTPATIENT
Start: 2019-12-24 | End: 2019-12-28 | Stop reason: HOSPADM

## 2019-12-24 RX ORDER — POTASSIUM CL/LIDO/0.9 % NACL 10MEQ/0.1L
10 INTRAVENOUS SOLUTION, PIGGYBACK (ML) INTRAVENOUS
Status: DISCONTINUED | OUTPATIENT
Start: 2019-12-24 | End: 2019-12-28 | Stop reason: HOSPADM

## 2019-12-24 RX ADMIN — OMEPRAZOLE 20 MG: 20 CAPSULE, DELAYED RELEASE ORAL at 16:28

## 2019-12-24 RX ADMIN — FOLIC ACID 1 MG: 1 TABLET ORAL at 13:51

## 2019-12-24 RX ADMIN — PREGABALIN 50 MG: 25 CAPSULE ORAL at 19:45

## 2019-12-24 RX ADMIN — MIDAZOLAM 5 MG: 1 INJECTION INTRAMUSCULAR; INTRAVENOUS at 12:18

## 2019-12-24 RX ADMIN — Medication 400 MG: at 13:51

## 2019-12-24 RX ADMIN — DIPHENHYDRAMINE HYDROCHLORIDE 50 MG: 50 INJECTION, SOLUTION INTRAMUSCULAR; INTRAVENOUS at 12:00

## 2019-12-24 RX ADMIN — TRAMADOL HYDROCHLORIDE 50 MG: 50 TABLET, FILM COATED ORAL at 14:34

## 2019-12-24 RX ADMIN — MELATONIN TAB 3 MG 3 MG: 3 TAB at 03:01

## 2019-12-24 RX ADMIN — PREGABALIN 50 MG: 25 CAPSULE ORAL at 10:26

## 2019-12-24 RX ADMIN — FENTANYL CITRATE 200 MCG: 50 INJECTION, SOLUTION INTRAMUSCULAR; INTRAVENOUS at 12:19

## 2019-12-24 RX ADMIN — Medication 2 G: at 03:01

## 2019-12-24 RX ADMIN — THIAMINE HYDROCHLORIDE 100 MG: 100 INJECTION, SOLUTION INTRAMUSCULAR; INTRAVENOUS at 07:41

## 2019-12-24 RX ADMIN — ONDANSETRON 4 MG: 2 INJECTION INTRAMUSCULAR; INTRAVENOUS at 11:47

## 2019-12-24 RX ADMIN — PREGABALIN 50 MG: 25 CAPSULE ORAL at 13:51

## 2019-12-24 RX ADMIN — MAGNESIUM SULFATE HEPTAHYDRATE 4 G: 40 INJECTION, SOLUTION INTRAVENOUS at 10:33

## 2019-12-24 RX ADMIN — SODIUM CHLORIDE 8 MG/HR: 9 INJECTION, SOLUTION INTRAVENOUS at 11:08

## 2019-12-24 RX ADMIN — POTASSIUM PHOSPHATE, MONOBASIC AND POTASSIUM PHOSPHATE, DIBASIC 15 MMOL: 224; 236 INJECTION, SOLUTION INTRAVENOUS at 18:49

## 2019-12-24 RX ADMIN — SODIUM CHLORIDE 8 MG/HR: 9 INJECTION, SOLUTION INTRAVENOUS at 00:52

## 2019-12-24 ASSESSMENT — ACTIVITIES OF DAILY LIVING (ADL)
ADLS_ACUITY_SCORE: 16
ADLS_ACUITY_SCORE: 18
ADLS_ACUITY_SCORE: 17
ADLS_ACUITY_SCORE: 16
ADLS_ACUITY_SCORE: 18
ADLS_ACUITY_SCORE: 18

## 2019-12-24 ASSESSMENT — PAIN DESCRIPTION - DESCRIPTORS
DESCRIPTORS: DULL;ACHING
DESCRIPTORS: SHOOTING;SHARP
DESCRIPTORS: CRAMPING

## 2019-12-24 NOTE — PLAN OF CARE
ICU End of Shift Summary. See flowsheets for vital signs and detailed assessment.    Changes this shift: Alert and oriented but forgetful. Tachycardic. BP stable. HGB 6.0. Transfused 1 unit PRBC. Repeat Hgb 7.0. Recheck scheduled for 0900. Complaints of abdominal bloating, but no pain. Abdomen nontender on palpation. Monitoring CIWA. Have not had to give any medication for CIWA. Pt complains of intermittent nausea, slight tremor, no headache, no hallucinations and mild anxiety. Pt has pain in her hands and feet from peripheral neuropathy that is baseline but gets worse with movement. RA. No hematemesis or BM since coming to the MICU. Bleeding from right molar.     Plan: Monitor CIWA. Monitor for signs of bleeding. Possible EGD.

## 2019-12-24 NOTE — PROGRESS NOTES
Admitted/transferred from: ED  Reason for admission/transfer: GI Bleed and CIWA  Patient status upon admission/transfer: VSS  Interventions: octreotide and pantoprazole gtt, 1 unit PRBC  Plan: Monitor for signs of bleeding.   2 RN skin assessment: completed by Pam  Result of skin assessment and interventions/actions: Blanchable redness on both heels, ulcers on both inner thighs.   Height, weight, drug calc weight: Done  Patient belongings (see Flowsheet - Adult Profile for details): Purse, phone, chargers, clothing, shoes, neckalce  MDRO education (if applicable): Yes

## 2019-12-24 NOTE — PROGRESS NOTES
12/24/19 1004   Quick Adds   Type of Visit Initial PT Evaluation   Living Environment   Lives With significant other   Living Arrangements house   Home Accessibility stairs to enter home;stairs within home   Number of Stairs, Main Entrance 3   Stair Railings, Main Entrance railings on both sides of stairs   Number of Stairs, Within Home, Primary   (split entry - 10 + 12)   Stair Railings, Within Home, Primary railing on left side (ascending)   Transportation Anticipated family or friend will provide;car, drives self   Living Environment Comment PT: pt lives in a split level home with her augustin, reports most needs met on upper level of home however laundry is in lower level. home is set up with split entry, 10 stairs to reach upper and 12 to reach lower level of home from platform. Augustin works 2 jobs and has limited assist at home, pt reports her augustin's father is able to help at times however walks with a cane and is limited in what he can do.    Self-Care   Usual Activity Tolerance moderate   Current Activity Tolerance fair   Regular Exercise No   Equipment Currently Used at Home shower chair  (has 4WW)   Activity/Exercise/Self-Care Comment pt uses a shower chair and owns a 4WW from previous needs.   Functional Level Prior   Ambulation 0-->independent   Transferring 0-->independent   Toileting 0-->independent   Bathing 3-->assistive equipment and person   Communication 0-->understands/communicates without difficulty   Swallowing 0-->swallows foods/liquids without difficulty   Cognition 0 - no cognition issues reported   Fall history within last six months no   Prior Functional Level Comment Pt receives assistance to complete showering task otherwise performs mobility and most iADLs IND.    General Information   Onset of Illness/Injury or Date of Surgery - Date 12/23/19   Referring Physician Valentino John MD   Patient/Family Goals Statement to regain my strength   Pertinent History of Current Problem  (include personal factors and/or comorbidities that impact the POC) 39 year old female with alcoholic liver cirrhosis c/b ascites and esophageal varices s/p banding, ongoing alcohol abuse, C-diff, and non-uremic calciphylaxis who presents to ED with complains of vomiting blood. Given actively drinking alcohol with concerning for alcohol withdrawal, she was admitted to MICU.   Precautions/Limitations fall precautions   Weight-Bearing Status - LUE full weight-bearing   Weight-Bearing Status - RUE full weight-bearing   Weight-Bearing Status - LLE full weight-bearing   Weight-Bearing Status - RLE full weight-bearing   General Observations pleasant, hx BLE neuropathy with pain exacerbated by movement.   Cognitive Status Examination   Orientation orientation to person, place and time   Level of Consciousness alert   Follows Commands and Answers Questions 100% of the time   Personal Safety and Judgment intact   Memory intact   Cognitive Comment pt is alert and oriented   Pain Assessment   Patient Currently in Pain Yes, see Vital Sign flowsheet   Integumentary/Edema   Integumentary/Edema Comments dry skin at B feet   Posture    Posture Forward head position;Protracted shoulders   Range of Motion (ROM)   ROM Comment BUE/BLE wfl   Strength   Strength Comments BLE > 3+/5 per observation   Bed Mobility   Bed Mobility Comments IND supine <> sit   Transfer Skills   Transfer Comments Sit <> stand with CGA of gait belt   Gait   Gait Comments short distance gait with B HHA, mild unsteadiness onf eet.    Balance   Balance Comments CGA with gait belt   Sensory Examination   Sensory Perception Comments hx peripheral neuropathy, limited sensation in BLE at feet and toes.    General Therapy Interventions   Planned Therapy Interventions balance training;gait training;strengthening;home program guidelines;progressive activity/exercise;transfer training   Clinical Impression   Criteria for Skilled Therapeutic Intervention yes, treatment  "indicated   PT Diagnosis impaired functional mobility   Influenced by the following impairments weakness, fatigue, balance deficits   Functional limitations due to impairments below baseline tolerance of functional mobility including transfers and gait   Clinical Presentation Stable/Uncomplicated   Clinical Presentation Rationale pt presentation, clinical reasoning   Clinical Decision Making (Complexity) Low complexity   Therapy Frequency 5x/week   Predicted Duration of Therapy Intervention (days/wks) 1 week   Anticipated Discharge Disposition Transitional Care Facility;Home with Home Therapy;Home with Assist   Risk & Benefits of therapy have been explained Yes   Patient, Family & other staff in agreement with plan of care Yes   Clinical Impression Comments evaluation completed, treatment initiated   [facility] TM \"6 Clicks\"   2016, Trustees of [facility], under license to [employer].  All rights reserved.   6 Clicks Short Forms Basic Mobility Inpatient Short Form   [facility]  \"6 Clicks\" V.2 Basic Mobility Inpatient Short Form   1. Turning from your back to your side while in a flat bed without using bedrails? 4 - None   2. Moving from lying on your back to sitting on the side of a flat bed without using bedrails? 4 - None   3. Moving to and from a bed to a chair (including a wheelchair)? 3 - A Little   4. Standing up from a chair using your arms (e.g., wheelchair, or bedside chair)? 3 - A Little   5. To walk in hospital room? 3 - A Little   6. Climbing 3-5 steps with a railing? 2 - A Lot   Basic Mobility Raw Score (Score out of 24.Lower scores equate to lower levels of function) 19   Total Evaluation Time   Total Evaluation Time (Minutes) 9     "

## 2019-12-24 NOTE — PROGRESS NOTES
"[facility]    Hepatology Follow-up    CC: Hematemesis    Dx: Decompensated alcoholic steatohepatitis with cirrhosis   Hx esophageal varices with bleeding s/p banding   Oropharyngeal bleeding         24 hour events: Hgb 7 > 6> 1uPRBC >7 w/o overt bleeding (other than from molar)     Tachycardic in setting of withdrawal otherwise hemodynamically stable      CIWA 4-7    Subjective:    Patient denies fevers, sweats or chills. No further hematemesis or melena (no bowel movement since admission). No nausea or vomiting.    Medications  Current Facility-Administered Medications   Medication Dose Route Frequency     cefTRIAXone  1 g Intravenous Q24H     thiamine  100 mg Intravenous Daily       Review of systems  A 10-point review of systems was negative, unless stated above    Examination  /83   Pulse 105   Temp 98.8  F (37.1  C) (Oral)   Resp 16   Ht 1.702 m (5' 7\")   Wt 62.3 kg (137 lb 5.6 oz)   SpO2 100%   BMI 21.51 kg/m      Intake/Output Summary (Last 24 hours) at 12/24/2019 0729  Last data filed at 12/24/2019 0700  Gross per 24 hour   Intake 714.33 ml   Output 375 ml   Net 339.33 ml       General: Looks well, NAD, normal color  CVS: RRR  Resp: CTA  Abdomen: Moderately distended, non-tender  Extremities: No edema  Neuro: AAO X 3, asterixis present  Skin: no rash  Psych: Flat affect    Laboratory  Lab Results   Component Value Date     12/23/2019    POTASSIUM 3.6 12/23/2019    CHLORIDE 108 12/23/2019    CO2 24 12/23/2019    BUN 15 12/23/2019    CR 0.56 12/23/2019       Lab Results   Component Value Date    BILITOTAL 2.6 12/23/2019    ALT 30 12/23/2019     12/23/2019    ALKPHOS 187 12/23/2019       Lab Results   Component Value Date    WBC 7.6 12/24/2019    HGB 7.0 12/24/2019    MCV 95 12/24/2019    PLT 70 12/24/2019       Lab Results   Component Value Date    INR 1.62 12/24/2019       MELD-Na score: 15 at 12/24/2019  4:41 AM  MELD score: 15 at 12/24/2019  4:41 " AM  Calculated from:  Serum Creatinine: 0.56 mg/dL (Rounded to 1 mg/dL) at 12/23/2019 11:15 PM  Serum Sodium: 138 mmol/L (Rounded to 137 mmol/L) at 12/23/2019 11:15 PM  Total Bilirubin: 2.6 mg/dL at 12/23/2019 11:15 PM  INR(ratio): 1.62 at 12/24/2019  4:41 AM  Age: 39 years     Radiology  No interval imaging.    Assessment  39 year old with alcoholic steatohepatitis with cirrhosis and alcohol use here with alcohol withdrawal and oropharyngeal bleeding.    No overt bleeding (no hematemesis or melena) since admission. Hemoglobin drifted from 7 > 6 with appropriate response to 1u PRBC to 7. ICU monitoring for withdrawal, CIWA 4-7 in the past 24 hours (last drink 12/22 evening)    Recommendations:  -- Continue octreotide gtt  -- Continue IV ceftriaxone  -- IV PPI BID  -- Plan for diagnostic EGD today (patient also due for surveillance) if stable with regard to withdrawal, additional recommendations to follow  -- Start lactulose, titrate to 3-4 soft bowel movements daily (given astrixis on exam)        Patient seen and discussed with Dr. Ibanez.    Florence Finney MD  GI Fellow, PGY-5  P: [phone]

## 2019-12-24 NOTE — PROGRESS NOTES
"                              Progress Note  Yenifer Maher MRN: [id number]  Age: 39 year old, : 1980  Date: 2019            Interval History:     No benzo given for CIWA protocol since admission.   No vomiting blood, has intermittent bleeding from right lower teeth. Stopped with gauze but then re-bled again, oozingly.  Not confused overnight, tremor improved.      PHYSICAL EXAM    Vital signs:  Temp: 98.8  F (37.1  C) Temp src: Oral BP: 120/81 Pulse: 99 Heart Rate: 102 Resp: 18 SpO2: 99 % O2 Device: None (Room air)   Height: 170.2 cm (5' 7\") Weight: 62.3 kg (137 lb 5.6 oz)  Estimated body mass index is 21.51 kg/m  as calculated from the following:    Height as of this encounter: 1.702 m (5' 7\").    Weight as of this encounter: 62.3 kg (137 lb 5.6 oz).    Intake/Output Summary (Last 24 hours) at 2019 0641  Last data filed at 2019 0500  Gross per 24 hour   Intake 674.33 ml   Output 325 ml   Net 349.33 ml     General: AAOx3, NAD, thin, tremor equally both hands (decrease from yesterday)  Skin: jaundiced, no rash, no ecchymoses  HEENT: dry mucosa, left lower teeth with active blood oozing, not pulsatile  CV: RRR, normal S1S2, no murmur, clicks, rubs  Resp: Clear to auscultation bilaterally, no wheezes, rhonchi  Abd: Soft, non-tender, BS+, distended  Extremities: warm and well perfused, palpable pulses, no edema  Neuro: Equal motor movement all extremities, grade IV+, hyperasthesia both feet and tips of hands.     DIAGNOSTIC STUDIES  ROUTINE ICU LABS (Last four results)  CMP  Recent Labs   Lab 19  2315 19  1436    137   POTASSIUM 3.6 3.8   CHLORIDE 108 105   CO2 24 23   ANIONGAP 6 8   GLC 78 89   BUN 15 19   CR 0.56 0.50*   GFRESTIMATED >90 >90   GFRESTBLACK >90 >90   MARKO 7.5* 8.2*   MAG 1.4* 1.4*   PHOS 2.2*  --    PROTTOTAL 7.4 8.6   ALBUMIN 2.0* 2.2*   BILITOTAL 2.6* 3.2*   ALKPHOS 187* 225*   * 276*   ALT 30 29     CBC  Recent Labs   Lab 19  0441 19  2310 " 12/23/19  1436   WBC 7.6 8.2 10.7   RBC 2.29* 1.91* 2.20*   HGB 7.0* 6.0* 7.0*   HCT 21.8* 19.3* 21.9*   MCV 95 101* 100   MCH 30.6 31.4 31.8   MCHC 32.1 31.1* 32.0   RDW 17.5* 17.2* 17.2*   PLT 70* 77* 101*     INR  Recent Labs   Lab 12/24/19  0441 12/23/19  1436   INR 1.62* 1.50*     Assessment and Plan:     39 year old female with alcoholic liver cirrhosis c/b ascites and esophageal varices s/p banding, ongoing alcohol abuse, C-diff, and non-uremic calciphylaxis who presents to ED with complains of vomiting blood. Given actively drinking alcohol with concerning for alcohol withdrawal, she was admitted to MICU.      MICU course  # Acute anemia: Hgb dropped from 7 ->6 and received 1 PRC and back up to 7. No further hematemesis. GI did EGD 12/24 noon, with non-bleeding EV, banded. She will required 6 weeks follow-up EGD and return to previously scheduled hepatology visit. Bleeding, likely all from tooth bleeding, not from EV per GI. Ceftriaxone, ppi drip, and octreotide drip cancelled per GI.   # Alcohol withdrawal: Overnight, did not receive any lorazepam during admission from [facility] protocol. Pending chem dept consult, and observe alcohol withdrawal at [facility].    PLAN:  Today:  - EGD, with non-bleeding EV, banded  - gauze pack for tooth bleeding, per dentist will need 5 gauze with biting hard for 30 minutes straight to stop the bleeding -- tried with 2 guaze, stopped  - stop pantoprazole gtt, octreotide, ceftriaxone per GI  - OK for not paracentesis per GI     PLAN:  ===NEURO===  # Sedation: none apart from [facility] protocol     # Alcohol use with possible alcohol withdrawal  Last drink 12/23, 2 am. Actively shaking at ED. No h/o withdrawal seizure.   - on [facility] protocol -- patient states that she could not get gabapentin which we will hold off gabapentin for now                 - lorazepam po and IV prn     ===CARDIOVASCULAR===  # tachycardia, improved.  Likely from alcohol withdrawal, and acute anemia.       ===PULMONARY===  No issue.     ===GASTROINTESTINAL===  # Hematemesis  # Teeth bleeding  # Non-bleeding EV s/p banded (12/24)  Unclear if this is upper GI bleeding as she also has molar and intermittent nose bleeding. Recent EGD 8/2019 large recently bleeding EV (>5mm) and clotted blood in fundus, banded. Hbg 7 from baseline 7.5-8.5.   - GI consult, EGD with non-bleeding EV  - soft diet  - resume PTA omeprazole po BID  - stopped gtt pantoprazole and octreotide gtt, stop ceftriaxone IV -- these were discussed with GI after the EGD finding  - pressure gauze to teeth bleeding prn  - f/u EGD in 6 weeks     Decompensated Alcoholic CIrrhosis   Complicated by Varices and Ascites   MELD-Na score: 15  Follows with Dr. Hernández. Continue to use alcohol. Received paracentesis prn. Mild encephalopathic on admission.  - holding PTA Furosemide 20mg every day , Spironolactone 50mg Qday given acute anemia with acute bleeding   - consider restart tomorrow  - Chem dept consult for Alcohol cessation   - no need for paracentesis this admission (discussed with GI -- no leukocytosis, no abdominal pain, stable ascites)  - recheck INR, fibronogen (ordered), CBC, CMP in am     MELD-Na score: 18 at 12/24/2019  9:40 AM  MELD score: 17 at 12/24/2019  9:40 AM  Calculated from:  Serum Creatinine: 0.50 mg/dL (Rounded to 1 mg/dL) at 12/24/2019  9:40 AM  Serum Sodium: 136 mmol/L at 12/24/2019  9:40 AM  Total Bilirubin: 3.7 mg/dL at 12/24/2019  9:40 AM  INR(ratio): 1.62 at 12/24/2019  4:41 AM  Age: 39 years     # Nutrition: poor nutrition  - soft liquid today, regular 2g Na diet tomorrow     ===RENAL===  No issue, producing urine.     # Fluids: 1 L NS at ED     ===HEME/ONC===  # Acute  on chronic anemia  Hgb 7.0 from prior baseline ~ 7.5-8.5. Likely related to epistaxis and oozing from her molars.  Received 1 PRC during admission (12/23).  - trend Hgb, transfuse if <7     ===ENDOCRINE===  No issue     ===INFECTIOUS DISEASE===  No issue.     #  Antimicrobials:  - ceftriaxone 1 g IV q24hr for SBP ppx with UGIB (12/23-12/24)-- stopped due to no EV bleeding     ===SKIN/MSK===  # non-uremic calciphylaxis  - no acute managment     Prophylaxis:  DVT: none given bleeding  GI: PTA omeprazole  Family:  Not updated, patient awake and alert  Disposition: Transfer to medicine  Code Status: FULL    Patient was seen and discussed with attending physician Dr. Perlman, who agrees with above assessment and plan.    Valentino John MD  Internal Medicine, PGY-2  Pager [phone]

## 2019-12-24 NOTE — PLAN OF CARE
4C evaluation  Discharge Planner PT   Patient plan for discharge: home  Current status: pt completes bed mobility with SBA, increased time to transition positions. Performs sit <> stand transfers with Chaim progressing to CGA with gait belt. Able to perform pivots and short distance gait with CGA on gait belt. Limited by pain in B feet.   Barriers to return to prior living situation: medical status, weakness, fatigue, pain, balance deficits  Recommendations for discharge: currently recommend TCU, pending LOS and progress may be appropriate for return to home with HHPT and increased assistance.  Rationale for recommendations: pt currently not demonstrating adequate safety with mobility for return to home, has limited assistance available.        Entered by: Norberto Montejo 12/24/2019 10:42 AM

## 2019-12-25 LAB
ALBUMIN SERPL-MCNC: 2 G/DL (ref 3.4–5)
ALP SERPL-CCNC: 192 U/L (ref 40–150)
ALT SERPL W P-5'-P-CCNC: 69 U/L (ref 0–50)
ANION GAP SERPL CALCULATED.3IONS-SCNC: 7 MMOL/L (ref 3–14)
AST SERPL W P-5'-P-CCNC: 415 U/L (ref 0–45)
BILIRUB SERPL-MCNC: 3.7 MG/DL (ref 0.2–1.3)
BUN SERPL-MCNC: 7 MG/DL (ref 7–30)
CALCIUM SERPL-MCNC: 7.6 MG/DL (ref 8.5–10.1)
CHLORIDE SERPL-SCNC: 104 MMOL/L (ref 94–109)
CO2 SERPL-SCNC: 23 MMOL/L (ref 20–32)
CREAT SERPL-MCNC: 0.5 MG/DL (ref 0.52–1.04)
ERYTHROCYTE [DISTWIDTH] IN BLOOD BY AUTOMATED COUNT: 18.1 % (ref 10–15)
GFR SERPL CREATININE-BSD FRML MDRD: >90 ML/MIN/{1.73_M2}
GLUCOSE SERPL-MCNC: 92 MG/DL (ref 70–99)
HCT VFR BLD AUTO: 22.3 % (ref 35–47)
HGB BLD-MCNC: 7.1 G/DL (ref 11.7–15.7)
INR PPP: 1.56 (ref 0.86–1.14)
MCH RBC QN AUTO: 30.9 PG (ref 26.5–33)
MCHC RBC AUTO-ENTMCNC: 31.8 G/DL (ref 31.5–36.5)
MCV RBC AUTO: 97 FL (ref 78–100)
PHOSPHATE SERPL-MCNC: 2.4 MG/DL (ref 2.5–4.5)
PLATELET # BLD AUTO: 81 10E9/L (ref 150–450)
POTASSIUM SERPL-SCNC: 3.4 MMOL/L (ref 3.4–5.3)
PROT SERPL-MCNC: 7.5 G/DL (ref 6.8–8.8)
RBC # BLD AUTO: 2.3 10E12/L (ref 3.8–5.2)
SODIUM SERPL-SCNC: 133 MMOL/L (ref 133–144)
WBC # BLD AUTO: 7.7 10E9/L (ref 4–11)

## 2019-12-25 PROCEDURE — 80053 COMPREHEN METABOLIC PANEL: CPT | Performed by: STUDENT IN AN ORGANIZED HEALTH CARE EDUCATION/TRAINING PROGRAM

## 2019-12-25 PROCEDURE — 25800030 ZZH RX IP 258 OP 636: Performed by: STUDENT IN AN ORGANIZED HEALTH CARE EDUCATION/TRAINING PROGRAM

## 2019-12-25 PROCEDURE — 36415 COLL VENOUS BLD VENIPUNCTURE: CPT | Performed by: STUDENT IN AN ORGANIZED HEALTH CARE EDUCATION/TRAINING PROGRAM

## 2019-12-25 PROCEDURE — 25000125 ZZHC RX 250: Performed by: STUDENT IN AN ORGANIZED HEALTH CARE EDUCATION/TRAINING PROGRAM

## 2019-12-25 PROCEDURE — 85610 PROTHROMBIN TIME: CPT | Performed by: STUDENT IN AN ORGANIZED HEALTH CARE EDUCATION/TRAINING PROGRAM

## 2019-12-25 PROCEDURE — 12000012 ZZH R&B MS OVERFLOW UMMC

## 2019-12-25 PROCEDURE — 25000132 ZZH RX MED GY IP 250 OP 250 PS 637: Performed by: STUDENT IN AN ORGANIZED HEALTH CARE EDUCATION/TRAINING PROGRAM

## 2019-12-25 PROCEDURE — 25000132 ZZH RX MED GY IP 250 OP 250 PS 637: Performed by: INTERNAL MEDICINE

## 2019-12-25 PROCEDURE — 99232 SBSQ HOSP IP/OBS MODERATE 35: CPT | Performed by: INTERNAL MEDICINE

## 2019-12-25 PROCEDURE — 84100 ASSAY OF PHOSPHORUS: CPT | Performed by: STUDENT IN AN ORGANIZED HEALTH CARE EDUCATION/TRAINING PROGRAM

## 2019-12-25 PROCEDURE — 25000132 ZZH RX MED GY IP 250 OP 250 PS 637: Performed by: PHYSICIAN ASSISTANT

## 2019-12-25 PROCEDURE — 85027 COMPLETE CBC AUTOMATED: CPT | Performed by: STUDENT IN AN ORGANIZED HEALTH CARE EDUCATION/TRAINING PROGRAM

## 2019-12-25 PROCEDURE — 25000128 H RX IP 250 OP 636: Performed by: STUDENT IN AN ORGANIZED HEALTH CARE EDUCATION/TRAINING PROGRAM

## 2019-12-25 PROCEDURE — 99207 ZZC APP CREDIT; MD BILLING SHARED VISIT: CPT | Performed by: PHYSICIAN ASSISTANT

## 2019-12-25 PROCEDURE — 25000132 ZZH RX MED GY IP 250 OP 250 PS 637

## 2019-12-25 PROCEDURE — 85027 COMPLETE CBC AUTOMATED: CPT | Performed by: INTERNAL MEDICINE

## 2019-12-25 RX ORDER — NORTRIPTYLINE HCL 10 MG
10 CAPSULE ORAL AT BEDTIME
Status: DISCONTINUED | OUTPATIENT
Start: 2019-12-25 | End: 2019-12-28 | Stop reason: HOSPADM

## 2019-12-25 RX ORDER — POLYETHYLENE GLYCOL 3350 17 G/17G
17 POWDER, FOR SOLUTION ORAL DAILY PRN
Status: DISCONTINUED | OUTPATIENT
Start: 2019-12-25 | End: 2019-12-28 | Stop reason: HOSPADM

## 2019-12-25 RX ORDER — LANOLIN ALCOHOL/MO/W.PET/CERES
100 CREAM (GRAM) TOPICAL DAILY
Status: DISCONTINUED | OUTPATIENT
Start: 2019-12-25 | End: 2019-12-28 | Stop reason: HOSPADM

## 2019-12-25 RX ORDER — CALCIUM CARBONATE 500 MG/1
500-1500 TABLET, CHEWABLE ORAL 2 TIMES DAILY PRN
Status: DISCONTINUED | OUTPATIENT
Start: 2019-12-25 | End: 2019-12-28 | Stop reason: HOSPADM

## 2019-12-25 RX ADMIN — Medication 100 MG: at 14:03

## 2019-12-25 RX ADMIN — POLYETHYLENE GLYCOL 3350 17 G: 17 POWDER, FOR SOLUTION ORAL at 14:04

## 2019-12-25 RX ADMIN — CALCIUM CARBONATE (ANTACID) CHEW TAB 500 MG 1000 MG: 500 CHEW TAB at 16:48

## 2019-12-25 RX ADMIN — PREGABALIN 50 MG: 25 CAPSULE ORAL at 08:26

## 2019-12-25 RX ADMIN — POTASSIUM PHOSPHATE, MONOBASIC AND POTASSIUM PHOSPHATE, DIBASIC 15 MMOL: 224; 236 INJECTION, SOLUTION INTRAVENOUS at 11:24

## 2019-12-25 RX ADMIN — CALCIUM CARBONATE (ANTACID) CHEW TAB 500 MG 1000 MG: 500 CHEW TAB at 12:58

## 2019-12-25 RX ADMIN — MELATONIN TAB 3 MG 3 MG: 3 TAB at 22:57

## 2019-12-25 RX ADMIN — PREGABALIN 50 MG: 25 CAPSULE ORAL at 14:03

## 2019-12-25 RX ADMIN — Medication 400 MG: at 08:26

## 2019-12-25 RX ADMIN — OMEPRAZOLE 20 MG: 20 CAPSULE, DELAYED RELEASE ORAL at 16:31

## 2019-12-25 RX ADMIN — CALCIUM CARBONATE (ANTACID) CHEW TAB 500 MG 500 MG: 500 CHEW TAB at 08:26

## 2019-12-25 RX ADMIN — FOLIC ACID 1 MG: 1 TABLET ORAL at 08:26

## 2019-12-25 RX ADMIN — OMEPRAZOLE 20 MG: 20 CAPSULE, DELAYED RELEASE ORAL at 08:26

## 2019-12-25 RX ADMIN — ONDANSETRON 4 MG: 4 TABLET, ORALLY DISINTEGRATING ORAL at 16:32

## 2019-12-25 RX ADMIN — PREGABALIN 50 MG: 25 CAPSULE ORAL at 20:39

## 2019-12-25 ASSESSMENT — ACTIVITIES OF DAILY LIVING (ADL)
ADLS_ACUITY_SCORE: 16
ADLS_ACUITY_SCORE: 17
ADLS_ACUITY_SCORE: 16

## 2019-12-25 ASSESSMENT — MIFFLIN-ST. JEOR: SCORE: 1344.63

## 2019-12-25 ASSESSMENT — PAIN DESCRIPTION - DESCRIPTORS: DESCRIPTORS: SORE

## 2019-12-25 NOTE — PLAN OF CARE
ICU End of Shift Summary. See flowsheets for vital signs and detailed assessment.    Changes this shift: Scoring 4-6 on CIWA, no Ativan needs. Complaining of shooting head/ear pain after EGD, provider aware and assessed. No signs of bleeding with stable hemoglobin, bedside EGD completed with banding. Protonix and Octreotide gtt discontinued. Chemical dependency consult placed.    Plan: Transfer to med/surg when bed available. Notify gold 5 with concerns/changes.

## 2019-12-25 NOTE — PLAN OF CARE
Neuro: A&Ox4. Normal conversation.  Cardiac: SR. HR low 100s. VSS.   Respiratory: Sating 99% on RA.  RR regular, unlabored.  GI/: Adequate urine output. Up to BSC.  Diet/appetite: Tolerating mech soft diet, 2 gm Na restriction. No apparent swallowing difficulties. Eating well.  Activity:  Assist of 1, up to BSC. Tremors, weakness, baseline per patient.  Pain: At acceptable level on current regimen.   Skin: No new deficits noted. Slight rash to chest. Old wounds to bilat upper inner thighs, dry, skin intact, no drainage, open to air.  LDA's: PIV L hand, R hand.    Plan: Continue CIWA scoring, 3 this shift.Continue with POC. Notify primary team with changes.

## 2019-12-25 NOTE — PROGRESS NOTES
Transfer  Transferred from: 4C  Via:bed  Reason for transfer:Pt appropriate for 6B- improved patient condition  Family: Aware of transfer  Belongings: Received with pt  Chart: Received with pt  Medications: Meds received from old unit with pt  2 RN Skin Assessment Completed By: Ranulfo AVALOS RN and Kemi SCHMIDT  Report received from: Go SCHMIDT  Pt status: AOx4, conversational, sitting in bed. VSS. Denies nausea. Reports mild headache.

## 2019-12-25 NOTE — PLAN OF CARE
Neuro: X 4/WNL  Cardiac: NSR-Sinus tac in low 100's        Respiratory: Upper 90's ORA.  GI/: WNL. Up to BSC  Activity:  Up with SBA  Pain: At baseline.   Skin: No new deficits noted. Slight rash to chest. Old wounds to bilat upper inner thighs, dry, skin intact, no drainage, open to air.  LDA's: PIV X 2.     Plan: CIWA 4 - 5. Pt sleeping intermittently.

## 2019-12-25 NOTE — PROGRESS NOTES
[facility] Progress Note - Hospitalist Service, Gold 5     Date of Admission:  12/23/2019  Assessment & Plan   Ms. Yenifer Maher is a 38 yo female with etoh use disorder, decompensated etoh cirrhosis, anemia, GERD, etoh induced gastritis, hx of non-uremic calciphylaxis, lupus anticoagulant, and polyneuropathy, admitted initially to MICU after presenting to ED 12/23 with etoh abuse and hematemesis, now s/p EGD 12/24 that revealed non-bleeding varices (bleeding likely related to oral lesion), transferred to the floor for ongoing management of her etoh withdrawal and deconditioned state.     # Acute on chronic anemia: BL Hgb over the past year appears to be 7.5-8.5. Admitted with concern for hematemesis and GI source of acute bleed due to her hx of varices and Hgb drop to amy 6.0 now s/p transfusion of 1 unit pRBCs. Started empirically on IV ceftriaxone and octreotide. Underwent EGD yesterday that revealed non-bleeding varices that were banded, so hematemesis and Hgb drop most likely due to copious bleeding from lesion in mouth at area of prior R upper molar that has been extracted. Octreotide and ceftriaxone discontinued. Pt states oral bleeding essentially resolved after packing in ICU. Hgb stable at 7.1 (7.1).   - Repeat CBC tomorrow am.   - Contact medicine if oral bleeding recurs.    # Decompensated cirrhosis c/b ascites, coagulopathy and esophageal varices: Diagnosed via biopsy 9/2018. Follow up OP by GI specialist at [facility], Dr. Hernández, last clinic visit 10/8/19. PTA on Lasix and spironolactone, both on hold since admission. C/b varices, of which 7 were banded via EGD 8/2019. Repeat EGD this admission with non-bleeding grade II varices s/p placement of 4 bands. Also revealed mod portal hypertensive gastropathy. Last para 8/27. No current indication for repeat para. BL INR increasing since beginning of year, now ~ 1.4-1.6, most recent at [facility].   - GI consulted and  appreciate recommendations.   - D/w GI today regarding whether ok to restart PTA Lasix and spironolactone.   - Also d/w with GI whether to start previously recommended lactulose as pt has no hx of HE and currently oriented in all spheres.   - Follow up with Dr. Hernández as scheduled on 1/16/2020    # Acute etoh abuse and withdrawal  # Severe etoh use disorder  # Acute etoh hepatitis   Multiple past hospitalization for etoh abuse and withdrawal. Denies hx of CD treatment. During most recent hospitalization this past August, she was offered OP treatment but pt did not pursue due to no close locations near her home. States most recently consuming 2-3 glasses of wine daily PTA for the past several wks, last drink two days before coming to ED. Placed on CIWA protocol for which she has not scored high enough to receive lorazepam over 24 hrs. LFTs slightly worse since admission: ALT 69, , total bili 3.7, INR 1.56.  - CD consulted and appreciate recommendations.   - Discontinue CIWA protocol  - Restart PTA oral thiamine  - Repeat hepatic panel tomorrow am  - Avoid hepatotoxic agents.     # GERD  # Etoh induced gastritis  EGD finding of gastropathy as above but no active bleeding. Denies abd pain. Mildly tender over epigastrium without guarding.   - Continue PTA BID PPI therapy.     # Hx of non-uremic calciphylaxis: Diagnosed via biopsy during hospitalization last year (12/2018). Was at one point on pentoxifylline but states for unclear reason someone told her to stop taking. Still has minor lesions on her inner thigh that no longer cause severe pain.  - Follow up with Dermatology after discharge. Referral placed on discharge orders.     # Polyneuropathy: Sxs stable. Continue PTA Lyrica and nortriptyline.    # Lupus anticoagulant: Was followed by Dr. Jeffery of hematology after testing + last year and was on Lovenox but due to recurrent bleeding, Dr. Jeffery advised discontinuing all AC. Has not had follow up since.   -  Follow up with Dr. Jeffery in Hematology clinic after discharge. Referral placed in discharge orders.       Diet: Mechanical/Dental Soft Diet    DVT Prophylaxis: Pneumatic Compression Devices  Chavez Catheter: not present  Code Status: Full Code      Disposition Plan   Expected discharge: 1-2 days, recommended to prior living arrangement once out of etoh withdrawal, seen by CD assessors, and safe discharge plan.  Entered: Albin Henderson PA-C 12/25/2019, 8:54 AM       The patient's care was discussed with the Attending Physician, Dr. Soto, Bedside Nurse and Patient.    Albin Henderson PA-C  Hospitalist Service, [address], [place]  Pager: [phone]  Please see sticky note for cross cover information  ______________________________________________________________________    Interval History   No acute events overnight. States having trouble eating and drinking due to difficulty swallowing she feels is related to her EGD. Denies fever, chills, chest pain and other acute physical concerns at this time. States no further bleeding from mouth.     Data reviewed today: I reviewed all medications, new labs and imaging results over the last 24 hours.    Physical Exam   Vital Signs: Temp: 98.5  F (36.9  C) Temp src: Oral BP: 110/74 Pulse: 97 Heart Rate: 99 Resp: 16 SpO2: 97 % O2 Device: None (Room air) Oxygen Delivery: 3 LPM  Weight: 138 lbs 3.65 oz  GEN: In NAD  HEENT: NCAT; PERRL; sclerae non-icteric  LUNGS: CTAB  CV: RRR  ABD: +BSs; mildly tender over epigastrium without guarding.   EXT: No BLE edema  SKIN: Mildly tender small areas of calciphylaxis on inner thighs.   NEURO: AAOx3; CNs grossly intact; No acute focal deficits noted.      Data   CMP  Recent Labs   Lab 12/25/19  0442 12/24/19  1801 12/24/19  0940 12/23/19  2315 12/23/19  1436     --  136 138 137   POTASSIUM 3.4  --  3.6 3.6 3.8   CHLORIDE 104  --  107 108 105   CO2 23  --  23 24 23   ANIONGAP 7  --  6 6 8   GLC  92  --  84 78 89   BUN 7  --  13 15 19   CR 0.50*  --  0.50* 0.56 0.50*   GFRESTIMATED >90  --  >90 >90 >90   GFRESTBLACK >90  --  >90 >90 >90   MARKO 7.6*  --  7.8* 7.5* 8.2*   MAG  --  2.6*  --  1.4* 1.4*   PHOS  --  2.3* 2.3* 2.2*  --    PROTTOTAL 7.5  --  7.5 7.4 8.6   ALBUMIN 2.0*  --  2.0* 2.0* 2.2*   BILITOTAL 3.7*  --  3.7* 2.6* 3.2*   ALKPHOS 192*  --  198* 187* 225*   *  --  434* 252* 276*   ALT 69*  --  60* 30 29     CBC  Recent Labs   Lab 12/25/19 0442 12/24/19  2209 12/24/19  1801 12/24/19  0940 12/24/19  0441   WBC 7.7 8.4 8.2  --  7.6   RBC 2.30* 2.40* 2.40*  --  2.29*   HGB 7.1* 7.4* 7.5* 7.2* 7.0*   HCT 22.3* 23.6* 23.0*  --  21.8*   MCV 97 98 96  --  95   MCH 30.9 30.8 31.3  --  30.6   MCHC 31.8 31.4* 32.6  --  32.1   RDW 18.1* 18.3* 18.1*  --  17.5*   PLT 81* 81* 81*  --  70*     INR  Recent Labs   Lab 12/25/19 0442 12/24/19  0441 12/23/19  1436   INR 1.56* 1.62* 1.50*

## 2019-12-25 NOTE — PROGRESS NOTES
D- Stable post ICU transfer. GI discomfort d/t recent EGD (team notified) CIWA discontinued. New orders to transfer to floor  I- Phos replaced per protocol. Belongings sent with pt.   A- Stable for transfer to  in   P- Continues cares on 7A

## 2019-12-26 ENCOUNTER — APPOINTMENT (OUTPATIENT)
Dept: OCCUPATIONAL THERAPY | Facility: CLINIC | Age: 39
End: 2019-12-26
Attending: PHYSICIAN ASSISTANT
Payer: MEDICAID

## 2019-12-26 ENCOUNTER — APPOINTMENT (OUTPATIENT)
Dept: PHYSICAL THERAPY | Facility: CLINIC | Age: 39
End: 2019-12-26
Payer: MEDICAID

## 2019-12-26 ENCOUNTER — APPOINTMENT (OUTPATIENT)
Dept: SPEECH THERAPY | Facility: CLINIC | Age: 39
End: 2019-12-26
Attending: PHYSICIAN ASSISTANT
Payer: MEDICAID

## 2019-12-26 LAB
ALBUMIN SERPL-MCNC: 1.9 G/DL (ref 3.4–5)
ALP SERPL-CCNC: 186 U/L (ref 40–150)
ALT SERPL W P-5'-P-CCNC: 58 U/L (ref 0–50)
ANION GAP SERPL CALCULATED.3IONS-SCNC: 8 MMOL/L (ref 3–14)
AST SERPL W P-5'-P-CCNC: 310 U/L (ref 0–45)
BILIRUB SERPL-MCNC: 3.4 MG/DL (ref 0.2–1.3)
BUN SERPL-MCNC: 4 MG/DL (ref 7–30)
CALCIUM SERPL-MCNC: 7.9 MG/DL (ref 8.5–10.1)
CHLORIDE SERPL-SCNC: 104 MMOL/L (ref 94–109)
CO2 SERPL-SCNC: 23 MMOL/L (ref 20–32)
CREAT SERPL-MCNC: 0.46 MG/DL (ref 0.52–1.04)
ERYTHROCYTE [DISTWIDTH] IN BLOOD BY AUTOMATED COUNT: 17.6 % (ref 10–15)
GFR SERPL CREATININE-BSD FRML MDRD: >90 ML/MIN/{1.73_M2}
GLUCOSE SERPL-MCNC: 90 MG/DL (ref 70–99)
HCT VFR BLD AUTO: 21.3 % (ref 35–47)
HGB BLD-MCNC: 6.9 G/DL (ref 11.7–15.7)
HGB BLD-MCNC: 6.9 G/DL (ref 11.7–15.7)
MAGNESIUM SERPL-MCNC: 1.8 MG/DL (ref 1.6–2.3)
MCH RBC QN AUTO: 30.8 PG (ref 26.5–33)
MCHC RBC AUTO-ENTMCNC: 32.4 G/DL (ref 31.5–36.5)
MCV RBC AUTO: 95 FL (ref 78–100)
PHOSPHATE SERPL-MCNC: 2.4 MG/DL (ref 2.5–4.5)
PLATELET # BLD AUTO: 88 10E9/L (ref 150–450)
POTASSIUM SERPL-SCNC: 3.4 MMOL/L (ref 3.4–5.3)
PROT SERPL-MCNC: 7 G/DL (ref 6.8–8.8)
RBC # BLD AUTO: 2.24 10E12/L (ref 3.8–5.2)
SODIUM SERPL-SCNC: 135 MMOL/L (ref 133–144)
WBC # BLD AUTO: 6.7 10E9/L (ref 4–11)

## 2019-12-26 PROCEDURE — 25000132 ZZH RX MED GY IP 250 OP 250 PS 637: Performed by: INTERNAL MEDICINE

## 2019-12-26 PROCEDURE — 97116 GAIT TRAINING THERAPY: CPT | Mod: GP | Performed by: PHYSICAL THERAPIST

## 2019-12-26 PROCEDURE — 86923 COMPATIBILITY TEST ELECTRIC: CPT | Performed by: INTERNAL MEDICINE

## 2019-12-26 PROCEDURE — 84100 ASSAY OF PHOSPHORUS: CPT | Performed by: INTERNAL MEDICINE

## 2019-12-26 PROCEDURE — 80053 COMPREHEN METABOLIC PANEL: CPT | Performed by: INTERNAL MEDICINE

## 2019-12-26 PROCEDURE — 85018 HEMOGLOBIN: CPT | Performed by: PHYSICIAN ASSISTANT

## 2019-12-26 PROCEDURE — 12000012 ZZH R&B MS OVERFLOW UMMC

## 2019-12-26 PROCEDURE — 97165 OT EVAL LOW COMPLEX 30 MIN: CPT | Mod: GO

## 2019-12-26 PROCEDURE — 97535 SELF CARE MNGMENT TRAINING: CPT | Mod: GO

## 2019-12-26 PROCEDURE — 99232 SBSQ HOSP IP/OBS MODERATE 35: CPT | Performed by: INTERNAL MEDICINE

## 2019-12-26 PROCEDURE — 86901 BLOOD TYPING SEROLOGIC RH(D): CPT | Performed by: INTERNAL MEDICINE

## 2019-12-26 PROCEDURE — 36415 COLL VENOUS BLD VENIPUNCTURE: CPT | Performed by: INTERNAL MEDICINE

## 2019-12-26 PROCEDURE — 25000132 ZZH RX MED GY IP 250 OP 250 PS 637: Performed by: PHYSICIAN ASSISTANT

## 2019-12-26 PROCEDURE — 99207 ZZC APP CREDIT; MD BILLING SHARED VISIT: CPT | Performed by: PHYSICIAN ASSISTANT

## 2019-12-26 PROCEDURE — 86850 RBC ANTIBODY SCREEN: CPT | Performed by: INTERNAL MEDICINE

## 2019-12-26 PROCEDURE — 92526 ORAL FUNCTION THERAPY: CPT | Mod: GN

## 2019-12-26 PROCEDURE — 92610 EVALUATE SWALLOWING FUNCTION: CPT | Mod: GN

## 2019-12-26 PROCEDURE — 86900 BLOOD TYPING SEROLOGIC ABO: CPT | Performed by: INTERNAL MEDICINE

## 2019-12-26 PROCEDURE — 85027 COMPLETE CBC AUTOMATED: CPT | Performed by: INTERNAL MEDICINE

## 2019-12-26 PROCEDURE — 36415 COLL VENOUS BLD VENIPUNCTURE: CPT | Performed by: PHYSICIAN ASSISTANT

## 2019-12-26 PROCEDURE — 25000132 ZZH RX MED GY IP 250 OP 250 PS 637: Performed by: STUDENT IN AN ORGANIZED HEALTH CARE EDUCATION/TRAINING PROGRAM

## 2019-12-26 PROCEDURE — 97530 THERAPEUTIC ACTIVITIES: CPT | Mod: GP | Performed by: PHYSICAL THERAPIST

## 2019-12-26 PROCEDURE — 97530 THERAPEUTIC ACTIVITIES: CPT | Mod: GO

## 2019-12-26 PROCEDURE — 83735 ASSAY OF MAGNESIUM: CPT | Performed by: INTERNAL MEDICINE

## 2019-12-26 RX ORDER — LACTULOSE 10 G/15ML
10 SOLUTION ORAL 2 TIMES DAILY
Status: DISCONTINUED | OUTPATIENT
Start: 2019-12-26 | End: 2019-12-28 | Stop reason: HOSPADM

## 2019-12-26 RX ORDER — CARVEDILOL 3.12 MG/1
3.12 TABLET ORAL 2 TIMES DAILY WITH MEALS
Status: DISCONTINUED | OUTPATIENT
Start: 2019-12-26 | End: 2019-12-27

## 2019-12-26 RX ORDER — MULTIVITAMIN,THERAPEUTIC
1 TABLET ORAL DAILY
Status: DISCONTINUED | OUTPATIENT
Start: 2019-12-26 | End: 2019-12-28 | Stop reason: HOSPADM

## 2019-12-26 RX ADMIN — Medication 100 MG: at 08:05

## 2019-12-26 RX ADMIN — PREGABALIN 50 MG: 25 CAPSULE ORAL at 08:11

## 2019-12-26 RX ADMIN — THERA TABS 1 TABLET: TAB at 12:06

## 2019-12-26 RX ADMIN — CARVEDILOL 3.12 MG: 3.12 TABLET, FILM COATED ORAL at 17:14

## 2019-12-26 RX ADMIN — PREGABALIN 50 MG: 25 CAPSULE ORAL at 19:41

## 2019-12-26 RX ADMIN — Medication 400 MG: at 08:05

## 2019-12-26 RX ADMIN — CALCIUM CARBONATE (ANTACID) CHEW TAB 500 MG 1000 MG: 500 CHEW TAB at 08:04

## 2019-12-26 RX ADMIN — LACTULOSE 10 G: 20 SOLUTION ORAL at 19:40

## 2019-12-26 RX ADMIN — PREGABALIN 50 MG: 25 CAPSULE ORAL at 14:12

## 2019-12-26 RX ADMIN — CALCIUM CARBONATE (ANTACID) CHEW TAB 500 MG 1000 MG: 500 CHEW TAB at 14:15

## 2019-12-26 RX ADMIN — POLYETHYLENE GLYCOL 3350 17 G: 17 POWDER, FOR SOLUTION ORAL at 08:05

## 2019-12-26 RX ADMIN — FOLIC ACID 1 MG: 1 TABLET ORAL at 08:05

## 2019-12-26 RX ADMIN — OMEPRAZOLE 20 MG: 20 CAPSULE, DELAYED RELEASE ORAL at 15:49

## 2019-12-26 RX ADMIN — NORTRIPTYLINE HYDROCHLORIDE 10 MG: 10 CAPSULE ORAL at 21:54

## 2019-12-26 RX ADMIN — OMEPRAZOLE 20 MG: 20 CAPSULE, DELAYED RELEASE ORAL at 08:05

## 2019-12-26 ASSESSMENT — ACTIVITIES OF DAILY LIVING (ADL)
ADLS_ACUITY_SCORE: 16

## 2019-12-26 NOTE — PROGRESS NOTES
"[facility]    Hepatology Follow-up    CC:       Hematemesis     Dx:        Decompensated alcoholic steatohepatitis with cirrhosis               Hx esophageal varices with bleeding s/p banding               Oropharyngeal bleeding    24 hour events: No further bleeding from mouth, no hematemesis     Hgb Stable ~ 7    Subjective:    Patient denies fevers, sweats or chills. No abdominal pain, eating well, no encephalopathy. Still has not had a bowel movement.    Medications  Current Facility-Administered Medications   Medication Dose Route Frequency     folic acid  1 mg Oral Daily     magnesium oxide  400 mg Oral Daily     nortriptyline  10 mg Oral At Bedtime     omeprazole  20 mg Oral BID AC     oxymetazoline  2 spray Both Nostrils Once     pregabalin  50 mg Oral TID     sodium chloride (PF)  3 mL Intracatheter Q8H     vitamin B1  100 mg Oral Daily       Review of systems  A 10-point review of systems was negative, unless stated above    Examination  /78 (BP Location: Right arm)   Pulse 98   Temp 98.4  F (36.9  C) (Oral)   Resp 16   Ht 1.702 m (5' 7\")   Wt 62.7 kg (138 lb 3.7 oz)   SpO2 95%   BMI 21.65 kg/m      Intake/Output Summary (Last 24 hours) at 12/26/2019 0824  Last data filed at 12/26/2019 0100  Gross per 24 hour   Intake 1300 ml   Output 700 ml   Net 600 ml       General: Looks well, NAD, normal color  CVS: RRR  Resp: CTA  Abdomen: Moderately distended, soft, mildly tender to palpation  Extremities: No edema  Neuro: AAO X 3, asterixis not present  Skin: no rash  Psych: normal mood    Laboratory  Lab Results   Component Value Date     12/26/2019    POTASSIUM 3.4 12/26/2019    CHLORIDE 104 12/26/2019    CO2 23 12/26/2019    BUN 4 12/26/2019    CR 0.46 12/26/2019       Lab Results   Component Value Date    BILITOTAL 3.4 12/26/2019    ALT 58 12/26/2019     12/26/2019    ALKPHOS 186 12/26/2019       Lab Results   Component Value Date    WBC 6.7 12/26/2019    " HGB 6.9 12/26/2019    MCV 95 12/26/2019    PLT 88 12/26/2019       Lab Results   Component Value Date    INR 1.56 12/25/2019       MELD-Na score: 18 at 12/26/2019  6:17 AM  MELD score: 16 at 12/26/2019  6:17 AM  Calculated from:  Serum Creatinine: 0.46 mg/dL (Rounded to 1 mg/dL) at 12/26/2019  6:17 AM  Serum Sodium: 135 mmol/L at 12/26/2019  6:17 AM  Total Bilirubin: 3.4 mg/dL at 12/26/2019  6:17 AM  INR(ratio): 1.56 at 12/25/2019  4:42 AM  Age: 39 years     EGD (12/26/19):  Findings:        Four columns of non-bleeding grade II varices were found in the middle        third of the esophagus and in the lower third of the esophagus,.        Stigmata of recent bleeding were evident and red carmela signs were        present. Stigmata of prior treatment were evident. Four bands were        successfully placed with complete eradication, resulting in deflation of        varices. There was no bleeding during and at the end of the procedure.        Moderate portal hypertensive gastropathy was found in the stomach.        The examined duodenum was normal.                                                                                     Impression:          - Recently bleeding grade II esophageal varices.                        Completely eradicated. Banded.                        - Portal hypertensive gastropathy.                        - Normal examined duodenum.                        - No specimens collected.     Radiology      Assessment  39 year old with alcoholic steatohepatitis with cirrhosis and alcohol use here with alcohol withdrawal and oropharyngeal bleeding.     EGD completed 12/24 with Grade II EV that were banded x4 with mild PHG, however, suspect bleeding PTA related to oozing from molar. No further reports of hematemesis and Hgb remains stable ~ 7. Plan to initiate NSBB given prior history of variceal bleeding (8/2019).    Recommendations:  -- Start coreg 3.125mg PO BID for secondary prophylaxis (variceal  bleed in 8/2019), titrate to HR 55-60 and SBP >90  -- OK to resume diuretics 12/27/19  -- OK to advance diet to 2gm Na restricted diet with 1.2-1.5g/kg/day protein  -- Continue to encourage absolute alcohol cessation  -- Follow-up:   -- EGD 6 weeks for EV surveilance   -- Dr. Hernández in clinic as scheduled on 1/16/19    Patient seen and discussed with Dr. Leventhal.    Florence Finney MD  GI Fellow, PGY-5  P: [phone]

## 2019-12-26 NOTE — CONSULTS
12/24/19 CD consult acknowledged.  Based on current volume and availability, pt will likely be seen Friday 12/27.     Lexus Negrete, [facility]  [phone]

## 2019-12-26 NOTE — PROGRESS NOTES
12/26/19 1055   Quick Adds   Type of Visit Initial Occupational Therapy Evaluation   Living Environment   Lives With significant other   Living Arrangements house  (split level house)   Home Accessibility stairs to enter home;stairs within home   Number of Stairs, Main Entrance 3   Number of Stairs, Within Home, Primary other (see comments)  (split level home)   Living Environment Comment Pt reports living in a split level home with her augustin. Augustin works 2 jobs and is gone majority of the time. Augustin's father also assists intermittently such as driving pt to the grocery store, pt reports he walks with a cane and is therefore limited in his ability to phsycially assist.    Self-Care   Usual Activity Tolerance moderate   Current Activity Tolerance fair   Regular Exercise No   Equipment Currently Used at Home shower chair;walker, rolling   Activity/Exercise/Self-Care Comment Pt uses a shower chair and has access to a 4WW if needed.   Functional Level   Ambulation 0-->independent   Transferring 0-->independent   Toileting 0-->independent   Bathing 3-->assistive equipment and person   Dressing 0-->independent   Eating 0-->independent   Communication 0-->understands/communicates without difficulty   Swallowing 0-->swallows foods/liquids without difficulty   Cognition 0 - no cognition issues reported   Fall history within last six months no   Which of the above functional risks had a recent onset or change? ambulation;transferring   Prior Functional Level Comment Pt reports receiving assist for seated bathing from augustin.   General Information   Onset of Illness/Injury or Date of Surgery - Date 12/23/19   Referring Physician Albin Henderson PA-C   Patient/Family Goals Statement not stated   Additional Occupational Profile Info/Pertinent History of Current Problem Ms. Yenifer Maher is a 40 yo female with etoh use disorder, decompensated etoh cirrhosis, anemia, GERD, etoh induced gastritis, hx of non-uremic  "calciphylaxis, lupus anticoagulant, and polyneuropathy, admitted initially to MICU after presenting to ED 12/23 with etoh abuse and hematemesis, now s/p EGD 12/24 that revealed non-bleeding varices (bleeding likely related to oral lesion), transferred to the floor for ongoing management of her etoh withdrawal and deconditioned state.    Precautions/Limitations fall precautions   General Observations Pt supine in bed upon arrival, agreeable to therapy.   Cognitive Status Examination   Orientation orientation to person, place and time   Level of Consciousness alert   Follows Commands (Cognition) WFL   Cognitive Comment Pt reports recent confusion has cleared, reports being at/near cognitive baseline.   Visual Perception   Visual Perception Wears glasses   Sensory Examination   Sensory Comments neuropathy in B hands and feet, pt reports this has been present for ~2 years   Pain Assessment   Patient Currently in Pain No   Range of Motion (ROM)   ROM Quick Adds No deficits were identified   Strength   Strength Comments generalized weakness   Instrumental Activities of Daily Living (IADL)   Previous Responsibilities driving;medication management;shopping;laundry;housekeeping;meal prep   IADL Comments Pt reports driving short distances at baseline. Pt performs simple meal prep at baseline, but waits for fiance to assist with handling heavy cooking items for safety or for taking objects out of oven.   Activities of Daily Living Analysis   Impairments Contributing to Impaired Activities of Daily Living balance impaired;cognition impaired;strength decreased   General Therapy Interventions   Planned Therapy Interventions ADL retraining;IADL retraining;cognition;home program guidelines;progressive activity/exercise   Clinical Impression   Criteria for Skilled Therapeutic Interventions Met yes, treatment indicated   OT Diagnosis OT order for \"deconditioned\"   Influenced by the following impairments strength, activity tolerance, " "cognition   Assessment of Occupational Performance 1-3 Performance Deficits   Identified Performance Deficits home mgmt, bathing   Clinical Decision Making (Complexity) Low complexity   Therapy Frequency 5x/week   Predicted Duration of Therapy Intervention (days/wks) 1 week   Anticipated Equipment Needs at Discharge   (TBD with further therapy)   Anticipated Discharge Disposition Home with Home Therapy;Home with Outpatient Therapy   Risks and Benefits of Treatment have been explained. Yes   Patient, Family & other staff in agreement with plan of care Yes   [facility] \"6 Clicks\"   2016, Trustees of [facility], under license to [employer]  All rights reserved.   6 Clicks Short Forms Daily Activity Inpatient Short Form   [facility] AM-PAC  \"6 Clicks\" Daily Activity Inpatient Short Form   1. Putting on and taking off regular lower body clothing? 4 - None   2. Bathing (including washing, rinsing, drying)? 3 - A Little   3. Toileting, which includes using toilet, bedpan or urinal? 3 - A Little   4. Putting on and taking off regular upper body clothing? 4 - None   5. Taking care of personal grooming such as brushing teeth? 4 - None   6. Eating meals? 4 - None   Daily Activity Raw Score (Score out of 24.Lower scores equate to lower levels of function) 22   Total Evaluation Time   Total Evaluation Time (Minutes) 8     "

## 2019-12-26 NOTE — PLAN OF CARE
"/64 (BP Location: Right arm)   Pulse 98   Temp 98.7  F (37.1  C) (Oral)   Resp 16   Ht 1.702 m (5' 7\")   Wt 62.7 kg (138 lb 3.7 oz)   SpO2 96%   BMI 21.65 kg/m       [id number]  BP runs lower- baseline, tachy- baseline. All other VSS on RA. Bed alarm has been placed on patient, but has not set it off this evening. Orientated x4. Calls appropriately. Up with walker and assist of one. Usually independent- but patient is weak and shaky. Adequate urine output- not saving. No BM, received scheduled miralax. Patient has not had a BM this evening. Tolerating mechanically altered diet, Na 2+ diet - tolerated food better tonight. Earlier this evening did not do well with oral intake due to heartburn. PRN tums and zofran given earlier this evening. PIV infusing with phos replacement and TKO. Will be saline locked after completed. Recheck in the AM. Continue plan of care, please notify MD with any changes.     "

## 2019-12-26 NOTE — PLAN OF CARE
"VS: /74 (BP Location: Right arm)   Pulse 98   Temp 98.5  F (36.9  C) (Oral)   Resp 16   Ht 1.702 m (5' 7\")   Wt 62.7 kg (138 lb 3.7 oz)   SpO2 95%   BMI 21.65 kg/m      CIWA scores of 4 and 3 respectively.     Current condition: Stable on RA  Neuro: WDL, AOx4  Cardio: Tachycardic, 100-110's.   Respiratory: WDL,  GI/: Voiding spontaneously, No BM on shift, but passing gas.   Skin: Blotchy skin, pale.   Diet:   Mechanical soft diet.   LDA:  Left and Right PIV saline locked.   Mobility:  Stand by assist with a walker.   Pain: Neuropathy pain.   PRN medications: None given.   Education:  Call light education.   Plan of Care: Will Continue to monitor.   "

## 2019-12-26 NOTE — PLAN OF CARE
Discharge Planner PT   Patient plan for discharge: Home  Current status: Pt demonstrated supine to/from sitting and scooting in bed with supervision.  Sit to/from standing from EOB x 3 with FWW and CGA to SBA x 1.  Pt demonstrated steady gait with FWW, no LOB observed.  Pt ambulated approximately 200' and then 100' with FWW and CGA to SBA x 1.  Pt reported feeling nauseated and lightheaded with OOB, VSS with activity.    Barriers to return to prior living situation: Stairs, balance, medical, and activity tolerance  Recommendations for discharge: Home with HH PT  Rationale for recommendations: Pt would benefit from further skilled PT for LE strengthening, balance, and increase independence with all functional mobility/gait.        Entered by: Aleena Irby 12/26/2019 4:37 PM

## 2019-12-26 NOTE — PLAN OF CARE
Alert and oriented x 4. CIWA score minimum. Vital signs stable. On room air.Denies pain.Hgb 6.9, recheck this afternoon. On calorie count starting today. Good oral intake.PIV saline locked. Ambulates to bathroom with stand by assist using a walker. Resting in bed. Call light within reach.Plan:continue care.Pending chemical dependency and GI consultation.

## 2019-12-26 NOTE — PROGRESS NOTES
12/26/19 0920   General Information   Onset Date 12/23/19   Start of Care Date 12/26/19   Referring Physician Albin Henderson PA-C   Patient Profile Review/OT: Additional Occupational Profile Info See Profile for full history and prior level of function   Patient/Family Goals Statement None stated   Swallowing Evaluation Bedside swallow evaluation   Behaviorial Observations WFL (within functional limits)   Mode of current nutrition Oral diet   Type of oral diet Regular;Thin liquid   Respiratory Status Room air   Comments SLP: Pt is a 38 yo female with etoh use disorder, decompensated etoh cirrhosis, anemia, GERD, etoh induced gastritis, hx of non-uremic calciphylaxis, lupus anticoagulant, and polyneuropathy, admitted initially to MICU after presenting to ED 12/23 with etoh abuse and hematemesis, now s/p EGD 12/24 that revealed non-bleeding varices (bleeding likely related to oral lesion), transferred to the floor for ongoing management of her etoh withdrawal and deconditioned state. Upon interview, pt denies hx of dysphagia but reports some pain when swallowing 2/2 EGD 12/24. No coughing/choking. Clinical swallow eval completed per MD order.   Clinical Swallow Evaluation   Oral Musculature generally intact  (slight weakness noted)   Structural Abnormalities none present   Dentition present and adequate  (per chart, bleeding from wisdom tooth socket)   Mucosal Quality good   Mandibular Strength and Mobility intact   Oral Labial Strength and Mobility WFL   Lingual Strength and Mobility WFL   Buccal Strength and Mobility intact   Laryngeal Function Cough;Throat clear;Swallow;Voicing initiated   Oral Musculature Comments WFL   Swallow Eval   Feeding Assistance no assistance needed   Clinical Swallow Eval: Thin Liquid Texture Trial   Mode of Presentation, Thin Liquids cup;self-fed  (pt declining use of straw 2/2 bleeding tooth socket)   Volume of Liquid or Food Presented 3oz thin water via small cup sips   Oral  Phase of Swallow WFL   Pharyngeal Phase of Swallow intact   Diagnostic Statement WFL, no overt s/sx of aspiration. Pt endorsed slight discomfort at level of sternum   Clinical Swallow Eval: Solid Food Texture Trial   Mode of Presentation, Solid self-fed   Volume of Solid Food Presented 1 cracker   Oral Phase, Solid WFL   Pharyngeal Phase, Solid intact   Diagnostic Statement WFL, no overt s/sx of aspiration, pt denied odynophagia   Esophageal Phase of Swallow   Patient reports or presents with symptoms of esophageal dysphagia Yes   Esophageal comments Hx of varices, s/p banding and EGD   Swallow Eval: Clinical Impressions   Skilled Criteria for Therapy Intervention No problems identified which require skilled intervention   Functional Assessment Scale (FAS) 7   Treatment Diagnosis Functional oropharyngeal swallow mechanism   Diet texture recommendations Regular diet;Thin liquids   Recommended Feeding/Eating Techniques small sips/bites;alternate between small bites and sips of food/liquid;maintain upright posture during/after eating for 30 mins  (slow rate, upright for all PO)   Demonstrates Need for Referral to Another Service   (ongoing GI f/u)   Risks and Benefits of Treatment have been explained. Yes   Patient, family and/or staff in agreement with Plan of Care Yes   Clinical Impression Comments SLP: Clinical swallow eval completed per MD order. Pt presents with functional oropharyngeal swallow mechanism. Pt having slight discomfort swallowing 2/2 EGD completed 12/24/19. Oral mech exam unremarkable; per chart and pt, pt endorsing some bleeding of upper right wisdom tooth socket. Assessed with thin liquids and higher texture solids. Oral phase WFL. Pharyngeal phase WFL, no overt s/sx of aspiration. Recommend regular diet/thin liquids. Ensure pt is fully alert and upright for all PO, taking small bites/sips at slow rate. Encourage pt to remain upright for 30 minutes after PO. No additional SLP services indicated.    Total Evaluation Time   Total Evaluation Time (Minutes) 9

## 2019-12-26 NOTE — PLAN OF CARE
OT/7A - Discharge Planner OT   Patient plan for discharge: return home with assist from ida and ida's father intermittently  Current status: Pt scores 25/30 on MOCA version 8.1 indicating cognitive impairment (a score of 26 and greater is considered normal). Pt reports feeling as though she is at cognitive baseline and reports recent confusion has cleared.  Facilitated standing ADL in bathroom with FWW and set up assist only. Facilitated functional mobility ~175 feet with FWW and SBA, mild instability noted. Pt reports this is due to neuropathy in B hands and feet.   Barriers to return to prior living situation: cognition, activity tolerance, strength  Recommendations for discharge: Anticipate pt would be able to return home with increased assist and home vs OP PT when medically appropriate. Per chart review, chemical dependency consult will likely see pt tomorrow (Friday 12/27) which may alter discharge plans.   Rationale for recommendations: Pt would likely benefit from home vs. OP PT to increase activity tolerance, strength, and higher level balance, if returning home. Pt describes assist from ida and ida's father at baseline with higher level IADL such as cooking, shopping, and med management.        Entered by: Pina Engel 12/26/2019 10:48 AM

## 2019-12-26 NOTE — PROGRESS NOTES
[facility], [facility] Progress Note - Hospitalist Service, Gold 5       Date of Admission:  12/23/2019  Assessment & Plan   Ms. Yenifer Maher is a 40 yo female with etoh use disorder, decompensated etoh cirrhosis, anemia, GERD, etoh induced gastritis, hx of non-uremic calciphylaxis, lupus anticoagulant, and polyneuropathy, admitted initially to MICU after presenting to ED 12/23 with etoh abuse and hematemesis, now s/p EGD 12/24 that revealed non-bleeding varices (bleeding likely related to oral lesion), transferred to the floor for ongoing management of her etoh withdrawal and deconditioned state.      # Acute on chronic anemia: [facility] Hgb over the past year appears to be 7.5-8.5. Admitted with concern for hematemesis and GI source of acute bleed due to her hx of varices and Hgb drop to amy 6.0 now s/p transfusion of 1 unit pRBCs. Started empirically on IV ceftriaxone and octreotide. Underwent EGD 12/23 that revealed non-bleeding varices that were banded, so hematemesis and Hgb drop most likely due to copious bleeding from lesion in mouth at area of prior R upper molar that has been extracted. Octreotide and ceftriaxone discontinued. Pt states oral bleeding essentially resolved after packing in ICU. Hgb stable at 6.9 (7.1).   - Repeat hgb at 1400 and again in AM.   - Contact medicine if oral bleeding recurs.     # Decompensated cirrhosis c/b ascites, coagulopathy and esophageal varices: Diagnosed via biopsy 9/2018. Follow up OP by GI specialist at [facility], Dr. Hernández, last clinic visit 10/8/19. PTA on Lasix and spironolactone, both on hold since admission. C/b varices, of which 7 were banded via EGD 8/2019. Repeat EGD this admission with non-bleeding grade II varices s/p placement of 4 bands. Also revealed mod portal hypertensive gastropathy. Last para 8/27. No current indication for repeat para. [facility] INR increasing since beginning of year, now ~ 1.4-1.6, most recent at [facility].   - GI  consulted and appreciate recommendations.   - Initiate Coreg 3.125 per GI recommendations for secondary prophylaxis (titrate to HR 50-60 with maintain SBP >90)  - Continue to hold spironolactone and lasix for now, likely resume prior to discharge if remains clinically stable   - Initiate Lactulose due to constipation, no evidence of HE at this time   - Follow up with Dr. Hernández as scheduled on 1/16/2020   - Repeat EGD in 6 weeks with EV surveillance      # Acute etoh abuse and withdrawal  # Severe etoh use disorder  # Acute etoh hepatitis   Multiple past hospitalization for etoh abuse and withdrawal. Denies hx of CD treatment. During most recent hospitalization this past August, she was offered OP treatment but pt did not pursue due to no close locations near her home. States most recently consuming 2-3 glasses of wine daily PTA for the past several wks, last drink two days before coming to ED. Placed on CIWA protocol for which was discontinued 12/25 she has not scored high enough to receive lorazepam over 24 hrs. LFTs slightly worse since admission: ALT 69, , total bili 3.7, INR 1.56.  - CD consulted and appreciate recommendations.   - Continue PTA oral thiamine  - Repeat hepatic panel tomorrow am  - Avoid hepatotoxic agents     # GERD  # Etoh induced gastritis  EGD finding of gastropathy as above but no active bleeding. Denies abd pain. Mildly tender over epigastrium without guarding.   - Continue PTA BID PPI therapy.      # Hx of non-uremic calciphylaxis: Diagnosed via biopsy during hospitalization last year (12/2018). Was at one point on pentoxifylline but states for unclear reason someone told her to stop taking. Still has minor lesions on her inner thigh that no longer cause severe pain.  - Follow up with Dermatology after discharge. Referral placed on discharge orders.      # Polyneuropathy: Sxs stable. Continue PTA Lyrica and nortriptyline.     # Lupus anticoagulant: Was followed by Dr. Jeffery of  hematology after testing + last year and was on Lovenox but due to recurrent bleeding, Dr. Jeffery advised discontinuing all AC. Has not had follow up since.   - Follow up with Dr. Jeffery in Hematology clinic after discharge. Referral placed in discharge orders.     # Severe malnutrition in the context of chronic illness    - Nutrition consulted   - PRN snacks/supplements   - Recommend discussion with PCP for possible appetite stimulant (Remeron, Marinol or Magace) after continued sobriety         Diet: Mechanical/Dental Soft Diet    DVT Prophylaxis: Ambulate every shift  Chavez Catheter: not present  Code Status: Full Code      Disposition Plan   Expected discharge: Tomorrow, recommended to prior living arrangement once evaluated by CD and hgb stable.  Entered: CRESCENCIO Osborn 12/26/2019, 8:21 AM       The patient's care was discussed with the Attending Physician, Dr. Soto, Bedside Nurse, Patient and GI Consultant.    CRESCENCIO Osborn  Hospitalist Service, [address]  Pager: [phone]  Please see sticky note for cross cover information  ______________________________________________________________________    Interval History    No acute events overnight. Hgb stable at 6.9 this AM. No further bleeding per patient. Reports mild pain in esophagus when swallowing some foods. Working with speech. No bowel movement since prior to admission.     Denies fevers, chills, lightheadedness, dizziness, chest pain, SOB, abdominal pain.     Data reviewed today: I reviewed all medications, new labs and imaging results over the last 24 hours.   Physical Exam   Vital Signs: Temp: 98.4  F (36.9  C) Temp src: Oral BP: 121/78 Pulse: 98 Heart Rate: 109 Resp: 16 SpO2: 95 % O2 Device: None (Room air)    Weight: 138 lbs 3.65 oz  GENERAL: Alert and oriented x 3. NAD.  HEENT: Anicteric sclera. PERRL. Mucous membranes moist and without lesions.   CV: RRR. S1, S2. No murmurs appreciated.    RESPIRATORY: Effort normal on RA. Lungs CTAB with no wheezing, rales, rhonchi.   GI: Abdomen soft and non distended, bowel sounds present. No tenderness, rebound, guarding.   MUSCULOSKELETAL: No joint swelling or tenderness. Moves all extremities.   NEUROLOGICAL: No focal deficits.   EXTREMITIES: No peripheral edema. Intact bilateral pedal pulses.   SKIN: No jaundice. No rashes.       Data   Recent Labs   Lab 12/26/19  0617 12/25/19  0442 12/24/19  2209  12/24/19  0940 12/24/19  0441  12/23/19  1436   WBC 6.7 7.7 8.4   < >  --  7.6   < > 10.7   HGB 6.9* 7.1* 7.4*   < > 7.2* 7.0*   < > 7.0*   MCV 95 97 98   < >  --  95   < > 100   PLT 88* 81* 81*   < >  --  70*   < > 101*   INR  --  1.56*  --   --   --  1.62*  --  1.50*    133  --   --  136  --    < > 137   POTASSIUM 3.4 3.4  --   --  3.6  --    < > 3.8   CHLORIDE 104 104  --   --  107  --    < > 105   CO2 23 23  --   --  23  --    < > 23   BUN 4* 7  --   --  13  --    < > 19   CR 0.46* 0.50*  --   --  0.50*  --    < > 0.50*   ANIONGAP 8 7  --   --  6  --    < > 8   MARKO 7.9* 7.6*  --   --  7.8*  --    < > 8.2*   GLC 90 92  --   --  84  --    < > 89   ALBUMIN 1.9* 2.0*  --   --  2.0*  --    < > 2.2*   PROTTOTAL 7.0 7.5  --   --  7.5  --    < > 8.6   BILITOTAL 3.4* 3.7*  --   --  3.7*  --    < > 3.2*   ALKPHOS 186* 192*  --   --  198*  --    < > 225*   ALT 58* 69*  --   --  60*  --    < > 29   * 415*  --   --  434*  --    < > 276*    < > = values in this interval not displayed.     Medications     - MEDICATION INSTRUCTIONS -         carvedilol  3.125 mg Oral BID w/meals     folic acid  1 mg Oral Daily     magnesium oxide  400 mg Oral Daily     multivitamin, therapeutic  1 tablet Oral Daily     nortriptyline  10 mg Oral At Bedtime     omeprazole  20 mg Oral BID AC     oxymetazoline  2 spray Both Nostrils Once     pregabalin  50 mg Oral TID     sodium chloride (PF)  3 mL Intracatheter Q8H     vitamin B1  100 mg Oral Daily

## 2019-12-26 NOTE — CONSULTS
12/26/2019    CD consult acknowledged. Pt is familiar with writer as pt has met with her 2 previous times (08/2019). Pt has declined services and not followed through both of those times. Writer called the unit today and spoke with pt. Pt reported she has thought about outpatient, but doesn't have reliable transportation (as reported in 08/2019). Pt reported may be getting a new car soon, so that outpatient would be a better option at that time. Pt asked for writer's name and number again, reported that she would contact writer once she is discharged and has her transportation figured out. CD has provided resources on two past occasions (has provided pt with handouts and emails about treatment in her area) and pt is reporting the same information. CD will close consult at this time as she reported to writer she is not interested in treatment until her transportation is figured out.     Erum Zhao, [facility]  [phone]

## 2019-12-26 NOTE — PROGRESS NOTES
"CLINICAL NUTRITION SERVICES - ASSESSMENT NOTE     Nutrition Prescription    RECOMMENDATIONS FOR MDs/PROVIDERS TO ORDER:  Highly recommend consideration of an appetite stimulant (Remeron, Marinol, or Megace) to assist with improvement of PO intake.    Malnutrition Status:    Severe malnutrition in the context of chronic illness.      Recommendations already ordered by Registered Dietitian (RD):  PRN snacks/supplements   Thera-Vit  Calorie Counts     Future/Additional Recommendations:  1. If patient consuming </= 50% of meals, considier scheduling snacks/supplements.     2. Calorie counts to meet at least 60% of estimated energy needs (930 kcals and 56 g PRO). If needs are not met, recommend further MNT interventions as follows: (1) liberalize diet to Regulars (2) schedule snacks/supplements per patient's preference (3) offer cafeteria passes  (4) high protein/calorie diet education      REASON FOR ASSESSMENT  Yenifer Maher is a/an 39 year old female assessed by the dietitian for Provider Order - \"Probable malnutrition in pt with severe etoh use disorder\"     NUTRITION HISTORY  Per chart review:   Patient is known to clinical nutrition services. She was last seen by RD on 8/26/19 and she was noted to have moderate muscle loss. Per RD note: \"Pt typically follows a vegetarian diet. Pt drinks wine every night. Pt states she eats a lot of \"vinegary\" foods and high acid foods, wondering if this affected her having a bleed around the bands in her esophagus.\"     MD office visit on 10/08/19: \"Her appetite has been adequate.\"    MD note on 12/26: \"States having trouble eating and drinking due to difficulty swallowing she feels is related to her EGD.\"      Information obtained from patient:  - Reports that her appetite waxes/wanes. States that her oral intake has been decreased over the past week s/p EGD as it is more difficult to swallow.   - Consumes 1 standard sized meal regularly at dinner time with snacks throughout the " "day. States that she is usually a \"snacker.\"   - Follows a vegetarian diet at baseline with an intolerance to lactose. Protein sources include, but not limited to: vegetable burgers, yogurt, hard cheeses, beans, Falafel and eggs. She reports being able to tolerate hard cheeses and yogurt, but not milk with lactose intolerance.        CURRENT NUTRITION ORDERS  Diet: 2 g Sodium and Mechanical/Dental Soft   Intake/Tolerance: Per RN flowsheet, 50 - 75% of 2 meals ordered since admit.     LABS  BUN: 4 (L) - may indicate poor protein intake   Cr: 0.46 (L) - may indicate poor muscle mass   Lytes: K+: 3.4, M.8 (both WNL) and Phos: 2.4 (L)   T. Bili: 3.4 (elevated)   Alk Phos: 186; ALT/AST: 58/310 (elevated)     MEDICATIONS  Folic acid (1 mg PO daily)   Omeprazole   Thiamine (100 mg PO daily)     ANTHROPOMETRICS  Height: 170.2 cm (5' 7\")  Most Recent Weight: 62.7 kg (138 lb 3.7 oz)    IBW: 61 kg  BMI: Normal BMI   Weight History: 13% weight loss in 6 months, suspect fluid related, at least in part.   Wt Readings from Last 15 Encounters:   19 62.7 kg (138 lb 3.7 oz)   10/08/19 63.3 kg (139 lb 9.6 oz)   19 68.5 kg (151 lb 0.2 oz)   19 73.3 kg (161 lb 9.6 oz)   19 64.3 kg (141 lb 12.8 oz)   19 70.3 kg (155 lb)   19 72 kg (158 lb 12.8 oz)   19 63 kg (139 lb)   19 65.8 kg (145 lb)   18 61.8 kg (136 lb 3.9 oz)   18 61.2 kg (135 lb)       Dosing Weight: 62 kg (dry wt on admit)     ASSESSED NUTRITION NEEDS  Estimated Energy Needs: 1550 - 1860 kcals/day (25 - 30 kcals/kg)  Justification: Maintenance  Estimated Protein Needs: 74 - 93 + grams protein/day (1.2 - 1.5 grams of pro/kg)  Justification: Increased needs  Estimated Fluid Needs: 1 mL/kcal   Justification: Maintenance and Per provider pending fluid status    PHYSICAL FINDINGS  See malnutrition section below.     MALNUTRITION  % Intake: < 75% for > 7 days (non-severe)  % Weight Loss: > 10% in 6 months " (severe)  Subcutaneous Fat Loss: Upper arm, Lower arm and Thoracic/intercostal: Mild   Muscle Loss: Scapular bone, Thoracic region (clavicle, acromium bone, deltoid, trapezius, pectoral), Upper arm (bicep, tricep), Lower arm  (forearm), Dorsal hand, Upper leg (quadricep, hamstring), Patellar region and Posterior calf: Moderate   Fluid Accumulation/Edema: None noted  Malnutrition Diagnosis: Severe malnutrition in the context of chronic illness.     NUTRITION DIAGNOSIS  Inadequate oral intake related to inconsistent appetite as evidenced by 13% weight loss in 6 months, low BUN level (4 mg/dL) and consuming < 75% of estimated oral intake for > 1 month.     INTERVENTIONS  Implementation  Nutrition Education: Provided education on encouraged small/frequent meals daily, importance of adequate protein intake to prevent further muscle loss, offered available snacks/supplements and role of RD.    Medical food supplement therapy - see above  Multivitamin/mineral supplement therapy - see above     Goals  Total avg nutritional intake to meet a minimum of 25 kcal/kg and 1.2 g PRO/kg daily (per dosing wt 62 kg).     Monitoring/Evaluation  Progress toward goals will be monitored and evaluated per protocol.      Lexus Lennon RD, LD   5A [phone])/7B floor pager [phone]

## 2019-12-26 NOTE — PLAN OF CARE
Discharge Planner SLP   Patient plan for discharge: Unknown  Current status: Clinical swallow eval completed per MD order. Pt presents with functional oropharyngeal swallow mechanism. Pt having slight discomfort swallowing 2/2 EGD completed 12/24/19. Oral mech exam unremarkable; per chart and pt, pt endorsing some bleeding of upper right wisdom tooth socket. Assessed with thin liquids and higher texture solids. Oral phase WFL. Pharyngeal phase WFL, no overt s/sx of aspiration. Recommend regular diet/thin liquids with pt choosing softer solids per preference. Ensure pt is fully alert and upright for all PO, taking small bites/sips at slow rate. Encourage pt to remain upright for 30 minutes after PO. No additional SLP services indicated.  Barriers to return to prior living situation: None from ST perspective  Recommendations for discharge: Defer to MD  Rationale for recommendations: Functional oropharyngeal swallow mechanism       Entered by: Leticia Azul 12/26/2019 9:37 AM

## 2019-12-27 ENCOUNTER — APPOINTMENT (OUTPATIENT)
Dept: PHYSICAL THERAPY | Facility: CLINIC | Age: 39
End: 2019-12-27
Payer: MEDICAID

## 2019-12-27 LAB
ABO + RH BLD: NORMAL
ABO + RH BLD: NORMAL
ALBUMIN SERPL-MCNC: 1.8 G/DL (ref 3.4–5)
ALP SERPL-CCNC: 194 U/L (ref 40–150)
ALT SERPL W P-5'-P-CCNC: 48 U/L (ref 0–50)
ANION GAP SERPL CALCULATED.3IONS-SCNC: 6 MMOL/L (ref 3–14)
AST SERPL W P-5'-P-CCNC: 233 U/L (ref 0–45)
BILIRUB SERPL-MCNC: 2.6 MG/DL (ref 0.2–1.3)
BLD GP AB SCN SERPL QL: NORMAL
BLD PROD TYP BPU: NORMAL
BLD PROD TYP BPU: NORMAL
BLD UNIT ID BPU: 0
BLOOD BANK CMNT PATIENT-IMP: NORMAL
BLOOD PRODUCT CODE: NORMAL
BPU ID: NORMAL
BUN SERPL-MCNC: 5 MG/DL (ref 7–30)
CALCIUM SERPL-MCNC: 8.2 MG/DL (ref 8.5–10.1)
CHLORIDE SERPL-SCNC: 103 MMOL/L (ref 94–109)
CO2 SERPL-SCNC: 24 MMOL/L (ref 20–32)
CREAT SERPL-MCNC: 0.51 MG/DL (ref 0.52–1.04)
ERYTHROCYTE [DISTWIDTH] IN BLOOD BY AUTOMATED COUNT: 17.8 % (ref 10–15)
GFR SERPL CREATININE-BSD FRML MDRD: >90 ML/MIN/{1.73_M2}
GLUCOSE SERPL-MCNC: 103 MG/DL (ref 70–99)
HCT VFR BLD AUTO: 20.3 % (ref 35–47)
HGB BLD-MCNC: 6.4 G/DL (ref 11.7–15.7)
MCH RBC QN AUTO: 31.2 PG (ref 26.5–33)
MCHC RBC AUTO-ENTMCNC: 31.5 G/DL (ref 31.5–36.5)
MCV RBC AUTO: 99 FL (ref 78–100)
NUM BPU REQUESTED: 1
PLATELET # BLD AUTO: 109 10E9/L (ref 150–450)
POTASSIUM SERPL-SCNC: 3.6 MMOL/L (ref 3.4–5.3)
PROT SERPL-MCNC: 6.8 G/DL (ref 6.8–8.8)
RBC # BLD AUTO: 2.05 10E12/L (ref 3.8–5.2)
RETICS # AUTO: 114 10E9/L (ref 25–95)
RETICS/RBC NFR AUTO: 5.6 % (ref 0.5–2)
SODIUM SERPL-SCNC: 133 MMOL/L (ref 133–144)
SPECIMEN EXP DATE BLD: NORMAL
TRANSFUSION STATUS PATIENT QL: NORMAL
TRANSFUSION STATUS PATIENT QL: NORMAL
WBC # BLD AUTO: 6.9 10E9/L (ref 4–11)

## 2019-12-27 PROCEDURE — 99207 ZZC APP CREDIT; MD BILLING SHARED VISIT: CPT | Performed by: PHYSICIAN ASSISTANT

## 2019-12-27 PROCEDURE — 85045 AUTOMATED RETICULOCYTE COUNT: CPT | Performed by: PHYSICIAN ASSISTANT

## 2019-12-27 PROCEDURE — 12000012 ZZH R&B MS OVERFLOW UMMC

## 2019-12-27 PROCEDURE — 97530 THERAPEUTIC ACTIVITIES: CPT | Mod: GP

## 2019-12-27 PROCEDURE — 80053 COMPREHEN METABOLIC PANEL: CPT | Performed by: PHYSICIAN ASSISTANT

## 2019-12-27 PROCEDURE — 85027 COMPLETE CBC AUTOMATED: CPT | Performed by: PHYSICIAN ASSISTANT

## 2019-12-27 PROCEDURE — 36415 COLL VENOUS BLD VENIPUNCTURE: CPT | Performed by: PHYSICIAN ASSISTANT

## 2019-12-27 PROCEDURE — 99232 SBSQ HOSP IP/OBS MODERATE 35: CPT | Performed by: INTERNAL MEDICINE

## 2019-12-27 PROCEDURE — 25000132 ZZH RX MED GY IP 250 OP 250 PS 637: Performed by: INTERNAL MEDICINE

## 2019-12-27 PROCEDURE — 25000132 ZZH RX MED GY IP 250 OP 250 PS 637: Performed by: PHYSICIAN ASSISTANT

## 2019-12-27 PROCEDURE — 97116 GAIT TRAINING THERAPY: CPT | Mod: GP

## 2019-12-27 PROCEDURE — 25000132 ZZH RX MED GY IP 250 OP 250 PS 637: Performed by: STUDENT IN AN ORGANIZED HEALTH CARE EDUCATION/TRAINING PROGRAM

## 2019-12-27 PROCEDURE — P9016 RBC LEUKOCYTES REDUCED: HCPCS | Performed by: INTERNAL MEDICINE

## 2019-12-27 RX ORDER — SPIRONOLACTONE 50 MG/1
50 TABLET, FILM COATED ORAL DAILY
Status: DISCONTINUED | OUTPATIENT
Start: 2019-12-27 | End: 2019-12-28 | Stop reason: HOSPADM

## 2019-12-27 RX ORDER — CARVEDILOL 6.25 MG/1
6.25 TABLET ORAL 2 TIMES DAILY WITH MEALS
Status: DISCONTINUED | OUTPATIENT
Start: 2019-12-27 | End: 2019-12-28 | Stop reason: HOSPADM

## 2019-12-27 RX ORDER — FUROSEMIDE 20 MG
20 TABLET ORAL DAILY
Status: DISCONTINUED | OUTPATIENT
Start: 2019-12-27 | End: 2019-12-28 | Stop reason: HOSPADM

## 2019-12-27 RX ORDER — LACTULOSE 10 G/15ML
10 SOLUTION ORAL ONCE
Status: COMPLETED | OUTPATIENT
Start: 2019-12-27 | End: 2019-12-27

## 2019-12-27 RX ADMIN — LACTULOSE 10 G: 20 SOLUTION ORAL at 16:00

## 2019-12-27 RX ADMIN — NORTRIPTYLINE HYDROCHLORIDE 10 MG: 10 CAPSULE ORAL at 21:47

## 2019-12-27 RX ADMIN — OMEPRAZOLE 20 MG: 20 CAPSULE, DELAYED RELEASE ORAL at 09:10

## 2019-12-27 RX ADMIN — FOLIC ACID 1 MG: 1 TABLET ORAL at 09:10

## 2019-12-27 RX ADMIN — SPIRONOLACTONE 50 MG: 50 TABLET, FILM COATED ORAL at 15:59

## 2019-12-27 RX ADMIN — LACTULOSE 10 G: 20 SOLUTION ORAL at 09:14

## 2019-12-27 RX ADMIN — LACTULOSE 10 G: 20 SOLUTION ORAL at 21:47

## 2019-12-27 RX ADMIN — THERA TABS 1 TABLET: TAB at 09:10

## 2019-12-27 RX ADMIN — CARVEDILOL 3.12 MG: 3.12 TABLET, FILM COATED ORAL at 09:10

## 2019-12-27 RX ADMIN — PREGABALIN 50 MG: 25 CAPSULE ORAL at 13:38

## 2019-12-27 RX ADMIN — CALCIUM CARBONATE (ANTACID) CHEW TAB 500 MG 1000 MG: 500 CHEW TAB at 09:14

## 2019-12-27 RX ADMIN — PREGABALIN 50 MG: 25 CAPSULE ORAL at 09:14

## 2019-12-27 RX ADMIN — CALCIUM CARBONATE (ANTACID) CHEW TAB 500 MG 1500 MG: 500 CHEW TAB at 19:56

## 2019-12-27 RX ADMIN — FUROSEMIDE 20 MG: 20 TABLET ORAL at 16:00

## 2019-12-27 RX ADMIN — Medication 400 MG: at 09:10

## 2019-12-27 RX ADMIN — CARVEDILOL 6.25 MG: 6.25 TABLET, FILM COATED ORAL at 17:29

## 2019-12-27 RX ADMIN — PREGABALIN 50 MG: 25 CAPSULE ORAL at 19:56

## 2019-12-27 RX ADMIN — OMEPRAZOLE 20 MG: 20 CAPSULE, DELAYED RELEASE ORAL at 15:59

## 2019-12-27 RX ADMIN — Medication 100 MG: at 09:10

## 2019-12-27 ASSESSMENT — ACTIVITIES OF DAILY LIVING (ADL)
ADLS_ACUITY_SCORE: 16

## 2019-12-27 NOTE — PLAN OF CARE
Status: CIWA 1 x2.  Vitals: Tachy, RA.  Neuros: A&O x4.  IV: PIV x2 SL.  Resp/trach: LS clear & equal, diminished lower LS.  Diet: Regular diet, fair appetite. Calorie counts to be started at midnight.  Bowel status: BS+, no BM for a few days. Lactulose given as ordered.  : Voiding spontaneously.  Skin: Redness to chest. Scabs.  Pain: Declined interventions.  Activity: Up ad marcelo with FWW. Ambulated in the halls and to the bathroom.  Plan: Will continue to monitor and follow POC.

## 2019-12-27 NOTE — PROGRESS NOTES
[facility], [facility] Progress Note - Hospitalist Service, Gold 5       Date of Admission:  12/23/2019  Assessment & Plan   Ms. Yenifer Maher is a 38 yo female with etoh use disorder, decompensated etoh cirrhosis, anemia, GERD, etoh induced gastritis, hx of non-uremic calciphylaxis, lupus anticoagulant, and polyneuropathy, admitted initially to MICU after presenting to ED 12/23 with etoh abuse and hematemesis, now s/p EGD 12/24 that revealed non-bleeding varices (bleeding likely related to oral lesion), transferred to the floor for ongoing management of her etoh withdrawal and deconditioned state.     Plan for today:   - Transfuse 1 unit pRBC   - Repeat CBC in AM    - Resume spironolactone and lasix   - Increase carvedilol to 6.25 mg BID      # Acute on chronic anemia: BL Hgb over the past year appears to be 7.5-8.5. Admitted with concern for hematemesis and GI source of acute bleed due to her hx of varices and Hgb drop to amy 6.0 now s/p transfusion of 1 unit pRBCs. Started empirically on IV ceftriaxone and octreotide. Underwent EGD 12/23 that revealed non-bleeding varices that were banded, so hematemesis and Hgb drop most likely due to copious bleeding from lesion in mouth at area of prior R upper molar that has been extracted. Octreotide and ceftriaxone discontinued. Pt states oral bleeding essentially resolved after packing in ICU. Hgb with small drop today to 6.4 (6.9). Unclear if slow bleeding vs slowed production of RBC given chronic disease.   - Add on retic count   - Transfuse 1 unit pRBC   - Repeat CBC in AM    - Contact medicine if oral bleeding recurs.     # Decompensated cirrhosis c/b ascites, coagulopathy and esophageal varices: Diagnosed via biopsy 9/2018. Follow up OP by GI specialist at [facility], Dr. Hernández, last clinic visit 10/8/19. PTA on Lasix and spironolactone, both on hold since admission. C/b varices, of which 7 were banded via EGD 8/2019. Repeat EGD  this admission with non-bleeding grade II varices s/p placement of 4 bands. Also revealed mod portal hypertensive gastropathy. Last para 8/27. No current indication for repeat para. [facility] INR increasing since beginning of year, now ~ 1.4-1.6, most recent at [facility].   - GI consulted and appreciate recommendations.   - Increase Coreg from 3.125mg to 6.25 BID per GI recommendations for secondary prophylaxis (titrate to HR 50-60 with maintain SBP >90)  - Resume PTA spironolactone and lasix   - Continue Lactulose due to constipation, no evidence of HE at this time   - Follow up with Dr. Hernández as scheduled on 1/16/2020   - Repeat EGD in 6 weeks with EV surveillance      # Acute etoh abuse and withdrawal  # Severe etoh use disorder  # Acute etoh hepatitis   Multiple past hospitalization for etoh abuse and withdrawal. Denies hx of CD treatment. During most recent hospitalization this past August, she was offered OP treatment but pt did not pursue due to no close locations near her home. States most recently consuming 2-3 glasses of wine daily PTA for the past several wks, last drink two days before coming to ED. Placed on CIWA protocol for which was discontinued 12/25 she has not scored high enough to receive lorazepam over 24 hrs. LFTs slightly worse since admission: ALT 69, , total bili 3.7, INR 1.56.  - CD consulted and appreciate recommendations.   - Continue PTA oral thiamine  - Repeat hepatic panel tomorrow am  - Avoid hepatotoxic agents     # GERD  # Etoh induced gastritis  EGD finding of gastropathy as above but no active bleeding. Denies abd pain. Mildly tender over epigastrium without guarding.   - Continue PTA BID PPI therapy     # Hx of non-uremic calciphylaxis: Diagnosed via biopsy during hospitalization last year (12/2018). Was at one point on pentoxifylline but states for unclear reason someone told her to stop taking. Still has minor lesions on her inner thigh that no longer cause severe pain.  - Follow up  with Dermatology after discharge. Referral placed on discharge orders.      # Polyneuropathy: Sxs stable.    - Continue PTA Lyrica and nortriptyline.     # Lupus anticoagulant: Was followed by Dr. Jeffery of hematology after testing + last year and was on Lovenox but due to recurrent bleeding, Dr. Jeffery advised discontinuing all AC. Has not had follow up since.   - Follow up with Dr. Jeffery in Hematology clinic after discharge. Referral placed in discharge orders.      # Severe malnutrition in the context of chronic illness    - Nutrition consulted   - PRN snacks/supplements   - Recommend discussion with PCP for possible appetite stimulant (Remeron, Marinol or Magace) after continued sobriety       Diet: Snacks/Supplements Adult: Other; PRN snacks/supplements; Between Meals  Calorie Counts  Regular Diet Adult    DVT Prophylaxis: Pneumatic Compression Devices and Ambulate every shift  Chavez Catheter: not present  Code Status: Full Code      Disposition Plan   Expected discharge: Tomorrow, recommended to prior living arrangement once hemoglobin stable.  Entered: CRESCENCIO Osborn 12/27/2019, 1:52 PM       The patient's care was discussed with the Attending Physician, Dr. Soto, Bedside Nurse, Patient and GI Consultant.    CRESCENCIO Osborn  Hospitalist Service, [address], [place]  Pager: [phone]  Please see sticky note for cross cover information  ______________________________________________________________________    Interval History   Reports mild lightheadedness this AM, feels it is due to not eating well over the last couple of days. Reports she had a nose bleed this AM following her lab draw that stopped spontaneously. Does not feel her teeth have had any further bleeding. Tolerating soft foods today. No BM since PTA.     Denies fevers, chills, nausea, vomiting, abdominal pain, dysuria, weakness, numbness, chest pain, SOB.     Data reviewed today: I reviewed all  medications, new labs and imaging results over the last 24 hours.     Physical Exam   Vital Signs: Temp: 97.8  F (36.6  C) Temp src: Axillary BP: 103/66 Pulse: 102 Heart Rate: 94 Resp: 18 SpO2: 97 % O2 Device: None (Room air)    Weight: 138 lbs 3.65 oz  GENERAL: Alert and oriented x 3. NAD.  HEENT: Anicteric sclera. PERRL. Mucous membranes moist and without lesions.   CV: RRR. S1, S2. No murmurs appreciated.   RESPIRATORY: Effort normal on RA. Lungs CTAB with no wheezing, rales, rhonchi.   GI: Abdomen soft and non distended, bowel sounds present. No tenderness, rebound, guarding.   MUSCULOSKELETAL: No joint swelling or tenderness. Moves all extremities.   NEUROLOGICAL: No focal deficits.   EXTREMITIES: No peripheral edema. Intact bilateral pedal pulses.   SKIN: No jaundice. Telangiectasia of chest.       Data   Recent Labs   Lab 12/27/19  0638 12/26/19  1408 12/26/19  0617 12/25/19  0442  12/24/19  0441  12/23/19  1436   WBC 6.9  --  6.7 7.7   < > 7.6   < > 10.7   HGB 6.4* 6.9* 6.9* 7.1*   < > 7.0*   < > 7.0*   MCV 99  --  95 97   < > 95   < > 100   *  --  88* 81*   < > 70*   < > 101*   INR  --   --   --  1.56*  --  1.62*  --  1.50*     --  135 133   < >  --    < > 137   POTASSIUM 3.6  --  3.4 3.4   < >  --    < > 3.8   CHLORIDE 103  --  104 104   < >  --    < > 105   CO2 24  --  23 23   < >  --    < > 23   BUN 5*  --  4* 7   < >  --    < > 19   CR 0.51*  --  0.46* 0.50*   < >  --    < > 0.50*   ANIONGAP 6  --  8 7   < >  --    < > 8   MARKO 8.2*  --  7.9* 7.6*   < >  --    < > 8.2*   *  --  90 92   < >  --    < > 89   ALBUMIN 1.8*  --  1.9* 2.0*   < >  --    < > 2.2*   PROTTOTAL 6.8  --  7.0 7.5   < >  --    < > 8.6   BILITOTAL 2.6*  --  3.4* 3.7*   < >  --    < > 3.2*   ALKPHOS 194*  --  186* 192*   < >  --    < > 225*   ALT 48  --  58* 69*   < >  --    < > 29   *  --  310* 415*   < >  --    < > 276*    < > = values in this interval not displayed.   Medications     - MEDICATION  INSTRUCTIONS -         carvedilol  3.125 mg Oral BID w/meals     folic acid  1 mg Oral Daily     lactulose  10 g Oral BID     magnesium oxide  400 mg Oral Daily     multivitamin, therapeutic  1 tablet Oral Daily     nortriptyline  10 mg Oral At Bedtime     omeprazole  20 mg Oral BID AC     oxymetazoline  2 spray Both Nostrils Once     pregabalin  50 mg Oral TID     sodium chloride (PF)  3 mL Intracatheter Q8H     vitamin B1  100 mg Oral Daily

## 2019-12-27 NOTE — PROGRESS NOTES
Care Coordinator Progress Note    Admission Date/Time:  12/23/2019  Attending MD:  Dale Soto MD    Data  Chart reviewed, discussed with interdisciplinary team.   Patient was admitted for:    Hematemesis with nausea  Alcohol withdrawal syndrome without complication (H)  UGIB (upper gastrointestinal bleed)  Lupus anticoagulant positive  Calciphylaxis  Physical deconditioning.    Concerns with insurance coverage for discharge needs: None.  Current Living Situation: Patient lives with family.  Support System: Supportive  Services Involved: no services involved prior to admission  Transportation at Discharge: [place] transportation,  [facility] [phone], or family/friend  Transportation to Medical Appointments:   - [place] transportation  Barriers to Discharge: medical clearance    Coordination of Care and Referrals: Provided patient/family with options for Home Care.        Assessment  Patient is a 39 year old female with etoh use disorder, decompensated etoh cirrhosis, anemia, GERD, etoh induced gastritis, hx of non-uremic calciphylaxis, lupus anticoagulant, and polyneuropathy, admitted initially to MICU after presenting to ED 12/23 with etoh abuse and hematemesis, now s/p EGD 12/24 that revealed non-bleeding varices (bleeding likely related to oral lesion), transferred to the floor for ongoing management of her etoh withdrawal and deconditioned state.  PT evaluated the patient and are recommending home PT.  Patient agreeable, has used [facility](P: [phone], F: [phone]). Referral completed and orders placed.  RNCC will continue to follow and assist with discharge planning as needed.       Plan  Anticipated Discharge Date: 12/28/2019    Anticipated Discharge Plan:  Home with [facility] for home PT    GABY Lozano  Phone: [phone]  Pager: [phone]  To contact weekend RNCC, dial [phone] and enter pager number [phone] at prompt. This pager can not be contacted by text page or outside line.

## 2019-12-27 NOTE — PLAN OF CARE
Status: CIWA 1.  Vitals: Tachy (102), RA.  Neuros: A&O x4.  IV: PIV x2 SL.  Resp/trach: LS clear & equal, diminished lower LS.  Diet: Regular diet. Calorie counts start x3 days.  Bowel status: BS+, no BM.  : Voiding spontaneously.  Skin: Redness to chest. Scabs.  Pain: Declined interventions.  Activity: Up ad marcelo with FWW. Ambulated to the bathroom.  Plan: Will continue to monitor and follow POC.

## 2019-12-27 NOTE — PLAN OF CARE
7A: Cxl - Attempted to see pt x 2 today however receiving blood transfusion for low hemoglobin and not feeling well enough to participate upon attempts.

## 2019-12-28 ENCOUNTER — APPOINTMENT (OUTPATIENT)
Dept: PHYSICAL THERAPY | Facility: CLINIC | Age: 39
End: 2019-12-28
Payer: MEDICAID

## 2019-12-28 ENCOUNTER — PATIENT OUTREACH (OUTPATIENT)
Dept: CARE COORDINATION | Facility: CLINIC | Age: 39
End: 2019-12-28

## 2019-12-28 VITALS
WEIGHT: 138.23 LBS | SYSTOLIC BLOOD PRESSURE: 101 MMHG | OXYGEN SATURATION: 97 % | TEMPERATURE: 99.5 F | BODY MASS INDEX: 21.7 KG/M2 | HEIGHT: 67 IN | HEART RATE: 102 BPM | DIASTOLIC BLOOD PRESSURE: 63 MMHG | RESPIRATION RATE: 16 BRPM

## 2019-12-28 LAB
ALBUMIN SERPL-MCNC: 1.8 G/DL (ref 3.4–5)
ALP SERPL-CCNC: 200 U/L (ref 40–150)
ALT SERPL W P-5'-P-CCNC: 43 U/L (ref 0–50)
ANION GAP SERPL CALCULATED.3IONS-SCNC: 7 MMOL/L (ref 3–14)
AST SERPL W P-5'-P-CCNC: 185 U/L (ref 0–45)
BILIRUB SERPL-MCNC: 3 MG/DL (ref 0.2–1.3)
BUN SERPL-MCNC: 3 MG/DL (ref 7–30)
CALCIUM SERPL-MCNC: 8.2 MG/DL (ref 8.5–10.1)
CHLORIDE SERPL-SCNC: 104 MMOL/L (ref 94–109)
CO2 SERPL-SCNC: 25 MMOL/L (ref 20–32)
CREAT SERPL-MCNC: 0.49 MG/DL (ref 0.52–1.04)
ERYTHROCYTE [DISTWIDTH] IN BLOOD BY AUTOMATED COUNT: 18.4 % (ref 10–15)
GFR SERPL CREATININE-BSD FRML MDRD: >90 ML/MIN/{1.73_M2}
GLUCOSE SERPL-MCNC: 102 MG/DL (ref 70–99)
HCT VFR BLD AUTO: 23 % (ref 35–47)
HGB BLD-MCNC: 7.4 G/DL (ref 11.7–15.7)
MCH RBC QN AUTO: 30.7 PG (ref 26.5–33)
MCHC RBC AUTO-ENTMCNC: 32.2 G/DL (ref 31.5–36.5)
MCV RBC AUTO: 95 FL (ref 78–100)
PLATELET # BLD AUTO: 130 10E9/L (ref 150–450)
POTASSIUM SERPL-SCNC: 3.6 MMOL/L (ref 3.4–5.3)
PROT SERPL-MCNC: 6.8 G/DL (ref 6.8–8.8)
RBC # BLD AUTO: 2.41 10E12/L (ref 3.8–5.2)
SODIUM SERPL-SCNC: 136 MMOL/L (ref 133–144)
WBC # BLD AUTO: 7 10E9/L (ref 4–11)

## 2019-12-28 PROCEDURE — 97116 GAIT TRAINING THERAPY: CPT | Mod: GP

## 2019-12-28 PROCEDURE — 36415 COLL VENOUS BLD VENIPUNCTURE: CPT | Performed by: PHYSICIAN ASSISTANT

## 2019-12-28 PROCEDURE — 25000132 ZZH RX MED GY IP 250 OP 250 PS 637: Performed by: STUDENT IN AN ORGANIZED HEALTH CARE EDUCATION/TRAINING PROGRAM

## 2019-12-28 PROCEDURE — 25000132 ZZH RX MED GY IP 250 OP 250 PS 637: Performed by: INTERNAL MEDICINE

## 2019-12-28 PROCEDURE — 99239 HOSP IP/OBS DSCHRG MGMT >30: CPT | Performed by: PHYSICIAN ASSISTANT

## 2019-12-28 PROCEDURE — 80053 COMPREHEN METABOLIC PANEL: CPT | Performed by: PHYSICIAN ASSISTANT

## 2019-12-28 PROCEDURE — 25000132 ZZH RX MED GY IP 250 OP 250 PS 637: Performed by: PHYSICIAN ASSISTANT

## 2019-12-28 PROCEDURE — 85027 COMPLETE CBC AUTOMATED: CPT | Performed by: PHYSICIAN ASSISTANT

## 2019-12-28 PROCEDURE — 97530 THERAPEUTIC ACTIVITIES: CPT | Mod: GP

## 2019-12-28 RX ORDER — LACTULOSE 10 G/15ML
10 SOLUTION ORAL 2 TIMES DAILY
Qty: 946 ML | Refills: 0 | Status: ON HOLD | OUTPATIENT
Start: 2019-12-28 | End: 2020-11-28

## 2019-12-28 RX ORDER — MULTIVITAMIN,THERAPEUTIC
1 TABLET ORAL DAILY
Qty: 30 TABLET | Refills: 0 | Status: ON HOLD | OUTPATIENT
Start: 2019-12-28 | End: 2020-12-06

## 2019-12-28 RX ORDER — CARVEDILOL 6.25 MG/1
6.25 TABLET ORAL 2 TIMES DAILY WITH MEALS
Qty: 60 TABLET | Refills: 0 | Status: SHIPPED | OUTPATIENT
Start: 2019-12-28 | End: 2020-11-26

## 2019-12-28 RX ADMIN — CALCIUM CARBONATE (ANTACID) CHEW TAB 500 MG 500 MG: 500 CHEW TAB at 13:04

## 2019-12-28 RX ADMIN — PREGABALIN 50 MG: 25 CAPSULE ORAL at 14:13

## 2019-12-28 RX ADMIN — LACTULOSE 10 G: 20 SOLUTION ORAL at 09:22

## 2019-12-28 RX ADMIN — PREGABALIN 50 MG: 25 CAPSULE ORAL at 09:20

## 2019-12-28 RX ADMIN — THERA TABS 1 TABLET: TAB at 09:21

## 2019-12-28 RX ADMIN — FUROSEMIDE 20 MG: 20 TABLET ORAL at 09:21

## 2019-12-28 RX ADMIN — SPIRONOLACTONE 50 MG: 50 TABLET, FILM COATED ORAL at 09:22

## 2019-12-28 RX ADMIN — FOLIC ACID 1 MG: 1 TABLET ORAL at 09:20

## 2019-12-28 RX ADMIN — Medication 100 MG: at 09:22

## 2019-12-28 RX ADMIN — CALCIUM CARBONATE (ANTACID) CHEW TAB 500 MG 1000 MG: 500 CHEW TAB at 09:36

## 2019-12-28 RX ADMIN — CARVEDILOL 6.25 MG: 6.25 TABLET, FILM COATED ORAL at 09:21

## 2019-12-28 RX ADMIN — Medication 400 MG: at 09:21

## 2019-12-28 RX ADMIN — OMEPRAZOLE 20 MG: 20 CAPSULE, DELAYED RELEASE ORAL at 09:21

## 2019-12-28 ASSESSMENT — ACTIVITIES OF DAILY LIVING (ADL)
ADLS_ACUITY_SCORE: 16
ADLS_ACUITY_SCORE: 15
ADLS_ACUITY_SCORE: 15
ADLS_ACUITY_SCORE: 14
ADLS_ACUITY_SCORE: 15

## 2019-12-28 NOTE — PROGRESS NOTES
Calorie Count  Intake recorded for: 12/27  Total Kcals: 1640 Total Protein: 35g  Kcals from Hospital Food: 1640  Protein: 35g  Kcals from Outside Food (average):0 Protein: 0g  # Meals Recorded: 2 meals (First - 100% Raisin bran w/ skim milk, banana bread, gingerale, vitality water, hot green tea, fruit cup, hashbrowns, vegetarian sausage)      (Second - 100% apple, rice krispie bar, gingerale, vitality water, apple juice, peaches, yogurt, blueberry muffin)  # Supplements Recorded: 0

## 2019-12-28 NOTE — PLAN OF CARE
NEURO: alert and oriented x4.   RESPIRATORY:  LS clear- diminished in bases, SpO2>94% on RA.  CARDIO: VSS. Hgb- 6.4, 1 unit of pRBC transfused.   GI/: BS active, abdomen distended. + 3 BMs. Voiding without difficulty. Tolerating PO intake- good appetite. On calorie counts.   SKIN: Jaundiced. Intact.   ACTIVITY:  Up ad marcelo with walker. Ambulated in hallway with SBA x2.   PAIN: Reported heartburn pain from EGD- given TUMs PRN x 1.   LINES: PIV saline locked. Site WNL.   PLAN:  Continue POC. Notify MD with concerns.

## 2019-12-28 NOTE — PLAN OF CARE
"/63 (BP Location: Right arm)   Pulse 102   Temp 99.5  F (37.5  C) (Oral)   Resp 16   Ht 1.702 m (5' 7\")   Wt 62.7 kg (138 lb 3.7 oz)   SpO2 97%   BMI 21.65 kg/m      Pt. ordered to discharge home & writer went over discharge paperwork with pt. which she signed. PIV pulled. Pt. took all her belongings with. Pt. escorted to lobby & family here to drive her home.  "

## 2019-12-28 NOTE — PLAN OF CARE
"/63 (BP Location: Right arm)   Pulse 102   Temp 99.5  F (37.5  C) (Oral)   Resp 16   Ht 1.702 m (5' 7\")   Wt 62.7 kg (138 lb 3.7 oz)   SpO2 97%   BMI 21.65 kg/m       VS: stable on room air except slightly tachy.   BG: n/a.   LABS: see chart.   Pain: denied.   PRN: n/a.  Nausea: denied.   Diet: regular diet.   LDA: PIV saline locked.   GI/: voiding but no reported BM today.   Skin: intact from areas assessed.   Mobility: independent in room and halls with walker.   Plan: continue POC. Orders to discharge this afternoon.     All care explained and questions answered. Will continue to monitor and notify team of changes.     "

## 2019-12-28 NOTE — DISCHARGE SUMMARY
[facility], [place]  Hospitalist Discharge Summary       Date of Admission:  12/23/2019  Date of Discharge:  12/28/2019  4:50 PM  Discharging Provider: CRESCENCIO Osborn/ Dr. Soto   Discharge Team: Hospitalist Service, Gold 5    Discharge Diagnoses     Acute on chronic anemia    Decompensated cirrhosis c/b ascites    Coagulopathy     Esophageal varices    Acute etoh abuse and withdrawal    Severe etoh use disorder    Acute etoh hepatitis     GERD     ETOH induced gastritis    Hx of non-uremic calciphylaxis     Polyneuropathy    Lupus anticoagulant     Severe malnutrition in the context of chronic illness    Follow-ups Needed After Discharge   Follow-up Appointments     Adult [facility] Follow-up and recommended labs and tests      Follow up with primary care provider, Flako Gaona, within 7 days for   hospital follow- up.  The following labs/tests are recommended: CBC, CMP.      Primary care provider for hospitalization follow up and repeat labs   Dermatology for ongoing management of lesion on your leg   Hematology for ongoing management of Lupus   Gastroenterology, Dr. Hernández, for repeat EGD in 6 weeks for EV surveilance on 1/16/19     Appointments on [place] and/or [facility] (with [facility] or [facility]   provider or service). Call [phone] if you haven't heard regarding   these appointments within 7 days of discharge.         Discharge Disposition   Discharged to home  Condition at discharge: Stable    Hospital Course   Ms. Yenifer Maher is a 40 yo female with etoh use disorder, decompensated etoh cirrhosis, anemia, GERD, etoh induced gastritis, hx of non-uremic calciphylaxis, lupus anticoagulant, and polyneuropathy, admitted initially to MICU after presenting to ED 12/23 with etoh abuse and hematemesis, now s/p EGD 12/24 that revealed non-bleeding varices (bleeding likely related to oral lesion), transferred to the floor for ongoing management of her etoh withdrawal and  deconditioned state.       # Acute on chronic anemia: [facility] Hgb over the past year appears to be 7.5-8.5. Admitted with concern for hematemesis and GI source of acute bleed due to her hx of varices and Hgb drop to amy 6.0. Initially transfused 1 unit pRBCs and started empirically on IV ceftriaxone and octreotide. Underwent EGD 12/23 that revealed non-bleeding varices that were banded X 4. Hematemesis and hgb drop thought to be most likely due to copious bleeding from lesion in mouth at area of prior R upper molar that has been extracted. Octreotide and ceftriaxone discontinued. Pt states oral bleeding essentially resolved after packing in ICU. Hgb with small drop 12/27 to 6.4 (6.9) therefore additional 1 units pRBC transfused. Unclear if slow bleeding vs slowed production of RBC given chronic disease. Hgb and reticulocyte count responded appropriately and remained stable on discharge at 7.4.   - Follow up with PCP for repeat CBC within one week   - Educated to return to ED for ongoing bleeding      # Decompensated cirrhosis c/b ascites, coagulopathy and esophageal varices: Diagnosed via biopsy 9/2018. Follow up OP by GI specialist at [facility], Dr. Hernández, last clinic visit 10/8/19. PTA on Lasix and spironolactone, both on hold admission. C/b varices, of which 7 were banded via EGD 8/2019. Repeat EGD this admission with non-bleeding grade II varices s/p placement of 4 bands. Also revealed mod portal hypertensive gastropathy. Last para 8/27. [facility] INR increasing since beginning of year, now ~ 1.4-1.6, most recent at [facility]. GI consulted. Imitated carvedilol for secondary prophylaxis given variceal bleed 8/2019. Spironolactone and lasix resumed prior to discharge. Lactulose    - Coreg from 6.25 mg for secondary prophylaxis (titrate to HR 50-60 with maintain SBP >90)  - Continue Lactulose due to constipation  - Follow up with Dr. Hernández as scheduled on 1/16/2020   - Repeat EGD in 6 weeks with EV surveillance      # Acute etoh abuse  and withdrawal  # Severe etoh use disorder  # Acute etoh hepatitis   Multiple past hospitalization for etoh abuse and withdrawal. Denies hx of CD treatment. States most recently consuming 2-3 glasses of wine daily PTA for the past several wks, last drink two days before coming to ED. Placed on CIWA protocol for which was discontinued 12/25 she has not scored high enough to receive lorazepam over 24 hrs. LFTs slightly worsened after admission then down trended and remained elevated on discharge. CD consulted however patient expressed she was not interested in treatment at this time.   - Continue PTA oral thiamine   - Multivitamin initiated   - Repeat CMP with PCP within one week   - Avoid hepatotoxic agents     # GERD  # Etoh induced gastritis  EGD finding of gastropathy as above but no active bleeding. Denies abd pain. Mildly tender over epigastrium without guarding.   - Continue PTA BID PPI therapy     # Hx of non-uremic calciphylaxis: Diagnosed via biopsy during hospitalization last year (12/2018). Was at one point on pentoxifylline but states for unclear reason someone told her to stop taking. Still has minor lesions on her inner thigh that no longer cause severe pain.  - Follow up with Dermatology after discharge. Referral placed..      # Polyneuropathy: Sxs stable.    - Continue PTA Lyrica and nortriptyline.     # Lupus anticoagulant: Was followed by Dr. Jeffery of hematology after testing + last year and was on Lovenox but due to recurrent bleeding, Dr. Jeffery advised discontinuing all AC. Has not had follow up since.   - Follow up with Dr. Jeffery in Hematology clinic after discharge. Referral placed..      # Severe malnutrition in the context of chronic illness    Nutrition consulted and patient provided PRN snacks/supplements.   - Recommend discussion with PCP for possible appetite stimulant (Remeron, Marinol or Magace) after continued sobriety      Consultations This Hospital Stay   GI HEPATOLOGY ADULT IP  CONSULT  PHYSICAL THERAPY ADULT IP CONSULT  DENTISTRY ADULT IP CONSULT  VASCULAR ACCESS CARE ADULT IP CONSULT  VASCULAR ACCESS CARE ADULT IP CONSULT  CHEMICAL DEPENDENCY IP CONSULT  SPEECH LANGUAGE PATH ADULT IP CONSULT  OCCUPATIONAL THERAPY ADULT IP CONSULT  NUTRITION SERVICES ADULT IP CONSULT    Code Status   Full Code    Time Spent on this Encounter   IRoma PA, personally saw the patient today and spent greater than 30 minutes discharging this patient.       CRESCENCIO Osborn  [facility], [place]  ______________________________________________________________________    Physical Exam   Vital Signs: Temp: 99.2  F (37.3  C) Temp src: Oral BP: 110/69   Heart Rate: 96 Resp: 16 SpO2: 95 % O2 Device: None (Room air)    Weight: 138 lbs 3.65 oz  GENERAL: Alert and oriented x 3. NAD.  HEENT: Anicteric sclera. PERRL. Mucous membranes moist and without lesions.   CV: RRR. S1, S2. No murmurs appreciated.   RESPIRATORY: Effort normal on RA. Lungs CTAB with no wheezing, rales, rhonchi.   GI: Abdomen soft and non distended, bowel sounds present. No tenderness, rebound, guarding.   MUSCULOSKELETAL: No joint swelling or tenderness. Moves all extremities.   NEUROLOGICAL: No focal deficits.   EXTREMITIES: No peripheral edema. Intact bilateral pedal pulses.   SKIN: No jaundice. Telangiectasia of chest.        Primary Care Physician   Flako Gaona    Discharge Orders      GASTROENTEROLOGY ADULT REF PROCEDURE ONLY      Onc/Heme Adult Referral      Dermatology Referral      Home Care PT Referral for Hospital Discharge      Reason for your hospital stay    Dear Yenifer Maher    Your were hospitalized at [facility] with anemia and treated with control of blood loss.  Over your hospitalization your anemia improved and today you are ready to be discharged to home.  If you continue to remain sober and take recommended medications you should continue to improve but if  you develop fever, shortness of breath, light headedness, chest pain or bleeding you are unable to control please seek medical attention.    We are suggesting the following medication changes:  - Start taking carvedilol 6.25 mg two times daily   - Start taking multivitamin daily   - Start taking lactulose two times daily     Please get the following tests done:  - Repeat labs including CBC and CMP with PCP within one week     Please set up an appointment with:  - Primary care provider for hospitalization follow up and repeat labs   - Dermatology for ongoing management of lesion on your leg   - Hematology for ongoing management of Lupus   - Gastroenterology, Dr. Hernández, for repeat EGD in 6 weeks for EV surveilance on 1/16/19     It was a pleasure meeting with you today. Thank you for allowing me and my team the privilege of caring for you today. You are the reason we are here, and I truly hope we provided you with the excellent service you deserve. Please let us know if there is anything else we can do for you so that we can be sure you are leaving completely satisfied with your care experience.    Your hospital unit at the time of discharge is 7A so if you have any questions please call the hospital at [phone] and ask to talk to a nurse on 7A.    Take care!  Roma Emerson PA-C  Hospitalist Service     Adult [facility] Follow-up and recommended labs and tests    Follow up with primary care provider, Flako Gaona, within 7 days for hospital follow- up.  The following labs/tests are recommended: CBC, CMP.      Appointments on [place] and/or [facility] (with [facility] or [facility] provider or service). Call [phone] if you haven't heard regarding these appointments within 7 days of discharge.     Activity    Your activity upon discharge: activity as tolerated     When to contact your care team    Call your primary doctor if you have any of the following:  increased shortness of breath, lightheadedness, dizziness,  chest pain, bleeding that you are unable to control.     Full Code     Diet    Follow this diet upon discharge:       Snacks/Supplements Adult: Other; PRN snacks/supplements; Between Meals      Regular Diet Adult       Soft diet       Significant Results and Procedures   Most Recent 3 CBC's:  Recent Labs   Lab Test 12/28/19  0724 12/27/19  0638 12/26/19  1408 12/26/19  0617   WBC 7.0 6.9  --  6.7   HGB 7.4* 6.4* 6.9* 6.9*   MCV 95 99  --  95   * 109*  --  88*     Most Recent 3 BMP's:  Recent Labs   Lab Test 12/28/19  0724 12/27/19  0638 12/26/19  0617    133 135   POTASSIUM 3.6 3.6 3.4   CHLORIDE 104 103 104   CO2 25 24 23   BUN 3* 5* 4*   CR 0.49* 0.51* 0.46*   ANIONGAP 7 6 8   MARKO 8.2* 8.2* 7.9*   * 103* 90     Most Recent 2 LFT's:  Recent Labs   Lab Test 12/28/19  0724 12/27/19  0638   * 233*   ALT 43 48   ALKPHOS 200* 194*   BILITOTAL 3.0* 2.6*     Most Recent 3 INR's:  Recent Labs   Lab Test 12/25/19  0442 12/24/19  0441 12/23/19  1436   INR 1.56* 1.62* 1.50*   ,   Results for orders placed or performed during the hospital encounter of 12/23/19   US Abdomen Complete w Doppler Complete    Narrative    EXAMINATION: US ABDOMEN COMPLETE WITH DOPPLER COMPLETE 12/24/2019 8:58  AM     COMPARISON: 8/27/2019    HISTORY: Cirrhosis    TECHNIQUE: The abdomen was scanned in standard fashion with  specialized ultrasound transducer(s) using both gray-scale, color  Doppler, and spectral flow techniques.    Findings:  Liver: The liver demonstrates increased echotexture. No evidence of a  focal hepatic mass.     Extrahepatic portal vein flow is retrograde at 50 cm/s.  Right portal vein flow is retrograde, measuring 16 cm/s.  Left portal vein flow is antegrade, measuring 24 cm/s.  There is a patent umbilical vein.    Flow in the hepatic artery is towards the liver and:  242 peak systolic  0.4 resistive index.     Both the right and left hepatic artery also low resistance with the  significant velocity.  This is likely due to the portal hypertension.    The splenic vein is patent and flow is away from the liver.  The left,  middle, and right hepatic veins are patent with flow towards the IVC.  The IVC is patent with flow towards the heart.   The visualized aorta  is not dilated.    Gallbladder: Slight within a distended gallbladder with wall measuring  3.7 mm.    Bile Ducts: Both the intra- and extrahepatic biliary system are of  normal caliber.  The common bile duct measures 6.8 mm.    Pancreas: Visualized portions of the head and body of the pancreas are  unremarkable.     Kidneys: Both kidneys are of normal echotexture, without mass or  hydronephrosis.   Renal lengths: right- 11.9, left- 10.8.    Spleen: The spleen measures 14.4 in length.    Fluid: No evidence of ascites or pleural effusions.      Impression    Impression:   1.  Portal hypertension evidenced by retrograde flow and patent  umbilical vein. Echogenic liver consistent with cirrhosis  2.  Elevated velocities in the hepatic artery felt to be related to  the liver parenchymal disease rather than a stenosis.   3.  Distended gallbladder mild thickening of the wall and sludge. No  paracystic fluid or positive Chao's sign to suggest cholecystitis.  4.  No pleural effusions or significant ascites.    LYUDMILA RENDON MD       Discharge Medications   Current Discharge Medication List      START taking these medications    Details   carvedilol (COREG) 6.25 MG tablet Take 1 tablet (6.25 mg) by mouth 2 times daily (with meals)  Qty: 60 tablet, Refills: 0    Associated Diagnoses: UGIB (upper gastrointestinal bleed)      lactulose (CHRONULAC) 10 GM/15ML solution Take 15 mLs (10 g) by mouth 2 times daily  Qty: 946 mL, Refills: 0    Associated Diagnoses: UGIB (upper gastrointestinal bleed)      multivitamin, therapeutic (THERA-VIT) TABS tablet Take 1 tablet by mouth daily  Qty: 30 tablet, Refills: 0    Associated Diagnoses: UGIB (upper gastrointestinal bleed)          CONTINUE these medications which have NOT CHANGED    Details   calcium carbonate (TUMS) 500 MG chewable tablet Take 1-2 chew tab by mouth as needed for heartburn      fexofenadine (ALLEGRA) 180 MG tablet Take 1 tablet (180 mg) by mouth daily as needed for allergies  Qty: 30 tablet, Refills: 0    Associated Diagnoses: Seasonal allergic rhinitis, unspecified trigger      folic acid (FOLVITE) 1 MG tablet Take 1 tablet (1 mg) by mouth daily  Qty: 30 tablet, Refills: 0    Associated Diagnoses: Alcohol abuse      furosemide (LASIX) 20 MG tablet Take 1 tablet (20 mg) by mouth daily  Qty: 90 tablet, Refills: 3    Associated Diagnoses: Alcohol abuse      magnesium oxide (MAG-OX) 400 (240 Mg) MG tablet Take 1 tablet (400 mg) by mouth daily  Qty: 30 tablet, Refills: 0    Associated Diagnoses: Hypomagnesemia      nortriptyline (PAMELOR) 10 MG capsule Take 1 capsule (10 mg) by mouth At Bedtime  Qty: 30 capsule, Refills: 0    Associated Diagnoses: Neuropathy      omeprazole (PRILOSEC) 40 MG DR capsule Take 1 tab twice daily until 19, then 1 tab daily  Qty: 112 capsule, Refills: 0    Associated Diagnoses: Upper GI bleed      polyethylene glycol (MIRALAX/GLYCOLAX) packet Take 17 g by mouth daily as needed for constipation  Qty: 10 packet, Refills: 0    Associated Diagnoses: Constipation, unspecified constipation type      pregabalin (LYRICA) 50 MG capsule Take 1 capsule (50 mg) by mouth 3 times daily  Qty: 30 capsule, Refills: 0    Associated Diagnoses: Neuropathy      Prenatal Vit-Fe Fumarate-FA (PRENATAL MULTIVITAMIN W/IRON) 27-0.8 MG tablet Take 1 tablet by mouth daily  Qty: 30 tablet, Refills: 0    Associated Diagnoses: Screening, , for malformation by ultrasound      sodium-potassium bicarbonate (FRIDA-SELTZER GOLD) TBEF solu-tab Take 1-2 tablets by mouth 2 times daily as needed for heartburn      spironolactone (ALDACTONE) 50 MG tablet Take 1 tablet (50 mg) by mouth daily  Qty: 90 tablet, Refills: 3     Associated Diagnoses: Alcoholic hepatitis with ascites      vitamin (B COMPLEX-C) tablet Take 1 tablet by mouth daily  Qty: 30 tablet, Refills: 0    Associated Diagnoses: Screening, , for malformation by ultrasound      vitamin B1 (THIAMINE) 100 MG tablet Take 1 tablet (100 mg) by mouth daily  Qty: 30 tablet, Refills: 0    Associated Diagnoses: Alcohol abuse           Allergies   Allergies   Allergen Reactions     Bengay Pain Relief [Menthol] Other (See Comments)     Skin turns red and feels extremely hot     Cats      Chocolate Dermatitis     Dicyclomine Other (See Comments)     Severe sedation     Dust Mites      Derm, resp     No Clinical Screening - See Comments      GI upset     Phenobarbital      Pollen Extract      Derm, resp.

## 2019-12-28 NOTE — PLAN OF CARE
Discharge Planner PT   Patient plan for discharge: home with A  Current status: PT: Pt supine, agreeable to working with PT, feels a little weak still after infusion. Pt transfers sit<>stand CGA to SBA as progressed and balance improved. Pt ambulates with 4 wh walker  600' and then 1000' with seated rest between.  Barriers to return to prior living situation: medical status, functional status, many stairs in home  Recommendations for discharge: home with A, home PT  Rationale for recommendations: based on current functional progression       Entered by: Ligia Mera 12/28/2019 9:33 AM

## 2019-12-28 NOTE — PLAN OF CARE
"/57 (BP Location: Right arm)   Pulse 102   Temp 98.4  F (36.9  C) (Oral)   Resp 16   Ht 1.702 m (5' 7\")   Wt 62.7 kg (138 lb 3.7 oz)   SpO2 92%   BMI 21.65 kg/m      [id number]: Patient with soft pressures (50-60/100s), OVSS on RA, afebrile. Scoring 2 on CIWA d/t baseline tremors. Tums given for heartburn x1; no c/o pain, nausea or SOB. Tolerating regular diet with good appetite, on arline counts. L PIV SL. Voiding not saving, 1 loose BM this shift. Up ad marcelo with walker. Plans for discharge if hbg stable this AM; will go [facility] with [facility] for [facility].   Will continue with POC and notify MD with changes or concerns.    "

## 2019-12-30 NOTE — PLAN OF CARE
Occupational Therapy Discharge Summary    Reason for therapy discharge:    Discharged to home.    Progress towards therapy goal(s). See goals on Care Plan in [facility] electronic health record for goal details.  Goals partially met.  Barriers to achieving goals:   discharge from facility.    Therapy recommendation(s):    No further therapy is recommended.

## 2019-12-30 NOTE — TELEPHONE ENCOUNTER
[facility]: Post-Discharge Note  SITUATION                                                      Admission:    Admission Date: 12/23/19   Reason for Admission: Acute on chronic anemia  Discharge:   Discharge Date: 12/28/19  Discharge Diagnosis: Acute on chronic anemia  Discharge Service: Hospitalist     BACKGROUND                                                      Ms. Yenifer Maher is a 38 yo female with etoh use disorder, decompensated etoh cirrhosis, anemia, GERD, etoh induced gastritis, hx of non-uremic calciphylaxis, lupus anticoagulant, and polyneuropathy, admitted initially to MICU after presenting to ED 12/23 with etoh abuse and hematemesis, now s/p EGD 12/24 that revealed non-bleeding varices (bleeding likely related to oral lesion), transferred to the floor for ongoing management of her etoh withdrawal and deconditioned state.     ASSESSMENT      Discharge Assessment  Patient reports symptoms are: Unchanged  Does the patient have all of their medications?: Yes  Does patient know what their new medications are for?: Yes  Does patient have a follow-up appointment scheduled?: Yes  Does patient have any other questions or concerns?: No    Post-op  Did the patient have surgery or a procedure: No  Fever: No  Chills: No  Eating & Drinking: eating and drinking without complaints/concerns  PO Intake: soft foods  Bowel Function: normal  Urinary Status: voiding without complaint/concerns    PLAN                                                      Outpatient Plan:      Follow up with primary care provider, Flako Gaona, within 7 days for hospital follow- up.  The following labs/tests are recommended: CBC, CMP.      Future Appointments   Date Time Provider Department Center   1/16/2020  7:00 AM  LAB [facility]   1/16/2020  8:00 AM William Hernández MD [facility]           [place] CMA

## 2019-12-31 DIAGNOSIS — K70.11 ALCOHOLIC HEPATITIS WITH ASCITES (H): Primary | ICD-10-CM

## 2020-01-02 LAB — UPPER GI ENDOSCOPY: NORMAL

## 2020-02-10 DIAGNOSIS — K70.11 ALCOHOLIC HEPATITIS WITH ASCITES (H): Primary | ICD-10-CM

## 2020-02-10 DIAGNOSIS — I85.10 SECONDARY ESOPHAGEAL VARICES WITHOUT BLEEDING (H): ICD-10-CM

## 2020-02-11 ENCOUNTER — TELEPHONE (OUTPATIENT)
Dept: GASTROENTEROLOGY | Facility: CLINIC | Age: 40
End: 2020-02-11

## 2020-03-27 ENCOUNTER — APPOINTMENT (OUTPATIENT)
Dept: ULTRASOUND IMAGING | Facility: CLINIC | Age: 40
DRG: 432 | End: 2020-03-27
Attending: PHYSICIAN ASSISTANT

## 2020-03-27 ENCOUNTER — HOSPITAL ENCOUNTER (INPATIENT)
Facility: CLINIC | Age: 40
LOS: 5 days | Discharge: HOME HEALTH | DRG: 432 | End: 2020-04-01
Attending: EMERGENCY MEDICINE | Admitting: INTERNAL MEDICINE

## 2020-03-27 ENCOUNTER — APPOINTMENT (OUTPATIENT)
Dept: GENERAL RADIOLOGY | Facility: CLINIC | Age: 40
DRG: 432 | End: 2020-03-27
Attending: EMERGENCY MEDICINE

## 2020-03-27 DIAGNOSIS — K74.60 CIRRHOSIS OF LIVER WITH ASCITES, UNSPECIFIED HEPATIC CIRRHOSIS TYPE (H): ICD-10-CM

## 2020-03-27 DIAGNOSIS — R18.8 CIRRHOSIS OF LIVER WITH ASCITES, UNSPECIFIED HEPATIC CIRRHOSIS TYPE (H): ICD-10-CM

## 2020-03-27 DIAGNOSIS — R06.02 SHORTNESS OF BREATH: ICD-10-CM

## 2020-03-27 DIAGNOSIS — R05.9 COUGH: ICD-10-CM

## 2020-03-27 DIAGNOSIS — D62 ANEMIA DUE TO BLOOD LOSS, ACUTE: ICD-10-CM

## 2020-03-27 DIAGNOSIS — K92.2 GASTROINTESTINAL HEMORRHAGE, UNSPECIFIED GASTROINTESTINAL HEMORRHAGE TYPE: ICD-10-CM

## 2020-03-27 DIAGNOSIS — D62 ACUTE BLOOD LOSS ANEMIA: ICD-10-CM

## 2020-03-27 LAB
ABO + RH BLD: NORMAL
ABO + RH BLD: NORMAL
ALBUMIN SERPL-MCNC: 2.3 G/DL (ref 3.4–5)
ALBUMIN UR-MCNC: 10 MG/DL
ALP SERPL-CCNC: 121 U/L (ref 40–150)
ALT SERPL W P-5'-P-CCNC: 46 U/L (ref 0–50)
AMMONIA PLAS-SCNC: <10 UMOL/L (ref 10–50)
ANION GAP SERPL CALCULATED.3IONS-SCNC: 8 MMOL/L (ref 3–14)
ANISOCYTOSIS BLD QL SMEAR: SLIGHT
APPEARANCE UR: CLEAR
APTT PPP: 39 SEC (ref 22–37)
AST SERPL W P-5'-P-CCNC: 198 U/L (ref 0–45)
BASOPHILS # BLD AUTO: 0 10E9/L (ref 0–0.2)
BASOPHILS NFR BLD AUTO: 0 %
BILIRUB SERPL-MCNC: 1.6 MG/DL (ref 0.2–1.3)
BILIRUB UR QL STRIP: NEGATIVE
BLD GP AB SCN SERPL QL: NORMAL
BLD PROD TYP BPU: NORMAL
BLD UNIT ID BPU: 0
BLOOD BANK CMNT PATIENT-IMP: NORMAL
BLOOD PRODUCT CODE: NORMAL
BPU ID: NORMAL
BUN SERPL-MCNC: 17 MG/DL (ref 7–30)
CALCIUM SERPL-MCNC: 9 MG/DL (ref 8.5–10.1)
CHLORIDE SERPL-SCNC: 96 MMOL/L (ref 94–109)
CO2 SERPL-SCNC: 24 MMOL/L (ref 20–32)
COLOR UR AUTO: YELLOW
CREAT SERPL-MCNC: 0.56 MG/DL (ref 0.52–1.04)
DIFFERENTIAL METHOD BLD: ABNORMAL
EOSINOPHIL # BLD AUTO: 0.1 10E9/L (ref 0–0.7)
EOSINOPHIL NFR BLD AUTO: 1 %
ERYTHROCYTE [DISTWIDTH] IN BLOOD BY AUTOMATED COUNT: 18.1 % (ref 10–15)
ETHANOL SERPL-MCNC: <0.01 G/DL
GFR SERPL CREATININE-BSD FRML MDRD: >90 ML/MIN/{1.73_M2}
GLUCOSE BLDC GLUCOMTR-MCNC: 102 MG/DL (ref 70–99)
GLUCOSE BLDC GLUCOMTR-MCNC: 105 MG/DL (ref 70–99)
GLUCOSE BLDC GLUCOMTR-MCNC: 122 MG/DL (ref 70–99)
GLUCOSE SERPL-MCNC: 126 MG/DL (ref 70–99)
GLUCOSE UR STRIP-MCNC: NEGATIVE MG/DL
HCO3 BLDV-SCNC: 24 MMOL/L (ref 21–28)
HCT VFR BLD AUTO: 10.4 % (ref 35–47)
HGB BLD-MCNC: 3.1 G/DL (ref 11.7–15.7)
HGB BLD-MCNC: 4.7 G/DL (ref 11.7–15.7)
HGB UR QL STRIP: NEGATIVE
HYPOCHROMIA BLD QL: PRESENT
INR PPP: 1.59 (ref 0.86–1.14)
KETONES UR STRIP-MCNC: NEGATIVE MG/DL
LACTATE BLD-SCNC: 1.2 MMOL/L (ref 0.7–2)
LACTATE BLD-SCNC: 3.7 MMOL/L (ref 0.7–2)
LEUKOCYTE ESTERASE UR QL STRIP: NEGATIVE
LYMPHOCYTES # BLD AUTO: 0.6 10E9/L (ref 0.8–5.3)
LYMPHOCYTES NFR BLD AUTO: 6 %
MAGNESIUM SERPL-MCNC: 1.4 MG/DL (ref 1.6–2.3)
MAGNESIUM SERPL-MCNC: 1.5 MG/DL (ref 1.6–2.3)
MCH RBC QN AUTO: 27.9 PG (ref 26.5–33)
MCHC RBC AUTO-ENTMCNC: 29.8 G/DL (ref 31.5–36.5)
MCV RBC AUTO: 94 FL (ref 78–100)
MONOCYTES # BLD AUTO: 0.6 10E9/L (ref 0–1.3)
MONOCYTES NFR BLD AUTO: 6 %
MRSA DNA SPEC QL NAA+PROBE: NEGATIVE
MUCOUS THREADS #/AREA URNS LPF: PRESENT /LPF
NEUTROPHILS # BLD AUTO: 8.9 10E9/L (ref 1.6–8.3)
NEUTROPHILS NFR BLD AUTO: 87 %
NITRATE UR QL: NEGATIVE
NRBC # BLD AUTO: 0.2 10*3/UL
NRBC BLD AUTO-RTO: 2 /100
NUM BPU REQUESTED: 4
O2/TOTAL GAS SETTING VFR VENT: ABNORMAL %
PCO2 BLDV: 33 MM HG (ref 40–50)
PH BLDV: 7.47 PH (ref 7.32–7.43)
PH UR STRIP: 6 PH (ref 5–7)
PHOSPHATE SERPL-MCNC: 5.1 MG/DL (ref 2.5–4.5)
PLATELET # BLD AUTO: 130 10E9/L (ref 150–450)
PO2 BLDV: 39 MM HG (ref 25–47)
POLYCHROMASIA BLD QL SMEAR: SLIGHT
POTASSIUM SERPL-SCNC: 3.4 MMOL/L (ref 3.4–5.3)
PROT SERPL-MCNC: 6.5 G/DL (ref 6.8–8.8)
RBC # BLD AUTO: 1.11 10E12/L (ref 3.8–5.2)
RBC #/AREA URNS AUTO: <1 /HPF (ref 0–2)
SODIUM SERPL-SCNC: 128 MMOL/L (ref 133–144)
SOURCE: ABNORMAL
SP GR UR STRIP: 1.02 (ref 1–1.03)
SPECIMEN EXP DATE BLD: NORMAL
SPECIMEN SOURCE: NORMAL
SQUAMOUS #/AREA URNS AUTO: 3 /HPF (ref 0–1)
TRANS CELLS #/AREA URNS HPF: <1 /HPF (ref 0–1)
TRANSFUSION STATUS PATIENT QL: NORMAL
UROBILINOGEN UR STRIP-MCNC: NORMAL MG/DL (ref 0–2)
WBC # BLD AUTO: 10.2 10E9/L (ref 4–11)
WBC #/AREA URNS AUTO: 2 /HPF (ref 0–5)

## 2020-03-27 PROCEDURE — 86923 COMPATIBILITY TEST ELECTRIC: CPT | Performed by: EMERGENCY MEDICINE

## 2020-03-27 PROCEDURE — P9059 PLASMA, FRZ BETWEEN 8-24HOUR: HCPCS | Performed by: EMERGENCY MEDICINE

## 2020-03-27 PROCEDURE — 25800030 ZZH RX IP 258 OP 636: Performed by: PHYSICIAN ASSISTANT

## 2020-03-27 PROCEDURE — 81001 URINALYSIS AUTO W/SCOPE: CPT | Performed by: PHYSICIAN ASSISTANT

## 2020-03-27 PROCEDURE — HZ2ZZZZ DETOXIFICATION SERVICES FOR SUBSTANCE ABUSE TREATMENT: ICD-10-PCS | Performed by: PHYSICIAN ASSISTANT

## 2020-03-27 PROCEDURE — 83605 ASSAY OF LACTIC ACID: CPT | Performed by: PHYSICIAN ASSISTANT

## 2020-03-27 PROCEDURE — 25000132 ZZH RX MED GY IP 250 OP 250 PS 637: Performed by: PHYSICIAN ASSISTANT

## 2020-03-27 PROCEDURE — 36430 TRANSFUSION BLD/BLD COMPNT: CPT | Performed by: EMERGENCY MEDICINE

## 2020-03-27 PROCEDURE — 25000128 H RX IP 250 OP 636: Performed by: PHYSICIAN ASSISTANT

## 2020-03-27 PROCEDURE — 82140 ASSAY OF AMMONIA: CPT | Performed by: EMERGENCY MEDICINE

## 2020-03-27 PROCEDURE — 87077 CULTURE AEROBIC IDENTIFY: CPT | Performed by: STUDENT IN AN ORGANIZED HEALTH CARE EDUCATION/TRAINING PROGRAM

## 2020-03-27 PROCEDURE — 40000344 ZZHCL STATISTIC THAWING COMPONENT: Performed by: EMERGENCY MEDICINE

## 2020-03-27 PROCEDURE — 87040 BLOOD CULTURE FOR BACTERIA: CPT | Performed by: STUDENT IN AN ORGANIZED HEALTH CARE EDUCATION/TRAINING PROGRAM

## 2020-03-27 PROCEDURE — 83605 ASSAY OF LACTIC ACID: CPT | Performed by: EMERGENCY MEDICINE

## 2020-03-27 PROCEDURE — 00000146 ZZHCL STATISTIC GLUCOSE BY METER IP

## 2020-03-27 PROCEDURE — 87181 SC STD AGAR DILUTION PER AGT: CPT | Performed by: STUDENT IN AN ORGANIZED HEALTH CARE EDUCATION/TRAINING PROGRAM

## 2020-03-27 PROCEDURE — 96376 TX/PRO/DX INJ SAME DRUG ADON: CPT | Performed by: EMERGENCY MEDICINE

## 2020-03-27 PROCEDURE — 80053 COMPREHEN METABOLIC PANEL: CPT | Performed by: EMERGENCY MEDICINE

## 2020-03-27 PROCEDURE — 84100 ASSAY OF PHOSPHORUS: CPT | Performed by: PHYSICIAN ASSISTANT

## 2020-03-27 PROCEDURE — 25800030 ZZH RX IP 258 OP 636: Performed by: STUDENT IN AN ORGANIZED HEALTH CARE EDUCATION/TRAINING PROGRAM

## 2020-03-27 PROCEDURE — 96367 TX/PROPH/DG ADDL SEQ IV INF: CPT | Performed by: EMERGENCY MEDICINE

## 2020-03-27 PROCEDURE — 96361 HYDRATE IV INFUSION ADD-ON: CPT | Performed by: EMERGENCY MEDICINE

## 2020-03-27 PROCEDURE — 20000004 ZZH R&B ICU UMMC

## 2020-03-27 PROCEDURE — 83735 ASSAY OF MAGNESIUM: CPT | Performed by: PHYSICIAN ASSISTANT

## 2020-03-27 PROCEDURE — C9113 INJ PANTOPRAZOLE SODIUM, VIA: HCPCS | Performed by: PHYSICIAN ASSISTANT

## 2020-03-27 PROCEDURE — 36415 COLL VENOUS BLD VENIPUNCTURE: CPT | Performed by: STUDENT IN AN ORGANIZED HEALTH CARE EDUCATION/TRAINING PROGRAM

## 2020-03-27 PROCEDURE — 71045 X-RAY EXAM CHEST 1 VIEW: CPT

## 2020-03-27 PROCEDURE — 25000128 H RX IP 250 OP 636: Performed by: EMERGENCY MEDICINE

## 2020-03-27 PROCEDURE — 96375 TX/PRO/DX INJ NEW DRUG ADDON: CPT | Performed by: EMERGENCY MEDICINE

## 2020-03-27 PROCEDURE — 99292 CRITICAL CARE ADDL 30 MIN: CPT | Performed by: EMERGENCY MEDICINE

## 2020-03-27 PROCEDURE — 86901 BLOOD TYPING SEROLOGIC RH(D): CPT | Performed by: EMERGENCY MEDICINE

## 2020-03-27 PROCEDURE — 99291 CRITICAL CARE FIRST HOUR: CPT | Performed by: INTERNAL MEDICINE

## 2020-03-27 PROCEDURE — 76705 ECHO EXAM OF ABDOMEN: CPT

## 2020-03-27 PROCEDURE — 86900 BLOOD TYPING SEROLOGIC ABO: CPT | Performed by: EMERGENCY MEDICINE

## 2020-03-27 PROCEDURE — 99291 CRITICAL CARE FIRST HOUR: CPT | Mod: 25 | Performed by: EMERGENCY MEDICINE

## 2020-03-27 PROCEDURE — 25000125 ZZHC RX 250: Performed by: PHYSICIAN ASSISTANT

## 2020-03-27 PROCEDURE — 82803 BLOOD GASES ANY COMBINATION: CPT | Performed by: PHYSICIAN ASSISTANT

## 2020-03-27 PROCEDURE — 87640 STAPH A DNA AMP PROBE: CPT | Performed by: PHYSICIAN ASSISTANT

## 2020-03-27 PROCEDURE — 36415 COLL VENOUS BLD VENIPUNCTURE: CPT | Performed by: PHYSICIAN ASSISTANT

## 2020-03-27 PROCEDURE — P9016 RBC LEUKOCYTES REDUCED: HCPCS | Performed by: EMERGENCY MEDICINE

## 2020-03-27 PROCEDURE — 85610 PROTHROMBIN TIME: CPT | Performed by: EMERGENCY MEDICINE

## 2020-03-27 PROCEDURE — C9113 INJ PANTOPRAZOLE SODIUM, VIA: HCPCS | Performed by: EMERGENCY MEDICINE

## 2020-03-27 PROCEDURE — 80320 DRUG SCREEN QUANTALCOHOLS: CPT | Performed by: PHYSICIAN ASSISTANT

## 2020-03-27 PROCEDURE — 83735 ASSAY OF MAGNESIUM: CPT | Performed by: EMERGENCY MEDICINE

## 2020-03-27 PROCEDURE — 99291 CRITICAL CARE FIRST HOUR: CPT | Mod: Z6 | Performed by: EMERGENCY MEDICINE

## 2020-03-27 PROCEDURE — 85730 THROMBOPLASTIN TIME PARTIAL: CPT | Performed by: EMERGENCY MEDICINE

## 2020-03-27 PROCEDURE — 25800030 ZZH RX IP 258 OP 636: Performed by: EMERGENCY MEDICINE

## 2020-03-27 PROCEDURE — 86850 RBC ANTIBODY SCREEN: CPT | Performed by: EMERGENCY MEDICINE

## 2020-03-27 PROCEDURE — 96365 THER/PROPH/DIAG IV INF INIT: CPT | Performed by: EMERGENCY MEDICINE

## 2020-03-27 PROCEDURE — 85025 COMPLETE CBC W/AUTO DIFF WBC: CPT | Performed by: EMERGENCY MEDICINE

## 2020-03-27 PROCEDURE — 87641 MR-STAPH DNA AMP PROBE: CPT | Performed by: PHYSICIAN ASSISTANT

## 2020-03-27 PROCEDURE — 85018 HEMOGLOBIN: CPT | Performed by: PHYSICIAN ASSISTANT

## 2020-03-27 PROCEDURE — 87635 SARS-COV-2 COVID-19 AMP PRB: CPT | Performed by: EMERGENCY MEDICINE

## 2020-03-27 RX ORDER — HALOPERIDOL 5 MG/ML
2.5-5 INJECTION INTRAMUSCULAR EVERY 4 HOURS PRN
Status: DISCONTINUED | OUTPATIENT
Start: 2020-03-27 | End: 2020-03-28

## 2020-03-27 RX ORDER — CEFTRIAXONE 1 G/1
1 INJECTION, POWDER, FOR SOLUTION INTRAMUSCULAR; INTRAVENOUS ONCE
Status: COMPLETED | OUTPATIENT
Start: 2020-03-27 | End: 2020-03-27

## 2020-03-27 RX ORDER — NALOXONE HYDROCHLORIDE 0.4 MG/ML
.1-.4 INJECTION, SOLUTION INTRAMUSCULAR; INTRAVENOUS; SUBCUTANEOUS
Status: DISCONTINUED | OUTPATIENT
Start: 2020-03-27 | End: 2020-03-30

## 2020-03-27 RX ORDER — FOLIC ACID 1 MG/1
1 TABLET ORAL DAILY
Status: DISCONTINUED | OUTPATIENT
Start: 2020-03-27 | End: 2020-03-27

## 2020-03-27 RX ORDER — FOLIC ACID 1 MG/1
1 TABLET ORAL DAILY
Status: DISCONTINUED | OUTPATIENT
Start: 2020-03-30 | End: 2020-03-27

## 2020-03-27 RX ORDER — LANOLIN ALCOHOL/MO/W.PET/CERES
100 CREAM (GRAM) TOPICAL 3 TIMES DAILY
Status: DISCONTINUED | OUTPATIENT
Start: 2020-03-29 | End: 2020-03-27

## 2020-03-27 RX ORDER — MAGNESIUM SULFATE HEPTAHYDRATE 40 MG/ML
4 INJECTION, SOLUTION INTRAVENOUS EVERY 4 HOURS PRN
Status: DISCONTINUED | OUTPATIENT
Start: 2020-03-27 | End: 2020-03-30

## 2020-03-27 RX ORDER — MULTIVITAMIN,THERAPEUTIC
1 TABLET ORAL DAILY
Status: DISCONTINUED | OUTPATIENT
Start: 2020-03-27 | End: 2020-03-27

## 2020-03-27 RX ORDER — BISACODYL 5 MG
15 TABLET, DELAYED RELEASE (ENTERIC COATED) ORAL DAILY PRN
Status: DISCONTINUED | OUTPATIENT
Start: 2020-03-27 | End: 2020-03-27

## 2020-03-27 RX ORDER — FUROSEMIDE 20 MG
20 TABLET ORAL DAILY
Status: DISCONTINUED | OUTPATIENT
Start: 2020-03-27 | End: 2020-03-31

## 2020-03-27 RX ORDER — CEFTRIAXONE 1 G/1
1 INJECTION, POWDER, FOR SOLUTION INTRAMUSCULAR; INTRAVENOUS EVERY 24 HOURS
Status: DISCONTINUED | OUTPATIENT
Start: 2020-03-28 | End: 2020-03-29

## 2020-03-27 RX ORDER — CARVEDILOL 6.25 MG/1
6.25 TABLET ORAL 2 TIMES DAILY WITH MEALS
Status: DISCONTINUED | OUTPATIENT
Start: 2020-03-27 | End: 2020-03-30

## 2020-03-27 RX ORDER — NORTRIPTYLINE HCL 10 MG
10 CAPSULE ORAL AT BEDTIME
Status: DISCONTINUED | OUTPATIENT
Start: 2020-03-27 | End: 2020-04-01 | Stop reason: HOSPADM

## 2020-03-27 RX ORDER — FEXOFENADINE HCL 180 MG/1
180 TABLET ORAL DAILY PRN
Status: DISCONTINUED | OUTPATIENT
Start: 2020-03-27 | End: 2020-04-01 | Stop reason: HOSPADM

## 2020-03-27 RX ORDER — LACTULOSE 10 G/15ML
10 SOLUTION ORAL 2 TIMES DAILY
Status: DISCONTINUED | OUTPATIENT
Start: 2020-03-27 | End: 2020-04-01 | Stop reason: HOSPADM

## 2020-03-27 RX ORDER — POTASSIUM CHLORIDE 29.8 MG/ML
20 INJECTION INTRAVENOUS
Status: DISCONTINUED | OUTPATIENT
Start: 2020-03-27 | End: 2020-03-30

## 2020-03-27 RX ORDER — SODIUM CHLORIDE 9 MG/ML
1000 INJECTION, SOLUTION INTRAVENOUS CONTINUOUS
Status: ACTIVE | OUTPATIENT
Start: 2020-03-27 | End: 2020-03-27

## 2020-03-27 RX ORDER — BISACODYL 5 MG
5 TABLET, DELAYED RELEASE (ENTERIC COATED) ORAL DAILY PRN
Status: DISCONTINUED | OUTPATIENT
Start: 2020-03-27 | End: 2020-03-30

## 2020-03-27 RX ORDER — PROCHLORPERAZINE MALEATE 5 MG
10 TABLET ORAL EVERY 6 HOURS PRN
Status: DISCONTINUED | OUTPATIENT
Start: 2020-03-27 | End: 2020-03-30

## 2020-03-27 RX ORDER — ONDANSETRON 2 MG/ML
4 INJECTION INTRAMUSCULAR; INTRAVENOUS EVERY 6 HOURS PRN
Status: DISCONTINUED | OUTPATIENT
Start: 2020-03-27 | End: 2020-04-01 | Stop reason: HOSPADM

## 2020-03-27 RX ORDER — METOPROLOL TARTRATE 1 MG/ML
5 INJECTION, SOLUTION INTRAVENOUS EVERY 6 HOURS PRN
Status: DISCONTINUED | OUTPATIENT
Start: 2020-03-27 | End: 2020-03-29

## 2020-03-27 RX ORDER — LANOLIN ALCOHOL/MO/W.PET/CERES
100 CREAM (GRAM) TOPICAL DAILY
Status: DISCONTINUED | OUTPATIENT
Start: 2020-04-03 | End: 2020-04-01 | Stop reason: HOSPADM

## 2020-03-27 RX ORDER — OCTREOTIDE ACETATE 50 UG/ML
50 INJECTION, SOLUTION INTRAVENOUS; SUBCUTANEOUS ONCE
Status: COMPLETED | OUTPATIENT
Start: 2020-03-27 | End: 2020-03-27

## 2020-03-27 RX ORDER — MULTIVITAMIN WITH IRON
1 TABLET ORAL DAILY
Status: DISCONTINUED | OUTPATIENT
Start: 2020-03-27 | End: 2020-03-30

## 2020-03-27 RX ORDER — FOLIC ACID 1 MG/1
1 TABLET ORAL DAILY
Status: DISCONTINUED | OUTPATIENT
Start: 2020-03-28 | End: 2020-04-01 | Stop reason: HOSPADM

## 2020-03-27 RX ORDER — FOLIC ACID 5 MG/ML
1 INJECTION, SOLUTION INTRAMUSCULAR; INTRAVENOUS; SUBCUTANEOUS ONCE
Status: DISCONTINUED | OUTPATIENT
Start: 2020-03-27 | End: 2020-03-27

## 2020-03-27 RX ORDER — POTASSIUM CHLORIDE 7.45 MG/ML
10 INJECTION INTRAVENOUS
Status: DISCONTINUED | OUTPATIENT
Start: 2020-03-27 | End: 2020-03-30

## 2020-03-27 RX ORDER — MULTIPLE VITAMINS W/ MINERALS TAB 9MG-400MCG
1 TAB ORAL DAILY
Status: DISCONTINUED | OUTPATIENT
Start: 2020-03-27 | End: 2020-04-01 | Stop reason: HOSPADM

## 2020-03-27 RX ORDER — LORAZEPAM 1 MG/1
1-2 TABLET ORAL EVERY 30 MIN PRN
Status: DISCONTINUED | OUTPATIENT
Start: 2020-03-27 | End: 2020-03-29

## 2020-03-27 RX ORDER — SPIRONOLACTONE 25 MG/1
50 TABLET ORAL DAILY
Status: DISCONTINUED | OUTPATIENT
Start: 2020-03-27 | End: 2020-04-01 | Stop reason: HOSPADM

## 2020-03-27 RX ORDER — PREGABALIN 50 MG/1
50 CAPSULE ORAL 3 TIMES DAILY
Status: DISCONTINUED | OUTPATIENT
Start: 2020-03-27 | End: 2020-04-01 | Stop reason: HOSPADM

## 2020-03-27 RX ORDER — FLUMAZENIL 0.1 MG/ML
0.2 INJECTION, SOLUTION INTRAVENOUS
Status: DISCONTINUED | OUTPATIENT
Start: 2020-03-27 | End: 2020-03-29

## 2020-03-27 RX ORDER — POLYETHYLENE GLYCOL 3350 17 G/17G
17 POWDER, FOR SOLUTION ORAL DAILY PRN
Status: DISCONTINUED | OUTPATIENT
Start: 2020-03-27 | End: 2020-04-01 | Stop reason: HOSPADM

## 2020-03-27 RX ORDER — POLYETHYLENE GLYCOL 3350 17 G/17G
17 POWDER, FOR SOLUTION ORAL DAILY PRN
Status: DISCONTINUED | OUTPATIENT
Start: 2020-03-27 | End: 2020-03-27

## 2020-03-27 RX ORDER — BISACODYL 5 MG
10 TABLET, DELAYED RELEASE (ENTERIC COATED) ORAL DAILY PRN
Status: DISCONTINUED | OUTPATIENT
Start: 2020-03-27 | End: 2020-03-30

## 2020-03-27 RX ORDER — MAGNESIUM SULFATE HEPTAHYDRATE 40 MG/ML
2 INJECTION, SOLUTION INTRAVENOUS DAILY PRN
Status: DISCONTINUED | OUTPATIENT
Start: 2020-03-27 | End: 2020-03-30

## 2020-03-27 RX ORDER — POTASSIUM CHLORIDE 750 MG/1
20-40 TABLET, EXTENDED RELEASE ORAL
Status: DISCONTINUED | OUTPATIENT
Start: 2020-03-27 | End: 2020-03-30

## 2020-03-27 RX ORDER — LANOLIN ALCOHOL/MO/W.PET/CERES
100 CREAM (GRAM) TOPICAL DAILY
Status: DISCONTINUED | OUTPATIENT
Start: 2020-03-27 | End: 2020-03-27

## 2020-03-27 RX ORDER — ONDANSETRON 4 MG/1
4 TABLET, ORALLY DISINTEGRATING ORAL EVERY 6 HOURS PRN
Status: DISCONTINUED | OUTPATIENT
Start: 2020-03-27 | End: 2020-03-30

## 2020-03-27 RX ORDER — FOLIC ACID 5 MG/ML
1 INJECTION, SOLUTION INTRAMUSCULAR; INTRAVENOUS; SUBCUTANEOUS DAILY
Status: DISCONTINUED | OUTPATIENT
Start: 2020-03-28 | End: 2020-03-27

## 2020-03-27 RX ORDER — PRENATAL VIT/IRON FUM/FOLIC AC 27MG-0.8MG
1 TABLET ORAL DAILY
Status: DISCONTINUED | OUTPATIENT
Start: 2020-03-27 | End: 2020-03-30

## 2020-03-27 RX ORDER — PROCHLORPERAZINE 25 MG
25 SUPPOSITORY, RECTAL RECTAL EVERY 12 HOURS PRN
Status: DISCONTINUED | OUTPATIENT
Start: 2020-03-27 | End: 2020-03-30

## 2020-03-27 RX ORDER — CLONIDINE HYDROCHLORIDE 0.1 MG/1
0.1 TABLET ORAL EVERY 8 HOURS
Status: DISCONTINUED | OUTPATIENT
Start: 2020-03-27 | End: 2020-03-29

## 2020-03-27 RX ORDER — MAGNESIUM OXIDE 400 MG/1
400 TABLET ORAL DAILY
Status: DISCONTINUED | OUTPATIENT
Start: 2020-03-27 | End: 2020-03-30

## 2020-03-27 RX ORDER — POTASSIUM CL/LIDO/0.9 % NACL 10MEQ/0.1L
10 INTRAVENOUS SOLUTION, PIGGYBACK (ML) INTRAVENOUS
Status: DISCONTINUED | OUTPATIENT
Start: 2020-03-27 | End: 2020-03-30

## 2020-03-27 RX ORDER — FOLIC ACID 5 MG/ML
1 INJECTION, SOLUTION INTRAMUSCULAR; INTRAVENOUS; SUBCUTANEOUS ONCE
Status: COMPLETED | OUTPATIENT
Start: 2020-03-27 | End: 2020-03-27

## 2020-03-27 RX ORDER — POTASSIUM CHLORIDE 1.5 G/1.58G
20-40 POWDER, FOR SOLUTION ORAL
Status: DISCONTINUED | OUTPATIENT
Start: 2020-03-27 | End: 2020-03-30

## 2020-03-27 RX ORDER — LORAZEPAM 2 MG/ML
1-2 INJECTION INTRAMUSCULAR EVERY 30 MIN PRN
Status: DISCONTINUED | OUTPATIENT
Start: 2020-03-27 | End: 2020-03-28

## 2020-03-27 RX ADMIN — PREGABALIN 50 MG: 50 CAPSULE ORAL at 20:24

## 2020-03-27 RX ADMIN — THIAMINE HYDROCHLORIDE 200 MG: 100 INJECTION, SOLUTION INTRAMUSCULAR; INTRAVENOUS at 17:02

## 2020-03-27 RX ADMIN — MULTIPLE VITAMINS W/ MINERALS TAB 1 TABLET: TAB at 17:02

## 2020-03-27 RX ADMIN — PANTOPRAZOLE SODIUM 8 MG/HR: 40 INJECTION, POWDER, FOR SOLUTION INTRAVENOUS at 22:36

## 2020-03-27 RX ADMIN — SODIUM CHLORIDE 1000 ML: 900 INJECTION INTRAVENOUS at 17:04

## 2020-03-27 RX ADMIN — CLONIDINE HYDROCHLORIDE 0.1 MG: 0.1 TABLET ORAL at 23:41

## 2020-03-27 RX ADMIN — ONDANSETRON 4 MG: 2 INJECTION INTRAMUSCULAR; INTRAVENOUS at 17:01

## 2020-03-27 RX ADMIN — OCTREOTIDE ACETATE 50 MCG/HR: 200 INJECTION, SOLUTION INTRAVENOUS; SUBCUTANEOUS at 14:07

## 2020-03-27 RX ADMIN — LACTULOSE 10 G: 20 SOLUTION ORAL at 20:23

## 2020-03-27 RX ADMIN — MAGNESIUM SULFATE IN WATER 4 G: 40 INJECTION, SOLUTION INTRAVENOUS at 18:56

## 2020-03-27 RX ADMIN — FOLIC ACID 1 MG: 5 INJECTION, SOLUTION INTRAMUSCULAR; INTRAVENOUS; SUBCUTANEOUS at 17:02

## 2020-03-27 RX ADMIN — CEFTRIAXONE 1 G: 1 INJECTION, POWDER, FOR SOLUTION INTRAMUSCULAR; INTRAVENOUS at 13:02

## 2020-03-27 RX ADMIN — PREGABALIN 50 MG: 50 CAPSULE ORAL at 17:01

## 2020-03-27 RX ADMIN — SODIUM CHLORIDE 1000 ML: 900 INJECTION, SOLUTION INTRAVENOUS at 11:45

## 2020-03-27 RX ADMIN — MAGNESIUM OXIDE 400 MG: 400 TABLET ORAL at 17:01

## 2020-03-27 RX ADMIN — SODIUM CHLORIDE 80 MG: 9 INJECTION, SOLUTION INTRAVENOUS at 13:24

## 2020-03-27 RX ADMIN — CLONIDINE HYDROCHLORIDE 0.1 MG: 0.1 TABLET ORAL at 17:01

## 2020-03-27 RX ADMIN — PANTOPRAZOLE SODIUM 8 MG/HR: 40 INJECTION, POWDER, FOR SOLUTION INTRAVENOUS at 14:05

## 2020-03-27 RX ADMIN — NORTRIPTYLINE HYDROCHLORIDE 10 MG: 10 CAPSULE ORAL at 22:36

## 2020-03-27 RX ADMIN — THIAMINE HYDROCHLORIDE 200 MG: 100 INJECTION, SOLUTION INTRAMUSCULAR; INTRAVENOUS at 22:31

## 2020-03-27 RX ADMIN — OCTREOTIDE ACETATE 50 MCG: 50 INJECTION, SOLUTION INTRAVENOUS; SUBCUTANEOUS at 12:57

## 2020-03-27 RX ADMIN — PHYTONADIONE 10 MG: 10 INJECTION, EMULSION INTRAMUSCULAR; INTRAVENOUS; SUBCUTANEOUS at 18:00

## 2020-03-27 RX ADMIN — Medication 1 TABLET: at 17:02

## 2020-03-27 RX ADMIN — PRENATAL VIT W/ FE FUMARATE-FA TAB 27-0.8 MG 1 TABLET: 27-0.8 TAB at 17:01

## 2020-03-27 ASSESSMENT — PAIN DESCRIPTION - DESCRIPTORS
DESCRIPTORS: SHARP;SHOOTING
DESCRIPTORS: ACHING
DESCRIPTORS: SHOOTING;SHARP

## 2020-03-27 ASSESSMENT — ACTIVITIES OF DAILY LIVING (ADL)
ADLS_ACUITY_SCORE: 19
ADLS_ACUITY_SCORE: 19

## 2020-03-27 NOTE — H&P
MEDICAL ICU ADMISSION NOTE  03/27/2020      PRIMARY TEAM: MICU  REASON FOR CRITICAL CARE ADMISSION: GI bleed    ADMITTING PHYSICIAN: Migdalia Robertson  ATTENDING PHYSICIAN:  Migdalia Robertson  Date of Service (when I saw the patient): 03/27/2020     ASSESSMENT:  Yenifer Maher is a 40 year old female with PMHx of alcoholic cirrhosis, alcohol abuse, coagulopathy, paroxysmal A-fib, tobacco use, who was admitted on 3/27/2020 due to GI bleed with Hgb of 3.1 after presenting with fever, black stools, SOB and lightheadedness.      PLAN:     Neurological:  # Acute pain   # Alcohol abuse with of alcohol withdrawal.   # Tobacco use disorder   # Impaired cognitive function   # Polyneuropathy: 2/2 alcohol abuse. Sxs stable.    - Continue PTA Lyrica and nortriptyline.  - CIWA protocol   - Monitor neurological status. Delirium preventions and precautions.     Pulmonary:   # COVID suspect(PUI) - Patient had fever, sore throat and cough  - COVID-19 testing pending  - Avoid aerosolizing procedure   - Contact and droplet precaution   - Minimize exposure     Cardiovascular:    # Near syncope- patient had lightheadedness. Likely related to severe anemia due to GI bleed   - Blood transfusion   - Telemetry   - Monitor hemodynamic status.     Workup:   Echo 2018 showed EF of 55-60%. No valvular abnormality. RAP of 15.,     Gastroenterology/Nutrition:  # GI Bleed - patient with black stools. Hgb of 3.1 in ED. Baseline Hgb of 6-7. She was treated with bolus Protonix, empiric ceftriaxone, and octreotide.   - Plan for 2 units of PRBCs in ED  - Protonix gtt   - Octreotide gtt  - Ceftriaxone   - Hepatology consulted for EGD.   - Repeat Hgb after completion of 2 units PRBCs   - Additional transfusion as indicated.   # Liver Cirrhosis- complicated by Grade II varices, portal HTN  #  Esophageal Varices  #  Portal HTN  - MELD of 13  - No clinical evidence of HE.   - Continue PTA lactulose titrate to 3-5 BMs  - Hold PTA coreg 6.25 mg  (Hold HR <60) lasix and spironolactone   # GERD:  - Protonix gtt  - Hold PTA omeprazole.   Renal:  # No acute issues   -  Will continue to monitor intake and output.  Fluids/Electrolytes:   # Hyponatremia - Likely 2/2 hypovolemia    -   ml/hr  for IV fluid hydration  Endocrine:  #  Stress hyperglycemia   - No management indication.   ID:  # Lactic acidosis - 2/2 hemorrhagic shock   # PUI- Has had cough and fever   - COVID-19 testing pending   - IVF   - no indications for antibiotics.   - UA, US abdomen pending.     Positive cultures:  - None    Heme:     # Severe anemia- GI bleed. Pt with back stools. Hx of grade II varices     - Hemoglobin 3.1 on ED presentation. Baseline Hgb 6-7  - Plan for transfusion with 2 units of PRBCs and FFP.  - Repeat Hgb after the transfusion .   - Transfuse if hgb <7.0 or signs/symptoms of hypoperfusion. Monitor and trend.   # Coagulopathy: 2/2 liver disease   - INR 1.59  - S/p 1 units FFP  - Vitamin K 10 mg x 3 days.     # Lupus Anticoagulant - Follow up with Dr. Jeffery. Lovenox held due to GI bleeding. Scheduled for outpatient follow up but didn't follow up.  - Continue to monitor     Musculoskeletal:  # Weakness and deconditioning of critical illness   - Physical and occupational therapy consult     Skin:   # Hx of non-uremic calciphylaxis: Diagnosed via biopsy during hospitalization last year (12/2018). Was at one point on pentoxifylline but states for unclear reason someone told her to stop taking. Still has minor lesions on her inner thigh that no longer cause severe pain.  - Follow up with Dermatology after discharge.      General Cares/Prophylaxis:    DVT Prophylaxis: Mechanical DVT prophylaxis   GI Prophylaxis: PPI  Restraints: Restraints for medical healing needed: NO    Lines/ tubes/ drains:  - PIV    Disposition:  - MICU.     Patient seen, findings and plan discussed with surgical ICU staff, Dr. Robertson .    Time Spent on this Encounter   Billing:  I spent 55 minutes  bedside and on the inpatient unit today managing the critical care of Yenifer Maher in relation to the issues listed in this note.    Komal Dasilva PA-C  Critical Care   - - - - - - - - - - - - - - - - - - - - - - - - - - - - - - - - - - - - - - - - - - - - - -   HISTORY PRESENTING ILLNESS:  Yenifer Maher is a 40 year old female with PMHx of alcoholic cirrhosis, alcohol abuse, coagulopathy, paroxysmal A-fib, tobacco use, who was admitted on 3/27/2020 due to GI bleed with Hgb of 3.1 after presenting with fever, black stools, SOB and lightheadedness.   Patient with hx of recurrent GI bleed, recently admitted for GI bleed and decompensated cirrhosis from 12/23-12/28/2020.   In the ED, patient was noted to have elevated lactate was 3103.7.  Hemoglobin of three-point morning sodium of 1/20/2018.  Patient was resuscitated with IV fluid.  She received 2 units of PRBC. COVID-19 nasopharyngeal swab was sent.  Patient being admitted for further evaluation and close hemodynamic monitoring to MICU.  Plan to avoid procedures while patient is under investigation for COVID-19.  Upon my evaluation, patient denies any specific complaint. Requesting something to drink.  Dizziness/lightheadedness has improved.  Denies cough, fever, chills, chest pain, shortness of breath, abdominal pain, nausea, vomiting.  Denies confusion.  Taking her medication as prescribed.  REVIEW OF SYSTEMS: 10 point ROS neg other than the symptoms noted above in the HPI.  PAST MEDICAL HISTORY:   Past Medical History:   Diagnosis Date     Alcohol abuse      Alcoholic cirrhosis (H)      Alcoholic peripheral neuropathy (H)      Coagulopathy (H)      Gastritis      History of Clostridium difficile colitis     unknown date     Impairment of cognitive function     MOCA 2/2019 22/30     Leukocytosis 02/2019    persistent leukocytosis across 2/2019 hospitalization without evidence of source across multiple diagnostics including LP, BCx, UCx      Lupus  anticoagulant positive      Macrocytic anemia      Moderate protein-calorie malnutrition (H)      Paroxysmal A-fib (H) 02/2019    not on chronic anticoagulation     Peptic ulcer disease      Positive SMILEY (antinuclear antibody)      Subclinical hypothyroidism 02/2019    normal T3, T4     Tobacco dependence     0.5 PPD   SURGICAL HISTORY:   Past Surgical History:   Procedure Laterality Date     ESOPHAGOSCOPY, GASTROSCOPY, DUODENOSCOPY (EGD), COMBINED N/A 8/9/2019    Procedure: ESOPHAGOGASTRODUODENOSCOPY (EGD);  Surgeon: Sowmya Ibanez MD;  Location:  GI     ESOPHAGOSCOPY, GASTROSCOPY, DUODENOSCOPY (EGD), COMBINED N/A 8/26/2019    Procedure: ESOPHAGOGASTRODUODENOSCOPY (EGD);  Surgeon: Leventhal, Thomas Michael, MD;  Location:  GI     UPPER GI ENDOSCOPY  8/9/2019        OCIAL HISTORY:   Social History     Socioeconomic History     Marital status: Single     Spouse name: Not on file     Number of children: Not on file     Years of education: Not on file     Highest education level: Not on file   Occupational History     Not on file   Social Needs     Financial resource strain: Not on file     Food insecurity     Worry: Not on file     Inability: Not on file     Transportation needs     Medical: Not on file     Non-medical: Not on file   Tobacco Use     Smoking status: Current Some Day Smoker     Types: Cigarettes     Smokeless tobacco: Never Used     Tobacco comment: 6-8 per day   Substance and Sexual Activity     Alcohol use: Yes     Alcohol/week: 2.0 standard drinks     Types: 2 Glasses of wine per week     Frequency: 2-3 times a week     Drug use: Not on file     Sexual activity: Not on file   Lifestyle     Physical activity     Days per week: Not on file     Minutes per session: Not on file     Stress: Not on file   Relationships     Social connections     Talks on phone: Not on file     Gets together: Not on file     Attends [employer] service: Not on file     Active member of club or organization:  Not on file     Attends meetings of clubs or organizations: Not on file     Relationship status: Not on file     Intimate partner violence     Fear of current or ex partner: Not on file     Emotionally abused: Not on file     Physically abused: Not on file     Forced sexual activity: Not on file   Other Topics Concern     Parent/sibling w/ CABG, MI or angioplasty before 65F 55M? Not Asked   Social History Narrative     Not on file   FAMILY HISTORY: No bleeding/clotting disorders nor problems with anesthesia.   ALLERGIES:   Allergies   Allergen Reactions     Bengay Pain Relief [Menthol] Other (See Comments)     Skin turns red and feels extremely hot     Cats      Chocolate Dermatitis     Dicyclomine Other (See Comments)     Severe sedation     Dust Mites      Derm, resp     No Clinical Screening - See Comments      GI upset     Phenobarbital      Pollen Extract      Derm, resp.    MEDICATIONS:  No current facility-administered medications on file prior to encounter.   calcium carbonate (TUMS) 500 MG chewable tablet, Take 1-2 chew tab by mouth as needed for heartburn  carvedilol (COREG) 6.25 MG tablet, Take 1 tablet (6.25 mg) by mouth 2 times daily (with meals)  fexofenadine (ALLEGRA) 180 MG tablet, Take 1 tablet (180 mg) by mouth daily as needed for allergies  folic acid (FOLVITE) 1 MG tablet, Take 1 tablet (1 mg) by mouth daily  furosemide (LASIX) 20 MG tablet, Take 1 tablet (20 mg) by mouth daily  lactulose (CHRONULAC) 10 GM/15ML solution, Take 15 mLs (10 g) by mouth 2 times daily  magnesium oxide (MAG-OX) 400 (240 Mg) MG tablet, Take 1 tablet (400 mg) by mouth daily  multivitamin, therapeutic (THERA-VIT) TABS tablet, Take 1 tablet by mouth daily  nortriptyline (PAMELOR) 10 MG capsule, Take 1 capsule (10 mg) by mouth At Bedtime  omeprazole (PRILOSEC) 40 MG DR capsule, Take 1 tab twice daily until 9/2/19, then 1 tab daily  polyethylene glycol (MIRALAX/GLYCOLAX) packet, Take 17 g by mouth daily as needed for  constipation  pregabalin (LYRICA) 50 MG capsule, Take 1 capsule (50 mg) by mouth 3 times daily  Prenatal Vit-Fe Fumarate-FA (PRENATAL MULTIVITAMIN W/IRON) 27-0.8 MG tablet, Take 1 tablet by mouth daily  sodium-potassium bicarbonate (FRIDA-SELTZER GOLD) TBEF solu-tab, Take 1-2 tablets by mouth 2 times daily as needed for heartburn  spironolactone (ALDACTONE) 50 MG tablet, Take 1 tablet (50 mg) by mouth daily  vitamin (B COMPLEX-C) tablet, Take 1 tablet by mouth daily  vitamin B1 (THIAMINE) 100 MG tablet, Take 1 tablet (100 mg) by mouth daily    PHYSICAL EXAMINATION:  Temp:  [99  F (37.2  C)] 99  F (37.2  C)  Pulse:  [120-121] 120  Heart Rate:  [121] 121  Resp:  [18] 18  BP: (105-129)/(61-68) 105/68  SpO2:  [95 %] 95 %  General: NAD. Flat affect.   HEENT: Mucus membrane dry. Moderate tongue fasciculation.    Neck: Supple    Neuro: A&Ox3, NAD. Moves all extremities symmetrically.   Pulm/Resp: Clear breath sounds bilaterally without rhonchi, crackles or wheeze,  breathing non-labored  CV: RRR,no m/g/c/r  Abdomen: Soft, non-distended, non-tender, no rebound tenderness or guarding, no masses  : No shelley catheter in place. No external hemorrhoid. Brown stool with mucus on rectal exam.   Incisions/Skin: No rashes or lesion    MSK/Extremities: Moderate hands tremor, no asterixis. No peripheral edema, moving all extremities, peripheral pulses intact, calves non-tender, extremities well perfused    LABS: Reviewed.   Arterial Blood Gases   No lab results found in last 7 days.  Complete Blood Count   Recent Labs   Lab 03/27/20  1135   WBC 10.2   HGB 3.1*   *     Basic Metabolic Panel  Recent Labs   Lab 03/27/20  1135   *   POTASSIUM 3.4   CHLORIDE 96   CO2 24   BUN 17   CR 0.56   *     Liver Function Tests  Recent Labs   Lab 03/27/20  1135   *   ALT 46   ALKPHOS 121   BILITOTAL 1.6*   ALBUMIN 2.3*   INR 1.59*     Pancreatic Enzymes  No lab results found in last 7 days.  Coagulation Profile  Recent  Labs   Lab 03/27/20  1135   INR 1.59*   PTT 39*   IMAGING:  No results found for this or any previous visit (from the past 24 hour(s)).

## 2020-03-27 NOTE — PROGRESS NOTES
Admitted/transferred from: ED  Reason for admission/transfer: hgb 3.1  Patient status upon admission/transfer: stable  Interventions: Second bag of blood started. Monitor labs and pt status.   Plan: Monitor labs. Monitor for bleed.   2 RN skin assessment: completed by BRAYAN Tate and BRAYAN Morales   Result of skin assessment and interventions/actions:  R flank bruising, calleous toes.    Height, weight, drug calc weight: Done.  Patient belongings (see Flowsheet - Adult Profile for details): Kept at bedside. PO fluids kept away from pt's reach, currently NPO.  MDRO education (if applicable): Completed.

## 2020-03-27 NOTE — ED TRIAGE NOTES
"BIBA from clinic with c/o \"flu like symptoms.\" Per EMS pt has two syncopal episodes this week and has been having fevers up to 100.4 F. Pt is tachycardic, hr 120s. Pt did have a syncopal episode at clinic this morning. Per EMS pt's eye were open with the episode. Per EMS pt has had no known contact with COVID-19 pts but pt reports that she has been diagnosed on a triage nurse line. Pt given Zofran 4 mg oral. Pt reports black stool for 4-5 days and reports generalized weakness.  "

## 2020-03-27 NOTE — PROGRESS NOTES
"[facility]  CRITICAL CARE STAFF NOTE  03/27/20      Brief patient summary:  Yenifer Maher is a 39yo female with a history of ETOH Cirrhosis complicated by esophageal varices and portal hypertensive gastropathy with previous variceal bleeding and ongoing drinking admitted for GIB due to acute blood loss anemia in addition to ruling out COVID.  Had subjective fevers and cough about ~1 week ago and was told she had COVID and she needed to stay home (not tested).  Presented to the ER today with several days of black tarry stools and near syncope.  Hgb found to be 3.8    Interim history:  Pt is tachycardic, normotensive, tremulous on arrival to ICU.     Acute critical care issues and supportive interventions managed by me today include:     1. Acute blood loss anemia: profound anemia to 3.0, with tachycardia and largely normal blood pressure suggests a slow bleed.  She is admitted to MICU due to very high risk of decompensation due to hemorrhagic shock and ETOH withdrawal. - continue blood transfusions with goal Hgb of 7.0  - continue octreotide and PPI gtt  - continue ceftriaxone  - discussed with GI.  Due to likely slow bleed and COVID rule out, will plan on EGD once she is ruled out.  Brown stool on rectal exam is reassuring.  Additionally,had very high sedation requirements on last EGD.  With possible withdrawal, concern that pt will require intubation for EGD.     2. ETOH abuse and ETOH cirrhosis: I told patient that she has a high risk of dying should she continue to drink alcohol.  She had a very flat affect and said \"ok.\"  Concern that she is withdrawing given last drink was yesterday, tremors.    - [facility] protocol    3. ETOH cirrhosis: decompensated with known esophageal varices.  GI aware of patient.     Rest per [facility] practice practitioner note from today.    The patient was seen and examined with the Advance practice practitioner.  We have discussed the patient in detail and I agree with the " findings, assessment, and plan as documented when this note was cosigned on this day. The plan was formulated in conjunction with pharmacy, ICU nurses, and respiratory therapist. I have evaluated all laboratory values and imaging studies for the past 24 hours. I have reviewed all the consults that have been ordered and are active for this patient.      Critical Care Time: 45 min.  I spent this time (excluding procedures) personally providing and directing critical care services at the bedside and on the critical care unit.      Migdalia Robertson MD   of Medicine  Department of Pulmonary, Allergy, Critical Care and Sleep Medicine   [facility]  Pager: [phone]

## 2020-03-27 NOTE — PLAN OF CARE
ICU End of Shift Summary. See flowsheets for vital signs and detailed assessment.    Changes this shift: Alert and oriented x4, forgetful (unable to recall what meds she takes at home or her last BM yesterday). Reports back and bilateral leg neuropathy, reports only take it when neuropathy is severe; Lyrica given. Reports intermittent nausea, Zofran IV given. Second bag of blood completed, hgb rechecked 4.7, another 2 units of blood ordered. Vit. K given. Plasma given. Continues NPO. Rectal exam completed at bedside.     Plan:  UA collection needed. Patient requires another 2 units of PRBC. Continued fluids till midnight then discontinue. Continue with POC.

## 2020-03-27 NOTE — ED NOTES
[facility], [place]   ED Nurse to Floor Handoff     Yenifer Maher is a 40 year old female who speaks English and lives with a spouse,  in a home  They arrived in the ED by ambulance from clinic    ED Chief Complaint: Melena    ED Dx;   Final diagnoses:   Gastrointestinal hemorrhage, unspecified gastrointestinal hemorrhage type   Anemia due to blood loss, acute   Cirrhosis of liver with ascites, unspecified hepatic cirrhosis type (H)         Needed?: No    Allergies:   Allergies   Allergen Reactions     Bengay Pain Relief [Menthol] Other (See Comments)     Skin turns red and feels extremely hot     Cats      Chocolate Dermatitis     Dicyclomine Other (See Comments)     Severe sedation     Dust Mites      Derm, resp     No Clinical Screening - See Comments      GI upset     Phenobarbital      Pollen Extract      Derm, resp.    .  Past Medical Hx:   Past Medical History:   Diagnosis Date     Alcohol abuse      Alcoholic cirrhosis (H)      Alcoholic peripheral neuropathy (H)      Coagulopathy (H)      Gastritis      History of Clostridium difficile colitis     unknown date     Impairment of cognitive function     MOCA 2/2019 22/30     Leukocytosis 02/2019    persistent leukocytosis across 2/2019 hospitalization without evidence of source across multiple diagnostics including LP, BCx, UCx      Lupus anticoagulant positive      Macrocytic anemia      Moderate protein-calorie malnutrition (H)      Paroxysmal A-fib (H) 02/2019    not on chronic anticoagulation     Peptic ulcer disease      Positive SMILEY (antinuclear antibody)      Subclinical hypothyroidism 02/2019    normal T3, T4     Tobacco dependence     0.5 PPD      Baseline Mental status: other pt has some memory issues r/t liver failure, disoriented by time  Current Mental Status changes: at basesline    Infection present or suspected this encounter: yes other GI bleed with possible infection  Sepsis suspected: Yes  Isolation  type: Droplet, Contact     Activity level - Baseline/Home:  Walks with a walker  Activity Level - Current:   Stand with assist x2    Bariatric equipment needed?: No    In the ED these meds were given:   Medications   0.9% sodium chloride BOLUS (0 mLs Intravenous Stopped 3/27/20 1225)     Followed by   sodium chloride 0.9% infusion (has no administration in time range)   octreotide (sandoSTATIN) 1,250 mcg in sodium chloride 0.9 % 250 mL (has no administration in time range)   pantoprazole (PROTONIX) 80 mg in sodium chloride 0.9 % 100 mL intermittent infusion (has no administration in time range)   pantoprazole (PROTONIX) 80 mg in sodium chloride 0.9 % 100 mL infusion (has no administration in time range)   cefTRIAXone (ROCEPHIN) 1 g vial to attach to  mL bag for ADULTS or NS 50 mL bag for PEDS (1 g Intravenous New Bag 3/27/20 1302)   octreotide (sandoSTATIN) injection 50 mcg (50 mcg Intravenous Given 3/27/20 1257)       Drips running?  Yes    Home pump  No    Current LDAs  Peripheral IV 03/27/20 Left Wrist (Active)   Site Assessment [facility] 03/27/20 1132   Line Status Saline locked 03/27/20 1132   Number of days: 0       Peripheral IV 03/27/20 Left Lower forearm (Active)   Site Assessment [facility] 03/27/20 1151   Line Status Infusing 03/27/20 1151   Number of days: 0       Wound 12/12/18 Anterior;Right;Inner;Mid Thigh Ulceration (Active)   Number of days: 471       Wound 12/12/18 Anterior;Left;Mid;Inner Thigh Ulceration (Active)   Number of days: 471       Labs results:   Labs Ordered and Resulted from Time of ED Arrival Up to the Time of Departure from the ED   CBC WITH PLATELETS DIFFERENTIAL - Abnormal; Notable for the following components:       Result Value    RBC Count 1.11 (*)     Hemoglobin 3.1 (*)     Hematocrit 10.4 (*)     MCHC 29.8 (*)     RDW 18.1 (*)     Platelet Count 130 (*)     Nucleated RBCs 2 (*)     Absolute Neutrophil 8.9 (*)     Absolute Lymphocytes 0.6 (*)     All other components within normal  limits   COMPREHENSIVE METABOLIC PANEL - Abnormal; Notable for the following components:    Sodium 128 (*)     Glucose 126 (*)     Bilirubin Total 1.6 (*)     Albumin 2.3 (*)     Protein Total 6.5 (*)      (*)     All other components within normal limits   LACTIC ACID WHOLE BLOOD - Abnormal; Notable for the following components:    Lactic Acid 3.7 (*)     All other components within normal limits   AMMONIA - Abnormal; Notable for the following components:    Ammonia <10 (*)     All other components within normal limits   INR - Abnormal; Notable for the following components:    INR 1.59 (*)     All other components within normal limits   PARTIAL THROMBOPLASTIN TIME - Abnormal; Notable for the following components:    PTT 39 (*)     All other components within normal limits   ALCOHOL ETHYL   BLOOD GAS VENOUS   PERIPHERAL IV CATHETER   IV ACCESS   NURSING DRAW AND HOLD   ABO/RH TYPE AND SCREEN   RED BLOOD CELL PREPARE ORDER UNIT   BLOOD COMPONENT   PLASMA PREPARE ORDER UNIT   COVID-19 VIRUS (CORONAVIRUS) BY PCR   COVID-19 VIRUS (CORONAVIRUS) BY PCR       Imaging Studies:   Recent Results (from the past 24 hour(s))   XR Chest Port 1 View    Narrative    EXAM: XR CHEST PORT 1 VW  3/27/2020 12:51 PM    HISTORY: fever, cough, possible covid.  GI bleed.    COMPARISON: 8/13/2019.     TECHNIQUE: Frontal semiupright view of the chest.    FINDINGS: No focal airspace opacity, pneumothorax, or pleural  effusion. Cardiomediastinal silhouette is within normal limits. Upper  abdomen is unremarkable. No acute osseous abnormality.      Impression    IMPRESSION: No suspicious radiographic opacities    I have personally reviewed the examination and initial interpretation  and I agree with the findings.    KYLAH LIRIANO MD       Recent vital signs:   /68   Pulse 120   Temp 99.1  F (37.3  C) (Oral)   Resp 18   Wt 68 kg (150 lb)   SpO2 (!) 89%   BMI 23.49 kg/m      [place] Coma Scale Score: 15 (03/27/20 1306)        Cardiac Rhythm: Tachycardia  Pt needs tele? Yes  Skin/wound Issues: bruising noted on left arm and right torso     Code Status: Full Code    Pain control: fair    Nausea control: fair    Abnormal labs/tests/findings requiring intervention: hgb 3.1 and lactic acid of 3.7    Family present during ED course? No   Family Comments/Social Situation comments: lives at home with fiance    Tasks needing completion: blood transfusions, recheck hgb    Ayse Begum RN  [phone] [facility]

## 2020-03-27 NOTE — PROGRESS NOTES
"  [facility]    Hepatology Consult    Requesting provider: CRESCENCIO Reaves    Consult requested for melena      HPI:  40 year old female w/ hx of EtOH cirrhosis, lupus anticoagulant (not on anticoagulation due to recurrent bleeding), who was brought in by ambulane from clinic w/ complaint of \"flu like symptoms\"  Had 2 syncopal episodes this week and has felt febrile at home. Also with melena.   She denies known COVID-19 exposure, but was told that she likely has COVID from triage nurse line.      ED:   Febrile to 100.5, Tachycardic, HR's 120's at rest and 140's when standing, orthostatic. Last /63. Satting well on room air, no hypoxia.   WBC 10.2, Hgb 3.1, MCV 94, last baseline from 2019 in 6-7's range, Plt 130  Na 128, Cr 0.56  TBili 1.6, TP 6.5, Alb 2.3, ALT 46, , Alkphos 121  Lactate 3.7, NH4 < 10, INR 1.59  Interventions include: IV PPI drip, IV octreotide drip, Ceftriaxone   1L NS, 2U pRBC's, 1U FFP     Past Admissions:  12/23/19 for EtOH abuse and hematemesis w/ EGD on 12/24 showing non-bleeding varices that were banded x 4.  Hematemesis and bleeding were felt to be related to prior Right upper molar extraction, which resolved after packing.      8/25/19: Admitted to MICU w/ Hgb of 5.1. EGD showed large recently bleeding EV  And clotted blood in fundus and concern for post-banding ulcer.     8/9/19: Admitted for hematemesis and hgb of 5.2. Found on EGD to have multiple bleeding Grade 3 EV s/p banding. Due to a history of antiphospholipid, she had been on Lovenox and aspirin.     Phone Interview:  Last bowel movement yesterday. Black stools started 5-7 days ago.   Started feeling feverish 3 days ago. One time there was a drop of bright red blood in the toilet bowl and on the toilet paper. She states she vomited one time, but she doesn't know what color it was, because she vomited into a black pot. She denies retching or heaving. She endorses reflux; she does not remember " "if she takes a PPI. No trouble swallowing. Endorses diarrhea. Endorses intermittent abdominal time; she thinks it is related to gas, because most of the time the abdominal pain is improved after she passes gas.       She is currently living with fijoanne. On disability for calciphylaxis and neuropathy.  She denies drinking alcohol today. Her last drink was last night, a Cranberry Vodka \"the little single serve airport bottles.\" Couple of glasses of wine the night before. 4 glasses of wine before that.She said she doesn't keep track. Her  drinks beer.   She smokes 5 cigarettes/day. No marijuana, meth, cocaine. No tylenol, herbal supplements.   No NSAIDS, strictly.   No recent antibiotics or steroids.     Medical hx Surgical hx   Past Medical History:   Diagnosis Date     Alcohol abuse      Alcoholic cirrhosis (H)      Alcoholic peripheral neuropathy (H)      Coagulopathy (H)      Gastritis      History of Clostridium difficile colitis     unknown date     Impairment of cognitive function     MOCA 2/2019 22/30     Leukocytosis 02/2019    persistent leukocytosis across 2/2019 hospitalization without evidence of source across multiple diagnostics including LP, BCx, UCx      Lupus anticoagulant positive      Macrocytic anemia      Moderate protein-calorie malnutrition (H)      Paroxysmal A-fib (H) 02/2019    not on chronic anticoagulation     Peptic ulcer disease      Positive SMILEY (antinuclear antibody)      Subclinical hypothyroidism 02/2019    normal T3, T4     Tobacco dependence     0.5 PPD      Past Surgical History:   Procedure Laterality Date     ESOPHAGOSCOPY, GASTROSCOPY, DUODENOSCOPY (EGD), COMBINED N/A 8/9/2019    Procedure: ESOPHAGOGASTRODUODENOSCOPY (EGD);  Surgeon: Sowmya Ibanez MD;  Location:  GI     ESOPHAGOSCOPY, GASTROSCOPY, DUODENOSCOPY (EGD), COMBINED N/A 8/26/2019    Procedure: ESOPHAGOGASTRODUODENOSCOPY (EGD);  Surgeon: Leventhal, Thomas Michael, MD;  Location:  GI     UPPER " GI ENDOSCOPY  8/9/2019               Medications  Current Facility-Administered Medications   Medication Dose Route Frequency     [Held by provider] carvedilol  6.25 mg Oral BID w/meals     [START ON 3/28/2020] cefTRIAXone  1 g Intravenous Q24H     cloNIDine  0.1 mg Oral Q8H     [START ON 3/30/2020] folic acid  1 mg Oral Daily     folic acid  1 mg Intravenous Once     folic acid  1 mg Intravenous Once     [Held by provider] furosemide  20 mg Oral Daily     lactulose  10 g Oral BID     magnesium oxide  400 mg Oral Daily     multivitamin w/minerals  1 tablet Oral Daily     nortriptyline  10 mg Oral At Bedtime     phytonadione  10 mg Intravenous Daily     pregabalin  50 mg Oral TID     prenatal multivitamin w/iron  1 tablet Oral Daily     [Held by provider] spironolactone  50 mg Oral Daily     thiamine  200 mg Intravenous TID     vitamin  1 tablet Oral Daily     [START ON 4/3/2020] thiamine  100 mg Oral Daily       Allergies  Allergies   Allergen Reactions     Bengay Pain Relief [Menthol] Other (See Comments)     Skin turns red and feels extremely hot     Cats      Chocolate Dermatitis     Dicyclomine Other (See Comments)     Severe sedation     Dust Mites      Derm, resp     No Clinical Screening - See Comments      GI upset     Phenobarbital      Pollen Extract      Derm, resp.        Family hx Social hx   No family history on file.   Social History     Tobacco Use     Smoking status: Current Some Day Smoker     Types: Cigarettes     Smokeless tobacco: Never Used     Tobacco comment: 6-8 per day   Substance Use Topics     Alcohol use: Yes     Alcohol/week: 2.0 standard drinks     Types: 2 Glasses of wine per week     Frequency: 2-3 times a week     Drug use: Not on file          Review of systems  A 10-point review of systems was negative.      Examination  /63   Pulse 109   Temp 100.4  F (38  C) (Oral)   Resp 18   Wt 64.4 kg (141 lb 15.6 oz)   SpO2 92%   BMI 22.24 kg/m      Intake/Output Summary (Last 24  hours) at 3/27/2020 1625  Last data filed at 3/27/2020 1500  Gross per 24 hour   Intake 5.67 ml   Output --   Net 5.67 ml       Physical Exam Deferred in setting of COVID-19 rule out.   Per MICU team: Rectal exam with BROWN stool in rectal vault     Laboratory  Lab Results   Component Value Date     03/27/2020    POTASSIUM 3.4 03/27/2020    CHLORIDE 96 03/27/2020    CO2 24 03/27/2020    BUN 17 03/27/2020    CR 0.56 03/27/2020       Lab Results   Component Value Date    BILITOTAL 1.6 03/27/2020    ALT 46 03/27/2020     03/27/2020    ALKPHOS 121 03/27/2020       Lab Results   Component Value Date    ALBUMIN 2.3 03/27/2020    PROTTOTAL 6.5 03/27/2020        Lab Results   Component Value Date    WBC 10.2 03/27/2020    HGB 3.1 03/27/2020    MCV 94 03/27/2020     03/27/2020       Lab Results   Component Value Date    INR 1.59 03/27/2020     MELD-Na score: 21 at 3/27/2020 11:35 AM  MELD score: 13 at 3/27/2020 11:35 AM  Calculated from:  Serum Creatinine: 0.56 mg/dL (Rounded to 1 mg/dL) at 3/27/2020 11:35 AM  Serum Sodium: 128 mmol/L at 3/27/2020 11:35 AM  Total Bilirubin: 1.6 mg/dL at 3/27/2020 11:35 AM  INR(ratio): 1.59 at 3/27/2020 11:35 AM  Age: 40 years      Radiology/Procedures    12/24/19 EGD  Findings:        Four columns of non-bleeding grade II varices were found in the middle        third of the esophagus and in the lower third of the esophagus,.        Stigmata of recent bleeding were evident and red carmela signs were        present. Stigmata of prior treatment were evident. Four bands were        successfully placed with complete eradication, resulting in deflation of        varices. There was no bleeding during and at the end of the procedure.        Moderate portal hypertensive gastropathy was found in the stomach.        The examined duodenum was normal.     8/25/19 EGD  Findings:        Non-bleeding large (> 5 mm) varices were found in the middle third of        the esophagus and in the  lower third of the esophagus, 35 cm from the        incisors. Stigmata of recent bleeding were evident and no red carmela signs        were present. Stigmata of prior treatment were evident. Scarring from        prior treatment was visible. There were 4 clean-based ulcers without        stigmata noted in lower esophagus.        A small hiatal hernia was present.        Mild portal hypertensive gastropathy was found in the entire examined        stomach.        Clotted blood was found in the gastric fundus.        The examined duodenum was normal.        Assessment     Patient Summary:  Yenifer Maher is a 39yo F w/ EtOH cirrhosis c/b ascites, EV, coagulopathy who is admitted for fever, dizziness, report of at least 7d of melena, shortness of breath, found to be tachycardic and orthostatic with hemoglobin of 3.1. She has ongoing active alcohol and tobacco abuse. Rectal exam with brown stool in vault.     #Report of melena:    Hgb 3.1 on admission down from last available baseline 7-8 (in 2019). Suspect that this is likely more chronic in nature given her current hemodynamic stability, last BM yesterday, and brown stools on rectal exam today. DDX includes PHG, esophagitis, EV, PUD, post-banding ulcer, Dieulafoy, AVM, right sided colonic source.   Timing of EGD to be determined given several factors at play including impending EtOH withdrawal, likely need for intubation given history of high sedation needs, as well as current COVID-19 rule out.     #Decompensated alcoholic cirrhosis  Follows w/ Dr. Hernández, last seen 10/8/19   Etiology: Alcoholic steatohepatitis with cirrhosis by biopsy (9/13/19)  Hepatic encephalopathy: None currently. Has history of Type C episodic HE  Ascites: To be evaluated with US (has history of ascites)  TIPSS: Not done   Esophageal/Gastric varices: Last EGD 12/24/19 with recently bleeding Grade 2 EV  Hepatocellular carcinoma: Last Abd US on 12/24/19 w/ no evidence of lesions. Prior abdominal  ultrasounds have shown a subcapsular right lobe subcentimeter lesion (initially seen on 6/21/19) with recommendations for nonemergnt MRI.  Transplant: Not presently due to active alcohol use (last drink 3/27/20)  Coagulopathy: INR 1.59   Thrombocytopenia:  Plts 130    #History of Lupus anticoagulant:  Was followed by Dr. Jeffery of hematology after testing positive in 2018. Pt had initially been on Lovenox, however, due to recurrent bleeding was advised to discontinue all anticoagulation.     Recommendations  --Ensure 2 large bore PIV (18g or larger)  --Continue resuscitation and excellent supportive cares per MICU team  --Trend hemoglobin; restrictive transfusion strategy for Hgb < 7  --IV Octreotide bolus and infusion  --IV PPI bolus and infusion  --Ceftriaxone x 7d course  --NPO (ice chips ok)  --Vitamin K 10mg IV x 3 days   --Abdominal Ultrasound with dopplers  --Diagnostic paracentesis w/ cell count, gram stain and culture, protein and albumin, as well as cytology. Give 6-8g of albumin per Liter if >5 L is removed.   --Monitor transaminases, bilirubin, INR (daily MELD labs)  --Hold home diuretics in setting of GI bleed    Hepatology will continue to follow.    Patient case staffed with Dr. Ibanez.     Renetta Kruse MD, [facility]  Gastroenterology Fellow  Pager [phone]    ATTENDING NOTE, GASTROENTEROLOGY/HEPATOLOGY    I  discussed this patient with the fellow and participated in the decision making. I agree with the fellow's note. Sowmya Ibanez MD

## 2020-03-27 NOTE — ED PROVIDER NOTES
ED Provider Note  [facility]      History     Chief Complaint   Patient presents with     Melena     HPI  Yenifer Maher is a 40 year old female with history notable for upper GI bleed, alcoholic cirrhosis, coagulopathy, paroxysmal atrial fibrillation who presents to the ED for shortness of breath, fever, lightheadedness and black stools.  The patient began having a low-grade fever and sore throat approximately 1 week ago although she is not entirely sure.  She had spoken with her clinic doctor and was clinically diagnosed with likely coven.  She is not feeling short of breath and not having any chest pain or significant productive cough today.    She states that she began having diarrhea with black stool 5 days ago.  She did vomit a few days ago but does not remember what it looked like.  She is feeling very lightheaded and was seen in the clinic today and had a near syncopal episode.  After that she was sent here given her elevated heart rate and pallor.  She denies any abdominal pain.  She has not been taking her lactulose as she already has the loose stools.        Past Medical History  Past Medical History:   Diagnosis Date     Alcohol abuse      Alcoholic cirrhosis (H)      Alcoholic peripheral neuropathy (H)      Coagulopathy (H)      Gastritis      History of Clostridium difficile colitis     unknown date     Impairment of cognitive function     MOCA 2/2019 22/30     Leukocytosis 02/2019    persistent leukocytosis across 2/2019 hospitalization without evidence of source across multiple diagnostics including LP, BCx, UCx      Lupus anticoagulant positive      Macrocytic anemia      Moderate protein-calorie malnutrition (H)      Paroxysmal A-fib (H) 02/2019    not on chronic anticoagulation     Peptic ulcer disease      Positive SMILEY (antinuclear antibody)      Subclinical hypothyroidism 02/2019    normal T3, T4     Tobacco dependence     0.5 PPD     Past Surgical History:    Procedure Laterality Date     ESOPHAGOSCOPY, GASTROSCOPY, DUODENOSCOPY (EGD), COMBINED N/A 8/9/2019    Procedure: ESOPHAGOGASTRODUODENOSCOPY (EGD);  Surgeon: Sowmya Ibanez MD;  Location:  GI     ESOPHAGOSCOPY, GASTROSCOPY, DUODENOSCOPY (EGD), COMBINED N/A 8/26/2019    Procedure: ESOPHAGOGASTRODUODENOSCOPY (EGD);  Surgeon: Leventhal, Thomas Michael, MD;  Location:  GI     UPPER GI ENDOSCOPY  8/9/2019          calcium carbonate (TUMS) 500 MG chewable tablet  carvedilol (COREG) 6.25 MG tablet  fexofenadine (ALLEGRA) 180 MG tablet  folic acid (FOLVITE) 1 MG tablet  furosemide (LASIX) 20 MG tablet  lactulose (CHRONULAC) 10 GM/15ML solution  magnesium oxide (MAG-OX) 400 (240 Mg) MG tablet  multivitamin, therapeutic (THERA-VIT) TABS tablet  nortriptyline (PAMELOR) 10 MG capsule  omeprazole (PRILOSEC) 40 MG DR capsule  polyethylene glycol (MIRALAX/GLYCOLAX) packet  pregabalin (LYRICA) 50 MG capsule  Prenatal Vit-Fe Fumarate-FA (PRENATAL MULTIVITAMIN W/IRON) 27-0.8 MG tablet  sodium-potassium bicarbonate (FRIDA-SELTZER GOLD) TBEF solu-tab  spironolactone (ALDACTONE) 50 MG tablet  vitamin (B COMPLEX-C) tablet  vitamin B1 (THIAMINE) 100 MG tablet      Allergies   Allergen Reactions     Bengay Pain Relief [Menthol] Other (See Comments)     Skin turns red and feels extremely hot     Cats      Chocolate Dermatitis     Dicyclomine Other (See Comments)     Severe sedation     Dust Mites      Derm, resp     No Clinical Screening - See Comments      GI upset     Phenobarbital      Pollen Extract      Derm, resp.      Past medical history, past surgical history, medications, and allergies were reviewed with the patient. Additional pertinent items: None    Family History  No family history on file.  Family history was reviewed with the patient. Additional pertinent items: None    Social History  Social History     Tobacco Use     Smoking status: Current Some Day Smoker     Types: Cigarettes     Smokeless tobacco:  Never Used     Tobacco comment: 6-8 per day   Substance Use Topics     Alcohol use: Yes     Alcohol/week: 2.0 standard drinks     Types: 2 Glasses of wine per week     Frequency: 2-3 times a week     Drug use: Not on file      Social history was reviewed with the patient. Additional pertinent items: None    Review of Systems  A complete review of systems was performed with pertinent positives and negatives noted in the HPI, and all other systems negative.    Physical Exam   BP: 129/61  Pulse: 121  Heart Rate: 121  Temp: 99  F (37.2  C)  Weight: 68 kg (150 lb)  SpO2: 95 %  Physical Exam  Vitals signs and nursing note reviewed.   Constitutional:       General: She is not in acute distress.  HENT:      Head: Atraumatic.      Mouth/Throat:      Mouth: Mucous membranes are moist.   Eyes:      General: No scleral icterus.     Extraocular Movements: Extraocular movements intact.   Neck:      Musculoskeletal: Neck supple.   Cardiovascular:      Rate and Rhythm: Normal rate and regular rhythm.      Heart sounds: Normal heart sounds.   Pulmonary:      Effort: Pulmonary effort is normal.      Breath sounds: Normal breath sounds. No wheezing, rhonchi or rales.   Abdominal:      Palpations: Abdomen is soft. There is fluid wave ( Mild).      Tenderness: There is no abdominal tenderness. There is no guarding or rebound.   Musculoskeletal:      Right lower leg: No edema.      Left lower leg: No edema.   Skin:     General: Skin is warm.      Coloration: Skin is not pale.   Neurological:      General: No focal deficit present.      Mental Status: She is alert and oriented to person, place, and time.         ED Course      Procedures                Critical Care time was 50 minutes for this patient excluding procedures.     The Lactic acid level is elevated due to GI bleed with severe anemia, at this time there is no sign of severe sepsis or septic shock.     Results for orders placed or performed during the hospital encounter of  03/27/20   CBC with platelets differential     Status: Abnormal (In process)   Result Value Ref Range    WBC 10.2 4.0 - 11.0 10e9/L    RBC Count 1.11 (L) 3.8 - 5.2 10e12/L    Hemoglobin 3.1 (LL) 11.7 - 15.7 g/dL    Hematocrit 10.4 (L) 35.0 - 47.0 %    MCV 94 78 - 100 fl    MCH 27.9 26.5 - 33.0 pg    MCHC 29.8 (L) 31.5 - 36.5 g/dL    RDW 18.1 (H) 10.0 - 15.0 %    Platelet Count 130 (L) 150 - 450 10e9/L    Diff Method PENDING    Comprehensive metabolic panel     Status: Abnormal   Result Value Ref Range    Sodium 128 (L) 133 - 144 mmol/L    Potassium 3.4 3.4 - 5.3 mmol/L    Chloride 96 94 - 109 mmol/L    Carbon Dioxide 24 20 - 32 mmol/L    Anion Gap 8 3 - 14 mmol/L    Glucose 126 (H) 70 - 99 mg/dL    Urea Nitrogen 17 7 - 30 mg/dL    Creatinine 0.56 0.52 - 1.04 mg/dL    GFR Estimate >90 >60 mL/min/[1.73_m2]    GFR Estimate If Black >90 >60 mL/min/[1.73_m2]    Calcium 9.0 8.5 - 10.1 mg/dL    Bilirubin Total 1.6 (H) 0.2 - 1.3 mg/dL    Albumin 2.3 (L) 3.4 - 5.0 g/dL    Protein Total 6.5 (L) 6.8 - 8.8 g/dL    Alkaline Phosphatase 121 40 - 150 U/L    ALT 46 0 - 50 U/L     (H) 0 - 45 U/L   Lactic acid whole blood     Status: Abnormal   Result Value Ref Range    Lactic Acid 3.7 (H) 0.7 - 2.0 mmol/L   Ammonia     Status: Abnormal   Result Value Ref Range    Ammonia <10 (L) 10 - 50 umol/L   INR     Status: Abnormal   Result Value Ref Range    INR 1.59 (H) 0.86 - 1.14   Partial thromboplastin time     Status: Abnormal   Result Value Ref Range    PTT 39 (H) 22 - 37 sec   ABO/Rh type and screen     Status: None (In process)   Result Value Ref Range    Units Ordered 1     ABO PENDING     Antibody Screen PENDING     Test Valid Only At          [facility]    Specimen Expires 03/30/2020     Crossmatch Red Blood Cells    Blood component     Status: None   Result Value Ref Range    Unit Number [id number]     Blood Component Type Red Blood Cells Leukocyte Reduced     Division Number  00     Status of Unit Released to care unit 03/27/2020 1224     Blood Product Code [id number]     Unit Status ISS      Medications   0.9% sodium chloride BOLUS (1,000 mLs Intravenous New Bag 3/27/20 1145)     Followed by   sodium chloride 0.9% infusion (has no administration in time range)   octreotide (sandoSTATIN) injection 50 mcg (has no administration in time range)   octreotide (sandoSTATIN) 1,250 mcg in sodium chloride 0.9 % 250 mL (has no administration in time range)   pantoprazole (PROTONIX) 80 mg in sodium chloride 0.9 % 100 mL intermittent infusion (has no administration in time range)   pantoprazole (PROTONIX) 80 mg in sodium chloride 0.9 % 100 mL infusion (has no administration in time range)   cefTRIAXone (ROCEPHIN) 1 g vial to attach to  mL bag for ADULTS or NS 50 mL bag for PEDS (has no administration in time range)        Assessments & Plan (with Medical Decision Making)   The patient has symptoms of the coronavirus infection with cough and shortness of breath but has no hypoxia.  She also has symptoms of a GI bleed.  She has not had any bleeding symptoms since yesterday but has a hemoglobin of 3.0.  Her INR is elevated at 1.59 and she is coagulopathic secondary to her cirrhosis.  She has a history of GI bleeds in the past but cannot remember when.  Given her underlying liver disease she was crossed for 2 units, the first unit given as uncrossed matched given her tachycardia.  She has gotten 1 L normal saline and she also appears mildly dehydrated.  She will get a unit of FFP to help preserve blood products.  She was given antibiotics, octreotide and PPI with infusions.  The patient has a stable blood pressure but remains tachycardic.  Vital signs were monitored closely.  I spoke with the ICU attending who accepted the patient.    I have reviewed the nursing notes. I have reviewed the findings, diagnosis, plan and need for follow up with the patient.    New Prescriptions    No medications on  file       Final diagnoses:   Gastrointestinal hemorrhage, unspecified gastrointestinal hemorrhage type   Anemia due to blood loss, acute   Cirrhosis of liver with ascites, unspecified hepatic cirrhosis type (H)       --     Emergency Medicine   [facility], [place], EMERGENCY DEPARTMENT  3/27/2020     Elgin Block MD  03/27/20 123       Elgin Block MD  03/27/20 0167

## 2020-03-27 NOTE — ED NOTES
Pt arrived with reports of 4 days of melena, Lactic acid 3.7, Hgb 3.0. Pt tachycardic hr 120s at rest and 140s when standing. PIV x2 placed, antibiotics and medications for GI bleed started. PRBC x 2 units administered. PT has a fever and generalized weakness therefore r/unit(s) COVID-19. 1:1 RN cares due to hemodynamic instability.

## 2020-03-28 ENCOUNTER — APPOINTMENT (OUTPATIENT)
Dept: GENERAL RADIOLOGY | Facility: CLINIC | Age: 40
DRG: 432 | End: 2020-03-28
Attending: NURSE PRACTITIONER

## 2020-03-28 LAB
ALBUMIN SERPL-MCNC: 2.2 G/DL (ref 3.4–5)
ALP SERPL-CCNC: 99 U/L (ref 40–150)
ALT SERPL W P-5'-P-CCNC: 52 U/L (ref 0–50)
ANION GAP SERPL CALCULATED.3IONS-SCNC: 6 MMOL/L (ref 3–14)
AST SERPL W P-5'-P-CCNC: 246 U/L (ref 0–45)
BASOPHILS # BLD AUTO: 0 10E9/L (ref 0–0.2)
BASOPHILS NFR BLD AUTO: 0.4 %
BILIRUB SERPL-MCNC: 3.1 MG/DL (ref 0.2–1.3)
BUN SERPL-MCNC: 16 MG/DL (ref 7–30)
CALCIUM SERPL-MCNC: 7.8 MG/DL (ref 8.5–10.1)
CHLORIDE SERPL-SCNC: 104 MMOL/L (ref 94–109)
CO2 SERPL-SCNC: 24 MMOL/L (ref 20–32)
CREAT SERPL-MCNC: 0.7 MG/DL (ref 0.52–1.04)
DIFFERENTIAL METHOD BLD: ABNORMAL
EOSINOPHIL # BLD AUTO: 0.1 10E9/L (ref 0–0.7)
EOSINOPHIL NFR BLD AUTO: 1.3 %
ERYTHROCYTE [DISTWIDTH] IN BLOOD BY AUTOMATED COUNT: 17.9 % (ref 10–15)
ERYTHROCYTE [DISTWIDTH] IN BLOOD BY AUTOMATED COUNT: 18.1 % (ref 10–15)
FLUAV+FLUBV RNA SPEC QL NAA+PROBE: NEGATIVE
FLUAV+FLUBV RNA SPEC QL NAA+PROBE: NEGATIVE
GFR SERPL CREATININE-BSD FRML MDRD: >90 ML/MIN/{1.73_M2}
GLUCOSE BLDC GLUCOMTR-MCNC: 92 MG/DL (ref 70–99)
GLUCOSE SERPL-MCNC: 88 MG/DL (ref 70–99)
HCT VFR BLD AUTO: 21.7 % (ref 35–47)
HCT VFR BLD AUTO: 21.8 % (ref 35–47)
HGB BLD-MCNC: 6.8 G/DL (ref 11.7–15.7)
HGB BLD-MCNC: 7.1 G/DL (ref 11.7–15.7)
IMM GRANULOCYTES # BLD: 0.1 10E9/L (ref 0–0.4)
IMM GRANULOCYTES NFR BLD: 0.7 %
INR PPP: 1.49 (ref 0.86–1.14)
LYMPHOCYTES # BLD AUTO: 0.9 10E9/L (ref 0.8–5.3)
LYMPHOCYTES NFR BLD AUTO: 12.7 %
MAGNESIUM SERPL-MCNC: 2.4 MG/DL (ref 1.6–2.3)
MCH RBC QN AUTO: 27.5 PG (ref 26.5–33)
MCH RBC QN AUTO: 27.9 PG (ref 26.5–33)
MCHC RBC AUTO-ENTMCNC: 31.2 G/DL (ref 31.5–36.5)
MCHC RBC AUTO-ENTMCNC: 31.3 G/DL (ref 31.5–36.5)
MCV RBC AUTO: 88 FL (ref 78–100)
MCV RBC AUTO: 89 FL (ref 78–100)
MONOCYTES # BLD AUTO: 0.8 10E9/L (ref 0–1.3)
MONOCYTES NFR BLD AUTO: 11.9 %
NEUTROPHILS # BLD AUTO: 5.1 10E9/L (ref 1.6–8.3)
NEUTROPHILS NFR BLD AUTO: 73 %
NRBC # BLD AUTO: 0.1 10*3/UL
NRBC BLD AUTO-RTO: 1 /100
PHOSPHATE SERPL-MCNC: 3.9 MG/DL (ref 2.5–4.5)
PLATELET # BLD AUTO: 105 10E9/L (ref 150–450)
PLATELET # BLD AUTO: 107 10E9/L (ref 150–450)
POTASSIUM SERPL-SCNC: 3.5 MMOL/L (ref 3.4–5.3)
PROT SERPL-MCNC: 6.1 G/DL (ref 6.8–8.8)
RBC # BLD AUTO: 2.44 10E12/L (ref 3.8–5.2)
RBC # BLD AUTO: 2.47 10E12/L (ref 3.8–5.2)
RSV RNA SPEC NAA+PROBE: NEGATIVE
SARS-COV-2 RNA SPEC QL NAA+PROBE: NOT DETECTED
SODIUM SERPL-SCNC: 134 MMOL/L (ref 133–144)
SPECIMEN SOURCE: NORMAL
SPECIMEN SOURCE: NORMAL
WBC # BLD AUTO: 7 10E9/L (ref 4–11)
WBC # BLD AUTO: 7.6 10E9/L (ref 4–11)

## 2020-03-28 PROCEDURE — 71045 X-RAY EXAM CHEST 1 VIEW: CPT

## 2020-03-28 PROCEDURE — 36415 COLL VENOUS BLD VENIPUNCTURE: CPT | Performed by: INTERNAL MEDICINE

## 2020-03-28 PROCEDURE — 25000128 H RX IP 250 OP 636: Performed by: PHYSICIAN ASSISTANT

## 2020-03-28 PROCEDURE — 84100 ASSAY OF PHOSPHORUS: CPT | Performed by: PHYSICIAN ASSISTANT

## 2020-03-28 PROCEDURE — 87581 M.PNEUMON DNA AMP PROBE: CPT | Performed by: NURSE PRACTITIONER

## 2020-03-28 PROCEDURE — 87486 CHLMYD PNEUM DNA AMP PROBE: CPT | Performed by: NURSE PRACTITIONER

## 2020-03-28 PROCEDURE — 25000132 ZZH RX MED GY IP 250 OP 250 PS 637: Performed by: STUDENT IN AN ORGANIZED HEALTH CARE EDUCATION/TRAINING PROGRAM

## 2020-03-28 PROCEDURE — 87631 RESP VIRUS 3-5 TARGETS: CPT | Performed by: NURSE PRACTITIONER

## 2020-03-28 PROCEDURE — 83735 ASSAY OF MAGNESIUM: CPT | Performed by: PHYSICIAN ASSISTANT

## 2020-03-28 PROCEDURE — 87633 RESP VIRUS 12-25 TARGETS: CPT | Performed by: NURSE PRACTITIONER

## 2020-03-28 PROCEDURE — 00000146 ZZHCL STATISTIC GLUCOSE BY METER IP

## 2020-03-28 PROCEDURE — 85610 PROTHROMBIN TIME: CPT | Performed by: PHYSICIAN ASSISTANT

## 2020-03-28 PROCEDURE — 25000132 ZZH RX MED GY IP 250 OP 250 PS 637: Performed by: PHYSICIAN ASSISTANT

## 2020-03-28 PROCEDURE — 85025 COMPLETE CBC W/AUTO DIFF WBC: CPT | Performed by: PHYSICIAN ASSISTANT

## 2020-03-28 PROCEDURE — C9113 INJ PANTOPRAZOLE SODIUM, VIA: HCPCS | Performed by: PHYSICIAN ASSISTANT

## 2020-03-28 PROCEDURE — 25800030 ZZH RX IP 258 OP 636: Performed by: PHYSICIAN ASSISTANT

## 2020-03-28 PROCEDURE — 85027 COMPLETE CBC AUTOMATED: CPT | Performed by: INTERNAL MEDICINE

## 2020-03-28 PROCEDURE — 80053 COMPREHEN METABOLIC PANEL: CPT | Performed by: PHYSICIAN ASSISTANT

## 2020-03-28 PROCEDURE — 99291 CRITICAL CARE FIRST HOUR: CPT | Mod: GC | Performed by: PHYSICIAN ASSISTANT

## 2020-03-28 PROCEDURE — 85018 HEMOGLOBIN: CPT | Performed by: STUDENT IN AN ORGANIZED HEALTH CARE EDUCATION/TRAINING PROGRAM

## 2020-03-28 PROCEDURE — 36415 COLL VENOUS BLD VENIPUNCTURE: CPT | Performed by: PHYSICIAN ASSISTANT

## 2020-03-28 PROCEDURE — 99207 ZZC NO CHARGE VISIT/PATIENT NOT SEEN: CPT | Performed by: PHYSICIAN ASSISTANT

## 2020-03-28 PROCEDURE — 36416 COLLJ CAPILLARY BLOOD SPEC: CPT | Performed by: STUDENT IN AN ORGANIZED HEALTH CARE EDUCATION/TRAINING PROGRAM

## 2020-03-28 PROCEDURE — 25000132 ZZH RX MED GY IP 250 OP 250 PS 637: Performed by: INTERNAL MEDICINE

## 2020-03-28 PROCEDURE — 12000001 ZZH R&B MED SURG/OB UMMC

## 2020-03-28 PROCEDURE — 36415 COLL VENOUS BLD VENIPUNCTURE: CPT | Performed by: STUDENT IN AN ORGANIZED HEALTH CARE EDUCATION/TRAINING PROGRAM

## 2020-03-28 RX ORDER — CALCIUM CARBONATE 500 MG/1
500 TABLET, CHEWABLE ORAL 3 TIMES DAILY PRN
Status: DISCONTINUED | OUTPATIENT
Start: 2020-03-28 | End: 2020-04-01 | Stop reason: HOSPADM

## 2020-03-28 RX ORDER — LANOLIN ALCOHOL/MO/W.PET/CERES
100 CREAM (GRAM) TOPICAL 3 TIMES DAILY
Status: DISCONTINUED | OUTPATIENT
Start: 2020-03-30 | End: 2020-03-30

## 2020-03-28 RX ORDER — HALOPERIDOL 5 MG/ML
2 INJECTION INTRAMUSCULAR EVERY 4 HOURS PRN
Status: DISCONTINUED | OUTPATIENT
Start: 2020-03-28 | End: 2020-03-29

## 2020-03-28 RX ADMIN — MAGNESIUM OXIDE 400 MG: 400 TABLET ORAL at 08:48

## 2020-03-28 RX ADMIN — OCTREOTIDE ACETATE 50 MCG/HR: 200 INJECTION, SOLUTION INTRAVENOUS; SUBCUTANEOUS at 16:08

## 2020-03-28 RX ADMIN — LACTULOSE 10 G: 20 SOLUTION ORAL at 08:48

## 2020-03-28 RX ADMIN — PRENATAL VIT W/ FE FUMARATE-FA TAB 27-0.8 MG 1 TABLET: 27-0.8 TAB at 08:50

## 2020-03-28 RX ADMIN — MULTIPLE VITAMINS W/ MINERALS TAB 1 TABLET: TAB at 08:48

## 2020-03-28 RX ADMIN — PHYTONADIONE 10 MG: 10 INJECTION, EMULSION INTRAMUSCULAR; INTRAVENOUS; SUBCUTANEOUS at 08:46

## 2020-03-28 RX ADMIN — NORTRIPTYLINE HYDROCHLORIDE 10 MG: 10 CAPSULE ORAL at 22:56

## 2020-03-28 RX ADMIN — Medication 1 TABLET: at 08:50

## 2020-03-28 RX ADMIN — PREGABALIN 50 MG: 50 CAPSULE ORAL at 21:45

## 2020-03-28 RX ADMIN — PANTOPRAZOLE SODIUM 8 MG/HR: 40 INJECTION, POWDER, FOR SOLUTION INTRAVENOUS at 18:55

## 2020-03-28 RX ADMIN — THIAMINE HYDROCHLORIDE 200 MG: 100 INJECTION, SOLUTION INTRAMUSCULAR; INTRAVENOUS at 21:45

## 2020-03-28 RX ADMIN — THIAMINE HYDROCHLORIDE 200 MG: 100 INJECTION, SOLUTION INTRAMUSCULAR; INTRAVENOUS at 09:33

## 2020-03-28 RX ADMIN — CALCIUM CARBONATE (ANTACID) CHEW TAB 500 MG 500 MG: 500 CHEW TAB at 14:28

## 2020-03-28 RX ADMIN — LACTULOSE 10 G: 20 SOLUTION ORAL at 21:46

## 2020-03-28 RX ADMIN — THIAMINE HYDROCHLORIDE 200 MG: 100 INJECTION, SOLUTION INTRAMUSCULAR; INTRAVENOUS at 14:14

## 2020-03-28 RX ADMIN — FOLIC ACID 1 MG: 1 TABLET ORAL at 08:48

## 2020-03-28 RX ADMIN — POTASSIUM CHLORIDE 20 MEQ: 750 TABLET, EXTENDED RELEASE ORAL at 04:39

## 2020-03-28 RX ADMIN — PANTOPRAZOLE SODIUM 8 MG/HR: 40 INJECTION, POWDER, FOR SOLUTION INTRAVENOUS at 08:51

## 2020-03-28 RX ADMIN — PREGABALIN 50 MG: 50 CAPSULE ORAL at 14:13

## 2020-03-28 RX ADMIN — PREGABALIN 50 MG: 50 CAPSULE ORAL at 08:48

## 2020-03-28 RX ADMIN — CEFTRIAXONE 1 G: 1 INJECTION, POWDER, FOR SOLUTION INTRAMUSCULAR; INTRAVENOUS at 12:22

## 2020-03-28 ASSESSMENT — MIFFLIN-ST. JEOR: SCORE: 1403.63

## 2020-03-28 ASSESSMENT — ACTIVITIES OF DAILY LIVING (ADL)
ADLS_ACUITY_SCORE: 19
ADLS_ACUITY_SCORE: 18
ADLS_ACUITY_SCORE: 19
ADLS_ACUITY_SCORE: 18
ADLS_ACUITY_SCORE: 19
ADLS_ACUITY_SCORE: 19

## 2020-03-28 ASSESSMENT — PAIN DESCRIPTION - DESCRIPTORS: DESCRIPTORS: ACHING

## 2020-03-28 NOTE — PROGRESS NOTES
Transferred to: 5B at 1830  Status at time of transfer: A&O x 4 in wheelchair. Yellow mask on. 2L NC.   Belongings: Sent in bags with patient  Chavez removed? (if no, why?): NA  Chart and medications: Sent with patient  Family notified: No. Patient to notify

## 2020-03-28 NOTE — PLAN OF CARE
ICU End of Shift Summary. See flowsheets for vital signs and detailed assessment.    Changes this shift: Alert & oriented x 4, vital stable on room air except soft BP, complain of lower back ache, decrease with repositioning, SR, T.max 99.6, NPO except ice chip/med, no BM this shift, no active sign of bleeding,2 units of blood given,no transfusion reaction occurs, hemoglobin recheck 7.1 and 6.8, MD notified about hgb 6.8, MD order another hgb recheck at 0700, UA sent ,see chart for result, CIWA score 2,1, mainly for mild tremors and intermittent nausea,  potassium 3.5, replace with potassium PO 20 mEq, octreotide and Protonix drip infusing.    Plan: Continue to monitor hemoglobin trend and transfuse <7, monitor for bleeding.    Problem: Adult Inpatient Plan of Care  Goal: Plan of Care Review  3/28/2020 0413 by Hans Harrison RN  Outcome: No Change  3/27/2020 1754 by Carmen Saleem RN  Outcome: No Change     Problem: Adjustment to Illness (Gastrointestinal Bleeding)  Goal: Optimal Coping with Acute Illness  3/28/2020 0413 by Hans Harrison RN  Outcome: No Change  3/27/2020 1754 by Carmen Saleem RN  Outcome: No Change     Problem: Bleeding (Gastrointestinal Bleeding)  Goal: Hemostasis  3/28/2020 0413 by Hans Harrison RN  Outcome: No Change  3/27/2020 1754 by Carmen Saleem RN  Outcome: No Change

## 2020-03-28 NOTE — PLAN OF CARE
At recommendation of the medical team, PT will be held while test pending for COVID-19. Will re-assess status daily

## 2020-03-28 NOTE — PLAN OF CARE
OT 4C: At recommendation of the medical team, OT will be held while test pending for COVID-19. Will re-assess status daily.

## 2020-03-28 NOTE — CONSULTS
"Brief GI Note  03/28/20    Note to resolve consult order - please see note dated 3/27/2020 \"Progress Note\" for formal consultation/recommendations.     Chart reviewed. No further bleeding documented and hemoglobin has appropriately responded to transfusion. Hemodynamically stable.     Remains PUI/COVID pending.     Recommend continue with supportive care per primary team and recommendations as outlined in initial consultative note. Clear liquid diet ok (no red/pink/purple). Holding off on EGD at this juncture - brown stool, responding hemoglobin, stable hemodynamics and pending COVID r/o.     Please call with HD significant bleeding, hgb not responding to transfusion.     Hepatology will continue to follow along with you.     Judit Pineda MD  GI Fellow, PGY-6  Pager     ATTENDING NOTE, GASTROENTEROLOGY/HEPATOLOGY    I discussed this patient with the fellow and participated in the decision making. I reviewed the chart. I agree with the fellow's note. I did not see the patient, so as to minimize person-to-person exposure during the COVID pandemic. Sowmya Ibanez MD      "

## 2020-03-28 NOTE — PROGRESS NOTES
[facility] MICU STAFF BRIEF ICU NOTE    Hospital day #: 2  Ventilator day #: n/a    Assessment:  Yenifer Maher is a 41 yo female with a history of ETOH Cirrhosis complicated by esophageal varices and portal hypertensive gastropathy with previous variceal bleeding and ongoing drinking admitted for GIB due to acute blood loss anemia in addition to ruling out COVID.  Had subjective fevers and cough about ~1 week ago and was told she may have COVID and she needed to stay home (not tested).  Presented to the ER on 3/26 with several days of black tarry stools and near syncope.  Hgb found to be 3.8 with presyncope and without hypotension. S/p total 4 units pRBC. Hb currently stable around 7's. She has not undergone endoscopy given her PUI status. No ascites on 3/27 US. Receiving empiric Rocephin but no ascites. Receiving Octreotide and Protonix infusions. INR 1.49 this AM. Mild thrombocytopenia of 107K. BUN is interestingly normal (16). Holding coreg, spironolactone and lasix. Is receiving lactulose. Sine 8/2019 it seems that her baseline Hb has been in the 7 range. This AM she had a few BP readings <100 systolic; we've held Coreg and need to stop Clonidine. There is no objective evidence of obvious ongoing bleeding. Hb stable 6.8's and given blood shortage and no active evidence of hemorrhage we are not transfusing yet. If blood pressures are stable over the next few hours she should be transferred out of the MICU. Advance diet.     Acute Issues:    Acute blood loss anemia    Suspected UGIB    EtOH abuse    End stage liver disease    PUI, COVID pending    Protein calorie malnutrition    Coagulopathy     Plan:    Hold clonidine and Coreg    Obtain respiratory viral panel    Await pending COVID; update GI when it has resulted    Repeat hemoglobin pending (trying to conserve transfusions) for afternoon    Advance to full liquid diet    Is receiving 72 hours of vitamin K      Please see the resident/FNP note from  today for full details. The patient was seen and examined with the resident/fellow physician and/or FNP.  We have discussed the patient in detail and I agree with the findings, assessment, and plan as documented when the separate resident/FNP note was cosigned on this day. The plan was formulated in conjunction with pharmacy, ICU nurses, and respiratory therapist. I have evaluated all laboratory values and imaging studies for the past 24 hours. I have reviewed all the consults that have been ordered and are active for this patient.      Critical Care Time: 40 min.  I spent this time (excluding procedures) personally providing and directing critical care services at the bedside and on the critical care unit.          Satnam Monroy MD, 3/28/2020, 7:43 AM  MICU Attending  Department of Pulmonary, Allergy, Critical Care & Sleep Medicine   Pager: [phone]

## 2020-03-28 NOTE — PROGRESS NOTES
[facility], [facility] Progress Note - Hospitalist Service, Gold 6       Date of Admission:  3/27/2020  Assessment & Plan    Yenifer Maher is a 40 year old female with a past medical history of alcoholic cirrhosis c/b EV, portal HTN, coagulopathy, GERD, paroxysmal a fib admitted to ICU 03/27 w/ anemia- hgb to 3.8 w/ melenic stool, fever w/ cough and need for COVID r/o.     #Alcoholic cirrhosis c/b EV, portal HTN, coagulopathy  #Acute on chronic normocytic anemia 2/2 GI bleed  EGD 12/24/2019 w/ recently bleeding grade II EV banded, completely eradicated, portal hypertensive gastropathy- no repeat. PTA maintained on coreg for prophylaxis, lactulose for HE, aldactone and lasix for diuresis. US yesterday w/o e/o ascites. Hgb 3.1 on admission s/p 4 units of PRBC, now stable at 6.8. Platelet count of 105. INR of 1.49. Receiving vitamin K x3 days. Baseline hgb 6-7. VS w/ mild tachycardia, normal BP. No further melenic stool per IC team.   MELD-Na score: 17 at 3/28/2020  3:23 AM  MELD score: 15 at 3/28/2020  3:23 AM  Calculated from:  Serum Creatinine: 0.70 mg/dL (Rounded to 1 mg/dL) at 3/28/2020  3:23 AM  Serum Sodium: 134 mmol/L at 3/28/2020  3:23 AM  Total Bilirubin: 3.1 mg/dL at 3/28/2020  3:23 AM  INR(ratio): 1.49 at 3/28/2020  3:23 AM  Age: 40 years  -GI following, plan to scope pending COVID r/o  -Hgb daily, sooner if any s/s of bleeding and/or HD changes  -CLD   -Continue octreotide and protonix drips  -Med/surg Tele  -Frequent VS  -Continue Lactulose BID  -Hold Coreg, lasix and Aldactone    #Fever: Tmax of 100.4. WBC count normal, ANC normal. MRSA nasal swab negative. Rapid flu negative. UA w/o e/o infection. CXR from 03/28 w/ increased basilar subsegmental atelectasis and/or consolidation concerning for infx vs pulmonary edema, small left pleural effusion.   -COVID r/o pending from 03/27  -RVP pending  -BC x2 from 03/27 pending   -Continue Rocephin daily    #Hypoxia: Sating  99% on 2 LPM via NC. CXR from 03/28 w/ increased basilar subsegmental atelectasis and/or consolidation concerning for infx vs pulmonary edema, small left pleural effusion. Fever as above.   -Consider lasix PRN   -Wean O2 as able  -Cautious volume resuscitation    #Alcohol abuse: Per ICU team, last drink 03/26. Ethanol level negative 03/27. Minimal CIWA scoring.   -Ativan for CIWA protocol  -IV thiamine-->PO taper  -MVi    #Transaminitis: AST of 246 (198), ALT of 52 (46) w/ T bili of 3.1 (1.6). Most likley 2/2 alcohol given 2:1 pattern and history.   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 Continue PTA lyrica and nortriptyline.   #H/O non uremic calciphylaxis: Diagnosed 12/2018. Not on meds PTA. Continue OP f/u with dermatology.   #Lupus anticoagulant: Has followed w/ Dr. Jeffery in the past, was on Lovenox but this has been on hold due to recurrent bleeding. No further f/u has been completed.  -Continue to hold AC given bleed    Diet: Advance Diet as Tolerated: Full Liquid Diet    DVT Prophylaxis: Pneumatic Compression Devices and Ambulate every shift  Chavez Catheter: not present  Code Status: Full Code      Disposition Plan   Expected discharge: 2 - 3 days, recommended to prior living arrangement once hemoglobin stable.  Entered: Soumya Monreal PA-C 03/28/2020, 3:25 PM       The patient's care was discussed with the Attending Physician, Dr. Mccollum, Bedside Nurse and Patient.    Soumya Monreal PA-C  Hospitalist Service, [address], [place]  Pager: [phone]  Please see sticky note for cross cover information  ______________________________________________________________________    Interval History   Reviewed RN notes, discussed with RN- no further bloody stools. Drinking clears. No acute concerns, HD stable. Reviewed ICU team note and discussed with triage.     Data reviewed today: I reviewed all medications, new labs and imaging  results over the last 24 hours. I personally reviewed no images or EKG's today.    Physical Exam   Vital Signs: Temp: 98.9  F (37.2  C) Temp src: Oral BP: 109/61 Pulse: 90 Heart Rate: 90 Resp: 20 SpO2: 99 % O2 Device: Nasal cannula Oxygen Delivery: 2 LPM  Weight: 144 lbs 6.42 oz  Did not physically see as COVID r/o and has been staffed by MICU.     Data   Reviewed CMP, CBC, glucose, CXR, Rapid flu

## 2020-03-28 NOTE — PROGRESS NOTES
MICU Progress Note  [facility], [place]  Date of Admission: .3/27/2020  -----------------------------------------------------------------  Daily Update 03/28/20     - Repeat hgb 6.8, will hold off on PRBC - recheck at 1600   -No signs of active bleeding  - CXR with increased fluid  - discontinue clonidine  - Monitor BP, if stable, consider transfer to the floor this PM    -----------------------------------------------------------------  Assessment & Plan      Yenifer Maher is a 40 year old female with PMHx of alcoholic cirrhosis, alcohol abuse, coagulopathy, paroxysmal A-fib, tobacco use, who was admitted on 3/27/2020 due to GI bleed with Hgb of 3.1 after presenting with fever, black stools, SOB and lightheadedness.       PLAN:   Neurological:  # Acute pain   # Alcohol abuse with of alcohol withdrawal.   # Tobacco use disorder   # Impaired cognitive function   # Polyneuropathy: 2/2 alcohol abuse. Sxs stable.    - Continue PTA Lyrica and nortriptyline.  - CIWA protocol   - Monitor neurological status. Delirium preventions and precautions.      Pulmonary:   # COVID suspect(PUI) - Patient had fever, sore throat and cough  # Hypoxia  New hypoxia on 3/28 in the setting of 2 L NS and 4 units PRBCs, on 4 L NC.   - CXR on 3/28 with signs of volume overload in the setting of PRBCs and fluid  - COVID-19 testing pending  - Avoid aerosolizing procedure   - Contact and droplet precaution   - Minimize exposure      Cardiovascular:    # Near syncope- patient had lightheadedness. Likely related to severe anemia due to GI bleed   - Blood transfusion   - Telemetry   - Monitor hemodynamic status.      Workup:   Echo 2018 showed EF of 55-60%. No valvular abnormality. RAP of 15.,      Gastroenterology/Nutrition:  # GI Bleed - patient with black stools. Hgb of 3.1 in ED. Baseline Hgb of 6-7. She was treated with bolus Protonix, empiric ceftriaxone, and octreotide. H/o esophageal varices.   - Protonix gtt    - Octreotide gtt  - Ceftriaxone q24h  - Hepatology consulted for EGD - awaiting COVID testing as able  - S/p 4 units PRBCS 3.1 --> 7.1 --> 6.8 (hold on transfusion here given volume overload and hemodynamically stable)  - Transfuse for hgb <7      # Liver Cirrhosis- complicated by Grade II varices, portal HTN  #  Esophageal Varices  #  Portal HTN  - MELD of 13  - No clinical evidence of HE.   - Continue PTA lactulose titrate to 3-5 BMs  - With GI bleed, hold PTA coreg 6.25 mg (Hold HR <60) lasix and spironolactone     # GERD:  - Protonix gtt  - Hold PTA omeprazole    Renal:  # No acute issues   -  Will continue to monitor intake and output    Fluids/Electrolytes:   # Hyponatremia - Likely 2/2 hypovolemia, resolved  Resolved with fluids and blood.    Endocrine:  #  Stress hyperglycemia, improved  - No management indication.     ID:  # Lactic acidosis - 2/2 hemorrhagic shock   # Fever  # Cough  # Concern for COVID-19   Temp to 100.5 on admission. UA clear.  - COVID-19 testing pending   - ceftriaxone for GI bleeding  - US abdomen no ascites  - Influenza testing and RVP testing ordered     Cultures:  BC x2 on 3/27 - NGTD  MRSA nares negative  Influenza pending  RVP pending     Heme:     # Acute on chronic normocytic anemia 2/2 GI bleed   GI bleed. Pt with back stools. Hx of grade II varices. Hemoglobin 3.1 on ED presentation. Baseline Hgb 6-7.  - S/p 4 units PRBCs  - Repeat Hgb after the transfusion .   - Transfuse if hgb <7.0 or signs/symptoms of hypoperfusion. Monitor and trend.     # Coagulopathy: 2/2 liver disease   - INR 1.49  - S/p 1 units FFP  - Vitamin K 10 mg x 3 days.      # Lupus Anticoagulant - Follow up with Dr. Jeffery. Lovenox held due to GI bleeding. Scheduled for outpatient follow up but didn't follow up.  - Continue to monitor      Musculoskeletal:  # Weakness and deconditioning of critical illness   - Physical and occupational therapy consult      Skin:   # Hx of non-uremic calciphylaxis: Diagnosed  via biopsy during hospitalization last year (12/2018). Was at one point on pentoxifylline but states for unclear reason someone told her to stop taking. Still has minor lesions on her inner thigh that no longer cause severe pain.  - Follow up with Dermatology after discharge.       General Cares/Prophylaxis:    DVT Prophylaxis: Mechanical DVT prophylaxis   GI Prophylaxis: PPI and octreotide   Code: Full     Lines/ tubes/ drains:  - PIV    The patient was seen and staffed with Dr. Monroy, attending physician.     Jessica Sawant MD  [facility] PGY3  [id number]  -----------------------------------------------------------------  Objective    Temp: 98.7  F (37.1  C) Temp src: Oral BP: 104/68 Pulse: 90 Heart Rate: 90 Resp: 23 SpO2: 92 % O2 Device: None (Room air) Oxygen Delivery: 2 LPM    Exam:   General Appearance: Laying in bed, awake, in NAD  HEENT: eyes equal and reactive to light, moist mucous membranes  Respiratory: Breathing easily on 4 L NC, laying almost flat  Cardiovascular: RRR  GI: abdomen nontender, nondistended  Genitourinary: not examined   Skin: no rashes   Neurologic: Alert, awake, no focal deficits    Notable Labs and Imaging:   See chart, all labs and imaging reviewed.    -----------------------------------------------------------------  Subjective    RN notes reviewed.   NAEO. Hgb stable overnight, increasing appropriately with transfusions. Some hypoxia this AM, started on 4 L NC.     -----------------------------------------------------------------

## 2020-03-29 ENCOUNTER — APPOINTMENT (OUTPATIENT)
Dept: PHYSICAL THERAPY | Facility: CLINIC | Age: 40
DRG: 432 | End: 2020-03-29

## 2020-03-29 LAB
ALBUMIN SERPL-MCNC: 2.1 G/DL (ref 3.4–5)
ALP SERPL-CCNC: 100 U/L (ref 40–150)
ALT SERPL W P-5'-P-CCNC: 59 U/L (ref 0–50)
ANION GAP SERPL CALCULATED.3IONS-SCNC: 4 MMOL/L (ref 3–14)
AST SERPL W P-5'-P-CCNC: 244 U/L (ref 0–45)
BASOPHILS # BLD AUTO: 0.1 10E9/L (ref 0–0.2)
BASOPHILS NFR BLD AUTO: 0.6 %
BILIRUB SERPL-MCNC: 3.1 MG/DL (ref 0.2–1.3)
BUN SERPL-MCNC: 9 MG/DL (ref 7–30)
C PNEUM DNA SPEC QL NAA+PROBE: NOT DETECTED
CALCIUM SERPL-MCNC: 7.3 MG/DL (ref 8.5–10.1)
CHLORIDE SERPL-SCNC: 104 MMOL/L (ref 94–109)
CO2 SERPL-SCNC: 22 MMOL/L (ref 20–32)
CREAT SERPL-MCNC: 0.56 MG/DL (ref 0.52–1.04)
CRP SERPL-MCNC: 17 MG/L (ref 0–8)
DIFFERENTIAL METHOD BLD: ABNORMAL
EOSINOPHIL # BLD AUTO: 0.2 10E9/L (ref 0–0.7)
EOSINOPHIL NFR BLD AUTO: 1.8 %
ERYTHROCYTE [DISTWIDTH] IN BLOOD BY AUTOMATED COUNT: 18.6 % (ref 10–15)
FLUAV H1 2009 PAND RNA SPEC QL NAA+PROBE: NOT DETECTED
FLUAV H1 RNA SPEC QL NAA+PROBE: NOT DETECTED
FLUAV H3 RNA SPEC QL NAA+PROBE: NOT DETECTED
FLUAV RNA SPEC QL NAA+PROBE: NOT DETECTED
FLUBV RNA SPEC QL NAA+PROBE: NOT DETECTED
GFR SERPL CREATININE-BSD FRML MDRD: >90 ML/MIN/{1.73_M2}
GLUCOSE SERPL-MCNC: 91 MG/DL (ref 70–99)
HADV DNA SPEC QL NAA+PROBE: NOT DETECTED
HCOV PNL SPEC NAA+PROBE: NOT DETECTED
HCT VFR BLD AUTO: 24.8 % (ref 35–47)
HGB BLD-MCNC: 7.5 G/DL (ref 11.7–15.7)
HMPV RNA SPEC QL NAA+PROBE: NOT DETECTED
HPIV1 RNA SPEC QL NAA+PROBE: NOT DETECTED
HPIV2 RNA SPEC QL NAA+PROBE: NOT DETECTED
HPIV3 RNA SPEC QL NAA+PROBE: NOT DETECTED
HPIV4 RNA SPEC QL NAA+PROBE: NOT DETECTED
IMM GRANULOCYTES # BLD: 0.1 10E9/L (ref 0–0.4)
IMM GRANULOCYTES NFR BLD: 0.6 %
INR PPP: 1.6 (ref 0.86–1.14)
LYMPHOCYTES # BLD AUTO: 1.1 10E9/L (ref 0.8–5.3)
LYMPHOCYTES NFR BLD AUTO: 13.1 %
M PNEUMO DNA SPEC QL NAA+PROBE: NOT DETECTED
MAGNESIUM SERPL-MCNC: 1.9 MG/DL (ref 1.6–2.3)
MCH RBC QN AUTO: 27.8 PG (ref 26.5–33)
MCHC RBC AUTO-ENTMCNC: 30.2 G/DL (ref 31.5–36.5)
MCV RBC AUTO: 92 FL (ref 78–100)
MICROBIOLOGIST REVIEW: NORMAL
MONOCYTES # BLD AUTO: 0.9 10E9/L (ref 0–1.3)
MONOCYTES NFR BLD AUTO: 10.7 %
NEUTROPHILS # BLD AUTO: 6.1 10E9/L (ref 1.6–8.3)
NEUTROPHILS NFR BLD AUTO: 73.2 %
NRBC # BLD AUTO: 0 10*3/UL
NRBC BLD AUTO-RTO: 0 /100
PLATELET # BLD AUTO: 133 10E9/L (ref 150–450)
POTASSIUM SERPL-SCNC: 3.6 MMOL/L (ref 3.4–5.3)
PROCALCITONIN SERPL-MCNC: 0.18 NG/ML
PROT SERPL-MCNC: 6.2 G/DL (ref 6.8–8.8)
RBC # BLD AUTO: 2.7 10E12/L (ref 3.8–5.2)
RSV RNA SPEC QL NAA+PROBE: NOT DETECTED
RSV RNA SPEC QL NAA+PROBE: NOT DETECTED
RV+EV RNA SPEC QL NAA+PROBE: NOT DETECTED
SODIUM SERPL-SCNC: 130 MMOL/L (ref 133–144)
WBC # BLD AUTO: 8.3 10E9/L (ref 4–11)

## 2020-03-29 PROCEDURE — 36415 COLL VENOUS BLD VENIPUNCTURE: CPT | Performed by: STUDENT IN AN ORGANIZED HEALTH CARE EDUCATION/TRAINING PROGRAM

## 2020-03-29 PROCEDURE — 25800030 ZZH RX IP 258 OP 636: Performed by: PHYSICIAN ASSISTANT

## 2020-03-29 PROCEDURE — 85025 COMPLETE CBC W/AUTO DIFF WBC: CPT | Performed by: STUDENT IN AN ORGANIZED HEALTH CARE EDUCATION/TRAINING PROGRAM

## 2020-03-29 PROCEDURE — 97161 PT EVAL LOW COMPLEX 20 MIN: CPT | Mod: GP

## 2020-03-29 PROCEDURE — 25000132 ZZH RX MED GY IP 250 OP 250 PS 637: Performed by: INTERNAL MEDICINE

## 2020-03-29 PROCEDURE — 84145 PROCALCITONIN (PCT): CPT | Performed by: STUDENT IN AN ORGANIZED HEALTH CARE EDUCATION/TRAINING PROGRAM

## 2020-03-29 PROCEDURE — 80053 COMPREHEN METABOLIC PANEL: CPT | Performed by: STUDENT IN AN ORGANIZED HEALTH CARE EDUCATION/TRAINING PROGRAM

## 2020-03-29 PROCEDURE — 83735 ASSAY OF MAGNESIUM: CPT | Performed by: STUDENT IN AN ORGANIZED HEALTH CARE EDUCATION/TRAINING PROGRAM

## 2020-03-29 PROCEDURE — 97530 THERAPEUTIC ACTIVITIES: CPT | Mod: GP

## 2020-03-29 PROCEDURE — 36415 COLL VENOUS BLD VENIPUNCTURE: CPT | Performed by: INTERNAL MEDICINE

## 2020-03-29 PROCEDURE — 25000132 ZZH RX MED GY IP 250 OP 250 PS 637: Performed by: STUDENT IN AN ORGANIZED HEALTH CARE EDUCATION/TRAINING PROGRAM

## 2020-03-29 PROCEDURE — 25000132 ZZH RX MED GY IP 250 OP 250 PS 637: Performed by: NURSE PRACTITIONER

## 2020-03-29 PROCEDURE — 12000001 ZZH R&B MED SURG/OB UMMC

## 2020-03-29 PROCEDURE — 85610 PROTHROMBIN TIME: CPT | Performed by: STUDENT IN AN ORGANIZED HEALTH CARE EDUCATION/TRAINING PROGRAM

## 2020-03-29 PROCEDURE — 25000128 H RX IP 250 OP 636: Performed by: PHYSICIAN ASSISTANT

## 2020-03-29 PROCEDURE — 25000132 ZZH RX MED GY IP 250 OP 250 PS 637: Performed by: PHYSICIAN ASSISTANT

## 2020-03-29 PROCEDURE — C9113 INJ PANTOPRAZOLE SODIUM, VIA: HCPCS | Performed by: PHYSICIAN ASSISTANT

## 2020-03-29 PROCEDURE — 86140 C-REACTIVE PROTEIN: CPT | Performed by: STUDENT IN AN ORGANIZED HEALTH CARE EDUCATION/TRAINING PROGRAM

## 2020-03-29 PROCEDURE — C9113 INJ PANTOPRAZOLE SODIUM, VIA: HCPCS | Performed by: ORTHOPAEDIC SURGERY

## 2020-03-29 PROCEDURE — 97116 GAIT TRAINING THERAPY: CPT | Mod: GP

## 2020-03-29 PROCEDURE — 25000128 H RX IP 250 OP 636: Performed by: ORTHOPAEDIC SURGERY

## 2020-03-29 PROCEDURE — 84145 PROCALCITONIN (PCT): CPT | Performed by: INTERNAL MEDICINE

## 2020-03-29 PROCEDURE — 99207 ZZC CDG-MDM COMPONENT: MEETS LOW - DOWN CODED: CPT | Performed by: ORTHOPAEDIC SURGERY

## 2020-03-29 PROCEDURE — 99232 SBSQ HOSP IP/OBS MODERATE 35: CPT | Performed by: ORTHOPAEDIC SURGERY

## 2020-03-29 RX ORDER — CEFTRIAXONE 1 G/1
1 INJECTION, POWDER, FOR SOLUTION INTRAMUSCULAR; INTRAVENOUS EVERY 24 HOURS
Status: DISCONTINUED | OUTPATIENT
Start: 2020-03-29 | End: 2020-04-01

## 2020-03-29 RX ORDER — ALUMINA, MAGNESIA, AND SIMETHICONE 2400; 2400; 240 MG/30ML; MG/30ML; MG/30ML
30 SUSPENSION ORAL
Status: COMPLETED | OUTPATIENT
Start: 2020-03-29 | End: 2020-03-30

## 2020-03-29 RX ORDER — ACETAMINOPHEN 325 MG/1
650 TABLET ORAL EVERY 8 HOURS PRN
Status: DISCONTINUED | OUTPATIENT
Start: 2020-03-29 | End: 2020-04-01 | Stop reason: HOSPADM

## 2020-03-29 RX ADMIN — LACTULOSE 10 G: 20 SOLUTION ORAL at 21:18

## 2020-03-29 RX ADMIN — CEFTRIAXONE 1 G: 1 INJECTION, POWDER, FOR SOLUTION INTRAMUSCULAR; INTRAVENOUS at 14:53

## 2020-03-29 RX ADMIN — THIAMINE HYDROCHLORIDE 200 MG: 100 INJECTION, SOLUTION INTRAMUSCULAR; INTRAVENOUS at 10:23

## 2020-03-29 RX ADMIN — CALCIUM CARBONATE (ANTACID) CHEW TAB 500 MG 500 MG: 500 CHEW TAB at 22:30

## 2020-03-29 RX ADMIN — MAGNESIUM OXIDE 400 MG: 400 TABLET ORAL at 08:43

## 2020-03-29 RX ADMIN — LACTULOSE 10 G: 20 SOLUTION ORAL at 08:43

## 2020-03-29 RX ADMIN — ACETAMINOPHEN 650 MG: 325 TABLET, FILM COATED ORAL at 23:40

## 2020-03-29 RX ADMIN — PREGABALIN 50 MG: 50 CAPSULE ORAL at 21:18

## 2020-03-29 RX ADMIN — PANTOPRAZOLE SODIUM 40 MG: 40 INJECTION, POWDER, FOR SOLUTION INTRAVENOUS at 21:18

## 2020-03-29 RX ADMIN — MULTIPLE VITAMINS W/ MINERALS TAB 1 TABLET: TAB at 08:43

## 2020-03-29 RX ADMIN — PANTOPRAZOLE SODIUM 8 MG/HR: 40 INJECTION, POWDER, FOR SOLUTION INTRAVENOUS at 05:27

## 2020-03-29 RX ADMIN — OCTREOTIDE ACETATE 50 MCG/HR: 200 INJECTION, SOLUTION INTRAVENOUS; SUBCUTANEOUS at 18:39

## 2020-03-29 RX ADMIN — PHYTONADIONE 10 MG: 10 INJECTION, EMULSION INTRAMUSCULAR; INTRAVENOUS; SUBCUTANEOUS at 10:24

## 2020-03-29 RX ADMIN — MAGNESIUM SULFATE 2 G: 2 INJECTION INTRAVENOUS at 11:54

## 2020-03-29 RX ADMIN — PREGABALIN 50 MG: 50 CAPSULE ORAL at 08:44

## 2020-03-29 RX ADMIN — POTASSIUM CHLORIDE 20 MEQ: 750 TABLET, EXTENDED RELEASE ORAL at 08:44

## 2020-03-29 RX ADMIN — FOLIC ACID 1 MG: 1 TABLET ORAL at 08:43

## 2020-03-29 RX ADMIN — PRENATAL VIT W/ FE FUMARATE-FA TAB 27-0.8 MG 1 TABLET: 27-0.8 TAB at 10:23

## 2020-03-29 RX ADMIN — NORTRIPTYLINE HYDROCHLORIDE 10 MG: 10 CAPSULE ORAL at 21:18

## 2020-03-29 RX ADMIN — Medication 1 TABLET: at 08:44

## 2020-03-29 RX ADMIN — PREGABALIN 50 MG: 50 CAPSULE ORAL at 14:53

## 2020-03-29 ASSESSMENT — MIFFLIN-ST. JEOR: SCORE: 1406.63

## 2020-03-29 ASSESSMENT — ACTIVITIES OF DAILY LIVING (ADL)
ADLS_ACUITY_SCORE: 19

## 2020-03-29 ASSESSMENT — PAIN DESCRIPTION - DESCRIPTORS
DESCRIPTORS: CONSTANT
DESCRIPTORS: CRAMPING

## 2020-03-29 NOTE — PLAN OF CARE
Discharge Planner PT   5B - PT evaluation completed, treatment initiated.  Patient plan for discharge: Home with assist.  Current status: Pt completes supine <> sit IND. Pt transfers sit <> stand with FWW and SBA. Pt ambulates ~200ft with FWW and CGA-SBA, slow pace, no LOB. Pt experiencing lightheadedness during session, thus unable to progress activity. VSS on RA.  Barriers to return to prior living situation: Medical needs, current mobility / level of A, lightheadedness, stairs to enter/within home.  Recommendations for discharge: Anticipate home with assist and  PT.  Rationale for recommendations: Current level of function, anticipated progress. Pt limited by lightheadedness this date though anticipate once resolved pt's mobility will progress.

## 2020-03-29 NOTE — PROGRESS NOTES
[facility], [facility] Progress Note - Hospitalist Service, Gold 6       Date of Admission:  3/27/2020  Assessment & Plan    Yenifer Maher is a 40 year old female with a past medical history of alcoholic cirrhosis c/b EV, portal HTN, coagulopathy, GERD, paroxysmal a fib admitted to ICU 03/27 w/ anemia- hgb to 3.8 w/ melenic stool, fever w/ cough now COVID neg w/stable hemoglobin and now signs of active bleeding    #Alcoholic cirrhosis c/b EV, portal HTN, coagulopathy  #Acute on chronic normocytic anemia 2/2 GI bleed  EGD 12/24/2019 w/ recently bleeding grade II EV banded, completely eradicated, portal hypertensive gastropathy- no repeat. PTA maintained on coreg for prophylaxis, lactulose for HE, aldactone and lasix for diuresis. US yesterday w/o e/o ascites. Hgb 3.1 on admission s/p 4 units of PRBC, now stable, up to 7.5 this AM. Platelet count of 105. INR of 1.49. Receiving vitamin K x3 days, compl on 3/29. Baseline hgb 6-7. VS w/ mild tachycardia, normal BP. No further melenic stool per IC team.   MELD-Na score: 22 at 3/29/2020  5:40 AM  MELD score: 16 at 3/29/2020  5:40 AM  Calculated from:  Serum Creatinine: 0.56 mg/dL (Rounded to 1 mg/dL) at 3/29/2020  5:40 AM  Serum Sodium: 130 mmol/L at 3/29/2020  5:40 AM  Total Bilirubin: 3.1 mg/dL at 3/29/2020  5:40 AM  INR(ratio): 1.60 at 3/29/2020  5:40 AM  Age: 40 years  -GI following, will review plans for EGD  -CLD   - Will discuss octreotide w/GI   - Continue protonix drips, consider transition to BID IV  - Continue Lactulose BID  - Continue Hold Coreg  - Will restart the lasix and Aldactone  - Cont ceftriaxone until EGD, plan for 7 day course.     # Viral URI  # Hypoxic resp failure  Continues to intermittent low grade fevers. WBC count normal, ANC normal. MRSA nasal swab negative. Rapid flu negative, RVP negative and COVID negative. UA w/o e/o infection. CXR from 03/28 w/ increased basilar subsegmental atelectasis and/or  consolidation concerning for infx vs pulmonary edema with small left pleural effusion. Will follow weights, restarting diuretics as above. Recommending incentive spiromter use to help reduce atelectasis  - Add-on procal and crp, if substanially elevated, may support diagnosis of concurrent pneumonia      #Alcohol abuse: Per ICU team, last drink 03/26. Ethanol level negative 03/27. Minimal CIWA scoring.   - Stop CIWA    #Transaminitis: AST of 246 (198), ALT of 52 (46) w/ T bili of 3.1 (1.6). Most likley 2/2 alcohol given 2:1 pattern and history.   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 Continue PTA lyrica and nortriptyline.   #H/O non uremic calciphylaxis: Diagnosed 12/2018. Not on meds PTA. Continue OP f/u with dermatology.   #Lupus anticoagulant: Has followed w/ Dr. Jeffery in the past, was on Lovenox but this has been on hold due to recurrent bleeding. No further f/u has been completed.  -Continue to hold AC given bleed    Diet: Advance Diet as Tolerated: Full Liquid Diet    DVT Prophylaxis: Pneumatic Compression Devices and Ambulate every shift  Chavez Catheter: not present  Code Status: Full Code      Disposition Plan   Expected discharge: 2 - 3 days, recommended to prior living arrangement once hemoglobin stable.  Entered: Guilherme Guzmán MD 03/29/2020, 11:16 AM       Guilherme Guzmán MD  Hospitalist Service, [address], [place]  Pager: [phone]  Please see sticky note for cross cover information  ______________________________________________________________________    Interval History   Reviewed RN notes, discussed with RN. Feeling better today. No melenic stools. No nausea or vomiting. Still with some loose stools. Continues to feel congested. No shortness of breath but int coughing. Has been no been up moving much. No abdominal pain. No LE edema. Weakness and fatigue substantially improve from admission. COVID negative.     Data  reviewed today: I reviewed all medications, new labs and imaging results over the last 24 hours. I personally reviewed no images or EKG's today.    Physical Exam   Vital Signs: Temp: 100.2  F (37.9  C) Temp src: Oral BP: 116/54 Pulse: 96 Heart Rate: 99 Resp: 20 SpO2: 90 % O2 Device: None (Room air) Oxygen Delivery: 2 LPM  Weight: 152 lbs 5.41 oz  Patient in NAD, alert and interactive  Lungs with a few insp basilar crackles, no increased WOB  Heart reg, no murmus  Abn is soft and NT, subcutaneous bruising in the right flank  No LE edeam, normal neuro no asterixis    Data   Lab Results   Component Value Date    WBC 8.3 03/29/2020    HGB 7.5 (L) 03/29/2020    HCT 24.8 (L) 03/29/2020    MCV 92 03/29/2020     (L) 03/29/2020     Lab Results   Component Value Date     (L) 03/29/2020    POTASSIUM 3.6 03/29/2020    CHLORIDE 104 03/29/2020    CO2 22 03/29/2020    GLC 91 03/29/2020     Lab Results   Component Value Date     (H) 03/29/2020    ALT 59 (H) 03/29/2020     (H) 08/09/2019    ALKPHOS 100 03/29/2020    BILITOTAL 3.1 (H) 03/29/2020    SOWMYA <10 (L) 03/27/2020     Lab Results   Component Value Date    INR 1.60 (H) 03/29/2020      Face + deep irregular laceration above left eye. +abrasions on right elbow and back of right hand. Skin normal color for race, warm, dry and intact. No evidence of rash. Face + deep irregular jaggered 2.5/4cm  laceration above left eye. +abrasions on right elbow and 2 skin tears back of right hand. Skin normal color for race, warm, dry and intact. No evidence of rash.

## 2020-03-29 NOTE — PROGRESS NOTES
Brief GI Note  03/29/20    Chart reviewed. Note COVID/RVP negative. VSS. Hemoglobin overall stable. No further bleeding documented.     - Please complete 7 day course of antibiotics for GI bleed in cirrhotic patient   - Continue octreotide for now; ok to change to PPI IV BID    - NPO @ 0000   - EGD tomorrow for further evaluation   - Call with HD significant GIB overnight    Judit Pineda MD  GI Fellow; PGY-6

## 2020-03-29 NOTE — PLAN OF CARE
PT / 5A: Hold PT evaluation - per discussion with interdisciplinary team, hold PT evaluation until confirmation from ID on COVID-19 results.

## 2020-03-29 NOTE — PLAN OF CARE
OT 5A: Hold OT evaluation - per discussion with interdisciplinary team, hold OT evaluation until confirmation from ID on COVID-19 results. Will reschedule.

## 2020-03-29 NOTE — PROGRESS NOTES
"   03/29/20 1525   Quick Adds   Type of Visit Initial PT Evaluation   Living Environment   Lives With significant other   Living Arrangements house   Home Accessibility stairs to enter home;stairs within home   Number of Stairs, Main Entrance 3   Number of Stairs, Within Home, Primary   (Split level - 10 up/10 down, 1 HR.)   Transportation Anticipated car, drives self;family or friend will provide   Living Environment Comment Pt has tub shower with GB and shower chair.   Self-Care   Usual Activity Tolerance moderate   Current Activity Tolerance fair   Regular Exercise No   Equipment Currently Used at Home grab bar, tub/shower;shower chair;walker, rolling   Functional Level Prior   Ambulation 1-->assistive equipment   Transferring 1-->assistive equipment   Toileting 0-->independent   Bathing 3-->assistive equipment and person   Communication 0-->understands/communicates without difficulty   Swallowing 0-->swallows foods/liquids without difficulty   Cognition 0 - no cognition issues reported   Fall history within last six months yes   Number of times patient has fallen within last six months 1   Which of the above functional risks had a recent onset or change? ambulation;transferring   Prior Functional Level Comment Pt uses 4WW for mobility. Pt uses shower chair + GB for bathing, S.O. assists with bathing. Pt otherwise IND with ADLs.   General Information   Onset of Illness/Injury or Date of Surgery - Date 03/27/20   Referring Physician Komal Dasilva PA-C    Patient/Family Goals Statement Pt would like to return home.   Pertinent History of Current Problem (include personal factors and/or comorbidities that impact the POC) Per chart \"Yenifer Maher is a 40 year old female with a past medical history of alcoholic cirrhosis c/b EV, portal HTN, coagulopathy, GERD, paroxysmal a fib admitted to ICU 03/27 w/ anemia- hgb to 3.8 w/ melenic stool, fever w/ cough and need for COVID r/o.\" *Pt found to be COVID negative " per chart review.*   Precautions/Limitations fall precautions;oxygen therapy device and L/min  (Pt on 2 L O2 NC - does not wear at baseline.)   General Observations Upon arrival pt supine in bed and agreeable to PT session.   General Info Comments Activity - ambulate with assist.   Cognitive Status Examination   Orientation orientation to person, place and time   Level of Consciousness alert   Follows Commands and Answers Questions 100% of the time   Personal Safety and Judgment intact   Memory intact   Pain Assessment   Patient Currently in Pain Yes, see Vital Sign flowsheet  (Stomach cramping.)   Posture    Posture Forward head position;Protracted shoulders   Range of Motion (ROM)   ROM Comment B LE ROM WFL.   Strength   Strength Comments B LE strength at least >3+/5 based on functional mobility assessment.   Bed Mobility   Bed Mobility Comments Pt completes supine <> sit IND.   Transfer Skills   Transfer Comments Pt transfers sit <> stand with FWW and SBA.   Gait   Gait Comments Pt ambulates ~200ft with FWW and CGA-SBA, slow pace, no LOB.    Balance   Balance Comments Pt using FWW for standing balance and ambulation this date.    Sensory Examination   Sensory Perception Comments Pt reports baseline neuropathy in B feet.   General Therapy Interventions   Planned Therapy Interventions balance training;gait training;strengthening;transfer training;home program guidelines;progressive activity/exercise   Clinical Impression   Criteria for Skilled Therapeutic Intervention yes, treatment indicated   PT Diagnosis Impaired functional mobility   Influenced by the following impairments LE weakness, impaired standing balance, decreased activity tolerance   Functional limitations due to impairments Transfers, gait, stairs, functional endurance   Clinical Presentation Stable/Uncomplicated   Clinical Presentation Rationale Clinical judgement   Clinical Decision Making (Complexity) Low complexity   Therapy Frequency 6x/week  "  Predicted Duration of Therapy Intervention (days/wks) 1 week   Anticipated Equipment Needs at Discharge   (TBD.)   Anticipated Discharge Disposition Home with Assist;Home with Home Therapy   Risk & Benefits of therapy have been explained Yes   Patient, Family & other staff in agreement with plan of care Yes   [facility] TM \"6 Clicks\"   2016, Trustees of [facility], under license to [employer].  All rights reserved.   6 Clicks Short Forms Basic Mobility Inpatient Short Form   [facility]  \"6 Clicks\" V.2 Basic Mobility Inpatient Short Form   1. Turning from your back to your side while in a flat bed without using bedrails? 4 - None   2. Moving from lying on your back to sitting on the side of a flat bed without using bedrails? 4 - None   3. Moving to and from a bed to a chair (including a wheelchair)? 3 - A Little   4. Standing up from a chair using your arms (e.g., wheelchair, or bedside chair)? 3 - A Little   5. To walk in hospital room? 3 - A Little   6. Climbing 3-5 steps with a railing? 3 - A Little   Basic Mobility Raw Score (Score out of 24.Lower scores equate to lower levels of function) 20   Total Evaluation Time   Total Evaluation Time (Minutes) 8     "

## 2020-03-29 NOTE — PLAN OF CARE
"[facility] cares [phone]     /59 (BP Location: Right arm)   Pulse 101   Temp 99.5  F (37.5  C) (Oral)   Resp 20   Ht 1.702 m (5' 7\")   Wt 69.1 kg (152 lb 5.4 oz)   SpO2 93%   BMI 23.86 kg/m       Pain: Patient has generalized pain.   Neuro:  A&Ox4. Bilateral tremors in upper extremities.   Respiratory: Lungs clear and equal. Lower lobes diminished. 2L NC.   Cardiac/Neurovascular: HR runs tachy. No numbness or tingling.   GI/: Adequate urine output. No bloody stools overnight.   Nutrition: Full liquid diet.   Activity: SBA w/ walker.   Skin: No concerns.  Lines: PIVx3. Octreotide and protonix drip running.   Events this shift: Lab did call back with a negative Covid result, however, we are still treating pt as though they are still rule out until infectious disease doctor confirms negative. Temp of 99.5. CIWA scores were stable.      Plan: Continue to monitor. Possible scope with negative Covid 19 result.     "

## 2020-03-30 PROBLEM — K92.2 UGIB (UPPER GASTROINTESTINAL BLEED): Status: ACTIVE | Noted: 2019-12-23

## 2020-03-30 PROBLEM — K92.2 GI BLEED: Status: RESOLVED | Noted: 2019-08-25 | Resolved: 2020-03-30

## 2020-03-30 PROBLEM — K70.30 ALCOHOLIC CIRRHOSIS (H): Status: ACTIVE | Noted: 2020-03-30

## 2020-03-30 LAB
ALBUMIN SERPL-MCNC: 2 G/DL (ref 3.4–5)
ALP SERPL-CCNC: 98 U/L (ref 40–150)
ALT SERPL W P-5'-P-CCNC: 46 U/L (ref 0–50)
ANION GAP SERPL CALCULATED.3IONS-SCNC: 5 MMOL/L (ref 3–14)
AST SERPL W P-5'-P-CCNC: 155 U/L (ref 0–45)
BILIRUB SERPL-MCNC: 2.5 MG/DL (ref 0.2–1.3)
BUN SERPL-MCNC: 6 MG/DL (ref 7–30)
CALCIUM SERPL-MCNC: 7.4 MG/DL (ref 8.5–10.1)
CHLORIDE SERPL-SCNC: 107 MMOL/L (ref 94–109)
CO2 SERPL-SCNC: 22 MMOL/L (ref 20–32)
CREAT SERPL-MCNC: 0.47 MG/DL (ref 0.52–1.04)
ERYTHROCYTE [DISTWIDTH] IN BLOOD BY AUTOMATED COUNT: 18.3 % (ref 10–15)
GFR SERPL CREATININE-BSD FRML MDRD: >90 ML/MIN/{1.73_M2}
GLUCOSE SERPL-MCNC: 105 MG/DL (ref 70–99)
HCT VFR BLD AUTO: 24.2 % (ref 35–47)
HGB BLD-MCNC: 7.3 G/DL (ref 11.7–15.7)
INR PPP: 1.69 (ref 0.86–1.14)
MCH RBC QN AUTO: 27.1 PG (ref 26.5–33)
MCHC RBC AUTO-ENTMCNC: 30.2 G/DL (ref 31.5–36.5)
MCV RBC AUTO: 90 FL (ref 78–100)
PLATELET # BLD AUTO: 182 10E9/L (ref 150–450)
POTASSIUM SERPL-SCNC: 3.7 MMOL/L (ref 3.4–5.3)
PROT SERPL-MCNC: 6 G/DL (ref 6.8–8.8)
RBC # BLD AUTO: 2.69 10E12/L (ref 3.8–5.2)
SODIUM SERPL-SCNC: 135 MMOL/L (ref 133–144)
UPPER GI ENDOSCOPY: NORMAL
WBC # BLD AUTO: 7.9 10E9/L (ref 4–11)

## 2020-03-30 PROCEDURE — 25000132 ZZH RX MED GY IP 250 OP 250 PS 637: Performed by: INTERNAL MEDICINE

## 2020-03-30 PROCEDURE — 80053 COMPREHEN METABOLIC PANEL: CPT | Performed by: ORTHOPAEDIC SURGERY

## 2020-03-30 PROCEDURE — 43235 EGD DIAGNOSTIC BRUSH WASH: CPT | Performed by: INTERNAL MEDICINE

## 2020-03-30 PROCEDURE — 25000132 ZZH RX MED GY IP 250 OP 250 PS 637: Performed by: HOSPITALIST

## 2020-03-30 PROCEDURE — 93010 ELECTROCARDIOGRAM REPORT: CPT | Performed by: INTERNAL MEDICINE

## 2020-03-30 PROCEDURE — 36415 COLL VENOUS BLD VENIPUNCTURE: CPT | Performed by: ORTHOPAEDIC SURGERY

## 2020-03-30 PROCEDURE — 25000125 ZZHC RX 250: Performed by: INTERNAL MEDICINE

## 2020-03-30 PROCEDURE — 12000001 ZZH R&B MED SURG/OB UMMC

## 2020-03-30 PROCEDURE — 40000104 ZZH STATISTIC MODERATE SEDATION < 10 MIN: Performed by: INTERNAL MEDICINE

## 2020-03-30 PROCEDURE — 36415 COLL VENOUS BLD VENIPUNCTURE: CPT | Performed by: INTERNAL MEDICINE

## 2020-03-30 PROCEDURE — 99233 SBSQ HOSP IP/OBS HIGH 50: CPT | Performed by: INTERNAL MEDICINE

## 2020-03-30 PROCEDURE — 25000132 ZZH RX MED GY IP 250 OP 250 PS 637: Performed by: PHYSICIAN ASSISTANT

## 2020-03-30 PROCEDURE — 0DJ08ZZ INSPECTION OF UPPER INTESTINAL TRACT, VIA NATURAL OR ARTIFICIAL OPENING ENDOSCOPIC: ICD-10-PCS | Performed by: INTERNAL MEDICINE

## 2020-03-30 PROCEDURE — 25000128 H RX IP 250 OP 636: Performed by: ORTHOPAEDIC SURGERY

## 2020-03-30 PROCEDURE — 85610 PROTHROMBIN TIME: CPT | Performed by: ORTHOPAEDIC SURGERY

## 2020-03-30 PROCEDURE — 87040 BLOOD CULTURE FOR BACTERIA: CPT | Performed by: INTERNAL MEDICINE

## 2020-03-30 PROCEDURE — 85027 COMPLETE CBC AUTOMATED: CPT | Performed by: ORTHOPAEDIC SURGERY

## 2020-03-30 PROCEDURE — C9113 INJ PANTOPRAZOLE SODIUM, VIA: HCPCS | Performed by: ORTHOPAEDIC SURGERY

## 2020-03-30 PROCEDURE — 25000128 H RX IP 250 OP 636: Performed by: INTERNAL MEDICINE

## 2020-03-30 PROCEDURE — 93005 ELECTROCARDIOGRAM TRACING: CPT

## 2020-03-30 RX ORDER — PANTOPRAZOLE SODIUM 40 MG/1
40 TABLET, DELAYED RELEASE ORAL
Status: DISCONTINUED | OUTPATIENT
Start: 2020-03-30 | End: 2020-04-01 | Stop reason: HOSPADM

## 2020-03-30 RX ORDER — AZITHROMYCIN 500 MG/1
500 TABLET, FILM COATED ORAL DAILY
Status: DISCONTINUED | OUTPATIENT
Start: 2020-03-30 | End: 2020-04-01

## 2020-03-30 RX ORDER — SIMETHICONE
LIQUID (ML) MISCELLANEOUS PRN
Status: DISCONTINUED | OUTPATIENT
Start: 2020-03-30 | End: 2020-03-30 | Stop reason: HOSPADM

## 2020-03-30 RX ORDER — POTASSIUM CL/LIDO/0.9 % NACL 10MEQ/0.1L
10 INTRAVENOUS SOLUTION, PIGGYBACK (ML) INTRAVENOUS EVERY 4 HOURS
Status: COMPLETED | OUTPATIENT
Start: 2020-03-30 | End: 2020-03-30

## 2020-03-30 RX ORDER — FENTANYL CITRATE 50 UG/ML
INJECTION, SOLUTION INTRAMUSCULAR; INTRAVENOUS PRN
Status: DISCONTINUED | OUTPATIENT
Start: 2020-03-30 | End: 2020-03-30 | Stop reason: HOSPADM

## 2020-03-30 RX ORDER — DIPHENHYDRAMINE HYDROCHLORIDE 50 MG/ML
INJECTION INTRAMUSCULAR; INTRAVENOUS PRN
Status: DISCONTINUED | OUTPATIENT
Start: 2020-03-30 | End: 2020-03-30 | Stop reason: HOSPADM

## 2020-03-30 RX ADMIN — LACTULOSE 10 G: 20 SOLUTION ORAL at 22:00

## 2020-03-30 RX ADMIN — MULTIPLE VITAMINS W/ MINERALS TAB 1 TABLET: TAB at 12:24

## 2020-03-30 RX ADMIN — PRENATAL VIT W/ FE FUMARATE-FA TAB 27-0.8 MG 1 TABLET: 27-0.8 TAB at 12:29

## 2020-03-30 RX ADMIN — PANTOPRAZOLE SODIUM 40 MG: 40 INJECTION, POWDER, FOR SOLUTION INTRAVENOUS at 10:28

## 2020-03-30 RX ADMIN — MAGNESIUM OXIDE 400 MG: 400 TABLET ORAL at 12:24

## 2020-03-30 RX ADMIN — PREGABALIN 50 MG: 50 CAPSULE ORAL at 10:41

## 2020-03-30 RX ADMIN — ALUMINUM HYDROXIDE, MAGNESIUM HYDROXIDE, AND DIMETHICONE 30 ML: 400; 400; 40 SUSPENSION ORAL at 00:13

## 2020-03-30 RX ADMIN — CEFTRIAXONE 1 G: 1 INJECTION, POWDER, FOR SOLUTION INTRAMUSCULAR; INTRAVENOUS at 14:26

## 2020-03-30 RX ADMIN — FUROSEMIDE 20 MG: 20 TABLET ORAL at 10:41

## 2020-03-30 RX ADMIN — SPIRONOLACTONE 50 MG: 25 TABLET ORAL at 10:41

## 2020-03-30 RX ADMIN — PANTOPRAZOLE SODIUM 40 MG: 40 TABLET, DELAYED RELEASE ORAL at 16:02

## 2020-03-30 RX ADMIN — NORTRIPTYLINE HYDROCHLORIDE 10 MG: 10 CAPSULE ORAL at 22:02

## 2020-03-30 RX ADMIN — PREGABALIN 50 MG: 50 CAPSULE ORAL at 14:25

## 2020-03-30 RX ADMIN — PREGABALIN 50 MG: 50 CAPSULE ORAL at 22:03

## 2020-03-30 RX ADMIN — Medication 1 TABLET: at 12:24

## 2020-03-30 RX ADMIN — AZITHROMYCIN 500 MG: 500 TABLET, FILM COATED ORAL at 17:34

## 2020-03-30 RX ADMIN — FOLIC ACID 1 MG: 1 TABLET ORAL at 12:24

## 2020-03-30 RX ADMIN — THIAMINE HCL TAB 100 MG 100 MG: 100 TAB at 12:24

## 2020-03-30 RX ADMIN — THIAMINE HCL TAB 100 MG 100 MG: 100 TAB at 16:02

## 2020-03-30 RX ADMIN — LACTULOSE 10 G: 20 SOLUTION ORAL at 10:39

## 2020-03-30 RX ADMIN — Medication 10 MEQ: at 10:28

## 2020-03-30 RX ADMIN — Medication 10 MEQ: at 13:06

## 2020-03-30 ASSESSMENT — ACTIVITIES OF DAILY LIVING (ADL)
ADLS_ACUITY_SCORE: 19

## 2020-03-30 ASSESSMENT — MIFFLIN-ST. JEOR: SCORE: 1427.95

## 2020-03-30 NOTE — PLAN OF CARE
Cancel in AM due to pt at endo, cancel in PM: pt firmly declines due to nausea.  Pt plans to return to a split level home, thus MUST demonstrate safe stair negotiation prior to disch.

## 2020-03-30 NOTE — PROCEDURES
EGD    Findings:  Grade 1 (small) esophageal varices  Portal hypertensive gastropathy (possible varices at GE junction)  No active bleeding    Assessment:  Acute on chronic anemia likely due to slow, chronic bleed given lack of history of acute massive hemorrhage and presentation w/ Hgb 3.1. Also possible that esophageal varices have now been decompressed.     Plan:  --Regular Diet  --Stop octreotide  --PPI PO BID  --Followup in liver clinic in 1-2 months    GI will sign off. Please do not hesitate to page w/ Questions.     Discussed w/ Dr. Shamar Kruse MD, [facility]  Gastroenterology Fellow, PGY4  Pager [phone]

## 2020-03-30 NOTE — PLAN OF CARE
Pt a/o x 3. Low grade temps continue. Sats stable 2 L NC, attempted to wean pt off o2 and sats would dip to 88-90%. Gave pt an IS and education, encouraged use throughout shift. Pt reports one very dark brown stool this evening this was not visualized by writer but pt didn't see blood in stool. Will continue POC.

## 2020-03-30 NOTE — OR NURSING
Upper endoscopy/EGD under conscious sedation.  No therapies were administered.  Oxygen by nasal cannula was administered during procedure and titrated as needed. Currently, it is back to 3 liters/min, as on arrival to Endoscopy.  Patient tolerated procedure well. Vital signs and sedation monitoring on Flowsheets.  Patient denies any new abdominal pain.  Report called to unit RN, Diamond, on 5th floor.  Patient is in stable condition. Transport team activated for patient's return to , room 5226.

## 2020-03-30 NOTE — PLAN OF CARE
Pt AOx4, VSS on RA except HS fever, given prn tylenol, down to 100.0 after recheck, & now 98.6 this AM. C/o abdominal pain, given maalox x1 w/relief. 2L NC. 2 very small black tarry BM's. Up SBA to commode. L wrist PIV removed d/t leaking at site. Octreotide gtt infusing. NPO @ midnight for EGD later today. Continue to monitor & follow POC.

## 2020-03-30 NOTE — PROGRESS NOTES
"  Care Coordinator Progress Note    Admission Date/Time:  3/27/2020  Attending MD:  Guilherme Guzmán*    Data  Chart reviewed, discussed with interdisciplinary team.   Patient was admitted for:    Gastrointestinal hemorrhage, unspecified gastrointestinal hemorrhage type  Anemia due to blood loss, acute  Cirrhosis of liver with ascites, unspecified hepatic cirrhosis type (H)  Acute blood loss anemia  Shortness of breath  Cough.    Concerns with insurance coverage for discharge needs: None.  Current Living Situation: Patient unsure where patient will discharge to. Home with fiance vs dad's house.  Support System: Involved  Services Involved: Home Care  Transportation at Discharge: patient still needs to determine if family can give her a ride  Transportation to Medical Appointments:   - Not applicable  Barriers to Discharge: medically stable    Coordination of Care and Referrals: Provided patient/family with options for Home Care.        Assessment  D: Plan of care discussed with Medical Team at Interdisciplinary Rounds, plan for patient to discharge 1-2 days.      I/A: Chart reviewed; spoke with patient via phone and introduced role. Writer confirmed home support, living arrangements and transportation.     Patient stated she is \"fighting with my fiance\" and unsure where she will discharge home to. Patient's current address is in [place], which is patients fiance's house. If she cannot return there, she would go to her dad's house in [place]. Patient stated she would try to determine where she would discharge today.     PT's recommendation of home with home PT was discussed. Patient stated she has had home care in the past with [facility] and is unsure she would benefit from this. She stated she wanted to think about these services and for writer to call back tomorrow.    Patient was not sure if she would have transportation at discharge. She was going to call family today to find a ride home at discharge.     "   P: Care Coordinator will follow up with patient tomorrow to determine discharge location and home care.     Plan  Anticipated Discharge Date:  1-2 days  Anticipated Discharge Plan:  Home     Ramona Diaz RN, [facility]  Internal Medicine Care Coordinator  Phone: [phone] Pager: [phone]  [facility]     To contact Weekend RNCC, dial * [phone] and enter job code [phone] at prompt.   This pager can not be contacted by text page or outside line.

## 2020-03-30 NOTE — PROGRESS NOTES
Pt is A/Ox4. VSS. Pt is on 3L of O2 this shift pt is having issues with SOB when ambulating and has decrease in O2 when mouth breathing writer did try and decrease but pt kept alarming and de sating into the high 80's. Pt has had no bloody stools this shift. Pt was put on regular diet and had good appetite this shift. Pt is on IV antibiotic and has had no adverse reaction this shift. Continue to monitor per plan of care.

## 2020-03-30 NOTE — PROGRESS NOTES
[facility], [facility] Progress Note - Hospitalist Service, Gold 9       Date of Admission:  3/27/2020  Assessment & Plan     40yoF with history of alcoholic cirrhosis, GERD, PAF, polyneuropathy, non-uremic calciphylaxis and lupus anticoagulant admitted to ICU 3/27 w/ anemia (Hgb 3.1), melenic stool and fever/cough.       1) GI bleed:  - EGD (3/30): No overt hemorrhage, likely slow/chronic bleed per GI  - Received Vitamin K 10 mg IV x3; Hgb 7.3, INR 1.69  - Stop octreotide and pantoprazole infusions  - Start pantoprazole 40 mg PO BID     2) CAP vs. Viral URI?:   - CXR (3/27): No suspicious radiographic opacities  - CXR (3/28): Increased basilar subsegmental atelectasis and/or consolidation  - COVID-19 negative, influenza negative, RVP negative, Procalcitonin 0.18  - Continue ceftriaxone (Day 4), add azithromycin today    3) Alcoholic cirrhosis:  - c/b esophageal varices (banded 12/2019), portal HTN/gastropathy  - Abd US (3/27): No ascites, echogenic hepatic parenchyma  - Continue furosemide, lactulose, spironolactone as ordered    4) Abnormal blood culture:  - BCx (3/27): Gram positive bacilli resembling diphtheroids   - Possible contaminant, check surveillance blood cultures  - Continue ceftriaxone as above    5) Alcohol abuse:   - Last drink 3/26, ethanol level negative 3/27, no c/f withdrawal  - Continue daily thiamine/folate/MVI, recommend complete cessation    6) Normocytic anemia:   - Due to GIB as above, s/p pRBC/FFP transfusion  - Anemia labs not checked prior to transfusion    7) Lupus anticoagulant: Was on enoxaparin in the past, on hold due to recurrent bleeding    8) Hypoxemic RF: Not on O2 at home, likely due to CAP/URI, wean as tolerated    9) Neuropathy: Continue nortriptyline/pregabalin    10) PAF: Check EKG, holding carvedilol, not on AC        Diet: Regular Diet Adult    DVT Prophylaxis: Mobilization  Chavez Catheter: not present  Code Status: Full Code       Disposition Plan   Expected discharge: 2 - 3 days, recommended to prior living arrangement once surveillance blood cultures finalized.  Entered: Berry Vigil MD 03/30/2020, 3:47 PM       The patient's care was discussed with the Patient.    Berry Vigil MD  Hospitalist Service, [address], [place]  Pager: [phone]  Please see sticky note for cross cover information  ______________________________________________________________________    Interval History   Patient doing well, tolerated EGD without event.  No overt hemorrhage noted as per GI. Denies N/V/D.  Discussed blood culture findings.  Denies cough/coryza.  Mild fever noted overnight, no leukocytosis    Data reviewed today: I reviewed all medications, new labs and imaging results over the last 24 hours. I personally reviewed no images or EKG's today.    Physical Exam   Vital Signs: Temp: 97.9  F (36.6  C) Temp src: Oral BP: 115/59 Pulse: 84 Heart Rate: 90 Resp: 18 SpO2: 97 % O2 Device: Nasal cannula Oxygen Delivery: 3 LPM  Weight: 153 lbs 0 oz     General Appearance: NAD, AAox3   Respiratory: CTABL, non-labored, no wheezing  Cardiovascular: Regular, S1S2 appreciated, no MRG  GI: Soft, NT/ND, no guarding  Extremities: No LE edema  Neuro: CN grossly intact, no focal motor deficits    Data   Recent Labs   Lab 03/30/20  0558 03/29/20  0540 03/28/20  1541 03/28/20  0824 03/28/20  0323   WBC 7.9 8.3  --  7.6 7.0   HGB 7.3* 7.5* 6.8* 6.8* 6.8*   MCV 90 92  --  89 88    133*  --  105* 107*   INR 1.69* 1.60*  --   --  1.49*    130*  --   --  134   POTASSIUM 3.7 3.6  --   --  3.5   CHLORIDE 107 104  --   --  104   CO2 22 22  --   --  24   BUN 6* 9  --   --  16   CR 0.47* 0.56  --   --  0.70   ANIONGAP 5 4  --   --  6   MARKO 7.4* 7.3*  --   --  7.8*   * 91  --   --  88   ALBUMIN 2.0* 2.1*  --   --  2.2*   PROTTOTAL 6.0* 6.2*  --   --  6.1*   BILITOTAL 2.5* 3.1*  --   --  3.1*   ALKPHOS 98 100  --   --  99    ALT 46 59*  --   --  52*   * 244*  --   --  246*     No results found for this or any previous visit (from the past 24 hour(s)).

## 2020-03-30 NOTE — PROGRESS NOTES
Care Coordinator Progress Note    Admission Date/Time:  3/27/2020  Attending MD:  Guilherme Guzmán*    Data  Chart reviewed, discussed with interdisciplinary team.   Patient was admitted for:    Gastrointestinal hemorrhage, unspecified gastrointestinal hemorrhage type  Anemia due to blood loss, acute  Cirrhosis of liver with ascites, unspecified hepatic cirrhosis type (H)  Acute blood loss anemia  Shortness of breath  Cough.    Concerns with insurance coverage for discharge needs: no insurance coverage.  Current Living Situation: Patient lives with significant other.   Support System: Supportive and Involved  Services Involved: None prior to admission.  Transportation at Discharge: Family or friend will provide  Transportation to Medical Appointments:   - significant other.  Barriers to Discharge: Medical stability, inactive insurance, home care services.     Assessment  Patient is a 40yr old female with a prior medical history of alcoholic cirrhosis c/b EV, portal HTN, coagulopathy, GERD, paroxysmal a fib who was admitted with suspected GI bleed. Writer attempted to contact patient via hospital room phone x2, busy signal both attempts. Patient lives locally with her significant other. Patient was evaluated by PT and has recommendations for home w/HHPT. Per chart review, patient has had [facility] in the past, is not currently open to them services. Per chart review, patients insurance lapsed, financial counseling is follow for assistance with MA application. RNCC will not be able to set up home care services until patient is MA is pending. RNCC will continue to follow for discharge planning.     Plan  Anticipated Discharge Date:  TBD  Anticipated Discharge Plan:  Likely home w/home care services once insurance is pending.     Avril Prescott, RNCC, BSN    [facility]    Medicine Group  [address] [place] [id number]    [email or web address]     Office: [phone] Pager: [phone]  To contact weekend RNCC, dial * [phone] and enter pager number [phone] at prompt. This pager can not be contacted by text page or outside line.

## 2020-03-30 NOTE — CONSULTS
"Social Work Services Progress Note    Hospital Day: 4  Collaborated with:  Patient, chart review    Data:  Pt is a 40-year-old female admitted to [facility] 3/27/20.     Intervention:  SW consult received by RN for \"Rule 25\" stating \"her mother, Isabel, is interested in obtaining this for her.\" Pt has been given CD resources during several previous admissions. Talked with pt via phone. Pt states she has already done a Rule 25 in the past. She states she would not know where to go if she wanted a new one and is agreeable with information being put in her discharge instructions regarding this. Pt states her mother \"really should not be calling giving information.\" Pt has no other questions or concerns at this time.    Provided information regarding [facility] CD assessments and Donna Villar Rule 25 in pt's discharge instructions.    Assessment:  Provided Rule 25 resources    Plan:    Anticipated Disposition:  Home with services    Barriers to d/c plan:  Medical stability    Follow Up:  SW to follow for discharge planning    Lisha Santoyo, SOPHIA, LGSW    [facility]  Pager [phone]  Phone [phone]        "

## 2020-03-30 NOTE — DISCHARGE INSTRUCTIONS
Resources for Rule 25 Assessment for CD Treatment:    [facility] Rule 25 Assessment   [phone]    [facility] Rule 25 Assessment  [address] [place] [address]   [phone]

## 2020-03-31 ENCOUNTER — APPOINTMENT (OUTPATIENT)
Dept: PHYSICAL THERAPY | Facility: CLINIC | Age: 40
DRG: 432 | End: 2020-03-31

## 2020-03-31 LAB
ALBUMIN SERPL-MCNC: 2 G/DL (ref 3.4–5)
ALP SERPL-CCNC: 169 U/L (ref 40–150)
ALT SERPL W P-5'-P-CCNC: 40 U/L (ref 0–50)
ANION GAP SERPL CALCULATED.3IONS-SCNC: 7 MMOL/L (ref 3–14)
AST SERPL W P-5'-P-CCNC: 125 U/L (ref 0–45)
BILIRUB SERPL-MCNC: 1.7 MG/DL (ref 0.2–1.3)
BUN SERPL-MCNC: 6 MG/DL (ref 7–30)
CALCIUM SERPL-MCNC: 7.6 MG/DL (ref 8.5–10.1)
CHLORIDE SERPL-SCNC: 109 MMOL/L (ref 94–109)
CO2 SERPL-SCNC: 21 MMOL/L (ref 20–32)
CREAT SERPL-MCNC: 0.55 MG/DL (ref 0.52–1.04)
ERYTHROCYTE [DISTWIDTH] IN BLOOD BY AUTOMATED COUNT: 18.5 % (ref 10–15)
GFR SERPL CREATININE-BSD FRML MDRD: >90 ML/MIN/{1.73_M2}
GLUCOSE SERPL-MCNC: 102 MG/DL (ref 70–99)
HCT VFR BLD AUTO: 24.4 % (ref 35–47)
HGB BLD-MCNC: 7.3 G/DL (ref 11.7–15.7)
INR PPP: 1.58 (ref 0.86–1.14)
INTERPRETATION ECG - MUSE: NORMAL
MAGNESIUM SERPL-MCNC: 1.9 MG/DL (ref 1.6–2.3)
MCH RBC QN AUTO: 27.4 PG (ref 26.5–33)
MCHC RBC AUTO-ENTMCNC: 29.9 G/DL (ref 31.5–36.5)
MCV RBC AUTO: 92 FL (ref 78–100)
PHOSPHATE SERPL-MCNC: 1.8 MG/DL (ref 2.5–4.5)
PLATELET # BLD AUTO: 219 10E9/L (ref 150–450)
POTASSIUM SERPL-SCNC: 3.9 MMOL/L (ref 3.4–5.3)
PROT SERPL-MCNC: 6.2 G/DL (ref 6.8–8.8)
RBC # BLD AUTO: 2.66 10E12/L (ref 3.8–5.2)
SODIUM SERPL-SCNC: 137 MMOL/L (ref 133–144)
WBC # BLD AUTO: 7.1 10E9/L (ref 4–11)

## 2020-03-31 PROCEDURE — 83735 ASSAY OF MAGNESIUM: CPT | Performed by: INTERNAL MEDICINE

## 2020-03-31 PROCEDURE — 25000132 ZZH RX MED GY IP 250 OP 250 PS 637: Performed by: INTERNAL MEDICINE

## 2020-03-31 PROCEDURE — 85610 PROTHROMBIN TIME: CPT | Performed by: INTERNAL MEDICINE

## 2020-03-31 PROCEDURE — 97116 GAIT TRAINING THERAPY: CPT | Mod: GP | Performed by: REHABILITATION PRACTITIONER

## 2020-03-31 PROCEDURE — 84100 ASSAY OF PHOSPHORUS: CPT | Performed by: INTERNAL MEDICINE

## 2020-03-31 PROCEDURE — 99232 SBSQ HOSP IP/OBS MODERATE 35: CPT | Performed by: INTERNAL MEDICINE

## 2020-03-31 PROCEDURE — 85027 COMPLETE CBC AUTOMATED: CPT | Performed by: INTERNAL MEDICINE

## 2020-03-31 PROCEDURE — 25000128 H RX IP 250 OP 636: Performed by: PHYSICIAN ASSISTANT

## 2020-03-31 PROCEDURE — 25000132 ZZH RX MED GY IP 250 OP 250 PS 637: Performed by: PHYSICIAN ASSISTANT

## 2020-03-31 PROCEDURE — 80053 COMPREHEN METABOLIC PANEL: CPT | Performed by: INTERNAL MEDICINE

## 2020-03-31 PROCEDURE — 25000132 ZZH RX MED GY IP 250 OP 250 PS 637: Performed by: STUDENT IN AN ORGANIZED HEALTH CARE EDUCATION/TRAINING PROGRAM

## 2020-03-31 PROCEDURE — 36415 COLL VENOUS BLD VENIPUNCTURE: CPT | Performed by: INTERNAL MEDICINE

## 2020-03-31 PROCEDURE — 97530 THERAPEUTIC ACTIVITIES: CPT | Mod: GP | Performed by: REHABILITATION PRACTITIONER

## 2020-03-31 PROCEDURE — 12000001 ZZH R&B MED SURG/OB UMMC

## 2020-03-31 PROCEDURE — 25000128 H RX IP 250 OP 636: Performed by: ORTHOPAEDIC SURGERY

## 2020-03-31 RX ORDER — FUROSEMIDE 40 MG
40 TABLET ORAL
Status: DISCONTINUED | OUTPATIENT
Start: 2020-03-31 | End: 2020-04-01 | Stop reason: HOSPADM

## 2020-03-31 RX ORDER — ASCORBIC ACID 500 MG
500 TABLET ORAL DAILY
Qty: 30 TABLET | Refills: 0 | Status: ON HOLD | OUTPATIENT
Start: 2020-03-31 | End: 2020-06-11

## 2020-03-31 RX ORDER — FERROUS SULFATE 325(65) MG
325 TABLET ORAL
Qty: 90 TABLET | Refills: 0 | Status: ON HOLD | OUTPATIENT
Start: 2020-03-31 | End: 2020-06-11

## 2020-03-31 RX ORDER — AZITHROMYCIN 500 MG/1
500 TABLET, FILM COATED ORAL DAILY
Qty: 4 TABLET | Refills: 0 | Status: ON HOLD | OUTPATIENT
Start: 2020-04-01 | End: 2020-06-11

## 2020-03-31 RX ORDER — PANTOPRAZOLE SODIUM 40 MG/1
40 TABLET, DELAYED RELEASE ORAL
Qty: 60 TABLET | Refills: 0 | Status: ON HOLD | OUTPATIENT
Start: 2020-03-31 | End: 2020-06-11

## 2020-03-31 RX ORDER — LACTULOSE 10 G/15ML
20 SOLUTION ORAL
Status: DISCONTINUED | OUTPATIENT
Start: 2020-03-31 | End: 2020-04-01

## 2020-03-31 RX ADMIN — ONDANSETRON 4 MG: 2 INJECTION INTRAMUSCULAR; INTRAVENOUS at 21:50

## 2020-03-31 RX ADMIN — CEFTRIAXONE 1 G: 1 INJECTION, POWDER, FOR SOLUTION INTRAMUSCULAR; INTRAVENOUS at 14:17

## 2020-03-31 RX ADMIN — PREGABALIN 50 MG: 50 CAPSULE ORAL at 08:11

## 2020-03-31 RX ADMIN — AZITHROMYCIN 500 MG: 500 TABLET, FILM COATED ORAL at 08:11

## 2020-03-31 RX ADMIN — SPIRONOLACTONE 50 MG: 25 TABLET ORAL at 08:11

## 2020-03-31 RX ADMIN — FUROSEMIDE 20 MG: 20 TABLET ORAL at 08:10

## 2020-03-31 RX ADMIN — PREGABALIN 50 MG: 50 CAPSULE ORAL at 14:16

## 2020-03-31 RX ADMIN — FOLIC ACID 1 MG: 1 TABLET ORAL at 08:11

## 2020-03-31 RX ADMIN — LACTULOSE 10 G: 20 SOLUTION ORAL at 08:10

## 2020-03-31 RX ADMIN — PREGABALIN 50 MG: 50 CAPSULE ORAL at 22:00

## 2020-03-31 RX ADMIN — FUROSEMIDE 40 MG: 40 TABLET ORAL at 16:11

## 2020-03-31 RX ADMIN — NORTRIPTYLINE HYDROCHLORIDE 10 MG: 10 CAPSULE ORAL at 22:00

## 2020-03-31 RX ADMIN — CALCIUM CARBONATE (ANTACID) CHEW TAB 500 MG 500 MG: 500 CHEW TAB at 21:50

## 2020-03-31 RX ADMIN — MULTIPLE VITAMINS W/ MINERALS TAB 1 TABLET: TAB at 08:11

## 2020-03-31 RX ADMIN — LACTULOSE 20 G: 20 SOLUTION ORAL at 16:11

## 2020-03-31 RX ADMIN — PANTOPRAZOLE SODIUM 40 MG: 40 TABLET, DELAYED RELEASE ORAL at 08:11

## 2020-03-31 RX ADMIN — PANTOPRAZOLE SODIUM 40 MG: 40 TABLET, DELAYED RELEASE ORAL at 16:11

## 2020-03-31 ASSESSMENT — MIFFLIN-ST. JEOR: SCORE: 1431.63

## 2020-03-31 ASSESSMENT — ACTIVITIES OF DAILY LIVING (ADL)
ADLS_ACUITY_SCORE: 19
ADLS_ACUITY_SCORE: 20

## 2020-03-31 ASSESSMENT — PAIN DESCRIPTION - DESCRIPTORS: DESCRIPTORS: ACHING;SORE

## 2020-03-31 NOTE — PROGRESS NOTES
Care Coordinator Progress Note    Admission Date/Time:  3/27/2020  Attending MD:  Diego Millard MD    Data  Chart reviewed, discussed with interdisciplinary team.   Patient was admitted for:    Gastrointestinal hemorrhage, unspecified gastrointestinal hemorrhage type  Anemia due to blood loss, acute  Cirrhosis of liver with ascites, unspecified hepatic cirrhosis type (H)  Acute blood loss anemia  Shortness of breath  Cough.    Concerns with insurance coverage for discharge needs: None.  [facility]  Support System: Involved  Services Involved: Home Care  Transportation at Discharge: Family or friend will provide  Transportation to Medical Appointments:   - Name of caregiver: family  Barriers to Discharge: none    Coordination of Care and Referrals: Provided patient/family with options for Home Care.        Assessment  D: Plan of care discussed with Medical Team at Interdisciplinary Rounds, plan for patient to discharge today.      I/A: Chart reviewed; spoke with patient via phone and introduced role. Writer confirmed home support, living arrangements and transportation.      Writer followed up on referral to home care, discharge location and transportation. [facility] on [facility] sheet. Patient stated she is undecided on home care services. She would like referral and will make final decision after discharge. Choice of agency was offered to patient and she would like referral made to [facility] for RN, PT. Order and referral were placed.    Currently no insurance is listed on patient's account. Per  note: Per [place] at [facility] pt's MA will be reopenned as of 3-1-2020.    Patient is still looking for transportation home. If family is unable to pick patient up, taxi or MA ride could be called. Patient aware.     P: No further RNCC needs at this time. Care Coordinator will remain available for discharge needs that may arise.      Plan  Anticipated Discharge Date:  3/31/2020  Anticipated Discharge Plan:  Home with home care.    Ramona Diaz RN, A  Internal Medicine Care Coordinator  Phone: [phone] Pager: [phone]  [facility]     To contact Weekend RNCC, dial * [phone] and enter job code [phone] at prompt.   This pager can not be contacted by text page or outside line.

## 2020-03-31 NOTE — PROGRESS NOTES
"Pt is AO*3, she has not been forgetful. VSS. Pt voids adequately. BMx1, soft black/brown in color. Adequate appetite and oral intake. Up to the bathroom, idependently. She has states she had 4 loose stools and she denied her prescribed lactulose. Pt felt nauseous, Zofran was given. Pt stated \"something is not sitting\". Her abdomen was distended. Calcium carbonate was given. Currently pt feels some relief.   Pt complains of mitra pain offered warm pack, pt denied. She has minor lightheadedness, but states that is her baseline. Continue to monitor and update MD with any changes.     Elen Coy RN on 3/31/2020 at 10:33 PM      "

## 2020-03-31 NOTE — PLAN OF CARE
Low grade fever during the night, T max 99.9. Pain negligible and using warm pad. Continue with current plan of nursing care, and update MD with concerns as needed.

## 2020-03-31 NOTE — PLAN OF CARE
OT 5B: Per discussion with PT, no OT needs identified at this time. Patient appropriate for one discipline during hospital stay. Will hold OT evaluation.

## 2020-03-31 NOTE — PROGRESS NOTES
[facility], [facility] Progress Note - Hospitalist Service, Gold 09       Date of Admission:  3/27/2020  Assessment & Plan   40yoF with history of alcoholic cirrhosis, GERD, PAF, polyneuropathy, non-uremic calciphylaxis and lupus anticoagulant admitted to ICU 3/27 w/ anemia (Hgb 3.1), melenic stool and fever/cough.         1) GI bleed:  - EGD (3/30): No overt hemorrhage, likely slow/chronic bleed per GI  - Received Vitamin K 10 mg IV x3; Hgb 7.3, INR 1.69  - Stop octreotide and pantoprazole infusions  - Start pantoprazole 40 mg PO BID     2) CAP vs. Viral URI?:   - CXR (3/27): No suspicious radiographic opacities  - CXR (3/28): Increased basilar subsegmental atelectasis and/or consolidation  - COVID-19 negative, influenza negative, RVP negative, Procalcitonin 0.18  - Continue ceftriaxone (Day 4), add azithromycin today     3) Alcoholic cirrhosis:  - c/b esophageal varices (banded 12/2019), portal HTN/gastropathy  - Abd US (3/27): No ascites, echogenic hepatic parenchyma  - Continue furosemide, lactulose, spironolactone as ordered     4) Abnormal blood culture: likely contaminant   - BCx (3/27): Gram positive bacilli resembling diphtheroids   - Possible contaminant, check surveillance blood cultures  - Continue ceftriaxone as above     5) Alcohol abuse: with continued alcohol use   - Last drink 3/26, ethanol level negative 3/27, no c/f withdrawal  - Continue daily thiamine/folate/MVI, recommend complete cessation  - patient counseled about the effect of alcohol and the need to stop      6) Acute on chronic Normocytic anemia:   - Due to GIB as above, s/p pRBC/FFP transfusion  - Anemia labs not checked prior to transfusion     7) Lupus anticoagulant: Was on enoxaparin in the past, on hold due to recurrent bleeding     8) Hypoxemic RF: Not on O2 at home, likely due to CAP/URI, wean as tolerated     9) Neuropathy: Continue nortriptyline/pregabalin     10) PAF: Check EKG, holding  carvedilol, not on AC     Diet: Regular Diet Adult  Diet    DVT Prophylaxis: Low Risk/Ambulatory with no VTE prophylaxis indicated  Chavez Catheter: not present  Code Status: Full Code      Disposition Plan   Expected discharge: 2 - 3 days, recommended to Patient needs assistance to walk, lives alone     Entered: Diego Millard MD 03/31/2020, 3:36 PM       The patient's care was discussed with the Bedside Nurse and Patient.    Diego Millard MD  Hospitalist Service, [address], [place]  Pager: [phone]  Please see sticky note for cross cover information  ______________________________________________________________________    Interval History   Patient unsteady on her feet and lives alone   Confused     Data reviewed today: I reviewed all medications, new labs and imaging results over the last 24 hours.     Physical Exam   Vital Signs: Temp: 99.7  F (37.6  C) Temp src: Oral BP: 137/80 Pulse: 93 Heart Rate: 94 Resp: 18 SpO2: 93 % O2 Device: None (Room air) Oxygen Delivery: 3 LPM  Weight: 158 lbs 8.17 oz  General Appearance:  NAD, AAox3      Respiratory: CTABL, non-labored, no wheezing  Cardiovascular: Regular, S1S2 appreciated, no MRG  GI: Soft, NT/ND, no guarding  Extremities: No LE edema  Neuro: CN grossly intact, no focal motor deficits       Data   Recent Labs   Lab 03/31/20  0539 03/30/20  0558 03/29/20  0540   WBC 7.1 7.9 8.3   HGB 7.3* 7.3* 7.5*   MCV 92 90 92    182 133*   INR 1.58* 1.69* 1.60*    135 130*   POTASSIUM 3.9 3.7 3.6   CHLORIDE 109 107 104   CO2 21 22 22   BUN 6* 6* 9   CR 0.55 0.47* 0.56   ANIONGAP 7 5 4   MARKO 7.6* 7.4* 7.3*   * 105* 91   ALBUMIN 2.0* 2.0* 2.1*   PROTTOTAL 6.2* 6.0* 6.2*   BILITOTAL 1.7* 2.5* 3.1*   ALKPHOS 169* 98 100   ALT 40 46 59*   * 155* 244*     No results found for this or any previous visit (from the past 24 hour(s)).  Medications       azithromycin  500 mg Oral Daily     cefTRIAXone  1 g Intravenous Q24H      folic acid  1 mg Oral Daily     furosemide  20 mg Oral Daily     lactulose  10 g Oral BID     lactulose  20 g Oral Daily at 4 pm     multivitamin w/minerals  1 tablet Oral Daily     nortriptyline  10 mg Oral At Bedtime     pantoprazole  40 mg Oral BID AC     pregabalin  50 mg Oral TID     spironolactone  50 mg Oral Daily     [START ON 4/3/2020] thiamine  100 mg Oral Daily

## 2020-03-31 NOTE — PLAN OF CARE
"Blood pressure 128/70, pulse 94, temperature 99.6  F (37.6  C), temperature source Oral, resp. rate 20, height 1.702 m (5' 7\"), weight 71.9 kg (158 lb 8.2 oz), SpO2 (!) 89 %, RA     Patient A & O X 3, with intermittent forgetful. Patient report short of breath with activity and dizzy. Patient had discharge order to go home . Writer paged primary team and update the status to (Dr Diego Millard [id number]) MD aware . 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 Patient appetite fair and void x 3 has stool x 1 soft black . Patient very weak needs assist to BSC. Call light in reach , bed alarm on . On coming RN update . Continue monitor closely and update MD with change.   "

## 2020-03-31 NOTE — PLAN OF CARE
Problem: Adult Inpatient Plan of Care  Goal: Plan of Care Review  3/31/2020 0734 by Giuliana Briggs RN  Outcome: No Change  Goal: Patient-Specific Goal (Individualization)  3/31/2020 0734 by Giuliana Briggs RN  Outcome: No Change  Goal: Absence of Hospital-Acquired Illness or Injury  3/31/2020 0734 by Giuliana Briggs RN  Outcome: No Change  Goal: Optimal Comfort and Wellbeing  3/31/2020 0734 by Giuliana Briggs RN  Outcome: No Change  Goal: Readiness for Transition of Care  3/31/2020 0734 by Giuliana Briggs RN  Outcome: No Change  Goal: Rounds/Family Conference  3/31/2020 0734 by Giuliana Briggs RN  Outcome: No Change     Problem: Adjustment to Illness (Gastrointestinal Bleeding)  Goal: Optimal Coping with Acute Illness  3/31/2020 0734 by Giuliana Briggs RN  Outcome: No Change     Problem: Bleeding (Gastrointestinal Bleeding)  Goal: Hemostasis  3/31/2020 0734 by Giuliana Briggs RN  Outcome: No Change

## 2020-03-31 NOTE — PROGRESS NOTES
"/70 (BP Location: Right arm)   Pulse 94   Temp 99.6  F (37.6  C) (Oral)   Resp 20   Ht 1.702 m (5' 7\")   Wt 71.9 kg (158 lb 8.2 oz)   SpO2 (!) 89%   BMI 24.83 kg/m      Care from: 0933-8007    VSS, on room air, neglectible pain in neck, back, and abdomen. Oriented x4, but forgetful, sleepy and lethargic. Writer notified [facility] 5 that pt was dizzy and \"almost passed out\" per PT and asked for prn Lactulose since pt had no stool the whole AM shift and becomes increasingly forgetful and sleepy. MD put in extra dose of scheduled Lactulose and Lasix, writer administered. Transferred care and gave report to Muna SCHMIDT at 1630. Continue to monitor and follow poc.  "

## 2020-03-31 NOTE — PLAN OF CARE
"Discharge Planner PT   5B -   Patient plan for discharge: Home with assist from [facility] in evenings.  Current status: Pt supine <> sit IND. Pt transfers sit <> stand with FWW and SBA, frequent cues for safety. Pt ambulates ~200ft with FWW and CGA-SBA, slow pace, no LOB. Pt experiencing lightheadedness \"almost blacking out\" along with c/o worsening fever/overheating and nausea during session, especially during stairs - notified RN. Desats to 89% on RA. Pt also indicates she is very stressed about getting home on time today.  Barriers to return to prior living situation: Medical needs, current mobility / level of A, lightheadedness, stairs to enter/within home.  Recommendations for discharge: Anticipate home with assist and  PT after symptoms improve.  Rationale for recommendations: Current level of function, anticipated progress. Pt limited by lightheadedness this date though anticipate once resolved pt's mobility will progress.  "

## 2020-03-31 NOTE — PLAN OF CARE
"/59 (BP Location: Right arm)   Pulse 84   Temp 100.4  F (38  C) (Oral)   Resp 18   Ht 1.702 m (5' 7\")   Wt 71.5 kg (157 lb 11.2 oz)   SpO2 94%   BMI 24.70 kg/m      Care from: 1214-6454    VSS ex febrile, on 3 LPM nc, applied warm pack and ice pack for back, neck, and abdominal pain- partially effective. A&O x4. Good appetite and oral intake. Adequate urine output, no BM this shift. Up to bathroom with A1 to SBA and a walker. Pt refused prn Tylenol for fever saying it interacts with her other medications, writer paged Gold night crosscover to verify, passed on to next nurse. Continue to monitor and follow poc.  "

## 2020-04-01 ENCOUNTER — APPOINTMENT (OUTPATIENT)
Dept: PHYSICAL THERAPY | Facility: CLINIC | Age: 40
DRG: 432 | End: 2020-04-01

## 2020-04-01 ENCOUNTER — PATIENT OUTREACH (OUTPATIENT)
Dept: CARE COORDINATION | Facility: CLINIC | Age: 40
End: 2020-04-01

## 2020-04-01 VITALS
HEART RATE: 90 BPM | TEMPERATURE: 98.7 F | BODY MASS INDEX: 24.88 KG/M2 | DIASTOLIC BLOOD PRESSURE: 55 MMHG | HEIGHT: 67 IN | SYSTOLIC BLOOD PRESSURE: 114 MMHG | WEIGHT: 158.51 LBS | OXYGEN SATURATION: 97 % | RESPIRATION RATE: 18 BRPM

## 2020-04-01 LAB
ALBUMIN SERPL-MCNC: 2 G/DL (ref 3.4–5)
ALP SERPL-CCNC: 172 U/L (ref 40–150)
ALT SERPL W P-5'-P-CCNC: 39 U/L (ref 0–50)
ANION GAP SERPL CALCULATED.3IONS-SCNC: 6 MMOL/L (ref 3–14)
AST SERPL W P-5'-P-CCNC: 108 U/L (ref 0–45)
BILIRUB SERPL-MCNC: 1.5 MG/DL (ref 0.2–1.3)
BUN SERPL-MCNC: 7 MG/DL (ref 7–30)
CALCIUM SERPL-MCNC: 8 MG/DL (ref 8.5–10.1)
CHLORIDE SERPL-SCNC: 104 MMOL/L (ref 94–109)
CO2 SERPL-SCNC: 24 MMOL/L (ref 20–32)
CREAT SERPL-MCNC: 0.56 MG/DL (ref 0.52–1.04)
ERYTHROCYTE [DISTWIDTH] IN BLOOD BY AUTOMATED COUNT: 18.5 % (ref 10–15)
GFR SERPL CREATININE-BSD FRML MDRD: >90 ML/MIN/{1.73_M2}
GLUCOSE SERPL-MCNC: 100 MG/DL (ref 70–99)
HCT VFR BLD AUTO: 25.2 % (ref 35–47)
HGB BLD-MCNC: 7.6 G/DL (ref 11.7–15.7)
INR PPP: 1.63 (ref 0.86–1.14)
MCH RBC QN AUTO: 27.7 PG (ref 26.5–33)
MCHC RBC AUTO-ENTMCNC: 30.2 G/DL (ref 31.5–36.5)
MCV RBC AUTO: 92 FL (ref 78–100)
PLATELET # BLD AUTO: 265 10E9/L (ref 150–450)
POTASSIUM SERPL-SCNC: 3.6 MMOL/L (ref 3.4–5.3)
PROT SERPL-MCNC: 6.5 G/DL (ref 6.8–8.8)
RBC # BLD AUTO: 2.74 10E12/L (ref 3.8–5.2)
SODIUM SERPL-SCNC: 134 MMOL/L (ref 133–144)
WBC # BLD AUTO: 7.4 10E9/L (ref 4–11)

## 2020-04-01 PROCEDURE — 25000132 ZZH RX MED GY IP 250 OP 250 PS 637: Performed by: INTERNAL MEDICINE

## 2020-04-01 PROCEDURE — 99239 HOSP IP/OBS DSCHRG MGMT >30: CPT | Performed by: INTERNAL MEDICINE

## 2020-04-01 PROCEDURE — 80053 COMPREHEN METABOLIC PANEL: CPT | Performed by: INTERNAL MEDICINE

## 2020-04-01 PROCEDURE — 85027 COMPLETE CBC AUTOMATED: CPT | Performed by: INTERNAL MEDICINE

## 2020-04-01 PROCEDURE — 36415 COLL VENOUS BLD VENIPUNCTURE: CPT | Performed by: INTERNAL MEDICINE

## 2020-04-01 PROCEDURE — 97116 GAIT TRAINING THERAPY: CPT | Mod: GP | Performed by: REHABILITATION PRACTITIONER

## 2020-04-01 PROCEDURE — 97750 PHYSICAL PERFORMANCE TEST: CPT | Mod: GP | Performed by: REHABILITATION PRACTITIONER

## 2020-04-01 PROCEDURE — 85610 PROTHROMBIN TIME: CPT | Performed by: INTERNAL MEDICINE

## 2020-04-01 PROCEDURE — 25000132 ZZH RX MED GY IP 250 OP 250 PS 637: Performed by: PHYSICIAN ASSISTANT

## 2020-04-01 PROCEDURE — 97530 THERAPEUTIC ACTIVITIES: CPT | Mod: GP | Performed by: REHABILITATION PRACTITIONER

## 2020-04-01 RX ADMIN — FUROSEMIDE 40 MG: 40 TABLET ORAL at 08:02

## 2020-04-01 RX ADMIN — AZITHROMYCIN 500 MG: 500 TABLET, FILM COATED ORAL at 08:02

## 2020-04-01 RX ADMIN — MULTIPLE VITAMINS W/ MINERALS TAB 1 TABLET: TAB at 08:02

## 2020-04-01 RX ADMIN — SPIRONOLACTONE 50 MG: 25 TABLET ORAL at 08:02

## 2020-04-01 RX ADMIN — FOLIC ACID 1 MG: 1 TABLET ORAL at 08:02

## 2020-04-01 RX ADMIN — FUROSEMIDE 40 MG: 40 TABLET ORAL at 15:45

## 2020-04-01 RX ADMIN — LACTULOSE 10 G: 20 SOLUTION ORAL at 08:02

## 2020-04-01 RX ADMIN — PREGABALIN 50 MG: 50 CAPSULE ORAL at 08:08

## 2020-04-01 RX ADMIN — PANTOPRAZOLE SODIUM 40 MG: 40 TABLET, DELAYED RELEASE ORAL at 08:02

## 2020-04-01 RX ADMIN — PANTOPRAZOLE SODIUM 40 MG: 40 TABLET, DELAYED RELEASE ORAL at 15:45

## 2020-04-01 RX ADMIN — PREGABALIN 50 MG: 50 CAPSULE ORAL at 13:38

## 2020-04-01 ASSESSMENT — ACTIVITIES OF DAILY LIVING (ADL)
ADLS_ACUITY_SCORE: 19
ADLS_ACUITY_SCORE: 20
ADLS_ACUITY_SCORE: 19

## 2020-04-01 NOTE — PROGRESS NOTES
"Pt had diarrhea x3 since 1900. states \"somethings not sitting well\". requesting something for gas and heart burn. Also has nausea. [facility] cover paged.   "

## 2020-04-01 NOTE — DISCHARGE SUMMARY
[facility], [place]  Hospitalist Discharge Summary       Date of Admission:  3/27/2020  Date of Discharge:  4/1/2020  Discharging Provider: Diego Millard MD  Discharge Team: Hospitalist Service, Gold     Discharge Diagnoses   slow/chronic GI bleed related to esophageal varices   Sever acuter on chronic blood loss Anemia   Alcoholic cirrhosis with ASCITES   Hepatic Gastropathy   ALCOHOL ABUSE     Follow-ups Needed After Discharge   Follow-up Appointments     Adult [facility] Follow-up and recommended labs and tests      Follow up with primary care provider, Flako Gaona, within 7 days for   hospital follow- up.  The following labs/tests are recommended: CBC.      Appointments on [place] and/or [facility] (with [facility] or [facility]   provider or service). Call [phone] if you haven't heard regarding   these appointments within 7 days of discharge.         Follow Up and recommended labs and tests      Follow up with gastroenterology/ Hepatology  , at (location with clinic   name or city) , within 4 wks after discharge  to evaluate treatment   change. The following labs/tests are recommended: CBC.         {Additional follow-up instructions/to-do's for PCP    MONITOR CBC     Unresulted Labs Ordered in the Past 30 Days of this Admission     Date and Time Order Name Status Description    3/30/2020 1440 Blood culture Preliminary     3/30/2020 1440 Blood culture Preliminary     3/27/2020 1713 Blood culture Preliminary     3/27/2020 1713 Blood culture Preliminary     3/27/2020 1227 Plasma prepare order unit In process       These results will be followed up by CBC    Discharge Disposition   Discharged to home  Condition at discharge: Stable    Hospital Course   40yoF with history of alcoholic cirrhosis, GERD, PAF, polyneuropathy, non-uremic calciphylaxis and lupus anticoagulant admitted to ICU 3/27 w/ anemia (Hgb 3.1), melenic stool and fever/cough.         1) GI bleed:  - EGD (3/30): No overt  hemorrhage, likely slow/chronic bleed per GI  - Received Vitamin K 10 mg IV x3; Hgb 7.3, INR 1.69  - Stop octreotide and pantoprazole infusions  - Start pantoprazole 40 mg PO BID     2) CAP vs. Viral URI?:   - CXR (3/27): No suspicious radiographic opacities  - CXR (3/28): Increased basilar subsegmental atelectasis and/or consolidation  - COVID-19 negative, influenza negative, RVP negative, Procalcitonin 0.18  - Finished ceftriaxone (Day 4), and zithromax      3) Alcoholic cirrhosis:  - c/b esophageal varices (banded 12/2019), portal HTN/gastropathy  - Abd US (3/27): No ascites, echogenic hepatic parenchyma  - Continue furosemide, lactulose, spironolactone as ordered     4) Abnormal blood culture: likely contaminant   - BCx (3/27): Gram positive bacilli resembling diphtheroids   - Possible contaminant, check surveillance blood cultures  - finished Abx course      5) Alcohol abuse: with continued alcohol use   - Last drink 3/26, ethanol level negative 3/27, no c/f withdrawal  - Continue daily thiamine/folate/MVI, recommend complete cessation  - patient counseled about the effect of alcohol and the need to stop      6) Acute on chronic Normocytic anemia:   - Due to GIB as above, s/p pRBC/FFP transfusion  - Anemia labs not checked prior to transfusion  - Hgb stable   - given Iron and vit C      7) Lupus anticoagulant: Was on enoxaparin in the past, on hold due to recurrent bleeding      8) Hypoxemic RF: Not on O2 at home, likely due to CAP/URI, wean as tolerated  - resolved after transfusion      9) Neuropathy: Continue nortriptyline/pregabalin     10) PAF: Check EKG, holding carvedilol, not on AC     Diet: Regular Diet Adult  Diet    DVT Prophylaxis: Low Risk/Ambulatory with no VTE prophylaxis indicated  Chavez Catheter: not present  Code Status: Full Code         Consultations This Hospital Stay   PHYSICAL THERAPY ADULT IP CONSULT  OCCUPATIONAL THERAPY ADULT IP CONSULT  GI HEPATOLOGY ADULT IP CONSULT  SOCIAL WORK IP  CONSULT  PHYSICAL THERAPY ADULT IP CONSULT    Code Status   Full Code    Time Spent on this Encounter   I, Diego Millard MD, personally saw the patient today and spent greater than 30 minutes discharging this patient.       Diego Millard MD  [facility], [place]  ______________________________________________________________________    Physical Exam   Vital Signs: Temp: 98.7  F (37.1  C) Temp src: Oral BP: 114/55 Pulse: 90 Heart Rate: 91 Resp: 18 SpO2: 97 % O2 Device: None (Room air)    Weight: 158 lbs 8.17 oz  Constitutional: awake, alert, cooperative, no apparent distress, and appears stated age  Hematologic / Lymphatic: no cervical lymphadenopathy  Respiratory: No increased work of breathing, good air exchange, clear to auscultation bilaterally, no crackles or wheezing  Cardiovascular: Normal apical impulse, regular rate and rhythm, normal S1 and S2, no S3 or S4, and no murmur noted  GI: No scars, normal bowel sounds, soft, non-distended, non-tender, no masses palpated, no hepatosplenomegally  Neurologic: Awake, alert, oriented to name, place and time.  Cranial nerves II-XII are grossly intact.  Motor is 5 out of 5 bilaterally.  Cerebellar finger to nose, heel to shin intact.  Sensory is intact.  Babinski down going, Romberg negative, and gait is normal.       Primary Care Physician   Flako Gaona    Discharge Orders      Home Care PT Referral for Hospital Discharge      Home care nursing referral      Home Care Social Service Referral for Hospital Discharge      Reason for your hospital stay    Patient was admitted to the hospital with sever anemia due to GI bleed likely subacute to chronic     Adult [facility] Follow-up and recommended labs and tests    Follow up with primary care provider, Flako Gaona, within 7 days for hospital follow- up.  The following labs/tests are recommended: CBC.      Appointments on [place] and/or [facility] (with [facility] or [facility] provider or service).  Call [phone] if you haven't heard regarding these appointments within 7 days of discharge.     Follow Up and recommended labs and tests    Follow up with gastroenterology/ Hepatology  , at (location with clinic name or city) , within 4 wks after discharge  to evaluate treatment change. The following labs/tests are recommended: CBC.     Activity    Your activity upon discharge: activity as tolerated     MD face to face encounter    Documentation of Face to Face and Certification for Home Health Services    I certify that patient: Yenifer Maher is under my care and that I, or a nurse practitioner or physician's assistant working with me, had a face-to-face encounter that meets the physician face-to-face encounter requirements with this patient on: 3/31/2020.    This encounter with the patient was in whole, or in part, for the following medical condition, which is the primary reason for home health care: complex medical care.    I certify that, based on my findings, the following services are medically necessary home health services: Nursing and Physical Therapy.    My clinical findings support the need for the above services because: Nurse is needed: To provide assessment and oversight required in the home to assure adherence to the medical plan due to: complex medical care. and Physical Therapy Services are needed to assess and treat the following functional impairments: decreased current level of function.    Further, I certify that my clinical findings support that this patient is homebound (i.e. absences from home require considerable and taxing effort and are for medical reasons or [facility] services or infrequently or of short duration when for other reasons) because: Requires assistance of another person or specialized equipment to access medical services because patient: Requires supervision of another for safe transfer...    Based on the above findings. I certify that this patient is confined to the  home and needs intermittent skilled nursing care, physical therapy and/or speech therapy.  The patient is under my care, and I have initiated the establishment of the plan of care.  This patient will be followed by a physician who will periodically review the plan of care.  Physician/Provider to provide follow up care: Flako Gaona    Attending hospital physician (the Medicare certified [facility] provider): Diego Millard MD  Physician Signature: See electronic signature associated with these discharge orders.  Date: 3/31/2020     Full Code     Diet    Follow this diet upon discharge: Orders Placed This Encounter      Regular Diet Adult       Significant Results and Procedures   Most Recent 3 CBC's:  Recent Labs   Lab Test 04/01/20  0342 03/31/20  0539 03/30/20  0558   WBC 7.4 7.1 7.9   HGB 7.6* 7.3* 7.3*   MCV 92 92 90    219 182     Most Recent 3 BMP's:  Recent Labs   Lab Test 04/01/20 0342 03/31/20  0539 03/30/20  0558    137 135   POTASSIUM 3.6 3.9 3.7   CHLORIDE 104 109 107   CO2 24 21 22   BUN 7 6* 6*   CR 0.56 0.55 0.47*   ANIONGAP 6 7 5   MARKO 8.0* 7.6* 7.4*   * 102* 105*       Discharge Medications   Current Discharge Medication List      START taking these medications    Details   azithromycin (ZITHROMAX) 500 MG tablet Take 1 tablet (500 mg) by mouth daily for 4 days  Qty: 4 tablet, Refills: 0    Associated Diagnoses: Cough      ferrous sulfate (FEROSUL) 325 (65 Fe) MG tablet Take 1 tablet (325 mg) by mouth 3 times daily (with meals)  Qty: 90 tablet, Refills: 0    Associated Diagnoses: Anemia due to blood loss, acute      pantoprazole (PROTONIX) 40 MG EC tablet Take 1 tablet (40 mg) by mouth 2 times daily (before meals)  Qty: 60 tablet, Refills: 0    Associated Diagnoses: Gastrointestinal hemorrhage, unspecified gastrointestinal hemorrhage type      vitamin C (ASCORBIC ACID) 500 MG tablet Take 1 tablet (500 mg) by mouth daily  Qty: 30 tablet, Refills: 0    Associated Diagnoses: Anemia due  to blood loss, acute         CONTINUE these medications which have NOT CHANGED    Details   calcium carbonate (TUMS) 500 MG chewable tablet Take 1-2 chew tab by mouth as needed for heartburn      carvedilol (COREG) 6.25 MG tablet Take 1 tablet (6.25 mg) by mouth 2 times daily (with meals)  Qty: 60 tablet, Refills: 0    Associated Diagnoses: UGIB (upper gastrointestinal bleed)      folic acid (FOLVITE) 1 MG tablet Take 1 tablet (1 mg) by mouth daily  Qty: 30 tablet, Refills: 0    Associated Diagnoses: Alcohol abuse      furosemide (LASIX) 20 MG tablet Take 1 tablet (20 mg) by mouth daily  Qty: 90 tablet, Refills: 3    Associated Diagnoses: Alcohol abuse      lactulose (CHRONULAC) 10 GM/15ML solution Take 15 mLs (10 g) by mouth 2 times daily  Qty: 946 mL, Refills: 0    Associated Diagnoses: UGIB (upper gastrointestinal bleed)      magnesium oxide (MAG-OX) 400 (240 Mg) MG tablet Take 1 tablet (400 mg) by mouth daily  Qty: 30 tablet, Refills: 0    Associated Diagnoses: Hypomagnesemia      multivitamin, therapeutic (THERA-VIT) TABS tablet Take 1 tablet by mouth daily  Qty: 30 tablet, Refills: 0    Associated Diagnoses: UGIB (upper gastrointestinal bleed)      nortriptyline (PAMELOR) 10 MG capsule Take 1 capsule (10 mg) by mouth At Bedtime  Qty: 30 capsule, Refills: 0    Associated Diagnoses: Neuropathy      omeprazole (PRILOSEC) 40 MG DR capsule Take 1 tab twice daily until 19, then 1 tab daily  Qty: 112 capsule, Refills: 0    Associated Diagnoses: Upper GI bleed      pregabalin (LYRICA) 50 MG capsule Take 1 capsule (50 mg) by mouth 3 times daily  Qty: 30 capsule, Refills: 0    Associated Diagnoses: Neuropathy      Prenatal Vit-Fe Fumarate-FA (PRENATAL MULTIVITAMIN W/IRON) 27-0.8 MG tablet Take 1 tablet by mouth daily  Qty: 30 tablet, Refills: 0    Associated Diagnoses: Screening, , for malformation by ultrasound      sodium-potassium bicarbonate (FRIDA-SELTZER GOLD) TBEF solu-tab Take 1-2 tablets by mouth 2  times daily as needed for heartburn      spironolactone (ALDACTONE) 50 MG tablet Take 1 tablet (50 mg) by mouth daily  Qty: 90 tablet, Refills: 3    Associated Diagnoses: Alcoholic hepatitis with ascites      vitamin (B COMPLEX-C) tablet Take 1 tablet by mouth daily  Qty: 30 tablet, Refills: 0    Associated Diagnoses: Screening, , for malformation by ultrasound      vitamin B1 (THIAMINE) 100 MG tablet Take 1 tablet (100 mg) by mouth daily  Qty: 30 tablet, Refills: 0    Associated Diagnoses: Alcohol abuse      fexofenadine (ALLEGRA) 180 MG tablet Take 1 tablet (180 mg) by mouth daily as needed for allergies  Qty: 30 tablet, Refills: 0    Associated Diagnoses: Seasonal allergic rhinitis, unspecified trigger      polyethylene glycol (MIRALAX/GLYCOLAX) packet Take 17 g by mouth daily as needed for constipation  Qty: 10 packet, Refills: 0    Associated Diagnoses: Constipation, unspecified constipation type           Allergies   Allergies   Allergen Reactions     Bengay Pain Relief [Menthol] Other (See Comments)     Skin turns red and feels extremely hot     Cats      Chocolate Dermatitis     Dicyclomine Other (See Comments)     Severe sedation     Dust Mites      Derm, resp     No Clinical Screening - See Comments      GI upset     Phenobarbital      Pollen Extract      Derm, resp.

## 2020-04-01 NOTE — PROGRESS NOTES
Assumed care 1500 to discharge at 1658. Pt alert and oriented. Vital signs stable on room air. Pt denies pain, SOB and nausea. No skin issues. Pt SBA. PT IV's removed. Discharge paperwork given to pt. Pt signed Discharge paperwork and placed in chart. Pt wheeled out to [email or web address] car. All belongings and discharge medication sent with pt.

## 2020-04-01 NOTE — PLAN OF CARE
Pt AOx4, VSS on RA. Denies pain & nausea. Pt states she had 4-5 BM's overnight. Resting overnight. No other acute events. Continue to monitor & follow POC.

## 2020-04-01 NOTE — PLAN OF CARE
Discharge Planner PT   5B -   Patient plan for discharge: Home with assist from [facility] in evenings & HH PT.  Current status: Pt more relaxed, improved safety awareness. Desats to 81% on RA with stair ascent - notified pt and RN.  Barriers to return to prior living situation: Medical needs, current mobility / level of A, lightheadedness, stairs to enter/within home.  Recommendations for discharge: Anticipate home with assist and HH PT after symptoms improve. (Pt indicates she had a positive experience with her prior HH PT and would like same company but can't remember the name)  Rationale for recommendations: Current level of function, anticipated progress. Pt limited by lightheadedness this date though anticipate once resolved pt's mobility will progress.

## 2020-04-01 NOTE — PROGRESS NOTES
[facility]  Spoke with patient to discuss plans for HC. Patient to be discharged home likely today 4/1/20 and has agreed to have [facility] follow with RN, PT, and SW. Patient care support center processing referral. Patient verbalized understanding that initial visit is scheduled for 4/2 or 4/3 (or within 48 hrs of discharge). Patient has 24 hour phone number for [facility] for any questions or concerns.    Romy Yuan RN   [facility] Liaison   [phone]

## 2020-04-01 NOTE — PROGRESS NOTES
04/01/20 1100   Signing Clinician's Name / Credentials   Signing clinician's name / credentials Jeannine Lawp, DPT   Dynamic Gait Index (Adolph and Matthew Peter, 1995)   Gait Level Surface 2   Change in Gait Speed 3   Gait and Horizontal Head Turns 2   Gait with Vertical Head Turns 3   Gait and Pivot Turns 2   Step Over Obstacle 3   Step Around Obstacles 3   Steps 2   Total Dynamic Gait Index Score  (A score of 19 or less has been correlated to an increased risk of falls in community dwelling older adults, patients with vestibular disorders, and patients with MS.)   Total Score (out of 24) 20   Dynamic Gait Index (DGI):The DGI is a measure of balance during gait that is reliable and valid for the elderly and individuals with Parkinson's disease, MS, vestibular disorders, or s/p stroke. Gait assistive device used: None    Patient score: 20/24  Scores ?19/24 indicate an increased risk for falls according to June et al 2000  Minimal Detectable Change = 2.9 in community dwelling elderly according to Deangelo et al 2011    Assessment (rationale for performing, application to patient s function & care plan): Although pt technically scoring just above increased fall risk, pt with intermittent lightheadedness (bp stable/increased) with initial stance and with initial gait and with prolonged gait, causing pt to reach out for UE support, lean onto stationary objects or take a seated rest break, thus pt agrees to use her 4 wheel walker at home and work with home PT to progress away from 4 wheel walker safely.  Minutes billed as physical performance test

## 2020-04-01 NOTE — PLAN OF CARE
"Blood pressure 114/55, pulse 90, temperature 98.7  F (37.1  C), temperature source Oral, resp. rate 18, height 1.702 m (5' 7\"), weight 71.9 kg (158 lb 8.2 oz), SpO2 97 %, RA at rest     Patient A & O X 4, VSS report fell better today. Received scheduled medications as ordered. Appetite fair. Patient up with SBA and walker , void adequate and report had bowel movement x 1. Patient have discharge order to go home patient aware . Provider report will communicate with SW regarding her ride. Continue grzegorz on as order put in and update MD with concerns.     "

## 2020-04-02 LAB
BACTERIA SPEC CULT: NO GROWTH
Lab: NORMAL
SPECIMEN SOURCE: NORMAL

## 2020-04-02 NOTE — PLAN OF CARE
Physical Therapy Discharge Summary    Reason for therapy discharge:    Discharged to home with home therapy.    Progress towards therapy goal(s). See goals on Care Plan in [facility] electronic health record for goal details.  Goals partially met.  Barriers to achieving goals:   discharge from facility.    Therapy recommendation(s):    Continued therapy is recommended.  Rationale/Recommendations:  for safe home assessment and to progress functional mobility.

## 2020-04-03 LAB
BACTERIA SPEC CULT: ABNORMAL
Lab: ABNORMAL
SPECIMEN SOURCE: ABNORMAL

## 2020-04-03 NOTE — PROGRESS NOTES
Patient was called three times and no answer so post 24 hr DC follow up calls will be closed out, message was left with contact number for department seen by or following up     Follow-up Appointments     Adult [facility] Follow-up and recommended labs and tests      Follow up with primary care provider, Flako Gaona, within 7 days for   hospital follow- up.  The following labs/tests are recommended: CBC.       Appointments on [place] and/or [facility] (with [facility] or [facility]   provider or service). Call [phone] if you haven't heard regarding   these appointments within 7 days of discharge.         Follow Up and recommended labs and tests      Follow up with gastroenterology/ Hepatology  , at (location with clinic   name or city) , within 4 wks after discharge  to evaluate treatment   change. The following labs/tests are recommended: CBC.

## 2020-04-05 LAB
BACTERIA SPEC CULT: NO GROWTH
BACTERIA SPEC CULT: NO GROWTH
Lab: NORMAL
SPECIMEN SOURCE: NORMAL
SPECIMEN SOURCE: NORMAL

## 2020-04-07 ENCOUNTER — APPOINTMENT (OUTPATIENT)
Dept: LAB | Facility: CLINIC | Age: 40
End: 2020-04-07
Attending: INTERNAL MEDICINE

## 2020-04-16 NOTE — TELEPHONE ENCOUNTER
RECORDS RECEIVED FROM: Internal - 4 week hospital follow up per discharge instructions from [facility] on 3/31/20   Appt Date: 05.05.2020   NOTES STATUS DETAILS   OFFICE NOTE from referring provider Internal 03.27.2020 Diego Millard MD     OFFICE NOTES from other specialists N/A    DISCHARGE SUMMARY from hospital Internal 12.23.2019 08.25.2019     MEDICATION LIST Internal    LIVER BIOSPY (IF APPLICABLE)      PATHOLOGY REPORTS  N/A    IMAGING     ENDOSCOPY (IF AVAILABLE) Internal 03.27.2020 12.24.2019 08.25.2019 08.09.2019     COLONOSCOPY (IF AVAILABLE) Received 09.06.2018   ULTRASOUND LIVER Internal 03.27.2020   CT OF ABDOMEN Internal 06.21.2019   MRI OF LIVER N/A    FIBROSCAN, US ELASTOGRAPHY, FIBROSIS SCAN, MR ELASTOGRAPHY N/A    LABS     HEPATIC PANEL (LIVER PANEL) In process  Internal 12.31.2019    10.08.2019   BASIC METABOLIC PANEL Internal 10.08.2019   COMPLETE METABOLIC PANEL Internal 04.01.2020   COMPLETE BLOOD COUNT (CBC) Internal 04.01.2020   INTERNATIONAL NORMALIZED RATIO (INR) Internal 04.01.2020   HEPATITIS C ANTIBODY N/A    HEPATITIS C VIRAL LOAD/PCR N/A    HEPATITIS C GENOTYPE N/A    HEPATITIS B SURFACE ANTIGEN N/A    HEPATITIS B SURFACE ANTIBODY N/A    HEPATITIS B DNA QUANT LEVEL N/A    HEPATITIS B CORE ANTIBODY N/A

## 2020-05-05 ENCOUNTER — PRE VISIT (OUTPATIENT)
Dept: GASTROENTEROLOGY | Facility: CLINIC | Age: 40
End: 2020-05-05

## 2020-05-05 ENCOUNTER — VIRTUAL VISIT (OUTPATIENT)
Dept: GASTROENTEROLOGY | Facility: CLINIC | Age: 40
End: 2020-05-05
Attending: INTERNAL MEDICINE
Payer: COMMERCIAL

## 2020-05-05 DIAGNOSIS — K70.30 ALCOHOLIC CIRRHOSIS OF LIVER WITHOUT ASCITES (H): Primary | ICD-10-CM

## 2020-05-05 NOTE — PROGRESS NOTES
"Yenifer Maher is a 40 year old female who is being evaluated via a billable telephone visit.      The patient has been notified of following:     \"This telephone visit will be conducted via a call between you and your physician/provider. We have found that certain health care needs can be provided without the need for a physical exam.  This service lets us provide the care you need with a short phone conversation.  If a prescription is necessary we can send it directly to your pharmacy.  If lab work is needed we can place an order for that and you can then stop by our lab to have the test done at a later time.    Telephone visits are billed at different rates depending on your insurance coverage. During this emergency period, for some insurers they may be billed the same as an in-person visit.  Please reach out to your insurance provider with any questions.    If during the course of the call the physician/provider feels a telephone visit is not appropriate, you will not be charged for this service.\"    Patient has given verbal consent for Telephone visit?  Yes    What phone number would you like to be contacted at? Cell phone    How would you like to obtain your AVS? By mail    I had the pleasure of seeing Yenifer Maher for followup in the Liver Clinic at the [facility] on 5/5/2020.  Ms. Maher returns for followup of alcoholic liver disease.  She had been diagnosed clinically with alcoholic hepatitis previously.  She unfortunately continues to drink at this time.  She sates that it is a small amount only.    She was recently hospitalized here with anemia.  She did admit on admission that she had been drinking.  She also recently had a chin laceration which she attributes to her kittens.     She denies any abdominal pain, itching or skin rash and has mild fatigue.  She does complain of some increased abdominal girth, but no lower extremity edema.      She denies any fevers or chills, " cough or shortness of breath.  She denies any nausea or vomiting and is having about 2 bowel movements per day.  Her appetite has been adequate, and her weight has been stable.     Current Outpatient Medications   Medication     calcium carbonate (TUMS) 500 MG chewable tablet     carvedilol (COREG) 6.25 MG tablet     fexofenadine (ALLEGRA) 180 MG tablet     folic acid (FOLVITE) 1 MG tablet     furosemide (LASIX) 20 MG tablet     lactulose (CHRONULAC) 10 GM/15ML solution     magnesium oxide (MAG-OX) 400 (240 Mg) MG tablet     multivitamin, therapeutic (THERA-VIT) TABS tablet     nortriptyline (PAMELOR) 10 MG capsule     omeprazole (PRILOSEC) 40 MG DR capsule     polyethylene glycol (MIRALAX/GLYCOLAX) packet     pregabalin (LYRICA) 50 MG capsule     Prenatal Vit-Fe Fumarate-FA (PRENATAL MULTIVITAMIN W/IRON) 27-0.8 MG tablet     sodium-potassium bicarbonate (RFIDA-SELTZER GOLD) TBEF solu-tab     spironolactone (ALDACTONE) 50 MG tablet     vitamin (B COMPLEX-C) tablet     vitamin B1 (THIAMINE) 100 MG tablet     No current facility-administered medications for this visit.      On physical exam, she appears to be in no acute distress.  Her affect was appropriate.    She will try to get blood test done in the next week.    My impression is that Ms. Maher has alcoholic cirrhosis.  I again strongly advised her to stop all alcohol use.  As I mentioned, we do have confirmation that she does have alcoholic cirrhosis.     As I mentioned, she will get blood work within the next week to follow-up on her anemia.  I also will try to get an ultrasound exam.     Thank you very much for allowing me to participate in the care of this patient.  If you have any questions regarding my recommendations, please do not hesitate to contact me.  My plan will be to see the patient back in the clinic in 6 months.         William Hernández MD      Professor of Medicine  [employer]      Executive Medical Director, Solid  Organ Transplant Program  [facility] Phone call duration: 15 minutes and 10 minutes for documentation

## 2020-06-10 ENCOUNTER — APPOINTMENT (OUTPATIENT)
Dept: CT IMAGING | Facility: CLINIC | Age: 40
End: 2020-06-10
Attending: FAMILY MEDICINE
Payer: COMMERCIAL

## 2020-06-10 ENCOUNTER — HOSPITAL ENCOUNTER (OUTPATIENT)
Facility: CLINIC | Age: 40
Setting detail: OBSERVATION
Discharge: HOME | End: 2020-06-11
Attending: FAMILY MEDICINE | Admitting: INTERNAL MEDICINE
Payer: COMMERCIAL

## 2020-06-10 ENCOUNTER — APPOINTMENT (OUTPATIENT)
Dept: GENERAL RADIOLOGY | Facility: CLINIC | Age: 40
End: 2020-06-10
Attending: FAMILY MEDICINE
Payer: COMMERCIAL

## 2020-06-10 DIAGNOSIS — K92.2 GASTROINTESTINAL HEMORRHAGE, UNSPECIFIED GASTROINTESTINAL HEMORRHAGE TYPE: ICD-10-CM

## 2020-06-10 DIAGNOSIS — R17 ELEVATED BILIRUBIN: ICD-10-CM

## 2020-06-10 DIAGNOSIS — D62 ANEMIA DUE TO BLOOD LOSS, ACUTE: ICD-10-CM

## 2020-06-10 DIAGNOSIS — R79.1 ELEVATED INR: ICD-10-CM

## 2020-06-10 DIAGNOSIS — R44.3 HALLUCINATIONS: ICD-10-CM

## 2020-06-10 DIAGNOSIS — K70.30 ALCOHOLIC CIRRHOSIS, UNSPECIFIED WHETHER ASCITES PRESENT (H): ICD-10-CM

## 2020-06-10 DIAGNOSIS — K70.31 ALCOHOLIC CIRRHOSIS OF LIVER WITH ASCITES (H): Primary | ICD-10-CM

## 2020-06-10 DIAGNOSIS — Z20.822 COVID-19 VIRUS NOT DETECTED: ICD-10-CM

## 2020-06-10 DIAGNOSIS — R29.6 FALLS FREQUENTLY: ICD-10-CM

## 2020-06-10 DIAGNOSIS — S09.93XD INJURY OF TONGUE, SUBSEQUENT ENCOUNTER: ICD-10-CM

## 2020-06-10 LAB
ALBUMIN SERPL-MCNC: 2.7 G/DL (ref 3.4–5)
ALP SERPL-CCNC: 172 U/L (ref 40–150)
ALT SERPL W P-5'-P-CCNC: 50 U/L (ref 0–50)
AMMONIA PLAS-SCNC: 14 UMOL/L (ref 10–50)
ANION GAP SERPL CALCULATED.3IONS-SCNC: 9 MMOL/L (ref 3–14)
APTT PPP: 45 SEC (ref 22–37)
AST SERPL W P-5'-P-CCNC: 281 U/L (ref 0–45)
BILIRUB SERPL-MCNC: 3.6 MG/DL (ref 0.2–1.3)
BUN SERPL-MCNC: 10 MG/DL (ref 7–30)
CALCIUM SERPL-MCNC: 9.9 MG/DL (ref 8.5–10.1)
CHLORIDE SERPL-SCNC: 103 MMOL/L (ref 94–109)
CO2 SERPL-SCNC: 20 MMOL/L (ref 20–32)
CREAT SERPL-MCNC: 0.59 MG/DL (ref 0.52–1.04)
DIFFERENTIAL METHOD BLD: ABNORMAL
ERYTHROCYTE [DISTWIDTH] IN BLOOD BY AUTOMATED COUNT: 20.5 % (ref 10–15)
ETHANOL SERPL-MCNC: 0.02 G/DL
GFR SERPL CREATININE-BSD FRML MDRD: >90 ML/MIN/{1.73_M2}
GLUCOSE SERPL-MCNC: 101 MG/DL (ref 70–99)
HCT VFR BLD AUTO: 27.8 % (ref 35–47)
HGB BLD-MCNC: 8.7 G/DL (ref 11.7–15.7)
INR PPP: 1.57 (ref 0.86–1.14)
MCH RBC QN AUTO: 28 PG (ref 26.5–33)
MCHC RBC AUTO-ENTMCNC: 31.3 G/DL (ref 31.5–36.5)
MCV RBC AUTO: 89 FL (ref 78–100)
PLATELET # BLD AUTO: 97 10E9/L (ref 150–450)
POTASSIUM SERPL-SCNC: 3.4 MMOL/L (ref 3.4–5.3)
PROT SERPL-MCNC: 8.6 G/DL (ref 6.8–8.8)
RBC # BLD AUTO: 3.11 10E12/L (ref 3.8–5.2)
SODIUM SERPL-SCNC: 132 MMOL/L (ref 133–144)
WBC # BLD AUTO: 5.8 10E9/L (ref 4–11)

## 2020-06-10 PROCEDURE — 85025 COMPLETE CBC W/AUTO DIFF WBC: CPT | Performed by: FAMILY MEDICINE

## 2020-06-10 PROCEDURE — U0003 INFECTIOUS AGENT DETECTION BY NUCLEIC ACID (DNA OR RNA); SEVERE ACUTE RESPIRATORY SYNDROME CORONAVIRUS 2 (SARS-COV-2) (CORONAVIRUS DISEASE [COVID-19]), AMPLIFIED PROBE TECHNIQUE, MAKING USE OF HIGH THROUGHPUT TECHNOLOGIES AS DESCRIBED BY CMS-2020-01-R: HCPCS | Performed by: FAMILY MEDICINE

## 2020-06-10 PROCEDURE — 85610 PROTHROMBIN TIME: CPT | Performed by: FAMILY MEDICINE

## 2020-06-10 PROCEDURE — 99207 ZZC CDG-MDM COMPONENT: MEETS MODERATE - UP CODED: CPT | Performed by: INTERNAL MEDICINE

## 2020-06-10 PROCEDURE — 99220 ZZC INITIAL OBSERVATION CARE,LEVL III: CPT | Performed by: INTERNAL MEDICINE

## 2020-06-10 PROCEDURE — 85730 THROMBOPLASTIN TIME PARTIAL: CPT | Performed by: FAMILY MEDICINE

## 2020-06-10 PROCEDURE — 99285 EMERGENCY DEPT VISIT HI MDM: CPT | Mod: 25 | Performed by: FAMILY MEDICINE

## 2020-06-10 PROCEDURE — 25800025 ZZH RX 258: Performed by: FAMILY MEDICINE

## 2020-06-10 PROCEDURE — 70486 CT MAXILLOFACIAL W/O DYE: CPT

## 2020-06-10 PROCEDURE — 73090 X-RAY EXAM OF FOREARM: CPT | Mod: LT

## 2020-06-10 PROCEDURE — 70450 CT HEAD/BRAIN W/O DYE: CPT

## 2020-06-10 PROCEDURE — 25000128 H RX IP 250 OP 636: Performed by: FAMILY MEDICINE

## 2020-06-10 PROCEDURE — 80320 DRUG SCREEN QUANTALCOHOLS: CPT | Performed by: FAMILY MEDICINE

## 2020-06-10 PROCEDURE — C9803 HOPD COVID-19 SPEC COLLECT: HCPCS | Performed by: FAMILY MEDICINE

## 2020-06-10 PROCEDURE — 80053 COMPREHEN METABOLIC PANEL: CPT | Performed by: FAMILY MEDICINE

## 2020-06-10 PROCEDURE — 82140 ASSAY OF AMMONIA: CPT | Performed by: FAMILY MEDICINE

## 2020-06-10 PROCEDURE — 99285 EMERGENCY DEPT VISIT HI MDM: CPT | Mod: Z6 | Performed by: FAMILY MEDICINE

## 2020-06-10 PROCEDURE — 25000125 ZZHC RX 250: Performed by: FAMILY MEDICINE

## 2020-06-10 PROCEDURE — 96365 THER/PROPH/DIAG IV INF INIT: CPT | Performed by: FAMILY MEDICINE

## 2020-06-10 RX ORDER — LIDOCAINE 40 MG/G
CREAM TOPICAL
Status: DISCONTINUED | OUTPATIENT
Start: 2020-06-10 | End: 2020-06-11

## 2020-06-10 RX ADMIN — FOLIC ACID: 5 INJECTION, SOLUTION INTRAMUSCULAR; INTRAVENOUS; SUBCUTANEOUS at 18:38

## 2020-06-10 ASSESSMENT — ENCOUNTER SYMPTOMS
HALLUCINATIONS: 1
DECREASED CONCENTRATION: 1
BRUISES/BLEEDS EASILY: 1
EYE REDNESS: 0
SEIZURES: 0
SORE THROAT: 0
COUGH: 0
COLOR CHANGE: 1
CHEST TIGHTNESS: 0
DYSURIA: 0
FATIGUE: 0
SHORTNESS OF BREATH: 0
FEVER: 0
ABDOMINAL DISTENTION: 0
NERVOUS/ANXIOUS: 0
BACK PAIN: 0
FLANK PAIN: 0
DYSPHORIC MOOD: 1
SLEEP DISTURBANCE: 1
ACTIVITY CHANGE: 0
HEADACHES: 0
VOICE CHANGE: 1
CHILLS: 0
RHINORRHEA: 0
DIARRHEA: 1
NECK STIFFNESS: 0
DIZZINESS: 0
WEAKNESS: 0
NAUSEA: 0
ARTHRALGIAS: 0
TROUBLE SWALLOWING: 0
FACIAL SWELLING: 1
DIFFICULTY URINATING: 0
ABDOMINAL PAIN: 0
HEMATURIA: 0
BLOOD IN STOOL: 0
CONFUSION: 0
VOMITING: 0

## 2020-06-10 ASSESSMENT — MIFFLIN-ST. JEOR: SCORE: 1370.35

## 2020-06-10 NOTE — ED PROVIDER NOTES
[facility] ([facility])  Татьяна 10, 2020  History     Chief Complaint   Patient presents with     Fall     HPI  Yenifer Maher is a 40 year old female who has a PMH of GERD, alcohol dependence, alcoholic cirrhosis with ascites, esophageal varices, hepatic gastropathy, and acute on chronic anemia, who presents to the Emergency Department after a fall a 2 days ago.  Patient states that she typically uses a walker and tripped.  States that she landed on her left arm and and possibly her chin and now has significant bruising to left arm and swelling to right jaw.  Denies further head injury or loss of consciousness.  Unsure if she broke a tooth, but has had some pain when attempting to eat so appetite is been limited.  She states that her tongue feels a bit swollen to her.  Reports that she is been having hallucinations after falling but none today.  Also felt dehydrated earlier in the week which is now improving with increased fluid intake.  She denies any systemic symptoms including fever, chills, cough, chest pain, shortness of breath sore throat, or rhinorrhea.  Denies diarrhea, nausea, vomiting or abdominal pain.  Denies any pain in her hips or difficulty ambulating.  Patient reports that she has chronic neuropathy in her hands and feet and does not feel any new significant impairment in her left arm/hand.  Patient states that she bruises easily and that she has had other falls in the past.    Other collateral information from guzman later noted patient with ongoing alcohol use frequent falls also hallucinations visual loss thinking that he was home last night but he was not patient seeing people patient also noted electronic devices were all activated last night.  He is concerned about her safety at home at this point in her ongoing alcohol use.  His her eyes are noted to be yellow now he is concerned that her liver tests are getting worse she has been in hospitals and TCU stay after  this.  Also would like a rule 25.        PAST MEDICAL HISTORY:   Past Medical History:   Diagnosis Date     Alcohol abuse      Alcoholic cirrhosis (H)      Alcoholic peripheral neuropathy (H)      Coagulopathy (H)      Gastritis      History of Clostridium difficile colitis     unknown date     Impairment of cognitive function     MOCA 2/2019 22/30     Leukocytosis 02/2019    persistent leukocytosis across 2/2019 hospitalization without evidence of source across multiple diagnostics including LP, BCx, UCx      Lupus anticoagulant positive      Macrocytic anemia      Moderate protein-calorie malnutrition (H)      Paroxysmal A-fib (H) 02/2019    not on chronic anticoagulation     Peptic ulcer disease      Positive SMILEY (antinuclear antibody)      Subclinical hypothyroidism 02/2019    normal T3, T4     Tobacco dependence     0.5 PPD       PAST SURGICAL HISTORY:   Past Surgical History:   Procedure Laterality Date     ESOPHAGOSCOPY, GASTROSCOPY, DUODENOSCOPY (EGD), COMBINED N/A 8/9/2019    Procedure: ESOPHAGOGASTRODUODENOSCOPY (EGD);  Surgeon: Sowmya Ibanez MD;  [facility]     ESOPHAGOSCOPY, GASTROSCOPY, DUODENOSCOPY (EGD), COMBINED N/A 8/26/2019    Procedure: ESOPHAGOGASTRODUODENOSCOPY (EGD);  Surgeon: Leventhal, Thomas Michael, MD;  Location: [facility]     ESOPHAGOSCOPY, GASTROSCOPY, DUODENOSCOPY (EGD), COMBINED N/A 3/30/2020    Procedure: ESOPHAGOGASTRODUODENOSCOPY (EGD);  Surgeon: Sowmya Ibanez MD;  Location: [facility]     UPPER GI ENDOSCOPY  8/9/2019            Past medical history, past surgical history, medications, and allergies were reviewed with the patient. Additional pertinent items: None    FAMILY HISTORY: No family history on file.    SOCIAL HISTORY:   Social History     Tobacco Use     Smoking status: Current Some Day Smoker     Types: Cigarettes     Smokeless tobacco: Never Used     Tobacco comment: 6-8 per day   Substance Use Topics     Alcohol use: Yes     Alcohol/week: 2.0  standard drinks     Types: 2 Glasses of wine per week     Frequency: 2-3 times a week     Social history was reviewed with the patient. Additional pertinent items: None      Patient's Medications   New Prescriptions    No medications on file   Previous Medications    CALCIUM CARBONATE (TUMS) 500 MG CHEWABLE TABLET    Take 1-2 chew tab by mouth as needed for heartburn    CARVEDILOL (COREG) 6.25 MG TABLET    Take 1 tablet (6.25 mg) by mouth 2 times daily (with meals)    FEXOFENADINE (ALLEGRA) 180 MG TABLET    Take 1 tablet (180 mg) by mouth daily as needed for allergies    FOLIC ACID (FOLVITE) 1 MG TABLET    Take 1 tablet (1 mg) by mouth daily    FUROSEMIDE (LASIX) 20 MG TABLET    Take 1 tablet (20 mg) by mouth daily    LACTULOSE (CHRONULAC) 10 GM/15ML SOLUTION    Take 15 mLs (10 g) by mouth 2 times daily    MAGNESIUM OXIDE (MAG-OX) 400 (240 MG) MG TABLET    Take 1 tablet (400 mg) by mouth daily    MULTIVITAMIN, THERAPEUTIC (THERA-VIT) TABS TABLET    Take 1 tablet by mouth daily    NORTRIPTYLINE (PAMELOR) 10 MG CAPSULE    Take 1 capsule (10 mg) by mouth At Bedtime    OMEPRAZOLE (PRILOSEC) 40 MG DR CAPSULE    Take 1 tab twice daily until 9/2/19, then 1 tab daily    POLYETHYLENE GLYCOL (MIRALAX/GLYCOLAX) PACKET    Take 17 g by mouth daily as needed for constipation    PREGABALIN (LYRICA) 50 MG CAPSULE    Take 1 capsule (50 mg) by mouth 3 times daily    PRENATAL VIT-FE FUMARATE-FA (PRENATAL MULTIVITAMIN W/IRON) 27-0.8 MG TABLET    Take 1 tablet by mouth daily    SODIUM-POTASSIUM BICARBONATE (FRIDA-SELTZER GOLD) TBEF SOLU-TAB    Take 1-2 tablets by mouth 2 times daily as needed for heartburn    SPIRONOLACTONE (ALDACTONE) 50 MG TABLET    Take 1 tablet (50 mg) by mouth daily    VITAMIN (B COMPLEX-C) TABLET    Take 1 tablet by mouth daily    VITAMIN B1 (THIAMINE) 100 MG TABLET    Take 1 tablet (100 mg) by mouth daily   Modified Medications    No medications on file   Discontinued Medications    No medications on file         "  Allergies   Allergen Reactions     Bengay Pain Relief [Menthol] Other (See Comments)     Skin turns red and feels extremely hot     Cats      Chocolate Dermatitis     Dicyclomine Other (See Comments)     Severe sedation     Dust Mites      Derm, resp     No Clinical Screening - See Comments      GI upset     Phenobarbital      Pollen Extract      Derm, resp.         Review of Systems   Constitutional: Negative for activity change, chills, fatigue and fever.   HENT: Positive for dental problem (tongue swelling), facial swelling and voice change (change with swollen tongue). Negative for congestion, nosebleeds, rhinorrhea, sore throat and trouble swallowing.    Eyes: Negative for redness and visual disturbance.   Respiratory: Negative for cough, chest tightness and shortness of breath.    Cardiovascular: Negative for chest pain and leg swelling.   Gastrointestinal: Positive for diarrhea (chronic). Negative for abdominal distention, abdominal pain, blood in stool, nausea and vomiting.   Genitourinary: Negative for difficulty urinating, dysuria, flank pain and hematuria.   Musculoskeletal: Negative for arthralgias, back pain, gait problem and neck stiffness.   Skin: Positive for color change (bruising on L arm and chin).   Neurological: Negative for dizziness, seizures, syncope, weakness and headaches.   Hematological: Bruises/bleeds easily.   Psychiatric/Behavioral: Positive for decreased concentration, dysphoric mood, hallucinations (not current) and sleep disturbance. Negative for confusion and suicidal ideas. The patient is not nervous/anxious.    All other systems reviewed and are negative.    A complete review of systems was performed with pertinent positives and negatives noted in the HPI, and all other systems negative.       Physical Exam   BP: (!) 147/81  Pulse: 111  Temp: 97.6  F (36.4  C)  Resp: 16  Height: 170.2 cm (5' 7\")  Weight: 65.8 kg (145 lb)  SpO2: 96 %      Physical Exam  Vitals signs and nursing " note reviewed.   Constitutional:       General: She is in acute distress.      Appearance: She is well-developed. She is ill-appearing (chronic). She is not toxic-appearing or diaphoretic.   HENT:      Head: Normocephalic.      Ears:      Comments: Neg sandoval sign       Nose: Nose normal. No congestion or rhinorrhea.      Mouth/Throat:      Mouth: Mucous membranes are moist.      Pharynx: Posterior oropharyngeal erythema present.      Comments: Patient noted with confluent pink tongue swelling and bruising throughout with some subtle bleeding noted but no pulsatile bleeding seen.  Patient's airway is intact otherwise no posterior swelling no swelling of the floor of the mouth.  Patient does have a dressing over her chin laceration repaired previous  Eyes:      General: Scleral icterus present.      Extraocular Movements: Extraocular movements intact.      Pupils: Pupils are equal, round, and reactive to light.   Neck:      Musculoskeletal: Normal range of motion and neck supple. No neck rigidity or muscular tenderness.   Cardiovascular:      Rate and Rhythm: Regular rhythm. Tachycardia present.   Pulmonary:      Effort: No respiratory distress.      Breath sounds: Normal breath sounds. No rales.   Abdominal:      General: There is distension (mild).      Palpations: Abdomen is soft. There is no mass.      Tenderness: There is no abdominal tenderness. There is no guarding or rebound.      Hernia: No hernia is present.   Musculoskeletal:         General: Swelling and tenderness present.      Comments: Left forearm with swelling and bruising with distal cms intact     Skin:     General: Skin is warm and dry.      Capillary Refill: Capillary refill takes less than 2 seconds.      Coloration: Skin is not pale.      Findings: Bruising (left inner forearm) present. No erythema or rash.   Neurological:      General: No focal deficit present.      Mental Status: She is alert and oriented to person, place, and time. Mental  status is at baseline.      Gait: Gait abnormal (uses walker).   Psychiatric:         Behavior: Behavior normal.         Thought Content: Thought content normal.      Comments: Flat affect not delusional or si or hi         ED Course   3:08 PM  The patient was seen and examined by Pedro Oviedo MD in Room ED02.       Procedures        Patient valuated in the ER.  Initially we did give much history at that she had fallen she had some bruising is unclear why she came to the ER further review records in epic notes alcoholic cirrhosis.  At this point this did explain her frequent falls and extensive bruising.  IV established labs drawn.  Patient did receive a banana bag down here in the ER.  Currently is not displaying any sign of intoxication or significant withdrawal type symptoms no active hallucinations is not delusional not suicidal homicidal.    Patient had a head CT along with maxillofacial did not reveal any acute findings of fractures etc.  Labs reviewed.  Patient's alcohol is 0.02.  White count 5.8 hemoglobin 8.7 which is stable platelets are 97.  INR is 1.57.  Sodium 132.  Glucose 101.  Total bilirubin is elevated 3.6.  Along with this alk phos 172.  AST is 281.  Patient's ammonia noted to be 14.    Head CT along with CT maxillofacial as noted without acute findings fracture etc.    Further formation then obtained from patient's fiancé who is very concerned about her at home she is been drinking would like rule 25 frequent falls hallucinations she is home alone by herself a lot at times.  Also concerned of worsening liver issues in her etc.  At this point patient finally did agree to staying overnight will admit to medicine for further evaluation of elevated liver function test with frequent falls hallucinations at this point I do not think she is going through withdrawal may be part of her liver disease she does not show elevated ammonia does not appear markedly encephalopathic at this point.  Boyfriend is  also hoping for rule 25.  Also would consider PT OT evaluation for fall risk etc.                Results for orders placed or performed during the hospital encounter of 06/10/20 (from the past 24 hour(s))   XR Forearm Port Left 2 Views    Narrative    2 views left forearm radiographs 6/10/2020 4:05 PM    History: fall with bruising.      Comparison: None available.    Findings:    AP and lateral views of the left forearm were obtained.     No acute osseous abnormality.      Elbow and wrist joints are incompletely assessed but grossly  congruent. No substantial degenerative change.    Subcutaneous soft tissue stranding at the ulnar and dorsal aspect.      Impression    Impression:  1. No acute osseous abnormality.  2. Subcutaneous soft tissue stranding at the ulnar and dorsal aspect.    SKIP FORTUNE   CBC with platelets differential   Result Value Ref Range    WBC 5.8 4.0 - 11.0 10e9/L    RBC Count 3.11 (L) 3.8 - 5.2 10e12/L    Hemoglobin 8.7 (L) 11.7 - 15.7 g/dL    Hematocrit 27.8 (L) 35.0 - 47.0 %    MCV 89 78 - 100 fl    MCH 28.0 26.5 - 33.0 pg    MCHC 31.3 (L) 31.5 - 36.5 g/dL    RDW 20.5 (H) 10.0 - 15.0 %    Platelet Count 97 (L) 150 - 450 10e9/L    Diff Method Automated Method    Partial thromboplastin time   Result Value Ref Range    PTT 45 (H) 22 - 37 sec   INR   Result Value Ref Range    INR 1.57 (H) 0.86 - 1.14   Comprehensive metabolic panel   Result Value Ref Range    Sodium 132 (L) 133 - 144 mmol/L    Potassium 3.4 3.4 - 5.3 mmol/L    Chloride 103 94 - 109 mmol/L    Carbon Dioxide 20 20 - 32 mmol/L    Anion Gap 9 3 - 14 mmol/L    Glucose 101 (H) 70 - 99 mg/dL    Urea Nitrogen 10 7 - 30 mg/dL    Creatinine 0.59 0.52 - 1.04 mg/dL    GFR Estimate >90 >60 mL/min/[1.73_m2]    GFR Estimate If Black >90 >60 mL/min/[1.73_m2]    Calcium 9.9 8.5 - 10.1 mg/dL    Bilirubin Total 3.6 (H) 0.2 - 1.3 mg/dL    Albumin 2.7 (L) 3.4 - 5.0 g/dL    Protein Total 8.6 6.8 - 8.8 g/dL    Alkaline Phosphatase 172 (H) 40 - 150  U/L    ALT 50 0 - 50 U/L     (H) 0 - 45 U/L   Alcohol ethyl   Result Value Ref Range    Ethanol g/dL 0.02 (H) <0.01 g/dL   CT Head w/o Contrast    Narrative    CT HEAD W/O CONTRAST 6/10/2020 5:29 PM    Provided History: Fall with some confusion  ICD-10:    Comparison: None.    Technique: Using multidetector thin collimation helical acquisition  technique, axial, coronal and sagittal CT images from the skull base  to the vertex were obtained without intravenous contrast.     Findings:      No intracranial hemorrhage. No mass effect. No midline shift. No  extra-axial fluid collection. The gray to white matter differentiation  of the cerebral hemispheres is preserved. Ventricles are proportionate  to the sulci.. No sulcal effacement..  The basal cisterns are patent.    The visualized paranasal sinuses are clear. The mastoid air cells are  clear. Orbits appear unremarkable. No acute fracture..      Impression    Impression: No acute intracranial pathology.    I have personally reviewed the examination and initial interpretation  and I agree with the findings.    TALISHA GARCIA MD   CT Facial Bones without Contrast    Narrative    CT FACIAL BONES WITHOUT CONTRAST 6/10/2020 5:30 PM    History:  facial trauma eval    Comparison:  None      Technique: Using thin collimation multidetector helical acquisition  technique, axial and coronal thin section CT images were reconstructed  through the facial bones. Images were reviewed in bone and soft tissue  windows.    Findings:    Exam partially limited by streak artifact.    Mild thickening of the left perimandibular soft tissue with  hypoattenuating focus measuring up to 1.5 cm. No evidence of  underlying osseous deformity. There is no evident fracture of the  facial bones. The cribriform plate appears intact. Alignment of the  facial bones appears normal.     There is no hematoma, soft tissue mass or gas visualized within the  orbits. Mild thickening of the left  maxillary sinus.. Mastoid air  cells appear clear. Debris in the right external auditory canal. Orbit  appears unremarkable.      Impression    Impression:   1. Mild thickening in the left perimandibular soft tissue with  associated hematoma.   2. No acute osseous abnormality.    I have personally reviewed the examination and initial interpretation  and I agree with the findings.    TALISHA GARCIA MD   Ammonia (on ice)   Result Value Ref Range    Ammonia 14 10 - 50 umol/L     Medications   lidocaine 1 % 0.1-1 mL (has no administration in time range)   lidocaine (LMX4) cream (has no administration in time range)   sodium chloride (PF) 0.9% PF flush 3 mL (has no administration in time range)   sodium chloride (PF) 0.9% PF flush 3 mL ( Intracatheter Canceled Entry 6/10/20 1821)   dextrose 5% and 0.45% NaCl 1,000 mL with Infuvite Adult 10 mL, thiamine 100 mg, folic acid 1 mg infusion ( Intravenous Stopped 6/10/20 1930)      Results for orders placed or performed during the hospital encounter of 06/10/20   XR Forearm Port Left 2 Views     Status: None    Narrative    2 views left forearm radiographs 6/10/2020 4:05 PM    History: fall with bruising.      Comparison: None available.    Findings:    AP and lateral views of the left forearm were obtained.     No acute osseous abnormality.      Elbow and wrist joints are incompletely assessed but grossly  congruent. No substantial degenerative change.    Subcutaneous soft tissue stranding at the ulnar and dorsal aspect.      Impression    Impression:  1. No acute osseous abnormality.  2. Subcutaneous soft tissue stranding at the ulnar and dorsal aspect.    SKIP FORTUNE   CT Head w/o Contrast     Status: None    Narrative    CT HEAD W/O CONTRAST 6/10/2020 5:29 PM    Provided History: Fall with some confusion  ICD-10:    Comparison: None.    Technique: Using multidetector thin collimation helical acquisition  technique, axial, coronal and sagittal CT images from the  skull base  to the vertex were obtained without intravenous contrast.     Findings:      No intracranial hemorrhage. No mass effect. No midline shift. No  extra-axial fluid collection. The gray to white matter differentiation  of the cerebral hemispheres is preserved. Ventricles are proportionate  to the sulci.. No sulcal effacement..  The basal cisterns are patent.    The visualized paranasal sinuses are clear. The mastoid air cells are  clear. Orbits appear unremarkable. No acute fracture..      Impression    Impression: No acute intracranial pathology.    I have personally reviewed the examination and initial interpretation  and I agree with the findings.    TALISHA GARCIA MD   CT Facial Bones without Contrast     Status: None    Narrative    CT FACIAL BONES WITHOUT CONTRAST 6/10/2020 5:30 PM    History:  facial trauma eval    Comparison:  None      Technique: Using thin collimation multidetector helical acquisition  technique, axial and coronal thin section CT images were reconstructed  through the facial bones. Images were reviewed in bone and soft tissue  windows.    Findings:    Exam partially limited by streak artifact.    Mild thickening of the left perimandibular soft tissue with  hypoattenuating focus measuring up to 1.5 cm. No evidence of  underlying osseous deformity. There is no evident fracture of the  facial bones. The cribriform plate appears intact. Alignment of the  facial bones appears normal.     There is no hematoma, soft tissue mass or gas visualized within the  orbits. Mild thickening of the left maxillary sinus.. Mastoid air  cells appear clear. Debris in the right external auditory canal. Orbit  appears unremarkable.      Impression    Impression:   1. Mild thickening in the left perimandibular soft tissue with  associated hematoma.   2. No acute osseous abnormality.    I have personally reviewed the examination and initial interpretation  and I agree with the findings.    TALISHA RIGGS  MD RADHA   CBC with platelets differential     Status: Abnormal   Result Value Ref Range    WBC 5.8 4.0 - 11.0 10e9/L    RBC Count 3.11 (L) 3.8 - 5.2 10e12/L    Hemoglobin 8.7 (L) 11.7 - 15.7 g/dL    Hematocrit 27.8 (L) 35.0 - 47.0 %    MCV 89 78 - 100 fl    MCH 28.0 26.5 - 33.0 pg    MCHC 31.3 (L) 31.5 - 36.5 g/dL    RDW 20.5 (H) 10.0 - 15.0 %    Platelet Count 97 (L) 150 - 450 10e9/L    Diff Method Automated Method    Partial thromboplastin time     Status: Abnormal   Result Value Ref Range    PTT 45 (H) 22 - 37 sec   INR     Status: Abnormal   Result Value Ref Range    INR 1.57 (H) 0.86 - 1.14   Comprehensive metabolic panel     Status: Abnormal   Result Value Ref Range    Sodium 132 (L) 133 - 144 mmol/L    Potassium 3.4 3.4 - 5.3 mmol/L    Chloride 103 94 - 109 mmol/L    Carbon Dioxide 20 20 - 32 mmol/L    Anion Gap 9 3 - 14 mmol/L    Glucose 101 (H) 70 - 99 mg/dL    Urea Nitrogen 10 7 - 30 mg/dL    Creatinine 0.59 0.52 - 1.04 mg/dL    GFR Estimate >90 >60 mL/min/[1.73_m2]    GFR Estimate If Black >90 >60 mL/min/[1.73_m2]    Calcium 9.9 8.5 - 10.1 mg/dL    Bilirubin Total 3.6 (H) 0.2 - 1.3 mg/dL    Albumin 2.7 (L) 3.4 - 5.0 g/dL    Protein Total 8.6 6.8 - 8.8 g/dL    Alkaline Phosphatase 172 (H) 40 - 150 U/L    ALT 50 0 - 50 U/L     (H) 0 - 45 U/L   Ammonia (on ice)     Status: None   Result Value Ref Range    Ammonia 14 10 - 50 umol/L   Alcohol ethyl     Status: Abnormal   Result Value Ref Range    Ethanol g/dL 0.02 (H) <0.01 g/dL            Assessments & Plan (with Medical Decision Making)  40-year-old female history of alcoholic cirrhosis who is still drinking on and off presents the ER with falls with left arm bruising and chin injury previously with tongue swelling.  No respiratory concerns at all.  Patient did state that her mother was concerned that she had some hallucinations a few days ago she states this is when it gets hot in their apartment she is dehydrated.  Later information per  fiancé noted he is concerned ongoing drinking worsening liver disease frequent falls hallucinations does not feel she is safe at home.  Labs drawn and reviewed did note elevated total bilirubin to 3.6.  Patient's ammonia was 14 alcohol 0.02.  No active withdrawal seen patient was given a banana bag in case there was any Warnicke's encephalopathy going on.  After discussion with patient and fiancé talking to patient patient will stay overnight talk to medicine and get this point concerns of safety with fall risk worsening liver disease ongoing alcohol use with a mild coagulopathy and also with her tongue swelling I talked to oral surgery.  They would be available for consultation if needed.  This point patient does not have any airway concerns is been stable for last few days.  Will treat symptomatically at this point.         I have reviewed the nursing notes.    I have reviewed the findings, diagnosis, plan and need for follow up with the patient.    New Prescriptions    No medications on file       Final diagnoses:   Falls frequently   Alcoholic cirrhosis, unspecified whether ascites present (H)   Elevated bilirubin   Hallucinations   Injury of tongue, subsequent encounter   Elevated INR     IDakota, am serving as a trained medical scribe to document services personally performed by Pedro Oviedo MD, based on the provider's statements to me.   Ivelisse BELLE MD, was physically present and have reviewed and verified the accuracy of this note documented by Dakota Nickerson.     6/10/2020   [facility], [facility] EMERGENCY DEPARTMENT    This note was created at least in part by the use of dragon voice dictation system. Inadvertent typographical errors may still exist.  Pedro Oviedo MD.    Patient evaluated in the emergency department during the COVID-19 pandemic period. Careful attention to patients safety was addressed throughout the evaluation. Evaluation and treatment management was initiated with disposition made  efficiently and appropriate as possible to minimize any risk of potential exposure to patient during this evaluation.       Pedro Oviedo MD  06/10/20 [id number]

## 2020-06-10 NOTE — ED TRIAGE NOTES
Patient presents to triage c/o fall a few days ago. She typically uses a walker, and tripped. She hit her left arm and chin. She has significant bruising on left arm and swelling to right jaw. Unsure if she broke any teeth. She also reports that she was having hallucinations after falling, but has not had any today.

## 2020-06-11 ENCOUNTER — APPOINTMENT (OUTPATIENT)
Dept: OCCUPATIONAL THERAPY | Facility: CLINIC | Age: 40
End: 2020-06-11
Attending: INTERNAL MEDICINE
Payer: COMMERCIAL

## 2020-06-11 ENCOUNTER — PATIENT OUTREACH (OUTPATIENT)
Dept: CARE COORDINATION | Facility: CLINIC | Age: 40
End: 2020-06-11

## 2020-06-11 VITALS
BODY MASS INDEX: 22 KG/M2 | SYSTOLIC BLOOD PRESSURE: 128 MMHG | OXYGEN SATURATION: 95 % | HEIGHT: 67 IN | RESPIRATION RATE: 16 BRPM | DIASTOLIC BLOOD PRESSURE: 89 MMHG | TEMPERATURE: 98.4 F | HEART RATE: 89 BPM | WEIGHT: 140.2 LBS
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
## 2020-06-11 LAB
ALBUMIN SERPL-MCNC: 2.5 G/DL (ref 3.4–5)
ALP SERPL-CCNC: 150 U/L (ref 40–150)
ALT SERPL W P-5'-P-CCNC: 44 U/L (ref 0–50)
ANION GAP SERPL CALCULATED.3IONS-SCNC: 8 MMOL/L (ref 3–14)
AST SERPL W P-5'-P-CCNC: 225 U/L (ref 0–45)
BILIRUB SERPL-MCNC: 3.7 MG/DL (ref 0.2–1.3)
BUN SERPL-MCNC: 12 MG/DL (ref 7–30)
CALCIUM SERPL-MCNC: 8.5 MG/DL (ref 8.5–10.1)
CHLORIDE SERPL-SCNC: 106 MMOL/L (ref 94–109)
CK SERPL-CCNC: 588 U/L (ref 30–225)
CO2 SERPL-SCNC: 22 MMOL/L (ref 20–32)
CREAT SERPL-MCNC: 0.58 MG/DL (ref 0.52–1.04)
GFR SERPL CREATININE-BSD FRML MDRD: >90 ML/MIN/{1.73_M2}
GLUCOSE SERPL-MCNC: 94 MG/DL (ref 70–99)
POTASSIUM SERPL-SCNC: 3.1 MMOL/L (ref 3.4–5.3)
PROT SERPL-MCNC: 7.9 G/DL (ref 6.8–8.8)
SARS-COV-2 RNA SPEC QL NAA+PROBE: NOT DETECTED
SODIUM SERPL-SCNC: 135 MMOL/L (ref 133–144)
SPECIMEN SOURCE: NORMAL

## 2020-06-11 PROCEDURE — 97530 THERAPEUTIC ACTIVITIES: CPT | Mod: GO

## 2020-06-11 PROCEDURE — 25000132 ZZH RX MED GY IP 250 OP 250 PS 637: Performed by: INTERNAL MEDICINE

## 2020-06-11 PROCEDURE — G0378 HOSPITAL OBSERVATION PER HR: HCPCS

## 2020-06-11 PROCEDURE — 97535 SELF CARE MNGMENT TRAINING: CPT | Mod: GO

## 2020-06-11 PROCEDURE — 99217 ZZC OBSERVATION CARE DISCHARGE: CPT | Performed by: INTERNAL MEDICINE

## 2020-06-11 PROCEDURE — 82550 ASSAY OF CK (CPK): CPT | Performed by: INTERNAL MEDICINE

## 2020-06-11 PROCEDURE — 80053 COMPREHEN METABOLIC PANEL: CPT | Performed by: INTERNAL MEDICINE

## 2020-06-11 PROCEDURE — 97165 OT EVAL LOW COMPLEX 30 MIN: CPT | Mod: GO

## 2020-06-11 PROCEDURE — 36415 COLL VENOUS BLD VENIPUNCTURE: CPT | Performed by: INTERNAL MEDICINE

## 2020-06-11 PROCEDURE — 40000893 ZZH STATISTIC PT IP EVAL DEFER

## 2020-06-11 RX ORDER — POTASSIUM CHLORIDE 750 MG/1
20-40 TABLET, EXTENDED RELEASE ORAL
Status: DISCONTINUED | OUTPATIENT
Start: 2020-06-11 | End: 2020-06-11 | Stop reason: HOSPADM

## 2020-06-11 RX ORDER — IBUPROFEN 600 MG/1
600 TABLET, FILM COATED ORAL ONCE
Status: COMPLETED | OUTPATIENT
Start: 2020-06-11 | End: 2020-06-11

## 2020-06-11 RX ORDER — ACETAMINOPHEN 325 MG/1
650 TABLET ORAL EVERY 4 HOURS PRN
Status: DISCONTINUED | OUTPATIENT
Start: 2020-06-11 | End: 2020-06-11 | Stop reason: HOSPADM

## 2020-06-11 RX ORDER — NORTRIPTYLINE HCL 10 MG
10 CAPSULE ORAL AT BEDTIME
Status: DISCONTINUED | OUTPATIENT
Start: 2020-06-11 | End: 2020-06-11 | Stop reason: HOSPADM

## 2020-06-11 RX ORDER — LACTULOSE 10 G/15ML
10 SOLUTION ORAL 2 TIMES DAILY
Status: DISCONTINUED | OUTPATIENT
Start: 2020-06-11 | End: 2020-06-11 | Stop reason: HOSPADM

## 2020-06-11 RX ORDER — FERROUS SULFATE 325(65) MG
325 TABLET ORAL
Qty: 90 TABLET | Refills: 0 | Status: ON HOLD | OUTPATIENT
Start: 2020-06-11 | End: 2020-11-27

## 2020-06-11 RX ORDER — POTASSIUM CHLORIDE 29.8 MG/ML
20 INJECTION INTRAVENOUS
Status: DISCONTINUED | OUTPATIENT
Start: 2020-06-11 | End: 2020-06-11 | Stop reason: HOSPADM

## 2020-06-11 RX ORDER — PREGABALIN 25 MG/1
50 CAPSULE ORAL 3 TIMES DAILY
Status: DISCONTINUED | OUTPATIENT
Start: 2020-06-11 | End: 2020-06-11 | Stop reason: HOSPADM

## 2020-06-11 RX ORDER — POTASSIUM CL/LIDO/0.9 % NACL 10MEQ/0.1L
10 INTRAVENOUS SOLUTION, PIGGYBACK (ML) INTRAVENOUS
Status: DISCONTINUED | OUTPATIENT
Start: 2020-06-11 | End: 2020-06-11 | Stop reason: HOSPADM

## 2020-06-11 RX ORDER — PANTOPRAZOLE SODIUM 40 MG/1
40 TABLET, DELAYED RELEASE ORAL
Qty: 60 TABLET | Refills: 0 | Status: ON HOLD | OUTPATIENT
Start: 2020-06-11 | End: 2020-12-06

## 2020-06-11 RX ORDER — SPIRONOLACTONE 50 MG/1
50 TABLET, FILM COATED ORAL DAILY
Status: DISCONTINUED | OUTPATIENT
Start: 2020-06-11 | End: 2020-06-11 | Stop reason: HOSPADM

## 2020-06-11 RX ORDER — LORAZEPAM 1 MG/1
1 TABLET ORAL EVERY 4 HOURS PRN
Status: DISCONTINUED | OUTPATIENT
Start: 2020-06-11 | End: 2020-06-11 | Stop reason: HOSPADM

## 2020-06-11 RX ORDER — POTASSIUM CHLORIDE 1.5 G/1.58G
20-40 POWDER, FOR SOLUTION ORAL
Status: DISCONTINUED | OUTPATIENT
Start: 2020-06-11 | End: 2020-06-11 | Stop reason: HOSPADM

## 2020-06-11 RX ORDER — ASCORBIC ACID 500 MG
500 TABLET ORAL DAILY
Qty: 30 TABLET | Refills: 0 | Status: ON HOLD | OUTPATIENT
Start: 2020-06-11 | End: 2020-11-27

## 2020-06-11 RX ORDER — ONDANSETRON 2 MG/ML
4 INJECTION INTRAMUSCULAR; INTRAVENOUS EVERY 6 HOURS PRN
Status: DISCONTINUED | OUTPATIENT
Start: 2020-06-11 | End: 2020-06-11 | Stop reason: HOSPADM

## 2020-06-11 RX ORDER — ONDANSETRON 4 MG/1
4 TABLET, ORALLY DISINTEGRATING ORAL EVERY 6 HOURS PRN
Status: DISCONTINUED | OUTPATIENT
Start: 2020-06-11 | End: 2020-06-11 | Stop reason: HOSPADM

## 2020-06-11 RX ORDER — NALOXONE HYDROCHLORIDE 0.4 MG/ML
.1-.4 INJECTION, SOLUTION INTRAMUSCULAR; INTRAVENOUS; SUBCUTANEOUS
Status: DISCONTINUED | OUTPATIENT
Start: 2020-06-11 | End: 2020-06-11 | Stop reason: HOSPADM

## 2020-06-11 RX ORDER — POTASSIUM CHLORIDE 7.45 MG/ML
10 INJECTION INTRAVENOUS
Status: DISCONTINUED | OUTPATIENT
Start: 2020-06-11 | End: 2020-06-11 | Stop reason: HOSPADM

## 2020-06-11 RX ORDER — PANTOPRAZOLE SODIUM 40 MG/1
40 TABLET, DELAYED RELEASE ORAL
Status: DISCONTINUED | OUTPATIENT
Start: 2020-06-11 | End: 2020-06-11 | Stop reason: HOSPADM

## 2020-06-11 RX ORDER — ACETAMINOPHEN 650 MG/1
650 SUPPOSITORY RECTAL EVERY 4 HOURS PRN
Status: DISCONTINUED | OUTPATIENT
Start: 2020-06-11 | End: 2020-06-11 | Stop reason: HOSPADM

## 2020-06-11 RX ADMIN — PANTOPRAZOLE SODIUM 40 MG: 40 TABLET, DELAYED RELEASE ORAL at 10:14

## 2020-06-11 RX ADMIN — LACTULOSE 10 G: 20 SOLUTION ORAL at 10:14

## 2020-06-11 RX ADMIN — PREGABALIN 50 MG: 25 CAPSULE ORAL at 10:14

## 2020-06-11 RX ADMIN — PREGABALIN 50 MG: 25 CAPSULE ORAL at 15:04

## 2020-06-11 RX ADMIN — NORTRIPTYLINE HYDROCHLORIDE 10 MG: 10 CAPSULE ORAL at 02:23

## 2020-06-11 RX ADMIN — POTASSIUM CHLORIDE 40 MEQ: 750 TABLET, EXTENDED RELEASE ORAL at 11:09

## 2020-06-11 RX ADMIN — SPIRONOLACTONE 50 MG: 50 TABLET, FILM COATED ORAL at 10:14

## 2020-06-11 RX ADMIN — IBUPROFEN 600 MG: 600 TABLET ORAL at 14:19

## 2020-06-11 RX ADMIN — PANTOPRAZOLE SODIUM 40 MG: 40 TABLET, DELAYED RELEASE ORAL at 16:00

## 2020-06-11 ASSESSMENT — ACTIVITIES OF DAILY LIVING (ADL): PREVIOUS_RESPONSIBILITIES: HOUSEKEEPING;MEAL PREP;LAUNDRY;MEDICATION MANAGEMENT

## 2020-06-11 ASSESSMENT — MIFFLIN-ST. JEOR: SCORE: 1348.57

## 2020-06-11 NOTE — PROGRESS NOTES
Care Coordinator Progress Note    Admission Date/Time:  6/10/2020  Attending MD:  Shara Mujica MD    Data  Chart reviewed, discussed with interdisciplinary team.   Patient was admitted for:    Falls frequently  Alcoholic cirrhosis, unspecified whether ascites present (H)  Elevated bilirubin  Hallucinations  Injury of tongue, subsequent encounter  Elevated INR.    Concerns with insurance coverage for discharge needs: None.  Current Living Situation: Patient lives with significant other. .  Support System: Involved  Services Involved: None prior to admission.   Transportation at Discharge: TBD, family or friend likely  Transportation to Medical Appointments: Family/friend  Barriers to Discharge: Medical stability.     Assessment  Patient is a 40yr old female with a prior medical history of alcoholic cirrhosis c/b ascites and varices who was admitted to observation for hallucinations and weakness. Per chart review, patient lives locally w/significant other and there are concerns noted regarding her ability to complete ADLs without the assistance of her significant other. Writer attempted to contact patient to further discuss her living situation/ability to care for herself and any in home services she may have, no answer. Patient previously had [facility], closed on 5/1. Patient has been assessed by PT/OT, recs for home w/home therapy. Writer will follow up and contact patient again as able.     1323 Addendum:  Writer received update from primary provider, patient is medically ready for discharge to home today. Writer attempted to contact patient again on her hospital room phone, no answer. Call out to patient's mobile number, patient answered. Patient confirms that she lives locally w/her fiance. Patient is agreeable to home care services, reports that her fiance is a little nervous with people coming in the home due to COVID but she will talk with him. Patient has used [facility] and would prefer an order  sent to them at this time, order placed for RN/PT/OT.      Plan  Anticipated Discharge Date:  6/11/2020  Anticipated Discharge Plan:  Likely home w/home care services.     Avril Prescott, RNCC, BSN    [address] [address]    [email or web address]    Office: [phone] Pager: [phone]  To contact the weekend RNCC, page [phone].

## 2020-06-11 NOTE — PROGRESS NOTES
Safe disposition plan has been identified: Met. MD rounded and discussed situation with patient.      PT/OT assessment completed:  Met.  PT differred and OT consult completed.       Pt admitted overnight through [facility].  MD met with patient this morning to discuss discharge planning.  Pt likely to return to home with home health visits for PT.  ETOH use discussed, however patient likely unreceptive to any sort of treatment outpatient at this time.

## 2020-06-11 NOTE — SUMMARY OF CARE
Pt just got here at 5B. Her belonging are shirt, pant, shoes, glasses, 3 cell phone with , 2 hand bag with passport, lotion, and perfume.

## 2020-06-11 NOTE — H&P
[facility], [place]    History and Physical - Hospitalist Service, [facility]       Date of Admission:  6/10/2020    Assessment & Plan   Ms. Yenifer Maher is a middle-age woman with alcoholic cirrhosis complicated by ascites and varices who being admitted to observation for hallucinations and weakness. Past medical history is significant for etoh use disorder,recurrent gastrointestinal hemorrhage, chronic anemia, lupus anticoagulant, and polyneuropathy. There is concern for patient's severe alchol dependence.     #. Hallucinations: Patient reported visual hallucination two days ago but has otherwise had not features of altered sensorium nor neurologic changes including asterixis or hippus. Patient has no features of psychosis.     #. Weakness, Fall: Patient reports mechanical fall while walking without her FWW. Trauma workup significant for injured tongue and LUE abrasion but no fractures or major MSK injuries. PT/OT ordered for safety evaluation in the morning.     # Decompensated cirrhosis c/b ascites, coagulopathy and esophageal varices, hyperbilirubinemia: Diagnosed via biopsy 9/2018, multiple hospitalization with UGIB. Patient has continued to drink regularly. Labs significant for elevated bilirubin and AST but no SAPPHIRE or other electrolyte abnormalities. PTA carvedilol for secondary prophylaxis given variceal bleed 8/2019, spironolactone, lasix  PLAN: Holding carvedilol, Repeat CMP      #. Anemia of Chronic Disease: BL Hgb over the past year appears to be 7.5-8.5,in setting of recurrent hematemesis and GI source of acute bleed     #. Severe etoh use disorder: Multiple past hospitalization for etoh abuse and withdrawal; reports last drink 3 days ago but denies withdrawal symptoms other than constant tremor. Partner who brought her in mentioned rule 25.  PLAN: [facility] monitoring, Social Work Consult     #. GERD, Etoh-Induced Gastritis: Continue PTA BID PPI  "therapy      #. Polyneuropathy: Continue PTA Lyrica and nortriptyline.     # Lupus Anticoagulant: Was followed by Dr. Jeffery of hematology after testing + last year and was on Lovenox but due to recurrent bleeding, Dr. Jeffery advised discontinuing all AC.     Diet: regular  DVT Prophylaxis: Low Risk/Ambulatory with no VTE prophylaxis indicated  Chavez Catheter: not present  Code Status: Full       Disposition Plan   Expected discharge: Tomorrow, recommended to prior living arrangement once safety evaluation.  Entered: Chepe Howard MD 06/10/2020, 11:03 PM     The patient's care was discussed with the Patient.    Chepe Howard MD  [facility], [place]  Pager: [phone]  Please see sticky note for cross cover information  ============================================================    Chief Complaint   Fall, Weakness    History is obtained from the patient    History of Present Illness   Yenifer Maher is a 40 year old woman with alcoholic cirrhosis who presented to the ED after a fall striking her LUE and face, immediately developing mouth pain and LUE pain. Past medical history is significant for alcohol use disorder complicated by previous withdrawal events, esophageal varices, anemia of chronic disease, and polyneuropathy. Patient states that she typically uses a walker and tripped.  States that she landed on her left arm and and possibly her chin and now has significant bruising to left arm and swelling to right jaw. She initially wished to go home but her partner was worried about her continue drinking and scleral icterus.     From ER provider:  \"Other collateral information from [facility] later noted patient with ongoing alcohol use frequent falls also hallucinations visual loss thinking that he was home last night but he was not patient seeing people patient also noted electronic devices were all activated last night. He is concerned about her safety at home at this point in her " "ongoing alcohol use.  His her eyes are noted to be yellow now he is concerned that her liver tests are getting worse she has been in hospitals and TCU stay after this.  Also would like a rule 25.\"    Review of Systems    The 10 point Review of Systems is negative other than noted in the HPI or here.     Past Medical History    I have reviewed this patient's medical history and updated it with pertinent information if needed.   Past Medical History:   Diagnosis Date     Alcohol abuse      Alcoholic cirrhosis (H)      Alcoholic peripheral neuropathy (H)      Coagulopathy (H)      Gastritis      History of Clostridium difficile colitis     unknown date     Impairment of cognitive function     MOCA 2/2019 22/30     Leukocytosis 02/2019    persistent leukocytosis across 2/2019 hospitalization without evidence of source across multiple diagnostics including LP, BCx, UCx      Lupus anticoagulant positive      Macrocytic anemia      Moderate protein-calorie malnutrition (H)      Paroxysmal A-fib (H) 02/2019    not on chronic anticoagulation     Peptic ulcer disease      Positive SMILEY (antinuclear antibody)      Subclinical hypothyroidism 02/2019    normal T3, T4     Tobacco dependence     0.5 PPD       Past Surgical History   I have reviewed this patient's surgical history and updated it with pertinent information if needed.  Past Surgical History:   Procedure Laterality Date     ESOPHAGOSCOPY, GASTROSCOPY, DUODENOSCOPY (EGD), COMBINED N/A 8/9/2019    Procedure: ESOPHAGOGASTRODUODENOSCOPY (EGD);  Surgeon: Sowmya Ibanez MD;  [facility]: [facility]     ESOPHAGOSCOPY, GASTROSCOPY, DUODENOSCOPY (EGD), COMBINED N/A 8/26/2019    Procedure: ESOPHAGOGASTRODUODENOSCOPY (EGD);  Surgeon: Leventhal, Thomas Michael, MD;  [facility]     ESOPHAGOSCOPY, GASTROSCOPY, DUODENOSCOPY (EGD), COMBINED N/A 3/30/2020    Procedure: ESOPHAGOGASTRODUODENOSCOPY (EGD);  Surgeon: Sowmya Ibanez MD;  [facility]: [facility]     UPPER GI " ENDOSCOPY  8/9/2019            Social History   I have reviewed this patient's social history and updated it with pertinent information if needed.  Social History     Tobacco Use     Smoking status: Current Some Day Smoker     Types: Cigarettes     Smokeless tobacco: Never Used     Tobacco comment: 6-8 per day   Substance Use Topics     Alcohol use: Yes     Alcohol/week: 2.0 standard drinks     Types: 2 Glasses of wine per week     Frequency: 2-3 times a week     Drug use: Not on file       Family History   No family history of sudden cardiac death    Prior to Admission Medications   Prior to Admission Medications   Prescriptions Last Dose Informant Patient Reported? Taking?   Prenatal Vit-Fe Fumarate-FA (PRENATAL MULTIVITAMIN W/IRON) 27-0.8 MG tablet   No No   Sig: Take 1 tablet by mouth daily   azithromycin (ZITHROMAX) 500 MG tablet   No No   Sig: Take 1 tablet (500 mg) by mouth daily for 4 days   calcium carbonate (TUMS) 500 MG chewable tablet   Yes No   Sig: Take 1-2 chew tab by mouth as needed for heartburn   carvedilol (COREG) 6.25 MG tablet   No No   Sig: Take 1 tablet (6.25 mg) by mouth 2 times daily (with meals)   ferrous sulfate (FEROSUL) 325 (65 Fe) MG tablet   No No   Sig: Take 1 tablet (325 mg) by mouth 3 times daily (with meals)   fexofenadine (ALLEGRA) 180 MG tablet   No No   Sig: Take 1 tablet (180 mg) by mouth daily as needed for allergies   folic acid (FOLVITE) 1 MG tablet   No No   Sig: Take 1 tablet (1 mg) by mouth daily   furosemide (LASIX) 20 MG tablet   No No   Sig: Take 1 tablet (20 mg) by mouth daily   lactulose (CHRONULAC) 10 GM/15ML solution   No No   Sig: Take 15 mLs (10 g) by mouth 2 times daily   magnesium oxide (MAG-OX) 400 (240 Mg) MG tablet   No No   Sig: Take 1 tablet (400 mg) by mouth daily   multivitamin, therapeutic (THERA-VIT) TABS tablet   No No   Sig: Take 1 tablet by mouth daily   nortriptyline (PAMELOR) 10 MG capsule   No No   Sig: Take 1 capsule (10 mg) by mouth At Bedtime    omeprazole (PRILOSEC) 40 MG DR capsule   No No   Sig: Take 1 tab twice daily until 9/2/19, then 1 tab daily   pantoprazole (PROTONIX) 40 MG EC tablet   No No   Sig: Take 1 tablet (40 mg) by mouth 2 times daily (before meals)   polyethylene glycol (MIRALAX/GLYCOLAX) packet   No No   Sig: Take 17 g by mouth daily as needed for constipation   pregabalin (LYRICA) 50 MG capsule   No No   Sig: Take 1 capsule (50 mg) by mouth 3 times daily   sodium-potassium bicarbonate (FRIDA-SELTZER GOLD) TBEF solu-tab   Yes No   Sig: Take 1-2 tablets by mouth 2 times daily as needed for heartburn   spironolactone (ALDACTONE) 50 MG tablet   No No   Sig: Take 1 tablet (50 mg) by mouth daily   vitamin (B COMPLEX-C) tablet   No No   Sig: Take 1 tablet by mouth daily   vitamin B1 (THIAMINE) 100 MG tablet   No No   Sig: Take 1 tablet (100 mg) by mouth daily   vitamin C (ASCORBIC ACID) 500 MG tablet   No No   Sig: Take 1 tablet (500 mg) by mouth daily      Facility-Administered Medications: None     Allergies   Allergies   Allergen Reactions     Bengay Pain Relief [Menthol] Other (See Comments)     Skin turns red and feels extremely hot     Cats      Chocolate Dermatitis     Dicyclomine Other (See Comments)     Severe sedation     Dust Mites      Derm, resp     No Clinical Screening - See Comments      GI upset     Phenobarbital      Pollen Extract      Derm, resp.        Physical Exam   Vital Signs: Temp: 97.6  F (36.4  C) Temp src: Axillary BP: 139/82 Pulse: 111   Resp: 16 SpO2: 97 % O2 Device: None (Room air)    Weight: 145 lbs 0 oz    General: nontoxic and alert  HEENT: Scleral icterus present, EOMI and Neck supple with midline trachea  Neck: supple, trachea is midline  Chest: normal respiratory effort  Cardiac: no murmurs, clicks, or gallops, no lower extremity edema and no JVD  Abdomen: nontender and nondistended  Extremities: no joint deformity or digital cyanosis  Skin: no rash  Neuro: no facial droop, normal speech, no focal  deficitis  Psych: alert, fully oriented      Data   Data reviewed today: I reviewed all medications, new labs and imaging results over the last 24 hours. I personally reviewed the CT images image(s) showing perimandibular hematoma.    Recent Labs   Lab 06/10/20  1649   WBC 5.8   HGB 8.7*   MCV 89   PLT 97*   INR 1.57*   *   POTASSIUM 3.4   CHLORIDE 103   CO2 20   BUN 10   CR 0.59   ANIONGAP 9   MARKO 9.9   *   ALBUMIN 2.7*   PROTTOTAL 8.6   BILITOTAL 3.6*   ALKPHOS 172*   ALT 50   *     Recent Results (from the past 24 hour(s))   XR Forearm Port Left 2 Views    Narrative    2 views left forearm radiographs 6/10/2020 4:05 PM    History: fall with bruising.      Comparison: None available.    Findings:    AP and lateral views of the left forearm were obtained.     No acute osseous abnormality.      Elbow and wrist joints are incompletely assessed but grossly  congruent. No substantial degenerative change.    Subcutaneous soft tissue stranding at the ulnar and dorsal aspect.      Impression    Impression:  1. No acute osseous abnormality.  2. Subcutaneous soft tissue stranding at the ulnar and dorsal aspect.    [facility]   CT Head w/o Contrast    Narrative    CT HEAD W/O CONTRAST 6/10/2020 5:29 PM    Provided History: Fall with some confusion  ICD-10:    Comparison: None.    Technique: Using multidetector thin collimation helical acquisition  technique, axial, coronal and sagittal CT images from the skull base  to the vertex were obtained without intravenous contrast.     Findings:      No intracranial hemorrhage. No mass effect. No midline shift. No  extra-axial fluid collection. The gray to white matter differentiation  of the cerebral hemispheres is preserved. Ventricles are proportionate  to the sulci.. No sulcal effacement..  The basal cisterns are patent.    The visualized paranasal sinuses are clear. The mastoid air cells are  clear. Orbits appear unremarkable. No acute fracture..       Impression    Impression: No acute intracranial pathology.    I have personally reviewed the examination and initial interpretation  and I agree with the findings.    TALISHA GARCIA MD   CT Facial Bones without Contrast    Narrative    CT FACIAL BONES WITHOUT CONTRAST 6/10/2020 5:30 PM    History:  facial trauma eval    Comparison:  None      Technique: Using thin collimation multidetector helical acquisition  technique, axial and coronal thin section CT images were reconstructed  through the facial bones. Images were reviewed in bone and soft tissue  windows.    Findings:    Exam partially limited by streak artifact.    Mild thickening of the left perimandibular soft tissue with  hypoattenuating focus measuring up to 1.5 cm. No evidence of  underlying osseous deformity. There is no evident fracture of the  facial bones. The cribriform plate appears intact. Alignment of the  facial bones appears normal.     There is no hematoma, soft tissue mass or gas visualized within the  orbits. Mild thickening of the left maxillary sinus.. Mastoid air  cells appear clear. Debris in the right external auditory canal. Orbit  appears unremarkable.      Impression    Impression:   1. Mild thickening in the left perimandibular soft tissue with  associated hematoma.   2. No acute osseous abnormality.    I have personally reviewed the examination and initial interpretation  and I agree with the findings.    TALISHA GARCIA MD

## 2020-06-11 NOTE — PROGRESS NOTES
06/11/20 0914   Quick Adds   Type of Visit Initial Occupational Therapy Evaluation   Living Environment   Lives With significant other   Living Arrangements house   Transportation Anticipated family or friend will provide;car, drives self   Living Environment Comment 10 steps to enter home. split level home with 8-10 stairs up/down. spouse works 2 jobs and is not home during the day. spouse supposed to help with showers but has not had time. typically uses walker for longer distances.    Self-Care   Usual Activity Tolerance moderate   Current Activity Tolerance fair   Regular Exercise No   Equipment Currently Used at Home walker, standard;shower chair   Activity/Exercise/Self-Care Comment uses walker for community mobility. has shower chair    Functional Level   Ambulation 1-->assistive equipment   Transferring 1-->assistive equipment   Toileting 0-->independent   Bathing 3-->assistive equipment and person   Dressing 0-->independent   Eating 0-->independent   Communication 0-->understands/communicates without difficulty   Swallowing 0-->swallows foods/liquids without difficulty   Cognition 0 - no cognition issues reported   Fall history within last six months yes   Number of times patient has fallen within last six months 2   Which of the above functional risks had a recent onset or change? transferring;ambulation;toileting;bathing;dressing;fall history   Prior Functional Level Comment IND with walker for most ADL's and transfers. spouse A with bathing.    General Information   Onset of Illness/Injury or Date of Surgery - Date 06/10/20   Referring Physician Chepe Howard MD    Patient/Family Goals Statement return home   Additional Occupational Profile Info/Pertinent History of Current Problem Yenifer Maher is a middle-age woman with alcoholic cirrhosis complicated by ascites and varices who being admitted to observation for hallucinations and weakness.   Precautions/Limitations no known precautions/limitations    Cognitive Status Examination   Orientation orientation to person, place and time   Level of Consciousness alert   Follows Commands (Cognition) WNL   Memory intact   Attention No deficits were identified   Organization/Problem Solving No deficits were identified   Executive Function No deficits were identified   Visual Perception   Visual Perception Wears glasses;No deficits were identified   Sensory Examination   Sensory Quick Adds No deficits were identified   Sensory Comments BLE neuropathy    Pain Assessment   Patient Currently in Pain Yes, see Vital Sign flowsheet   Integumentary/Edema   Integumentary/Edema Comments BLE dec skin integrity    Posture   Posture not impaired   Range of Motion (ROM)   ROM Quick Adds No deficits were identified   Strength   Manual Muscle Testing Quick Adds No deficits were identified   Muscle Tone Assessment   Muscle Tone Quick Adds No deficits were identified   Coordination   Upper Extremity Coordination No deficits were identified   Instrumental Activities of Daily Living (IADL)   Previous Responsibilities housekeeping;meal prep;laundry;medication management   IADL Comments spouse A with bathing and driving.    Activities of Daily Living Analysis   Impairments Contributing to Impaired Activities of Daily Living pain;strength decreased   General Therapy Interventions   Planned Therapy Interventions ADL retraining;cognition;transfer training   Clinical Impression   Criteria for Skilled Therapeutic Interventions Met yes, treatment indicated   OT Diagnosis dec IND with ADL's and transfers   Influenced by the following impairments dec ax tolerance    Assessment of Occupational Performance 1-3 Performance Deficits   Identified Performance Deficits LE dressing, toileting, stairs    Clinical Decision Making (Complexity) Low complexity   Therapy Frequency   (1 tx only)   Predicted Duration of Therapy Intervention (days/wks) 1 tx only   Anticipated Discharge Disposition Home with Home  "Therapy;Home with Assist   Risks and Benefits of Treatment have been explained. Yes   Patient, Family & other staff in agreement with plan of care Yes   [facility] TM \"6 Clicks\"   2016, Trustees of [facility], under license to [employer].  All rights reserved.   6 Clicks Short Forms Daily Activity Inpatient Short Form   [facility]  \"6 Clicks\" Daily Activity Inpatient Short Form   1. Putting on and taking off regular lower body clothing? 4 - None   2. Bathing (including washing, rinsing, drying)? 3 - A Little   3. Toileting, which includes using toilet, bedpan or urinal? 4 - None   4. Putting on and taking off regular upper body clothing? 4 - None   5. Taking care of personal grooming such as brushing teeth? 4 - None   6. Eating meals? 4 - None   Daily Activity Raw Score (Score out of 24.Lower scores equate to lower levels of function) 23   Total Evaluation Time   Total Evaluation Time (Minutes) 8     "

## 2020-06-11 NOTE — ED NOTES
[facility], [place]   ED Nurse to Floor Handoff     Yenifer Maher is a 40 year old female who speaks English and lives with others,  in a home  They arrived in the ED by car from home    ED Chief Complaint: Fall    ED Dx;   Final diagnoses:   Falls frequently   Alcoholic cirrhosis, unspecified whether ascites present (H)   Elevated bilirubin   Hallucinations   Injury of tongue, subsequent encounter   Elevated INR         Needed?: No    Allergies:   Allergies   Allergen Reactions     Bengay Pain Relief [Menthol] Other (See Comments)     Skin turns red and feels extremely hot     Cats      Chocolate Dermatitis     Dicyclomine Other (See Comments)     Severe sedation     Dust Mites      Derm, resp     No Clinical Screening - See Comments      GI upset     Phenobarbital      Pollen Extract      Derm, resp.    .  Past Medical Hx:   Past Medical History:   Diagnosis Date     Alcohol abuse      Alcoholic cirrhosis (H)      Alcoholic peripheral neuropathy (H)      Coagulopathy (H)      Gastritis      History of Clostridium difficile colitis     unknown date     Impairment of cognitive function     MOCA 2/2019 22/30     Leukocytosis 02/2019    persistent leukocytosis across 2/2019 hospitalization without evidence of source across multiple diagnostics including LP, BCx, UCx      Lupus anticoagulant positive      Macrocytic anemia      Moderate protein-calorie malnutrition (H)      Paroxysmal A-fib (H) 02/2019    not on chronic anticoagulation     Peptic ulcer disease      Positive SMILEY (antinuclear antibody)      Subclinical hypothyroidism 02/2019    normal T3, T4     Tobacco dependence     0.5 PPD      Baseline Mental status: WDL  Current Mental Status changes: at basesline    Infection present or suspected this encounter: no  Sepsis suspected: No  Isolation type: No active isolations     Activity level - Baseline/Home:  Walker  Activity Level - Current:   Stand with  Assist    Bariatric equipment needed?: No    In the ED these meds were given:   Medications   lidocaine 1 % 0.1-1 mL (has no administration in time range)   lidocaine (LMX4) cream (has no administration in time range)   sodium chloride (PF) 0.9% PF flush 3 mL (has no administration in time range)   sodium chloride (PF) 0.9% PF flush 3 mL ( Intracatheter Canceled Entry 6/10/20 1821)   dextrose 5% and 0.45% NaCl 1,000 mL with Infuvite Adult 10 mL, thiamine 100 mg, folic acid 1 mg infusion ( Intravenous Stopped 6/10/20 1930)       Drips running?  No    Home pump  No    Current LDAs  Peripheral IV 06/10/20 Right Lower forearm (Active)   Site Assessment WDL 06/10/20 1650   Line Status Saline locked 06/10/20 1650   Number of days: 0       Wound 12/12/18 Anterior;Right;Inner;Mid Thigh Ulceration (Active)   Number of days: 546       Wound 12/12/18 Anterior;Left;Mid;Inner Thigh Ulceration (Active)   Number of days: 546       Labs results:   Labs Ordered and Resulted from Time of ED Arrival Up to the Time of Departure from the ED   CBC WITH PLATELETS DIFFERENTIAL - Abnormal; Notable for the following components:       Result Value    RBC Count 3.11 (*)     Hemoglobin 8.7 (*)     Hematocrit 27.8 (*)     MCHC 31.3 (*)     RDW 20.5 (*)     Platelet Count 97 (*)     All other components within normal limits   PARTIAL THROMBOPLASTIN TIME - Abnormal; Notable for the following components:    PTT 45 (*)     All other components within normal limits   INR - Abnormal; Notable for the following components:    INR 1.57 (*)     All other components within normal limits   COMPREHENSIVE METABOLIC PANEL - Abnormal; Notable for the following components:    Sodium 132 (*)     Glucose 101 (*)     Bilirubin Total 3.6 (*)     Albumin 2.7 (*)     Alkaline Phosphatase 172 (*)      (*)     All other components within normal limits   ALCOHOL ETHYL - Abnormal; Notable for the following components:    Ethanol g/dL 0.02 (*)     All other  components within normal limits   AMMONIA   PULSE OXIMETRY NURSING   CARDIAC CONTINUOUS MONITORING   PERIPHERAL IV CATHETER       Imaging Studies:   Recent Results (from the past 24 hour(s))   XR Forearm Port Left 2 Views    Narrative    2 views left forearm radiographs 6/10/2020 4:05 PM    History: fall with bruising.      Comparison: None available.    Findings:    AP and lateral views of the left forearm were obtained.     No acute osseous abnormality.      Elbow and wrist joints are incompletely assessed but grossly  congruent. No substantial degenerative change.    Subcutaneous soft tissue stranding at the ulnar and dorsal aspect.      Impression    Impression:  1. No acute osseous abnormality.  2. Subcutaneous soft tissue stranding at the ulnar and dorsal aspect.    [facility]   CT Head w/o Contrast    Narrative    CT HEAD W/O CONTRAST 6/10/2020 5:29 PM    Provided History: Fall with some confusion  ICD-10:    Comparison: None.    Technique: Using multidetector thin collimation helical acquisition  technique, axial, coronal and sagittal CT images from the skull base  to the vertex were obtained without intravenous contrast.     Findings:      No intracranial hemorrhage. No mass effect. No midline shift. No  extra-axial fluid collection. The gray to white matter differentiation  of the cerebral hemispheres is preserved. Ventricles are proportionate  to the sulci.. No sulcal effacement..  The basal cisterns are patent.    The visualized paranasal sinuses are clear. The mastoid air cells are  clear. Orbits appear unremarkable. No acute fracture..      Impression    Impression: No acute intracranial pathology.    I have personally reviewed the examination and initial interpretation  and I agree with the findings.    TALISHA GARCIA MD   CT Facial Bones without Contrast    Narrative    CT FACIAL BONES WITHOUT CONTRAST 6/10/2020 5:30 PM    History:  facial trauma eval    Comparison:  None      Technique:  "Using thin collimation multidetector helical acquisition  technique, axial and coronal thin section CT images were reconstructed  through the facial bones. Images were reviewed in bone and soft tissue  windows.    Findings:    Exam partially limited by streak artifact.    Mild thickening of the left perimandibular soft tissue with  hypoattenuating focus measuring up to 1.5 cm. No evidence of  underlying osseous deformity. There is no evident fracture of the  facial bones. The cribriform plate appears intact. Alignment of the  facial bones appears normal.     There is no hematoma, soft tissue mass or gas visualized within the  orbits. Mild thickening of the left maxillary sinus.. Mastoid air  cells appear clear. Debris in the right external auditory canal. Orbit  appears unremarkable.      Impression    Impression:   1. Mild thickening in the left perimandibular soft tissue with  associated hematoma.   2. No acute osseous abnormality.    I have personally reviewed the examination and initial interpretation  and I agree with the findings.    TALISHA GARCIA MD       Recent vital signs:   BP (!) 147/81   Pulse 111   Temp 97.6  F (36.4  C) (Axillary)   Resp 16   Ht 1.702 m (5' 7\")   Wt 65.8 kg (145 lb)   SpO2 96%   BMI 22.71 kg/m      Aramis Coma Scale Score: 15 (06/10/20 1800)       Cardiac Rhythm: Other  Pt needs tele? No  Skin/wound Issues: None    Code Status: See Epic    Pain control: pt had none    Nausea control: pt had none    Abnormal labs/tests/findings requiring intervention:     Family present during ED course? No   Family Comments/Social Situation comments:    Tasks needing completion: None    Boris Tesfaye RN  [phone]      "

## 2020-06-11 NOTE — PLAN OF CARE
PT Defer: PT orders acknowledged and appreciated. Per OT, pt has no acute PT needs at this time. Pt is SBA for mobility. Will defer to HH PT for safety eval in home setting. PT orders will be completed at this time    Discharge Planner PT   Current status: Pt is SBA for mobility, no acute PT needs  Barriers to return to prior living situation: medical status, falls risk, pain  Recommendations for discharge: Defer to OT, currently recommending Home with HH PT/OT  Rationale for recommendations: Defer to OT  Entered by: Avril Maya 06/11/2020 9:51 AM

## 2020-06-11 NOTE — PROGRESS NOTES
Observation goals:    Safe disposition plan has been identified: Not met.    PT/OT assessment completed: Not met.     Pt admitted overnight through [facility].  No viable disposition plan has been created.  Unsure if home environment conducive to recovery.  Will collaborate with MD team to develop plan.

## 2020-06-11 NOTE — PLAN OF CARE
All obs goals met. Discharge instructions reviewed with pt including med changes and follow ups. Pt verbalized understanding. Friend providing ride home.

## 2020-06-11 NOTE — PLAN OF CARE
Discharge Planner OT   Patient plan for discharge: home today     Current status: eval completed. IND with bed mobility. Pt ambulated x 200 ft with SBA and use of FWW. One seated rest break needed. Pt completed 12 stairs up/down with CGA. Pt reports mild dizziness however reports due to not eating breakfast yet. /90. Pt scored 29/30 on MOCA indicating normal cognitive function.     Barriers to return to prior living situation: none    Recommendations for discharge: home with significant other A with bathing and higher level IADL's (yardwork, driving) and HHOT/PT     Rationale for recommendations: anticipate pt is near baseline function and is safe to return home with significant other A and HHOT/PT. Pt demonstrates mobility with SBA and ADL's with IND. Pt will benefit from HHOT as pt is pt requires assistive device, assistance from an additional person, and would require taxing amount of energy to leave home environment, therefore appropriate for HH therapy. Recommend use of 4ww for all mobility.        Entered by: Charlene Jimenez 06/11/2020 9:56 AM     Occupational Therapy Discharge Summary    Reason for therapy discharge:    All goals and outcomes met, no further needs identified.    Progress towards therapy goal(s). See goals on Care Plan in [facility] electronic health record for goal details.  Adequate for discharge     Therapy recommendation(s):    Continued therapy is recommended.  Rationale/Recommendations:  HHOT/PT, see above.

## 2020-06-11 NOTE — PROVIDER NOTIFICATION
Paged Gold [employer] re:  CIWA  Score and patient restless and unable to sleep. No PRNS available.

## 2020-06-11 NOTE — DISCHARGE SUMMARY
[facility], [place]  Hospitalist Discharge Summary      Date of Admission:  6/10/2020  Date of Discharge:  6/11/2020  4:15 PM  Discharging Provider: Shara Mujica MD  Discharge Team: Hospitalist Service, Gold 2    Discharge Diagnoses   Weakness   Mechanical Fall   Severe EtOH use disorder  Decompensated cirrhosis   Anemia of chronic disease     Follow-ups Needed After Discharge   Follow-up Appointments     Adult [facility] Follow-up and recommended labs and tests      Follow up with primary care provider, Flako Gaona, within 2-4 weeks for   hospital follow- up.  No follow up labs or test are needed.    Follow up with the liver specialists as established. for hospital follow-   up.  No follow up labs or test are needed.    Appointments on [place] and/or [facility] (with [facility] or [facility]   provider or service). Call [phone] if you haven't heard regarding   these appointments within 7 days of discharge.           Discharge Disposition   Discharged to home  Condition at discharge: Stable    Hospital Course    Ms. Yenifer Maher is a middle-age woman with alcoholic cirrhosis complicated by ascites and varices who was admitted to observation for hallucinations and weakness, notably with a positive etoh level. Past medical history is significant for etoh use disorder,recurrent gastrointestinal hemorrhage, chronic anemia, lupus anticoagulant, and polyneuropathy. She had a preceding GI illness with diarrhea and poor PO intake that she suspects was part of her worsening weakness and mechanical fall that resulted in a chin and tongue laceration. Her labs were near baseline without concern for infection, and besides a chin hematoma she had no other evidence of severe mechanical injury after the fall. She did not show signs of alcochol withdrawal.  She was tolerating soft foods and liquids without nausea or significant pain, and was deemed safe to discharge to home with home care by PT/OT -  home services were reestablished.  There is concern for patient's severe alchol dependence, and while family appropriately expresses interest in getting her to abstain the patient is entirely pre-contemplative in terms of alcohol cessation. MOCA 29/30 on OT evaluation. No significant changes were made to her medications at discharge.     Consultations This Hospital Stay   PHYSICAL THERAPY ADULT IP CONSULT  OCCUPATIONAL THERAPY ADULT IP CONSULT  SOCIAL WORK IP CONSULT    Code Status   Full Code    Time Spent on this Encounter   IShara MD, personally saw the patient today and spent greater than 30 minutes discharging this patient.       Shara Mujica MD  [facility], [place]  ______________________________________________________________________    Physical Exam   Vital Signs: Temp: 96.9  F (36.1  C) Temp src: Oral BP: 136/78 Pulse: 79   Resp: 16 SpO2: 97 % O2 Device: None (Room air)    Weight: 140 lbs 3.2 oz   Gen: alert, interactive and pleasant, lying in bed in no acute distress  HEENT: Normocephalic, +chin hematoma and tongue laceration/bruising, poor dentition but no clearly broken teeth, sclera clear, moist mucous membranes  Resp: Clear bilaterally, easy work of breathing on room air  CV: Regular rate and rhythm, no murmurs noted   Abd: soft, mildly distended, non-tender, +BS   Ext: no lower extremity edema, warm and well-perfused with brisk capillary refill, scattered bruising on UE    Neuro: Alert and oriented x4, grossly non-focal, moving all extremities equally, +tremor           Primary Care Physician   Flako Gaona    Discharge Orders      Home care nursing referral      Home Care PT Referral for Hospital Discharge      Home Care OT Referral for Hospital Discharge      MD face to face encounter    Documentation of Face to Face and Certification for Home Health Services    I certify that patient: Yenifer Maher is under my care and that I, or a nurse practitioner or  physician's assistant working with me, had a face-to-face encounter that meets the physician face-to-face encounter requirements with this patient on: 6/11/2020.    This encounter with the patient was in whole, or in part, for the following medical condition, which is the primary reason for home health care:  Alcoholic cirrhosis.    I certify that, based on my findings, the following services are medically necessary home health services: Nursing, Occupational Therapy and Physical Therapy.    My clinical findings support the need for the above services because: Nurse is needed: To assess vital signs and weight, respiratory and cardiac status, patients ability to take and record daily blood pressure, temp and weight, pain level and activity tolerance, wounds for signs/symptoms of infection, hydration, nutrition and bowel status and home safety after changes in medications or other medical regimen, Occupational Therapy Services are needed to assess and treat cognitive ability and address ADL safety due to impairment in activity tolerance and Physical Therapy Services are needed to assess and treat the following functional impairments: mobility.     Further, I certify that my clinical findings support that this patient is homebound (i.e. absences from home require considerable and taxing effort and are for medical reasons or [employer] services or infrequently or of short duration when for other reasons) because: Requires assistance of another person or specialized equipment to access medical services because patient: Is unable to walk greater than 200 feet without rest.    Based on the above findings. I certify that this patient is confined to the home and needs intermittent skilled nursing care, physical therapy and/or speech therapy.  The patient is under my care, and I have initiated the establishment of the plan of care.  This patient will be followed by a physician who will periodically review the plan of  care.  Physician/Provider to provide follow up care: Flako Gaona    Attending hospital physician (the Medicare certified VENKAT provider): Dr. Mujica   Physician Signature: See electronic signature associated with these discharge orders.  Date: 6/11/2020     Reason for your hospital stay    Dear Yenifer Maher    You were hospitalized at [facility] with weakness after a fall. Over this hospitalization your symptoms improved and today you are ready to be discharged home.  If you continue your medications for cirrhosis and abstain from alcohol you should continue to improve but if you develop worsening falls, fever, shortness of breath, light headedness, chest pain, severe vomiting or any other worrisome symptoms, please seek medical attention.    We are suggesting the following medication changes: None, though your pantoprazole, vitamin C, and iron were refilled because you should continue to take these.     Please set up an appointment with:  - Primary care doctor within 2-4 weeks after hospitalization   - Liver specialist as previously established     Home care physical and occupational therapy will be set up.     Going forward, avoiding alcohol will be important to help your liver disease, and probably also help your neuropathy. As we discussed, trying to drink Boost or those Special K shakes to get more calories and protein while your mouth is still sore will also be important.   It was a pleasure meeting with you today. Thank you for allowing me and my team the privilege of caring for you. You are the reason we are here, and I truly hope we provided you with the excellent service you deserve. Please let us know if there is anything else we can do for you so that we can be sure you are leaving completely satisfied with your care experience.    Your hospital unit at the time of discharge is 5B so if you have any questions about your discharge paperwork please call the [facility]  line at [phone] and ask to talk to a nurse on 5B so they can reach me.    Take care!  Shara Mujica MD  Department of Medicine  [facility]     Adult [facility] Follow-up and recommended labs and tests    Follow up with primary care provider, Flako Gaona, within 2-4 weeks for hospital follow- up.  No follow up labs or test are needed.    Follow up with the liver specialists as established. for hospital follow- up.  No follow up labs or test are needed.    Appointments on [place] and/or [facility] (with [facility] or [facility] provider or service). Call [phone] if you haven't heard regarding these appointments within 7 days of discharge.     Activity    Your activity upon discharge: activity as tolerated     Full Code     Diet    Follow this diet upon discharge: soft       Significant Results and Procedures   Most Recent 3 CBC's:  Recent Labs   Lab Test 06/10/20  1649 04/01/20  0342 03/31/20  0539   WBC 5.8 7.4 7.1   HGB 8.7* 7.6* 7.3*   MCV 89 92 92   PLT 97* 265 219     Most Recent 3 BMP's:  Recent Labs   Lab Test 06/11/20  0535 06/10/20  1649 04/01/20  0342    132* 134   POTASSIUM 3.1* 3.4 3.6   CHLORIDE 106 103 104   CO2 22 20 24   BUN 12 10 7   CR 0.58 0.59 0.56   ANIONGAP 8 9 6   MARKO 8.5 9.9 8.0*   GLC 94 101* 100*     Most Recent 2 LFT's:  Recent Labs   Lab Test 06/11/20 0535 06/10/20  1649   * 281*   ALT 44 50   ALKPHOS 150 172*   BILITOTAL 3.7* 3.6*     Most Recent 3 INR's:  Recent Labs   Lab Test 06/10/20  1649 04/01/20  0342 03/31/20  0539   INR 1.57* 1.63* 1.58*   ,   Results for orders placed or performed during the hospital encounter of 06/10/20   XR Forearm Port Left 2 Views    Narrative    2 views left forearm radiographs 6/10/2020 4:05 PM    History: fall with bruising.      Comparison: None available.    Findings:    AP and lateral views of the left forearm were obtained.     No acute osseous abnormality.      Elbow and wrist joints are incompletely assessed but  grossly  congruent. No substantial degenerative change.    Subcutaneous soft tissue stranding at the ulnar and dorsal aspect.      Impression    Impression:  1. No acute osseous abnormality.  2. Subcutaneous soft tissue stranding at the ulnar and dorsal aspect.    [facility]   CT Head w/o Contrast    Narrative    CT HEAD W/O CONTRAST 6/10/2020 5:29 PM    Provided History: Fall with some confusion  ICD-10:    Comparison: None.    Technique: Using multidetector thin collimation helical acquisition  technique, axial, coronal and sagittal CT images from the skull base  to the vertex were obtained without intravenous contrast.     Findings:      No intracranial hemorrhage. No mass effect. No midline shift. No  extra-axial fluid collection. The gray to white matter differentiation  of the cerebral hemispheres is preserved. Ventricles are proportionate  to the sulci.. No sulcal effacement..  The basal cisterns are patent.    The visualized paranasal sinuses are clear. The mastoid air cells are  clear. Orbits appear unremarkable. No acute fracture..      Impression    Impression: No acute intracranial pathology.    I have personally reviewed the examination and initial interpretation  and I agree with the findings.    TALISHA GARCIA MD   CT Facial Bones without Contrast    Narrative    CT FACIAL BONES WITHOUT CONTRAST 6/10/2020 5:30 PM    History:  facial trauma eval    Comparison:  None      Technique: Using thin collimation multidetector helical acquisition  technique, axial and coronal thin section CT images were reconstructed  through the facial bones. Images were reviewed in bone and soft tissue  windows.    Findings:    Exam partially limited by streak artifact.    Mild thickening of the left perimandibular soft tissue with  hypoattenuating focus measuring up to 1.5 cm. No evidence of  underlying osseous deformity. There is no evident fracture of the  facial bones. The cribriform plate appears intact.  Alignment of the  facial bones appears normal.     There is no hematoma, soft tissue mass or gas visualized within the  orbits. Mild thickening of the left maxillary sinus.. Mastoid air  cells appear clear. Debris in the right external auditory canal. Orbit  appears unremarkable.      Impression    Impression:   1. Mild thickening in the left perimandibular soft tissue with  associated hematoma.   2. No acute osseous abnormality.    I have personally reviewed the examination and initial interpretation  and I agree with the findings.    TALISHA GARCIA MD       Discharge Medications   Current Discharge Medication List      CONTINUE these medications which have CHANGED    Details   ferrous sulfate (FEROSUL) 325 (65 Fe) MG tablet Take 1 tablet (325 mg) by mouth 3 times daily (with meals)  Qty: 90 tablet, Refills: 0    Associated Diagnoses: Anemia due to blood loss, acute      pantoprazole (PROTONIX) 40 MG EC tablet Take 1 tablet (40 mg) by mouth 2 times daily (before meals)  Qty: 60 tablet, Refills: 0    Associated Diagnoses: Gastrointestinal hemorrhage, unspecified gastrointestinal hemorrhage type      vitamin C (ASCORBIC ACID) 500 MG tablet Take 1 tablet (500 mg) by mouth daily  Qty: 30 tablet, Refills: 0    Associated Diagnoses: Anemia due to blood loss, acute         CONTINUE these medications which have NOT CHANGED    Details   calcium carbonate (TUMS) 500 MG chewable tablet Take 1-2 chew tab by mouth as needed for heartburn      carvedilol (COREG) 6.25 MG tablet Take 1 tablet (6.25 mg) by mouth 2 times daily (with meals)  Qty: 60 tablet, Refills: 0    Associated Diagnoses: UGIB (upper gastrointestinal bleed)      folic acid (FOLVITE) 1 MG tablet Take 1 tablet (1 mg) by mouth daily  Qty: 30 tablet, Refills: 0    Associated Diagnoses: Alcohol abuse      furosemide (LASIX) 20 MG tablet Take 1 tablet (20 mg) by mouth daily  Qty: 90 tablet, Refills: 3    Associated Diagnoses: Alcohol abuse      lactulose  (CHRONULAC) 10 GM/15ML solution Take 15 mLs (10 g) by mouth 2 times daily  Qty: 946 mL, Refills: 0    Associated Diagnoses: UGIB (upper gastrointestinal bleed)      magnesium oxide (MAG-OX) 400 (240 Mg) MG tablet Take 1 tablet (400 mg) by mouth daily  Qty: 30 tablet, Refills: 0    Associated Diagnoses: Hypomagnesemia      multivitamin, therapeutic (THERA-VIT) TABS tablet Take 1 tablet by mouth daily  Qty: 30 tablet, Refills: 0    Associated Diagnoses: UGIB (upper gastrointestinal bleed)      nortriptyline (PAMELOR) 10 MG capsule Take 1 capsule (10 mg) by mouth At Bedtime  Qty: 30 capsule, Refills: 0    Associated Diagnoses: Neuropathy      omeprazole (PRILOSEC) 40 MG DR capsule Take 1 tab twice daily until 19, then 1 tab daily  Qty: 112 capsule, Refills: 0    Associated Diagnoses: Upper GI bleed      polyethylene glycol (MIRALAX/GLYCOLAX) packet Take 17 g by mouth daily as needed for constipation  Qty: 10 packet, Refills: 0    Associated Diagnoses: Constipation, unspecified constipation type      pregabalin (LYRICA) 50 MG capsule Take 1 capsule (50 mg) by mouth 3 times daily  Qty: 30 capsule, Refills: 0    Associated Diagnoses: Neuropathy      Prenatal Vit-Fe Fumarate-FA (PRENATAL MULTIVITAMIN W/IRON) 27-0.8 MG tablet Take 1 tablet by mouth daily  Qty: 30 tablet, Refills: 0    Associated Diagnoses: Screening, , for malformation by ultrasound      sodium-potassium bicarbonate (FRIDA-SELTZER GOLD) TBEF solu-tab Take 1-2 tablets by mouth 2 times daily as needed for heartburn      spironolactone (ALDACTONE) 50 MG tablet Take 1 tablet (50 mg) by mouth daily  Qty: 90 tablet, Refills: 3    Associated Diagnoses: Alcoholic hepatitis with ascites      vitamin (B COMPLEX-C) tablet Take 1 tablet by mouth daily  Qty: 30 tablet, Refills: 0    Associated Diagnoses: Screening, , for malformation by ultrasound      vitamin B1 (THIAMINE) 100 MG tablet Take 1 tablet (100 mg) by mouth daily  Qty: 30 tablet,  Refills: 0    Associated Diagnoses: Alcohol abuse         STOP taking these medications       azithromycin (ZITHROMAX) 500 MG tablet Comments:   Reason for Stopping:         fexofenadine (ALLEGRA) 180 MG tablet Comments:   Reason for Stopping:             Allergies   Allergies   Allergen Reactions     Bengay Pain Relief [Menthol] Other (See Comments)     Skin turns red and feels extremely hot     Cats      Chocolate Dermatitis     Dicyclomine Other (See Comments)     Severe sedation     Dust Mites      Derm, resp     No Clinical Screening - See Comments      GI upset     Phenobarbital      Pollen Extract      Derm, resp.

## 2020-06-11 NOTE — PLAN OF CARE
".   Observation goals  PRIOR TO DISCHARGE   : NOT MET   Comments: -safe disposition plan has been identified :NOT MET  PT/OT assessment: NOT MET  Nurse to notify provider when observation goals have been met and patient is ready for discharge.         /82 (BP Location: Right arm)   Pulse 98   Temp 99.3  F (37.4  C) (Oral)   Resp 16   Ht 1.702 m (5' 7\")   Wt 63.6 kg (140 lb 3.2 oz)   SpO2 95%   BMI 21.96 kg/m    Patient  restless and anxious towards end of shift reporting she cannot fall asleep.  Patient able to lift legs for wound care, but would complain of pain from ongoing neuropathy.  However, she also squirmed with high pitch voice when cotton tip applicator put in between toes, patient squirmed and expressed out \"it tickled.\" Chart review indicates that patient  was seen in ED on April 16 for chin laceration s/p fall on coffee table.  CIWA 9. Paged Gold 2 x2 for recommendations as no PRN orders for ativan.   Labs: K+3.1 , Hgb 8.7, ,   Plan:  Patient will need SW eval, PTOT Eval today. Continue to monitor for  mentation changes, CIWA, pain, labs, and notify MD of changes.    "

## 2020-06-11 NOTE — PROVIDER NOTIFICATION
2nd page (Smartweb) to  [employer] re:  CIWA Score and patient  Restless and unable to sleep.

## 2020-06-11 NOTE — PLAN OF CARE
Reason for Admission: Fall    Status: Stable, adequate for discharge.    RN assumed cares at 0700    Vitals: Stable  Pain: Some pain in the lower back and restless extremities.   Neuro: Alert and oriented, defensive at times.   Cardiac: WDL  Respiratory: WDL  GI/: WDL  IV/Drains: PIV saline locked.   Activity: Up with a stand by assist and the walker, walker at home per baseline.   Skin: WDL ex abrasion on chin and bruising/abrasions on the left forearm after fall.    Labs: WDL ex K+ of 3.1. Protocol obtained and replaced.     Plan of Care:  Pt observed throughout the day for symptoms of suspected withdrawal.  Pt did not exhibit any overt symptoms and has been cleared for discharge.  Pt sister to provide transport to home.  Family discussed some concern about ETOH use, however, patient likely not receptive to treatment options at this time.  No acute incidents this shift.  Continue to monitor and notify MD of any changes.

## 2020-06-11 NOTE — PLAN OF CARE
"/82 (BP Location: Right arm)   Pulse 98   Temp 99.3  F (37.4  C) (Oral)   Resp 16   Ht 1.702 m (5' 7\")   Wt 63.6 kg (140 lb 3.2 oz)   SpO2 95%   BMI 21.96 kg/m      Observation goals  PRIOR TO DISCHARGE      Comments: -safe disposition plan has been identified -not met  PT/OT assessment -not met  Nurse to notify provider when observation goals have been met and patient is ready for discharge.      Patient admitted under OBS status to 5B @ 0030.  Pt settled in room and immediate stench noted upon arrival.  Upon skin assessment, RN noted patient's feet were  Stuck in her shoes, very foul smelling and full of dead, brown, dry, skin particularly around and in between the toes.  Initially, patient declined to allow RN to take her shoes off, but after much encouragement, patient agreeable to have shoes removed.  Patient complained of significant pain with removal of shoes, slip ons, stating that the pain was from  Her  Neuropathy.  Patient has a resting tremor and she endorses neuropathy in both hands as well as both feet.  Soaked each foot in individual baths of warm water and   HIBICLENS wash for 30 minutes  X 2 and then washed each  Foot with Microklenz wound cleanser and left to open air to dry.  Significant   Improvement with fee hygeine after treatment, but noticeable  Raw, open area in between left great toe and second toe.  Patient endorsed that the reason she had not cleaned her fee was that she needed to have assistance of  1 person in the shower. She reported that her significant other (Manohar) works 2 jobs and that he hasn't been able to help her.  Upon further exploration of home environment,  Patient reports that she is supposed to have PCA services as she is on the [employer]  Waiver program.  The PCA typically helps her with shower and shopping etc..; however, patient reports that her S.O. does not like having someone in the house to  help her and won't allow her to have PCA come to help and she is " "left with more needs since he is not available. Patient reports that she \" typically drinks a few glasses of wine here and there\" and that her  Her mother was probably the one that suggested she have a RULE 25 assessment because she thinks she has an issue.  Patient reports that she thinks her mother has the issue as well as her S.O.  She stated she didn't think she had concerns for getting assessment. Patient reports she has only fallen once and that was 2 days ago. 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 She states she is on disability. Patient reports living in a split level home and having difficulty navigating the stairs.  Upon arrival to ,  CIWA score 7 @ 0100 ( mild nausea,  Noticeable tremor, mild itching, pins and needles with hands and feet, disoriented to time). Plan: Continue to monitor and notify MD of changes.  "

## 2020-06-25 ENCOUNTER — TRANSFERRED RECORDS (OUTPATIENT)
Dept: HEALTH INFORMATION MANAGEMENT | Facility: CLINIC | Age: 40
End: 2020-06-25

## 2020-07-08 DIAGNOSIS — K92.2 GASTROINTESTINAL HEMORRHAGE, UNSPECIFIED GASTROINTESTINAL HEMORRHAGE TYPE: ICD-10-CM

## 2020-07-09 RX ORDER — PANTOPRAZOLE SODIUM 40 MG/1
40 TABLET, DELAYED RELEASE ORAL
Qty: 60 TABLET | Refills: 0 | OUTPATIENT
Start: 2020-07-09

## 2020-07-28 ENCOUNTER — HOSPITAL ENCOUNTER (EMERGENCY)
Facility: CLINIC | Age: 40
Discharge: HOME | End: 2020-07-28
Attending: EMERGENCY MEDICINE | Admitting: EMERGENCY MEDICINE
Payer: COMMERCIAL

## 2020-07-28 VITALS
OXYGEN SATURATION: 97 % | DIASTOLIC BLOOD PRESSURE: 71 MMHG | HEART RATE: 86 BPM | TEMPERATURE: 98.2 F | RESPIRATION RATE: 18 BRPM | SYSTOLIC BLOOD PRESSURE: 129 MMHG

## 2020-07-28 DIAGNOSIS — F10.21 ALCOHOL DEPENDENCE IN REMISSION (H): ICD-10-CM

## 2020-07-28 PROCEDURE — 99283 EMERGENCY DEPT VISIT LOW MDM: CPT | Mod: Z6 | Performed by: EMERGENCY MEDICINE

## 2020-07-28 PROCEDURE — 99285 EMERGENCY DEPT VISIT HI MDM: CPT | Mod: 25 | Performed by: EMERGENCY MEDICINE

## 2020-07-28 PROCEDURE — 90791 PSYCH DIAGNOSTIC EVALUATION: CPT

## 2020-07-28 ASSESSMENT — ENCOUNTER SYMPTOMS
HALLUCINATIONS: 0
NERVOUS/ANXIOUS: 0
SLEEP DISTURBANCE: 0
DYSPHORIC MOOD: 0

## 2020-07-28 NOTE — ED AVS SNAPSHOT
[facility], [place], Emergency Department  [address]  [place] [address]  Phone:  [phone]  Fax:  [phone]                                    Yenifer Maher   MRN: [id number]    Department:  [facility], Emergency Department   Date of Visit:  7/28/2020           After Visit Summary Signature Page    I have received my discharge instructions, and my questions have been answered. I have discussed any challenges I see with this plan with the nurse or doctor.    ..........................................................................................................................................  Patient/Patient Representative Signature      ..........................................................................................................................................  Patient Representative Print Name and Relationship to Patient    ..................................................               ................................................  Date                                   Time    ..........................................................................................................................................  Reviewed by Signature/Title    ...................................................              ..............................................  Date                                               Time          22EPIC Rev 08/18

## 2020-07-28 NOTE — DISCHARGE INSTRUCTIONS
Discharge home.  Call your county  tomorrow to discuss further treatment plans.     [place] .  Her number is [phone].

## 2020-07-28 NOTE — ED NOTES
Spoke with staff from [facility], they stated they needed a SEE before they could talk to me about this patient.
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
## 2020-07-28 NOTE — ED TRIAGE NOTES
Pt lives in group home; pt staff states she is failure to thrive; chemical dependency;  Patient reports feeling confused currently about why she is here in the hospital. States group home was working on transferring her to another residence/program then suddenly told her she would be going to the hospital. Pt denies FTT, Pt has been at home for 2 weeks and has refused to shower, and participate in group activities and doesn't eat.  Pt states she there is no appropriate shower or food eat due to being a vegetarian. She also now says she was never offered a shower. Staff also states pt doesn't get along with other residents although cooperative with EMS.  Hx of alcohol, has a commitment order for CD. Patient states she is unsure if she is on a commitment, was told she would be at this treatment center for 4 weeks, has been there 2 weeks. Last ETOH use 3 weeks ago she reports.

## 2020-07-28 NOTE — ED NOTES
Bed: ED10  Expected date: 7/28/20  Expected time: 2:15 PM  Means of arrival: Ambulance  Comments:  Loan [address] 41yo female, not showering at group home, not getting along with others

## 2020-07-28 NOTE — ED NOTES
Patients  called - Odilia Radford. [phone]. Please update on plan.     Patient is on civil commitment due to CD. Has been working on transferring her to another facility, high risk for her to return to ED if returned back to current residence. Advocate for her to be kept here inpatient to work on transferring her to another facility. There are 2 programs with immediate openings, working on referrals and funding to have her sent there - ideally within 1-2 days.

## 2020-07-28 NOTE — ED NOTES
Made total of 3 calls to [facility], but no one is answering now so unable to FAX HANK LORA aware.

## 2020-11-13 ENCOUNTER — TELEPHONE (OUTPATIENT)
Dept: GASTROENTEROLOGY | Facility: CLINIC | Age: 40
End: 2020-11-13

## 2020-11-13 NOTE — TELEPHONE ENCOUNTER
[facility] Call Center    Phone Message    May a detailed message be left on voicemail: yes     Reason for Call: Other:  Patient is in a treatment center at [facility] and is going to a new treatment center on Tuesday, 11/17/20.  However, the new treatment center is refusing to give her the RX: pregabalin (LYRICA) 50 MG capsule.  Patient would like a call back from Dr. Piña at [facility]: [phone]; just ask for patient and they will get her.  Thank you!    Action Taken: Message routed to:  [facility]    Travel Screening: Not Applicable

## 2020-11-13 NOTE — TELEPHONE ENCOUNTER
Connected with patient. Pt reports taken pregabalin prior to hospitalization and now is discontinued and is requesting it back. Medication was not prescripted by Dr. Hernández asked pt to follow up with Dr. Piña or PCP.    Kalli COOK LPN  Hepatology Clinic  ---------  [facility] Call Center    Phone Message    May a detailed message be left on voicemail: yes     Reason for Call: Other: Yenifer calling to request a call back. She would like to speak to a nurse in regards to her pregabalin (LYRICA) 50 MG capsule. Please call her back to discuss. (Pt is at a facility)      Action Taken: Message routed to:  [facility]     Travel Screening: Not Applicable

## 2020-11-14 ENCOUNTER — HEALTH MAINTENANCE LETTER (OUTPATIENT)
Age: 40
End: 2020-11-14

## 2020-11-26 ENCOUNTER — APPOINTMENT (OUTPATIENT)
Dept: CT IMAGING | Facility: CLINIC | Age: 40
End: 2020-11-26
Attending: EMERGENCY MEDICINE
Payer: MEDICARE

## 2020-11-26 ENCOUNTER — HOSPITAL ENCOUNTER (OUTPATIENT)
Facility: CLINIC | Age: 40
Setting detail: OBSERVATION
Discharge: NURSING, REHAB OR LONG-TERM CARE | End: 2020-11-28
Attending: EMERGENCY MEDICINE | Admitting: EMERGENCY MEDICINE
Payer: MEDICARE

## 2020-11-26 DIAGNOSIS — K76.82 HEPATIC ENCEPHALOPATHY (H): ICD-10-CM

## 2020-11-26 DIAGNOSIS — Z20.828 EXPOSURE TO SARS-ASSOCIATED CORONAVIRUS: ICD-10-CM

## 2020-11-26 DIAGNOSIS — R41.82 ALTERED MENTAL STATUS, UNSPECIFIED ALTERED MENTAL STATUS TYPE: ICD-10-CM

## 2020-11-26 DIAGNOSIS — K70.30 ALCOHOLIC CIRRHOSIS, UNSPECIFIED WHETHER ASCITES PRESENT (H): ICD-10-CM

## 2020-11-26 DIAGNOSIS — R42 DIZZINESS AND GIDDINESS: ICD-10-CM

## 2020-11-26 DIAGNOSIS — K92.2 UGIB (UPPER GASTROINTESTINAL BLEED): ICD-10-CM

## 2020-11-26 LAB
ALBUMIN SERPL-MCNC: 3.3 G/DL (ref 3.4–5)
ALBUMIN UR-MCNC: NEGATIVE MG/DL
ALP SERPL-CCNC: 212 U/L (ref 40–150)
ALT SERPL W P-5'-P-CCNC: 42 U/L (ref 0–50)
AMMONIA PLAS-SCNC: 74 UMOL/L (ref 10–50)
ANION GAP SERPL CALCULATED.3IONS-SCNC: 8 MMOL/L (ref 3–14)
APPEARANCE UR: CLEAR
AST SERPL W P-5'-P-CCNC: 89 U/L (ref 0–45)
BASOPHILS # BLD AUTO: 0 10E9/L (ref 0–0.2)
BASOPHILS NFR BLD AUTO: 1 %
BILIRUB SERPL-MCNC: 4.6 MG/DL (ref 0.2–1.3)
BILIRUB UR QL STRIP: NEGATIVE
BUN SERPL-MCNC: 18 MG/DL (ref 7–30)
CALCIUM SERPL-MCNC: 10.1 MG/DL (ref 8.5–10.1)
CHLORIDE SERPL-SCNC: 104 MMOL/L (ref 94–109)
CO2 SERPL-SCNC: 23 MMOL/L (ref 20–32)
COLOR UR AUTO: YELLOW
CREAT SERPL-MCNC: 0.82 MG/DL (ref 0.52–1.04)
DIFFERENTIAL METHOD BLD: ABNORMAL
EOSINOPHIL # BLD AUTO: 0.1 10E9/L (ref 0–0.7)
EOSINOPHIL NFR BLD AUTO: 1.2 %
ERYTHROCYTE [DISTWIDTH] IN BLOOD BY AUTOMATED COUNT: 13.7 % (ref 10–15)
GFR SERPL CREATININE-BSD FRML MDRD: >90 ML/MIN/{1.73_M2}
GLUCOSE SERPL-MCNC: 117 MG/DL (ref 70–99)
GLUCOSE UR STRIP-MCNC: NEGATIVE MG/DL
HCG UR QL: NEGATIVE
HCT VFR BLD AUTO: 33 % (ref 35–47)
HGB BLD-MCNC: 10.8 G/DL (ref 11.7–15.7)
HGB UR QL STRIP: NEGATIVE
IMM GRANULOCYTES # BLD: 0 10E9/L (ref 0–0.4)
IMM GRANULOCYTES NFR BLD: 0.2 %
INR PPP: 1.55 (ref 0.86–1.14)
INTERNAL QC OK POCT: YES
INTERPRETATION ECG - MUSE: NORMAL
KETONES UR STRIP-MCNC: NEGATIVE MG/DL
LABORATORY COMMENT REPORT: NORMAL
LACTATE BLD-SCNC: 1.5 MMOL/L (ref 0.7–2)
LEUKOCYTE ESTERASE UR QL STRIP: NEGATIVE
LYMPHOCYTES # BLD AUTO: 0.7 10E9/L (ref 0.8–5.3)
LYMPHOCYTES NFR BLD AUTO: 16.4 %
MCH RBC QN AUTO: 29.8 PG (ref 26.5–33)
MCHC RBC AUTO-ENTMCNC: 32.7 G/DL (ref 31.5–36.5)
MCV RBC AUTO: 91 FL (ref 78–100)
MONOCYTES # BLD AUTO: 0.5 10E9/L (ref 0–1.3)
MONOCYTES NFR BLD AUTO: 12.1 %
NEUTROPHILS # BLD AUTO: 2.9 10E9/L (ref 1.6–8.3)
NEUTROPHILS NFR BLD AUTO: 69.1 %
NITRATE UR QL: NEGATIVE
NRBC # BLD AUTO: 0 10*3/UL
NRBC BLD AUTO-RTO: 0 /100
PH UR STRIP: 7 PH (ref 5–7)
PLATELET # BLD AUTO: 173 10E9/L (ref 150–450)
POTASSIUM SERPL-SCNC: 4 MMOL/L (ref 3.4–5.3)
PROT SERPL-MCNC: 8.4 G/DL (ref 6.8–8.8)
RBC # BLD AUTO: 3.63 10E12/L (ref 3.8–5.2)
RBC #/AREA URNS AUTO: 0 /HPF (ref 0–2)
SARS-COV-2 RNA SPEC QL NAA+PROBE: NEGATIVE
SARS-COV-2 RNA SPEC QL NAA+PROBE: NORMAL
SODIUM SERPL-SCNC: 136 MMOL/L (ref 133–144)
SOURCE: ABNORMAL
SP GR UR STRIP: 1.01 (ref 1–1.03)
SPECIMEN SOURCE: NORMAL
SPECIMEN SOURCE: NORMAL
SQUAMOUS #/AREA URNS AUTO: 8 /HPF (ref 0–1)
TRANS CELLS #/AREA URNS HPF: <1 /HPF (ref 0–1)
TROPONIN I SERPL-MCNC: <0.015 UG/L (ref 0–0.04)
UROBILINOGEN UR STRIP-MCNC: NORMAL MG/DL (ref 0–2)
WBC # BLD AUTO: 4.2 10E9/L (ref 4–11)
WBC #/AREA URNS AUTO: 2 /HPF (ref 0–5)

## 2020-11-26 PROCEDURE — 258N000003 HC RX IP 258 OP 636: Performed by: NURSE PRACTITIONER

## 2020-11-26 PROCEDURE — 84484 ASSAY OF TROPONIN QUANT: CPT | Performed by: EMERGENCY MEDICINE

## 2020-11-26 PROCEDURE — 87086 URINE CULTURE/COLONY COUNT: CPT | Performed by: EMERGENCY MEDICINE

## 2020-11-26 PROCEDURE — 250N000013 HC RX MED GY IP 250 OP 250 PS 637: Mod: GY | Performed by: NURSE PRACTITIONER

## 2020-11-26 PROCEDURE — 85610 PROTHROMBIN TIME: CPT | Performed by: EMERGENCY MEDICINE

## 2020-11-26 PROCEDURE — 96361 HYDRATE IV INFUSION ADD-ON: CPT

## 2020-11-26 PROCEDURE — 80053 COMPREHEN METABOLIC PANEL: CPT | Performed by: EMERGENCY MEDICINE

## 2020-11-26 PROCEDURE — 93005 ELECTROCARDIOGRAM TRACING: CPT | Performed by: EMERGENCY MEDICINE

## 2020-11-26 PROCEDURE — 96360 HYDRATION IV INFUSION INIT: CPT

## 2020-11-26 PROCEDURE — 250N000013 HC RX MED GY IP 250 OP 250 PS 637: Performed by: EMERGENCY MEDICINE

## 2020-11-26 PROCEDURE — C9803 HOPD COVID-19 SPEC COLLECT: HCPCS | Performed by: EMERGENCY MEDICINE

## 2020-11-26 PROCEDURE — 999N000127 HC STATISTIC PERIPHERAL IV START W US GUIDANCE

## 2020-11-26 PROCEDURE — G0378 HOSPITAL OBSERVATION PER HR: HCPCS

## 2020-11-26 PROCEDURE — 85025 COMPLETE CBC W/AUTO DIFF WBC: CPT | Performed by: EMERGENCY MEDICINE

## 2020-11-26 PROCEDURE — 99220 PR INITIAL OBSERVATION CARE,LEVEL III: CPT | Performed by: EMERGENCY MEDICINE

## 2020-11-26 PROCEDURE — 81025 URINE PREGNANCY TEST: CPT | Performed by: EMERGENCY MEDICINE

## 2020-11-26 PROCEDURE — 93010 ELECTROCARDIOGRAM REPORT: CPT | Performed by: EMERGENCY MEDICINE

## 2020-11-26 PROCEDURE — 70450 CT HEAD/BRAIN W/O DYE: CPT | Mod: 26 | Performed by: STUDENT IN AN ORGANIZED HEALTH CARE EDUCATION/TRAINING PROGRAM

## 2020-11-26 PROCEDURE — U0003 INFECTIOUS AGENT DETECTION BY NUCLEIC ACID (DNA OR RNA); SEVERE ACUTE RESPIRATORY SYNDROME CORONAVIRUS 2 (SARS-COV-2) (CORONAVIRUS DISEASE [COVID-19]), AMPLIFIED PROBE TECHNIQUE, MAKING USE OF HIGH THROUGHPUT TECHNOLOGIES AS DESCRIBED BY CMS-2020-01-R: HCPCS | Performed by: EMERGENCY MEDICINE

## 2020-11-26 PROCEDURE — 81001 URINALYSIS AUTO W/SCOPE: CPT | Performed by: EMERGENCY MEDICINE

## 2020-11-26 PROCEDURE — 83605 ASSAY OF LACTIC ACID: CPT | Performed by: EMERGENCY MEDICINE

## 2020-11-26 PROCEDURE — 36415 COLL VENOUS BLD VENIPUNCTURE: CPT | Performed by: EMERGENCY MEDICINE

## 2020-11-26 PROCEDURE — 70450 CT HEAD/BRAIN W/O DYE: CPT

## 2020-11-26 PROCEDURE — 99285 EMERGENCY DEPT VISIT HI MDM: CPT | Mod: 25 | Performed by: EMERGENCY MEDICINE

## 2020-11-26 PROCEDURE — 82140 ASSAY OF AMMONIA: CPT | Performed by: EMERGENCY MEDICINE

## 2020-11-26 RX ORDER — SILDENAFIL CITRATE 20 MG/1
40 TABLET ORAL 3 TIMES DAILY
Status: ON HOLD | COMMUNITY
Start: 2020-10-05 | End: 2020-12-06

## 2020-11-26 RX ORDER — MAGNESIUM OXIDE 400 MG/1
400 TABLET ORAL DAILY
Status: DISCONTINUED | OUTPATIENT
Start: 2020-11-27 | End: 2020-11-28 | Stop reason: HOSPADM

## 2020-11-26 RX ORDER — AMOXICILLIN 250 MG
1 CAPSULE ORAL 2 TIMES DAILY
Status: DISCONTINUED | OUTPATIENT
Start: 2020-11-26 | End: 2020-11-28 | Stop reason: HOSPADM

## 2020-11-26 RX ORDER — CETIRIZINE HYDROCHLORIDE 5 MG/1
5 TABLET ORAL DAILY
Status: DISCONTINUED | OUTPATIENT
Start: 2020-11-27 | End: 2020-11-27

## 2020-11-26 RX ORDER — MECLIZINE HYDROCHLORIDE 25 MG/1
50 TABLET ORAL ONCE
Status: COMPLETED | OUTPATIENT
Start: 2020-11-26 | End: 2020-11-26

## 2020-11-26 RX ORDER — PRENATAL VIT/IRON FUM/FOLIC AC 27MG-0.8MG
1 TABLET ORAL DAILY
Status: DISCONTINUED | OUTPATIENT
Start: 2020-11-27 | End: 2020-11-28 | Stop reason: HOSPADM

## 2020-11-26 RX ORDER — NORTRIPTYLINE HCL 10 MG
10 CAPSULE ORAL AT BEDTIME
Status: DISCONTINUED | OUTPATIENT
Start: 2020-11-26 | End: 2020-11-28 | Stop reason: HOSPADM

## 2020-11-26 RX ORDER — ONDANSETRON 2 MG/ML
4 INJECTION INTRAMUSCULAR; INTRAVENOUS EVERY 6 HOURS PRN
Status: DISCONTINUED | OUTPATIENT
Start: 2020-11-26 | End: 2020-11-28 | Stop reason: HOSPADM

## 2020-11-26 RX ORDER — AMOXICILLIN 250 MG
2 CAPSULE ORAL 2 TIMES DAILY
Status: DISCONTINUED | OUTPATIENT
Start: 2020-11-26 | End: 2020-11-28 | Stop reason: HOSPADM

## 2020-11-26 RX ORDER — PANTOPRAZOLE SODIUM 40 MG/1
40 TABLET, DELAYED RELEASE ORAL
Status: DISCONTINUED | OUTPATIENT
Start: 2020-11-27 | End: 2020-11-28 | Stop reason: HOSPADM

## 2020-11-26 RX ORDER — NICOTINE 21 MG/24HR
1 PATCH, TRANSDERMAL 24 HOURS TRANSDERMAL DAILY
Status: DISCONTINUED | OUTPATIENT
Start: 2020-11-26 | End: 2020-11-27

## 2020-11-26 RX ORDER — ONDANSETRON 4 MG/1
4 TABLET, ORALLY DISINTEGRATING ORAL EVERY 6 HOURS PRN
Status: DISCONTINUED | OUTPATIENT
Start: 2020-11-26 | End: 2020-11-28 | Stop reason: HOSPADM

## 2020-11-26 RX ORDER — LACTULOSE 10 G/15ML
40 SOLUTION ORAL 3 TIMES DAILY
Status: DISCONTINUED | OUTPATIENT
Start: 2020-11-26 | End: 2020-11-27

## 2020-11-26 RX ORDER — FERROUS SULFATE 325(65) MG
TABLET ORAL
Status: ON HOLD | COMMUNITY
Start: 2020-10-05 | End: 2020-11-27

## 2020-11-26 RX ORDER — CETIRIZINE HYDROCHLORIDE 5 MG/1
5 TABLET ORAL
Status: ON HOLD | COMMUNITY
Start: 2020-10-05 | End: 2020-11-27

## 2020-11-26 RX ORDER — POLYETHYLENE GLYCOL 3350 17 G/17G
17 POWDER, FOR SOLUTION ORAL DAILY
Status: DISCONTINUED | OUTPATIENT
Start: 2020-11-26 | End: 2020-11-28 | Stop reason: HOSPADM

## 2020-11-26 RX ORDER — SILDENAFIL CITRATE 20 MG/1
40 TABLET ORAL 3 TIMES DAILY
Status: DISCONTINUED | OUTPATIENT
Start: 2020-11-26 | End: 2020-11-28 | Stop reason: HOSPADM

## 2020-11-26 RX ORDER — FERROUS SULFATE 325(65) MG
325 TABLET ORAL
Status: DISCONTINUED | OUTPATIENT
Start: 2020-11-27 | End: 2020-11-28 | Stop reason: HOSPADM

## 2020-11-26 RX ORDER — FLUTICASONE PROPIONATE 50 MCG
1 SPRAY, SUSPENSION (ML) NASAL DAILY PRN
Status: ON HOLD | COMMUNITY
Start: 2020-10-06 | End: 2020-12-06

## 2020-11-26 RX ORDER — FOLIC ACID 1 MG/1
1 TABLET ORAL DAILY
Status: DISCONTINUED | OUTPATIENT
Start: 2020-11-27 | End: 2020-11-28 | Stop reason: HOSPADM

## 2020-11-26 RX ORDER — MULTIVITAMIN,THER AND MINERALS
1 TABLET ORAL
Status: ON HOLD | COMMUNITY
Start: 2020-10-05 | End: 2020-11-27

## 2020-11-26 RX ORDER — PREGABALIN 25 MG/1
50 CAPSULE ORAL 3 TIMES DAILY
Status: DISCONTINUED | OUTPATIENT
Start: 2020-11-27 | End: 2020-11-28 | Stop reason: HOSPADM

## 2020-11-26 RX ORDER — MULTIVITAMIN,THERAPEUTIC
1 TABLET ORAL DAILY
Status: DISCONTINUED | OUTPATIENT
Start: 2020-11-27 | End: 2020-11-28 | Stop reason: HOSPADM

## 2020-11-26 RX ORDER — LANOLIN ALCOHOL/MO/W.PET/CERES
100 CREAM (GRAM) TOPICAL DAILY
Status: DISCONTINUED | OUTPATIENT
Start: 2020-11-27 | End: 2020-11-28 | Stop reason: HOSPADM

## 2020-11-26 RX ADMIN — RIFAXIMIN 550 MG: 550 TABLET ORAL at 21:46

## 2020-11-26 RX ADMIN — LACTULOSE 30 G: 20 POWDER, FOR SOLUTION ORAL at 12:59

## 2020-11-26 RX ADMIN — DOCUSATE SODIUM 50 MG AND SENNOSIDES 8.6 MG 2 TABLET: 8.6; 5 TABLET, FILM COATED ORAL at 21:46

## 2020-11-26 RX ADMIN — LACTULOSE 40 G: 20 SOLUTION ORAL at 21:48

## 2020-11-26 RX ADMIN — SILDENAFIL 40 MG: 20 TABLET ORAL at 21:46

## 2020-11-26 RX ADMIN — POLYETHYLENE GLYCOL 3350 17 G: 17 POWDER, FOR SOLUTION ORAL at 21:46

## 2020-11-26 RX ADMIN — SODIUM CHLORIDE 500 ML: 0.9 INJECTION, SOLUTION INTRAVENOUS at 21:46

## 2020-11-26 RX ADMIN — NORTRIPTYLINE HYDROCHLORIDE 10 MG: 10 CAPSULE ORAL at 21:46

## 2020-11-26 RX ADMIN — MECLIZINE HYDROCHLORIDE 50 MG: 25 TABLET ORAL at 16:42

## 2020-11-26 ASSESSMENT — ENCOUNTER SYMPTOMS
NAUSEA: 1
CONFUSION: 1
ABDOMINAL PAIN: 1
DYSURIA: 0
HEADACHES: 1
SHORTNESS OF BREATH: 0
DIFFICULTY URINATING: 0
LIGHT-HEADEDNESS: 1
COUGH: 0
FREQUENCY: 0
DIZZINESS: 1
BLOOD IN STOOL: 0
DIARRHEA: 0
FEVER: 0
VOMITING: 0
CONSTIPATION: 0
HEMATURIA: 0

## 2020-11-26 NOTE — ED TRIAGE NOTES
Pt brought in by ambulance with c/o altered mental status.Treatment center staff reported that they had to wake her up around 9 am, which is abnormal for them. Upon wakeing Yenifer reported feeling nauseous and light headed. Has hx of migraines and calciphylaxis with elveated ammonia levels.

## 2020-11-26 NOTE — ED NOTES
Bed: ED08  Expected date: 11/26/20  Expected time: 10:16 AM  Means of arrival: Ambulance  Comments:  [id number]    40F with altered mental status  Triaged yellow

## 2020-11-26 NOTE — H&P
"[facility]   History & Physical    Yenifer Maher MRN# [id number]   Age: 40 year old YOB: 1980     Date of Admission: 11/26/2020    Primary Care Provider: Flako Gaona         Chief Complaint:   \"altered mental status\"         History of Present Illness:   Yenifer Maher is a 40 year old female w/PMH significant for alcohol abuse with alcoholic cirrhosis, portal HTN, encephalopathy, pulmonary hypertension, paroxysmal afib, tobacco dependence, neuropathy, macrocytic anemia, PUD, lupus anticoagulant positive who presented to the ED with lightheadedness and altered mental status.  Per ER MD, \"Patient reports that last night before going to bed she developed a headache and felt \"weird\".  The patient states that she woke up this morning and was continuing to feel \"out of it\" and still had a headache.  She also reports that she was feeling nauseous when she woke up this morning.  The patient got up as she thought she just needed to eat breakfast, but when she was walking to the dining room she felt as though she would fall over.  Patient had difficulty explaining what she meant by this, but stated that she felt like she was going to pass out and felt off balance.  She notes that she did have some mild room spinning symptoms.  She notes that she has been told of the possibility of vertigo in the past.  The patient reports that she does have a history of migraines and she has had similar symptoms associated with her migraines in the past.  However, she stated that her current symptoms \"feel different\".  Patient was not able to elaborate about what she meant by them feeling different, she stated \"they just feel different\".  Patient denies any recent falls or head injuries.  Patient reports that she is currently at [facility].  She reports that a staff member noticed that she did not look well, the patient went to lay down, but felt more nauseous.  Patient was " "then sent here to the Emergency Department for further evaluation and management.     Patient here denies any recent hematemesis, abnormal bowel movements or urinary symptoms.  She does note some mild abdominal cramping.  She denies any chest pain or shortness of breath.  The patient reports that her treatment center manages all of her medications, so she states that she has been taking her lactulose and the remainder of her medications as prescribed.  She does note they recently decreased her lactulose from 3 times a day to twice a day.  Patient denies any history of strokes.  Patient reports that she typically has similar symptoms a couple of times a year, but is not able to tell us what is normally the cause of the symptoms\"    In the ED the patient's vital signs were stable with , she was afebrile.  O2 sats 97% on room air.  Labs: CMP with bili 4.6, alk phos 212, ALT 42, AST 89, ammonia 74.  Lactic acid 1.5. HCG negative.  CBC with hgb 10.8. Glucose 117.  UA negative for infection, CT head negative for acute abnormalities.  EKG with no acute ischemic changes.  COVID pending. Troponin negative. She was given 30 g Lactulose, 50 mg meclizine.  She was admitted to the observation unit for monitoring.            Review of Systems:     All others reviewed and are negative         Past Medical History:     Past Medical History:   Diagnosis Date     Alcohol abuse      Alcoholic cirrhosis (H)      Alcoholic peripheral neuropathy (H)      Coagulopathy (H)      Gastritis      History of Clostridium difficile colitis     unknown date     Impairment of cognitive function     MOCA 2/2019 22/30     Leukocytosis 02/2019    persistent leukocytosis across 2/2019 hospitalization without evidence of source across multiple diagnostics including LP, BCx, UCx      Lupus anticoagulant positive      Macrocytic anemia      Moderate protein-calorie malnutrition (H)      Paroxysmal A-fib (H) 02/2019    not on chronic anticoagulation "     Peptic ulcer disease      Positive SMILEY (antinuclear antibody)      Subclinical hypothyroidism 02/2019    normal T3, T4     Tobacco dependence     0.5 PPD             Past Surgical History:      Past Surgical History:   Procedure Laterality Date     ESOPHAGOSCOPY, GASTROSCOPY, DUODENOSCOPY (EGD), COMBINED N/A 8/9/2019    Procedure: ESOPHAGOGASTRODUODENOSCOPY (EGD);  Surgeon: Sowmya Ibanez MD;  Location:  GI     ESOPHAGOSCOPY, GASTROSCOPY, DUODENOSCOPY (EGD), COMBINED N/A 8/26/2019    Procedure: ESOPHAGOGASTRODUODENOSCOPY (EGD);  Surgeon: Leventhal, Thomas Michael, MD;  Location:  GI     ESOPHAGOSCOPY, GASTROSCOPY, DUODENOSCOPY (EGD), COMBINED N/A 3/30/2020    Procedure: ESOPHAGOGASTRODUODENOSCOPY (EGD);  Surgeon: Sowmya Ibanez MD;  Location:  GI     UPPER GI ENDOSCOPY  8/9/2019                  Family History:   History reviewed. No pertinent family history.          Social History:     Social History     Tobacco Use     Smoking status: Current Some Day Smoker     Types: Cigarettes     Smokeless tobacco: Never Used     Tobacco comment: 6-8 per day   Substance Use Topics     Alcohol use: Yes     Alcohol/week: 2.0 standard drinks     Types: 2 Glasses of wine per week     Frequency: 2-3 times a week             Medications:   No current facility-administered medications on file prior to encounter.        calcium carbonate (TUMS) 500 MG chewable tablet, Take 1-2 chew tab by mouth as needed for heartburn       carvedilol (COREG) 6.25 MG tablet, Take 1 tablet (6.25 mg) by mouth 2 times daily (with meals)       ferrous sulfate (FEROSUL) 325 (65 Fe) MG tablet, Take 1 tablet (325 mg) by mouth 3 times daily (with meals)       folic acid (FOLVITE) 1 MG tablet, Take 1 tablet (1 mg) by mouth daily       furosemide (LASIX) 20 MG tablet, Take 1 tablet (20 mg) by mouth daily       lactulose (CHRONULAC) 10 GM/15ML solution, Take 15 mLs (10 g) by mouth 2 times daily       magnesium oxide  (MAG-OX) 400 (240 Mg) MG tablet, Take 1 tablet (400 mg) by mouth daily       multivitamin, therapeutic (THERA-VIT) TABS tablet, Take 1 tablet by mouth daily       nortriptyline (PAMELOR) 10 MG capsule, Take 1 capsule (10 mg) by mouth At Bedtime       omeprazole (PRILOSEC) 40 MG DR capsule, Take 1 tab twice daily until 9/2/19, then 1 tab daily       pantoprazole (PROTONIX) 40 MG EC tablet, Take 1 tablet (40 mg) by mouth 2 times daily (before meals)       polyethylene glycol (MIRALAX/GLYCOLAX) packet, Take 17 g by mouth daily as needed for constipation       pregabalin (LYRICA) 50 MG capsule, Take 1 capsule (50 mg) by mouth 3 times daily       Prenatal Vit-Fe Fumarate-FA (PRENATAL MULTIVITAMIN W/IRON) 27-0.8 MG tablet, Take 1 tablet by mouth daily       sodium-potassium bicarbonate (FRIDA-SELTZER GOLD) TBEF solu-tab, Take 1-2 tablets by mouth 2 times daily as needed for heartburn       spironolactone (ALDACTONE) 50 MG tablet, Take 1 tablet (50 mg) by mouth daily       vitamin (B COMPLEX-C) tablet, Take 1 tablet by mouth daily       vitamin B1 (THIAMINE) 100 MG tablet, Take 1 tablet (100 mg) by mouth daily       vitamin C (ASCORBIC ACID) 500 MG tablet, Take 1 tablet (500 mg) by mouth daily             Allergies:     Allergies   Allergen Reactions     Bengay Pain Relief [Menthol] Other (See Comments)     Skin turns red and feels extremely hot     Cats      Chocolate Dermatitis     Dicyclomine Other (See Comments)     Severe sedation     Dust Mites      Derm, resp     No Clinical Screening - See Comments      GI upset     Phenobarbital      Pollen Extract      Derm, resp.              Physical Exam:   BP (!) 149/85   Pulse 117   Temp 98  F (36.7  C) (Oral)   Resp 16   Wt 68 kg (150 lb)   SpO2 97%   BMI 23.49 kg/m     GENERAL: Alert and oriented x 3. NAD.   HEENT: Anicteric sclera. PERRL. Mucous membranes moist and without lesions.   CV: RRR. S1, S2. No murmurs appreciated.   RESPIRATORY: Effort normal. Lungs CTAB  with no wheezing, rales, rhonchi.   GI: Abdomen soft and non distended with normoactive bowel sounds present in all quadrants. No tenderness, rebound, guarding.   MUSCULOSKELETAL: No joint swelling or tenderness. Moves all extremities.   NEUROLOGICAL: No focal deficits. Strength 5/5 bilaterally in upper and lower extremities.   EXTREMITIES: No peripheral edema. Intact bilateral pedal pulses.   SKIN: No jaundice. No rashes.          Labs:   CBC:  Recent Labs   Lab Test 11/26/20  1126   WBC 4.2   RBC 3.63*   HGB 10.8*   HCT 33.0*   MCV 91   MCH 29.8   MCHC 32.7   RDW 13.7          CMP:  Recent Labs   Lab Test 11/26/20  1126      POTASSIUM 4.0   CHLORIDE 104   MARKO 10.1   CO2 23   BUN 18   CR 0.82   *   AST 89*   ALT 42   BILITOTAL 4.6*   ALBUMIN 3.3*   PROTTOTAL 8.4   ALKPHOS 212*       INR:   Recent Labs   Lab Test 11/26/20  1126   INR 1.55*            Imaging:   CT HEAD W/O CONTRAST 11/26/2020 1:31 PM     History: headache, AMS      Comparison: 6/10/2020     Technique: Using multidetector thin collimation helical acquisition  technique, axial, coronal and sagittal CT images from the skull base  to the vertex were obtained without intravenous contrast.     Findings: There is no intracranial hemorrhage, mass effect, or midline  shift. Gray/white matter differentiation in both cerebral hemispheres  is preserved. Ventricles are proportionate to the cerebral sulci. The  basal cisterns are clear.     The bony calvaria and the bones of the skull base are normal. The  visualized portions of the paranasal sinuses and mastoid air cells are  clear.                                                                       Impression:  No acute intracranial pathology.      I have personally reviewed the examination and initial interpretation  and I agree with the findings.     KRIS KIRK MD         Assessment and Plan:   Yenifer Maher is a 40 year old female  w/PMH significant for alcohol abuse with alcoholic  cirrhosis, portal HTN, encephalopathy, pulmonary hypertension, paroxysmal afib, tobacco dependence, neuropathy, macrocytic anemia, PUD, lupus anticoagulant positive who presented to the ED with lightheadedness and altered mental status.     ##Lightheadedness, altered mental status  ##alcoholic cirrhosis   In the ED the patient's vital signs were stable with , she was afebrile.  O2 sats 97% on room air.  Labs: CMP with bili 4.6, alk phos 212, ALT 42, AST 89, ammonia 74.  Lactic acid 1.5. HCG negative.  CBC with hgb 10.8. Glucose 117.  UA negative for infection, CT head negative for acute abnormalities.  EKG with no acute ischemic changes.  COVID pending. Troponin negative. She was given 30 g Lactulose, 50 mg meclizine.  She was admitted to the observation unit for monitoring. She admits to some nausea, poor PO intake the last few days. Denies ETOH use. Last ETOH use was end of September 2020, per patient.    Wt Readings from Last 4 Encounters:   11/26/20 68 kg (150 lb)   06/11/20 63.6 kg (140 lb 3.2 oz)   03/31/20 71.9 kg (158 lb 8.2 oz)   12/25/19 62.7 kg (138 lb 3.7 oz)         -admit to the observation unit  -increase lactulose to 40g TID from BID  -repeat ammonia in am  -Hepatology consult in am  -continue rifaximin 550 mg PO BID  -continue folic acid, thiamine, MV  -pharmacy to do med rec (unsure if she is taking lasix and/or spirinolactone)  -PT consult  -SW consult  -will give 500 ml NS bolus over 2 hours (given nausea and poor PO intake and tachycardia)  -serial troponins  -telemetry  -echo in am    #pulmonary HTN:   -continue PTA sildenafil 40 mg po TID    #tobacco dependence:  -nicotine patch    #neuropathy:   -continue PTA nortriptyline and pregabalin    #anemia  -continue PTA ferrous sulfate    #GERD:  -continue PTA PPI        Discussed with Dr. Jordan.     FEN: 2g Na diet  Prophylaxis: anticipate short stay  Code Status: Full        Eleonora Arguello, APRN, CNP  Ascom #[id number]             This patient  was discussed with the Care Team in the OBS Unit.  The patient's chart was reviewed.  The plan of care was discussed and reviewed with the Care Team.  The above documentation reflects the initial evaluation, medical decision making and plan under my supervision.    Keegan Jordan MD, FACEP  [facility] Staff Emergency Physician

## 2020-11-26 NOTE — ED PROVIDER NOTES
"    [place] EMERGENCY DEPARTMENT ([facility])  November 26, 2020  ED 8  History     Chief Complaint   Patient presents with     Altered Mental Status     The history is provided by the patient and medical records.     Yenifer Maher is a 40 year old female with history of alcoholic cirrhosis, calciphylaxis and paroxysmal atrial fibrillation who presents to the Emergency Department for evaluation of an altered mental status.  Patient reports that last night before going to bed she developed a headache and felt \"weird\".  The patient states that she woke up this morning and was continuing to feel \"out of it\" and still had a headache.  She also reports that she was feeling nauseous when she woke up this morning.  The patient got up as she thought she just needed to eat breakfast, but when she was walking to the dining room she felt as though she would fall over.  Patient had difficulty explaining what she meant by this, but stated that she felt like she was going to pass out and felt off balance.  She notes that she did have some mild room spinning symptoms.  She notes that she has been told of the possibility of vertigo in the past.  The patient reports that she does have a history of migraines and she has had similar symptoms associated with her migraines in the past.  However, she stated that her current symptoms \"feel different\".  Patient was not able to elaborate about what she meant by them feeling different, she stated \"they just feel different\".  Patient denies any recent falls or head injuries.  Patient reports that she is currently at [facility].  She reports that a staff member noticed that she did not look well, the patient went to lay down, but felt more nauseous.  Patient was then sent here to the Emergency Department for further evaluation and management.    Patient here denies any recent hematemesis, abnormal bowel movements or urinary symptoms.  She does note some mild abdominal " cramping.  She denies any chest pain or shortness of breath.  The patient reports that her treatment center manages all of her medications, so she states that she has been taking her lactulose and the remainder of her medications as prescribed.  She does note they recently decreased her lactulose from 3 times a day to twice a day.  Patient denies any history of strokes.  Patient reports that she typically has similar symptoms a couple of times a year, but is not able to tell us what is normally the cause of the symptoms.    PAST MEDICAL HISTORY:   Past Medical History:   Diagnosis Date     Alcohol abuse      Alcoholic cirrhosis (H)      Alcoholic peripheral neuropathy (H)      Coagulopathy (H)      Gastritis      History of Clostridium difficile colitis     unknown date     Impairment of cognitive function     MOCA 2/2019 22/30     Leukocytosis 02/2019    persistent leukocytosis across 2/2019 hospitalization without evidence of source across multiple diagnostics including LP, BCx, UCx      Lupus anticoagulant positive      Macrocytic anemia      Moderate protein-calorie malnutrition (H)      Paroxysmal A-fib (H) 02/2019    not on chronic anticoagulation     Peptic ulcer disease      Positive SMILEY (antinuclear antibody)      Subclinical hypothyroidism 02/2019    normal T3, T4     Tobacco dependence     0.5 PPD       PAST SURGICAL HISTORY:   Past Surgical History:   Procedure Laterality Date     ESOPHAGOSCOPY, GASTROSCOPY, DUODENOSCOPY (EGD), COMBINED N/A 8/9/2019    Procedure: ESOPHAGOGASTRODUODENOSCOPY (EGD);  Surgeon: Sowmya Ibanez MD;  Location:  GI     ESOPHAGOSCOPY, GASTROSCOPY, DUODENOSCOPY (EGD), COMBINED N/A 8/26/2019    Procedure: ESOPHAGOGASTRODUODENOSCOPY (EGD);  Surgeon: Leventhal, Thomas Michael, MD;  Location:  GI     ESOPHAGOSCOPY, GASTROSCOPY, DUODENOSCOPY (EGD), COMBINED N/A 3/30/2020    Procedure: ESOPHAGOGASTRODUODENOSCOPY (EGD);  Surgeon: Sowmya Ibanez MD;   Location:  GI     UPPER GI ENDOSCOPY  8/9/2019            Past medical history, past surgical history, medications, and allergies were reviewed with the patient. Additional pertinent items: None    FAMILY HISTORY: History reviewed. No pertinent family history.    SOCIAL HISTORY:   Social History     Tobacco Use     Smoking status: Current Some Day Smoker     Types: Cigarettes     Smokeless tobacco: Never Used     Tobacco comment: 6-8 per day   Substance Use Topics     Alcohol use: Yes     Alcohol/week: 2.0 standard drinks     Types: 2 Glasses of wine per week     Frequency: 2-3 times a week     Social history was reviewed with the patient. Additional pertinent items: None      Patient's Medications   New Prescriptions    No medications on file   Previous Medications    CALCIUM CARBONATE (TUMS) 500 MG CHEWABLE TABLET    Take 1-2 chew tab by mouth as needed for heartburn    CARVEDILOL (COREG) 6.25 MG TABLET    Take 1 tablet (6.25 mg) by mouth 2 times daily (with meals)    FERROUS SULFATE (FEROSUL) 325 (65 FE) MG TABLET    Take 1 tablet (325 mg) by mouth 3 times daily (with meals)    FOLIC ACID (FOLVITE) 1 MG TABLET    Take 1 tablet (1 mg) by mouth daily    FUROSEMIDE (LASIX) 20 MG TABLET    Take 1 tablet (20 mg) by mouth daily    LACTULOSE (CHRONULAC) 10 GM/15ML SOLUTION    Take 15 mLs (10 g) by mouth 2 times daily    MAGNESIUM OXIDE (MAG-OX) 400 (240 MG) MG TABLET    Take 1 tablet (400 mg) by mouth daily    MULTIVITAMIN, THERAPEUTIC (THERA-VIT) TABS TABLET    Take 1 tablet by mouth daily    NORTRIPTYLINE (PAMELOR) 10 MG CAPSULE    Take 1 capsule (10 mg) by mouth At Bedtime    OMEPRAZOLE (PRILOSEC) 40 MG DR CAPSULE    Take 1 tab twice daily until 9/2/19, then 1 tab daily    PANTOPRAZOLE (PROTONIX) 40 MG EC TABLET    Take 1 tablet (40 mg) by mouth 2 times daily (before meals)    POLYETHYLENE GLYCOL (MIRALAX/GLYCOLAX) PACKET    Take 17 g by mouth daily as needed for constipation    PREGABALIN (LYRICA) 50 MG CAPSULE     "Take 1 capsule (50 mg) by mouth 3 times daily    PRENATAL VIT-FE FUMARATE-FA (PRENATAL MULTIVITAMIN W/IRON) 27-0.8 MG TABLET    Take 1 tablet by mouth daily    SODIUM-POTASSIUM BICARBONATE (FRIDA-SELTZER GOLD) TBEF SOLU-TAB    Take 1-2 tablets by mouth 2 times daily as needed for heartburn    SPIRONOLACTONE (ALDACTONE) 50 MG TABLET    Take 1 tablet (50 mg) by mouth daily    VITAMIN (B COMPLEX-C) TABLET    Take 1 tablet by mouth daily    VITAMIN B1 (THIAMINE) 100 MG TABLET    Take 1 tablet (100 mg) by mouth daily    VITAMIN C (ASCORBIC ACID) 500 MG TABLET    Take 1 tablet (500 mg) by mouth daily   Modified Medications    No medications on file   Discontinued Medications    No medications on file          Allergies   Allergen Reactions     Bengay Pain Relief [Menthol] Other (See Comments)     Skin turns red and feels extremely hot     Cats      Chocolate Dermatitis     Dicyclomine Other (See Comments)     Severe sedation     Dust Mites      Derm, resp     No Clinical Screening - See Comments      GI upset     Phenobarbital      Pollen Extract      Derm, resp.         Review of Systems   Constitutional: Negative for fever.   Eyes:        Positive for yellowing of eyes   Respiratory: Negative for cough and shortness of breath.    Cardiovascular: Negative for chest pain.   Gastrointestinal: Positive for abdominal pain (mild cramping) and nausea. Negative for blood in stool, constipation, diarrhea and vomiting.   Genitourinary: Negative for decreased urine volume, difficulty urinating, dysuria, frequency, hematuria and urgency.   Neurological: Positive for dizziness (feeling off-balance), light-headedness and headaches.   Psychiatric/Behavioral: Positive for confusion (\"feeling out of it\").   All other systems reviewed and are negative.      Physical Exam   BP: (!) 150/94  Pulse: 106  Temp: 98  F (36.7  C)  Resp: 16  Weight: 68 kg (150 lb)  SpO2: 97 %      Physical Exam   General: awake, alert, NAD  Head: normal " cephalic  HEENT: pupils equal, conjugate gaze intact  Neck: Supple  CV: regular rate and rhythm without murmur  Lungs: clear to ascultation  Abd: soft, non-tender, no guarding, no peritoneal signs  EXT: lower extremities without swelling or edema  Skin: Spider angioma, scleral icterus and jaundice  Neuro: Bilateral asterixis, awake, answers questions appropriately. No focal deficits noted.  Cranial nerves II through XII intact.  Strength intact bilaterally.  Bilateral shin to heel intact.  Bilateral asterixis.     ED Course        Procedures     10:49 AM  The patient was seen and examined by Alex Sousa MD in Room ED08.                EKG Interpretation:      Interpreted by Alex Sousa MD  Time reviewed: 1449  Symptoms at time of EKG: Altered mental status  Rhythm: sinus tachycardia  Rate: 115  Axis: normal  Ectopy: none  Conduction: RSR' in V1 suggesting right ventricular conduction delay  ST Segments/ T Waves: Nonspecific ST-T wave changes  Q Waves: none  Comparison to prior: Unchanged from prior EKG from August 25, 2019    Clinical Impression: Sinus tachycardia with nonspecific ST-T wave changes, otherwise normal EKG similar to previous EKGs      Results for orders placed or performed during the hospital encounter of 11/26/20 (from the past 24 hour(s))   CBC with platelets differential   Result Value Ref Range    WBC 4.2 4.0 - 11.0 10e9/L    RBC Count 3.63 (L) 3.8 - 5.2 10e12/L    Hemoglobin 10.8 (L) 11.7 - 15.7 g/dL    Hematocrit 33.0 (L) 35.0 - 47.0 %    MCV 91 78 - 100 fl    MCH 29.8 26.5 - 33.0 pg    MCHC 32.7 31.5 - 36.5 g/dL    RDW 13.7 10.0 - 15.0 %    Platelet Count 173 150 - 450 10e9/L    Diff Method Automated Method     % Neutrophils 69.1 %    % Lymphocytes 16.4 %    % Monocytes 12.1 %    % Eosinophils 1.2 %    % Basophils 1.0 %    % Immature Granulocytes 0.2 %    Nucleated RBCs 0 0 /100    Absolute Neutrophil 2.9 1.6 - 8.3 10e9/L    Absolute Lymphocytes 0.7 (L) 0.8 - 5.3 10e9/L    Absolute Monocytes  0.5 0.0 - 1.3 10e9/L    Absolute Eosinophils 0.1 0.0 - 0.7 10e9/L    Absolute Basophils 0.0 0.0 - 0.2 10e9/L    Abs Immature Granulocytes 0.0 0 - 0.4 10e9/L    Absolute Nucleated RBC 0.0    Comprehensive metabolic panel   Result Value Ref Range    Sodium 136 133 - 144 mmol/L    Potassium 4.0 3.4 - 5.3 mmol/L    Chloride 104 94 - 109 mmol/L    Carbon Dioxide 23 20 - 32 mmol/L    Anion Gap 8 3 - 14 mmol/L    Glucose 117 (H) 70 - 99 mg/dL    Urea Nitrogen 18 7 - 30 mg/dL    Creatinine 0.82 0.52 - 1.04 mg/dL    GFR Estimate >90 >60 mL/min/[1.73_m2]    GFR Estimate If Black >90 >60 mL/min/[1.73_m2]    Calcium 10.1 8.5 - 10.1 mg/dL    Bilirubin Total 4.6 (H) 0.2 - 1.3 mg/dL    Albumin 3.3 (L) 3.4 - 5.0 g/dL    Protein Total 8.4 6.8 - 8.8 g/dL    Alkaline Phosphatase 212 (H) 40 - 150 U/L    ALT 42 0 - 50 U/L    AST 89 (H) 0 - 45 U/L   Lactic acid whole blood   Result Value Ref Range    Lactic Acid 1.5 0.7 - 2.0 mmol/L   Ammonia (on ice)   Result Value Ref Range    Ammonia 74 (H) 10 - 50 umol/L   INR   Result Value Ref Range    INR 1.55 (H) 0.86 - 1.14   UA with Microscopic   Result Value Ref Range    Color Urine Yellow     Appearance Urine Clear     Glucose Urine Negative NEG^Negative mg/dL    Bilirubin Urine Negative NEG^Negative    Ketones Urine Negative NEG^Negative mg/dL    Specific Gravity Urine 1.010 1.003 - 1.035    Blood Urine Negative NEG^Negative    pH Urine 7.0 5.0 - 7.0 pH    Protein Albumin Urine Negative NEG^Negative mg/dL    Urobilinogen mg/dL Normal 0.0 - 2.0 mg/dL    Nitrite Urine Negative NEG^Negative    Leukocyte Esterase Urine Negative NEG^Negative    Source Midstream Urine     WBC Urine 2 0 - 5 /HPF    RBC Urine 0 0 - 2 /HPF    Squamous Epithelial /HPF Urine 8 (H) 0 - 1 /HPF    Transitional Epi <1 0 - 1 /HPF   Urine Culture    Specimen: Urine Midstream; Midstream Urine   Result Value Ref Range    Specimen Description Midstream Urine     Special Requests Specimen received in preservative     Culture  Micro PENDING    hCG qual urine POCT   Result Value Ref Range    HCG Qual Urine Negative neg    Internal QC OK Yes    Head CT w/o contrast    Narrative    CT HEAD W/O CONTRAST 11/26/2020 1:31 PM    History: headache, AMS     Comparison: 6/10/2020    Technique: Using multidetector thin collimation helical acquisition  technique, axial, coronal and sagittal CT images from the skull base  to the vertex were obtained without intravenous contrast.    Findings: There is no intracranial hemorrhage, mass effect, or midline  shift. Gray/white matter differentiation in both cerebral hemispheres  is preserved. Ventricles are proportionate to the cerebral sulci. The  basal cisterns are clear.    The bony calvaria and the bones of the skull base are normal. The  visualized portions of the paranasal sinuses and mastoid air cells are  clear.       Impression    Impression:  No acute intracranial pathology.     I have personally reviewed the examination and initial interpretation  and I agree with the findings.    KRIS KIRK MD   EKG 12-lead, tracing only   Result Value Ref Range    Interpretation ECG Click View Image link to view waveform and result    Troponin I   Result Value Ref Range    Troponin I ES <0.015 0.000 - 0.045 ug/L     Medications   meclizine (ANTIVERT) tablet 50 mg (has no administration in time range)   lactulose (CEPHULAC) Packet 30 g (30 g Oral Given 11/26/20 1259)             Assessments & Plan (with Medical Decision Making)   Yenifer is a 40-year-old female past medical history significant for cirrhosis who presents with feeling off balance, vomiting, and generally unwell.  Patient also notes a headache.  Patient does endorse history of similar symptoms in the past of unknown etiology.    Differential includes vertigo, migraine, electrolyte abnormality, elevated ammonia, infection, cardiac arrhythmia, acute intercranial pathology.    Patient specifically denies chest pain, shortness of breath, and a presyncopal  sensation.  EKG showed no signs of acute ischemia or underlying arrhythmia so have low suspicion this is secondary to cardiac etiology.  Troponin negative.    Patient has a benign abdominal exam and is not complaining of any infectious symptoms.  She is a normal white count.  No left shift.  UA without signs of infection.  Her electrolytes are normal, she does have an elevated bilirubin which has been elevated in the past, this is slightly higher than baseline.  Normal lactic acid.    She does have an elevated ammonia of 74.  This could certainly be contributing to her symptoms.  She was given a dose of lactulose here in the emergency department.    CT without any acute intercranial pathology.    Given the patient's normal neurologic exam, negative work-up here in the ER, and elevated ammonia I suspect her symptoms could be secondary to the high ammonia level.  She is under close observation of the treatment center will recommend discharge home with increased lactulose back to 3 times daily and close follow-up with her PCP for recheck of labs.    On reassessment patient still felt she did not feel back to baseline.  Plan is to give lactulose, admit to ED opts to see if her symptoms improve.  Also give meclizine to see if this helps with symptomatic improvement.    I have reviewed the nursing notes.    I have reviewed the findings, diagnosis, plan and need for follow up with the patient.    New Prescriptions    No medications on file       Final diagnoses:   Hepatic encephalopathy (H)     IJackson, am serving as a trained medical scribe to document services personally performed by Alex Sousa MD, based on the provider's statements to me.     IAlex MD, was physically present and have reviewed and verified the accuracy of this note documented by Jackson Srinivasan.    Alex Sousa MD  11/26/2020   [facility] EMERGENCY DEPARTMENT     Alex Sousa MD  11/26/20 1640

## 2020-11-26 NOTE — ED NOTES
[facility]    ED Nurse to Floor Handoff     Yenifer Maher is a 40 year old female who speaks English and lives with others,  [facility]  They arrived in the ED by ambulance from Facility     ED Chief Complaint: Altered Mental Status    ED Dx;   Final diagnoses:   Hepatic encephalopathy (H)         Needed?: No    Allergies:   Allergies   Allergen Reactions     Bengay Pain Relief [Menthol] Other (See Comments)     Skin turns red and feels extremely hot     Cats      Chocolate Dermatitis     Dicyclomine Other (See Comments)     Severe sedation     Dust Mites      Derm, resp     No Clinical Screening - See Comments      GI upset     Phenobarbital      Pollen Extract      Derm, resp.    .  Past Medical Hx:   Past Medical History:   Diagnosis Date     Alcohol abuse      Alcoholic cirrhosis (H)      Alcoholic peripheral neuropathy (H)      Coagulopathy (H)      Gastritis      History of Clostridium difficile colitis     unknown date     Impairment of cognitive function     MOCA 2/2019 22/30     Leukocytosis 02/2019    persistent leukocytosis across 2/2019 hospitalization without evidence of source across multiple diagnostics including LP, BCx, UCx      Lupus anticoagulant positive      Macrocytic anemia      Moderate protein-calorie malnutrition (H)      Paroxysmal A-fib (H) 02/2019    not on chronic anticoagulation     Peptic ulcer disease      Positive SMILEY (antinuclear antibody)      Subclinical hypothyroidism 02/2019    normal T3, T4     Tobacco dependence     0.5 PPD      Baseline Mental status: WDL  Current Mental Status changes: other Confused due to elevated amonia    Infection present or suspected this encounter: no  Sepsis suspected: No  Isolation type: No active isolations  Patient tested for COVID 19 prior to admission: YES     Activity level - Baseline/Home:  Independent  Activity Level - Current:   Independent    Bariatric equipment needed?:  No    In the ED these meds were given:   Medications   lactulose (CEPHULAC) Packet 30 g (30 g Oral Given 11/26/20 1259)   meclizine (ANTIVERT) tablet 50 mg (50 mg Oral Given 11/26/20 0772)       Drips running?  No    Home pump  No    Current LDAs  Peripheral IV 11/26/20 Right Upper forearm (Active)   Site Assessment WDL 11/26/20 1129   Number of days: 0       Wound 12/12/18 Anterior;Right;Inner;Mid Thigh Ulceration (Active)   Number of days: 715       Wound 12/12/18 Anterior;Left;Mid;Inner Thigh Ulceration (Active)   Number of days: 715       Labs results:   Labs Ordered and Resulted from Time of ED Arrival Up to the Time of Departure from the ED   CBC WITH PLATELETS DIFFERENTIAL - Abnormal; Notable for the following components:       Result Value    RBC Count 3.63 (*)     Hemoglobin 10.8 (*)     Hematocrit 33.0 (*)     Absolute Lymphocytes 0.7 (*)     All other components within normal limits   COMPREHENSIVE METABOLIC PANEL - Abnormal; Notable for the following components:    Glucose 117 (*)     Bilirubin Total 4.6 (*)     Albumin 3.3 (*)     Alkaline Phosphatase 212 (*)     AST 89 (*)     All other components within normal limits   ROUTINE UA WITH MICROSCOPIC - Abnormal; Notable for the following components:    Squamous Epithelial /HPF Urine 8 (*)     All other components within normal limits   AMMONIA - Abnormal; Notable for the following components:    Ammonia 74 (*)     All other components within normal limits   INR - Abnormal; Notable for the following components:    INR 1.55 (*)     All other components within normal limits   HCG QUAL URINE POCT - Normal   LACTIC ACID WHOLE BLOOD   TROPONIN I   COVID-19 VIRUS (CORONAVIRUS) BY PCR   SARS-COV-2 (COVID-19) VIRUS RT-PCR   URINE CULTURE AEROBIC BACTERIAL       Imaging Studies:   Recent Results (from the past 24 hour(s))   Head CT w/o contrast    Narrative    CT HEAD W/O CONTRAST 11/26/2020 1:31 PM    History: headache, AMS     Comparison: 6/10/2020    Technique:  Using multidetector thin collimation helical acquisition  technique, axial, coronal and sagittal CT images from the skull base  to the vertex were obtained without intravenous contrast.    Findings: There is no intracranial hemorrhage, mass effect, or midline  shift. Gray/white matter differentiation in both cerebral hemispheres  is preserved. Ventricles are proportionate to the cerebral sulci. The  basal cisterns are clear.    The bony calvaria and the bones of the skull base are normal. The  visualized portions of the paranasal sinuses and mastoid air cells are  clear.       Impression    Impression:  No acute intracranial pathology.     I have personally reviewed the examination and initial interpretation  and I agree with the findings.    KRIS KIRK MD       Recent vital signs:   BP (!) 143/85   Pulse 117   Temp 98  F (36.7  C) (Oral)   Resp 16   Wt 68 kg (150 lb)   SpO2 98%   BMI 23.49 kg/m      [place] Coma Scale Score: 14 (11/26/20 1115)       Cardiac Rhythm: Tachycardia  Pt needs tele? No  Skin/wound Issues: None    Code Status: Full Code    Pain control: pt had none    Nausea control: fair    Abnormal labs/tests/findings requiring intervention: Ammonia level    Family present during ED course? No   Family Comments/Social Situation comments:     Tasks needing completion: None    Judit Jefferson, RN  [phone] [facility]

## 2020-11-27 ENCOUNTER — APPOINTMENT (OUTPATIENT)
Dept: ULTRASOUND IMAGING | Facility: CLINIC | Age: 40
End: 2020-11-27
Attending: NURSE PRACTITIONER
Payer: MEDICARE

## 2020-11-27 ENCOUNTER — APPOINTMENT (OUTPATIENT)
Dept: GENERAL RADIOLOGY | Facility: CLINIC | Age: 40
End: 2020-11-27
Attending: NURSE PRACTITIONER
Payer: MEDICARE

## 2020-11-27 ENCOUNTER — APPOINTMENT (OUTPATIENT)
Dept: CARDIOLOGY | Facility: CLINIC | Age: 40
End: 2020-11-27
Attending: NURSE PRACTITIONER
Payer: MEDICARE

## 2020-11-27 LAB
ALBUMIN SERPL-MCNC: 2.8 G/DL (ref 3.4–5)
ALP SERPL-CCNC: 185 U/L (ref 40–150)
ALT SERPL W P-5'-P-CCNC: 37 U/L (ref 0–50)
AMMONIA PLAS-SCNC: 81 UMOL/L (ref 10–50)
ANION GAP SERPL CALCULATED.3IONS-SCNC: 7 MMOL/L (ref 3–14)
AST SERPL W P-5'-P-CCNC: 72 U/L (ref 0–45)
BACTERIA SPEC CULT: NO GROWTH
BASOPHILS # BLD AUTO: 0 10E9/L (ref 0–0.2)
BASOPHILS NFR BLD AUTO: 0.9 %
BILIRUB SERPL-MCNC: 3.8 MG/DL (ref 0.2–1.3)
BUN SERPL-MCNC: 16 MG/DL (ref 7–30)
CALCIUM SERPL-MCNC: 9.2 MG/DL (ref 8.5–10.1)
CHLORIDE SERPL-SCNC: 108 MMOL/L (ref 94–109)
CO2 SERPL-SCNC: 23 MMOL/L (ref 20–32)
CREAT SERPL-MCNC: 0.86 MG/DL (ref 0.52–1.04)
DIFFERENTIAL METHOD BLD: ABNORMAL
EOSINOPHIL # BLD AUTO: 0.1 10E9/L (ref 0–0.7)
EOSINOPHIL NFR BLD AUTO: 2.5 %
ERYTHROCYTE [DISTWIDTH] IN BLOOD BY AUTOMATED COUNT: 13.9 % (ref 10–15)
GFR SERPL CREATININE-BSD FRML MDRD: 86 ML/MIN/{1.73_M2}
GLUCOSE SERPL-MCNC: 99 MG/DL (ref 70–99)
HCT VFR BLD AUTO: 30.7 % (ref 35–47)
HGB BLD-MCNC: 10 G/DL (ref 11.7–15.7)
IMM GRANULOCYTES # BLD: 0 10E9/L (ref 0–0.4)
IMM GRANULOCYTES NFR BLD: 0.5 %
INR PPP: 1.62 (ref 0.86–1.14)
LACTATE BLD-SCNC: 1.2 MMOL/L (ref 0.7–2)
LYMPHOCYTES # BLD AUTO: 1.3 10E9/L (ref 0.8–5.3)
LYMPHOCYTES NFR BLD AUTO: 30.1 %
Lab: NORMAL
MCH RBC QN AUTO: 29.9 PG (ref 26.5–33)
MCHC RBC AUTO-ENTMCNC: 32.6 G/DL (ref 31.5–36.5)
MCV RBC AUTO: 92 FL (ref 78–100)
MONOCYTES # BLD AUTO: 0.7 10E9/L (ref 0–1.3)
MONOCYTES NFR BLD AUTO: 16.6 %
NEUTROPHILS # BLD AUTO: 2.2 10E9/L (ref 1.6–8.3)
NEUTROPHILS NFR BLD AUTO: 49.4 %
NRBC # BLD AUTO: 0 10*3/UL
NRBC BLD AUTO-RTO: 0 /100
PLATELET # BLD AUTO: 157 10E9/L (ref 150–450)
POTASSIUM SERPL-SCNC: 4.1 MMOL/L (ref 3.4–5.3)
PROT SERPL-MCNC: 7.5 G/DL (ref 6.8–8.8)
RBC # BLD AUTO: 3.34 10E12/L (ref 3.8–5.2)
SODIUM SERPL-SCNC: 138 MMOL/L (ref 133–144)
SPECIMEN SOURCE: NORMAL
TROPONIN I SERPL-MCNC: <0.015 UG/L (ref 0–0.04)
TROPONIN I SERPL-MCNC: <0.015 UG/L (ref 0–0.04)
WBC # BLD AUTO: 4.4 10E9/L (ref 4–11)

## 2020-11-27 PROCEDURE — 80321 ALCOHOLS BIOMARKERS 1OR 2: CPT | Performed by: PHYSICIAN ASSISTANT

## 2020-11-27 PROCEDURE — 250N000013 HC RX MED GY IP 250 OP 250 PS 637: Mod: GY | Performed by: NURSE PRACTITIONER

## 2020-11-27 PROCEDURE — 93306 TTE W/DOPPLER COMPLETE: CPT

## 2020-11-27 PROCEDURE — 83605 ASSAY OF LACTIC ACID: CPT | Performed by: PHYSICIAN ASSISTANT

## 2020-11-27 PROCEDURE — 93975 VASCULAR STUDY: CPT | Mod: 26 | Performed by: RADIOLOGY

## 2020-11-27 PROCEDURE — 36415 COLL VENOUS BLD VENIPUNCTURE: CPT | Performed by: NURSE PRACTITIONER

## 2020-11-27 PROCEDURE — 71046 X-RAY EXAM CHEST 2 VIEWS: CPT | Mod: 26 | Performed by: RADIOLOGY

## 2020-11-27 PROCEDURE — 99204 OFFICE O/P NEW MOD 45 MIN: CPT | Mod: GC | Performed by: INTERNAL MEDICINE

## 2020-11-27 PROCEDURE — 93975 VASCULAR STUDY: CPT

## 2020-11-27 PROCEDURE — G0378 HOSPITAL OBSERVATION PER HR: HCPCS

## 2020-11-27 PROCEDURE — 84484 ASSAY OF TROPONIN QUANT: CPT | Performed by: PHYSICIAN ASSISTANT

## 2020-11-27 PROCEDURE — 85610 PROTHROMBIN TIME: CPT | Performed by: NURSE PRACTITIONER

## 2020-11-27 PROCEDURE — 80053 COMPREHEN METABOLIC PANEL: CPT | Performed by: NURSE PRACTITIONER

## 2020-11-27 PROCEDURE — 84484 ASSAY OF TROPONIN QUANT: CPT | Performed by: NURSE PRACTITIONER

## 2020-11-27 PROCEDURE — 96361 HYDRATE IV INFUSION ADD-ON: CPT

## 2020-11-27 PROCEDURE — 76705 ECHO EXAM OF ABDOMEN: CPT

## 2020-11-27 PROCEDURE — 999N000147 HC STATISTIC PT IP EVAL DEFER

## 2020-11-27 PROCEDURE — 71046 X-RAY EXAM CHEST 2 VIEWS: CPT

## 2020-11-27 PROCEDURE — 82140 ASSAY OF AMMONIA: CPT | Performed by: NURSE PRACTITIONER

## 2020-11-27 PROCEDURE — 99225 PR SUBSEQUENT OBSERVATION CARE,LEVEL II: CPT | Performed by: EMERGENCY MEDICINE

## 2020-11-27 PROCEDURE — 87040 BLOOD CULTURE FOR BACTERIA: CPT | Performed by: NURSE PRACTITIONER

## 2020-11-27 PROCEDURE — 250N000013 HC RX MED GY IP 250 OP 250 PS 637: Performed by: NURSE PRACTITIONER

## 2020-11-27 PROCEDURE — 85025 COMPLETE CBC W/AUTO DIFF WBC: CPT | Performed by: NURSE PRACTITIONER

## 2020-11-27 PROCEDURE — 93306 TTE W/DOPPLER COMPLETE: CPT | Mod: 26 | Performed by: INTERNAL MEDICINE

## 2020-11-27 PROCEDURE — 36415 COLL VENOUS BLD VENIPUNCTURE: CPT | Performed by: PHYSICIAN ASSISTANT

## 2020-11-27 PROCEDURE — 76705 ECHO EXAM OF ABDOMEN: CPT | Mod: 26 | Performed by: RADIOLOGY

## 2020-11-27 RX ORDER — CELECOXIB 200 MG/1
200 CAPSULE ORAL 2 TIMES DAILY PRN
Status: DISCONTINUED | OUTPATIENT
Start: 2020-11-27 | End: 2020-11-28 | Stop reason: HOSPADM

## 2020-11-27 RX ORDER — CALCIUM CARBONATE 500 MG/1
1000 TABLET, CHEWABLE ORAL 3 TIMES DAILY PRN
Status: DISCONTINUED | OUTPATIENT
Start: 2020-11-27 | End: 2020-11-28 | Stop reason: HOSPADM

## 2020-11-27 RX ORDER — ONDANSETRON 4 MG/1
4 TABLET, ORALLY DISINTEGRATING ORAL EVERY 8 HOURS PRN
Status: DISCONTINUED | OUTPATIENT
Start: 2020-11-27 | End: 2020-11-27

## 2020-11-27 RX ORDER — SPIRONOLACTONE 25 MG/1
100 TABLET ORAL DAILY
Status: DISCONTINUED | OUTPATIENT
Start: 2020-11-27 | End: 2020-11-28 | Stop reason: HOSPADM

## 2020-11-27 RX ORDER — ECHINACEA PURPUREA EXTRACT 125 MG
TABLET ORAL
Status: ON HOLD | COMMUNITY
End: 2020-12-06

## 2020-11-27 RX ORDER — VITAMIN B COMPLEX
50 TABLET ORAL DAILY
Status: DISCONTINUED | OUTPATIENT
Start: 2020-11-27 | End: 2020-11-28 | Stop reason: HOSPADM

## 2020-11-27 RX ORDER — SIMETHICONE 125 MG
125 TABLET,CHEWABLE ORAL 4 TIMES DAILY PRN
Status: DISCONTINUED | OUTPATIENT
Start: 2020-11-27 | End: 2020-11-27 | Stop reason: CLARIF

## 2020-11-27 RX ORDER — FEXOFENADINE HCL 180 MG/1
180 TABLET ORAL DAILY
Status: ON HOLD | COMMUNITY
End: 2020-12-06

## 2020-11-27 RX ORDER — ONDANSETRON 4 MG/1
4 TABLET, ORALLY DISINTEGRATING ORAL EVERY 8 HOURS PRN
Status: ON HOLD | COMMUNITY
End: 2020-12-06

## 2020-11-27 RX ORDER — SIMETHICONE 125 MG
TABLET,CHEWABLE ORAL
Status: ON HOLD | COMMUNITY
End: 2020-12-06

## 2020-11-27 RX ORDER — FUROSEMIDE 20 MG
40 TABLET ORAL DAILY
Status: DISCONTINUED | OUTPATIENT
Start: 2020-11-27 | End: 2020-11-28 | Stop reason: HOSPADM

## 2020-11-27 RX ORDER — LIDOCAINE 4 G/G
1 PATCH TOPICAL EVERY 24 HOURS
Status: DISCONTINUED | OUTPATIENT
Start: 2020-11-27 | End: 2020-11-28 | Stop reason: HOSPADM

## 2020-11-27 RX ORDER — SENNOSIDES A AND B 8.6 MG/1
1-2 TABLET, FILM COATED ORAL 2 TIMES DAILY
Status: DISCONTINUED | OUTPATIENT
Start: 2020-11-27 | End: 2020-11-27

## 2020-11-27 RX ORDER — IBUPROFEN 200 MG
400 TABLET ORAL EVERY 6 HOURS PRN
Status: ON HOLD | COMMUNITY
End: 2020-12-06

## 2020-11-27 RX ORDER — LACTULOSE 10 G/15ML
40 SOLUTION ORAL 4 TIMES DAILY
Status: DISCONTINUED | OUTPATIENT
Start: 2020-11-27 | End: 2020-11-28

## 2020-11-27 RX ORDER — LANOLIN ALCOHOL/MO/W.PET/CERES
100 CREAM (GRAM) TOPICAL DAILY
Status: DISCONTINUED | OUTPATIENT
Start: 2020-11-27 | End: 2020-11-28 | Stop reason: HOSPADM

## 2020-11-27 RX ORDER — ACETAMINOPHEN 325 MG/1
325 TABLET ORAL EVERY 4 HOURS PRN
Status: DISCONTINUED | OUTPATIENT
Start: 2020-11-27 | End: 2020-11-28 | Stop reason: HOSPADM

## 2020-11-27 RX ORDER — FEXOFENADINE HCL 180 MG/1
180 TABLET ORAL DAILY
Status: DISCONTINUED | OUTPATIENT
Start: 2020-11-27 | End: 2020-11-28 | Stop reason: HOSPADM

## 2020-11-27 RX ORDER — LANOLIN ALCOHOL/MO/W.PET/CERES
100 CREAM (GRAM) TOPICAL DAILY
Status: ON HOLD | COMMUNITY
End: 2020-12-06

## 2020-11-27 RX ORDER — LIDOCAINE 4 G/G
1 PATCH TOPICAL EVERY 24 HOURS
Status: ON HOLD | COMMUNITY
End: 2020-12-06

## 2020-11-27 RX ORDER — VITAMIN B COMPLEX
2 TABLET ORAL DAILY
Status: ON HOLD | COMMUNITY
End: 2020-12-06

## 2020-11-27 RX ORDER — SENNOSIDES A AND B 8.6 MG/1
1-2 TABLET, FILM COATED ORAL 2 TIMES DAILY
Status: ON HOLD | COMMUNITY
End: 2020-12-06

## 2020-11-27 RX ORDER — IBUPROFEN 200 MG
400 TABLET ORAL EVERY 6 HOURS PRN
Status: DISCONTINUED | OUTPATIENT
Start: 2020-11-27 | End: 2020-11-28 | Stop reason: HOSPADM

## 2020-11-27 RX ORDER — ACETAMINOPHEN 325 MG/1
325 TABLET ORAL EVERY 4 HOURS PRN
COMMUNITY

## 2020-11-27 RX ORDER — SIMETHICONE 80 MG
80 TABLET,CHEWABLE ORAL 4 TIMES DAILY PRN
Status: DISCONTINUED | OUTPATIENT
Start: 2020-11-27 | End: 2020-11-28 | Stop reason: HOSPADM

## 2020-11-27 RX ORDER — CELECOXIB 200 MG/1
200 CAPSULE ORAL 2 TIMES DAILY PRN
COMMUNITY

## 2020-11-27 RX ADMIN — PANTOPRAZOLE SODIUM 40 MG: 40 TABLET, DELAYED RELEASE ORAL at 08:16

## 2020-11-27 RX ADMIN — THERA TABS 1 TABLET: TAB at 09:13

## 2020-11-27 RX ADMIN — SILDENAFIL 40 MG: 20 TABLET ORAL at 20:26

## 2020-11-27 RX ADMIN — FERROUS SULFATE TAB 325 MG (65 MG ELEMENTAL FE) 325 MG: 325 (65 FE) TAB at 11:21

## 2020-11-27 RX ADMIN — POLYETHYLENE GLYCOL 3350 17 G: 17 POWDER, FOR SOLUTION ORAL at 09:11

## 2020-11-27 RX ADMIN — MAGNESIUM OXIDE 400 MG: 400 TABLET ORAL at 09:13

## 2020-11-27 RX ADMIN — PREGABALIN 50 MG: 25 CAPSULE ORAL at 09:13

## 2020-11-27 RX ADMIN — FERROUS SULFATE TAB 325 MG (65 MG ELEMENTAL FE) 325 MG: 325 (65 FE) TAB at 09:13

## 2020-11-27 RX ADMIN — PRENATAL VIT W/ FE FUMARATE-FA TAB 27-0.8 MG 1 TABLET: 27-0.8 TAB at 09:13

## 2020-11-27 RX ADMIN — FOLIC ACID 1 MG: 1 TABLET ORAL at 09:13

## 2020-11-27 RX ADMIN — RIFAXIMIN 550 MG: 550 TABLET ORAL at 20:26

## 2020-11-27 RX ADMIN — PANTOPRAZOLE SODIUM 40 MG: 40 TABLET, DELAYED RELEASE ORAL at 17:27

## 2020-11-27 RX ADMIN — LACTULOSE 40 G: 20 SOLUTION ORAL at 16:19

## 2020-11-27 RX ADMIN — PREGABALIN 50 MG: 25 CAPSULE ORAL at 20:26

## 2020-11-27 RX ADMIN — SPIRONOLACTONE 100 MG: 25 TABLET ORAL at 17:27

## 2020-11-27 RX ADMIN — LACTULOSE 40 G: 20 SOLUTION ORAL at 09:12

## 2020-11-27 RX ADMIN — PREGABALIN 50 MG: 25 CAPSULE ORAL at 13:54

## 2020-11-27 RX ADMIN — CETIRIZINE HYDROCHLORIDE 5 MG: 5 TABLET ORAL at 09:13

## 2020-11-27 RX ADMIN — FERROUS SULFATE TAB 325 MG (65 MG ELEMENTAL FE) 325 MG: 325 (65 FE) TAB at 17:26

## 2020-11-27 RX ADMIN — VITAMIN D, TAB 1000IU (100/BT) 50 MCG: 25 TAB at 17:26

## 2020-11-27 RX ADMIN — SILDENAFIL 40 MG: 20 TABLET ORAL at 09:13

## 2020-11-27 RX ADMIN — FUROSEMIDE 40 MG: 20 TABLET ORAL at 17:27

## 2020-11-27 RX ADMIN — NORTRIPTYLINE HYDROCHLORIDE 10 MG: 10 CAPSULE ORAL at 22:47

## 2020-11-27 RX ADMIN — RIFAXIMIN 550 MG: 550 TABLET ORAL at 09:13

## 2020-11-27 RX ADMIN — LIDOCAINE 1 PATCH: 560 PATCH PERCUTANEOUS; TOPICAL; TRANSDERMAL at 17:27

## 2020-11-27 RX ADMIN — Medication 100 MG: at 17:27

## 2020-11-27 RX ADMIN — SILDENAFIL 40 MG: 20 TABLET ORAL at 13:53

## 2020-11-27 RX ADMIN — THIAMINE HCL TAB 100 MG 100 MG: 100 TAB at 09:13

## 2020-11-27 RX ADMIN — LACTULOSE 40 G: 20 SOLUTION ORAL at 11:21

## 2020-11-27 RX ADMIN — NICOTINE 7 MG/24 HR DAILY TRANSDERMAL PATCH 1 PATCH: at 11:28

## 2020-11-27 RX ADMIN — DOCUSATE SODIUM 50 MG AND SENNOSIDES 8.6 MG 2 TABLET: 8.6; 5 TABLET, FILM COATED ORAL at 09:13

## 2020-11-27 NOTE — PLAN OF CARE
- Hepatology consult complete: Not met   - Lightheadedness improved: Not met   - PT consult complete: Not met   - Mental status at baseline: Not met   - Tolerating oral intake: Met

## 2020-11-27 NOTE — PLAN OF CARE
- Hepatology consult complete: Yes  - Lightheadedness improved: In progress, reports improvement   - PT consult complete:Yes  - Mental status at baseline: Yes  - Tolerating oral intake: Yes    BP (!) 144/95 (BP Location: Left arm)   Pulse 120   Temp 98  F (36.7  C) (Oral)   Resp 17   Wt 68 kg (150 lb)   SpO2 96%   BMI 23.49 kg/m

## 2020-11-27 NOTE — PLAN OF CARE
- Hepatology consult complete: Not met   - Lightheadedness improved: In progress, reports improvement   - PT consult complete: Not met   - Mental status at baseline: Met   - Tolerating oral intake: Met

## 2020-11-27 NOTE — PLAN OF CARE
- Hepatology consult complete: In progress, waiting for recommendations  - Lightheadedness improved: In progress, reports improvement   - PT consult complete: Not met   - Mental status at baseline: Met   - Tolerating oral intake: Met

## 2020-11-27 NOTE — PHARMACY-ADMISSION MEDICATION HISTORY
Admission Medication History Completed by Pharmacy    See [facility] Admission Navigator for allergy information, preferred outpatient pharmacy, prior to admission medications and immunization status.     Medication History Sources:     [facility]    Changes made to PTA medication list (reason):    Added: cough drops, celecoxib, cholecalciferol, APAP, senna, ibuprofen, melatonin, ondansetron, pepto-bismol, sodium chloride nasal spray, lidocaine patch, simethicone, fexofenadine, nicotine patch      Deleted: All of the following were removed as they were not of [place] MAR:  o Cetirizine 5 mg by mouth  o Ferrous sulfate 325 mg tabs   - One entry for one tab daily every other day  - One entry for one tab TID with meals  o Mag-ox 400 mg tab daily  o Miralax 17 g daily PRN constipation  o Pregabalin 50 mg cap TID  o Prenatal vitamin daily  o Mayda-seltzer gold 1-2 tabs BID PRN heartburn  o Duplicate multivitamin entry  o Vitamin B complex C tablet daily  o Vitamin C 500 mg tablet daily       Changed:   o Calcium carbonate 500 mg 1-2 tab PRN heartburn -> 750 mg 2-4 tab PRN  o Added frequency to Flonase  o Furosemide 20 mg daily -> 40 mg daily  o Lactulose 10 g BID -> 20 g TID  o Added frequency to rifaximin  o Added frequency to sildenafil  o Spironolactone 50 mg daily -> 100 mg daily    Additional Information:    None    Prior to Admission medications    Medication Sig Last Dose Taking? Auth Provider   acetaminophen (TYLENOL) 325 MG tablet Take 325 mg by mouth every 4 hours as needed  Yes Unknown, Entered By History   bismuth subsalicylate (PEPTO BISMOL) 262 MG/15ML suspension Take 30 mL every half to one hour as needed. Do not exceed 8 doses in 24 hours.  Yes Unknown, Entered By History   calcium carbonate 750 MG CHEW Take 2-4 chew tab by mouth as needed for heartburn  Past Week at Unknown time Yes Unknown, Entered By History   celecoxib (CELEBREX) 200 MG capsule Take 200 mg by mouth 2 times daily as needed   Yes Unknown, Entered By History   fexofenadine (ALLEGRA) 180 MG tablet Take 180 mg by mouth daily 11/26/2020 at Unknown time Yes Unknown, Entered By History   fluticasone (FLONASE) 50 MCG/ACT nasal spray Spray 1 spray into both nostrils daily as needed   Yes Reported, Patient   folic acid (FOLVITE) 1 MG tablet Take 1 tablet (1 mg) by mouth daily 11/26/2020 at Unknown time Yes Corinna Piña MD   furosemide (LASIX) 20 MG tablet Take 1 tablet (20 mg) by mouth daily  Patient taking differently: Take 40 mg by mouth daily  11/26/2020 at Unknown time Yes William Hernández MD   ibuprofen (ADVIL/MOTRIN) 200 MG tablet Take 400 mg by mouth every 6 hours as needed for mild pain 11/26/2020 at Unknown time Yes Unknown, Entered By History   lactulose (CHRONULAC) 10 GM/15ML solution Take 15 mLs (10 g) by mouth 2 times daily  Patient taking differently: Take 20 g by mouth 3 times daily  11/26/2020 at Unknown time Yes Roma Emerson PA   Lidocaine (LIDOCARE) 4 % Patch Place 1 patch onto the skin every 24 hours To prevent lidocaine toxicity, patient should be patch free for 12 hrs daily. 11/26/2020 at Unknown time Yes Unknown, Entered By History   melatonin 5 MG tablet Take 5-10 mg by mouth nightly as needed for sleep  Yes Unknown, Entered By History   menthol (COUGH DROPS) 7 MG LOZG Take 1-2 lozenges by mouth as needed Past Week at Unknown time Yes Unknown, Entered By History   multivitamin, therapeutic (THERA-VIT) TABS tablet Take 1 tablet by mouth daily 11/26/2020 at Unknown time Yes Roma Emerson PA   nicotine (NICODERM CQ) 7 MG/24HR 24 hr patch Place 1 patch onto the skin every 24 hours 11/26/2020 at Unknown time Yes Unknown, Entered By History   nortriptyline (PAMELOR) 10 MG capsule Take 1 capsule (10 mg) by mouth At Bedtime 11/25/2020 at Unknown time Yes Corinna Piña MD   ondansetron (ZOFRAN-ODT) 4 MG ODT tab Take 4 mg by mouth every 8 hours as needed for nausea  Yes Unknown, Entered By History    pantoprazole (PROTONIX) 40 MG EC tablet Take 1 tablet (40 mg) by mouth 2 times daily (before meals) 11/26/2020 at Unknown time Yes Shara Mujica MD   rifaximin (XIFAXAN) 550 MG TABS tablet Take 550 mg by mouth 2 times daily   Yes Reported, Patient   senna (SENOKOT) 8.6 MG tablet Take 1-2 tablets by mouth 2 times daily 11/26/2020 at Unknown time Yes Unknown, Entered By History   sildenafil (REVATIO) 20 MG tablet Take 40 mg by mouth 3 times daily  11/26/2020 at Unknown time Yes Reported, Patient   simethicone (MYLICON) 125 MG chewable tablet Take 1-2 softgels as needed after meals and at bedtime  Yes Unknown, Entered By History   sodium chloride (OCEAN) 0.65 % nasal spray Inhale 2 sprays in the nostril(s) every 30 minutes as needed for nasal congestion or nasal dryness Past Month at Unknown time Yes Unknown, Entered By History   spironolactone (ALDACTONE) 50 MG tablet Take 1 tablet (50 mg) by mouth daily  Patient taking differently: Take 100 mg by mouth daily  11/26/2020 at Unknown time Yes William Hernández MD   thiamine (B-1) 100 MG tablet Take 100 mg by mouth daily 11/26/2020 at Unknown time Yes Unknown, Entered By History   Vitamin D3 (CHOLECALCIFEROL) 25 mcg (1000 units) tablet Take 2 tablets by mouth daily 11/26/2020 at Unknown time Yes Unknown, Entered By History       Date completed: 11/27/20    Medication history completed by: Aleena Doll, PharmD Resident

## 2020-11-27 NOTE — CONSULTS
"[facility]    Hepatology Consult    Requesting provider: LÓPEZ Cortes CNP, ED Obs    Consult requested for Hepatic Encephalopathy      HPI:  40 year old female with history of liver cirrhosis secondary to alcohol use, portal hypertension, hepatic encephalopathy, pulmonary hypertension, who was admitted to ED observation for confusion, lightheadedness, and nausea.    Prior to presentation to the emergency room, she has been at [facility] for alcohol treatment. Per patient, her last drink was in September (but was hospitalized at [facility] from Aug-October).    She reports that 1 day ago, she woke up late and felt more sleepy than usual which is unusual for her.  She also reported feeling nauseous and lightheaded.  Reports feeling confusion and describes it as feeling \"weird\", could not describe her feeling any further. Had loss of balance and headaches.  No vomiting.  Denies any fevers.  No breathing difficulties.  No chest pain.  No urinary symptoms. Recently had pharyngitis, seen at clinic ([facility]), negative for COVID. Has been in [facility] rehab center and on quarantine since [facility] is [facility].    Has been having formed to soft bowel movements, not watery.  She reports taking lactulose 2-3 times per day, also reports that her prescription has been changed at [facility] where she was only getting it 2 times a day (recently changed from 3 times a day)  With this, she was only having about 3 bowel movements which were formed.  Denies any blood in the stool, stools are brown without any melena.  Denies any abdominal pain.    Recent admission at [facility]: prolonged hospitalization from 8/19 - 10/5/2020 for Cardiogenic shock, renal failure requiring HD; needed pressors and Flolan. Had encephalopathy, thought to be HE, cleared with lactulose. Echo showed significant tricuspid regurg, dilated RV and elevated PA pressures. Discharged " on sildenafil. Was also volume overloaded, so was started on lasix and spironolactone. Was referred to pulmonary hypertension clinic here at the , has not seen anyone yet. Was discharged to [place] acute rehab.      Cirrhosis History:  Etiology: Alcohol; biopsy proven cirrhosis  Primary Hepatologist: Dr Hernández, last clinic 5/2020  MELD-Na: 17  Portal hypertension: Present  HE: Type C, Hx of this in the past. Has been on Lactulose and Rifaximin  Ascites: History of this. No recent paracentesis, last para was last year, no hx of SBP  Coagulopathy: present; INR is 1.62  Varices: Grade I varices on EGD in March. PHG.  HCC Screening: At [facility], Sept 2020, no masses  Any Thrombosis: Left portal vein occlusion with recanalization  Last US:  At [facility], Sept 2020, Trace fluids, no masses  Transplant Candidacy: Not under evaluation currently - recent alcohol use.    MELD-Na score: 17 at 11/27/2020  6:16 AM  MELD score: 17 at 11/27/2020  6:16 AM  Calculated from:  Serum Creatinine: 0.86 mg/dL (Rounded to 1 mg/dL) at 11/27/2020  6:16 AM  Serum Sodium: 138 mmol/L (Rounded to 137 mmol/L) at 11/27/2020  6:16 AM  Total Bilirubin: 3.8 mg/dL at 11/27/2020  6:16 AM  INR(ratio): 1.62 at 11/27/2020  6:16 AM  Age: 40 years 8 months        Medical hx Surgical hx   Past Medical History:   Diagnosis Date     Alcohol abuse      Alcoholic cirrhosis (H)      Alcoholic peripheral neuropathy (H)      Coagulopathy (H)      Gastritis      History of Clostridium difficile colitis     unknown date     Impairment of cognitive function     MOCA 2/2019 22/30     Leukocytosis 02/2019    persistent leukocytosis across 2/2019 hospitalization without evidence of source across multiple diagnostics including LP, BCx, UCx      Lupus anticoagulant positive      Macrocytic anemia      Moderate protein-calorie malnutrition (H)      Paroxysmal A-fib (H) 02/2019    not on chronic anticoagulation     Peptic ulcer disease      Positive SMILEY (antinuclear antibody)       Subclinical hypothyroidism 02/2019    normal T3, T4     Tobacco dependence     0.5 PPD      Past Surgical History:   Procedure Laterality Date     ESOPHAGOSCOPY, GASTROSCOPY, DUODENOSCOPY (EGD), COMBINED N/A 8/9/2019    Procedure: ESOPHAGOGASTRODUODENOSCOPY (EGD);  Surgeon: Sowmya Ibanez MD;  Location:  GI     ESOPHAGOSCOPY, GASTROSCOPY, DUODENOSCOPY (EGD), COMBINED N/A 8/26/2019    Procedure: ESOPHAGOGASTRODUODENOSCOPY (EGD);  Surgeon: Leventhal, Thomas Michael, MD;  Location:  GI     ESOPHAGOSCOPY, GASTROSCOPY, DUODENOSCOPY (EGD), COMBINED N/A 3/30/2020    Procedure: ESOPHAGOGASTRODUODENOSCOPY (EGD);  Surgeon: Sowmya Ibanez MD;  Location:  GI     UPPER GI ENDOSCOPY  8/9/2019               Medications  Current Facility-Administered Medications   Medication Dose Route Frequency     cetirizine  5 mg Oral Daily     ferrous sulfate  325 mg Oral TID w/meals     folic acid  1 mg Oral Daily     lactulose  40 g Oral TID     magnesium oxide  400 mg Oral Daily     multivitamin, therapeutic  1 tablet Oral Daily     nicotine  1 patch Transdermal Daily     nicotine   Transdermal Q8H     nortriptyline  10 mg Oral At Bedtime     pantoprazole  40 mg Oral BID AC     polyethylene glycol  17 g Oral Daily     pregabalin  50 mg Oral TID     prenatal multivitamin w/iron  1 tablet Oral Daily     rifaximin  550 mg Oral BID     senna-docusate  1 tablet Oral BID    Or     senna-docusate  2 tablet Oral BID     sildenafil  40 mg Oral TID     thiamine  100 mg Oral Daily       Home Medications:    Current Outpatient Medications   Medication Instructions     calcium carbonate (TUMS) 500 MG chewable tablet 1-2 chew tab, Oral, PRN     cetirizine (ZYRTEC) 5 mg, Oral     ferrous sulfate (FEROSUL) 325 (65 Fe) MG tablet Take one tab daily every other day     ferrous sulfate (FEROSUL) 325 mg, Oral, 3 TIMES DAILY WITH MEALS     fluticasone (FLONASE) 50 MCG/ACT nasal spray 1 spray, Nasal     folic acid (FOLVITE) 1 mg,  "Oral, DAILY     furosemide (LASIX) 20 mg, Oral, DAILY     lactulose (CHRONULAC) 10 g, Oral, 2 TIMES DAILY     magnesium oxide (MAG-OX) 400 mg, Oral, DAILY     multivitamin, therapeutic (THERA-VIT) TABS tablet 1 tablet, Oral, DAILY     nortriptyline (PAMELOR) 10 mg, Oral, AT BEDTIME     pantoprazole (PROTONIX) 40 mg, Oral, 2 TIMES DAILY BEFORE MEALS     polyethylene glycol (MIRALAX) 17 g, Oral, DAILY PRN     pregabalin (LYRICA) 50 mg, Oral, 3 TIMES DAILY     Prenatal Vit-Fe Fumarate-FA (PRENATAL MULTIVITAMIN W/IRON) 27-0.8 MG tablet 1 tablet, Oral, DAILY     rifaximin (XIFAXAN) 550 mg, Oral     sildenafil (REVATIO) 40 mg, Oral     sodium-potassium bicarbonate (FRIDA-SELTZER GOLD) TBEF solu-tab 1-2 tablets, Oral, 2 TIMES DAILY PRN     spironolactone (ALDACTONE) 50 mg, Oral, DAILY     thiamine (B-1) 100 mg, Oral, DAILY     vitamin  s/Minerals TABS 1 tablet, Oral     vitamin (B COMPLEX-C) tablet 1 tablet, Oral, DAILY     vitamin C (ASCORBIC ACID) 500 mg, Oral, DAILY         Allergies  Allergies   Allergen Reactions     Bengay Pain Relief [Menthol] Other (See Comments)     Skin turns red and feels extremely hot     Cats      Chocolate Dermatitis     Dicyclomine Other (See Comments)     Severe sedation     Dust Mites      Derm, resp     No Clinical Screening - See Comments      GI upset     Phenobarbital      Pollen Extract      Derm, resp.        Family hx Social hx   History reviewed. No pertinent family history.  Grandfather had \"liver cancer\", details unknown Social History     Tobacco Use     Smoking status: Current Some Day Smoker     Types: Cigarettes     Smokeless tobacco: Never Used     Tobacco comment: 6-8 per day   Substance Use Topics     Alcohol use: Yes     Alcohol/week: 2.0 standard drinks     Types: 2 Glasses of wine per week     Frequency: 2-3 times a week     Drug use: None          Review of systems  A 10-point review of systems was negative.      Examination  BP (!) 141/83 (BP Location: Right arm)   " Pulse 111   Temp 98.1  F (36.7  C) (Oral)   Resp 16   Wt 68 kg (150 lb)   SpO2 97%   BMI 23.49 kg/m    No intake or output data in the 24 hours ending 11/27/20 0808    Gen- well, NAD, A+Ox3, mild jaundice  Eye- EOMI  ENT- MMM  Lym- no palpable LAD  CVS- distant heart sounds, faint systolic murmur present  RS- CTA, normal respiratory effort  Abd- Soft abdomen, mildly distended, non tender  Extr- no DOMINIQUE  Neuro- Asterixis is present  Skin- no rash  Psych- flat affect    Laboratory  Lab Results   Component Value Date     11/27/2020    POTASSIUM 4.1 11/27/2020    CHLORIDE 108 11/27/2020    CO2 23 11/27/2020    BUN 16 11/27/2020    CR 0.86 11/27/2020     Cr baseline is around 0.4-0.6.      Lab Results   Component Value Date    BILITOTAL 3.8 11/27/2020    ALT 37 11/27/2020    AST 72 11/27/2020    ALKPHOS 185 11/27/2020     Liver labs similar to ones in the past, AST is lower, alk phos is about the same. T bili is improved compared to 2019, but variable numbers between high 1s - 4s.     Lab Results   Component Value Date    ALBUMIN 2.8 11/27/2020    PROTTOTAL 7.5 11/27/2020   These are stable from the past       Lab Results   Component Value Date    WBC 4.4 11/27/2020    HGB 10.0 11/27/2020    MCV 92 11/27/2020     11/27/2020     Improved Hgb compared to the past    Lab Results   Component Value Date    INR 1.62 11/27/2020   Stable INR      PEth - pending    B12 on 10/16/2020 - 1285      Radiology  No new abdominal imaging, no chest imaging    CT Head - Negative for acute process    Assessment  40 year old female with history of liver cirrhosis secondary to alcohol use, portal hypertension, hepatic encephalopathy, pulmonary hypertension, who was admitted to ED observation for confusion, lightheadedness, and nausea.    Even though she is answering all questions, her exam shows asterixis. Concern for hepatic encephalopathy given underlying disease.    Based on her history, she is having formed to soft bowel  movements, and her lactulose has been down titrated recently.  Unclear reasons why.  This could be potential trigger.  However, other etiologies for treatment needs to be ruled out which includes infectious work-up. Has been eating and drinking well, not as concerned for dehydration.    Low concern for GI bleed at this time.    Recommendations  -Would obtain abdominal ultrasound with Dopplers, and assess for ascites.  If there is ascites, would recommend diagnostic paracentesis to rule out SBP.  -Recommend obtaining chest x-ray, blood cultures for infectious work-up.  Her UA has been done.  -Would recommend increasing lactulose to 20 g 3 times daily to titrate bowel movements to have 3-4 loose bowel movements a day. Monitor electrolytes closely with increased lactulose.  -Continue to monitor mental status and physical exam.  Low utility in trending ammonia levels.  -No indication for antibiotics at this time.      Patient was seen and discussed with Dr Nikolay GASPAR MD  Gastroenterology Fellow  Division of Gastroenterology, Hepatology and Nutrition  [facility]  Text page Pager [phone]

## 2020-11-27 NOTE — PROGRESS NOTES
[facility]     Medicine Progress Note - Emergency Department Observation Unit       Date of Admission:  11/26/2020  Assessment & Plan       Yenifer Maher is a 40 year old female  w/PMH significant for alcohol abuse with alcoholic cirrhosis, portal HTN, encephalopathy, pulmonary hypertension, paroxysmal afib, tobacco dependence, neuropathy, macrocytic anemia, PUD, lupus anticoagulant positive who presented to the ED with lightheadedness and altered mental status.      ##Lightheadedness, altered mental status  ##alcoholic cirrhosis   In the ED the patient's vital signs were stable with , Afebrile,  CT head negative for acute abnormalities.  EKG with no acute ischemic changes.  COVID negative. Exam shows. asterixis. Concern for hepatic encephalopathy given underlying disease. Patient reports she has not been eating and drinking well for the last 2 days. Echo normal. Troponins negative. She was seen by GI who recommended an abdominal ultrasound with Dopplers, and assess for ascites. US negative for ascites. Chest xray negative for infection. UA negative for infection, Blood cultures pending. PT Consulted and patient is at baseline physical functional status. SW consulted to facilitate return to [facility].   - increasing lactulose to 20 g 3 times daily to titrate bowel movements to have 3-4 loose bowel movements a day.   - Monitor electrolytes closely with increased lactulose.  - Continue to monitor mental status and physical exam.    - Follow BC  - Low utility in trending ammonia levels.  -No indication for antibiotics at this time.  -Hepatology consult in am  -continue rifaximin 550 mg PO BID  -continue folic acid, thiamine, MV  -Continue lasix and spirinolactone        Wt Readings from Last 4 Encounters:   11/26/20 68 kg (150 lb)   06/11/20 63.6 kg (140 lb 3.2 oz)   03/31/20 71.9 kg (158 lb 8.2 oz)   12/25/19 62.7 kg (138 lb 3.7 oz)        #pulmonary HTN:   -continue  PTA sildenafil 40 mg po TID     #tobacco dependence:  -nicotine patch     #neuropathy:   -continue PTA nortriptyline and pregabalin     #anemia  -continue PTA ferrous sulfate     #GERD:  -continue PTA PPI       FEN: Regular diet  Prophylaxis: anticipate short stay  Code Status: Full           Disposition Plan   Expected discharge: Tomorrow, recommended to prior living arrangement once Baseline mental status.  Entered: LÓPEZ Montero CNP 11/27/2020, 11:14 AM       The patient's care was discussed with the Attending Physician, Dr. Ibanez, Bedside Nurse, Care Coordinator/ and Patient.    LÓPEZ Guzman, NP  Emergency Department  [phone] Ex [id number]    ______________________________________________________________________    Interval History   No events     Data reviewed today: I reviewed all medications, new labs and imaging results over the last 24 hours.    Physical Exam   Vital Signs: Temp: 98.1  F (36.7  C) Temp src: Oral BP: (!) 141/83 Pulse: 111   Resp: 16 SpO2: 97 % O2 Device: None (Room air)    Weight: 150 lbs 0 oz  GENERAL: Alert and oriented x 3. NAD.   HEENT: Anicteric sclera. PERRL. Mucous membranes moist and without lesions.   CV: RRR. S1, S2. No murmurs appreciated.   RESPIRATORY: Effort normal. Lungs CTAB with no wheezing, rales, rhonchi.   GI: Abdomen soft and non distended with normoactive bowel sounds present in all quadrants. No tenderness, rebound, guarding.   MUSCULOSKELETAL: No joint swelling or tenderness. Moves all extremities.   NEUROLOGICAL: Asterixis present Strength 5/5 bilaterally in upper and lower extremities.   EXTREMITIES: No peripheral edema. Intact bilateral pedal pulses.   SKIN: Mild jaundice.    Data   Recent Labs   Lab 11/27/20  0616 11/27/20  0106 11/26/20  1504 11/26/20  1126   WBC 4.4  --   --  4.2   HGB 10.0*  --   --  10.8*   MCV 92  --   --  91     --   --  173   INR 1.62*  --   --  1.55*     --   --  136   POTASSIUM 4.1  --   --   4.0   CHLORIDE 108  --   --  104   CO2 23  --   --  23   BUN 16  --   --  18   CR 0.86  --   --  0.82   ANIONGAP 7  --   --  8   MARKO 9.2  --   --  10.1   GLC 99  --   --  117*   ALBUMIN 2.8*  --   --  3.3*   PROTTOTAL 7.5  --   --  8.4   BILITOTAL 3.8*  --   --  4.6*   ALKPHOS 185*  --   --  212*   ALT 37  --   --  42   AST 72*  --   --  89*   TROPI <0.015 <0.015 <0.015  --      Recent Results (from the past 24 hour(s))   XR Chest 2 Views    Narrative    EXAM: XR CHEST 2 VW 2020    HISTORY: R/O infection.    COMPARISON: Radiograph 3/28/2020 and 3/27/2020.    TECHNIQUE: Upright frontal and lateral views of the chest.    FINDINGS: Hazy opacification lower lung fields, likely artifact from  overlying soft tissues. No focal airspace consolidation, pneumothorax,  or pleural effusion. Cardiomediastinal silhouette is within normal  limits. Upper abdomen is grossly unremarkable. No acute osseous  abnormality.      Impression    IMPRESSION:   No acute airspace disease.    I have personally reviewed the examination and initial interpretation  and I agree with the findings.    NAYELI HARRIS MD   Echo Complete    Narrative    [id number]  [id number]  [id number]  [id number]^WILTON^XIAO^MACK           [facility],[place]  Echocardiography Laboratory  [address] [place] [id number]     Name: BJORN CHAIREZ  MRN: [id number]  : 1980  Study Date: 2020 07:38 AM  Age: 40 yrs  Gender: Female  Patient [facility]  Reason For Study: Syncope  Ordering Physician: XIAO NÚÑEZ  Performed By: Farrah Bernabe RDCS     BSA: 1.8 m2  Height: 67 in  Weight: 150 lb  HR: 99  BP: 141/83 mmHg  _____________________________________________________________________________  __        Procedure  Complete Portable Echo Adult.  _____________________________________________________________________________  __        Interpretation Summary  No structural cause for syncope  identified.  _____________________________________________________________________________  __        Left Ventricle  Global and regional left ventricular function is normal with an EF of 60-65%.  Left ventricular wall thickness is normal. Left ventricular size is normal.  Left ventricular diastolic function is normal. No regional wall motion  abnormalities are seen.     Right Ventricle  Right ventricular function, chamber size, wall motion, and thickness are  normal.     Atria  Both atria appear normal.     Mitral Valve  The mitral valve is normal.        Aortic Valve  Aortic valve is normal in structure and function.     Tricuspid Valve  The tricuspid valve is normal. Trace tricuspid insufficiency is present. The  right ventricular systolic pressure is approximated at 26.6 mmHg plus the  right atrial pressure. Pulmonary artery systolic pressure is normal.     Pulmonic Valve  The pulmonic valve is normal.     Vessels  The thoracic aorta is normal. The pulmonary artery is normal. The inferior  vena cava is normal.     Pericardium  No pericardial effusion is present.        Compared to Previous Study  There is no prior study for direct comparison.  _____________________________________________________________________________  __  MMode/2D Measurements & Calculations     IVSd: 0.96 cm  LVIDd: 4.7 cm  LVIDs: 3.3 cm  LVPWd: 1.0 cm  FS: 31.4 %  LV mass(C)d: 164.8 grams  LV mass(C)dI: 92.1 grams/m2  Ao root diam: 2.9 cm  asc Aorta Diam: 2.8 cm  LVOT diam: 2.1 cm  LVOT area: 3.5 cm2  LA Volume (BP): 62.5 ml  LA Volume Index (BP): 34.9 ml/m2  RWT: 0.43           Doppler Measurements & Calculations  MV E max susi: 75.7 cm/sec  MV A max susi: 70.3 cm/sec  MV E/A: 1.1  MV dec slope: 403.0 cm/sec2  PA acc time: 0.10 sec  TR max susi: 258.0 cm/sec  TR max P.6 mmHg  E/E' av.6  Lateral E/e': 6.7  Medial E/e': 8.5     _____________________________________________________________________________  __           Report approved  by: Juan Esposito 11/27/2020 10:50 AM      US Abdominal Doppler Complete    Narrative    EXAMINATION: US ABDOMEN OR PELVIS DOPPLER COMPLETE 11/27/2020 11:17 AM      COMPARISON: Ultrasound 12/24/2019.    HISTORY: 40-year-old female with history of cirrhosis, portal  hypertension, hepatic encephalopathy and pulmonary hypertension who  presents with confusion and lightheadedness and nausea. Evaluate for  portal venous clot potentially causing ascites.    TECHNIQUE: The abdomen was scanned in standard fashion with  specialized ultrasound transducer(s) using both gray-scale, color  Doppler, and spectral flow techniques.    Findings:  Liver: The liver demonstrates increased echogenicity and mild surface  nodularity evident in the left lobe. No evidence of a focal hepatic  mass.     Extrahepatic portal vein flow is retrograde at 23 cm/s.  Right portal vein flow is retrograde, measuring 14 cm/s.  Left portal vein flow is retrograde, measuring 26 cm/s.    Previously seen recannulized paraumbilical vein is not visualized on  current study.     Flow in the hepatic artery is towards the liver and:  117 cm/s peak systolic velocity  51 cm/s end diastolic velocity  0.56 resistive index.     The splenic vein is patent and flow is retrograde measuring 38 cm/s.   The left, middle, and right hepatic veins are patent with flow towards  the IVC measuring 13 cm/s, 28 cm/s and 57 cm/s respectively. The IVC  is patent with flow towards the heart at 179 cm/s.   The visualized  aorta is not dilated.    Fluid: No evidence of ascites or pleural effusions.      Impression    Impression:   1.  No portal venous thrombosis or ascites.  2.  Hepatic cirrhosis and portal hypertension with retrograde portal  venous flow.     I have personally reviewed the examination and initial interpretation  and I agree with the findings.    NANCY MERCADO MD     Medications       cetirizine  5 mg Oral Daily     ferrous sulfate  325 mg Oral TID w/meals     folic  acid  1 mg Oral Daily     lactulose  40 g Oral 4x Daily     magnesium oxide  400 mg Oral Daily     multivitamin, therapeutic  1 tablet Oral Daily     nicotine  1 patch Transdermal Daily     nicotine   Transdermal Q8H     nortriptyline  10 mg Oral At Bedtime     pantoprazole  40 mg Oral BID AC     polyethylene glycol  17 g Oral Daily     pregabalin  50 mg Oral TID     prenatal multivitamin w/iron  1 tablet Oral Daily     rifaximin  550 mg Oral BID     senna-docusate  1 tablet Oral BID    Or     senna-docusate  2 tablet Oral BID     sildenafil  40 mg Oral TID     thiamine  100 mg Oral Daily

## 2020-11-27 NOTE — PLAN OF CARE
Observation Goals:  - Hepatology consult complete: Met, please see consult note.  - Lightheadedness improved: In progress, reports improvement   - PT consult complete: Met  - Mental status at baseline: Met   - Tolerating oral intake: Met    Sterling Fernández RN

## 2020-11-27 NOTE — PLAN OF CARE
PT: PT orders received.  Per pt at her facility she was moving around IND, has had LE weakness in past from ankle injuries.  Pt IND with bed mobility and transfers, ambulated 2 laps around unit with SBA, improved stability with second lap.  At this time pt feels she is at baseline mobility wise and safe to discharge back to facility.  PT to complete orders, no further inpatient PT needs at this time.  Please continue to encourage ambulation.

## 2020-11-28 VITALS
SYSTOLIC BLOOD PRESSURE: 147 MMHG | OXYGEN SATURATION: 95 % | HEART RATE: 108 BPM | BODY MASS INDEX: 23.49 KG/M2 | TEMPERATURE: 98.2 F | RESPIRATION RATE: 15 BRPM | DIASTOLIC BLOOD PRESSURE: 89 MMHG | WEIGHT: 150 LBS

## 2020-11-28 LAB
ALBUMIN SERPL-MCNC: 2.9 G/DL (ref 3.4–5)
ALP SERPL-CCNC: 270 U/L (ref 40–150)
ALT SERPL W P-5'-P-CCNC: 40 U/L (ref 0–50)
AMMONIA PLAS-SCNC: NORMAL UMOL/L (ref 10–50)
AMMONIA PLAS-SCNC: NORMAL UMOL/L (ref 10–50)
ANION GAP SERPL CALCULATED.3IONS-SCNC: 9 MMOL/L (ref 3–14)
AST SERPL W P-5'-P-CCNC: 87 U/L (ref 0–45)
BILIRUB SERPL-MCNC: 2.9 MG/DL (ref 0.2–1.3)
BUN SERPL-MCNC: 12 MG/DL (ref 7–30)
CALCIUM SERPL-MCNC: 9.5 MG/DL (ref 8.5–10.1)
CHLORIDE SERPL-SCNC: 105 MMOL/L (ref 94–109)
CO2 SERPL-SCNC: 22 MMOL/L (ref 20–32)
CREAT SERPL-MCNC: 0.7 MG/DL (ref 0.52–1.04)
ERYTHROCYTE [DISTWIDTH] IN BLOOD BY AUTOMATED COUNT: 13.8 % (ref 10–15)
GFR SERPL CREATININE-BSD FRML MDRD: >90 ML/MIN/{1.73_M2}
GLUCOSE SERPL-MCNC: 116 MG/DL (ref 70–99)
HCT VFR BLD AUTO: 31.4 % (ref 35–47)
HGB BLD-MCNC: 10.4 G/DL (ref 11.7–15.7)
INR PPP: 1.54 (ref 0.86–1.14)
MCH RBC QN AUTO: 30.7 PG (ref 26.5–33)
MCHC RBC AUTO-ENTMCNC: 33.1 G/DL (ref 31.5–36.5)
MCV RBC AUTO: 93 FL (ref 78–100)
PLATELET # BLD AUTO: 158 10E9/L (ref 150–450)
POTASSIUM SERPL-SCNC: 3.8 MMOL/L (ref 3.4–5.3)
PROT SERPL-MCNC: 7.5 G/DL (ref 6.8–8.8)
RBC # BLD AUTO: 3.39 10E12/L (ref 3.8–5.2)
SODIUM SERPL-SCNC: 136 MMOL/L (ref 133–144)
WBC # BLD AUTO: 4.9 10E9/L (ref 4–11)

## 2020-11-28 PROCEDURE — 36415 COLL VENOUS BLD VENIPUNCTURE: CPT | Performed by: EMERGENCY MEDICINE

## 2020-11-28 PROCEDURE — 250N000011 HC RX IP 250 OP 636: Performed by: NURSE PRACTITIONER

## 2020-11-28 PROCEDURE — 80053 COMPREHEN METABOLIC PANEL: CPT | Performed by: NURSE PRACTITIONER

## 2020-11-28 PROCEDURE — 99217 PR OBSERVATION CARE DISCHARGE: CPT | Performed by: PHYSICIAN ASSISTANT

## 2020-11-28 PROCEDURE — 82140 ASSAY OF AMMONIA: CPT | Performed by: EMERGENCY MEDICINE

## 2020-11-28 PROCEDURE — 250N000013 HC RX MED GY IP 250 OP 250 PS 637: Mod: GY | Performed by: PHYSICIAN ASSISTANT

## 2020-11-28 PROCEDURE — 85610 PROTHROMBIN TIME: CPT | Performed by: NURSE PRACTITIONER

## 2020-11-28 PROCEDURE — 85027 COMPLETE CBC AUTOMATED: CPT | Performed by: NURSE PRACTITIONER

## 2020-11-28 PROCEDURE — G0378 HOSPITAL OBSERVATION PER HR: HCPCS

## 2020-11-28 PROCEDURE — 250N000013 HC RX MED GY IP 250 OP 250 PS 637: Performed by: NURSE PRACTITIONER

## 2020-11-28 PROCEDURE — 250N000013 HC RX MED GY IP 250 OP 250 PS 637: Mod: GY | Performed by: NURSE PRACTITIONER

## 2020-11-28 PROCEDURE — 82140 ASSAY OF AMMONIA: CPT | Performed by: PHYSICIAN ASSISTANT

## 2020-11-28 PROCEDURE — 36415 COLL VENOUS BLD VENIPUNCTURE: CPT | Performed by: NURSE PRACTITIONER

## 2020-11-28 RX ORDER — LACTULOSE 10 G/15ML
20 SOLUTION ORAL 3 TIMES DAILY
Status: DISCONTINUED | OUTPATIENT
Start: 2020-11-28 | End: 2020-11-28 | Stop reason: HOSPADM

## 2020-11-28 RX ORDER — LACTULOSE 10 G/15ML
20 SOLUTION ORAL 3 TIMES DAILY
Qty: 5 ML | Refills: 0 | Status: ON HOLD | COMMUNITY
Start: 2020-11-28 | End: 2020-12-06

## 2020-11-28 RX ORDER — LACTULOSE 10 G/15ML
40 SOLUTION ORAL 3 TIMES DAILY
Status: DISCONTINUED | OUTPATIENT
Start: 2020-11-28 | End: 2020-11-28

## 2020-11-28 RX ADMIN — THERA TABS 1 TABLET: TAB at 08:13

## 2020-11-28 RX ADMIN — PREGABALIN 50 MG: 25 CAPSULE ORAL at 14:29

## 2020-11-28 RX ADMIN — FERROUS SULFATE TAB 325 MG (65 MG ELEMENTAL FE) 325 MG: 325 (65 FE) TAB at 08:13

## 2020-11-28 RX ADMIN — PANTOPRAZOLE SODIUM 40 MG: 40 TABLET, DELAYED RELEASE ORAL at 08:13

## 2020-11-28 RX ADMIN — MAGNESIUM OXIDE 400 MG: 400 TABLET ORAL at 08:11

## 2020-11-28 RX ADMIN — LACTULOSE 20 G: 20 SOLUTION ORAL at 14:28

## 2020-11-28 RX ADMIN — ONDANSETRON 4 MG: 4 TABLET, ORALLY DISINTEGRATING ORAL at 15:09

## 2020-11-28 RX ADMIN — SILDENAFIL 40 MG: 20 TABLET ORAL at 08:51

## 2020-11-28 RX ADMIN — SILDENAFIL 40 MG: 20 TABLET ORAL at 14:29

## 2020-11-28 RX ADMIN — NICOTINE 7 MG/24 HR DAILY TRANSDERMAL PATCH 1 PATCH: at 08:20

## 2020-11-28 RX ADMIN — CALCIUM CARBONATE (ANTACID) CHEW TAB 500 MG 1000 MG: 500 CHEW TAB at 00:02

## 2020-11-28 RX ADMIN — FOLIC ACID 1 MG: 1 TABLET ORAL at 08:12

## 2020-11-28 RX ADMIN — PRENATAL VIT W/ FE FUMARATE-FA TAB 27-0.8 MG 1 TABLET: 27-0.8 TAB at 08:12

## 2020-11-28 RX ADMIN — FERROUS SULFATE TAB 325 MG (65 MG ELEMENTAL FE) 325 MG: 325 (65 FE) TAB at 12:27

## 2020-11-28 RX ADMIN — SPIRONOLACTONE 100 MG: 25 TABLET ORAL at 08:11

## 2020-11-28 RX ADMIN — FEXOFENADINE HYDROCHLORIDE 180 MG: 180 TABLET, FILM COATED ORAL at 08:12

## 2020-11-28 RX ADMIN — LACTULOSE 20 G: 20 SOLUTION ORAL at 08:11

## 2020-11-28 RX ADMIN — THIAMINE HCL TAB 100 MG 100 MG: 100 TAB at 08:12

## 2020-11-28 RX ADMIN — SIMETHICONE 80 MG: 80 TABLET, CHEWABLE ORAL at 14:34

## 2020-11-28 RX ADMIN — RIFAXIMIN 550 MG: 550 TABLET ORAL at 08:12

## 2020-11-28 RX ADMIN — VITAMIN D, TAB 1000IU (100/BT) 25 MCG: 25 TAB at 09:56

## 2020-11-28 RX ADMIN — PREGABALIN 50 MG: 25 CAPSULE ORAL at 08:12

## 2020-11-28 RX ADMIN — IBUPROFEN 400 MG: 200 TABLET, FILM COATED ORAL at 09:58

## 2020-11-28 RX ADMIN — FUROSEMIDE 40 MG: 20 TABLET ORAL at 08:12

## 2020-11-28 NOTE — PLAN OF CARE
Observation Goals:    -hepatology consult complete: met    -lightheadedness improved: met    -PT consult complete: met    -mental status at baseline: met    -tolerating oral intake: met; pt did report slight nausea after eating, but is still able to take in PO fluids     BP (!) 149/84 (BP Location: Left arm)   Pulse 122   Temp 99  F (37.2  C) (Oral)   Resp 16   Wt 68 kg (150 lb)   SpO2 97%   BMI 23.49 kg/m

## 2020-11-28 NOTE — DISCHARGE SUMMARY
Discharge Summary    Yenifer Maher MRN# [id number]   YOB: 1980 Age: 40 year old     Date of Admission:  11/26/2020  Date of Discharge:  11/28/2020  3:32 PM  Admitting Physician:  Keegan Jordan MD  Discharge Physician:  ARIAN MAN  Discharging Service:  Emergency Department Observation Unit     Primary Provider: Flako Gaona          Discharge Diagnosis:     Hepatic encephalopathy (H)    * No resolved hospital problems. *               Discharge Disposition:   Discharged to home           Condition on Discharge:   Discharge condition: Stable   Code status on discharge: Full Code           Procedures:   Imaging performed:   Abdomen CT             Discharge Medications:     Current Discharge Medication List      CONTINUE these medications which have NOT CHANGED    Details   acetaminophen (TYLENOL) 325 MG tablet Take 325 mg by mouth every 4 hours as needed      bismuth subsalicylate (PEPTO BISMOL) 262 MG/15ML suspension Take 30 mL every half to one hour as needed. Do not exceed 8 doses in 24 hours.      calcium carbonate 750 MG CHEW Take 2-4 chew tab by mouth as needed for heartburn       celecoxib (CELEBREX) 200 MG capsule Take 200 mg by mouth 2 times daily as needed      fexofenadine (ALLEGRA) 180 MG tablet Take 180 mg by mouth daily      fluticasone (FLONASE) 50 MCG/ACT nasal spray Spray 1 spray into both nostrils daily as needed       folic acid (FOLVITE) 1 MG tablet Take 1 tablet (1 mg) by mouth daily  Qty: 30 tablet, Refills: 0    Associated Diagnoses: Alcohol abuse      furosemide (LASIX) 20 MG tablet Take 1 tablet (20 mg) by mouth daily  Qty: 90 tablet, Refills: 3    Associated Diagnoses: Alcohol abuse      ibuprofen (ADVIL/MOTRIN) 200 MG tablet Take 400 mg by mouth every 6 hours as needed for mild pain      lactulose (CHRONULAC) 10 GM/15ML solution Take 15 mLs (10 g) by mouth 2 times daily  Qty: 946 mL, Refills: 0    Associated Diagnoses: UGIB (upper gastrointestinal bleed)       Lidocaine (LIDOCARE) 4 % Patch Place 1 patch onto the skin every 24 hours To prevent lidocaine toxicity, patient should be patch free for 12 hrs daily.      melatonin 5 MG tablet Take 5-10 mg by mouth nightly as needed for sleep      menthol (COUGH DROPS) 7 MG LOZG Take 1-2 lozenges by mouth as needed      multivitamin, therapeutic (THERA-VIT) TABS tablet Take 1 tablet by mouth daily  Qty: 30 tablet, Refills: 0    Associated Diagnoses: UGIB (upper gastrointestinal bleed)      nicotine (NICODERM CQ) 7 MG/24HR 24 hr patch Place 1 patch onto the skin every 24 hours      nortriptyline (PAMELOR) 10 MG capsule Take 1 capsule (10 mg) by mouth At Bedtime  Qty: 30 capsule, Refills: 0    Associated Diagnoses: Neuropathy      ondansetron (ZOFRAN-ODT) 4 MG ODT tab Take 4 mg by mouth every 8 hours as needed for nausea      pantoprazole (PROTONIX) 40 MG EC tablet Take 1 tablet (40 mg) by mouth 2 times daily (before meals)  Qty: 60 tablet, Refills: 0    Associated Diagnoses: Gastrointestinal hemorrhage, unspecified gastrointestinal hemorrhage type      rifaximin (XIFAXAN) 550 MG TABS tablet Take 550 mg by mouth 2 times daily       senna (SENOKOT) 8.6 MG tablet Take 1-2 tablets by mouth 2 times daily      sildenafil (REVATIO) 20 MG tablet Take 40 mg by mouth 3 times daily       simethicone (MYLICON) 125 MG chewable tablet Take 1-2 softgels as needed after meals and at bedtime      sodium chloride (OCEAN) 0.65 % nasal spray Inhale 2 sprays in the nostril(s) every 30 minutes as needed for nasal congestion or nasal dryness      spironolactone (ALDACTONE) 50 MG tablet Take 1 tablet (50 mg) by mouth daily  Qty: 90 tablet, Refills: 3    Associated Diagnoses: Alcoholic hepatitis with ascites      thiamine (B-1) 100 MG tablet Take 100 mg by mouth daily      Vitamin D3 (CHOLECALCIFEROL) 25 mcg (1000 units) tablet Take 2 tablets by mouth daily         STOP taking these medications       ferrous sulfate (FEROSUL) 325 (65 Fe) MG tablet  Comments:   Reason for Stopping:         magnesium oxide (MAG-OX) 400 (240 Mg) MG tablet Comments:   Reason for Stopping:         pregabalin (LYRICA) 50 MG capsule Comments:   Reason for Stopping:         Prenatal Vit-Fe Fumarate-FA (PRENATAL MULTIVITAMIN W/IRON) 27-0.8 MG tablet Comments:   Reason for Stopping:                     Consultations:   Consultation during this admission received from gastroenterology             Brief History of Illness:   Yenifre Maher is a 40 year old female  w/PMH significant for alcohol abuse with alcoholic cirrhosis, portal HTN, encephalopathy, pulmonary hypertension, paroxysmal afib, tobacco dependence, neuropathy, macrocytic anemia, PUD, lupus anticoagulant positive who presented to the ED with lightheadedness and altered mental status.           Hospital Course:   ##Lightheadedness, altered mental status  ##alcoholic cirrhosis   In the ED the patient's vital signs were stable with , Afebrile,  CT head negative for acute abnormalities.  EKG with no acute ischemic changes.  COVID negative. Exam shows. asterixis. Concern for hepatic encephalopathy given underlying disease. Patient reports she has not been eating and drinking well for the last 2 days. Echo normal. Troponins negative. She was seen by GI who recommended an abdominal ultrasound with Dopplers, and assess for ascites. US negative for ascites. Chest xray negative for infection. UA negative for infection, Blood cultures negative. PT Consulted and patient is at baseline physical functional status. Patient afebrile and tolerating po. She denies abdominal pain, nausea or vomiting. Patient HD stable and will be discharged back to her facility. She was advised to follow up with her PCP in 1 week.   -Increased lactulose to 20 g 3 times daily to titrate bowel movements to have 3-4 loose bowel movements a day.   -Continue rifaximin 550 mg PO BID  -Continue folic acid, thiamine, MV  -Continue lasix and spirinolactone            Wt Readings from Last 4 Encounters:   11/26/20 68 kg (150 lb)   06/11/20 63.6 kg (140 lb 3.2 oz)   03/31/20 71.9 kg (158 lb 8.2 oz)   12/25/19 62.7 kg (138 lb 3.7 oz)         #pulmonary HTN:   -continue PTA sildenafil 40 mg po TID     #tobacco dependence:  -nicotine patch     #neuropathy:   -continue PTA nortriptyline and pregabalin     #anemia  -continue PTA ferrous sulfate     #GERD:  -continue PTA PPI              Final Day of Progress before Discharge:       Physical Exam:  Blood pressure (!) 147/89, pulse 108, temperature 98.2  F (36.8  C), temperature source Oral, resp. rate 15, weight 68 kg (150 lb), SpO2 95 %, not currently breastfeeding.    EXAM:  Physical Exam   Constitutional: Pt is oriented to person, place, and time.Pt appears well-developed and well-nourished.   HENT:   Head: Normocephalic and atraumatic.   Eyes: Conjunctivae are normal. Pupils are equal, round, and reactive to light.   Neck: Normal range of motion. Neck supple.   Cardiovascular: Normal rate, regular rhythm, normal heart sounds and intact distal pulses.    Pulmonary/Chest: Effort normal and breath sounds normal. No respiratory distress. Pt has no wheezes. Pt has no rales  Abdominal: Soft. Bowel sounds are normal. Pt exhibits no distension and no mass. No tenderness. Pt has no rebound and no guarding.   Musculoskeletal: Normal range of motion. Pt exhibits no edema.   Neurological: Pt is alert and oriented to person, place, and time. Normal reflexes.   Skin: Skin is warm and dry. No rash noted.   Psychiatric: Pt has a normal mood and affect. Behavior is normal. Judgment and thought content normal.             Data:  All laboratory data reviewed             Significant Results:   None  Results for orders placed or performed during the hospital encounter of 11/26/20   Head CT w/o contrast     Status: None    Narrative    CT HEAD W/O CONTRAST 11/26/2020 1:31 PM    History: headache, AMS     Comparison: 6/10/2020    Technique: Using  multidetector thin collimation helical acquisition  technique, axial, coronal and sagittal CT images from the skull base  to the vertex were obtained without intravenous contrast.    Findings: There is no intracranial hemorrhage, mass effect, or midline  shift. Gray/white matter differentiation in both cerebral hemispheres  is preserved. Ventricles are proportionate to the cerebral sulci. The  basal cisterns are clear.    The bony calvaria and the bones of the skull base are normal. The  visualized portions of the paranasal sinuses and mastoid air cells are  clear.       Impression    Impression:  No acute intracranial pathology.     I have personally reviewed the examination and initial interpretation  and I agree with the findings.    KRIS KIRK MD   US Abdomen Limited     Status: None    Narrative    EXAMINATION: US ABDOMEN LIMITED, 11/27/2020 11:17 AM     COMPARISON: Ultrasound 3/27/2020, 12/24/2019.    HISTORY: 40-year-old female with history of cirrhosis, portal  hypertension, hepatic encephalopathy, pulmonary hypertension who  presents with confusion and lightheadedness and nausea. Assess for  abdominal ascites    TECHNIQUE: Gray scale ultrasound of the abdomen.    FINDINGS:  A targeted ultrasound of all 4 abdominal quadrants was performed and  demonstrated no evidence of ascites.        Impression    IMPRESSION: No evidence of ascites.    I have personally reviewed the examination and initial interpretation  and I agree with the findings.    NANCY MERCADO MD   XR Chest 2 Views     Status: None    Narrative    EXAM: XR CHEST 2 VW 11/27/2020    HISTORY: R/O infection.    COMPARISON: Radiograph 3/28/2020 and 3/27/2020.    TECHNIQUE: Upright frontal and lateral views of the chest.    FINDINGS: Hazy opacification lower lung fields, likely artifact from  overlying soft tissues. No focal airspace consolidation, pneumothorax,  or pleural effusion. Cardiomediastinal silhouette is within normal  limits. Upper  abdomen is grossly unremarkable. No acute osseous  abnormality.      Impression    IMPRESSION:   No acute airspace disease.    I have personally reviewed the examination and initial interpretation  and I agree with the findings.    NAYELI HARRIS MD   US Abdominal Doppler Complete     Status: None    Narrative    EXAMINATION: US ABDOMEN OR PELVIS DOPPLER COMPLETE 11/27/2020 11:17 AM      COMPARISON: Ultrasound 12/24/2019.    HISTORY: 40-year-old female with history of cirrhosis, portal  hypertension, hepatic encephalopathy and pulmonary hypertension who  presents with confusion and lightheadedness and nausea. Evaluate for  portal venous clot potentially causing ascites.    TECHNIQUE: The abdomen was scanned in standard fashion with  specialized ultrasound transducer(s) using both gray-scale, color  Doppler, and spectral flow techniques.    Findings:  Liver: The liver demonstrates increased echogenicity and mild surface  nodularity evident in the left lobe. No evidence of a focal hepatic  mass.     Extrahepatic portal vein flow is retrograde at 23 cm/s.  Right portal vein flow is retrograde, measuring 14 cm/s.  Left portal vein flow is retrograde, measuring 26 cm/s.    Previously seen recannulized paraumbilical vein is not visualized on  current study.     Flow in the hepatic artery is towards the liver and:  117 cm/s peak systolic velocity  51 cm/s end diastolic velocity  0.56 resistive index.     The splenic vein is patent and flow is retrograde measuring 38 cm/s.   The left, middle, and right hepatic veins are patent with flow towards  the IVC measuring 13 cm/s, 28 cm/s and 57 cm/s respectively. The IVC  is patent with flow towards the heart at 179 cm/s.   The visualized  aorta is not dilated.    Fluid: No evidence of ascites or pleural effusions.      Impression    Impression:   1.  No portal venous thrombosis or ascites.  2.  Hepatic cirrhosis and portal hypertension with retrograde portal  venous flow.     I  have personally reviewed the examination and initial interpretation  and I agree with the findings.    NANCY MERCADO MD   CBC with platelets differential     Status: Abnormal   Result Value Ref Range    WBC 4.2 4.0 - 11.0 10e9/L    RBC Count 3.63 (L) 3.8 - 5.2 10e12/L    Hemoglobin 10.8 (L) 11.7 - 15.7 g/dL    Hematocrit 33.0 (L) 35.0 - 47.0 %    MCV 91 78 - 100 fl    MCH 29.8 26.5 - 33.0 pg    MCHC 32.7 31.5 - 36.5 g/dL    RDW 13.7 10.0 - 15.0 %    Platelet Count 173 150 - 450 10e9/L    Diff Method Automated Method     % Neutrophils 69.1 %    % Lymphocytes 16.4 %    % Monocytes 12.1 %    % Eosinophils 1.2 %    % Basophils 1.0 %    % Immature Granulocytes 0.2 %    Nucleated RBCs 0 0 /100    Absolute Neutrophil 2.9 1.6 - 8.3 10e9/L    Absolute Lymphocytes 0.7 (L) 0.8 - 5.3 10e9/L    Absolute Monocytes 0.5 0.0 - 1.3 10e9/L    Absolute Eosinophils 0.1 0.0 - 0.7 10e9/L    Absolute Basophils 0.0 0.0 - 0.2 10e9/L    Abs Immature Granulocytes 0.0 0 - 0.4 10e9/L    Absolute Nucleated RBC 0.0    Comprehensive metabolic panel     Status: Abnormal   Result Value Ref Range    Sodium 136 133 - 144 mmol/L    Potassium 4.0 3.4 - 5.3 mmol/L    Chloride 104 94 - 109 mmol/L    Carbon Dioxide 23 20 - 32 mmol/L    Anion Gap 8 3 - 14 mmol/L    Glucose 117 (H) 70 - 99 mg/dL    Urea Nitrogen 18 7 - 30 mg/dL    Creatinine 0.82 0.52 - 1.04 mg/dL    GFR Estimate >90 >60 mL/min/[1.73_m2]    GFR Estimate If Black >90 >60 mL/min/[1.73_m2]    Calcium 10.1 8.5 - 10.1 mg/dL    Bilirubin Total 4.6 (H) 0.2 - 1.3 mg/dL    Albumin 3.3 (L) 3.4 - 5.0 g/dL    Protein Total 8.4 6.8 - 8.8 g/dL    Alkaline Phosphatase 212 (H) 40 - 150 U/L    ALT 42 0 - 50 U/L    AST 89 (H) 0 - 45 U/L   Lactic acid whole blood     Status: None   Result Value Ref Range    Lactic Acid 1.5 0.7 - 2.0 mmol/L   UA with Microscopic     Status: Abnormal   Result Value Ref Range    Color Urine Yellow     Appearance Urine Clear     Glucose Urine Negative NEG^Negative mg/dL     Bilirubin Urine Negative NEG^Negative    Ketones Urine Negative NEG^Negative mg/dL    Specific Gravity Urine 1.010 1.003 - 1.035    Blood Urine Negative NEG^Negative    pH Urine 7.0 5.0 - 7.0 pH    Protein Albumin Urine Negative NEG^Negative mg/dL    Urobilinogen mg/dL Normal 0.0 - 2.0 mg/dL    Nitrite Urine Negative NEG^Negative    Leukocyte Esterase Urine Negative NEG^Negative    Source Midstream Urine     WBC Urine 2 0 - 5 /HPF    RBC Urine 0 0 - 2 /HPF    Squamous Epithelial /HPF Urine 8 (H) 0 - 1 /HPF    Transitional Epi <1 0 - 1 /HPF   Ammonia (on ice)     Status: Abnormal   Result Value Ref Range    Ammonia 74 (H) 10 - 50 umol/L   INR     Status: Abnormal   Result Value Ref Range    INR 1.55 (H) 0.86 - 1.14   Troponin I     Status: None   Result Value Ref Range    Troponin I ES <0.015 0.000 - 0.045 ug/L   Asymptomatic COVID-19 Virus (Coronavirus) by PCR     Status: None    Specimen: Nasopharyngeal   Result Value Ref Range    COVID-19 Virus PCR to [facility] - Source Nasopharyngeal     COVID-19 Virus PCR to [facility] - Result       Test received-See reflex to IDDL test SARS CoV2 (COVID-19) Virus RT-PCR   SARS-CoV-2 COVID-19 Virus (Coronavirus) RT-PCR Nasopharyngeal     Status: None    Specimen: Nasopharyngeal   Result Value Ref Range    SARS-CoV-2 Virus Specimen Source Nasopharyngeal     SARS-CoV-2 PCR Result NEGATIVE     SARS-CoV-2 PCR Comment       Testing was performed using the Xpert Xpress SARS-CoV-2 Assay on the Cepheid Gene-Xpert   Instrument Systems. Additional information about this Emergency Use Authorization (EUA)   assay can be found via the Lab Guide.     Comprehensive metabolic panel     Status: Abnormal   Result Value Ref Range    Sodium 138 133 - 144 mmol/L    Potassium 4.1 3.4 - 5.3 mmol/L    Chloride 108 94 - 109 mmol/L    Carbon Dioxide 23 20 - 32 mmol/L    Anion Gap 7 3 - 14 mmol/L    Glucose 99 70 - 99 mg/dL    Urea Nitrogen 16 7 - 30 mg/dL    Creatinine 0.86 0.52 - 1.04 mg/dL    GFR Estimate  86 >60 mL/min/[1.73_m2]    GFR Estimate If Black >90 >60 mL/min/[1.73_m2]    Calcium 9.2 8.5 - 10.1 mg/dL    Bilirubin Total 3.8 (H) 0.2 - 1.3 mg/dL    Albumin 2.8 (L) 3.4 - 5.0 g/dL    Protein Total 7.5 6.8 - 8.8 g/dL    Alkaline Phosphatase 185 (H) 40 - 150 U/L    ALT 37 0 - 50 U/L    AST 72 (H) 0 - 45 U/L   CBC with platelets differential     Status: Abnormal   Result Value Ref Range    WBC 4.4 4.0 - 11.0 10e9/L    RBC Count 3.34 (L) 3.8 - 5.2 10e12/L    Hemoglobin 10.0 (L) 11.7 - 15.7 g/dL    Hematocrit 30.7 (L) 35.0 - 47.0 %    MCV 92 78 - 100 fl    MCH 29.9 26.5 - 33.0 pg    MCHC 32.6 31.5 - 36.5 g/dL    RDW 13.9 10.0 - 15.0 %    Platelet Count 157 150 - 450 10e9/L    Diff Method Automated Method     % Neutrophils 49.4 %    % Lymphocytes 30.1 %    % Monocytes 16.6 %    % Eosinophils 2.5 %    % Basophils 0.9 %    % Immature Granulocytes 0.5 %    Nucleated RBCs 0 0 /100    Absolute Neutrophil 2.2 1.6 - 8.3 10e9/L    Absolute Lymphocytes 1.3 0.8 - 5.3 10e9/L    Absolute Monocytes 0.7 0.0 - 1.3 10e9/L    Absolute Eosinophils 0.1 0.0 - 0.7 10e9/L    Absolute Basophils 0.0 0.0 - 0.2 10e9/L    Abs Immature Granulocytes 0.0 0 - 0.4 10e9/L    Absolute Nucleated RBC 0.0    INR     Status: Abnormal   Result Value Ref Range    INR 1.62 (H) 0.86 - 1.14   Ammonia     Status: Abnormal   Result Value Ref Range    Ammonia 81 (H) 10 - 50 umol/L   Troponin I     Status: None   Result Value Ref Range    Troponin I ES <0.015 0.000 - 0.045 ug/L   Lactic acid whole blood     Status: None   Result Value Ref Range    Lactic Acid 1.2 0.7 - 2.0 mmol/L   Troponin I     Status: None   Result Value Ref Range    Troponin I ES <0.015 0.000 - 0.045 ug/L   CBC with platelets     Status: Abnormal   Result Value Ref Range    WBC 4.9 4.0 - 11.0 10e9/L    RBC Count 3.39 (L) 3.8 - 5.2 10e12/L    Hemoglobin 10.4 (L) 11.7 - 15.7 g/dL    Hematocrit 31.4 (L) 35.0 - 47.0 %    MCV 93 78 - 100 fl    MCH 30.7 26.5 - 33.0 pg    MCHC 33.1 31.5 - 36.5 g/dL     RDW 13.8 10.0 - 15.0 %    Platelet Count 158 150 - 450 10e9/L   Comprehensive metabolic panel     Status: Abnormal   Result Value Ref Range    Sodium 136 133 - 144 mmol/L    Potassium 3.8 3.4 - 5.3 mmol/L    Chloride 105 94 - 109 mmol/L    Carbon Dioxide 22 20 - 32 mmol/L    Anion Gap 9 3 - 14 mmol/L    Glucose 116 (H) 70 - 99 mg/dL    Urea Nitrogen 12 7 - 30 mg/dL    Creatinine 0.70 0.52 - 1.04 mg/dL    GFR Estimate >90 >60 mL/min/[1.73_m2]    GFR Estimate If Black >90 >60 mL/min/[1.73_m2]    Calcium 9.5 8.5 - 10.1 mg/dL    Bilirubin Total 2.9 (H) 0.2 - 1.3 mg/dL    Albumin 2.9 (L) 3.4 - 5.0 g/dL    Protein Total 7.5 6.8 - 8.8 g/dL    Alkaline Phosphatase 270 (H) 40 - 150 U/L    ALT 40 0 - 50 U/L    AST 87 (H) 0 - 45 U/L   INR     Status: Abnormal   Result Value Ref Range    INR 1.54 (H) 0.86 - 1.14   Ammonia     Status: None   Result Value Ref Range    Ammonia Canceled, Test credited 10 - 50 umol/L   Ammonia     Status: None   Result Value Ref Range    Ammonia Canceled, Test credited 10 - 50 umol/L   EKG 12-lead, tracing only     Status: None   Result Value Ref Range    Interpretation ECG Click View Image link to view waveform and result    Care Management / Social Work IP Consult     Status: None ()    Jaime Dominguez, MSW     11/27/2020  6:20 PM  Care Management Initial Consult    General Information  Assessment completed with: Patient,    Type of CM/SW Visit: Initial Assessment  Primary Care Provider verified and updated as needed:     Readmission within the last 30 days:        Reason for Consult: discharge planning  Advance Care Planning:     pt's HCD is in her chart.       Communication Assessment  Patient's communication style: spoken language (English or   Bilingual)    Hearing Difficulty or Deaf: no        Cognitive  Cognitive/Neuro/Behavioral: WDL  Level of Consciousness: alert    Arousal Level: opens eyes spontaneously  Orientation: oriented x   4     Best Language: 0 - No aphasia  Speech:  clear;spontaneous    Living Environment:   People in home:       Current living Arrangements: residential facility  Name of   Facility: ([facility])   Able to return to prior arrangements: yes       Family/Social Support:  Care provided by:  Self  Provides care for:  Self only  Marital Status: Single  Significant Other;Parent(s)          Description of Support System:      Support Assessment: Adequate family and caregiver support    Current Resources:   Skilled Home Care Services:  None    Employment/Financial:  Employment Status:     Did not assess.  Pt is on Disability given   she has Medicare.     Financial Concerns:   None reported.          Lifestyle & Psychosocial Needs:        Socioeconomic History     Marital status: Single     Spouse name: Not on file     Number of children: Not on file     Years of education: Not on file     Highest education level: Not on file     Tobacco Use     Smoking status: Current Some Day Smoker     Types: Cigarettes     Smokeless tobacco: Never Used     Tobacco comment: 6-8 per day   Substance and Sexual Activity     Alcohol use: Yes     Alcohol/week: 2.0 standard drinks     Types: 2 Glasses of wine per week     Frequency: 2-3 times a week       Functional Status:  Prior to admission patient needed assistance: Pt was independent   with mobility and ADL's.             Mental Health Status:  None per chart review.          Chemical Dependency Status:  Chemical Dependency Status: Current Concern  Chemical Dependency   Management: Other (see comment)(Current treatment)   Pt has substance use disorder and tobacco use disorder.  Pt   currently is in treatment at [facility].  She just   admitted there a day or 2 ago.  Pt states she is currently   participating in groups via [employer] as she was quarantining because   she just admitted there.    SW encouraged pt to call [facility] prior to returning as they may   require information re: COVID testing.    Pt  states they provide transport to/from appointments so she   wonders if they would come pick her up here at the hospital on   the day of discharge.  If not, she thought her dad might be able   to transport her.          Additional Information:  SW consulted for [facility] form completion and psychosocial   assessment.  Notes indicate pt is currently at the [facility], a substance use disorder treatment facility for   women.      SW met with pt, introduced self, role and reason for the visit.    Reviewed MOON form with pt; pt signed and completed the form.  SW faxed the form to [facility].    Pt confirms she is currently at [facility] and   intends to return there at discharge.  SW encouraged pt to call   [facility] prior to returning in case they need info re: her COVID   test while here or any other information.      Of note, the address on her facesheet is her home information.    [facility] is in [place], [place].      SW will be available as needed.    SOPHIA CALDERON LISW  She/her/hers  Social Work Services  Emergency Department & Obs  [phone] phone  [phone] pager  8:00am-8:30pm Mon-Sat    On-call pager, [phone], 4:00pm to midnight             GI Hepatology Adult IP Consult: Patient to be seen: Routine within 24 hrs; Call back #: [id number]; elevated ammonia in patient with alcoholic cirrhosis, altered mental status/lightheadedness; Consultant may enter orders: Yes; Requesting provider? Hosp...     Status: None ()    Narrative    Jac Leone MD     11/27/2020 12:04 PM  [facility]    Hepatology Consult    Requesting provider: LÓPEZ Cortes CNP, ED Obs    Consult requested for Hepatic Encephalopathy      HPI:  40 year old female with history of liver cirrhosis secondary to   alcohol use, portal hypertension, hepatic encephalopathy,   pulmonary hypertension, who was admitted to ED observation for   confusion,  "lightheadedness, and nausea.    Prior to presentation to the emergency room, she has been at   [facility] for alcohol treatment. Per patient, her   last drink was in September (but [place] [facility] [place]    She reports that 1 day ago, she woke up late and felt more sleepy   than usual which is unusual for her.  She also reported feeling   nauseous and lightheaded.  Reports feeling confusion and   describes it as feeling \"weird\", could not describe her feeling   any further. Had loss of balance and headaches.  No vomiting.    Denies any fevers.  No breathing difficulties.  No chest pain.    No urinary symptoms. Recently had pharyngitis, seen at clinic   ([facility]), negative for COVID. Has been in [facility]   rehab center and on quarantine since [facility]    Has been having formed to soft bowel movements, not watery.  She   reports taking lactulose 2-3 times per day, also reports that her   prescription has been changed at [facility] where   she was only getting it 2 times a day (recently changed from 3   times a day)  With this, she was only having about 3 bowel   movements which were formed.  Denies any blood in the stool,   stools are brown without any melena.  Denies any abdominal pain.    [place] [facility] [place] -   10/5/2020 for Cardiogenic shock, renal failure requiring HD;   needed pressors and Flolan. Had encephalopathy, thought to be HE,   cleared with lactulose. Echo showed significant tricuspid regurg,   dilated RV and elevated PA pressures. Discharged on sildenafil.   Was also volume overloaded, so was started on lasix and   spironolactone. Was referred to pulmonary hypertension clinic   here at the , has not seen anyone yet. Was discharged to [place]   acute rehab.      Cirrhosis History:  Etiology: Alcohol; biopsy proven cirrhosis  Primary Hepatologist: Dr Hernández, last " clinic 5/2020  MELD-Na: 17  Portal hypertension: Present  HE: Type C, Hx of this in the past. Has been on Lactulose and   Rifaximin  Ascites: History of this. No recent paracentesis, last para was   last year, no hx of SBP  Coagulopathy: present; INR is 1.62  Varices: Grade I varices on EGD in March. PHG.  [place] [facility] [place]  Any Thrombosis: Left portal vein occlusion with recanalization  [place] [facility] [place], Trace fluids, no masses  Transplant Candidacy: Not under evaluation currently - recent   alcohol use.    MELD-Na score: 17 at 11/27/2020  6:16 AM  MELD score: 17 at 11/27/2020  6:16 AM  Calculated from:  Serum Creatinine: 0.86 mg/dL (Rounded to 1 mg/dL) at 11/27/2020    6:16 AM  Serum Sodium: 138 mmol/L (Rounded to 137 mmol/L) at 11/27/2020    6:16 AM  Total Bilirubin: 3.8 mg/dL at 11/27/2020  6:16 AM  INR(ratio): 1.62 at 11/27/2020  6:16 AM  Age: 40 years 8 months        Medical hx Surgical hx   Past Medical History:   Diagnosis Date     Alcohol abuse      Alcoholic cirrhosis (H)      Alcoholic peripheral neuropathy (H)      Coagulopathy (H)      Gastritis      History of Clostridium difficile colitis     unknown date     Impairment of cognitive function     MOCA 2/2019 22/30     Leukocytosis 02/2019    persistent leukocytosis across 2/2019 hospitalization without   evidence of source across multiple diagnostics including LP, BCx,   UCx      Lupus anticoagulant positive      Macrocytic anemia      Moderate protein-calorie malnutrition (H)      Paroxysmal A-fib (H) 02/2019    not on chronic anticoagulation     Peptic ulcer disease      Positive SMILEY (antinuclear antibody)      Subclinical hypothyroidism 02/2019    normal T3, T4     Tobacco dependence     0.5 PPD      Past Surgical History:   Procedure Laterality Date     ESOPHAGOSCOPY, GASTROSCOPY, DUODENOSCOPY (EGD), COMBINED N/A   8/9/2019    Procedure: ESOPHAGOGASTRODUODENOSCOPY (EGD);  Surgeon: Sowmya Ibanez  MD Coco;  Location:  GI     ESOPHAGOSCOPY, GASTROSCOPY, DUODENOSCOPY (EGD), COMBINED N/A   8/26/2019    Procedure: ESOPHAGOGASTRODUODENOSCOPY (EGD);  Surgeon:   Leventhal, Thomas Michael, MD;  Location:  GI     ESOPHAGOSCOPY, GASTROSCOPY, DUODENOSCOPY (EGD), COMBINED N/A   3/30/2020    Procedure: ESOPHAGOGASTRODUODENOSCOPY (EGD);  Surgeon: Sowmya Ibanez MD;  Location:  GI     UPPER GI ENDOSCOPY  8/9/2019               Medications  Current Facility-Administered Medications   Medication Dose Route Frequency     cetirizine  5 mg Oral Daily     ferrous sulfate  325 mg Oral TID w/meals     folic acid  1 mg Oral Daily     lactulose  40 g Oral TID     magnesium oxide  400 mg Oral Daily     multivitamin, therapeutic  1 tablet Oral Daily     nicotine  1 patch Transdermal Daily     nicotine   Transdermal Q8H     nortriptyline  10 mg Oral At Bedtime     pantoprazole  40 mg Oral BID AC     polyethylene glycol  17 g Oral Daily     pregabalin  50 mg Oral TID     prenatal multivitamin w/iron  1 tablet Oral Daily     rifaximin  550 mg Oral BID     senna-docusate  1 tablet Oral BID    Or     senna-docusate  2 tablet Oral BID     sildenafil  40 mg Oral TID     thiamine  100 mg Oral Daily       Home Medications:    Current Outpatient Medications   Medication Instructions     calcium carbonate (TUMS) 500 MG chewable tablet 1-2 chew tab,   Oral, PRN     cetirizine (ZYRTEC) 5 mg, Oral     ferrous sulfate (FEROSUL) 325 (65 Fe) MG tablet Take one tab   daily every other day     ferrous sulfate (FEROSUL) 325 mg, Oral, 3 TIMES DAILY WITH   MEALS     fluticasone (FLONASE) 50 MCG/ACT nasal spray 1 spray, Nasal     folic acid (FOLVITE) 1 mg, Oral, DAILY     furosemide (LASIX) 20 mg, Oral, DAILY     lactulose (CHRONULAC) 10 g, Oral, 2 TIMES DAILY     magnesium oxide (MAG-OX) 400 mg, Oral, DAILY     multivitamin, therapeutic (THERA-VIT) TABS tablet 1 tablet,   Oral, DAILY     nortriptyline (PAMELOR) 10 mg, Oral, AT  "BEDTIME     pantoprazole (PROTONIX) 40 mg, Oral, 2 TIMES DAILY BEFORE MEALS       polyethylene glycol (MIRALAX) 17 g, Oral, DAILY PRN     pregabalin (LYRICA) 50 mg, Oral, 3 TIMES DAILY     Prenatal Vit-Fe Fumarate-FA (PRENATAL MULTIVITAMIN W/IRON)   27-0.8 MG tablet 1 tablet, Oral, DAILY     rifaximin (XIFAXAN) 550 mg, Oral     sildenafil (REVATIO) 40 mg, Oral     sodium-potassium bicarbonate (FRIDA-SELTZER GOLD) TBEF solu-tab   1-2 tablets, Oral, 2 TIMES DAILY PRN     spironolactone (ALDACTONE) 50 mg, Oral, DAILY     thiamine (B-1) 100 mg, Oral, DAILY     vitamin  s/Minerals TABS 1 tablet, Oral     vitamin (B COMPLEX-C) tablet 1 tablet, Oral, DAILY     vitamin C (ASCORBIC ACID) 500 mg, Oral, DAILY         Allergies  Allergies   Allergen Reactions     Bengay Pain Relief [Menthol] Other (See Comments)     Skin turns red and feels extremely hot     Cats      Chocolate Dermatitis     Dicyclomine Other (See Comments)     Severe sedation     Dust Mites      Derm, resp     No Clinical Screening - See Comments      GI upset     Phenobarbital      Pollen Extract      Derm, resp.        Family hx Social hx   History reviewed. No pertinent family history.  Grandfather had \"liver cancer\", details unknown Social History     Tobacco Use     Smoking status: Current Some Day Smoker     Types: Cigarettes     Smokeless tobacco: Never Used     Tobacco comment: 6-8 per day   Substance Use Topics     Alcohol use: Yes     Alcohol/week: 2.0 standard drinks     Types: 2 Glasses of wine per week     Frequency: 2-3 times a week     Drug use: None          Review of systems  A 10-point review of systems was negative.      Examination  BP (!) 141/83 (BP Location: Right arm)   Pulse 111   Temp 98.1    F (36.7  C) (Oral)   Resp 16   Wt 68 kg (150 lb)   SpO2 97%     BMI 23.49 kg/m    No intake or output data in the 24 hours ending 11/27/20 0808    Gen- well, NAD, A+Ox3, mild jaundice  Eye- EOMI  ENT- MMM  Lym- no palpable LAD  CVS- distant " heart sounds, faint systolic murmur present  RS- CTA, normal respiratory effort  Abd- Soft abdomen, mildly distended, non tender  Extr- no DOMINIQUE  Neuro- Asterixis is present  Skin- no rash  Psych- flat affect    Laboratory  Lab Results   Component Value Date     11/27/2020    POTASSIUM 4.1 11/27/2020    CHLORIDE 108 11/27/2020    CO2 23 11/27/2020    BUN 16 11/27/2020    CR 0.86 11/27/2020     Cr baseline is around 0.4-0.6.      Lab Results   Component Value Date    BILITOTAL 3.8 11/27/2020    ALT 37 11/27/2020    AST 72 11/27/2020    ALKPHOS 185 11/27/2020     Liver labs similar to ones in the past, AST is lower, alk phos is   about the same. T bili is improved compared to 2019, but variable   numbers between high 1s - 4s.     Lab Results   Component Value Date    ALBUMIN 2.8 11/27/2020    PROTTOTAL 7.5 11/27/2020   These are stable from the past       Lab Results   Component Value Date    WBC 4.4 11/27/2020    HGB 10.0 11/27/2020    MCV 92 11/27/2020     11/27/2020     Improved Hgb compared to the past    Lab Results   Component Value Date    INR 1.62 11/27/2020   Stable INR      PEth - pending    B12 on 10/16/2020 - 1285      Radiology  No new abdominal imaging, no chest imaging    CT Head - Negative for acute process    Assessment  40 year old female with history of liver cirrhosis secondary to   alcohol use, portal hypertension, hepatic encephalopathy,   pulmonary hypertension, who was admitted to ED observation for   confusion, lightheadedness, and nausea.    Even though she is answering all questions, her exam shows   asterixis. Concern for hepatic encephalopathy given underlying   disease.    Based on her history, she is having formed to soft bowel   movements, and her lactulose has been down titrated recently.    Unclear reasons why.  This could be potential trigger.  However,   other etiologies for treatment needs to be ruled out which   includes infectious work-up. Has been eating and drinking  well,   not as concerned for dehydration.    Low concern for GI bleed at   this time.    Recommendations  -Would obtain abdominal ultrasound with Dopplers, and assess for   ascites.  If there is ascites, would recommend diagnostic   paracentesis to rule out SBP.  -Recommend obtaining chest x-ray, blood cultures for infectious   work-up.  Her UA has been done.  -Would recommend increasing lactulose to 20 g 3 times daily to   titrate bowel movements to have 3-4 loose bowel movements a day.   Monitor electrolytes closely with increased lactulose.  -Continue to monitor mental status and physical exam.  Low   utility in trending ammonia levels.  -No indication for antibiotics at this time.      Patient was seen and discussed with Dr Nikolay GASPAR MD  Gastroenterology Fellow  Division of Gastroenterology, Hepatology and Nutrition  [facility]  Text page Pager [phone]     hCG qual urine POCT     Status: Normal   Result Value Ref Range    HCG Qual Urine Negative neg    Internal QC OK Yes    Echo Complete     Status: None    Narrative    [id number]  [id number]  [id number]^WILTON^XIAO^MACK           [facility],[place]  Echocardiography Laboratory  [address] [place] [id number]     Name: BJORN CHAIREZ  MRN: [id number]  : 1980  Study Date: 2020 07:38 AM  Age: 40 yrs  Gender: Female  Patient Location: [facility]  Reason For Study: Syncope  Ordering Physician: XIAO NÚÑEZ  Performed By: Farrah Bernabe RDCS     BSA: 1.8 m2  Height: 67 in  Weight: 150 lb  HR: 99  BP: 141/83 mmHg  _____________________________________________________________________________  __        Procedure  Complete Portable Echo Adult.  _____________________________________________________________________________  __        Interpretation Summary  No structural cause for syncope identified.  _____________________________________________________________________________  __        Left  Ventricle  Global and regional left ventricular function is normal with an EF of 60-65%.  Left ventricular wall thickness is normal. Left ventricular size is normal.  Left ventricular diastolic function is normal. No regional wall motion  abnormalities are seen.     Right Ventricle  Right ventricular function, chamber size, wall motion, and thickness are  normal.     Atria  Both atria appear normal.     Mitral Valve  The mitral valve is normal.        Aortic Valve  Aortic valve is normal in structure and function.     Tricuspid Valve  The tricuspid valve is normal. Trace tricuspid insufficiency is present. The  right ventricular systolic pressure is approximated at 26.6 mmHg plus the  right atrial pressure. Pulmonary artery systolic pressure is normal.     Pulmonic Valve  The pulmonic valve is normal.     Vessels  The thoracic aorta is normal. The pulmonary artery is normal. The inferior  vena cava is normal.     Pericardium  No pericardial effusion is present.        Compared to Previous Study  There is no prior study for direct comparison.  _____________________________________________________________________________  __  MMode/2D Measurements & Calculations     IVSd: 0.96 cm  LVIDd: 4.7 cm  LVIDs: 3.3 cm  LVPWd: 1.0 cm  FS: 31.4 %  LV mass(C)d: 164.8 grams  LV mass(C)dI: 92.1 grams/m2  Ao root diam: 2.9 cm  asc Aorta Diam: 2.8 cm  LVOT diam: 2.1 cm  LVOT area: 3.5 cm2  LA Volume (BP): 62.5 ml  LA Volume Index (BP): 34.9 ml/m2  RWT: 0.43           Doppler Measurements & Calculations  MV E max susi: 75.7 cm/sec  MV A max susi: 70.3 cm/sec  MV E/A: 1.1  MV dec slope: 403.0 cm/sec2  PA acc time: 0.10 sec  TR max susi: 258.0 cm/sec  TR max P.6 mmHg  E/E' av.6  Lateral E/e': 6.7  Medial E/e': 8.5     _____________________________________________________________________________  __           Report approved by: Juan Esposito 2020 10:50 AM      Urine Culture     Status: None    Specimen: Urine Midstream;  Midstream Urine   Result Value Ref Range    Specimen Description Midstream Urine     Special Requests Specimen received in preservative     Culture Micro No growth    Blood culture     Status: None (Preliminary result)    Specimen: Blood    Right Hand   Result Value Ref Range    Specimen Description Blood Right Hand     Culture Micro No growth after 2 days    Blood culture     Status: None (Preliminary result)    Specimen: Blood    Left Hand   Result Value Ref Range    Specimen Description Blood Left Hand     Culture Micro No growth after 2 days       Recent Results (from the past 48 hour(s))   XR Chest 2 Views    Narrative    EXAM: XR CHEST 2 VW 2020    HISTORY: R/O infection.    COMPARISON: Radiograph 3/28/2020 and 3/27/2020.    TECHNIQUE: Upright frontal and lateral views of the chest.    FINDINGS: Hazy opacification lower lung fields, likely artifact from  overlying soft tissues. No focal airspace consolidation, pneumothorax,  or pleural effusion. Cardiomediastinal silhouette is within normal  limits. Upper abdomen is grossly unremarkable. No acute osseous  abnormality.      Impression    IMPRESSION:   No acute airspace disease.    I have personally reviewed the examination and initial interpretation  and I agree with the findings.    NAYELI HARRIS MD   Echo Complete    Narrative    [id number]  [id number]  [id number]  [id number]^WILTON^XIAO^MACK           [facility],[place]  Echocardiography Laboratory  [address] [place] [address]     Name: BJORN CHAIREZ  MRN: [id number]  : 1980  Study Date: 2020 07:38 AM  Age: 40 yrs  Gender: Female  [facility]  Reason For Study: Syncope  Ordering Physician: XIAO NÚÑEZ  Performed By: Farrah Bernabe RDCS     BSA: 1.8 m2  Height: 67 in  Weight: 150 lb  HR: 99  BP: 141/83 mmHg  _____________________________________________________________________________  __        Procedure  Complete Portable Echo  Adult.  _____________________________________________________________________________  __        Interpretation Summary  No structural cause for syncope identified.  _____________________________________________________________________________  __        Left Ventricle  Global and regional left ventricular function is normal with an EF of 60-65%.  Left ventricular wall thickness is normal. Left ventricular size is normal.  Left ventricular diastolic function is normal. No regional wall motion  abnormalities are seen.     Right Ventricle  Right ventricular function, chamber size, wall motion, and thickness are  normal.     Atria  Both atria appear normal.     Mitral Valve  The mitral valve is normal.        Aortic Valve  Aortic valve is normal in structure and function.     Tricuspid Valve  The tricuspid valve is normal. Trace tricuspid insufficiency is present. The  right ventricular systolic pressure is approximated at 26.6 mmHg plus the  right atrial pressure. Pulmonary artery systolic pressure is normal.     Pulmonic Valve  The pulmonic valve is normal.     Vessels  The thoracic aorta is normal. The pulmonary artery is normal. The inferior  vena cava is normal.     Pericardium  No pericardial effusion is present.        Compared to Previous Study  There is no prior study for direct comparison.  _____________________________________________________________________________  __  MMode/2D Measurements & Calculations     IVSd: 0.96 cm  LVIDd: 4.7 cm  LVIDs: 3.3 cm  LVPWd: 1.0 cm  FS: 31.4 %  LV mass(C)d: 164.8 grams  LV mass(C)dI: 92.1 grams/m2  Ao root diam: 2.9 cm  asc Aorta Diam: 2.8 cm  LVOT diam: 2.1 cm  LVOT area: 3.5 cm2  LA Volume (BP): 62.5 ml  LA Volume Index (BP): 34.9 ml/m2  RWT: 0.43           Doppler Measurements & Calculations  MV E max susi: 75.7 cm/sec  MV A max susi: 70.3 cm/sec  MV E/A: 1.1  MV dec slope: 403.0 cm/sec2  PA acc time: 0.10 sec  TR max susi: 258.0 cm/sec  TR max P.6 mmHg  E/E' avg:  7.6  Lateral E/e': 6.7  Medial E/e': 8.5     _____________________________________________________________________________  __           Report approved by: Juan Esposito 11/27/2020 10:50 AM      US Abdominal Doppler Complete    Narrative    EXAMINATION: US ABDOMEN OR PELVIS DOPPLER COMPLETE 11/27/2020 11:17 AM      COMPARISON: Ultrasound 12/24/2019.    HISTORY: 40-year-old female with history of cirrhosis, portal  hypertension, hepatic encephalopathy and pulmonary hypertension who  presents with confusion and lightheadedness and nausea. Evaluate for  portal venous clot potentially causing ascites.    TECHNIQUE: The abdomen was scanned in standard fashion with  specialized ultrasound transducer(s) using both gray-scale, color  Doppler, and spectral flow techniques.    Findings:  Liver: The liver demonstrates increased echogenicity and mild surface  nodularity evident in the left lobe. No evidence of a focal hepatic  mass.     Extrahepatic portal vein flow is retrograde at 23 cm/s.  Right portal vein flow is retrograde, measuring 14 cm/s.  Left portal vein flow is retrograde, measuring 26 cm/s.    Previously seen recannulized paraumbilical vein is not visualized on  current study.     Flow in the hepatic artery is towards the liver and:  117 cm/s peak systolic velocity  51 cm/s end diastolic velocity  0.56 resistive index.     The splenic vein is patent and flow is retrograde measuring 38 cm/s.   The left, middle, and right hepatic veins are patent with flow towards  the IVC measuring 13 cm/s, 28 cm/s and 57 cm/s respectively. The IVC  is patent with flow towards the heart at 179 cm/s.   The visualized  aorta is not dilated.    Fluid: No evidence of ascites or pleural effusions.      Impression    Impression:   1.  No portal venous thrombosis or ascites.  2.  Hepatic cirrhosis and portal hypertension with retrograde portal  venous flow.     I have personally reviewed the examination and initial interpretation  and  I agree with the findings.    NANCY MERCADO MD   US Abdomen Limited    Narrative    EXAMINATION: US ABDOMEN LIMITED, 11/27/2020 11:17 AM     COMPARISON: Ultrasound 3/27/2020, 12/24/2019.    HISTORY: 40-year-old female with history of cirrhosis, portal  hypertension, hepatic encephalopathy, pulmonary hypertension who  presents with confusion and lightheadedness and nausea. Assess for  abdominal ascites    TECHNIQUE: Gray scale ultrasound of the abdomen.    FINDINGS:  A targeted ultrasound of all 4 abdominal quadrants was performed and  demonstrated no evidence of ascites.        Impression    IMPRESSION: No evidence of ascites.    I have personally reviewed the examination and initial interpretation  and I agree with the findings.    NANCY MERCADO MD                Pending Results:   Unresulted Labs Ordered in the Past 30 Days of this Admission     Date and Time Order Name Status Description    11/28/2020 1100 Ammonia In process     11/27/2020 0915 Blood culture Preliminary     11/27/2020 0915 Blood culture Preliminary     11/27/2020 0044 Phosphatidylethanol (PEth) In process                   Discharge Instructions and Follow-Up:   No discharge procedures on file.       Attestation:  Elicia Earl PA-C.

## 2020-11-28 NOTE — PROVIDER NOTIFICATION
DAGO notified re: Pt stating she is having stomach upset. Requesting TUMS or pepto. Please advise.

## 2020-11-28 NOTE — PROGRESS NOTES
-hepatology consult complete- met   -lightheadedness improved- met  -PT consult complete- met  -mental status at baseline- met  -tolerating oral intake- met    Pt ready for discharge

## 2020-11-28 NOTE — PLAN OF CARE
-hepatology consult complete- met  -lightheadedness improved- met  -PT consult complete- met  -mental status at baseline- met  -tolerating oral intake- met    Pt given PRN Tylenol for sore throat.

## 2020-11-28 NOTE — CONSULTS
Care Management Initial Consult    General Information  Assessment completed with: Patient,    Type of CM/SW Visit: Initial Assessment  Primary Care Provider verified and updated as needed:     Readmission within the last 30 days:        Reason for Consult: discharge planning  Advance Care Planning:     pt's HCD is in her chart.       Communication Assessment  Patient's communication style: spoken language (English or Bilingual)    Hearing Difficulty or Deaf: no        Cognitive  Cognitive/Neuro/Behavioral: WDL  Level of Consciousness: alert  Arousal Level: opens eyes spontaneously  Orientation: oriented x 4     Best Language: 0 - No aphasia  Speech: clear;spontaneous    Living Environment:   People in home:       Current living Arrangements: residential facility  Name of Facility: ([facility])   Able to return to prior arrangements: yes       Family/Social Support:  Care provided by:  Self  Provides care for:  Self only  Marital Status: Single  Significant Other;Parent(s)          Description of Support System:      Support Assessment: Adequate family and caregiver support    Current Resources:   Skilled Home Care Services:  None    Employment/Financial:  Employment Status:     Did not assess.  Pt is on Disability given she has Medicare.     Financial Concerns:   None reported.          Lifestyle & Psychosocial Needs:        Socioeconomic History     Marital status: Single     Spouse name: Not on file     Number of children: Not on file     Years of education: Not on file     Highest education level: Not on file     Tobacco Use     Smoking status: Current Some Day Smoker     Types: Cigarettes     Smokeless tobacco: Never Used     Tobacco comment: 6-8 per day   Substance and Sexual Activity     Alcohol use: Yes     Alcohol/week: 2.0 standard drinks     Types: 2 Glasses of wine per week     Frequency: 2-3 times a week       Functional Status:  Prior to admission patient needed assistance: Pt was  independent with mobility and ADL's.             Mental Health Status:  None per chart review.          Chemical Dependency Status:  Chemical Dependency Status: Current Concern  Chemical Dependency Management: Other (see comment)(Current treatment)   Pt has substance use disorder and tobacco use disorder.  Pt currently is in treatment at [facility].  She just admitted there a day or 2 ago.  Pt states she is currently participating in groups via [id number] as she was quarantining because she just admitted there.    SW encouraged pt to call [facility] prior to returning as they may require information re: COVID testing.    Pt states they provide transport to/from appointments so she wonders if they would come pick her up here at the hospital on the day of discharge.  If not, she thought her dad might be able to transport her.          Additional Information:  MAI consulted for MCGREGOR form completion and psychosocial assessment.  Notes indicate pt is currently at the [facility], a substance use disorder treatment facility for women.      MAI met with pt, introduced self, role and reason for the visit.  Reviewed MOON form with pt; pt signed and completed the form.  SW faxed the form to [facility].    Pt confirms she is currently at [facility] and intends to return there at discharge.  SW encouraged pt to call [facility] prior to returning in case they need info re: her COVID test while here or any other information.      Of note, the address on her facesheet is her home information.  [facility] is in [place], [place].      SW will be available as needed.    SOPHIA CALDERON LISW  She/her/hers  Social Work Services  Emergency Department & Obs  [phone] phone  [phone] pager  8:00am-8:30pm Mon-Sat    On-call pager, [phone], 4:00pm to midnight

## 2020-11-28 NOTE — PLAN OF CARE
Observation Goals:    -hepatology consult complete: met    -lightheadedness improved: met    -PT consult complete: met    -mental status at baseline: met    -tolerating oral intake: met     /81 (BP Location: Left arm)   Pulse 117   Temp 99  F (37.2  C) (Oral)   Resp 16   Wt 68 kg (150 lb)   SpO2 98%   BMI 23.49 kg/m

## 2020-11-28 NOTE — PROGRESS NOTES
Care Management Discharge Note    Discharge Date:  11/28/2020     Discharge Disposition:  Treatment Center ([facility])    Discharge Services:  None    Discharge DME:  None    Discharge Transportation:  Per [facility] staff    Private pay costs discussed: Not applicable    PAS Confirmation Code:  N/A  Patient/family educated on Medicare website which has current facility and service quality ratings: N/A     Education Provided on the Discharge Plan:  Yes  Persons Notified of Discharge Plans: Pt, care team  Patient/Family in Agreement with the Plan:  Yes    Handoff Referral Completed: N/A    Additional Information:  1400: Pt medically cleared for discharge. SW spoke w/ Leanne at [facility] to arrange transport. SW to f/u as needed.    1415: Per [place], pt's father, Pravin, will pick pt up at approx. 1445.    Aleena Kasper) MICHAEL Torres, [facility]  ED/OBS Unit   [facility]  Phone: [phone]  ED/OBS Pager: [phone]   After Hours Pager: [phone] (Mon-Sat 4pm-midnight)

## 2020-11-28 NOTE — PLAN OF CARE
- Hepatology consult complete: Yes  - Lightheadedness improved: In progress, reporting improvement   - PT consult complete:Yes  - Mental status at baseline: Yes  - Tolerating oral intake: Yes    Pt reports BM x1 during day shift and BM x2 during evening shift. Call light within reach, able to make needs known. Will continue to monitor and follow POC.    BP (!) 143/87 (BP Location: Left arm)   Pulse 110   Temp 97.8  F (36.6  C) (Oral)   Resp 16   Wt 68 kg (150 lb)   SpO2 97%   BMI 23.49 kg/m

## 2020-11-30 ENCOUNTER — APPOINTMENT (OUTPATIENT)
Dept: CT IMAGING | Facility: CLINIC | Age: 40
DRG: 433 | End: 2020-11-30
Attending: EMERGENCY MEDICINE
Payer: MEDICARE

## 2020-11-30 ENCOUNTER — HOSPITAL ENCOUNTER (INPATIENT)
Facility: CLINIC | Age: 40
LOS: 6 days | Discharge: NURSING, REHAB OR LONG-TERM CARE | DRG: 433 | End: 2020-12-06
Attending: EMERGENCY MEDICINE | Admitting: STUDENT IN AN ORGANIZED HEALTH CARE EDUCATION/TRAINING PROGRAM
Payer: MEDICARE

## 2020-11-30 ENCOUNTER — APPOINTMENT (OUTPATIENT)
Dept: GENERAL RADIOLOGY | Facility: CLINIC | Age: 40
DRG: 433 | End: 2020-11-30
Attending: EMERGENCY MEDICINE
Payer: MEDICARE

## 2020-11-30 DIAGNOSIS — F10.10 ALCOHOL ABUSE: ICD-10-CM

## 2020-11-30 DIAGNOSIS — K92.2 UGIB (UPPER GASTROINTESTINAL BLEED): ICD-10-CM

## 2020-11-30 DIAGNOSIS — E86.0 DEHYDRATION: ICD-10-CM

## 2020-11-30 DIAGNOSIS — J01.10 SUBACUTE FRONTAL SINUSITIS: ICD-10-CM

## 2020-11-30 DIAGNOSIS — K92.2 GASTROINTESTINAL HEMORRHAGE, UNSPECIFIED GASTROINTESTINAL HEMORRHAGE TYPE: ICD-10-CM

## 2020-11-30 DIAGNOSIS — Z20.828 EXPOSURE TO SARS-ASSOCIATED CORONAVIRUS: ICD-10-CM

## 2020-11-30 DIAGNOSIS — S22.050A COMPRESSION FRACTURE OF T6 VERTEBRA, INITIAL ENCOUNTER (H): Primary | ICD-10-CM

## 2020-11-30 DIAGNOSIS — I27.20 PULMONARY HYPERTENSION (H): ICD-10-CM

## 2020-11-30 DIAGNOSIS — K76.82 HEPATIC ENCEPHALOPATHY (H): ICD-10-CM

## 2020-11-30 LAB
ALBUMIN SERPL-MCNC: 3.1 G/DL (ref 3.4–5)
ALBUMIN UR-MCNC: NEGATIVE MG/DL
ALP SERPL-CCNC: 252 U/L (ref 40–150)
ALT SERPL W P-5'-P-CCNC: 45 U/L (ref 0–50)
AMMONIA PLAS-SCNC: 79 UMOL/L (ref 10–50)
AMPHETAMINES UR QL SCN: NEGATIVE
ANION GAP SERPL CALCULATED.3IONS-SCNC: 8 MMOL/L (ref 3–14)
APPEARANCE UR: CLEAR
AST SERPL W P-5'-P-CCNC: 110 U/L (ref 0–45)
BASOPHILS # BLD AUTO: 0.1 10E9/L (ref 0–0.2)
BASOPHILS NFR BLD AUTO: 1 %
BENZODIAZ UR QL: NEGATIVE
BILIRUB SERPL-MCNC: 3.2 MG/DL (ref 0.2–1.3)
BILIRUB UR QL STRIP: NEGATIVE
BUN SERPL-MCNC: 20 MG/DL (ref 7–30)
CALCIUM SERPL-MCNC: 9.5 MG/DL (ref 8.5–10.1)
CANNABINOIDS UR QL SCN: NEGATIVE
CHLORIDE SERPL-SCNC: 102 MMOL/L (ref 94–109)
CO2 SERPL-SCNC: 23 MMOL/L (ref 20–32)
COCAINE UR QL: NEGATIVE
COLOR UR AUTO: YELLOW
CREAT SERPL-MCNC: 0.9 MG/DL (ref 0.52–1.04)
CREAT SERPL-MCNC: 0.9 MG/DL (ref 0.52–1.04)
D DIMER PPP FEU-MCNC: 0.6 UG/ML FEU (ref 0–0.5)
DIFFERENTIAL METHOD BLD: ABNORMAL
EOSINOPHIL # BLD AUTO: 0.1 10E9/L (ref 0–0.7)
EOSINOPHIL NFR BLD AUTO: 1.6 %
ERYTHROCYTE [DISTWIDTH] IN BLOOD BY AUTOMATED COUNT: 14 % (ref 10–15)
ETHANOL SERPL-MCNC: <0.01 G/DL
GFR SERPL CREATININE-BSD FRML MDRD: 81 ML/MIN/{1.73_M2}
GFR SERPL CREATININE-BSD FRML MDRD: 81 ML/MIN/{1.73_M2}
GLUCOSE SERPL-MCNC: 98 MG/DL (ref 70–99)
GLUCOSE UR STRIP-MCNC: NEGATIVE MG/DL
HCG UR QL: NEGATIVE
HCT VFR BLD AUTO: 31.4 % (ref 35–47)
HGB BLD-MCNC: 10.4 G/DL (ref 11.7–15.7)
HGB UR QL STRIP: NEGATIVE
IMM GRANULOCYTES # BLD: 0 10E9/L (ref 0–0.4)
IMM GRANULOCYTES NFR BLD: 0.5 %
INR PPP: 1.57 (ref 0.86–1.14)
KETONES UR STRIP-MCNC: NEGATIVE MG/DL
LACTATE BLD-SCNC: 1.5 MMOL/L (ref 0.7–2)
LEUKOCYTE ESTERASE UR QL STRIP: NEGATIVE
LIPASE SERPL-CCNC: 154 U/L (ref 73–393)
LIPASE SERPL-CCNC: 241 U/L (ref 73–393)
LYMPHOCYTES # BLD AUTO: 1.7 10E9/L (ref 0.8–5.3)
LYMPHOCYTES NFR BLD AUTO: 28 %
MAGNESIUM SERPL-MCNC: 2.2 MG/DL (ref 1.6–2.3)
MAGNESIUM SERPL-MCNC: 2.4 MG/DL (ref 1.6–2.3)
MCH RBC QN AUTO: 30.2 PG (ref 26.5–33)
MCHC RBC AUTO-ENTMCNC: 33.1 G/DL (ref 31.5–36.5)
MCV RBC AUTO: 91 FL (ref 78–100)
MONOCYTES # BLD AUTO: 0.7 10E9/L (ref 0–1.3)
MONOCYTES NFR BLD AUTO: 11.5 %
NEUTROPHILS # BLD AUTO: 3.5 10E9/L (ref 1.6–8.3)
NEUTROPHILS NFR BLD AUTO: 57.4 %
NITRATE UR QL: NEGATIVE
NRBC # BLD AUTO: 0 10*3/UL
NRBC BLD AUTO-RTO: 0 /100
OPIATES UR QL SCN: NEGATIVE
PH UR STRIP: 6.5 PH (ref 5–7)
PHOSPHATE SERPL-MCNC: 2.5 MG/DL (ref 2.5–4.5)
PLATELET # BLD AUTO: 182 10E9/L (ref 150–450)
PLATELET # BLD EST: ABNORMAL 10*3/UL
POTASSIUM SERPL-SCNC: 4.3 MMOL/L (ref 3.4–5.3)
POTASSIUM SERPL-SCNC: 4.6 MMOL/L (ref 3.4–5.3)
PROT SERPL-MCNC: 8 G/DL (ref 6.8–8.8)
RADIOLOGIST FLAGS: NORMAL
RBC # BLD AUTO: 3.44 10E12/L (ref 3.8–5.2)
RBC #/AREA URNS AUTO: 1 /HPF (ref 0–2)
SODIUM SERPL-SCNC: 133 MMOL/L (ref 133–144)
SOURCE: NORMAL
SP GR UR STRIP: 1.01 (ref 1–1.03)
SQUAMOUS #/AREA URNS AUTO: 1 /HPF (ref 0–1)
TROPONIN I SERPL-MCNC: <0.015 UG/L (ref 0–0.04)
TSH SERPL DL<=0.005 MIU/L-ACNC: 0.74 MU/L (ref 0.4–4)
UROBILINOGEN UR STRIP-MCNC: NORMAL MG/DL (ref 0–2)
WBC # BLD AUTO: 6.2 10E9/L (ref 4–11)
WBC #/AREA URNS AUTO: <1 /HPF (ref 0–5)

## 2020-11-30 PROCEDURE — 71275 CT ANGIOGRAPHY CHEST: CPT

## 2020-11-30 PROCEDURE — 96360 HYDRATION IV INFUSION INIT: CPT | Mod: 59 | Performed by: EMERGENCY MEDICINE

## 2020-11-30 PROCEDURE — 71046 X-RAY EXAM CHEST 2 VIEWS: CPT | Mod: 26 | Performed by: RADIOLOGY

## 2020-11-30 PROCEDURE — 84100 ASSAY OF PHOSPHORUS: CPT | Performed by: PHYSICIAN ASSISTANT

## 2020-11-30 PROCEDURE — 85610 PROTHROMBIN TIME: CPT | Performed by: EMERGENCY MEDICINE

## 2020-11-30 PROCEDURE — 258N000003 HC RX IP 258 OP 636: Performed by: EMERGENCY MEDICINE

## 2020-11-30 PROCEDURE — 250N000013 HC RX MED GY IP 250 OP 250 PS 637: Performed by: EMERGENCY MEDICINE

## 2020-11-30 PROCEDURE — 83735 ASSAY OF MAGNESIUM: CPT | Performed by: PHYSICIAN ASSISTANT

## 2020-11-30 PROCEDURE — 82565 ASSAY OF CREATININE: CPT | Performed by: PHYSICIAN ASSISTANT

## 2020-11-30 PROCEDURE — 87040 BLOOD CULTURE FOR BACTERIA: CPT | Performed by: EMERGENCY MEDICINE

## 2020-11-30 PROCEDURE — 999N001064 HC STATISTIC DRUG SCREEN MULTIPLE (METRO): Performed by: PHYSICIAN ASSISTANT

## 2020-11-30 PROCEDURE — 999N000127 HC STATISTIC PERIPHERAL IV START W US GUIDANCE

## 2020-11-30 PROCEDURE — 83605 ASSAY OF LACTIC ACID: CPT | Performed by: EMERGENCY MEDICINE

## 2020-11-30 PROCEDURE — C9803 HOPD COVID-19 SPEC COLLECT: HCPCS | Performed by: EMERGENCY MEDICINE

## 2020-11-30 PROCEDURE — 81001 URINALYSIS AUTO W/SCOPE: CPT | Performed by: PHYSICIAN ASSISTANT

## 2020-11-30 PROCEDURE — 120N000011 HC R&B TRANSPLANT UMMC

## 2020-11-30 PROCEDURE — 36415 COLL VENOUS BLD VENIPUNCTURE: CPT | Performed by: EMERGENCY MEDICINE

## 2020-11-30 PROCEDURE — 80307 DRUG TEST PRSMV CHEM ANLYZR: CPT | Performed by: PHYSICIAN ASSISTANT

## 2020-11-30 PROCEDURE — 99285 EMERGENCY DEPT VISIT HI MDM: CPT | Mod: 25 | Performed by: EMERGENCY MEDICINE

## 2020-11-30 PROCEDURE — 250N000011 HC RX IP 250 OP 636: Performed by: STUDENT IN AN ORGANIZED HEALTH CARE EDUCATION/TRAINING PROGRAM

## 2020-11-30 PROCEDURE — 82140 ASSAY OF AMMONIA: CPT | Performed by: EMERGENCY MEDICINE

## 2020-11-30 PROCEDURE — 84443 ASSAY THYROID STIM HORMONE: CPT | Performed by: EMERGENCY MEDICINE

## 2020-11-30 PROCEDURE — 99207 PR APP CREDIT; MD BILLING SHARED VISIT: CPT | Performed by: PHYSICIAN ASSISTANT

## 2020-11-30 PROCEDURE — 71046 X-RAY EXAM CHEST 2 VIEWS: CPT

## 2020-11-30 PROCEDURE — 80053 COMPREHEN METABOLIC PANEL: CPT | Performed by: EMERGENCY MEDICINE

## 2020-11-30 PROCEDURE — 85025 COMPLETE CBC W/AUTO DIFF WBC: CPT | Performed by: EMERGENCY MEDICINE

## 2020-11-30 PROCEDURE — 250N000013 HC RX MED GY IP 250 OP 250 PS 637: Performed by: PHYSICIAN ASSISTANT

## 2020-11-30 PROCEDURE — 258N000003 HC RX IP 258 OP 636: Performed by: PHYSICIAN ASSISTANT

## 2020-11-30 PROCEDURE — 83690 ASSAY OF LIPASE: CPT | Performed by: PHYSICIAN ASSISTANT

## 2020-11-30 PROCEDURE — 84132 ASSAY OF SERUM POTASSIUM: CPT | Performed by: PHYSICIAN ASSISTANT

## 2020-11-30 PROCEDURE — 99223 1ST HOSP IP/OBS HIGH 75: CPT | Mod: AI | Performed by: STUDENT IN AN ORGANIZED HEALTH CARE EDUCATION/TRAINING PROGRAM

## 2020-11-30 PROCEDURE — 84484 ASSAY OF TROPONIN QUANT: CPT | Performed by: EMERGENCY MEDICINE

## 2020-11-30 PROCEDURE — 81025 URINE PREGNANCY TEST: CPT | Performed by: PHYSICIAN ASSISTANT

## 2020-11-30 PROCEDURE — 93010 ELECTROCARDIOGRAM REPORT: CPT | Performed by: EMERGENCY MEDICINE

## 2020-11-30 PROCEDURE — 85379 FIBRIN DEGRADATION QUANT: CPT | Performed by: EMERGENCY MEDICINE

## 2020-11-30 PROCEDURE — 71275 CT ANGIOGRAPHY CHEST: CPT | Mod: 26 | Performed by: STUDENT IN AN ORGANIZED HEALTH CARE EDUCATION/TRAINING PROGRAM

## 2020-11-30 PROCEDURE — 83735 ASSAY OF MAGNESIUM: CPT | Performed by: EMERGENCY MEDICINE

## 2020-11-30 PROCEDURE — 80320 DRUG SCREEN QUANTALCOHOLS: CPT | Performed by: PHYSICIAN ASSISTANT

## 2020-11-30 PROCEDURE — 36415 COLL VENOUS BLD VENIPUNCTURE: CPT | Performed by: PHYSICIAN ASSISTANT

## 2020-11-30 PROCEDURE — U0003 INFECTIOUS AGENT DETECTION BY NUCLEIC ACID (DNA OR RNA); SEVERE ACUTE RESPIRATORY SYNDROME CORONAVIRUS 2 (SARS-COV-2) (CORONAVIRUS DISEASE [COVID-19]), AMPLIFIED PROBE TECHNIQUE, MAKING USE OF HIGH THROUGHPUT TECHNOLOGIES AS DESCRIBED BY CMS-2020-01-R: HCPCS | Performed by: EMERGENCY MEDICINE

## 2020-11-30 PROCEDURE — 93005 ELECTROCARDIOGRAM TRACING: CPT | Performed by: EMERGENCY MEDICINE

## 2020-11-30 PROCEDURE — 83690 ASSAY OF LIPASE: CPT | Performed by: EMERGENCY MEDICINE

## 2020-11-30 PROCEDURE — 96361 HYDRATE IV INFUSION ADD-ON: CPT | Performed by: EMERGENCY MEDICINE

## 2020-11-30 RX ORDER — LANOLIN ALCOHOL/MO/W.PET/CERES
6 CREAM (GRAM) TOPICAL
Status: DISCONTINUED | OUTPATIENT
Start: 2020-11-30 | End: 2020-12-06 | Stop reason: HOSPADM

## 2020-11-30 RX ORDER — AMOXICILLIN 250 MG
2 CAPSULE ORAL 2 TIMES DAILY
Status: DISCONTINUED | OUTPATIENT
Start: 2020-11-30 | End: 2020-11-30

## 2020-11-30 RX ORDER — POLYETHYLENE GLYCOL 3350 17 G/17G
17 POWDER, FOR SOLUTION ORAL DAILY PRN
Status: DISCONTINUED | OUTPATIENT
Start: 2020-11-30 | End: 2020-12-06 | Stop reason: HOSPADM

## 2020-11-30 RX ORDER — PANTOPRAZOLE SODIUM 40 MG/1
40 TABLET, DELAYED RELEASE ORAL
Status: DISCONTINUED | OUTPATIENT
Start: 2020-12-01 | End: 2020-12-06 | Stop reason: HOSPADM

## 2020-11-30 RX ORDER — SODIUM CHLORIDE 9 MG/ML
INJECTION, SOLUTION INTRAVENOUS CONTINUOUS
Status: DISCONTINUED | OUTPATIENT
Start: 2020-11-30 | End: 2020-11-30

## 2020-11-30 RX ORDER — ONDANSETRON 4 MG/1
4 TABLET, ORALLY DISINTEGRATING ORAL EVERY 6 HOURS PRN
Status: DISCONTINUED | OUTPATIENT
Start: 2020-11-30 | End: 2020-12-06 | Stop reason: HOSPADM

## 2020-11-30 RX ORDER — LIDOCAINE 4 G/G
1 PATCH TOPICAL EVERY 24 HOURS
Status: DISCONTINUED | OUTPATIENT
Start: 2020-11-30 | End: 2020-12-06 | Stop reason: HOSPADM

## 2020-11-30 RX ORDER — IOPAMIDOL 755 MG/ML
55 INJECTION, SOLUTION INTRAVASCULAR ONCE
Status: COMPLETED | OUTPATIENT
Start: 2020-11-30 | End: 2020-11-30

## 2020-11-30 RX ORDER — FUROSEMIDE 40 MG
40 TABLET ORAL DAILY
Status: ON HOLD | COMMUNITY
End: 2020-12-06

## 2020-11-30 RX ORDER — MULTIVITAMIN,THERAPEUTIC
1 TABLET ORAL DAILY
Status: DISCONTINUED | OUTPATIENT
Start: 2020-12-01 | End: 2020-12-06 | Stop reason: HOSPADM

## 2020-11-30 RX ORDER — ONDANSETRON 2 MG/ML
4 INJECTION INTRAMUSCULAR; INTRAVENOUS EVERY 6 HOURS PRN
Status: DISCONTINUED | OUTPATIENT
Start: 2020-11-30 | End: 2020-12-06 | Stop reason: HOSPADM

## 2020-11-30 RX ORDER — LACTULOSE 10 G/15ML
100 SOLUTION ORAL
Status: DISCONTINUED | OUTPATIENT
Start: 2020-11-30 | End: 2020-11-30

## 2020-11-30 RX ORDER — LACTULOSE 10 G/15ML
20 SOLUTION ORAL
Status: DISCONTINUED | OUTPATIENT
Start: 2020-11-30 | End: 2020-11-30

## 2020-11-30 RX ORDER — SILDENAFIL CITRATE 20 MG/1
40 TABLET ORAL 3 TIMES DAILY
Status: DISCONTINUED | OUTPATIENT
Start: 2020-11-30 | End: 2020-12-06 | Stop reason: HOSPADM

## 2020-11-30 RX ORDER — FEXOFENADINE HCL 180 MG/1
180 TABLET ORAL DAILY
Status: DISCONTINUED | OUTPATIENT
Start: 2020-12-01 | End: 2020-12-06 | Stop reason: HOSPADM

## 2020-11-30 RX ORDER — LANOLIN ALCOHOL/MO/W.PET/CERES
100 CREAM (GRAM) TOPICAL DAILY
Status: DISCONTINUED | OUTPATIENT
Start: 2020-12-01 | End: 2020-12-06 | Stop reason: HOSPADM

## 2020-11-30 RX ORDER — POLYETHYLENE GLYCOL 3350 17 G/17G
17 POWDER, FOR SOLUTION ORAL DAILY
Status: DISCONTINUED | OUTPATIENT
Start: 2020-12-01 | End: 2020-11-30

## 2020-11-30 RX ORDER — CELECOXIB 200 MG/1
200 CAPSULE ORAL 2 TIMES DAILY PRN
Status: DISCONTINUED | OUTPATIENT
Start: 2020-11-30 | End: 2020-12-06 | Stop reason: HOSPADM

## 2020-11-30 RX ORDER — FOLIC ACID 1 MG/1
1 TABLET ORAL DAILY
Status: DISCONTINUED | OUTPATIENT
Start: 2020-12-01 | End: 2020-12-06 | Stop reason: HOSPADM

## 2020-11-30 RX ORDER — LIDOCAINE 40 MG/G
CREAM TOPICAL
Status: DISCONTINUED | OUTPATIENT
Start: 2020-11-30 | End: 2020-12-06 | Stop reason: HOSPADM

## 2020-11-30 RX ORDER — SODIUM CHLORIDE 9 MG/ML
INJECTION, SOLUTION INTRAVENOUS CONTINUOUS
Status: ACTIVE | OUTPATIENT
Start: 2020-11-30 | End: 2020-12-01

## 2020-11-30 RX ORDER — ACETAMINOPHEN 325 MG/1
325 TABLET ORAL EVERY 4 HOURS PRN
Status: DISCONTINUED | OUTPATIENT
Start: 2020-11-30 | End: 2020-12-04

## 2020-11-30 RX ORDER — LACTULOSE 10 G/15ML
10 SOLUTION ORAL ONCE
Status: COMPLETED | OUTPATIENT
Start: 2020-11-30 | End: 2020-11-30

## 2020-11-30 RX ORDER — CALCIUM CARBONATE 750 MG/1
750 TABLET, CHEWABLE ORAL DAILY PRN
Status: DISCONTINUED | OUTPATIENT
Start: 2020-11-30 | End: 2020-12-06 | Stop reason: HOSPADM

## 2020-11-30 RX ORDER — VITAMIN B COMPLEX
50 TABLET ORAL DAILY
Status: DISCONTINUED | OUTPATIENT
Start: 2020-12-01 | End: 2020-12-06 | Stop reason: HOSPADM

## 2020-11-30 RX ORDER — SPIRONOLACTONE 100 MG/1
100 TABLET, FILM COATED ORAL DAILY
Status: ON HOLD | COMMUNITY
End: 2020-12-06

## 2020-11-30 RX ORDER — AMOXICILLIN 250 MG
1 CAPSULE ORAL 2 TIMES DAILY
Status: DISCONTINUED | OUTPATIENT
Start: 2020-11-30 | End: 2020-11-30

## 2020-11-30 RX ORDER — SIMETHICONE 80 MG
80 TABLET,CHEWABLE ORAL 4 TIMES DAILY PRN
Status: DISCONTINUED | OUTPATIENT
Start: 2020-11-30 | End: 2020-12-06 | Stop reason: HOSPADM

## 2020-11-30 RX ORDER — NORTRIPTYLINE HCL 10 MG
10 CAPSULE ORAL AT BEDTIME
Status: DISCONTINUED | OUTPATIENT
Start: 2020-11-30 | End: 2020-12-01

## 2020-11-30 RX ORDER — FLUTICASONE PROPIONATE 50 MCG
1 SPRAY, SUSPENSION (ML) NASAL DAILY PRN
Status: DISCONTINUED | OUTPATIENT
Start: 2020-11-30 | End: 2020-12-06 | Stop reason: HOSPADM

## 2020-11-30 RX ADMIN — IOPAMIDOL 55 ML: 755 INJECTION, SOLUTION INTRAVENOUS at 19:11

## 2020-11-30 RX ADMIN — NORTRIPTYLINE HYDROCHLORIDE 10 MG: 10 CAPSULE ORAL at 22:34

## 2020-11-30 RX ADMIN — LIDOCAINE 1 PATCH: 560 PATCH PERCUTANEOUS; TOPICAL; TRANSDERMAL at 22:21

## 2020-11-30 RX ADMIN — SODIUM CHLORIDE: 9 INJECTION, SOLUTION INTRAVENOUS at 22:34

## 2020-11-30 RX ADMIN — SILDENAFIL 40 MG: 20 TABLET ORAL at 22:21

## 2020-11-30 RX ADMIN — LACTULOSE 10 G: 20 SOLUTION ORAL at 20:50

## 2020-11-30 RX ADMIN — SODIUM CHLORIDE 1000 ML: 9 INJECTION, SOLUTION INTRAVENOUS at 17:08

## 2020-11-30 RX ADMIN — ACETAMINOPHEN 325 MG: 325 TABLET, FILM COATED ORAL at 22:48

## 2020-11-30 RX ADMIN — RIFAXIMIN 550 MG: 550 TABLET ORAL at 22:21

## 2020-11-30 ASSESSMENT — ENCOUNTER SYMPTOMS
DIARRHEA: 1
DYSURIA: 0
VOMITING: 0
DIZZINESS: 1
SORE THROAT: 1
LIGHT-HEADEDNESS: 1
FATIGUE: 1
BLOOD IN STOOL: 0
CHILLS: 1
NAUSEA: 1
FEVER: 0
CONFUSION: 1
ABDOMINAL PAIN: 0
COUGH: 0

## 2020-11-30 ASSESSMENT — MIFFLIN-ST. JEOR: SCORE: 1393.03

## 2020-11-30 NOTE — LETTER
Health Information Management Services               Recipient:  [facility]         Sender:  Vicki joel  pager [phone]        Date: December 6, 2020  Patient Name:  Yenifer Maher  Routing Message:  discharge paperwork [id number]          The documents accompanying this notice contain confidential information belonging to the sender.  This information is intended only for the use of the individual or entity named above.  The authorized recipient of this information is prohibited from disclosing this information to any other party and is required to destroy the information after its stated need has been fulfilled, unless otherwise required by state law.      If you are not the intended recipient, you are hereby notified that any disclosure, copy, distribution or action taken in reliance on the contents of these documents is strictly prohibited.  If you have received this document in error, please return it by fax to [phone] with a note on the cover sheet explaining why you are returning it (e.g. not your patient, not your provider, etc.).  If you need further assistance, please call [facility] Centralized Transcription at [phone].  Documents may also be returned by mail to [place], , [facility] [address], [place], [place] [address].

## 2020-11-30 NOTE — ED PROVIDER NOTES
ED Provider Note  [facility]      History     Chief Complaint   Patient presents with     Chills     Fatigue     The history is provided by the patient and medical records.     Yenifer Maher is a 40 year old female with a past medical history significant for alcohol abuse with alcoholic cirrhosis, portal HTN, encephalopathy, pulmonary hypertension, paroxysmal afib, tobacco dependence, neuropathy, macrocytic anemia, PUD, lupus anticoagulant positive who presents to the ED today for multiple complaints including chills, fatigue, confusion, nausea, diarrhea and lightheadedness.     Per chart review the patient was just admitted 11/26-11/28 for hepatic encephalopathy after presenting to the ED with lightheadedness and AMS.  In the ED the patient's vital signs were stable with , Afebrile,  CT head negative for acute abnormalities.  EKG with no acute ischemic changes.  COVID negative. Exam showed asterixis. There was concern for hepatic encephalopathy given underlying disease. Patient reported she had not been eating and drinking well for the last 2 days. Echo normal. Troponins negative. She was seen by GI who recommended an abdominal ultrasound with Dopplers, and assess for ascites. US negative for ascites and no evidence of thrombus. Chest xray negative for infection. UA negative for infection, Blood cultures negative. PT consulted and patient is at baseline physical functional status. She was stable and discharged back to her facility and advised to follow-up with her PCP in 1 week.    Patient states that she presents today with the same symptoms. She notes feeling chills all night, feeling nausea, and decreased appetite from nausea. She notes that she typically has a good appetite. She reports diarrhea but notes that she takes lactulose 2-3 times daily. She denies bloody stool.  Denies any episodes of vomiting.  She notes cramping abdominal discomfort earlier, but denies abdominal  pain now. She also notes episodes of bloody nose.  She denies dysuria, fever, cough, and chest pain.  She does note some urinary frequency.  She denies unusual bruising or bleeding. [place] [facility] [place] has been tested for covid 3 times there. Last covid test was last week. All tests were negative. She denies known exposure to covid.  She did have a Covid swab completed during her recent hospitalization as well which is negative.    On chart review the patient was admitted to [facility] from August to the beginning of October due to acute cor pulmonale and cardiogenic shock requiring vasopressors and hemodialysis.  She was seen in pulmonology clinic 5 days ago with plans for a TTE and right heart cath.  During her recent hospitalization she did have an echo which showed no evidence of LV or RV dysfunction.  Also no evidence of pericardial effusion.     Past Medical History:   Diagnosis Date     Alcohol abuse      Alcoholic cirrhosis (H)      Alcoholic peripheral neuropathy (H)      Coagulopathy (H)      Gastritis      History of Clostridium difficile colitis     unknown date     Impairment of cognitive function     MOCA 2/2019 22/30     Leukocytosis 02/2019    persistent leukocytosis across 2/2019 hospitalization without evidence of source across multiple diagnostics including LP, BCx, UCx      Lupus anticoagulant positive      Macrocytic anemia      Moderate protein-calorie malnutrition (H)      Paroxysmal A-fib (H) 02/2019    not on chronic anticoagulation     Peptic ulcer disease      Positive SMILEY (antinuclear antibody)      Subclinical hypothyroidism 02/2019    normal T3, T4     Tobacco dependence     0.5 PPD       Past Surgical History:   Procedure Laterality Date     ESOPHAGOSCOPY, GASTROSCOPY, DUODENOSCOPY (EGD), COMBINED N/A 8/9/2019    Procedure: ESOPHAGOGASTRODUODENOSCOPY (EGD);  Surgeon: Sowmya Ibanez MD;  Location:  GI     ESOPHAGOSCOPY, GASTROSCOPY,  DUODENOSCOPY (EGD), COMBINED N/A 8/26/2019    Procedure: ESOPHAGOGASTRODUODENOSCOPY (EGD);  Surgeon: Leventhal, Thomas Michael, MD;  Location:  GI     ESOPHAGOSCOPY, GASTROSCOPY, DUODENOSCOPY (EGD), COMBINED N/A 3/30/2020    Procedure: ESOPHAGOGASTRODUODENOSCOPY (EGD);  Surgeon: Sowmya Ibanez MD;  Location:  GI     UPPER GI ENDOSCOPY  8/9/2019            History reviewed. No pertinent family history.    Social History     Tobacco Use     Smoking status: Current Some Day Smoker     Types: Cigarettes     Smokeless tobacco: Never Used     Tobacco comment: 6-8 per day   Substance Use Topics     Alcohol use: Not Currently     Alcohol/week: 2.0 standard drinks     Types: 2 Glasses of wine per week     Frequency: 2-3 times a week     Comment: last drink 10/2/2020     Current Facility-Administered Medications   Medication     0.9% sodium chloride BOLUS    Followed by     sodium chloride 0.9% infusion     Current Outpatient Medications   Medication     acetaminophen (TYLENOL) 325 MG tablet     bismuth subsalicylate (PEPTO BISMOL) 262 MG/15ML suspension     calcium carbonate 750 MG CHEW     celecoxib (CELEBREX) 200 MG capsule     fexofenadine (ALLEGRA) 180 MG tablet     fluticasone (FLONASE) 50 MCG/ACT nasal spray     folic acid (FOLVITE) 1 MG tablet     furosemide (LASIX) 20 MG tablet     ibuprofen (ADVIL/MOTRIN) 200 MG tablet     lactulose (CHRONULAC) 10 GM/15ML solution     Lidocaine (LIDOCARE) 4 % Patch     melatonin 5 MG tablet     menthol (COUGH DROPS) 7 MG LOZG     multivitamin, therapeutic (THERA-VIT) TABS tablet     nicotine (NICODERM CQ) 7 MG/24HR 24 hr patch     nortriptyline (PAMELOR) 10 MG capsule     ondansetron (ZOFRAN-ODT) 4 MG ODT tab     pantoprazole (PROTONIX) 40 MG EC tablet     rifaximin (XIFAXAN) 550 MG TABS tablet     senna (SENOKOT) 8.6 MG tablet     sildenafil (REVATIO) 20 MG tablet     simethicone (MYLICON) 125 MG chewable tablet     sodium chloride (OCEAN) 0.65 % nasal spray      spironolactone (ALDACTONE) 50 MG tablet     thiamine (B-1) 100 MG tablet     Vitamin D3 (CHOLECALCIFEROL) 25 mcg (1000 units) tablet        Allergies   Allergen Reactions     Bengay Pain Relief [Menthol] Other (See Comments)     Skin turns red and feels extremely hot     Cats      Chocolate Dermatitis     Dicyclomine Other (See Comments)     Severe sedation     Dust Mites      Derm, resp     No Clinical Screening - See Comments      GI upset     Phenobarbital      Pollen Extract      Derm, resp.        Past Medical History  Past Medical History:   Diagnosis Date     Alcohol abuse      Alcoholic cirrhosis (H)      Alcoholic peripheral neuropathy (H)      Coagulopathy (H)      Gastritis      History of Clostridium difficile colitis     unknown date     Impairment of cognitive function     MOCA 2/2019 22/30     Leukocytosis 02/2019    persistent leukocytosis across 2/2019 hospitalization without evidence of source across multiple diagnostics including LP, BCx, UCx      Lupus anticoagulant positive      Macrocytic anemia      Moderate protein-calorie malnutrition (H)      Paroxysmal A-fib (H) 02/2019    not on chronic anticoagulation     Peptic ulcer disease      Positive SMILEY (antinuclear antibody)      Subclinical hypothyroidism 02/2019    normal T3, T4     Tobacco dependence     0.5 PPD     Past Surgical History:   Procedure Laterality Date     ESOPHAGOSCOPY, GASTROSCOPY, DUODENOSCOPY (EGD), COMBINED N/A 8/9/2019    Procedure: ESOPHAGOGASTRODUODENOSCOPY (EGD);  Surgeon: Sowmya Ibanez MD;  Location:  GI     ESOPHAGOSCOPY, GASTROSCOPY, DUODENOSCOPY (EGD), COMBINED N/A 8/26/2019    Procedure: ESOPHAGOGASTRODUODENOSCOPY (EGD);  Surgeon: Leventhal, Thomas Michael, MD;  Location:  GI     ESOPHAGOSCOPY, GASTROSCOPY, DUODENOSCOPY (EGD), COMBINED N/A 3/30/2020    Procedure: ESOPHAGOGASTRODUODENOSCOPY (EGD);  Surgeon: Sowmya Ibanez MD;  Location:  GI     UPPER GI ENDOSCOPY  8/9/2019                acetaminophen (TYLENOL) 325 MG tablet       bismuth subsalicylate (PEPTO BISMOL) 262 MG/15ML suspension       calcium carbonate 750 MG CHEW       celecoxib (CELEBREX) 200 MG capsule       fexofenadine (ALLEGRA) 180 MG tablet       fluticasone (FLONASE) 50 MCG/ACT nasal spray       folic acid (FOLVITE) 1 MG tablet       furosemide (LASIX) 20 MG tablet       ibuprofen (ADVIL/MOTRIN) 200 MG tablet       lactulose (CHRONULAC) 10 GM/15ML solution       Lidocaine (LIDOCARE) 4 % Patch       melatonin 5 MG tablet       menthol (COUGH DROPS) 7 MG LOZG       multivitamin, therapeutic (THERA-VIT) TABS tablet       nicotine (NICODERM CQ) 7 MG/24HR 24 hr patch       nortriptyline (PAMELOR) 10 MG capsule       ondansetron (ZOFRAN-ODT) 4 MG ODT tab       pantoprazole (PROTONIX) 40 MG EC tablet       rifaximin (XIFAXAN) 550 MG TABS tablet       senna (SENOKOT) 8.6 MG tablet       sildenafil (REVATIO) 20 MG tablet       simethicone (MYLICON) 125 MG chewable tablet       sodium chloride (OCEAN) 0.65 % nasal spray       spironolactone (ALDACTONE) 50 MG tablet       thiamine (B-1) 100 MG tablet       Vitamin D3 (CHOLECALCIFEROL) 25 mcg (1000 units) tablet      Allergies   Allergen Reactions     Bengay Pain Relief [Menthol] Other (See Comments)     Skin turns red and feels extremely hot     Cats      Chocolate Dermatitis     Dicyclomine Other (See Comments)     Severe sedation     Dust Mites      Derm, resp     No Clinical Screening - See Comments      GI upset     Phenobarbital      Pollen Extract      Derm, resp.      Family History  History reviewed. No pertinent family history.  Social History   Social History     Tobacco Use     Smoking status: Current Some Day Smoker     Types: Cigarettes     Smokeless tobacco: Never Used     Tobacco comment: 6-8 per day   Substance Use Topics     Alcohol use: Not Currently     Alcohol/week: 2.0 standard drinks     Types: 2 Glasses of wine per week     Frequency: 2-3 times a week      "Comment: last drink 10/2/2020     Drug use: Not Currently      Past medical history, past surgical history, medications, allergies, family history, and social history were reviewed with the patient. No additional pertinent items.       Review of Systems   Constitutional: Positive for chills and fatigue. Negative for fever.   HENT: Positive for nosebleeds and sore throat.    Respiratory: Negative for cough.    Gastrointestinal: Positive for diarrhea and nausea. Negative for abdominal pain, blood in stool and vomiting.   Genitourinary: Negative for dysuria.        Positive for difficulty emptying bladder   Skin:        Negative for unusual bruising or bleeding   Neurological: Positive for dizziness and light-headedness.   Psychiatric/Behavioral: Positive for confusion.   All other systems reviewed and are negative.    A complete review of systems was performed with pertinent positives and negatives noted in the HPI, and all other systems negative.    Physical Exam   BP: 130/74  Pulse: 124  Temp: 98.7  F (37.1  C)  Resp: 16  Height: 170.2 cm (5' 7\")  Weight: 68 kg (150 lb)  SpO2: 94 %  Physical Exam   General: patient is alert and oriented and in no acute distress   Head: atraumatic and normocephalic   EENT: moist mucus membranes without tonsillar erythema or exudates, pupils round and reactive, sclera anicteric  Neck: supple   Cardiovascular: regular rate and rhythm, no murmur appreciated, extremities warm and well perfused, no lower extremity edema, 2+ PT pulses bilaterally  Pulmonary: lungs clear to auscultation bilaterally   Abdomen: soft, non-tender, nondistended  Musculoskeletal: normal range of motion   Neurological: alert and oriented, moving all extremities symmetrically, gait normal   Skin: warm, dry       ED Course     4:32 PM  The patient was seen and examined by Yenifer Esquivel MD in Room ED28.     Procedures             EKG Interpretation:      Interpreted by Yenifer Esquivel MD  Time reviewed: " 1705  Symptoms at time of EKG: generalized weakness   Rhythm: sinus tachycardia  Rate: Tachycardia  Axis: Normal  Ectopy: none  Conduction: nonspecific interventricular conduction block  ST Segments/ T Waves: No acute ischemic changes  Q Waves: none  Comparison to prior: Unchanged    Clinical Impression: no acute changes                            No results found for any visits on 11/30/20.  Medications - No data to display     Assessments & Plan (with Medical Decision Making)   Ms. Maher is a 40 year old female with a past medical history significant for alcohol abuse with alcoholic cirrhosis, portal HTN, encephalopathy, pulmonary hypertension, paroxysmal afib, tobacco dependence, neuropathy, macrocytic anemia, PUD, lupus anticoagulant positive who presents to the ED today for multiple concerns including confusion, generalized fatigue and nausea.  She is noted to be tachycardic with heart rate in the 120s but normotensive and afebrile.  She is saturating 94% on room air.  Broad differential diagnosis is considered including but not limited to hepatic encephalopathy, dehydration, electrolyte derangement, anemia, thyroid dysfunction, ACS, cardiomyopathy, UTI.  On chart review does appear that she has some baseline cognitive dysfunction in the setting of significant alcohol use.  She is alert and oriented x3.  Ammonia level in the ED is elevated to 79.  Remainder of her labs demonstrate no electrolyte derangements, LFTs elevated with a total bilirubin of 3.2, alk phos of 252, AST of 110 which is near the patient's baseline.  Hemoglobin is mildly anemic at 10.4 also at baseline, no leukocytosis.  She does have an elevated D-dimer of 0.6 and CT was ordered and pending.  She did have a thorough work-up recently including an echocardiogram which did not show evidence of pericardial effusion or RV or LV dysfunction.  In addition her abdominal ultrasound did not reveal any evidence of portal thrombus.  Blood cultures as  well as Covid swab were negative.  She does not have any evidence of acute heart failure at this time.  Suspect that her confusion is associated with hepatic encephalopathy in the setting of dehydration as well given her very minimal p.o. intake.  Heart rate has improved with IV fluids.  Will plan to admit for continued IV fluids and lactulose and observation for clearing of mental status.      I have reviewed the nursing notes. I have reviewed the findings, diagnosis, plan and need for follow up with the patient.    New Prescriptions    No medications on file       Final diagnoses:   Hepatic encephalopathy (H)   Dehydration   I, Tc oKch, am serving as a trained medical scribe to document services personally performed by Yenifer Esquivel MD, based on the provider's statements to me.      I, Yenifer Esquivel MD, was physically present and have reviewed and verified the accuracy of this note documented by Tc Koch.     --  Yenifer Esquivel MD  [facility] EMERGENCY DEPARTMENT  11/30/2020     Yenifer Esquivel MD  11/30/20 [id number]

## 2020-11-30 NOTE — ED TRIAGE NOTES
Pt arrives to triage with complaints of chills, confusion and fatigue. Pt was discharged from the hospital yesterday with the same symptoms. [facility] in a [facility] and they suggested she comes back to the ER. Pt started on PO antibiotic on discharge yesterday, which she hasn't started yet. Pt is A&O, forgetful at times. Vitals stable.

## 2020-11-30 NOTE — ED NOTES
If discharging or admitting patient please call patients home at either [phone] or [phone].

## 2020-12-01 ENCOUNTER — APPOINTMENT (OUTPATIENT)
Dept: ULTRASOUND IMAGING | Facility: CLINIC | Age: 40
DRG: 433 | End: 2020-12-01
Attending: PHYSICIAN ASSISTANT
Payer: MEDICARE

## 2020-12-01 LAB
ALBUMIN SERPL-MCNC: 2.7 G/DL (ref 3.4–5)
ALP SERPL-CCNC: 251 U/L (ref 40–150)
ALT SERPL W P-5'-P-CCNC: 36 U/L (ref 0–50)
ANION GAP SERPL CALCULATED.3IONS-SCNC: 6 MMOL/L (ref 3–14)
APTT PPP: 41 SEC (ref 22–37)
AST SERPL W P-5'-P-CCNC: 87 U/L (ref 0–45)
BASOPHILS # BLD AUTO: 0.1 10E9/L (ref 0–0.2)
BASOPHILS NFR BLD AUTO: 1 %
BILIRUB SERPL-MCNC: 3 MG/DL (ref 0.2–1.3)
BUN SERPL-MCNC: 18 MG/DL (ref 7–30)
C COLI+JEJUNI+LARI FUSA STL QL NAA+PROBE: NOT DETECTED
CALCIUM SERPL-MCNC: 9 MG/DL (ref 8.5–10.1)
CHLORIDE SERPL-SCNC: 110 MMOL/L (ref 94–109)
CO2 SERPL-SCNC: 21 MMOL/L (ref 20–32)
CREAT SERPL-MCNC: 0.76 MG/DL (ref 0.52–1.04)
DIFFERENTIAL METHOD BLD: ABNORMAL
EC STX1 GENE STL QL NAA+PROBE: NOT DETECTED
EC STX2 GENE STL QL NAA+PROBE: NOT DETECTED
ENTERIC PATHOGEN COMMENT: NORMAL
EOSINOPHIL # BLD AUTO: 0.1 10E9/L (ref 0–0.7)
EOSINOPHIL NFR BLD AUTO: 2.5 %
ERYTHROCYTE [DISTWIDTH] IN BLOOD BY AUTOMATED COUNT: 14.4 % (ref 10–15)
GFR SERPL CREATININE-BSD FRML MDRD: >90 ML/MIN/{1.73_M2}
GLUCOSE SERPL-MCNC: 94 MG/DL (ref 70–99)
HCT VFR BLD AUTO: 28.5 % (ref 35–47)
HEMOCCULT STL QL IA: NEGATIVE
HGB BLD-MCNC: 10 G/DL (ref 11.7–15.7)
HGB BLD-MCNC: 9.1 G/DL (ref 11.7–15.7)
HGB BLD-MCNC: 9.3 G/DL (ref 11.7–15.7)
IMM GRANULOCYTES # BLD: 0.1 10E9/L (ref 0–0.4)
IMM GRANULOCYTES NFR BLD: 1.2 %
INR PPP: 1.52 (ref 0.86–1.14)
INTERPRETATION ECG - MUSE: NORMAL
LYMPHOCYTES # BLD AUTO: 1.6 10E9/L (ref 0.8–5.3)
LYMPHOCYTES NFR BLD AUTO: 30.6 %
MCH RBC QN AUTO: 30.9 PG (ref 26.5–33)
MCHC RBC AUTO-ENTMCNC: 32.6 G/DL (ref 31.5–36.5)
MCV RBC AUTO: 95 FL (ref 78–100)
MONOCYTES # BLD AUTO: 0.8 10E9/L (ref 0–1.3)
MONOCYTES NFR BLD AUTO: 15.5 %
NEUTROPHILS # BLD AUTO: 2.5 10E9/L (ref 1.6–8.3)
NEUTROPHILS NFR BLD AUTO: 49.2 %
NOROV GI+II ORF1-ORF2 JNC STL QL NAA+PR: NOT DETECTED
NRBC # BLD AUTO: 0 10*3/UL
NRBC BLD AUTO-RTO: 0 /100
PLATELET # BLD AUTO: 142 10E9/L (ref 150–450)
PLATELET # BLD EST: ABNORMAL 10*3/UL
POTASSIUM SERPL-SCNC: 4.4 MMOL/L (ref 3.4–5.3)
PROT SERPL-MCNC: 7 G/DL (ref 6.8–8.8)
RBC # BLD AUTO: 3.01 10E12/L (ref 3.8–5.2)
RVA NSP5 STL QL NAA+PROBE: NOT DETECTED
SALMONELLA SP RPOD STL QL NAA+PROBE: NOT DETECTED
SARS-COV-2 RNA SPEC QL NAA+PROBE: NOT DETECTED
SHIGELLA SP+EIEC IPAH STL QL NAA+PROBE: NOT DETECTED
SODIUM SERPL-SCNC: 137 MMOL/L (ref 133–144)
SPECIMEN SOURCE: NORMAL
V CHOL+PARA RFBL+TRKH+TNAA STL QL NAA+PR: NOT DETECTED
WBC # BLD AUTO: 5.1 10E9/L (ref 4–11)
Y ENTERO RECN STL QL NAA+PROBE: NOT DETECTED

## 2020-12-01 PROCEDURE — 85018 HEMOGLOBIN: CPT | Performed by: INTERNAL MEDICINE

## 2020-12-01 PROCEDURE — 76705 ECHO EXAM OF ABDOMEN: CPT

## 2020-12-01 PROCEDURE — 250N000013 HC RX MED GY IP 250 OP 250 PS 637: Performed by: PHYSICIAN ASSISTANT

## 2020-12-01 PROCEDURE — 36415 COLL VENOUS BLD VENIPUNCTURE: CPT | Performed by: PHYSICIAN ASSISTANT

## 2020-12-01 PROCEDURE — 76705 ECHO EXAM OF ABDOMEN: CPT | Mod: 26 | Performed by: RADIOLOGY

## 2020-12-01 PROCEDURE — 99222 1ST HOSP IP/OBS MODERATE 55: CPT | Performed by: INTERNAL MEDICINE

## 2020-12-01 PROCEDURE — 85610 PROTHROMBIN TIME: CPT | Performed by: PHYSICIAN ASSISTANT

## 2020-12-01 PROCEDURE — 87506 IADNA-DNA/RNA PROBE TQ 6-11: CPT | Performed by: PHYSICIAN ASSISTANT

## 2020-12-01 PROCEDURE — 99233 SBSQ HOSP IP/OBS HIGH 50: CPT | Performed by: INTERNAL MEDICINE

## 2020-12-01 PROCEDURE — 999N000147 HC STATISTIC PT IP EVAL DEFER: Performed by: REHABILITATION PRACTITIONER

## 2020-12-01 PROCEDURE — 120N000011 HC R&B TRANSPLANT UMMC

## 2020-12-01 PROCEDURE — 82274 ASSAY TEST FOR BLOOD FECAL: CPT | Performed by: PHYSICIAN ASSISTANT

## 2020-12-01 PROCEDURE — 85025 COMPLETE CBC W/AUTO DIFF WBC: CPT | Performed by: PHYSICIAN ASSISTANT

## 2020-12-01 PROCEDURE — 999N000111 HC STATISTIC OT IP EVAL DEFER

## 2020-12-01 PROCEDURE — 85730 THROMBOPLASTIN TIME PARTIAL: CPT | Performed by: PHYSICIAN ASSISTANT

## 2020-12-01 PROCEDURE — 80053 COMPREHEN METABOLIC PANEL: CPT | Performed by: PHYSICIAN ASSISTANT

## 2020-12-01 PROCEDURE — 250N000011 HC RX IP 250 OP 636: Performed by: NURSE PRACTITIONER

## 2020-12-01 PROCEDURE — 250N000013 HC RX MED GY IP 250 OP 250 PS 637: Performed by: INTERNAL MEDICINE

## 2020-12-01 PROCEDURE — 36415 COLL VENOUS BLD VENIPUNCTURE: CPT | Performed by: INTERNAL MEDICINE

## 2020-12-01 RX ORDER — LACTULOSE 10 G/15ML
20 SOLUTION ORAL 3 TIMES DAILY
Status: DISCONTINUED | OUTPATIENT
Start: 2020-12-01 | End: 2020-12-06 | Stop reason: HOSPADM

## 2020-12-01 RX ADMIN — RIFAXIMIN 550 MG: 550 TABLET ORAL at 08:41

## 2020-12-01 RX ADMIN — Medication 50 MCG: at 08:41

## 2020-12-01 RX ADMIN — NICOTINE 7 MG/24 HR DAILY TRANSDERMAL PATCH 1 PATCH: at 08:50

## 2020-12-01 RX ADMIN — LACTULOSE 20 G: 10 SOLUTION ORAL at 21:29

## 2020-12-01 RX ADMIN — SILDENAFIL 40 MG: 20 TABLET ORAL at 21:29

## 2020-12-01 RX ADMIN — LACTULOSE 20 G: 10 SOLUTION ORAL at 17:18

## 2020-12-01 RX ADMIN — THERA TABS 1 TABLET: TAB at 08:41

## 2020-12-01 RX ADMIN — LIDOCAINE 1 PATCH: 560 PATCH PERCUTANEOUS; TOPICAL; TRANSDERMAL at 21:30

## 2020-12-01 RX ADMIN — RIFAXIMIN 550 MG: 550 TABLET ORAL at 21:29

## 2020-12-01 RX ADMIN — PANTOPRAZOLE SODIUM 40 MG: 40 TABLET, DELAYED RELEASE ORAL at 08:41

## 2020-12-01 RX ADMIN — PROCHLORPERAZINE EDISYLATE 5 MG: 5 INJECTION INTRAMUSCULAR; INTRAVENOUS at 21:28

## 2020-12-01 RX ADMIN — SILDENAFIL 40 MG: 20 TABLET ORAL at 14:07

## 2020-12-01 RX ADMIN — THIAMINE HCL TAB 100 MG 100 MG: 100 TAB at 08:41

## 2020-12-01 RX ADMIN — SILDENAFIL 40 MG: 20 TABLET ORAL at 08:41

## 2020-12-01 RX ADMIN — FEXOFENADINE HYDROCHLORIDE 180 MG: 180 TABLET, FILM COATED ORAL at 08:41

## 2020-12-01 RX ADMIN — PANTOPRAZOLE SODIUM 40 MG: 40 TABLET, DELAYED RELEASE ORAL at 17:18

## 2020-12-01 RX ADMIN — FOLIC ACID 1 MG: 1 TABLET ORAL at 08:41

## 2020-12-01 ASSESSMENT — ACTIVITIES OF DAILY LIVING (ADL)
ADLS_ACUITY_SCORE: 16
ADLS_ACUITY_SCORE: 17
ADLS_ACUITY_SCORE: 16

## 2020-12-01 ASSESSMENT — MIFFLIN-ST. JEOR: SCORE: 1414.8

## 2020-12-01 NOTE — PHARMACY-ADMISSION MEDICATION HISTORY
"  Admission Medication History Completed by Pharmacy    See [facility] Admission Navigator for allergy information, preferred outpatient pharmacy, prior to admission medications and immunization status.     Medication History Sources:     [facility]    Changes made to PTA medication list (reason):    Added: None    Deleted: None    Changed: None    Additional Information:    Tylenol, Celebrex, Flonase, melatonin, and Zofran were on MAR but did not have last doses. Kept on PTA list as \"taking\".     Xifaxan- patient is prescribed this medication but has never received it at [facility] because they don't have the drug in stock per MAR.     Prior to Admission medications    Medication Sig Last Dose Taking? Auth Provider   acetaminophen (TYLENOL) 325 MG tablet Take 325 mg by mouth every 4 hours as needed  Yes Unknown, Entered By History   bismuth subsalicylate (PEPTO BISMOL) 262 MG/15ML suspension Take 30 mL every half to one hour as needed. Do not exceed 8 doses in 24 hours. 11/23/2020 at 1000 Yes Unknown, Entered By History   calcium carbonate 750 MG CHEW Take 2-4 chew tab by mouth as needed for heartburn  11/28/2020 at 2155 Yes Unknown, Entered By History   celecoxib (CELEBREX) 200 MG capsule Take 200 mg by mouth 2 times daily as needed  Yes Unknown, Entered By History   fexofenadine (ALLEGRA) 180 MG tablet Take 180 mg by mouth daily 11/30/2020 at 0705 Yes Unknown, Entered By History   fluticasone (FLONASE) 50 MCG/ACT nasal spray Spray 1 spray into both nostrils daily as needed   Yes Reported, Patient   folic acid (FOLVITE) 1 MG tablet Take 1 tablet (1 mg) by mouth daily 11/30/2020 at 0705 Yes Corinna Piña MD   furosemide (LASIX) 40 MG tablet Take 40 mg by mouth daily 11/30/2020 at 0705 Yes Unknown, Entered By History   ibuprofen (ADVIL/MOTRIN) 200 MG tablet Take 400 mg by mouth every 6 hours as needed for mild pain 11/26/2020 at 0829 Yes Unknown, Entered By History   lactulose " (CHRONULAC) 10 GM/15ML solution Take 30 mLs (20 g) by mouth 3 times daily 11/30/2020 at 0705 Yes Elicia Earl PA-C   Lidocaine (LIDOCARE) 4 % Patch Place 1 patch onto the skin every 24 hours To prevent lidocaine toxicity, patient should be patch free for 12 hrs daily. Past Week at AM Yes Unknown, Entered By History   melatonin 5 MG tablet Take 5-10 mg by mouth nightly as needed for sleep  Yes Unknown, Entered By History   menthol (COUGH DROPS) 7 MG LOZG Take 1-2 lozenges by mouth as needed 11/29/2020 at 0837 Yes Unknown, Entered By History   multivitamin, therapeutic (THERA-VIT) TABS tablet Take 1 tablet by mouth daily 11/30/2020 at 0705 Yes Roma Emerson PA   nicotine (NICODERM CQ) 7 MG/24HR 24 hr patch Place 1 patch onto the skin every 24 hours 11/30/2020 at 0705 Yes Unknown, Entered By History   nortriptyline (PAMELOR) 10 MG capsule Take 1 capsule (10 mg) by mouth At Bedtime 11/29/2020 at 2030 Yes Corinna Piña MD   ondansetron (ZOFRAN-ODT) 4 MG ODT tab Take 4 mg by mouth every 8 hours as needed for nausea  Yes Unknown, Entered By History   pantoprazole (PROTONIX) 40 MG EC tablet Take 1 tablet (40 mg) by mouth 2 times daily (before meals) 11/30/2020 at 0705 Yes Shara Mujica MD   rifaximin (XIFAXAN) 550 MG TABS tablet Take 550 mg by mouth 2 times daily   Yes Reported, Patient   senna (SENOKOT) 8.6 MG tablet Take 1-2 tablets by mouth 2 times daily 11/30/2020 at 0705 Yes Unknown, Entered By History   sildenafil (REVATIO) 20 MG tablet Take 40 mg by mouth 3 times daily  11/30/2020 at 0705 Yes Reported, Patient   simethicone (MYLICON) 125 MG chewable tablet Take 1-2 softgels as needed after meals and at bedtime  Yes Unknown, Entered By History   sodium chloride (OCEAN) 0.65 % nasal spray Inhale 2 sprays in the nostril(s) every 30 minutes as needed for nasal congestion or nasal dryness 11/23/2020 at 1008 Yes Unknown, Entered By History   spironolactone (ALDACTONE) 100 MG tablet Take 100 mg  by mouth daily 11/30/2020 at 0705 Yes Unknown, Entered By History   thiamine (B-1) 100 MG tablet Take 100 mg by mouth daily 11/30/2020 at 0705 Yes Unknown, Entered By History   Vitamin D3 (CHOLECALCIFEROL) 25 mcg (1000 units) tablet Take 2 tablets by mouth daily 11/30/2020 at 0705 Yes Unknown, Entered By History       Date completed: 11/30/20    Medication history completed by: LUISA Jones

## 2020-12-01 NOTE — PLAN OF CARE
2100 - 2300 Yenifer was admitted to 7A from the emergency room. A & O x4. VSS on room air. Denies pain, however endorses significant nausea as of late. Profile completed. Two person skin check completed. PIV saline locked, will start MIVF once pole & pump arrive. Pt needs urine and stool sample. Will continue to monitor and notify team w concerns.     Admitted/transferred from: ED  Time of arrival on unit 2100  2 RN full  skin assessment completed by Skyla Montejo & Vicki Davenport  Skin assessment finding: intact  Interventions/actions: none needed    Will continue to monitor.

## 2020-12-01 NOTE — PLAN OF CARE
"Blood pressure 114/61, pulse 106, temperature 98.6  F (37  C), resp. rate 16, height 1.702 m (5' 7\"), weight 70.2 kg (154 lb 12.8 oz), SpO2 95 %, not currently breastfeeding.  No new issues today.  Hgb ordered to be monitored q 8 hours.  Pt. Feels stable when she is walking. Did not appear unsteady.  Still, encouraged pt. To have staff walk with her to be cautious.  Tolerating a regular diet. More hungry today, per pt.  Denies pain; no pain medications given.  Plan: follow serial Hgb. Team to reassess. Hold Lovenox.  "

## 2020-12-01 NOTE — H&P
[facility]     History and Physical - Hospitalist Service, Gold Night       Date of Admission:  11/30/2020    Assessment & Plan   Yenifer Maher is a 40 year old female with PMHx of alcohol abuse with alcoholic cirrhosis c/b portal hypertension, esophageal varices, ascites, and hepatic encephalopathy, pulmonary hypertension, paroxysmal atrial fibrillation, hx of calciphylaxis, lupus anticoagulant positive, neuropathy, macrocytic anemia, and PUD, who presented with chills, fatigue, nausea, abdominal cramping/distention, confusion on 11/26/2020, admitted to ED observation for hepatic encephalopathy, discharged on 11/28/2020 with higher dose of lactulose, re-presenting with continued chills, fatigue and now with diarrhea admitted on 11/30/2020.     #Nausea without emesis  #Abdominal pain  #Diarrhea   #Poor appetite   Endorsing poor PO intake, nausea, intermittent abdominal cramping, diarrhea, and fatigue. Work up in ED from OBS admission was unremarkable, with negative infectious work up (blood cx, UA, and COVID swab), abd US without ascites or PVT, and TTE with normal EF and without any right heart strain or pericardial effusion. DDx quite long with vague symptoms but includes underlying infection, viral syndrome, gastroenteritis, C.diff. Low suspicion for any acute biliary process or acute hepatitis. With tachycardia as noted below, likely dehydrated in setting of poor PO intake, diuretics and diarrhea, which could be contributing to her continued malaise.   -Infectious work up with UA, blood cultures, stool culture. CXR clear. F/u asymptomatic COVID  -Hold lactulose tonight, if still having diarrhea, obtain C. Diff    -IVF (1L in ED, will continue  ml/hr for another 1L then stop).   -Hold diuretics overnight  -Anti-emetics PRN   -Continue PTA PPI and tums     #Tachycardia: EKG in sinus tachycardia. Likely due to dehydration. Lower suspicion for sepsis. Hx of PVT  and lupus anticoagulant positive not on anticoagulation due to high risk of bleeding.   -Follow up CTA chest. ADDENDUM: poor contrast timing. If patient continues to be tachycardic, would recommend repeat CTA Chest in AM.   -Continue IVF    #Alcohol cirrhosis: Alcohol cirrhosis c/b HE, portal HTN, varices, and remote hx of ascites. Hx of PVT in past. Last EGD 3/2020 with grade I EV,  with prior variceal banding in 2019.  MELD 19. PTA medication with rifaximin 550 mg BID, lactulose, spironolactone 100 mg daily, lasix 40 mg daily. Denies any recent alcohol use, currently residing at treatment facility. Recent abdominal US without any ascites or PVT. Recently had lactulose increased for HE. On exam, has mild asterixis with elevated ammonia level of 79. Likely has mild HE. Unclear underlying trigger. Patient reports one episode of black stool yesterday, so could have underlying GIB, but hemoglobin stable.   -Continue Rifaximin   -Hold lactulose overnight, likely resume home regimen in AM  -Hold Diuretics overnight, and likely resume in AM  -Utox and EtOH level   -Trend LFTs and INR  -Trend CBC in AM, and maintain stools chart, and occult stool for reported black stool   -Repeat limited abd US to assess for ascites  -Continue Thiamine, folic acid, and MVI   -Consider hepatology consult in AM     #Pulmonary hypertension: [place] [facility] [place] with nausea and diarrhea, ultimately developing cardiogenic shock requiring pressors and HD, diagnosed with severe cor pulmonale 2/2 portal hypertension, now on sildenafil 40 mg TID. Recent saw outpatient pulmonologist on 11/25, with recommendations for repeat TTE and RHC. TTE on 11/27 with normal EF, no right heart strain or pericardial effusion.   -Continue PTA sildenafil 40 mg TID    #Incidental finding: Equivocal asymmetric tissue left breast upper outer quadrant. Outpatient dedicated evaluation in breast imaging center with  diagnostic mammography and possible ultrasound is advised.    --------- Chronic medical problems --------    #Paroxysmal atrial fibrillation: EKG in sinus rhythm. Not on anticoagulation due to high risk of bleeding.   #Macrocytic anemia: Hemoglobin ranging 10.0s. Trend CBC.   #Gastritis/PUD: Continue PTA pantoprazole 40 mg BID.   #Lupus anticoagulant positive: Followed by Dr. Jeffery. Peviously on anticoagulation, but stopped due to high risk of bleeding in the past.   #Hx of non-uremic calciphylaxis: No current skin lesions. Follow up with dermatology PRN.   #Tobacco dependence: Continue PTA nicotine patch.          Diet: Regular Diet Adult    DVT Prophylaxis: Enoxaparin (Lovenox) SQ  Chavez Catheter: not present  Code Status:  Full          Disposition Plan   Expected discharge: 2 - 3 days, recommended to prior living arrangement once adequate pain management/ tolerating PO medications and work up completed for possible infection vs GIB. .  Entered: Laila Bailey PA-C 11/30/2020, 8:49 PM     The patient's care was discussed with the Attending Physician, Dr. Belia Luz , Bedside Nurse and Patient.    Laila Bailey PA-C  [facility]   Contact information available via [facility] Paging/Directory  Please see sign in/sign out for up to date coverage information    ______________________________________________________________________    Chief Complaint   Nausea, fatigue, chills, malaise    History is obtained from the patient    History of Present Illness   Yenifer Maher is a 40 year old female with PMHx of alcohol abuse with alcoholic cirrhosis c/b portal hypertension, esophageal varices, ascites, and hepatic encephalopathy, pulmonary hypertension, paroxysmal atrial fibrillation, hx of calciphylaxis, lupus anticoagulant positive, neuropathy, macrocytic anemia, and PUD, who presented with chills, fatigue, nausea, abdominal cramping/distention, confusion on 11/26/2020,  admitted to ED observation for hepatic encephalopathy, discharged on 11/28/2020 with higher dose of lactulose, re-presenting with continued chills, fatigue and now with diarrhea admitted on 11/30/2020.     Patient states she initially felt unwell on 11/25 prior to initial presentation to ED OBS, with nausea, poor appetite, dizziness, poor sleep and fatigue. She states she started to feel a little better in the hospital, but since being discharged, she continues to have nausea without any vomiting, with poor appetite and PO intake. Associated with intermittent abdominal cramping and pain which comes and goes, not in any specific location, with abdominal distension. Also noted to have cold sweats yesterday evening, but denies any recorded fevers. Endorses diarrhea, with 2 loose episodes yesterday, and one which was black and tarry. Denies any bright red blood, hematemesis, or coffee ground emesis. Denies any recent abx use, but states an antibiotic was sent to her outpatient pharmacy and does not know what it was called or what it was for. Endorses a hx of GIB. Also has a hx of C. Diff, but feels that this is different from prior episodes. No recent ascites and does not get regular paracentesis. No hx of pancreatitis. No known sick contacts. Endorses a hx of PVT in the past.  Patient reports taking all of her medications including diuretics and lactulose, though was just notified that she has run out of refills of spironolactone. Denies any recent alcohol use, last drink was in early October. Denies any drug use.     Patient denies any F/C, changes in chronic SOB, orthopnea, syncope, cough, chest pain, vomiting, changes in chronic back pain, rashes, joint pain or swelling, LE edema, dysuria, or hematuria. Noted some mild urinary frequency yesterday. Endorses sore throat and stuffy nose with recent bloody nose. Endorsed racing palpitations this morning.     Of note, on chart review the patient was admitted to [place]  from 8/23 through 10/5/2020 due to acute cor pulmonale and cardiogenic shock requiring vasopressors and hemodialysis.  She was seen in pulmonology clinic 5 days ago with plans for a TTE and right heart cath.  During her recent hospitalization she did have an echo which showed no evidence of LV or RV dysfunction.  Also no evidence of pericardial effusion.    In the ED, patient presented with mild tachycardia to 120s, with remaining VSS. Afebrile. No hypoxia. Labs notable for mild transaminitis, elevated ammonia and anemia, with hemoglobin at baseline. Elevated D-dimer to 0.6. CXR clear. EKG in Sinus tachycardia without ischemic changes. CTA chest obtained with results pending. Given lactulose and IVF. Admitted to medicine for observation.     Review of Systems    The 10 point Review of Systems is negative other than noted in the HPI or here.     Past Medical History    I have reviewed this patient's medical history and updated it with pertinent information if needed.   Past Medical History:   Diagnosis Date     Alcohol abuse      Alcoholic cirrhosis (H)      Alcoholic peripheral neuropathy (H)      Coagulopathy (H)      Gastritis      History of Clostridium difficile colitis     unknown date     Impairment of cognitive function     MOCA 2/2019 22/30     Leukocytosis 02/2019    persistent leukocytosis across 2/2019 hospitalization without evidence of source across multiple diagnostics including LP, BCx, UCx      Lupus anticoagulant positive      Macrocytic anemia      Moderate protein-calorie malnutrition (H)      Paroxysmal A-fib (H) 02/2019    not on chronic anticoagulation     Peptic ulcer disease      Positive SMILEY (antinuclear antibody)      Subclinical hypothyroidism 02/2019    normal T3, T4     Tobacco dependence     0.5 PPD       Past Surgical History   I have reviewed this patient's surgical history and updated it with pertinent information if needed.  Past Surgical History:   Procedure Laterality Date      ESOPHAGOSCOPY, GASTROSCOPY, DUODENOSCOPY (EGD), COMBINED N/A 8/9/2019    Procedure: ESOPHAGOGASTRODUODENOSCOPY (EGD);  Surgeon: Sowmya Ibanez MD;  Location:  GI     ESOPHAGOSCOPY, GASTROSCOPY, DUODENOSCOPY (EGD), COMBINED N/A 8/26/2019    Procedure: ESOPHAGOGASTRODUODENOSCOPY (EGD);  Surgeon: Leventhal, Thomas Michael, MD;  Location:  GI     ESOPHAGOSCOPY, GASTROSCOPY, DUODENOSCOPY (EGD), COMBINED N/A 3/30/2020    Procedure: ESOPHAGOGASTRODUODENOSCOPY (EGD);  Surgeon: Sowmya Ibanez MD;  Location:  GI     UPPER GI ENDOSCOPY  8/9/2019            Social History   I have reviewed this patient's social history and updated it with pertinent information if needed.  Social History     Tobacco Use     Smoking status: Current Some Day Smoker     Types: Cigarettes     Smokeless tobacco: Never Used     Tobacco comment: 6-8 per day   Substance Use Topics     Alcohol use: Not Currently     Alcohol/week: 2.0 standard drinks     Types: 2 Glasses of wine per week     Frequency: 2-3 times a week     Comment: last drink 10/2/2020     Drug use: Not Currently       Family History   I have reviewed this patient's family history and updated it with pertinent information if needed.  Family History   Problem Relation Age of Onset     Skin Cancer Mother      Depression Mother      Hypertension Father      Depression Sister      Breast Cancer Maternal Aunt        Prior to Admission Medications   Prior to Admission Medications   Prescriptions Last Dose Informant Patient Reported? Taking?   Lidocaine (LIDOCARE) 4 % Patch Past Week at AM Other Yes Yes   Sig: Place 1 patch onto the skin every 24 hours To prevent lidocaine toxicity, patient should be patch free for 12 hrs daily.   Vitamin D3 (CHOLECALCIFEROL) 25 mcg (1000 units) tablet 11/30/2020 at 0705 Other Yes Yes   Sig: Take 2 tablets by mouth daily   acetaminophen (TYLENOL) 325 MG tablet  Other Yes Yes   Sig: Take 325 mg by mouth every 4 hours as needed    bismuth subsalicylate (PEPTO BISMOL) 262 MG/15ML suspension 11/23/2020 at 1000 Other Yes Yes   Sig: Take 30 mL every half to one hour as needed. Do not exceed 8 doses in 24 hours.   calcium carbonate 750 MG CHEW 11/28/2020 at 2155 Other Yes Yes   Sig: Take 2-4 chew tab by mouth as needed for heartburn    celecoxib (CELEBREX) 200 MG capsule  Other Yes Yes   Sig: Take 200 mg by mouth 2 times daily as needed   fexofenadine (ALLEGRA) 180 MG tablet 11/30/2020 at 0705 Other Yes Yes   Sig: Take 180 mg by mouth daily   fluticasone (FLONASE) 50 MCG/ACT nasal spray  Other Yes Yes   Sig: Spray 1 spray into both nostrils daily as needed    folic acid (FOLVITE) 1 MG tablet 11/30/2020 at 0705 Other No Yes   Sig: Take 1 tablet (1 mg) by mouth daily   furosemide (LASIX) 40 MG tablet 11/30/2020 at 0705 Other Yes Yes   Sig: Take 40 mg by mouth daily   ibuprofen (ADVIL/MOTRIN) 200 MG tablet 11/26/2020 at 0829 Other Yes Yes   Sig: Take 400 mg by mouth every 6 hours as needed for mild pain   lactulose (CHRONULAC) 10 GM/15ML solution 11/30/2020 at 0705 Other Yes Yes   Sig: Take 30 mLs (20 g) by mouth 3 times daily   melatonin 5 MG tablet  Other Yes Yes   Sig: Take 5-10 mg by mouth nightly as needed for sleep   menthol (COUGH DROPS) 7 MG LOZG 11/29/2020 at 0837 Other Yes Yes   Sig: Take 1-2 lozenges by mouth as needed   multivitamin, therapeutic (THERA-VIT) TABS tablet 11/30/2020 at 0705 Other No Yes   Sig: Take 1 tablet by mouth daily   nicotine (NICODERM CQ) 7 MG/24HR 24 hr patch 11/30/2020 at 0705 Other Yes Yes   Sig: Place 1 patch onto the skin every 24 hours   nortriptyline (PAMELOR) 10 MG capsule 11/29/2020 at 2030 Other No Yes   Sig: Take 1 capsule (10 mg) by mouth At Bedtime   ondansetron (ZOFRAN-ODT) 4 MG ODT tab  Other Yes Yes   Sig: Take 4 mg by mouth every 8 hours as needed for nausea   pantoprazole (PROTONIX) 40 MG EC tablet 11/30/2020 at 0705 Other No Yes   Sig: Take 1 tablet (40 mg) by mouth 2 times daily (before meals)    rifaximin (XIFAXAN) 550 MG TABS tablet  Other Yes Yes   Sig: Take 550 mg by mouth 2 times daily    senna (SENOKOT) 8.6 MG tablet 11/30/2020 at 0705 Other Yes Yes   Sig: Take 1-2 tablets by mouth 2 times daily   sildenafil (REVATIO) 20 MG tablet 11/30/2020 at 0705 Other Yes Yes   Sig: Take 40 mg by mouth 3 times daily    simethicone (MYLICON) 125 MG chewable tablet  Other Yes Yes   Sig: Take 1-2 softgels as needed after meals and at bedtime   sodium chloride (OCEAN) 0.65 % nasal spray 11/23/2020 at 1008 Other Yes Yes   Sig: Inhale 2 sprays in the nostril(s) every 30 minutes as needed for nasal congestion or nasal dryness   spironolactone (ALDACTONE) 100 MG tablet 11/30/2020 at 0705 Other Yes Yes   Sig: Take 100 mg by mouth daily   thiamine (B-1) 100 MG tablet 11/30/2020 at 0705 Other Yes Yes   Sig: Take 100 mg by mouth daily      Facility-Administered Medications: None     Allergies   Allergies   Allergen Reactions     Bengay Pain Relief [Menthol] Other (See Comments)     Skin turns red and feels extremely hot     Cats      Chocolate Dermatitis     Dicyclomine Other (See Comments)     Severe sedation     Dust Mites      Derm, resp     No Clinical Screening - See Comments      GI upset     Phenobarbital      Pollen Extract      Derm, resp.        Physical Exam   Vital Signs: Temp: 98.7  F (37.1  C) Temp src: Oral BP: 118/76 Pulse: 120   Resp: 15 SpO2: 95 % O2 Device: None (Room air)    Weight: 150 lbs 0 oz  GENERAL: Alert and oriented x 3 to self, place, time, and situation. NAD. Pleasant and conversational.  HEENT: Anicteric sclera. EOMI. Mucous membranes moist   NECK: Trachea midline.   CARDIOVASCULAR: Tachycardic rate, regular rhythm. S1, S2. No murmurs, rubs, or gallops.   RESPIRATORY: Effort normal on RA. Faint rales in RLL, with remaining lung fields clear to auscultation bilaterally, no rhonchi or wheezes, respirations unlabored   GI: Abdomen mildly distended, but soft, non-tender abdomen without rebound or  guarding, no hepatosplenomegaly, hyperactive bowel sounds present  MUSCULOSKELETAL: No joint swelling or tenderness. Moves all extremities.   EXTREMITIES: No peripheral edema. Intact bilateral pedal pulses. No calf asymmetry, erythema, or tenderness.   NEUROLOGICAL: No focal deficits. CN II-XII grossly intact. Moving all extremities symmetrically. Mild asterixis in LUE.   SKIN: Intact. Warm and dry. No rashes or lesions.  No jaundice.     Data   Data reviewed today: I reviewed all medications, new labs and imaging results over the last 24 hours. I personally reviewed the EKG tracing showing Sinus tachycardia at 114 bpm. Normal axis. QTc 452. No signs of acute ischemia. .    Recent Labs   Lab 11/30/20  1709 11/28/20  0617 11/27/20  0616 11/27/20  0106   WBC 6.2 4.9 4.4  --    HGB 10.4* 10.4* 10.0*  --    MCV 91 93 92  --     158 157  --    INR 1.57* 1.54* 1.62*  --     136 138  --    POTASSIUM 4.6 3.8 4.1  --    CHLORIDE 102 105 108  --    CO2 23 22 23  --    BUN 20 12 16  --    CR 0.90 0.70 0.86  --    ANIONGAP 8 9 7  --    MARKO 9.5 9.5 9.2  --    GLC 98 116* 99  --    ALBUMIN 3.1* 2.9* 2.8*  --    PROTTOTAL 8.0 7.5 7.5  --    BILITOTAL 3.2* 2.9* 3.8*  --    ALKPHOS 252* 270* 185*  --    ALT 45 40 37  --    * 87* 72*  --    LIPASE 154  --   --   --    TROPI <0.015  --  <0.015 <0.015       Recent Results (from the past 24 hour(s))   XR Chest 2 Views    Narrative    EXAM: XR CHEST 2 VW  LOCATION: [facility]  DATE/TIME: 11/30/2020 5:26 PM    INDICATION: Chills, fatigue  COMPARISON: None.      Impression    IMPRESSION: Negative chest.

## 2020-12-01 NOTE — CONSULTS
[facility]    Hepatology Consult    Requesting provider: Dr Francis Arellano, Internal Medicine    Consult requested for generalized weakness      HPI:  40 year old female with history of cirrhosis of the liver secondary to alcohol use, portal hypertension, history of hepatic encephalopathy, pulmonary hypertension, recent admission at outside hospital for cardiogenic shock, currently at treatment center for alcohol treatment, who now presents with generalized symptoms.  Hepatology is consulted for further management.    Patient was recently admitted and discharged at our ED observation for symptoms consistent with hepatic encephalopathy.  No precipitating etiology was identified other than reduced dose of lactulose at her treatment center.  After increasing her dose of lactulose, she was discharged 2 days ago.  However, she reports that after going back she continued to feel generalized weakness, nausea, frequent loose stools.  Had no fevers, however had sweats and chills at night.  He reports feeling epigastric pain.  Reports anorexia.  Does report that she had tarry, black stools 2 days ago, which has since resolved.  Has epistaxis which has been frequent for her in the last few weeks to months.  Denies any chest pain.  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      Medical hx Surgical hx   Past Medical History:   Diagnosis Date     Alcohol abuse      Alcoholic cirrhosis (H)      Alcoholic peripheral neuropathy (H)      Coagulopathy (H)      Gastritis      History of Clostridium difficile colitis     unknown date     Impairment of cognitive function     MOCA 2/2019 22/30     Leukocytosis 02/2019    persistent leukocytosis across 2/2019 hospitalization without evidence of source across multiple diagnostics including LP, BCx, UCx      Lupus anticoagulant positive      Macrocytic anemia      Moderate protein-calorie malnutrition (H)      Paroxysmal A-fib  (H) 02/2019    not on chronic anticoagulation     Peptic ulcer disease      Positive SMILEY (antinuclear antibody)      Subclinical hypothyroidism 02/2019    normal T3, T4     Tobacco dependence     0.5 PPD      Past Surgical History:   Procedure Laterality Date     ESOPHAGOSCOPY, GASTROSCOPY, DUODENOSCOPY (EGD), COMBINED N/A 8/9/2019    Procedure: ESOPHAGOGASTRODUODENOSCOPY (EGD);  Surgeon: Sowmya Ibanez MD;  Location:  GI     ESOPHAGOSCOPY, GASTROSCOPY, DUODENOSCOPY (EGD), COMBINED N/A 8/26/2019    Procedure: ESOPHAGOGASTRODUODENOSCOPY (EGD);  Surgeon: Leventhal, Thomas Michael, MD;  Location: [facility]     ESOPHAGOSCOPY, GASTROSCOPY, DUODENOSCOPY (EGD), COMBINED N/A 3/30/2020    Procedure: ESOPHAGOGASTRODUODENOSCOPY (EGD);  Surgeon: Sowmya Ibanez MD;  Location:  GI     UPPER GI ENDOSCOPY  8/9/2019               Medications  Current Facility-Administered Medications   Medication Dose Route Frequency     [Held by provider] enoxaparin ANTICOAGULANT  40 mg Subcutaneous Q24H     fexofenadine  180 mg Oral Daily     folic acid  1 mg Oral Daily     Lidocaine  1 patch Transdermal Q24H     lidocaine   Transdermal Q8H     multivitamin, therapeutic  1 tablet Oral Daily     nicotine  1 patch Transdermal Q24H     nicotine   Transdermal Q8H     nortriptyline  10 mg Oral At Bedtime     pantoprazole  40 mg Oral BID AC     rifaximin  550 mg Oral BID     sildenafil  40 mg Oral TID     sodium chloride (PF)  3 mL Intracatheter Q8H     thiamine  100 mg Oral Daily     Vitamin D3  50 mcg Oral Daily       Allergies  Allergies   Allergen Reactions     Bengay Pain Relief [Menthol] Other (See Comments)     Skin turns red and feels extremely hot     Cats      Chocolate Dermatitis     Dicyclomine Other (See Comments)     Severe sedation     Dust Mites      Derm, resp     No Clinical Screening - See Comments      GI upset     Phenobarbital      Pollen Extract      Derm, resp.        Family hx Social hx   Family  "History   Problem Relation Age of Onset     Skin Cancer Mother      Depression Mother      Hypertension Father      Depression Sister      Breast Cancer Maternal Aunt       Social History     Tobacco Use     Smoking status: Current Some Day Smoker     Types: Cigarettes     Smokeless tobacco: Never Used     Tobacco comment: 6-8 per day   Substance Use Topics     Alcohol use: Not Currently     Alcohol/week: 2.0 standard drinks     Types: 2 Glasses of wine per week     Frequency: 2-3 times a week     Comment: last drink 10/2/2020     Drug use: Not Currently          Review of systems  A 10-point review of systems was negative.      Examination  /61   Pulse 106   Temp 98.6  F (37  C)   Resp 16   Ht 1.702 m (5' 7\")   Wt 70.2 kg (154 lb 12.8 oz)   SpO2 95%   BMI 24.25 kg/m      Intake/Output Summary (Last 24 hours) at 12/1/2020 1223  Last data filed at 12/1/2020 1100  Gross per 24 hour   Intake 300 ml   Output --   Net 300 ml       Gen- well, NAD, A+Ox3, normal color  Eye- EOMI  ENT- MMM  Lym- no palpable LAD  CVS- RRR  RS- CTA, normal respiratory effort on room air  Abd-mildly distended, soft, nontender no rebound or guarding.  No appreciable ascites  Extr- no DOMINIQUE  Neuro-no asterixis compared to previous exam in last admission.  Mild tremor present.  Skin- no rash  Psych- normal mood    Laboratory  Lab Results   Component Value Date     12/01/2020    POTASSIUM 4.4 12/01/2020    CHLORIDE 110 12/01/2020    CO2 21 12/01/2020    BUN 18 12/01/2020    CR 0.76 12/01/2020       Lab Results   Component Value Date    BILITOTAL 3.0 12/01/2020    ALT 36 12/01/2020    AST 87 12/01/2020    ALKPHOS 251 12/01/2020       Lab Results   Component Value Date    ALBUMIN 2.7 12/01/2020    PROTTOTAL 7.0 12/01/2020        Lab Results   Component Value Date    WBC 5.1 12/01/2020    HGB 9.3 12/01/2020    MCV 95 12/01/2020     12/01/2020       Lab Results   Component Value Date    INR 1.52 12/01/2020     MELD-Na score: 15 " at 12/1/2020  6:52 AM  MELD score: 15 at 12/1/2020  6:52 AM  Calculated from:  Serum Creatinine: 0.76 mg/dL (Rounded to 1 mg/dL) at 12/1/2020  6:52 AM  Serum Sodium: 137 mmol/L at 12/1/2020  6:52 AM  Total Bilirubin: 3.0 mg/dL at 12/1/2020  6:52 AM  INR(ratio): 1.52 at 12/1/2020  6:52 AM  Age: 40 years 8 months      Radiology  Abdominal ultrasound reviewed.  No ascites.    Assessment  40 year old female with history of cirrhosis of the liver secondary to alcohol use, portal hypertension, history of hepatic encephalopathy, pulmonary hypertension, recent admission at outside hospital for cardiogenic shock, currently at treatment center for alcohol treatment, who now presents with generalized symptoms.  MELD-Na=15.  She does report having some black tarry stool couple of days ago, which has since resolved.  There may be a component of mild bleeding through portal hypertensive gastropathy or peptic ulcer disease.  No signs or symptoms to suggest ongoing large-volume variceal type of bleed.      Recommendations  - We will plan for an upper endoscopy tomorrow to evaluate for any potential source of bleeding.  - Agree with full infectious work-up including blood cultures.  Her UA, CXR, blood cultures have been all completed.  COVID-19 is pending.  - Please keep NPO at MN  -We will plan on EGD in the AM  - Continue lactulose 3 times a day to aim for 3-4 loose bowel movements.  Continue rifaximin 550 mg twice daily.  -Given long history of alcohol use, consider obtaining levels for micronutrients and replenish as needed    Patient seen and discussed with Dr Nikolay GASPAR MD  Gastroenterology Fellow  Division of Gastroenterology, Hepatology and Nutrition  [facility]  Text page Pager [phone]

## 2020-12-01 NOTE — UTILIZATION REVIEW
Admission Status; Secondary Review Determination         Under the authority of the Utilization Management Committee, the utilization review process indicated a secondary review on the above patient.  The review outcome is based on review of the medical records, discussions with staff, and applying clinical experience noted on the date of the review.        (x)      Inpatient Status Appropriate - This patient's medical care is consistent with medical management for inpatient care and reasonable inpatient medical practice.      RATIONALE FOR DETERMINATION   The patient is a 40-year-old female admitted on 11/30/2020.  She came to the emergency room with chills and fatigue along with confusion nausea and diarrhea and lightheadedness.  She had a recent admission to observation and was in the hospital from 11/26-11/28 with hepatic encephalopathy.  Lactulose was increased.  Her current presentation includes alcohol encephalopathy with a serum ammonia level of 79.  Lactulose is currently held because of diarrhea.  Exam shows asterixis.  Liver enzymes are elevated with an alk phos of 251.  Based on this being a readmission with persisting encephalopathic findings and elevated serum ammonia with difficulty tolerating lactulose, inpatient status is appropriate.  The case will also be discussed with Dr. Arellano who has been paged through [employer] paging system.      The severity of illness, intensity of service provided, expected LOS and risk for adverse outcome make the care complex, high risk and appropriate for hospital admission.        The information on this document is developed by the utilization review team in order for the business office to ensure compliance.  This only denotes the appropriateness of proper admission status and does not reflect the quality of care rendered.         The definitions of Inpatient Status and Observation Status used in making the determination above are those provided in the CMS Coverage  Manual, Chapter 1 and Chapter 6, section 70.4.      Sincerely,     Bruno Chand MD  Physician Advisor  Utilization Review/ Case Management  [facility].

## 2020-12-01 NOTE — PROGRESS NOTES
[facility]     Medicine Progress Note - Hospitalist Service, Gold 8       Date of Admission:  11/30/2020  Assessment & Plan       Yenifer Maher is a 40 year old female with PMHx of alcohol abuse with alcoholic cirrhosis c/b portal hypertension, esophageal varices, ascites, and hepatic encephalopathy, pulmonary hypertension, paroxysmal atrial fibrillation, hx of calciphylaxis, lupus anticoagulant positive, neuropathy, macrocytic anemia, and PUD, who presented with chills, fatigue, nausea, abdominal cramping/distention, confusion on 11/26/2020, admitted to ED observation for hepatic encephalopathy, discharged on 11/28/2020 with higher dose of lactulose, re-presenting with continued chills, fatigue and now with diarrhea admitted on 11/30/2020.      #Nausea without emesis  #Abdominal pain  #Diarrhea   #Poor appetite   Endorsing poor PO intake, nausea, intermittent abdominal cramping, diarrhea, and fatigue. Work up in ED from OBS admission was unremarkable, with negative infectious work up (blood cx, UA, and COVID swab), abd US without ascites or PVT, and TTE with normal EF and without any right heart strain or pericardial effusion. DDx quite long with vague symptoms but includes underlying infection, viral syndrome, gastroenteritis, C.diff. Low suspicion for any acute biliary process or acute hepatitis. With tachycardia as noted , likely dehydrated in setting of poor PO intake, diuretics and diarrhea, which could be contributing to her continued malaise.   -Infectious work up with UA, blood cultures, stool culture. CXR clear. F/u asymptomatic COVID  -resume lactulose. Ct Rifaximin   -Ct to Hold diuretics   -Anti-emetics PRN   -Continue PTA PPI and tums      #Tachycardia: EKG in sinus tachycardia. Likely due to dehydration. Lower suspicion for sepsis. Hx of PVT and lupus anticoagulant positive not on anticoagulation due to high risk of bleeding.   -Follow up CTA chest.  ADDENDUM: poor contrast timing. If patient continues to be tachycardic despite adequate IVF may repeat CTA Chest (no CP/SOB/ palpitations)   -Continue IVF     #Alcohol cirrhosis: Alcohol cirrhosis c/b HE, portal HTN, varices, and remote hx of ascites. Hx of PVT in past. Last EGD 3/2020 with grade I EV,  with prior variceal banding in 2019.  MELD 19. PTA medication with rifaximin 550 mg BID, lactulose, spironolactone 100 mg daily, lasix 40 mg daily. Denies any recent alcohol use, currently residing at treatment facility. Recent abdominal US without any ascites or PVT. Recently had lactulose increased for HE. On exam, has mild asterixis with elevated ammonia level of 79. Likely has mild HE. Unclear underlying trigger. Patient reports one episode of black stool , so could have underlying GIB, but hemoglobin stable.   -Continue Rifaximin   -Hold Diuretics   -Utox and EtOH level   -Trend LFTs and INR  -Trend Hb Q8hr for now. Ct PPI bid  - D/W hepatology to review 12/1   -Continue Thiamine, folic acid, and MVI     #Pulmonary hypertension: Recently diagnosed during prolonged hospital course at [facility], after presenting with nausea and diarrhea, ultimately developing cardiogenic shock requiring pressors and HD, diagnosed with severe cor pulmonale 2/2 portal hypertension, now on sildenafil 40 mg TID. Recent saw outpatient pulmonologist on 11/25, with recommendations for repeat TTE and RHC. TTE on 11/27 with normal EF, no right heart strain or pericardial effusion.   -Continue PTA sildenafil 40 mg TID     #Incidental finding: Equivocal asymmetric tissue left breast upper outer quadrant. Outpatient dedicated evaluation in breast imaging center with diagnostic mammography and possible ultrasound is advised.     --------- Chronic medical problems --------     #Paroxysmal atrial fibrillation: EKG in sinus rhythm. Not on anticoagulation due to high risk of bleeding.   #Macrocytic anemia: Hemoglobin ranging 10.0s. Trend CBC.  "  #Gastritis/PUD: Continue PTA pantoprazole 40 mg BID.   #Lupus anticoagulant positive: Followed by Dr. Jeffery. Peviously on anticoagulation, but stopped due to high risk of bleeding in the past.   #Hx of non-uremic calciphylaxis: No current skin lesions. Follow up with dermatology PRN.   #Tobacco dependence: Continue PTA nicotine patch.       Diet: Combination Diet Regular Diet Adult    DVT Prophylaxis: Pneumatic Compression Devices  Chavez Catheter: not present  Code Status: Full Code         Disposition Plan   Expected discharge: 2 - 3 days, recommended to prior living arrangement once adequate pain management/ tolerating PO medications and mental status at baseline.  Entered: Francis Arellano MD, MD 12/01/2020, 2:52 PM       The patient's care was discussed with the Bedside Nurse, Care Coordinator/, Patient and GI Consultant.    Francis Arellano MD, MD  Hospitalist Service, [address]   Contact information available via [facility] Paging/Directory  Please see sign in/sign out for up to date coverage information  ______________________________________________________________________    Interval History   Feels better compared to admission. Says has not been eating or drinking and had chills after previous discharge.    Had dark stool 2 day ago  But clearer today  No vomiting  No new rash  Was dizzy because of not eating last few days  Denies SOB    Data reviewed today: I reviewed all medications, new labs and imaging results over the last 24 hours. I personally reviewed the USS image(s) showing no ascites.    Physical Exam   /61   Pulse 106   Temp 98.6  F (37  C)   Resp 16   Ht 1.702 m (5' 7\")   Wt 70.2 kg (154 lb 12.8 oz)   SpO2 95%   BMI 24.25 kg/m    Gen- pleasant  lying in bed  HEENT- NAD, JERI  Neck- supple, no JVD elevation, no thyromegaly  CVS- I+II+ no m/r/g  RS- CTAB  Abdo- soft, no tenderness . No g/r/r   Ext- no edema   CNS-oriented " to person/ place and time  Can count from 10 to 1 backwards    Data   BMP  Recent Labs   Lab 12/01/20  0652 11/30/20 2137 11/30/20 1709 11/28/20  0617 11/27/20  0616     --  133 136 138   POTASSIUM 4.4 4.3 4.6 3.8 4.1   CHLORIDE 110*  --  102 105 108   MARKO 9.0  --  9.5 9.5 9.2   CO2 21  --  23 22 23   BUN 18  --  20 12 16   CR 0.76 0.90 0.90 0.70 0.86   GLC 94  --  98 116* 99     CBC  Recent Labs   Lab 12/01/20  1303 12/01/20  0652 11/30/20 1709 11/28/20  0617 11/27/20  0616   WBC  --  5.1 6.2 4.9 4.4   RBC  --  3.01* 3.44* 3.39* 3.34*   HGB 9.1* 9.3* 10.4* 10.4* 10.0*   HCT  --  28.5* 31.4* 31.4* 30.7*   MCV  --  95 91 93 92   MCH  --  30.9 30.2 30.7 29.9   MCHC  --  32.6 33.1 33.1 32.6   RDW  --  14.4 14.0 13.8 13.9   PLT  --  142* 182 158 157     INR  Recent Labs   Lab 12/01/20  0652 11/30/20 1709 11/28/20 0617 11/27/20  0616   INR 1.52* 1.57* 1.54* 1.62*     LFTs  Recent Labs   Lab 12/01/20 0652 11/30/20  1709 11/28/20  0617 11/27/20  0616   ALKPHOS 251* 252* 270* 185*   AST 87* 110* 87* 72*   ALT 36 45 40 37   BILITOTAL 3.0* 3.2* 2.9* 3.8*   PROTTOTAL 7.0 8.0 7.5 7.5   ALBUMIN 2.7* 3.1* 2.9* 2.8*      PANC  Recent Labs   Lab 11/30/20 2137 11/30/20  1709   LIPASE 241 154

## 2020-12-01 NOTE — PLAN OF CARE
PT - Cancel/defer, per chart review and discussion with interdisciplinary team, pt does not require skilled inpatient physical therapy at this time. Pt indep sup <> sit flat bed, Indep sit <> stand and Indep gait in halls without AD at pt's normal speed, no LOB with lateral head turns, change of speed, turns, and sudden stops, and mod Indep negotiating 14 stairs.  Pt indicates her fiance lives with her and is available to A as needed. Please re-consult as needed if patient experiences a change in functional mobility or goals requiring skilled inpatient physical therapy.

## 2020-12-01 NOTE — PLAN OF CARE
OT 7A: Defer OT consult - OT consult received and acknowledged. Pt observed to be ambulating the hallway independently with nursing. Educated pt on role of OT however pt with no acute OT needs. Pt completing ADLs mod I. Pt already implementing compensatory strategies for energy conservation, DME needs met within the home, and pt implementing compensatory strategies for FMC/sensory deficits. Will complete orders.

## 2020-12-01 NOTE — PLAN OF CARE
Vitals: tachy 113 and 108, otherwise stable on room air.  Neuro : oriented X 4.   Blood glucose: NA  Pain/nausea: denies   Diet: regular   Lines: RUE PIV infusing NS @ 125.  : voided X 2.  GI: X 1, large BM, formed brown, stool sample sent down.  Drains: NA  Skin: WDL.   Mobility: up ad marcelo.   PLan : abdominal U/S in the morning.   Continue to monitor pending COVID, blood culture, heart rate.

## 2020-12-02 LAB
ACETAMINOPHEN QUAL: NEGATIVE
ALBUMIN SERPL-MCNC: 2.7 G/DL (ref 3.4–5)
ALP SERPL-CCNC: 239 U/L (ref 40–150)
ALT SERPL W P-5'-P-CCNC: 38 U/L (ref 0–50)
AMANTADINE: NEGATIVE
AMITRIPTYLINE QUAL: NEGATIVE
AMOXAPINE: NEGATIVE
AMPHETAMINES QUAL: NEGATIVE
ANION GAP SERPL CALCULATED.3IONS-SCNC: 8 MMOL/L (ref 3–14)
AST SERPL W P-5'-P-CCNC: 85 U/L (ref 0–45)
ATROPINE: NEGATIVE
BENZODIAZ UR QL: NEGATIVE
BILIRUB SERPL-MCNC: 2.8 MG/DL (ref 0.2–1.3)
BUN SERPL-MCNC: 12 MG/DL (ref 7–30)
BUPROPION QUAL: NEGATIVE
CAFFEINE QUAL: POSITIVE
CALCIUM SERPL-MCNC: 9.5 MG/DL (ref 8.5–10.1)
CANNABINOIDS UR QL SCN: NEGATIVE
CARBAMAZEPINE QUAL: NEGATIVE
CHLORIDE SERPL-SCNC: 110 MMOL/L (ref 94–109)
CHLORPHENIRAMINE: NEGATIVE
CHLORPROMAZINE: NEGATIVE
CITALOPRAM QUAL: NEGATIVE
CLOMIPRAMINE QUAL: NEGATIVE
CO2 SERPL-SCNC: 19 MMOL/L (ref 20–32)
COCAINE QUAL: NEGATIVE
COCAINE UR QL: NEGATIVE
CODEINE QUAL: NEGATIVE
CREAT SERPL-MCNC: 0.68 MG/DL (ref 0.52–1.04)
DESIPRAMINE QUAL: NEGATIVE
DEXTROMETHORPHAN: NEGATIVE
DIPHENHYDRAMINE: NEGATIVE
DOXEPIN/METABOLITE: NEGATIVE
DOXYLAMINE: NEGATIVE
EPHEDRINE OR PSEUDO: NEGATIVE
ERYTHROCYTE [DISTWIDTH] IN BLOOD BY AUTOMATED COUNT: 14.6 % (ref 10–15)
FENTANYL QUAL: NEGATIVE
FLUOXETINE AND METAB: NEGATIVE
GFR SERPL CREATININE-BSD FRML MDRD: >90 ML/MIN/{1.73_M2}
GLUCOSE SERPL-MCNC: 91 MG/DL (ref 70–99)
HCT VFR BLD AUTO: 28.7 % (ref 35–47)
HGB BLD-MCNC: 9.4 G/DL (ref 11.7–15.7)
HYDROCODONE QUAL: NEGATIVE
HYDROMORPHONE QUAL: NEGATIVE
IBUPROFEN QUAL: POSITIVE
IMIPRAMINE QUAL: NEGATIVE
INR PPP: 1.47 (ref 0.86–1.14)
KETAMINE QUAL: NEGATIVE
LAMOTRIGINE QUAL: NEGATIVE
LIDOCAIN SPEC QL: NEGATIVE
LOXAPINE: NEGATIVE
MAGNESIUM SERPL-MCNC: 1.9 MG/DL (ref 1.6–2.3)
MAPROTYLINE: NEGATIVE
MCH RBC QN AUTO: 30.4 PG (ref 26.5–33)
MCHC RBC AUTO-ENTMCNC: 32.8 G/DL (ref 31.5–36.5)
MCV RBC AUTO: 93 FL (ref 78–100)
MDMA QUAL: NEGATIVE
MEPERIDINE QUAL: NEGATIVE
METHAMPHETAMINE: NEGATIVE
METHODONE QUAL: NEGATIVE
MIRTAZAPINE QUAL: NEGATIVE
MORPHINE QUAL: NEGATIVE
NICOTINE: NEGATIVE
NORTRIPTYLINE QUAL: NEGATIVE
OLANZAPINE QUAL: NEGATIVE
OPIATES UR QL SCN: NEGATIVE
OXYCODONE QUAL: NEGATIVE
PENTAZOCINE: NEGATIVE
PETH BLD-MCNC: NEGATIVE NG/ML
PHENCYCLIDINE QUAL: NEGATIVE
PHENTERMINE: NEGATIVE
PLATELET # BLD AUTO: 143 10E9/L (ref 150–450)
POTASSIUM SERPL-SCNC: 4.1 MMOL/L (ref 3.4–5.3)
PROPOFOL QUAL: NEGATIVE
PROPOXPHENE QUAL: NEGATIVE
PROPRANOLOL QUAL: NEGATIVE
PROT SERPL-MCNC: 7 G/DL (ref 6.8–8.8)
PYRILAMINE: NEGATIVE
QUETIAPINE METAB QUAL: NEGATIVE
RBC # BLD AUTO: 3.09 10E12/L (ref 3.8–5.2)
SALICYLATE QUAL: NEGATIVE
SERTRALINE QUAL: NEGATIVE
SODIUM SERPL-SCNC: 136 MMOL/L (ref 133–144)
THEOBROMINE: NEGATIVE
TOPIRAMATE QUAL: NEGATIVE
TRAMADOL QUAL: NEGATIVE
TRIMIPRAMINE QUAL: NEGATIVE
UPPER GI ENDOSCOPY: NORMAL
VENLAFAXINE QUAL: NEGATIVE
WBC # BLD AUTO: 5.6 10E9/L (ref 4–11)

## 2020-12-02 PROCEDURE — 250N000009 HC RX 250: Performed by: INTERNAL MEDICINE

## 2020-12-02 PROCEDURE — 43235 EGD DIAGNOSTIC BRUSH WASH: CPT | Performed by: INTERNAL MEDICINE

## 2020-12-02 PROCEDURE — 99407 BEHAV CHNG SMOKING > 10 MIN: CPT

## 2020-12-02 PROCEDURE — 83735 ASSAY OF MAGNESIUM: CPT | Performed by: INTERNAL MEDICINE

## 2020-12-02 PROCEDURE — 85027 COMPLETE CBC AUTOMATED: CPT | Performed by: INTERNAL MEDICINE

## 2020-12-02 PROCEDURE — 250N000013 HC RX MED GY IP 250 OP 250 PS 637: Performed by: PHYSICIAN ASSISTANT

## 2020-12-02 PROCEDURE — 120N000011 HC R&B TRANSPLANT UMMC

## 2020-12-02 PROCEDURE — G0500 MOD SEDAT ENDO SERVICE >5YRS: HCPCS | Performed by: INTERNAL MEDICINE

## 2020-12-02 PROCEDURE — 80053 COMPREHEN METABOLIC PANEL: CPT | Performed by: INTERNAL MEDICINE

## 2020-12-02 PROCEDURE — 250N000011 HC RX IP 250 OP 636: Performed by: INTERNAL MEDICINE

## 2020-12-02 PROCEDURE — 999N000033 HC STATISTIC CHRONIC PULMONARY DISEASE SPECIALIST

## 2020-12-02 PROCEDURE — 85610 PROTHROMBIN TIME: CPT | Performed by: INTERNAL MEDICINE

## 2020-12-02 PROCEDURE — 99233 SBSQ HOSP IP/OBS HIGH 50: CPT | Performed by: INTERNAL MEDICINE

## 2020-12-02 PROCEDURE — 36415 COLL VENOUS BLD VENIPUNCTURE: CPT | Performed by: INTERNAL MEDICINE

## 2020-12-02 PROCEDURE — 250N000013 HC RX MED GY IP 250 OP 250 PS 637: Performed by: INTERNAL MEDICINE

## 2020-12-02 PROCEDURE — 0DJ08ZZ INSPECTION OF UPPER INTESTINAL TRACT, VIA NATURAL OR ARTIFICIAL OPENING ENDOSCOPIC: ICD-10-PCS | Performed by: INTERNAL MEDICINE

## 2020-12-02 RX ORDER — FENTANYL CITRATE 50 UG/ML
INJECTION, SOLUTION INTRAMUSCULAR; INTRAVENOUS PRN
Status: DISCONTINUED | OUTPATIENT
Start: 2020-12-02 | End: 2020-12-02 | Stop reason: HOSPADM

## 2020-12-02 RX ORDER — DIPHENHYDRAMINE HYDROCHLORIDE 50 MG/ML
INJECTION INTRAMUSCULAR; INTRAVENOUS PRN
Status: DISCONTINUED | OUTPATIENT
Start: 2020-12-02 | End: 2020-12-02 | Stop reason: HOSPADM

## 2020-12-02 RX ADMIN — LACTULOSE 20 G: 10 SOLUTION ORAL at 10:36

## 2020-12-02 RX ADMIN — SILDENAFIL 40 MG: 20 TABLET ORAL at 10:37

## 2020-12-02 RX ADMIN — ACETAMINOPHEN 325 MG: 325 TABLET, FILM COATED ORAL at 00:51

## 2020-12-02 RX ADMIN — LACTULOSE 20 G: 10 SOLUTION ORAL at 14:28

## 2020-12-02 RX ADMIN — THIAMINE HCL TAB 100 MG 100 MG: 100 TAB at 10:38

## 2020-12-02 RX ADMIN — THERA TABS 1 TABLET: TAB at 10:38

## 2020-12-02 RX ADMIN — SILDENAFIL 40 MG: 20 TABLET ORAL at 20:10

## 2020-12-02 RX ADMIN — LACTULOSE 20 G: 10 SOLUTION ORAL at 20:10

## 2020-12-02 RX ADMIN — ACETAMINOPHEN 325 MG: 325 TABLET, FILM COATED ORAL at 22:16

## 2020-12-02 RX ADMIN — PANTOPRAZOLE SODIUM 40 MG: 40 TABLET, DELAYED RELEASE ORAL at 10:37

## 2020-12-02 RX ADMIN — FEXOFENADINE HYDROCHLORIDE 180 MG: 180 TABLET, FILM COATED ORAL at 10:37

## 2020-12-02 RX ADMIN — Medication 50 MCG: at 10:38

## 2020-12-02 RX ADMIN — SILDENAFIL 40 MG: 20 TABLET ORAL at 14:28

## 2020-12-02 RX ADMIN — RIFAXIMIN 550 MG: 550 TABLET ORAL at 20:10

## 2020-12-02 RX ADMIN — RIFAXIMIN 550 MG: 550 TABLET ORAL at 10:37

## 2020-12-02 RX ADMIN — PANTOPRAZOLE SODIUM 40 MG: 40 TABLET, DELAYED RELEASE ORAL at 16:01

## 2020-12-02 RX ADMIN — FOLIC ACID 1 MG: 1 TABLET ORAL at 10:38

## 2020-12-02 ASSESSMENT — ACTIVITIES OF DAILY LIVING (ADL)
ADLS_ACUITY_SCORE: 16
DEPENDENT_IADLS:: INDEPENDENT

## 2020-12-02 NOTE — PROGRESS NOTES
[facility]     Medicine Progress Note - Hospitalist Service, Gold 8       Date of Admission:  11/30/2020  Assessment & Plan       Yenifer Maher is a 40 year old female with PMHx of alcohol abuse with alcoholic cirrhosis c/b portal hypertension, esophageal varices, ascites, and hepatic encephalopathy, pulmonary hypertension, paroxysmal atrial fibrillation, hx of calciphylaxis, lupus anticoagulant positive, neuropathy, macrocytic anemia, and PUD, who presented with chills, fatigue, nausea, abdominal cramping/distention, confusion on 11/26/2020, admitted to ED observation for hepatic encephalopathy, discharged on 11/28/2020 with higher dose of lactulose, re-presenting with continued chills, fatigue and now with diarrhea admitted on 11/30/2020.      #Nausea without emesis  #Abdominal pain  #Diarrhea   #Poor appetite   Endorsing poor PO intake, nausea, intermittent abdominal cramping, diarrhea, and fatigue. Work up in ED from OBS admission was unremarkable, with negative infectious work up (blood cx, UA, and COVID swab), abd US without ascites or PVT, and TTE with normal EF and without any right heart strain or pericardial effusion. DDx quite long with vague symptoms but includes underlying infection, viral syndrome, gastroenteritis, C.diff. Low suspicion for any acute biliary process or acute hepatitis. With tachycardia as noted , likely dehydrated in setting of poor PO intake, diuretics and diarrhea, which could be contributing to her continued malaise.   -Infectious work up with UA, blood cultures, stool culture. CXR clear. F/u asymptomatic COVID  -Ct lactulose. Ct Rifaximin   -Ct to Hold diuretics   -Anti-emetics PRN   -Continue PTA PPI and tums   - appreciate GI review: s/p EGD 12/2:Stomach- erosions in stomach antrum, mild portal hypertensive gastropathy  Ct PPI PO bid x 8 weeks, check H. Pylori     #Tachycardia: EKG in sinus tachycardia. Likely due to dehydration.  Lower suspicion for sepsis. Hx of PVT and lupus anticoagulant positive not on anticoagulation due to high risk of bleeding.   -Follow up CTA chest. ADDENDUM: poor contrast timing. If patient continues to be tachycardic despite adequate IVF may repeat CTA Chest (no CP/SOB/ palpitations)   -Continue IVF     #Alcohol cirrhosis: Alcohol cirrhosis c/b HE, portal HTN, varices, and remote hx of ascites. Hx of PVT in past. Last EGD 3/2020 with grade I EV,  with prior variceal banding in 2019.  MELD 19. PTA medication with rifaximin 550 mg BID, lactulose, spironolactone 100 mg daily, lasix 40 mg daily. Denies any recent alcohol use, currently residing at treatment facility. Recent abdominal US without any ascites or PVT. Recently had lactulose increased for HE. On exam, has mild asterixis with elevated ammonia level of 79. Likely has mild HE. Unclear underlying trigger. Patient reports one episode of black stool , so could have underlying GIB, but hemoglobin stable.   -Continue Rifaximin   -Hold Diuretics   -Utox and EtOH level   -Trend LFTs and INR  -Trend Hb Q8hr for now. Ct PPI bid  -Continue Thiamine, folic acid, and MVI     #Pulmonary hypertension: Recently diagnosed during prolonged hospital course at [facility], after presenting with nausea and diarrhea, ultimately developing cardiogenic shock requiring pressors and HD, diagnosed with severe cor pulmonale 2/2 portal hypertension, now on sildenafil 40 mg TID. Recent saw outpatient pulmonologist on 11/25, with recommendations for repeat TTE and RHC. TTE on 11/27 with normal EF, no right heart strain or pericardial effusion.   -Continue PTA sildenafil 40 mg TID     #Incidental finding: Equivocal asymmetric tissue left breast upper outer quadrant. Outpatient dedicated evaluation in breast imaging center with diagnostic mammography and possible ultrasound is advised.     --------- Chronic medical problems --------     #Paroxysmal atrial fibrillation: EKG in sinus rhythm. Not on  "anticoagulation due to high risk of bleeding.   #Macrocytic anemia: Hemoglobin ranging 10.0s. Trend CBC.   #Gastritis/PUD: Continue PTA pantoprazole 40 mg BID.   #Lupus anticoagulant positive: Followed by Dr. Jeffery. Peviously on anticoagulation, but stopped due to high risk of bleeding in the past.   #Hx of non-uremic calciphylaxis: No current skin lesions. Follow up with dermatology PRN.   #Tobacco dependence: Continue PTA nicotine patch.       Diet: Regular Diet Adult    DVT Prophylaxis: Pneumatic Compression Devices  Chavez Catheter: not present  Code Status: Full Code         Disposition Plan   Expected discharge: 2 - 3 days, recommended to prior living arrangement once adequate pain management/ tolerating PO medications and mental status at baseline.  Entered: Francis Arellano MD, MD 12/02/2020, 3:53 PM       The patient's care was discussed with the Bedside Nurse, Care Coordinator/, Patient and GI Consultant.    Francis Arellano MD, MD  Hospitalist Service, [address]   Contact information available via [facility] Paging/Directory  Please see sign in/sign out for up to date coverage information  ______________________________________________________________________    Interval History   Feels better. S/p EGD today    No vomiting  No new rash  Denies SOB. Denies feeling dizzy    Data reviewed today: I reviewed all medications, new labs and imaging results over the last 24 hours. I personally reviewed the USS image(s) showing no ascites.    Physical Exam   /75 (BP Location: Left arm)   Pulse 101   Temp 98  F (36.7  C) (Oral)   Resp 18   Ht 1.702 m (5' 7\")   Wt 70.2 kg (154 lb 12.8 oz)   SpO2 96%   BMI 24.25 kg/m    Gen- pleasant  lying in bed  HEENT- NAD, JERI  Neck- supple, no JVD elevation, no thyromegaly  CVS- I+II+ no m/r/g  RS- CTAB  Abdo- soft, no tenderness . No g/r/r   Ext- no edema   CNS-oriented to person/ place and time  Can count " from 10 to 1 backwards    Data   BMP  Recent Labs   Lab 12/02/20  0538 12/01/20  0652 11/30/20  2137 11/30/20  1709 11/28/20  0617    137  --  133 136   POTASSIUM 4.1 4.4 4.3 4.6 3.8   CHLORIDE 110* 110*  --  102 105   MARKO 9.5 9.0  --  9.5 9.5   CO2 19* 21  --  23 22   BUN 12 18  --  20 12   CR 0.68 0.76 0.90 0.90 0.70   GLC 91 94  --  98 116*     CBC  Recent Labs   Lab 12/02/20  0538 12/01/20  0652 12/01/20  0652 11/30/20  1709 11/28/20  0617   WBC 5.6  --  5.1 6.2 4.9   RBC 3.09*  --  3.01* 3.44* 3.39*   HGB 9.4*   < > 9.3* 10.4* 10.4*   HCT 28.7*  --  28.5* 31.4* 31.4*   MCV 93  --  95 91 93   MCH 30.4  --  30.9 30.2 30.7   MCHC 32.8  --  32.6 33.1 33.1   RDW 14.6  --  14.4 14.0 13.8   *  --  142* 182 158    < > = values in this interval not displayed.     INR  Recent Labs   Lab 12/02/20  0538 12/01/20  0652 11/30/20  1709 11/28/20  0617   INR 1.47* 1.52* 1.57* 1.54*     LFTs  Recent Labs   Lab 12/02/20  0538 12/01/20  0652 11/30/20  1709 11/28/20  0617   ALKPHOS 239* 251* 252* 270*   AST 85* 87* 110* 87*   ALT 38 36 45 40   BILITOTAL 2.8* 3.0* 3.2* 2.9*   PROTTOTAL 7.0 7.0 8.0 7.5   ALBUMIN 2.7* 2.7* 3.1* 2.9*      PANC  Recent Labs   Lab 11/30/20 2137 11/30/20  1709   LIPASE 241 154

## 2020-12-02 NOTE — PLAN OF CARE
"Blood pressure 126/75, pulse 101, temperature 98  F (36.7  C), temperature source Oral, resp. rate 18, height 1.702 m (5' 7\"), weight 70.2 kg (154 lb 12.8 oz), SpO2 96 %, not currently breastfeeding.  Stable today post endoscopy.  Hgb stable.  Diet was advanced to regular; pt. Tolerated.  Denies pain, other than a mild sore throat.  Spoke to RT regarding smoking cessation.  Plan: shower this evening. Ambulate. Awaiting disposition plan.  "

## 2020-12-02 NOTE — PLAN OF CARE
"/80 (BP Location: Left arm)   Pulse 99   Temp 98.2  F (36.8  C) (Oral)   Resp 18   Ht 1.702 m (5' 7\")   Wt 70.2 kg (154 lb 12.8 oz)   SpO2 97%   BMI 24.25 kg/m      [id number]  VSS on RA. A+Ox4. Compazine given for nausea x1 with some relief but denied additional antiemetic. Tylenol given for foot pain. Pt resting comfortably after. Voiding, not saving. No blood sugar checks. NPO since midnight. LBM 12/1. PIV SL. Lidocaine patch on back. Denies nicotine patch. Up ad marcelo in room. On scheduled lactulose. Will continue to monitor and notify team with changes.   "

## 2020-12-02 NOTE — CONSULTS
Care Management Initial Consult    General Information  Assessment completed with: Patient;-chart review([facility]),  Type of CM/SW Visit: Initial Assessment  Primary Care Provider verified and updated as needed: No  Readmission within the last 30 days: previous discharge plan unsuccessful   Return Category: Exacerbation of disease    Reason for Consult: discharge planning  Advance Care Planning:  Has a HCD on file naming her mother as her HCA       Communication Assessment  Patient's communication style: spoken language (English or Bilingual)    Hearing Difficulty or Deaf: no   Wear Glasses or Blind: yes    Cognitive  Cognitive/Neuro/Behavioral: WDL  Level of Consciousness: alert     Orientation: oriented x 4  Mood/Behavior: hypoactive (quiet, withdrawn)  Best Language: 0 - No aphasia  Speech: logical;spontaneous    Living Environment:   People in home: facility resident     Current living Arrangements: Currently at [facility] for CD treatment  Able to return to prior arrangements: (TBD)    Family/Social Support:  Care provided by: self(RN staff at CD treatment)  Provides care for: no one  Marital Status: Lives with Significant Other  Support: Parent(s);Significant Other    Description of Support System: Supportive    Support Assessment: Adequate family and caregiver support    Current Resources:   Skilled Home Care Services: None  Community Resources: Chemical Dependency Services;  Equipment currently used at home: none  Supplies currently used at home: None    Employment/Financial:  Employment Status: Disabled  Financial Concerns: Did not discuss today    Lifestyle & Psychosocial Needs:  Socioeconomic History     Marital status: Single     Spouse name: Not on file     Number of children: Not on file     Years of education: Not on file     Highest education level: Not on file     Tobacco Use     Smoking status: Current Some Day Smoker     Types: Cigarettes     Smokeless tobacco: Never  "Used     Tobacco comment: 6-8 per day   Substance and Sexual Activity     Alcohol use: Not Currently     Alcohol/week: 2.0 standard drinks     Types: 2 Glasses of wine per week     Frequency: 2-3 times a week     Comment: last drink 10/2/2020     Drug use: Not Currently     Functional Status:  Prior to admission patient needed assistance:   Dependent ADLs:: Independent  Dependent IADLs:: Independent  Assesssment of Functional Status: At functional baseline    Mental Health Status:  Mental Health Status: No Current Concerns, no diagnoses per chart review    Chemical Dependency Status:  Chemical Dependency Status: Current Concern, Pt has substance use disorder and tobacco use disorder.  Chemical Dependency Management. Patient is currently committed for CD treatment through [facility] (Commimtment CM: Soumya Radford, [phone]). She is committed to [facility]    Values/Beliefs:  Spiritual, Cultural Beliefs, Confucianist Practices, Values that affect care:  Values/Beliefs Comment: Did not discuss today    Additional Information:  MAI received a call from Liana ([phone], [email or web address]) at [facility]. [place] staff have a lot of concerns with medically managing patient. They have had to bring the patient multiple times to the ED and are \"concered with her medical condition\". Patient has been doing programming via Zoom due to need for quarantine every time she comes to the hospital. They are concerned about her nausea, disorientation, and fatigue. Per Liana, patient \"never looks good\". MAI normalized with Liana that patient does have medical concerns and may not always be feeling her best. Liana would appreciate any information from the MD on how they can best take care of patient to minimize hospitalizations. They are willing to take her back but would like SW to make a referral to [facility]. Liana would also like medical records from this hospitalization " emailed. MAI compiled those and sent to Liana via email.     Patient: SW spoke with patient regarding the need for an SEE for [facility] as they need medical records on how to take care of her better. Patient agreeable to sign. Bedside RN helped patient sign and SW sent to HIM for filing in her chart.     [facility]: They are unable to take anyone with medical needs as they are classified as an intensive OP program with lodging. Patient would need to manage her own needs.     Soumya Radford: MAI spoke with Soumya Radford regarding patient. Soumya is patient's commitment CM. Patient has been dealing with this medical condition for some months and spent 6 weeks at [facility] doing CD treatment while getting her medical needs managed. She was then discharged to [facility] in [facility]. Per Soumya, [facility] will not take patient back at this time. SW suggested [facility] but they had declined her while at [facility] as she lacked LTC needs. Soumya would appreciate if this SW would talk to the MD and have clear instructions for [facility] regarding her nausea, how many BM's she is supposed to have per day, and how much she needs to eat/drink to keep up with fluid loss. Soumya would also like patient to be scheduled for close community follow up to mitigate hospitalizations. Soumya is also thinking about making a referral for a CADI waiver as patient will have ongoing needs after her treatment ends.     Dr. Arellano: MAI spoke with Dr. Arellano. He is willing to write specific instructions on how to manage patient's nausea, how many BM's/lactulose regimen she is supposed to have per day, how to address her disorientation, how much she needs to drink/eat, and the need for close follow up with her PCP after discharge from the hospital.     SOPHIA Cobb, LGMAI  7A   Ph: [phone]  Pager: [phone]

## 2020-12-02 NOTE — PROCEDURES
Brief procedure note    EGD performed in endoscopy suite with conscious sedation.    Findings  Esophagus- small esophageal varices with scarring from prior banding    Stomach- erosions in stomach antrum, mild portal hypertensive gastropathy    Duodenum- normal    Assessment  ?melena and nausea from gastric erosions.  No active bleeding.      Recommend  1.  Return to floor  2.  Advance diet as tolerated  3.  PPI PO BID for 8 weeks  4.  Check h.pylori stool antigen    Regulo Link MD  Hepatology  [facility]

## 2020-12-02 NOTE — CONSULTS
Smoking Cessation Consult   2020    Patient: Yenifer Maher      :  1980                    MRN:[id number]      Hepatic encephalopathy (H) [K72.90]  Dehydration [E86.0] @HX    History of Present Illness: Yenifer Maher is a 40 year old female with a past medical history significant for alcohol abuse with alcoholic cirrhosis, portal HTN, encephalopathy, pulmonary hypertension, paroxysmal afib, tobacco dependence, neuropathy, macrocytic anemia, PUD, lupus anticoagulant positive who presents to the ED today for multiple complaints including chills, fatigue, confusion, nausea, diarrhea and lightheadedness.     Reason for Consult: Patient identified as current everyday/someday/heavy smoker per patient chart.     Patient to sharing about her tobacco use and in learning about more options and resources for quitting, staying quit, and in developing a relapse prevention plan.     Shared she'd been smoking since the age of 19; smoked an average of 1/2 to 1PPD over the years. Quit x1 for 1 year. Had other short bouts here and there. Tried nicotine patches as NRT during various hospital admissions; never used nicotine gums/lozenges/inhalers or Chantix/Welbuttrin. Had been trying to cut down in the last year or so. Currently smoking 6-8 cigs per day.      Asked about her thoughts and feelings about making another quit attempt. Accepted counseling and our follow-up support post discharge. Open to continuing nicotine patches post discharge. Open to trying nicotine lozenges as well. Will try to get an order for these, so patient can pick them up from our discharge pharmacy upon discharge.     Symptoms of craving or withdrawal: Stated she's not currently experiencing symptoms of withdrawal-- no intense cravings or urges to smoke.      Motivational Interviewing:     MI Intervention: Expressed Empathy/Understanding, Supported Autonomy, Collaboration, Evocation, Permission to raise concern or advise, Open-ended questions,  "Reflections: simple and complex, Change talk (evoked) and Reframe    Patients last cigarette/vape/e-cig/smokeless tobacco:  \"few days ago\"    Patients main reason for smoking include: Stress relieve, boredom, other ppl smoking; other ppl telling her to quit.     Top Reasons to quit smoking:  Hates the smell; health health; cost    Types of Tobacco and Amount   Cigarettes X   E-Cigs    Smokeless Tobacco    Cigars    Pipes    Waterpipes      Fagerstrom Test for Nicotine Dependence   How soon after waking do you smoke your first cigarette Within 5 minutes=3  5-30 minutes=2  31-60 minute=1  >61 minutes=0      1        Do you find it difficult to refrain from smoking in places where it is forbidden? e.g. [place], restaurants, etc? Yes=1  No=0        0   Which cigarette would you hate to give up? The first in the morning=1  Any other=0 1        How many cigarettes a day do you smoke? 10 or less=0  11-20=1  21-30=2  31 or more=3 0   Do you smoke more frequently in the morning?   Yes=1  No=0 1   Do you smoke even if you are sick in bed most of the day? Yes=1  No=0 0                                                                                                                                           Total Score 3   SCORE 1-2 = Low Dependence          3-4 = Low to Mod Dependence     5-7 = Moderate Dependence       8+ = High Dependence     Stage of Behavior Change:   Pre-contemplation - No intention    Contemplation - Change on the horizon X   Preparation - Getting Ready    Action - Consistently changed (within 6 months)    Maintenance - Staying quit (more than 6 months)    Relapse - Recycling      Patients Motivation to Quit Scale    Importance (1-10): 10   Confidence  (1-10): 6   Can Patient Imagine a Future without Smoking: YES   Quit Attempt Date:  TBD   Final Quit Date:       Education/Recommendations    Education:    -Provided educational workbook, \"Quitting for Good with Treatment and Support\". Discussed health risks " of continued smoking. Provided motivation and encouragement. Shared that it's never too late to quit smoking and that the benefits will be immediate and profoundly impactful. Shared that slipping up here and there doesn't necessarily mean she failed; rather, it's an opportunity to reflect and modify her behaviors and habits and to employ alternate strategies to deal with strong triggers.    Recommendations:   -Encouraged patient to use workbook to help her understand why she smokes, come up with 5 reasons to quit, and to imagine what her future looks like without smoking. Encouraged her to develop strategies to distract herself to get past cravings.     -Developed Smoking Cessation Relapse Prevention Plan that includes recommendation of:     **Continue the current 7mg patch daily for 4-6 weeks of smoking abstinence based on assessment of patients dependence level. Patient wants to  this medication from our discharge pharmacy upon discharge. Please place an order for this.    **Use of 2mg nicotine lozenge, take first thing in the morning and/as needed for cravings and urges to smoke. May be used every 1-2 hours. Patient wants to  this medication from our discharge pharmacy upon discharge. Please place an order for this.    **If the above is not effective, consider using 2mg nicotine inhaler.       **Participate in our post-hosp discharge follow-up calls, starting at 48 hrs post discharge, then at 14 days, 1 month, and at 6 months.     Time Spent: I spent 60 minutes with patient. Will notify provider of recommendations.    Gave contact information and direction on how she could reach us with any questions or concerns.     Bala Calabrese, RRT, CTTS  Chronic Pulmonary Disease Specialist  Cardiopulmonary Services   Office: [phone]  Pager: [phone]

## 2020-12-02 NOTE — PLAN OF CARE
"/76 (BP Location: Left arm)   Pulse 111   Temp 98.4  F (36.9  C) (Oral)   Resp 18   Ht 1.702 m (5' 7\")   Wt 70.2 kg (154 lb 12.8 oz)   SpO2 96%   BMI 24.25 kg/m   VS stable on room air. Patient denies pain. Patient denies nausea. Urine Output - voiding. Bowel Function - passing gas, had BM today. Nutrition - regular diet, good appetite. Activity - up ad marcelo in room, walked in hallway with therapy.     "

## 2020-12-02 NOTE — OR NURSING
EGD completed and pt tolerated well with conscious sedation and on 2L nc oxygen.  Report called to 7A RN and transport to take pt back to dept.  Pt sleepy, but arousable at this time.  OK per GI for pt to eat later this a.m.

## 2020-12-03 ENCOUNTER — APPOINTMENT (OUTPATIENT)
Dept: CT IMAGING | Facility: CLINIC | Age: 40
DRG: 433 | End: 2020-12-03
Attending: INTERNAL MEDICINE
Payer: MEDICARE

## 2020-12-03 LAB
ANION GAP SERPL CALCULATED.3IONS-SCNC: 8 MMOL/L (ref 3–14)
BACTERIA SPEC CULT: NO GROWTH
BACTERIA SPEC CULT: NO GROWTH
BUN SERPL-MCNC: 12 MG/DL (ref 7–30)
CALCIUM SERPL-MCNC: 8.7 MG/DL (ref 8.5–10.1)
CHLORIDE SERPL-SCNC: 108 MMOL/L (ref 94–109)
CO2 SERPL-SCNC: 19 MMOL/L (ref 20–32)
CREAT SERPL-MCNC: 0.62 MG/DL (ref 0.52–1.04)
ERYTHROCYTE [DISTWIDTH] IN BLOOD BY AUTOMATED COUNT: 14.7 % (ref 10–15)
GFR SERPL CREATININE-BSD FRML MDRD: >90 ML/MIN/{1.73_M2}
GLUCOSE SERPL-MCNC: 98 MG/DL (ref 70–99)
HCT VFR BLD AUTO: 27.9 % (ref 35–47)
HGB BLD-MCNC: 9.2 G/DL (ref 11.7–15.7)
MCH RBC QN AUTO: 30.8 PG (ref 26.5–33)
MCHC RBC AUTO-ENTMCNC: 33 G/DL (ref 31.5–36.5)
MCV RBC AUTO: 93 FL (ref 78–100)
PLATELET # BLD AUTO: 150 10E9/L (ref 150–450)
POTASSIUM SERPL-SCNC: 3.9 MMOL/L (ref 3.4–5.3)
RBC # BLD AUTO: 2.99 10E12/L (ref 3.8–5.2)
SODIUM SERPL-SCNC: 135 MMOL/L (ref 133–144)
SPECIMEN SOURCE: NORMAL
SPECIMEN SOURCE: NORMAL
WBC # BLD AUTO: 5.3 10E9/L (ref 4–11)

## 2020-12-03 PROCEDURE — 250N000013 HC RX MED GY IP 250 OP 250 PS 637: Performed by: INTERNAL MEDICINE

## 2020-12-03 PROCEDURE — 71275 CT ANGIOGRAPHY CHEST: CPT | Mod: 26 | Performed by: RADIOLOGY

## 2020-12-03 PROCEDURE — 99233 SBSQ HOSP IP/OBS HIGH 50: CPT | Performed by: INTERNAL MEDICINE

## 2020-12-03 PROCEDURE — 258N000003 HC RX IP 258 OP 636: Performed by: INTERNAL MEDICINE

## 2020-12-03 PROCEDURE — 80048 BASIC METABOLIC PNL TOTAL CA: CPT | Performed by: INTERNAL MEDICINE

## 2020-12-03 PROCEDURE — 71275 CT ANGIOGRAPHY CHEST: CPT

## 2020-12-03 PROCEDURE — 120N000011 HC R&B TRANSPLANT UMMC

## 2020-12-03 PROCEDURE — 87338 HPYLORI STOOL AG IA: CPT | Performed by: INTERNAL MEDICINE

## 2020-12-03 PROCEDURE — 250N000013 HC RX MED GY IP 250 OP 250 PS 637: Performed by: PHYSICIAN ASSISTANT

## 2020-12-03 PROCEDURE — 93010 ELECTROCARDIOGRAM REPORT: CPT | Performed by: INTERNAL MEDICINE

## 2020-12-03 PROCEDURE — 36415 COLL VENOUS BLD VENIPUNCTURE: CPT | Performed by: INTERNAL MEDICINE

## 2020-12-03 PROCEDURE — 250N000013 HC RX MED GY IP 250 OP 250 PS 637: Performed by: HOSPITALIST

## 2020-12-03 PROCEDURE — 93005 ELECTROCARDIOGRAM TRACING: CPT

## 2020-12-03 PROCEDURE — 250N000011 HC RX IP 250 OP 636: Performed by: STUDENT IN AN ORGANIZED HEALTH CARE EDUCATION/TRAINING PROGRAM

## 2020-12-03 PROCEDURE — 999N000127 HC STATISTIC PERIPHERAL IV START W US GUIDANCE

## 2020-12-03 PROCEDURE — 85027 COMPLETE CBC AUTOMATED: CPT | Performed by: INTERNAL MEDICINE

## 2020-12-03 RX ORDER — IOPAMIDOL 755 MG/ML
57 INJECTION, SOLUTION INTRAVASCULAR ONCE
Status: COMPLETED | OUTPATIENT
Start: 2020-12-03 | End: 2020-12-03

## 2020-12-03 RX ADMIN — IOPAMIDOL 57 ML: 755 INJECTION, SOLUTION INTRAVENOUS at 14:03

## 2020-12-03 RX ADMIN — ACETAMINOPHEN 325 MG: 325 TABLET, FILM COATED ORAL at 22:14

## 2020-12-03 RX ADMIN — SODIUM CHLORIDE 500 ML: 9 INJECTION, SOLUTION INTRAVENOUS at 13:25

## 2020-12-03 RX ADMIN — CELECOXIB 200 MG: 200 CAPSULE ORAL at 22:14

## 2020-12-03 RX ADMIN — SILDENAFIL 40 MG: 20 TABLET ORAL at 20:27

## 2020-12-03 RX ADMIN — Medication 50 MCG: at 08:24

## 2020-12-03 RX ADMIN — THERA TABS 1 TABLET: TAB at 08:24

## 2020-12-03 RX ADMIN — PANTOPRAZOLE SODIUM 40 MG: 40 TABLET, DELAYED RELEASE ORAL at 16:10

## 2020-12-03 RX ADMIN — THIAMINE HCL TAB 100 MG 100 MG: 100 TAB at 08:24

## 2020-12-03 RX ADMIN — CELECOXIB 200 MG: 200 CAPSULE ORAL at 16:51

## 2020-12-03 RX ADMIN — NICOTINE 7 MG/24 HR DAILY TRANSDERMAL PATCH 1 PATCH: at 13:40

## 2020-12-03 RX ADMIN — Medication 2.5 MG: at 00:55

## 2020-12-03 RX ADMIN — FEXOFENADINE HYDROCHLORIDE 180 MG: 180 TABLET, FILM COATED ORAL at 08:24

## 2020-12-03 RX ADMIN — RIFAXIMIN 550 MG: 550 TABLET ORAL at 20:27

## 2020-12-03 RX ADMIN — ACETAMINOPHEN 325 MG: 325 TABLET, FILM COATED ORAL at 16:51

## 2020-12-03 RX ADMIN — MELATONIN TAB 3 MG 6 MG: 3 TAB at 22:14

## 2020-12-03 RX ADMIN — ACETAMINOPHEN 325 MG: 325 TABLET, FILM COATED ORAL at 12:46

## 2020-12-03 RX ADMIN — LACTULOSE 20 G: 10 SOLUTION ORAL at 13:25

## 2020-12-03 RX ADMIN — LACTULOSE 20 G: 10 SOLUTION ORAL at 08:23

## 2020-12-03 RX ADMIN — LACTULOSE 20 G: 10 SOLUTION ORAL at 20:27

## 2020-12-03 RX ADMIN — SILDENAFIL 40 MG: 20 TABLET ORAL at 13:25

## 2020-12-03 RX ADMIN — SILDENAFIL 40 MG: 20 TABLET ORAL at 08:23

## 2020-12-03 RX ADMIN — RIFAXIMIN 550 MG: 550 TABLET ORAL at 08:24

## 2020-12-03 RX ADMIN — FOLIC ACID 1 MG: 1 TABLET ORAL at 08:24

## 2020-12-03 RX ADMIN — PANTOPRAZOLE SODIUM 40 MG: 40 TABLET, DELAYED RELEASE ORAL at 08:24

## 2020-12-03 ASSESSMENT — ACTIVITIES OF DAILY LIVING (ADL)
ADLS_ACUITY_SCORE: 16

## 2020-12-03 ASSESSMENT — MIFFLIN-ST. JEOR: SCORE: 1424.32

## 2020-12-03 NOTE — PROVIDER NOTIFICATION
Provider Notification  MD notified pt is having severe tooth pain r/t cracked molar (per pt report). MD ordered one-time dose of 2.5mg Oxycodone.

## 2020-12-03 NOTE — PLAN OF CARE
"VS: /61 (BP Location: Left arm)   Pulse 112   Temp 99.1  F (37.3  C) (Oral)   Resp 16   Ht 1.702 m (5' 7\")   Wt 71.2 kg (156 lb 14.4 oz)   SpO2 94%   BMI 24.57 kg/m      Current condition: Tachycardic, temp 99.1. OVSS on room air.   Neuro: WDL  Cardio: WDL  Respiratory: WDL  GI/: Last BM 12/3, voiding spontaneously. Need stool sample, patient aware.   Skin: WDL  Diet:   Regular diet. Good appetite.   LDA:  PIV infiltrated, new one ordered. Currently running 500 ml bolus.   Mobility:  UAL  Pain: Toothache due to two cracked molars.    PRN medications: Tylenol as needed.   Plan of Care: Patient to pulmonary study CT this afternoon. Will continue to monitor and assess for any changes.   "

## 2020-12-03 NOTE — PROGRESS NOTES
Care Management Follow Up    Length of Stay (days): 3    Expected Discharge Date: TBD  Concerns to be Addressed: Discharge Planning  Patient plan of care discussed at interdisciplinary rounds: Yes    Anticipated Discharge Disposition: TBD     Anticipated Discharge Services: Transportation, IMM  Anticipated Discharge DME: None    Patient/family educated on Medicare website which has current facility and service quality ratings: N/A, is committed and going to a CD treatment facility  Education Provided on the Discharge Plan: Yes  Patient/Family in Agreement with the Plan: Yes    Referrals Placed by CM/SW: Yes  Private pay costs discussed: Not applicable    Additional Information:  [facility] ([email or web address]): MAI received the following email from Liana,  Thank you for speaking with me about Yenifer RODRIGUEZ's status and helping to try and get her into [facility]'s program.  [facility] staff is wondering if it is possible to keep Yenifer at the hospital until she can get in to [facility], or at the least [facility] can temporarily have her here until she is able to admit to [facility]'s program.  Please let me know if you have any additional information, questions, or concerns.  MAI replied,  I talked to our [facility] program and they are not able to manage any medical needs as they are considered an intensive outpatient program with lodging, so she would need to manage everything on her own. I am having the MD write out specific instructions regarding how to manage her nausea, how many BM's/lactulose regimen she is supposed to have per day, how to address her disorientation, how much she needs to drink/eat, and that she needs close PCP follow up to mitigate more hospitalizations. With liver disease it is common for people to not feel well and if they are not keeping up on the lactulose, along with adequate nutritional/water intake, they can get worse. When reviewing the MD s notes, they believe her symptoms  "may be attributed to dehydration. They did an upper endoscopy on her during this hospitalization and are starting her on a PPI, so hopefully that will help. Soumya: Do you have any input-on placement for Yenifer?  Liana replied,  I received your response to my inquiry about [facility] with lodging and appreciate your feedback.  Our nursing staff has reviewed the records you sent yesterday and have forwarded them on to the provider who works with clients at [place] for review as well.  Our nurse has indicated that Yenifer is likely appropriate for skilled nursing facility with [facility]CHI treatment and has been in touch with [facility] in [place], [place].  [facility] does have an opening for a female admit to their program.  Is this something that both you and Soumya Radford can discuss as a potential referral?  The number to [facility] is [phone] if you need to inquire into specifics of their program. Please let me know your thoughts on this.  I've included our nurse, counselor manager, and director of treatment services in this email so that we can communicate with all parties involved in Yenifer's ongoing treatment planning.  SW replied,  I will send a referral but the last time one was sent they did not see a SNF level need. So, if we cannot get her there will you take her back?  Soumya Radford replied, \"Hello- I appreciate everyone s coordination in trying to meet Yenifer s needs. If the doctor believes she requires skilled nursing care and is recommending [facility] then I do support that referral. I m wondering what Yenifer s thoughts would be on this though, as I don t think she d be happy about moving that far out of the [facility]. Soumya, if you could keep us posted on your end that d be great. As long as Yenifer is prescribed the lactulose, which for now is the foreseeable future, she will likely experience some side effects and I think it makes sense to support her in measures to mitigate side effects- anti-nausea meds, Yenifer " "monitoring her dietary and hydration intake, etc.\"    [facility] (Ph: [phone], F: [phone]): MAI sent a referral for their SNF/RUCHI program. MAI then called admissions and left a message with [facility] requesting a call back. MAI was asked to email the records as they have not received them. [facility]'s email is [email or web address]. MAI then securely emailed her the referral.     Tasha Delgadillo CM ([phone] or [phone]): Suggested to coordinate a call with [facility]/RN with the hospital MD. She would be in agreement with [facility] placement if [facility] were unable to take patient back.     SOPHIA Cobb, [facility]  7A   Ph: [phone]  Pager: [phone]  "

## 2020-12-03 NOTE — CONSULTS
[facility]       NEUROSURGERY CONSULTATION NOTE    This consultation was requested by Dr. Arellano from the Medicine service.    Reason for Consultation: Compression fracture    HPI: Yenifer Maher is a 40 year old female with a PMH of alcohol abuse with alcoholic cirrhosis c/b portal hypertension, esophageal varices, ascites, and hepatic encephalopathy, pulmonary hypertension, paroxysmal atrial fibrillation, hx of calciphylaxis and peripheral neuropathy, admitted for observation for hepatic encephalopathy (chills, fatigue, diarrhea), who was incidentally found to have a T6 age indeterminate compression fracture with 50% height loss anteriorly and subsequent kyphosis. She endorses chronic history of diffuse back pain since childhood, with no recent worsening of back pain. She denies band like pain around her chest. She denies any recent history of trauma or falls, numbness or weakness of her lower extremities, loss of sphincter control or urinary retention.       PAST MEDICAL HISTORY:   Past Medical History:   Diagnosis Date     Alcohol abuse      Alcoholic cirrhosis (H)      Alcoholic peripheral neuropathy (H)      Coagulopathy (H)      Gastritis      History of Clostridium difficile colitis     unknown date     Impairment of cognitive function     MOCA 2/2019 22/30     Leukocytosis 02/2019    persistent leukocytosis across 2/2019 hospitalization without evidence of source across multiple diagnostics including LP, BCx, UCx      Lupus anticoagulant positive      Macrocytic anemia      Moderate protein-calorie malnutrition (H)      Paroxysmal A-fib (H) 02/2019    not on chronic anticoagulation     Peptic ulcer disease      Positive SMILEY (antinuclear antibody)      Subclinical hypothyroidism 02/2019    normal T3, T4     Tobacco dependence     0.5 PPD       PAST SURGICAL HISTORY:   Past Surgical History:   Procedure Laterality Date     ESOPHAGOSCOPY, GASTROSCOPY, DUODENOSCOPY  (EGD), COMBINED N/A 8/9/2019    Procedure: ESOPHAGOGASTRODUODENOSCOPY (EGD);  Surgeon: Sowmya Ibanez MD;  Location: [facility]     ESOPHAGOSCOPY, GASTROSCOPY, DUODENOSCOPY (EGD), COMBINED N/A 8/26/2019    Procedure: ESOPHAGOGASTRODUODENOSCOPY (EGD);  Surgeon: Leventhal, Thomas Michael, MD;  Location: [facility]     ESOPHAGOSCOPY, GASTROSCOPY, DUODENOSCOPY (EGD), COMBINED N/A 3/30/2020    Procedure: ESOPHAGOGASTRODUODENOSCOPY (EGD);  Surgeon: Sowmya Ibanez MD;  Location: [facility]     ESOPHAGOSCOPY, GASTROSCOPY, DUODENOSCOPY (EGD), COMBINED N/A 12/2/2020    Procedure: ESOPHAGOGASTRODUODENOSCOPY (EGD);  Surgeon: Regulo Abarca MD;  Location: [facility]     UPPER GI ENDOSCOPY  8/9/2019            FAMILY HISTORY:   Family History   Problem Relation Age of Onset     Skin Cancer Mother      Depression Mother      Hypertension Father      Depression Sister      Breast Cancer Maternal Aunt        SOCIAL HISTORY:   Social History     Tobacco Use     Smoking status: Current Some Day Smoker     Types: Cigarettes     Smokeless tobacco: Never Used     Tobacco comment: 6-8 per day   Substance Use Topics     Alcohol use: Not Currently     Alcohol/week: 2.0 standard drinks     Types: 2 Glasses of wine per week     Frequency: 2-3 times a week     Comment: last drink 10/2/2020       MEDICATIONS:  Medications Prior to Admission   Medication Sig Dispense Refill Last Dose     acetaminophen (TYLENOL) 325 MG tablet Take 325 mg by mouth every 4 hours as needed        bismuth subsalicylate (PEPTO BISMOL) 262 MG/15ML suspension Take 30 mL every half to one hour as needed. Do not exceed 8 doses in 24 hours.   11/23/2020 at 1000     calcium carbonate 750 MG CHEW Take 2-4 chew tab by mouth as needed for heartburn    11/28/2020 at 2155     celecoxib (CELEBREX) 200 MG capsule Take 200 mg by mouth 2 times daily as needed        fexofenadine (ALLEGRA) 180 MG tablet Take 180 mg by mouth daily   11/30/2020 at 0705     fluticasone  (FLONASE) 50 MCG/ACT nasal spray Spray 1 spray into both nostrils daily as needed         folic acid (FOLVITE) 1 MG tablet Take 1 tablet (1 mg) by mouth daily 30 tablet 0 11/30/2020 at 0705     furosemide (LASIX) 40 MG tablet Take 40 mg by mouth daily   11/30/2020 at 0705     ibuprofen (ADVIL/MOTRIN) 200 MG tablet Take 400 mg by mouth every 6 hours as needed for mild pain   11/26/2020 at 0829     lactulose (CHRONULAC) 10 GM/15ML solution Take 30 mLs (20 g) by mouth 3 times daily 5 mL 0 11/30/2020 at 0705     Lidocaine (LIDOCARE) 4 % Patch Place 1 patch onto the skin every 24 hours To prevent lidocaine toxicity, patient should be patch free for 12 hrs daily.   Past Week at AM     melatonin 5 MG tablet Take 5-10 mg by mouth nightly as needed for sleep        menthol (COUGH DROPS) 7 MG LOZG Take 1-2 lozenges by mouth as needed   11/29/2020 at 0837     multivitamin, therapeutic (THERA-VIT) TABS tablet Take 1 tablet by mouth daily 30 tablet 0 11/30/2020 at 0705     nicotine (NICODERM CQ) 7 MG/24HR 24 hr patch Place 1 patch onto the skin every 24 hours   11/30/2020 at 0705     nortriptyline (PAMELOR) 10 MG capsule Take 1 capsule (10 mg) by mouth At Bedtime 30 capsule 0 11/29/2020 at 2030     ondansetron (ZOFRAN-ODT) 4 MG ODT tab Take 4 mg by mouth every 8 hours as needed for nausea        pantoprazole (PROTONIX) 40 MG EC tablet Take 1 tablet (40 mg) by mouth 2 times daily (before meals) 60 tablet 0 11/30/2020 at 0705     rifaximin (XIFAXAN) 550 MG TABS tablet Take 550 mg by mouth 2 times daily         senna (SENOKOT) 8.6 MG tablet Take 1-2 tablets by mouth 2 times daily   11/30/2020 at 0705     sildenafil (REVATIO) 20 MG tablet Take 40 mg by mouth 3 times daily    11/30/2020 at 0705     simethicone (MYLICON) 125 MG chewable tablet Take 1-2 softgels as needed after meals and at bedtime        sodium chloride (OCEAN) 0.65 % nasal spray Inhale 2 sprays in the nostril(s) every 30 minutes as needed for nasal congestion or  "nasal dryness   11/23/2020 at 1008     spironolactone (ALDACTONE) 100 MG tablet Take 100 mg by mouth daily   11/30/2020 at 0705     thiamine (B-1) 100 MG tablet Take 100 mg by mouth daily   11/30/2020 at 0705     Vitamin D3 (CHOLECALCIFEROL) 25 mcg (1000 units) tablet Take 2 tablets by mouth daily   11/30/2020 at 0705       Allergies:  Allergies   Allergen Reactions     Bengay Pain Relief [Menthol] Other (See Comments)     Skin turns red and feels extremely hot     Cats      Chocolate Dermatitis     Dicyclomine Other (See Comments)     Severe sedation     Dust Mites      Derm, resp     No Clinical Screening - See Comments      GI upset     Phenobarbital      Pollen Extract      Derm, resp.        ROS: 10 point ROS of systems including Constitutional, Eyes, Respiratory, Cardiovascular, Gastroenterology, Genitourinary, Integumentary, Muscularskeletal, Psychiatric were all negative except for pertinent positives noted in my HPI.    Physical exam:   Blood pressure 119/61, pulse 112, temperature 99.1  F (37.3  C), temperature source Oral, resp. rate 16, height 1.702 m (5' 7\"), weight 71.2 kg (156 lb 14.4 oz), SpO2 94 %, not currently breastfeeding.  CV: RRR  PULM: breathing comfortably on room air  ABD: soft  NEUROLOGIC:  -- Awake; Alert; oriented x 3  -- Follows commands briskly  -- Speech fluent, spontaneous. No aphasia or dysarthria.  -- no gaze preference. No apparent hemineglect.  Cranial Nerves:  -- visual fields full to confrontation, PERRL 3-2mm bilat and brisk, extraocular movements intact  -- face symmetrical, tongue midline  -- sensory V1-V3 intact bilaterally  -- palate elevates symmetrically, uvula midline  -- hearing grossly intact bilat  -- Trapezii 5/5 strength bilat symmetric  -- Cerebellar: Finger nose finger without dysmetria    Motor:  Normal bulk / tone; no tremor, rigidity, or bradykinesia.  No muscle wasting or fasciculations  No Pronator Drift     Delt Bi Tri Hand Flexion/  Extension Iliopsoas " Quadriceps Hamstrings Tibialis Anterior Gastroc    C5 C6 C7 C8/T1 L2 L3 L4-S1 L4 S1   R 5 5 5 5 5 5 5 5 5   L 5 5 5 5 5 5 5 5 5   Sensory: allodynia (to light touch) distal feet and hands    Reflexes:     Bi Tri BR Norma Pat Ach Bab    C5-6 C7-8 C6 UMN L2-4 S1 UMN   R 2+ 2+ 2+ Norm 2+ 2+ Norm   L 2+ 2+ 2+ Norm 2+ 2+ Norm     Gait: Deferred      LABS:  Last Comprehensive Metabolic Panel:  Sodium   Date Value Ref Range Status   12/03/2020 135 133 - 144 mmol/L Final     Potassium   Date Value Ref Range Status   12/03/2020 3.9 3.4 - 5.3 mmol/L Final     Chloride   Date Value Ref Range Status   12/03/2020 108 94 - 109 mmol/L Final     Carbon Dioxide   Date Value Ref Range Status   12/03/2020 19 (L) 20 - 32 mmol/L Final     Anion Gap   Date Value Ref Range Status   12/03/2020 8 3 - 14 mmol/L Final     Glucose   Date Value Ref Range Status   12/03/2020 98 70 - 99 mg/dL Final     Urea Nitrogen   Date Value Ref Range Status   12/03/2020 12 7 - 30 mg/dL Final     Creatinine   Date Value Ref Range Status   12/03/2020 0.62 0.52 - 1.04 mg/dL Final     GFR Estimate   Date Value Ref Range Status   12/03/2020 >90 >60 mL/min/[1.73_m2] Final     Comment:     Non  GFR Calc  Starting 12/18/2018, serum creatinine based estimated GFR (eGFR) will be   calculated using the Chronic Kidney Disease Epidemiology Collaboration   (CKD-EPI) equation.       Calcium   Date Value Ref Range Status   12/03/2020 8.7 8.5 - 10.1 mg/dL Final     Lab Results   Component Value Date    WBC 5.3 12/03/2020     Lab Results   Component Value Date    RBC 2.99 12/03/2020     Lab Results   Component Value Date    HGB 9.2 12/03/2020     Lab Results   Component Value Date    HCT 27.9 12/03/2020     Lab Results   Component Value Date    MCV 93 12/03/2020     Lab Results   Component Value Date    MCH 30.8 12/03/2020     Lab Results   Component Value Date    MCHC 33.0 12/03/2020     Lab Results   Component Value Date    RDW 14.7 12/03/2020     Lab Results    Component Value Date     12/03/2020         IMAGING:  CT chest: Age indeterminate T7 anterior wedge compression fracture with 50% height loss with associated kyphosis. Apparent new fracture since CXR from March 2020.     ASSESSMENT:Yenifer Maher is a 40 year old female with a PMH of alcohol abuse with alcoholic cirrhosis c/b portal hypertension, esophageal varices, ascites, and hepatic encephalopathy, pulmonary hypertension, paroxysmal atrial fibrillation, hx of calciphylaxis and peripheral neuropathy, admitted for observation for hepatic encephalopathy, with incidental finding of an age indeterminate T7 compression fracture with associated kyphosis, asymptomatic and no neurological deficits associated to this finding.       RECOMMENDATIONS:  No need for neurosurgical intervention  Follow up with Neurosurgery [facility] (schedulers messaged) in 6 weeks with upright XR (ordered for discharge for you)  Neurosurgery will sign off, please callback for any questions    The patient was discussed with Dr. Sepulveda, neurosurgery chief resident, and Dr. Cotton, neurosurgery staff, and they agree with the above.    Karly Sykes MD  Neurosurgery Resident, PGY-1

## 2020-12-03 NOTE — PLAN OF CARE
"VS: /66 (BP Location: Left arm)   Pulse 55   Temp 99.5  F (37.5  C) (Oral)   Resp 18   Ht 1.702 m (5' 7\")   Wt 70.2 kg (154 lb 12.8 oz)   SpO2 96%   BMI 24.25 kg/m      Current condition: Stable on RA  Neuro: WDL  Cardio: WDL  Respiratory: WDL  GI/: Last BM 12/2, voiding spontaneously   Skin: WDL  Diet:   Regular diet. Good appetite.   Labs: Mag 1.9  LDA:  PIV saline locked.   Mobility:  UAL  Pain: Toothache.   PRN medications: Tylenol as needed.   Changes: Pt. Showered.   Plan of Care: Will continue to monitor and assess for any changes.   "

## 2020-12-03 NOTE — PLAN OF CARE
"/66 (BP Location: Left arm)   Pulse 122   Temp 99.5  F (37.5  C) (Oral)   Resp 18   Ht 1.702 m (5' 7\")   Wt 71.2 kg (156 lb 14.4 oz)   SpO2 96%   BMI 24.57 kg/m     Shift: 8743-9942  VS: VSS on RA, afebrile.   Neuro: AOx4  Respiratory: WDL  Pain/Nausea/PRN: c/o intense tooth pain r/t a broken molar. MD ordered one-time dose of 2.5mg Oxycodone which provided relief.   Diet: Reg  LDA: L PIV   GI/: Voids spontaneously, no BM overnight.   Skin: WDL  Mobility: Ind  Plan: Continue to monitor.    Will continue with POC, will notify MD with changes or concerns.     "

## 2020-12-03 NOTE — PROGRESS NOTES
[facility]     Medicine Progress Note - Hospitalist Service, Gold 8       Date of Admission:  11/30/2020  Assessment & Plan       Yenifer Maher is a 40 year old female with PMHx of alcohol abuse with alcoholic cirrhosis c/b portal hypertension, esophageal varices, ascites, and hepatic encephalopathy, pulmonary hypertension, paroxysmal atrial fibrillation, hx of calciphylaxis, lupus anticoagulant positive, neuropathy, macrocytic anemia, and PUD, who presented with chills, fatigue, nausea, abdominal cramping/distention, confusion on 11/26/2020, admitted to ED observation for hepatic encephalopathy, discharged on 11/28/2020 with higher dose of lactulose, re-presenting with continued chills, fatigue and now with diarrhea admitted on 11/30/2020.      #Nausea without emesis  #Abdominal pain  #Diarrhea   #Poor appetite    Work up in ED from OBS admission was unremarkable, with negative infectious work up (blood cx, UA, and COVID swab), abd US without ascites or PVT, and TTE with normal EF and without any right heart strain or pericardial effusion. DDx quite long with vague symptoms but includes underlying infection, viral syndrome, gastroenteritis. Low suspicion for any acute biliary process or acute hepatitis. With tachycardia as noted , likely dehydrated in setting of poor PO intake, diuretics and diarrhea, which could be contributing to her continued malaise.   -Infectious work up with UA, blood cultures, stool culture. CXR clear. COVID- neg  -Ct lactulose. Ct Rifaximin   -Ct to Hold diuretics   -Anti-emetics PRN   -Continue PTA PPI and tums     #Tachycardia: EKG in sinus tachycardia. Likely due to dehydration. Lower suspicion for sepsis. Hx of PVT and lupus anticoagulant positive not on anticoagulation due to high risk of bleeding.   -Follow up CTA chest. ADDENDUM: poor contrast timing. If patient continues to be tachycardic despite adequate IVF may repeat CTA Chest (no  CP/SOB/ palpitations)     12/3: TSh wnl.   - will give trial of 500 ml NS and repeat CT chest to r/o PE     #Alcohol cirrhosis: Alcohol cirrhosis c/b HE, portal HTN, varices, and remote hx of ascites. Hx of PVT in past. Last EGD 3/2020 with grade I EV,  with prior variceal banding in 2019.  MELD 19. PTA medication with rifaximin 550 mg BID, lactulose, spironolactone 100 mg daily, lasix 40 mg daily. Denies any recent alcohol use, currently residing at treatment facility. Recent abdominal US without any ascites or PVT. Recently had lactulose increased for HE. On exam, has mild asterixis with elevated ammonia level of 79. Likely has mild HE. Unclear underlying trigger. Patient reports one episode of black stool , so could have underlying GIB, but hemoglobin stable.   -Continue Rifaximin   -Hold Diuretics   -Utox and EtOH level   -Trend LFTs and INR  - Ct PPI bid  -Continue Thiamine, folic acid, and MVI   - appreciate GI review: s/p EGD 12/2:Stomach- erosions in stomach antrum, mild portal     hypertensive gastropathy  Ct PPI PO bid x 8 weeks, check H. Pylori     #Pulmonary hypertension: Recently diagnosed during prolonged hospital course at [facility], after presenting with nausea and diarrhea, ultimately developing cardiogenic shock requiring pressors and HD, diagnosed with severe cor pulmonale 2/2 portal hypertension, now on sildenafil 40 mg TID. Recently saw outpatient pulmonologist on 11/25, with recommendations for repeat TTE and RHC. TTE on 11/27 with normal EF, no right heart strain or pericardial effusion.   -Continue PTA sildenafil 40 mg TID  - need to follow up with cardiology as outpatient      #Incidental finding: Equivocal asymmetric tissue left breast upper outer quadrant. Outpatient dedicated evaluation in breast imaging center with diagnostic mammography and possible ultrasound is advised.     --------- Chronic medical problems --------     #Paroxysmal atrial fibrillation: EKG in sinus rhythm. Not on  "anticoagulation due to high risk of bleeding.   #Macrocytic anemia: Hemoglobin ranging 10.0s. Trend CBC.   #Gastritis/PUD: Continue PTA pantoprazole 40 mg BID.   #Lupus anticoagulant positive: Followed by Dr. Jeffery. Peviously on anticoagulation, but stopped due to high risk of bleeding in the past.   #Hx of non-uremic calciphylaxis: No current skin lesions. Follow up with dermatology PRN.   #Tobacco dependence: Continue PTA nicotine patch.   # Compression fracture deformity of the T6 vertebral body with about 50% loss of vertebral body height        Diet: Regular Diet Adult    DVT Prophylaxis: Pneumatic Compression Devices  Chavez Catheter: not present  Code Status: Full Code         Disposition Plan   Expected discharge: 2 - 3 days, recommended to prior living arrangement once adequate pain management/ tolerating PO medications and mental status at baseline.  Entered: Francis Arellano MD, MD 12/03/2020, 1:52 PM       The patient's care was discussed with the Bedside Nurse, Care Coordinator/, Patient and GI Consultant.    Francis Arellano MD, MD  Hospitalist Service, [address]   Contact information available via [facility] Paging/Directory  Please see sign in/sign out for up to date coverage information  ______________________________________________________________________    Interval History      Last 24 hr events noted.   Says had nausea but no vomiting  No CP but has palpitations. Felt SOB on walking with mild dizziness  No bleeding  No diarrhea since yesterday    Data reviewed today: I reviewed all medications, new labs and imaging results over the last 24 hours. I personally reviewed no images or EKG's today.    Physical Exam   /61 (BP Location: Left arm)   Pulse 112   Temp 99.1  F (37.3  C) (Oral)   Resp 16   Ht 1.702 m (5' 7\")   Wt 71.2 kg (156 lb 14.4 oz)   SpO2 94%   BMI 24.57 kg/m    Gen- pleasant  lying in bed  HEENT- NAD, " JERI  Neck- supple, no JVD elevation, no thyromegaly  CVS- I+II+ no m/r/g  RS- CTAB  Abdo- soft, no tenderness . No g/r/r   Ext- no edema   CNS-oriented to person/ place and time  Can count from 10 to 1 backwards    Data   BMP  Recent Labs   Lab 12/03/20  0610 12/02/20  0538 12/01/20  0652 11/30/20  2137 11/30/20  1709    136 137  --  133   POTASSIUM 3.9 4.1 4.4 4.3 4.6   CHLORIDE 108 110* 110*  --  102   MARKO 8.7 9.5 9.0  --  9.5   CO2 19* 19* 21  --  23   BUN 12 12 18  --  20   CR 0.62 0.68 0.76 0.90 0.90   GLC 98 91 94  --  98     CBC  Recent Labs   Lab 12/03/20  0610 12/02/20  0538 12/01/20  0652 12/01/20 0652 11/30/20  1709   WBC 5.3 5.6  --  5.1 6.2   RBC 2.99* 3.09*  --  3.01* 3.44*   HGB 9.2* 9.4*   < > 9.3* 10.4*   HCT 27.9* 28.7*  --  28.5* 31.4*   MCV 93 93  --  95 91   MCH 30.8 30.4  --  30.9 30.2   MCHC 33.0 32.8  --  32.6 33.1   RDW 14.7 14.6  --  14.4 14.0    143*  --  142* 182    < > = values in this interval not displayed.     INR  Recent Labs   Lab 12/02/20  0538 12/01/20  0652 11/30/20  1709 11/28/20  0617   INR 1.47* 1.52* 1.57* 1.54*     LFTs  Recent Labs   Lab 12/02/20  0538 12/01/20  0652 11/30/20  1709 11/28/20  0617   ALKPHOS 239* 251* 252* 270*   AST 85* 87* 110* 87*   ALT 38 36 45 40   BILITOTAL 2.8* 3.0* 3.2* 2.9*   PROTTOTAL 7.0 7.0 8.0 7.5   ALBUMIN 2.7* 2.7* 3.1* 2.9*      PANC  Recent Labs   Lab 11/30/20  2137 11/30/20  1709   LIPASE 241 154

## 2020-12-04 ENCOUNTER — APPOINTMENT (OUTPATIENT)
Dept: CT IMAGING | Facility: CLINIC | Age: 40
DRG: 433 | End: 2020-12-04
Payer: MEDICARE

## 2020-12-04 DIAGNOSIS — K70.30 ALCOHOLIC CIRRHOSIS OF LIVER WITHOUT ASCITES (H): Primary | ICD-10-CM

## 2020-12-04 LAB
H PYLORI AG STL QL IA: NEGATIVE
INTERPRETATION ECG - MUSE: NORMAL

## 2020-12-04 PROCEDURE — 250N000013 HC RX MED GY IP 250 OP 250 PS 637: Performed by: PHYSICIAN ASSISTANT

## 2020-12-04 PROCEDURE — 120N000011 HC R&B TRANSPLANT UMMC

## 2020-12-04 PROCEDURE — 250N000009 HC RX 250: Performed by: INTERNAL MEDICINE

## 2020-12-04 PROCEDURE — 70486 CT MAXILLOFACIAL W/O DYE: CPT

## 2020-12-04 PROCEDURE — 99232 SBSQ HOSP IP/OBS MODERATE 35: CPT | Performed by: INTERNAL MEDICINE

## 2020-12-04 PROCEDURE — 70486 CT MAXILLOFACIAL W/O DYE: CPT | Mod: 26 | Performed by: RADIOLOGY

## 2020-12-04 PROCEDURE — 250N000013 HC RX MED GY IP 250 OP 250 PS 637: Performed by: INTERNAL MEDICINE

## 2020-12-04 RX ORDER — IBUPROFEN 400 MG/1
400 TABLET, FILM COATED ORAL ONCE
Status: COMPLETED | OUTPATIENT
Start: 2020-12-04 | End: 2020-12-04

## 2020-12-04 RX ORDER — OXYMETAZOLINE HYDROCHLORIDE 0.05 G/100ML
2 SPRAY NASAL 2 TIMES DAILY
Status: DISCONTINUED | OUTPATIENT
Start: 2020-12-04 | End: 2020-12-05

## 2020-12-04 RX ORDER — ACETAMINOPHEN 325 MG/1
650 TABLET ORAL EVERY 4 HOURS PRN
Status: DISCONTINUED | OUTPATIENT
Start: 2020-12-04 | End: 2020-12-06 | Stop reason: HOSPADM

## 2020-12-04 RX ADMIN — IBUPROFEN 400 MG: 400 TABLET ORAL at 23:48

## 2020-12-04 RX ADMIN — FOLIC ACID 1 MG: 1 TABLET ORAL at 08:39

## 2020-12-04 RX ADMIN — RIFAXIMIN 550 MG: 550 TABLET ORAL at 08:39

## 2020-12-04 RX ADMIN — SALINE NASAL SPRAY 1 SPRAY: 1.5 SOLUTION NASAL at 23:48

## 2020-12-04 RX ADMIN — NICOTINE 7 MG/24 HR DAILY TRANSDERMAL PATCH 1 PATCH: at 10:26

## 2020-12-04 RX ADMIN — SIMETHICONE 80 MG: 80 TABLET, CHEWABLE ORAL at 23:40

## 2020-12-04 RX ADMIN — SIMETHICONE 80 MG: 80 TABLET, CHEWABLE ORAL at 00:54

## 2020-12-04 RX ADMIN — RIFAXIMIN 550 MG: 550 TABLET ORAL at 20:59

## 2020-12-04 RX ADMIN — FEXOFENADINE HYDROCHLORIDE 180 MG: 180 TABLET, FILM COATED ORAL at 08:38

## 2020-12-04 RX ADMIN — THIAMINE HCL TAB 100 MG 100 MG: 100 TAB at 08:38

## 2020-12-04 RX ADMIN — LACTULOSE 20 G: 10 SOLUTION ORAL at 13:31

## 2020-12-04 RX ADMIN — LACTULOSE 20 G: 10 SOLUTION ORAL at 08:38

## 2020-12-04 RX ADMIN — PANTOPRAZOLE SODIUM 40 MG: 40 TABLET, DELAYED RELEASE ORAL at 18:13

## 2020-12-04 RX ADMIN — Medication 50 MCG: at 08:38

## 2020-12-04 RX ADMIN — THERA TABS 1 TABLET: TAB at 08:40

## 2020-12-04 RX ADMIN — SILDENAFIL 40 MG: 20 TABLET ORAL at 13:30

## 2020-12-04 RX ADMIN — SILDENAFIL 40 MG: 20 TABLET ORAL at 21:02

## 2020-12-04 RX ADMIN — SILDENAFIL 40 MG: 20 TABLET ORAL at 08:38

## 2020-12-04 RX ADMIN — BENZOCAINE 1 ML: 220 SPRAY, METERED PERIODONTAL at 23:40

## 2020-12-04 RX ADMIN — PANTOPRAZOLE SODIUM 40 MG: 40 TABLET, DELAYED RELEASE ORAL at 08:38

## 2020-12-04 ASSESSMENT — ACTIVITIES OF DAILY LIVING (ADL)
ADLS_ACUITY_SCORE: 16

## 2020-12-04 ASSESSMENT — MIFFLIN-ST. JEOR: SCORE: 1451.99

## 2020-12-04 NOTE — PROGRESS NOTES
Gastroenterology Inpatient Sign Off Note    Inpatient GI consults service will sign off. No further recommendations at this time. If primary team has addition questions, please page consult fellow listed in [place].    Current GI Consult Staff: Dr Ibanez     Follow up recommendations:   - we will set up for her follow up with Hepatology clinic in about 1 month in January    Jac GASPAR MD  Gastroenterology Fellow  Division of Gastroenterology, Hepatology and Nutrition  [facility]  Text page Pager [phone]

## 2020-12-04 NOTE — PROGRESS NOTES
Care Management Discharge Note    Discharge Date: 12/6/2020 at 1000 via her joana Hall    Discharge Disposition: [facility]  Address: [address] [place] [id number]  Ph: [phone]  F: [phone]    Discharge Services: IMM, PAS    Discharge DME: None    Discharge Transportation: Joana Hall ([phone])    Private pay costs discussed: Co-Pay after day 20 at SNF    PAS Confirmation Code: [id number]   Patient/family educated on Medicare website which has current facility and service quality ratings:  N/A, on commitment and has limited choice of facility    Education Provided on the Discharge Plan: Yes  Persons Notified of Discharge Plans: Patient, patient's mother, Dr. Arellano, nursing staff, Soumya Radford ([phone] or [phone]), [facility]  Patient/Family in Agreement with the Plan: Yes    Handoff Referral Completed: No    Additional Information:  Staff: Please send patient with a packet and 7 days of filled medication. Bedside RN to also complete a nurse to nurse.     MD: Please send 7 days worth of medication to discharge pharmacy. This SW paged Dr. Arellano on 12/4 regarding this.     Weekend SW: Please fax discharge orders, discharge summary, and ensure she will be going with 7 days of meds. Please also complete IMM with patient.     SOPHIA Cobb, LGSW  7A   Ph: [phone]  Pager: [phone]

## 2020-12-04 NOTE — PROGRESS NOTES
Care Management Follow Up    Length of Stay (days): 4    Expected Discharge Date: TBD  Concerns to be Addressed: Discharge Planning  Patient plan of care discussed at interdisciplinary rounds: Yes     Anticipated Discharge Disposition: TBD  Anticipated Discharge Services: Transportation, IMM  Anticipated Discharge DME: None     Patient/family educated on Medicare website which has current facility and service quality ratings: N/A, is committed and going to a  treatment facility  Education Provided on the Discharge Plan: Yes  Patient/Family in Agreement with the Plan: Yes     Referrals Placed by CM/SW: Yes  Private pay costs discussed: Not applicable    Additional Information:  Conchita at [facility] (Ph: [phone], F: [phone]): SW spoke with Conchita at [facility]. They received the referral and will review today. They do need to confirm her insurance and SW needs to let Conchita know when she is ready for discharge.     Treatment Team: Patient is medically ready for discharge once dental sees her.      Conchita at [facility] (Ph: [phone], F: [phone]): SW updated Conchita via message with discharge readiness. Conchita can take patient whenever she has a ride and orders/discharge summary can be completed. The will need a ride time, 7 day supply of meds sent with her, orders, and a nurse to nurse.     [facility]: SW received a call from BRAYAN Crews ([phone]) at [facility]. Their MD has reviewed and they cannot take her back as she needs daily vitals, mental status exams, monitoring of PO intake, and labs which they don't have the capability to provide.    Soumya Radford and Patient: SW spoke with Soumya Radford regarding discharge planning. SW then conference called with patient and Soumya to discuss discharge plans. Patient very unhappy with discharge plan and feels like it is not an option for her. It was explained that since patient has a commitment she really has no choice. Patient  perseverated on how she would get her things from [place]. SW tried to problem solve with patient but she kept making excuses on why that would not work. MAI encouraged patient to call her mother to collect her belongings and bring to the hospital. After the call, Soumya spoke with patient's mother who will  patient's things tomorrow at [place] and bring them to her on Sunday. MAI emailed patient's mother back asking if family would be able to pick patient up and she is. See phone conversation below.     [id number] Completed.     [employer] ([phone]) and [employer] ([phone]): Patient no longer has ride benefits through her insurance as it termed on 10/31/20. [employer] also shows no benefits.     Isabel ([phone]): Discussed Medicare A, B, D. Patient's mother will need to have Soumya Radford call the county to figure out if patient qualifies for help with Medicare premiums. Isabel is able to come get patient at the hospital on 12/6 at 1000 and bring her to [facility]. MAI provided Isabel with the nursing unit phone number, address for [facility], and phone number for [facility]. Isabel thankful for all of th information.     SOPHIA Cobb, LGSW  7A   Ph: [phone]  Pager: [phone]

## 2020-12-04 NOTE — PLAN OF CARE
"VS: /65 (BP Location: Left arm)   Pulse 109   Temp 99  F (37.2  C) (Oral)   Resp 18   Ht 1.702 m (5' 7\")   Wt 71.2 kg (156 lb 14.4 oz)   SpO2 92%   BMI 24.57 kg/m      Current condition: Tachycardic Tmax of 99. Otherwise stable on RA.    Neuro: WDL  Cardio: WDL  Respiratory: WDL  GI/:  Last BM 12/3, Voiding spontaneously   Skin: WDL  Diet:   Regular   LDA:  PIV saline locked.   Mobility:  UAL  Pain: toothache, encouraged pt to discuss with primary team tomorrow about tooth pain.   PRN medications: Tylenol, Celebrex as needed.   Changes: 500 mL bolus given at start of shift. H. Pylori sample sent.  Plan of Care: Will continue to monitor and assess for any changes.   "

## 2020-12-04 NOTE — PROGRESS NOTES
[facility]     Medicine Progress Note - Hospitalist Service, Gold 8       Date of Admission:  11/30/2020  Assessment & Plan       Yenifer Maher is a 40 year old female with PMHx of alcohol abuse with alcoholic cirrhosis c/b portal hypertension, esophageal varices, ascites, and hepatic encephalopathy, pulmonary hypertension, paroxysmal atrial fibrillation, hx of calciphylaxis, lupus anticoagulant positive, neuropathy, macrocytic anemia, and PUD, who presented with chills, fatigue, nausea, abdominal cramping/distention, confusion on 11/26/2020, admitted to ED observation for hepatic encephalopathy, discharged on 11/28/2020 with higher dose of lactulose, re-presenting with continued chills, fatigue and now with diarrhea admitted on 11/30/2020.      #Nausea without emesis  #Abdominal pain  #Diarrhea   #Poor appetite    Work up in ED from OBS admission was unremarkable, with negative infectious work up (blood cx, UA, and COVID swab), abd US without ascites or PVT, and TTE with normal EF and without any right heart strain or pericardial effusion. DDx quite long with vague symptoms but includes underlying infection, viral syndrome, gastroenteritis. Low suspicion for any acute biliary process or acute hepatitis. With tachycardia as noted , likely dehydrated in setting of poor PO intake, diuretics and diarrhea, which could be contributing to her continued malaise.   -Infectious work up with UA, blood cultures, stool culture. CXR clear. COVID- neg  -Ct lactulose. Ct Rifaximin   -Ct to Hold diuretics   -Anti-emetics PRN   -Continue PTA PPI and tums     #Tachycardia: EKG in sinus tachycardia. Likely due to dehydration. Lower suspicion for sepsis. Hx of PVT and lupus anticoagulant positive not on anticoagulation due to high risk of bleeding.   -Follow up CTA chest. ADDENDUM: poor contrast timing. If patient continues to be tachycardic despite adequate IVF may repeat CTA Chest (no  "CP/SOB/ palpitations)     12/3: TSh wnl.   -repeat CT chest to r/o PE: Neg      #Alcohol cirrhosis: Alcohol cirrhosis c/b HE, portal HTN, varices, and remote hx of ascites. Hx of PVT in past. Last EGD 3/2020 with grade I EV,  with prior variceal banding in 2019.  MELD 19. PTA medication with rifaximin 550 mg BID, lactulose, spironolactone 100 mg daily, lasix 40 mg daily. Denies any recent alcohol use, currently residing at treatment facility. Recent abdominal US without any ascites or PVT. Recently had lactulose increased for HE. On exam, has mild asterixis with elevated ammonia level of 79. Likely has mild HE. Unclear underlying trigger. Patient reports one episode of black stool , so could have underlying GIB, but hemoglobin stable.   -Continue Rifaximin   -Hold Diuretics   -Utox and EtOH level   -Trend LFTs and INR  - Ct PPI bid  -Continue Thiamine, folic acid, and MVI   - appreciate GI review: s/p EGD 12/2:Stomach- erosions in stomach antrum, mild portal     hypertensive gastropathy  Ct PPI PO bid x 8 weeks, check H. Pylori     #Pulmonary hypertension: Recently [place] [facility] presenting with nausea and diarrhea, ultimately developing cardiogenic shock requiring pressors and HD, diagnosed with severe cor pulmonale 2/2 portal hypertension, now on sildenafil 40 mg TID. Recently saw outpatient pulmonologist on 11/25, with recommendations for repeat TTE and RHC. TTE on 11/27 with normal EF, no right heart strain or pericardial effusion.   -Continue PTA sildenafil 40 mg TID  - need to follow up with cardiology as outpatient      #Incidental finding: Equivocal asymmetric tissue left breast upper outer quadrant. Outpatient dedicated evaluation in breast imaging center with diagnostic mammography and possible ultrasound is advised.    # Compression fracture deformity of the T6 vertebral body with about 50% loss of vertebral body height  - appreciate NSG review: \"  No need for " "neurosurgical intervention  Follow up with [facility]surgery [facility] (schedulers messaged) in 6 weeks with upright XR \"    # Dental pain- a/w dental review . Topical benzocaine    --------- Chronic medical problems --------     #Paroxysmal atrial fibrillation: EKG in sinus rhythm. Not on anticoagulation due to high risk of bleeding.   #Macrocytic anemia: Hemoglobin ranging 10.0s. Trend CBC.   #Gastritis/PUD: Continue PTA pantoprazole 40 mg BID.   #Lupus anticoagulant positive: Followed by Dr. Jeffery. Peviously on anticoagulation, but stopped due to high risk of bleeding in the past.   #Hx of non-uremic calciphylaxis: No current skin lesions. Follow up with dermatology PRN.   #Tobacco dependence: Continue PTA nicotine patch.           Diet: Regular Diet Adult    DVT Prophylaxis: Pneumatic Compression Devices  Chavez Catheter: not present  Code Status: Full Code         Disposition Plan   Expected discharge: today/ tomorrow, recommended to prior living arrangement once safe disposition plan/ TCU bed available.  Entered: Francis Arellano MD, MD 12/04/2020, 12:31 PM       The patient's care was discussed with the Bedside Nurse, Care Coordinator/, Patient and GI Consultant.    Francis Arellano MD, MD  Hospitalist Service, [address]   Contact information available via [facility] Paging/Directory  Please see sign in/sign out for up to date coverage information  ______________________________________________________________________    Interval History      Last 24 hr events noted.   No new concerns. Feels much better    No CP . Breathing better  No bleeding  No diarrhea     Data reviewed today: I reviewed all medications, new labs and imaging results over the last 24 hours. I personally reviewed no images or EKG's today.    Physical Exam   /71 (BP Location: Right arm)   Pulse 112   Temp 98.6  F (37  C) (Oral)   Resp 16   Ht 1.702 m (5' 7\")   Wt 73.9 kg (163 " lb)   SpO2 94%   BMI 25.53 kg/m    Gen- pleasant  lying in bed  HEENT- NAD, JERI  Neck- supple, no JVD elevation, no thyromegaly  CVS- I+II+ no m/r/g  RS- CTAB  Abdo- soft, no tenderness. No g/r/r   Ext- no edema   CNS-oriented to person/ place and time  Can count from 10 to 1 backwards    Data   BMP  Recent Labs   Lab 12/03/20  0610 12/02/20  0538 12/01/20  0652 11/30/20  2137 11/30/20  1709    136 137  --  133   POTASSIUM 3.9 4.1 4.4 4.3 4.6   CHLORIDE 108 110* 110*  --  102   MARKO 8.7 9.5 9.0  --  9.5   CO2 19* 19* 21  --  23   BUN 12 12 18  --  20   CR 0.62 0.68 0.76 0.90 0.90   GLC 98 91 94  --  98     CBC  Recent Labs   Lab 12/03/20  0610 12/02/20  0538 12/01/20  0652 12/01/20 0652 11/30/20  1709   WBC 5.3 5.6  --  5.1 6.2   RBC 2.99* 3.09*  --  3.01* 3.44*   HGB 9.2* 9.4*   < > 9.3* 10.4*   HCT 27.9* 28.7*  --  28.5* 31.4*   MCV 93 93  --  95 91   MCH 30.8 30.4  --  30.9 30.2   MCHC 33.0 32.8  --  32.6 33.1   RDW 14.7 14.6  --  14.4 14.0    143*  --  142* 182    < > = values in this interval not displayed.     INR  Recent Labs   Lab 12/02/20  0538 12/01/20  0652 11/30/20  1709 11/28/20  0617   INR 1.47* 1.52* 1.57* 1.54*     LFTs  Recent Labs   Lab 12/02/20  0538 12/01/20  0652 11/30/20  1709 11/28/20  0617   ALKPHOS 239* 251* 252* 270*   AST 85* 87* 110* 87*   ALT 38 36 45 40   BILITOTAL 2.8* 3.0* 3.2* 2.9*   PROTTOTAL 7.0 7.0 8.0 7.5   ALBUMIN 2.7* 2.7* 3.1* 2.9*      PANC  Recent Labs   Lab 11/30/20 2137 11/30/20  1709   LIPASE 241 154

## 2020-12-04 NOTE — CONSULTS
Dental Hygiene Consult Service        Yenifer Maher MRN# [id number]   YOB: 1980 Age: 40 year old     Date of Admission: 11/30/2020  PCP is Flako Gaona  Date of Service: 12/4/2020           Reason for Consult:   Referring MD & Reason for Visit: I was asked by Belia Luz DO, to see Yenifer Maher for oral hygiene consult.            History of Present Illness:   This patient is a 40 year old female with a history ofof alcohol abuse with alcoholic cirrhosis c/b portal hypertension, esophageal varices, ascites, and hepatic encephalopathy, pulmonary hypertension, paroxysmal atrial fibrillation, hx of calciphylaxis, lupus anticoagulant positive, neuropathy, macrocytic anemia, and PUD, who presented with chills, fatigue, nausea, abdominal cramping/distention, confusion on 11/26/2020, admitted to ED observation for hepatic encephalopathy, discharged on 11/28/2020 with higher dose of lactulose, re-presenting with continued chills, fatigue and now with diarrhea admitted on 11/30/2020.    .  The patient reports broken teeth in LR quadrant with pain awakening her at night. Feeling sore and tender in submandibular area. Does not have 'good' dental insurance, has not seen a dentist in a long time. Family history of poor dentition.   The patient  does not have a history of aspiration.    The patient  does not have a history of dentures or dental appliances.      Current oral products and cares performed by the patient and/or nursing include:  Unknown.              Past Medical History:     Past Medical History:   Diagnosis Date     Alcohol abuse      Alcoholic cirrhosis (H)      Alcoholic peripheral neuropathy (H)      Coagulopathy (H)      Gastritis      History of Clostridium difficile colitis     unknown date     Impairment of cognitive function     MOCA 2/2019 22/30     Leukocytosis 02/2019    persistent leukocytosis across 2/2019 hospitalization without evidence of source across multiple diagnostics  including LP, BCx, UCx      Lupus anticoagulant positive      Macrocytic anemia      Moderate protein-calorie malnutrition (H)      Paroxysmal A-fib (H) 02/2019    not on chronic anticoagulation     Peptic ulcer disease      Positive SMILEY (antinuclear antibody)      Subclinical hypothyroidism 02/2019    normal T3, T4     Tobacco dependence     0.5 PPD               Social History:     Social History     Tobacco Use     Smoking status: Current Some Day Smoker     Types: Cigarettes     Smokeless tobacco: Never Used     Tobacco comment: 6-8 per day   Substance Use Topics     Alcohol use: Not Currently     Alcohol/week: 2.0 standard drinks     Types: 2 Glasses of wine per week     Frequency: 2-3 times a week     Comment: last drink 10/2/2020     Drug use: Not Currently              Medications:   No current facility-administered medications on file prior to encounter.        acetaminophen (TYLENOL) 325 MG tablet, Take 325 mg by mouth every 4 hours as needed       bismuth subsalicylate (PEPTO BISMOL) 262 MG/15ML suspension, Take 30 mL every half to one hour as needed. Do not exceed 8 doses in 24 hours.       calcium carbonate 750 MG CHEW, Take 2-4 chew tab by mouth as needed for heartburn        celecoxib (CELEBREX) 200 MG capsule, Take 200 mg by mouth 2 times daily as needed       fexofenadine (ALLEGRA) 180 MG tablet, Take 180 mg by mouth daily       fluticasone (FLONASE) 50 MCG/ACT nasal spray, Spray 1 spray into both nostrils daily as needed        folic acid (FOLVITE) 1 MG tablet, Take 1 tablet (1 mg) by mouth daily       furosemide (LASIX) 40 MG tablet, Take 40 mg by mouth daily       ibuprofen (ADVIL/MOTRIN) 200 MG tablet, Take 400 mg by mouth every 6 hours as needed for mild pain       lactulose (CHRONULAC) 10 GM/15ML solution, Take 30 mLs (20 g) by mouth 3 times daily       Lidocaine (LIDOCARE) 4 % Patch, Place 1 patch onto the skin every 24 hours To prevent lidocaine toxicity, patient should be patch free for 12  hrs daily.       melatonin 5 MG tablet, Take 5-10 mg by mouth nightly as needed for sleep       menthol (COUGH DROPS) 7 MG LOZG, Take 1-2 lozenges by mouth as needed       multivitamin, therapeutic (THERA-VIT) TABS tablet, Take 1 tablet by mouth daily       nicotine (NICODERM CQ) 7 MG/24HR 24 hr patch, Place 1 patch onto the skin every 24 hours       nortriptyline (PAMELOR) 10 MG capsule, Take 1 capsule (10 mg) by mouth At Bedtime       ondansetron (ZOFRAN-ODT) 4 MG ODT tab, Take 4 mg by mouth every 8 hours as needed for nausea       pantoprazole (PROTONIX) 40 MG EC tablet, Take 1 tablet (40 mg) by mouth 2 times daily (before meals)       rifaximin (XIFAXAN) 550 MG TABS tablet, Take 550 mg by mouth 2 times daily        senna (SENOKOT) 8.6 MG tablet, Take 1-2 tablets by mouth 2 times daily       sildenafil (REVATIO) 20 MG tablet, Take 40 mg by mouth 3 times daily        simethicone (MYLICON) 125 MG chewable tablet, Take 1-2 softgels as needed after meals and at bedtime       sodium chloride (OCEAN) 0.65 % nasal spray, Inhale 2 sprays in the nostril(s) every 30 minutes as needed for nasal congestion or nasal dryness       spironolactone (ALDACTONE) 100 MG tablet, Take 100 mg by mouth daily       thiamine (B-1) 100 MG tablet, Take 100 mg by mouth daily       Vitamin D3 (CHOLECALCIFEROL) 25 mcg (1000 units) tablet, Take 2 tablets by mouth daily              Physical Exam:   Head and neck exam:  Slightly enlarged and firm submandibular lymphnode, tender to palpation.   Soft Tissue exam:   erythematous slt edematous gingiva throughout dentition. No fistulas noted in vestibular areas.    Hard Tissue exam:  Several broken teeth: #14, 18, 20, 28,29,31. Missing teeth #19, #30. Erosion present throughout.            Assessment and Plan:   Assessment:  This patient is a 40 year old female with a history ofof alcohol abuse with alcoholic cirrhosis c/b portal hypertension, esophageal varices, ascites, and hepatic encephalopathy,  pulmonary hypertension, paroxysmal atrial fibrillation, hx of calciphylaxis, lupus anticoagulant positive, neuropathy, macrocytic anemia, and PUD, who presented with chills, fatigue, nausea, abdominal cramping/distention, confusion on 11/26/2020, admitted to ED observation for hepatic encephalopathy, discharged on 11/28/2020 with higher dose of lactulose, re-presenting with continued chills, fatigue and now with diarrhea admitted on 11/30/2020.    , oral exam concerning for several broken and missing teeth, pain on LR submandibular area.     Assessment and Plan:  #  Several broken teeth: #14, 18, 20, 28,29,31. Concern for infection.   -  Recommended workup includes: Consult to [facility] for dental consult.   -  General Practice Dentistry referral recommended? YES  -  Oral and Maxillofacial Surgery referral recommended?  NO    Oral Care Plan:    -  Consult with [facility] re: potential dental infections from broken teeth.   - Find DDS in community ([facility]) to take care of dental needs.   - While in hospital: Brushing 2x/day, fluoridated toothpaste, rinsing with water after having acidic beverages like pop, juice, gingerale, etc.     Shelbie Bush  Pager

## 2020-12-04 NOTE — PLAN OF CARE
NEURO: alert and oriented x4. Flat. Had small episode of epistaxis- MD notified- saline spray and afrin spray ordered for pt.   RESPIRATORY: LS clear, slightly diminished at bases. SpO2>92% on RA.   CARDIO: VSS.   GI/: BS active- no BM yet, 2 doses of lactulose given. Tolerating PO intake. Reported tooth pain- MD notified. Dental consult added, hurricane spray available and pt went down to dental CT- results pending.   SKIN: Intact.  ACTIVITY: Up ad marcelo.   PAIN: Reported tooth pain as well as baseline neuropathy pain. MD notified about tooth pain. Pt declined interventions for neuropathy.   LINES: PIV saline locked. Site WNL.   PLAN: Continue POC.

## 2020-12-04 NOTE — PLAN OF CARE
"/78 (BP Location: Right arm)   Pulse 108   Temp 99  F (37.2  C) (Oral)   Resp 16   Ht 1.702 m (5' 7\")   Wt 73.9 kg (163 lb)   SpO2 92%   BMI 25.53 kg/m      Patient A/O x4, stable on RA, afebrile. Patient complaint of gas pain treated w/ PRN simethicone. Patient also reported constant tooth pain, applied heat and ice w/ no relief. Paged provider at 0100 for one time order of oxy w/ no response. Encouraged patient to discuss tooth discomfort w/ primary team during days. Tolerating regular diet. PIV saline locked. No BM overnight. Voiding spontaneously.  Patient is up ad marcelo and calling appropriately.  Will continue with POC and notify MD with changes or concerns.    "

## 2020-12-05 LAB
MAGNESIUM SERPL-MCNC: 1.7 MG/DL (ref 1.6–2.3)
POTASSIUM SERPL-SCNC: 4.1 MMOL/L (ref 3.4–5.3)

## 2020-12-05 PROCEDURE — 120N000011 HC R&B TRANSPLANT UMMC

## 2020-12-05 PROCEDURE — 83735 ASSAY OF MAGNESIUM: CPT | Performed by: STUDENT IN AN ORGANIZED HEALTH CARE EDUCATION/TRAINING PROGRAM

## 2020-12-05 PROCEDURE — 250N000013 HC RX MED GY IP 250 OP 250 PS 637: Performed by: PHYSICIAN ASSISTANT

## 2020-12-05 PROCEDURE — 250N000009 HC RX 250: Performed by: INTERNAL MEDICINE

## 2020-12-05 PROCEDURE — 99232 SBSQ HOSP IP/OBS MODERATE 35: CPT | Performed by: INTERNAL MEDICINE

## 2020-12-05 PROCEDURE — 36415 COLL VENOUS BLD VENIPUNCTURE: CPT | Performed by: STUDENT IN AN ORGANIZED HEALTH CARE EDUCATION/TRAINING PROGRAM

## 2020-12-05 PROCEDURE — 84132 ASSAY OF SERUM POTASSIUM: CPT | Performed by: STUDENT IN AN ORGANIZED HEALTH CARE EDUCATION/TRAINING PROGRAM

## 2020-12-05 PROCEDURE — 250N000013 HC RX MED GY IP 250 OP 250 PS 637: Performed by: INTERNAL MEDICINE

## 2020-12-05 RX ORDER — GUAIFENESIN 600 MG/1
600 TABLET, EXTENDED RELEASE ORAL 2 TIMES DAILY
Status: DISCONTINUED | OUTPATIENT
Start: 2020-12-05 | End: 2020-12-06 | Stop reason: HOSPADM

## 2020-12-05 RX ORDER — DOXYCYCLINE 100 MG/1
100 CAPSULE ORAL EVERY 12 HOURS SCHEDULED
Status: DISCONTINUED | OUTPATIENT
Start: 2020-12-05 | End: 2020-12-06 | Stop reason: HOSPADM

## 2020-12-05 RX ADMIN — CELECOXIB 200 MG: 200 CAPSULE ORAL at 23:59

## 2020-12-05 RX ADMIN — LACTULOSE 20 G: 10 SOLUTION ORAL at 07:45

## 2020-12-05 RX ADMIN — PANTOPRAZOLE SODIUM 40 MG: 40 TABLET, DELAYED RELEASE ORAL at 07:45

## 2020-12-05 RX ADMIN — DOXYCYCLINE HYCLATE 100 MG: 100 CAPSULE ORAL at 22:08

## 2020-12-05 RX ADMIN — CELECOXIB 200 MG: 200 CAPSULE ORAL at 08:28

## 2020-12-05 RX ADMIN — RIFAXIMIN 550 MG: 550 TABLET ORAL at 07:45

## 2020-12-05 RX ADMIN — LACTULOSE 20 G: 10 SOLUTION ORAL at 15:38

## 2020-12-05 RX ADMIN — FOLIC ACID 1 MG: 1 TABLET ORAL at 07:44

## 2020-12-05 RX ADMIN — RIFAXIMIN 550 MG: 550 TABLET ORAL at 22:08

## 2020-12-05 RX ADMIN — ACETAMINOPHEN 650 MG: 325 TABLET, FILM COATED ORAL at 08:29

## 2020-12-05 RX ADMIN — Medication 50 MCG: at 07:45

## 2020-12-05 RX ADMIN — NICOTINE 7 MG/24 HR DAILY TRANSDERMAL PATCH 1 PATCH: at 12:07

## 2020-12-05 RX ADMIN — SILDENAFIL 40 MG: 20 TABLET ORAL at 07:44

## 2020-12-05 RX ADMIN — SILDENAFIL 40 MG: 20 TABLET ORAL at 22:08

## 2020-12-05 RX ADMIN — Medication: at 10:01

## 2020-12-05 RX ADMIN — LACTULOSE 20 G: 10 SOLUTION ORAL at 22:09

## 2020-12-05 RX ADMIN — SALINE NASAL SPRAY 2 SPRAY: 1.5 SOLUTION NASAL at 22:11

## 2020-12-05 RX ADMIN — PANTOPRAZOLE SODIUM 40 MG: 40 TABLET, DELAYED RELEASE ORAL at 15:39

## 2020-12-05 RX ADMIN — SILDENAFIL 40 MG: 20 TABLET ORAL at 15:39

## 2020-12-05 RX ADMIN — THIAMINE HCL TAB 100 MG 100 MG: 100 TAB at 07:45

## 2020-12-05 RX ADMIN — THERA TABS 1 TABLET: TAB at 07:45

## 2020-12-05 RX ADMIN — GUAIFENESIN 600 MG: 600 TABLET ORAL at 22:08

## 2020-12-05 RX ADMIN — LIDOCAINE 1 PATCH: 560 PATCH PERCUTANEOUS; TOPICAL; TRANSDERMAL at 07:45

## 2020-12-05 RX ADMIN — FEXOFENADINE HYDROCHLORIDE 180 MG: 180 TABLET, FILM COATED ORAL at 07:45

## 2020-12-05 ASSESSMENT — ACTIVITIES OF DAILY LIVING (ADL)
ADLS_ACUITY_SCORE: 16

## 2020-12-05 ASSESSMENT — MIFFLIN-ST. JEOR: SCORE: 1451.99

## 2020-12-05 NOTE — PLAN OF CARE
"/51 (BP Location: Right arm)   Pulse 110   Temp 98.6  F (37  C) (Oral)   Resp 16   Ht 1.702 m (5' 7\")   Wt 73.9 kg (163 lb)   SpO2 94%   BMI 25.53 kg/m      Patient tachycardic, OVSS on RA, afebrile. Pain managed w/ ibuprofen and benzocaine spray. Pt requested order for benzocaine lozenges as well, now available. No complaints of nausea; gas discomfort treated w/ simethicone. Nicotine patch in place. Tolerating regular diet with good appetite. PIV saline locked. Voiding independently. No BM overnight.  Patient is up independently.   Will continue with POC and notify MD with changes or concerns.    "

## 2020-12-05 NOTE — PROGRESS NOTES
[facility]     Medicine Progress Note - Hospitalist Service, Gold 8       Date of Admission:  11/30/2020  Assessment & Plan       Yenifer Maher is a 40 year old female with PMHx of alcohol abuse with alcoholic cirrhosis c/b portal hypertension, esophageal varices, ascites, and hepatic encephalopathy, pulmonary hypertension, paroxysmal atrial fibrillation, hx of calciphylaxis, lupus anticoagulant positive, neuropathy, macrocytic anemia, and PUD, who presented with chills, fatigue, nausea, abdominal cramping/distention, confusion on 11/26/2020, admitted to ED observation for hepatic encephalopathy, discharged on 11/28/2020 with higher dose of lactulose, re-presenting with continued chills, fatigue and now with diarrhea admitted on 11/30/2020.      #Nausea without emesis  #Abdominal pain  #Diarrhea   #Poor appetite    Work up in ED from OBS admission was unremarkable, with negative infectious work up (blood cx, UA, and COVID swab), abd US without ascites or PVT, and TTE with normal EF and without any right heart strain or pericardial effusion. DDx quite long with vague symptoms but includes underlying infection, viral syndrome, gastroenteritis. Low suspicion for any acute biliary process or acute hepatitis. With tachycardia as noted , likely dehydrated in setting of poor PO intake, diuretics and diarrhea, which could be contributing to her continued malaise.   -Infectious work up with UA, blood cultures, stool culture. CXR clear. COVID- neg  -Ct lactulose. Ct Rifaximin   -Ct to Hold diuretics   -Anti-emetics PRN   -Continue PTA PPI and tums     #Tachycardia: EKG in sinus tachycardia. Likely due to dehydration. Lower suspicion for sepsis. Hx of PVT and lupus anticoagulant positive not on anticoagulation due to high risk of bleeding.   -Follow up CTA chest. ADDENDUM: poor contrast timing. If patient continues to be tachycardic despite adequate IVF may repeat CTA Chest (no  "CP/SOB/ palpitations)     12/3: TSh wnl.   -repeat CT chest to r/o PE: Neg      #Alcohol cirrhosis: Alcohol cirrhosis c/b HE, portal HTN, varices, and remote hx of ascites. Hx of PVT in past. Last EGD 3/2020 with grade I EV,  with prior variceal banding in 2019.  MELD 19. PTA medication with rifaximin 550 mg BID, lactulose, spironolactone 100 mg daily, lasix 40 mg daily. Denies any recent alcohol use, currently residing at treatment facility. Recent abdominal US without any ascites or PVT. Recently had lactulose increased for HE. On exam, has mild asterixis with elevated ammonia level of 79. Likely has mild HE. Unclear underlying trigger. Patient reports one episode of black stool , so could have underlying GIB, but hemoglobin stable.   -Continue Rifaximin   -Hold Diuretics   -Utox and EtOH level   -Trend LFTs and INR  - Ct PPI bid  -Continue Thiamine, folic acid, and MVI   - appreciate GI review: s/p EGD 12/2:Stomach- erosions in stomach antrum, mild portal     hypertensive gastropathy  Ct PPI PO bid x 8 weeks, check H. Pylori     #Pulmonary hypertension: Recently diagnosed [place] [facility] with nausea and diarrhea, ultimately developing cardiogenic shock requiring pressors and HD, diagnosed with severe cor pulmonale 2/2 portal hypertension, now on sildenafil 40 mg TID. Recently saw outpatient pulmonologist on 11/25, with recommendations for repeat TTE and RHC. TTE on 11/27 with normal EF, no right heart strain or pericardial effusion.   -Continue PTA sildenafil 40 mg TID  - need to follow up with cardiology as outpatient      #Incidental finding: Equivocal asymmetric tissue left breast upper outer quadrant. Outpatient dedicated evaluation in breast imaging center with diagnostic mammography and possible ultrasound is advised.    # Compression fracture deformity of the T6 vertebral body with about 50% loss of vertebral body height  - appreciate NSG review: \"  No need for " "neurosurgical intervention  Follow up with [facility] (schedulers messaged) in 6 weeks with upright XR \"    # Dental pain- appreciate dental review . Topical benzocaine  - O/P follow up    --------- Chronic medical problems --------     #Paroxysmal atrial fibrillation: EKG in sinus rhythm. Not on anticoagulation due to high risk of bleeding.   #Macrocytic anemia: Hemoglobin ranging 10.0s. Trend CBC.   #Gastritis/PUD: Continue PTA pantoprazole 40 mg BID.   #Lupus anticoagulant positive: Followed by Dr. Jeffery. Peviously on anticoagulation, but stopped due to high risk of bleeding in the past.   #Hx of non-uremic calciphylaxis: No current skin lesions. Follow up with dermatology PRN.   #Tobacco dependence: Continue PTA nicotine patch.           Diet: Regular Diet Adult    DVT Prophylaxis: Pneumatic Compression Devices  Chavez Catheter: not present  Code Status: Full Code         Disposition Plan   Expected discharge: Tomorrow, recommended to transitional care unit once safe disposition plan/ TCU bed available.  Entered: Francis Arellano MD, MD 12/05/2020, 11:11 AM       The patient's care was discussed with the Bedside Nurse, Care Coordinator/, Patient and GI Consultant.    Francis Arellano MD, MD  Hospitalist Service, [address]   Contact information available via [facility] Paging/Directory  Please see sign in/sign out for up to date coverage information  ______________________________________________________________________    Interval History      Last 24 hr events noted.   perssitent mild pain in abdomen but tolerating PO.   No vomiting  No CP . Breathing better  No bleeding  No diarrhea     Data reviewed today: I reviewed all medications, new labs and imaging results over the last 24 hours. I personally reviewed no images or EKG's today.    Physical Exam   /63 (BP Location: Right arm)   Pulse 112   Temp 97.8  F (36.6  C) (Oral)   Resp " "16   Ht 1.702 m (5' 7\")   Wt 73.9 kg (163 lb)   SpO2 94%   BMI 25.53 kg/m    Gen- pleasant  lying in bed  HEENT- NAD, JERI  Neck- supple, no JVD elevation, no thyromegaly  CVS- I+II+ no m/r/g  RS- CTAB  Abdo- soft, no tenderness. No g/r/r   Ext- no edema   CNS-oriented to person/ place and time    Data   BMP  Recent Labs   Lab 12/05/20  0707 12/03/20  0610 12/02/20  0538 12/01/20  0652 11/30/20  2137 11/30/20  1709   NA  --  135 136 137  --  133   POTASSIUM 4.1 3.9 4.1 4.4 4.3 4.6   CHLORIDE  --  108 110* 110*  --  102   MARKO  --  8.7 9.5 9.0  --  9.5   CO2  --  19* 19* 21  --  23   BUN  --  12 12 18  --  20   CR  --  0.62 0.68 0.76 0.90 0.90   GLC  --  98 91 94  --  98     CBC  Recent Labs   Lab 12/03/20  0610 12/02/20  0538 12/01/20  0652 12/01/20 0652 11/30/20  1709   WBC 5.3 5.6  --  5.1 6.2   RBC 2.99* 3.09*  --  3.01* 3.44*   HGB 9.2* 9.4*   < > 9.3* 10.4*   HCT 27.9* 28.7*  --  28.5* 31.4*   MCV 93 93  --  95 91   MCH 30.8 30.4  --  30.9 30.2   MCHC 33.0 32.8  --  32.6 33.1   RDW 14.7 14.6  --  14.4 14.0    143*  --  142* 182    < > = values in this interval not displayed.     INR  Recent Labs   Lab 12/02/20  0538 12/01/20  0652 11/30/20  1709   INR 1.47* 1.52* 1.57*     LFTs  Recent Labs   Lab 12/02/20  0538 12/01/20  0652 11/30/20  1709   ALKPHOS 239* 251* 252*   AST 85* 87* 110*   ALT 38 36 45   BILITOTAL 2.8* 3.0* 3.2*   PROTTOTAL 7.0 7.0 8.0   ALBUMIN 2.7* 2.7* 3.1*      PANC  Recent Labs   Lab 11/30/20 2137 11/30/20  1709   LIPASE 241 154     "

## 2020-12-05 NOTE — PROGRESS NOTES
Care Management Follow Up    Length of Stay (days): 5    Expected Discharge Date: 12/05/20     Concerns to be Addressed:     confirmed discharge plans   Patient plan of care discussed at interdisciplinary rounds: No    Anticipated Discharge Disposition:     [facility]  Address: [address] [place] [id number]  Ph: [phone]  F: [phone]     Anticipated Discharge Services:  IMM  Anticipated Discharge DME:  none     Patient/family educated on Medicare website which has current facility and service quality ratings:    N/A, on commitment and has limited choice of facility  Education Provided on the Discharge Plan:  yes, spoke with Yenifer, left voicemail with mom who is transporting. Spoke with [place] charge nurse.   Patient/Family in Agreement with the Plan:  Yenifer is in agreement, expresses a little bit of apprehensiveness given her recent change in condition that prompted hospitalization. She voices disappointment that her medical needs could not be managed at [place].     Referrals Placed by CM/SW:  no referrals placed today.   Private pay costs discussed:did not discuss today.     Additional Information:  Spoke with Yenifer about discharge plans. Previous social work notes indicate her mother is providing transportation. Yenifer wondered about future dental appointment, reviewed notes which indicate an outpatient appointment can be set up after discharge. Informed Yenifer that  at [place] can help her identify options for transportation for future dental appointment, if needed.         Vicki Gatica, AMOS   12/5/2020    Text paging available through [employer] on [employer] - search SOCIAL WORK    ON CALL PAGER   0800 - 1600   [phone]    ON CALL COVERAGE AFTER 1600  [phone]

## 2020-12-05 NOTE — CONSULTS
Dental Service Consultation        Yenifer Maher MRN# [id number]   YOB: 1980 Age: 40 year old   Date of Admission: 11/30/2020     Reason for consult: I was asked by Dr. Arellano to evaluate this patient for multiple broken teeth.           Assessment and Plan:   Assessment:  - Extra-oral exam revealed no significant findings.  - No extra-oral swelling is seen  - Patient has multiple decayed teeth (most premolars and molars)  - CT reveals decays on teeth #[id number] and mucosal thickening in the left left maxillary sinus.  - Sensitivity to percussion on molars and premolars, likely reversible pulpitis. Patient reports pain provoked by hot, cold, acid food.  - No pain on palpation inta-orally  - Generalized bleeding and sensitivity on probing.  - No abscesses, sinus tract or intra-oral swelling is seen.      Plan:  - Patient requires comprehensive exam and care in clinic. No urgent care is needed in the hospital at the moment.  - We will see the patient at the [facility] in [facility] once she is discharged from the hospital.             Chief Complaint:   Provoked teeth pain with mastication, especially the right side.         History of Present Illness:   This patient is a 40 year old female who presents to ED and admitted to patient care with alcohol cirrhosis and hepatic HTN.              Past Medical History:     Past Medical History:   Diagnosis Date     Alcohol abuse      Alcoholic cirrhosis (H)      Alcoholic peripheral neuropathy (H)      Coagulopathy (H)      Gastritis      History of Clostridium difficile colitis     unknown date     Impairment of cognitive function     MOCA 2/2019 22/30     Leukocytosis 02/2019    persistent leukocytosis across 2/2019 hospitalization without evidence of source across multiple diagnostics including LP, BCx, UCx      Lupus anticoagulant positive      Macrocytic anemia      Moderate protein-calorie malnutrition (H)      Paroxysmal  A-fib (H) 02/2019    not on chronic anticoagulation     Peptic ulcer disease      Positive SMILEY (antinuclear antibody)      Subclinical hypothyroidism 02/2019    normal T3, T4     Tobacco dependence     0.5 PPD             Past Surgical History:     Past Surgical History:   Procedure Laterality Date     ESOPHAGOSCOPY, GASTROSCOPY, DUODENOSCOPY (EGD), COMBINED N/A 8/9/2019    Procedure: ESOPHAGOGASTRODUODENOSCOPY (EGD);  Surgeon: Sowmya Ibanez MD;  Location: [facility]     ESOPHAGOSCOPY, GASTROSCOPY, DUODENOSCOPY (EGD), COMBINED N/A 8/26/2019    Procedure: ESOPHAGOGASTRODUODENOSCOPY (EGD);  Surgeon: Leventhal, Thomas Michael, MD;  Location: [facility]     ESOPHAGOSCOPY, GASTROSCOPY, DUODENOSCOPY (EGD), COMBINED N/A 3/30/2020    Procedure: ESOPHAGOGASTRODUODENOSCOPY (EGD);  Surgeon: Sowmya Ibanez MD;  Location: [facility]     ESOPHAGOSCOPY, GASTROSCOPY, DUODENOSCOPY (EGD), COMBINED N/A 12/2/2020    Procedure: ESOPHAGOGASTRODUODENOSCOPY (EGD);  Surgeon: Regulo Abarca MD;  Location: [facility]     UPPER GI ENDOSCOPY  8/9/2019                    Social History:     Social History     Tobacco Use     Smoking status: Current Some Day Smoker     Types: Cigarettes     Smokeless tobacco: Never Used     Tobacco comment: 6-8 per day   Substance Use Topics     Alcohol use: Not Currently     Alcohol/week: 2.0 standard drinks     Types: 2 Glasses of wine per week     Frequency: 2-3 times a week     Comment: last drink 10/2/2020             Family History:     Family History   Problem Relation Age of Onset     Skin Cancer Mother      Depression Mother      Hypertension Father      Depression Sister      Breast Cancer Maternal Aunt              Immunizations:     Immunization History   Administered Date(s) Administered     TDAP Vaccine (Adacel) 03/14/2008             Allergies:     Allergies   Allergen Reactions     Bengay Pain Relief [Menthol] Other (See Comments)     Skin turns red and feels extremely hot      Cats      Chocolate Dermatitis     Dicyclomine Other (See Comments)     Severe sedation     Dust Mites      Derm, resp     No Clinical Screening - See Comments      GI upset     Phenobarbital      Pollen Extract      Derm, resp.              Medications:     Current Facility-Administered Medications Ordered in Epic   Medication Dose Route Frequency Last Rate Last Admin     acetaminophen (TYLENOL) tablet 650 mg  650 mg Oral Q4H PRN         benzocaine 20% (HURRICAINE/TOPEX) 20 % spray 0.5-1 mL  1-2 spray Mouth/Throat Q3H PRN         calcium carbonate chew tablet 750 mg  750 mg Oral Daily PRN         celecoxib (celeBREX) capsule 200 mg  200 mg Oral BID PRN   200 mg at 12/03/20 2214     Daily 2 GRAM acetaminophen limit, unless fulminent liver failure or transaminases greater than or equal to 300 - 400, then none   Does not apply Continuous PRN         [Held by provider] enoxaparin ANTICOAGULANT (LOVENOX) injection 40 mg  40 mg Subcutaneous Q24H         fexofenadine (ALLEGRA) tablet 180 mg  180 mg Oral Daily   180 mg at 12/04/20 0838     fluticasone (FLONASE) 50 MCG/ACT spray 1 spray  1 spray Both Nostrils Daily PRN         folic acid (FOLVITE) tablet 1 mg  1 mg Oral Daily   1 mg at 12/04/20 0839     lactulose (CHRONULAC) solution 20 g  20 g Oral TID   20 g at 12/04/20 1331     Lidocaine (LIDOCARE) 4 % Patch 1 patch  1 patch Transdermal Q24H   1 patch at 12/01/20 2130     lidocaine (LMX4) cream   Topical Q1H PRN         lidocaine 1 % 0.1-1 mL  0.1-1 mL Other Q1H PRN         lidocaine patch in PLACE   Transdermal Q8H         melatonin tablet 6 mg  6 mg Oral At Bedtime PRN   6 mg at 12/03/20 2214     multivitamin, therapeutic (THERA-VIT) tablet 1 tablet  1 tablet Oral Daily   1 tablet at 12/04/20 0840     nicotine (NICODERM CQ) 7 MG/24HR 24 hr patch 1 patch  1 patch Transdermal Q24H   1 patch at 12/04/20 1026     nicotine Patch in Place   Transdermal Q8H         ondansetron (ZOFRAN-ODT) ODT tab 4 mg  4 mg Oral Q6H PRN         Or     ondansetron (ZOFRAN) injection 4 mg  4 mg Intravenous Q6H PRN         oxymetazoline (AFRIN) 0.05 % spray 2 spray  2 spray Both Nostrils BID         pantoprazole (PROTONIX) EC tablet 40 mg  40 mg Oral BID AC   40 mg at 12/04/20 1813     polyethylene glycol (MIRALAX) Packet 17 g  17 g Oral Daily PRN         prochlorperazine (COMPAZINE) injection 5 mg  5 mg Intravenous Q6H PRN   5 mg at 12/01/20 2128     rifaximin (XIFAXAN) tablet 550 mg  550 mg Oral BID   550 mg at 12/04/20 0839     sildenafil (REVATIO) tablet 40 mg  40 mg Oral TID   40 mg at 12/04/20 1330     simethicone (MYLICON) chewable tablet 80 mg  80 mg Oral 4x Daily PRN   80 mg at 12/04/20 0054     sodium chloride (OCEAN) 0.65 % nasal spray 1 spray  1 spray Both Nostrils Q1H PRN         sodium chloride (OCEAN) 0.65 % nasal spray 2 spray  2 spray Both Nostrils Q1H PRN         sodium chloride (PF) 0.9% PF flush 3 mL  3 mL Intracatheter q1 min prn         sodium chloride (PF) 0.9% PF flush 3 mL  3 mL Intracatheter Q8H   3 mL at 12/04/20 1708     thiamine (B-1) tablet 100 mg  100 mg Oral Daily   100 mg at 12/04/20 0838     Vitamin D3 (CHOLECALCIFEROL) tablet 50 mcg  50 mcg Oral Daily   50 mcg at 12/04/20 0838     No current [facility]-ordered outpatient medications on file.             Review of Systems:   The 10 point Review of Systems is negative other than noted in the HPI            Physical Exam:   Vitals were reviewed  Temp: 98.7  F (37.1  C) Temp src: Oral BP: 139/78 Pulse: 113   Resp: 16 SpO2: 95 % O2 Device: None (Room air)      Head and neck exam: no significant findings    Intraoral exam:  - No pain on palpation intra-orally  - No abscesses, sinus tract or intra-oral swelling is seen.       Data:   Radiographic interpretation: Orthopantomogram taken on 12/04/2020  - decay on teeth #[id number] and mucosal thickening in the left left maxillary sinus.    Pulpal Pathology: reversible pulpitis on molars and premolars  Periodontal  Pathology: gingivitis  Caries: decays on teeth #[id number]     The patient was discussed with Dr. Phyllis Interiano DDBAILEY Rogers  PGY-1  Pager: [phone]

## 2020-12-05 NOTE — PLAN OF CARE
"Blood pressure 119/63, pulse 112, temperature 97.8  F (36.6  C), temperature source Oral, resp. rate 16, height 1.702 m (5' 7\"), weight 73.9 kg (163 lb), SpO2 94 %, not currently breastfeeding.  Talkative; in good spirits.  Good PO intake this morning.  Main issue is tooth pain.  Trying an Oragel product and  getting some relief.  Encouraged pt. To be out of bed; no deficits, just needs to do it.  Plan: discharging tomorrow. MD to order 7 days worth of medications.   "

## 2020-12-05 NOTE — PROGRESS NOTES
"Brief Medicine Note    Contacted by nursing regarding, as patient reporting sinus pressure and increased nasal congestion with bloody nasal drainage. States she has been having symptoms for 2 weeks and progressively worsen with R>L maxillary pressure and frontal pressure, currently rated at 8/10. Took flonase initially when symptoms started 2 weeks ago, which worsened her episodes of bloody nose. Has been using saline nasal spray without effect. Completed 3 days of afrin already. Denies any fevers.      Today's vital signs, medications, and nursing notes were reviewed.     /68 (BP Location: Right arm)   Pulse 107   Temp 98.5  F (36.9  C) (Oral)   Resp 16   Ht 1.702 m (5' 7\")   Wt 73.9 kg (163 lb)   SpO2 93%   BMI 25.53 kg/m      A/P:  #Sinus pressure, nasal congestion ?sinusitis   -Continue celebrex, and tylenol  -Will avoid flonase with recurrent bloody nose   -Saline nasal irrigation twice daily   -Start Doxycycline 100 mg BID x7 days (lower risk of causing C. Diff, with hx of C. Diff from clindamycin in the past)     Laila Bailey PA-C  Hospitalist Service  Pager [phone]    "

## 2020-12-05 NOTE — PLAN OF CARE
Afebrile, VSS on RA.  Alert and oriented x4.  Complaint of headache, Tylenol declined.  Advil requested from provider.  Denies nausea.  Tolerating regular diet.  BM x2.  Voiding adequate amount.  Dental consult complete.  PIV SL.  Up independently.  Continue plan of care.

## 2020-12-06 ENCOUNTER — PATIENT OUTREACH (OUTPATIENT)
Dept: CARE COORDINATION | Facility: CLINIC | Age: 40
End: 2020-12-06

## 2020-12-06 VITALS
RESPIRATION RATE: 16 BRPM | HEART RATE: 80 BPM | HEIGHT: 67 IN | SYSTOLIC BLOOD PRESSURE: 166 MMHG | TEMPERATURE: 98.3 F | OXYGEN SATURATION: 95 % | WEIGHT: 162.9 LBS | BODY MASS INDEX: 25.57 KG/M2 | DIASTOLIC BLOOD PRESSURE: 83 MMHG

## 2020-12-06 LAB
BACTERIA SPEC CULT: NO GROWTH
BACTERIA SPEC CULT: NO GROWTH
PLATELET # BLD AUTO: 104 10E9/L (ref 150–450)
SPECIMEN SOURCE: NORMAL
SPECIMEN SOURCE: NORMAL

## 2020-12-06 PROCEDURE — 36415 COLL VENOUS BLD VENIPUNCTURE: CPT | Performed by: PHYSICIAN ASSISTANT

## 2020-12-06 PROCEDURE — 250N000013 HC RX MED GY IP 250 OP 250 PS 637: Performed by: INTERNAL MEDICINE

## 2020-12-06 PROCEDURE — 250N000013 HC RX MED GY IP 250 OP 250 PS 637: Performed by: PHYSICIAN ASSISTANT

## 2020-12-06 PROCEDURE — 99239 HOSP IP/OBS DSCHRG MGMT >30: CPT | Performed by: INTERNAL MEDICINE

## 2020-12-06 PROCEDURE — 85049 AUTOMATED PLATELET COUNT: CPT | Performed by: PHYSICIAN ASSISTANT

## 2020-12-06 RX ORDER — PANTOPRAZOLE SODIUM 40 MG/1
40 TABLET, DELAYED RELEASE ORAL
Qty: 60 TABLET | Refills: 1 | Status: ON HOLD | OUTPATIENT
Start: 2020-12-06 | End: 2022-01-01

## 2020-12-06 RX ORDER — LANOLIN ALCOHOL/MO/W.PET/CERES
100 CREAM (GRAM) TOPICAL DAILY
Qty: 30 TABLET | Refills: 0 | Status: ON HOLD | OUTPATIENT
Start: 2020-12-06 | End: 2022-01-01

## 2020-12-06 RX ORDER — LACTULOSE 10 G/15ML
20 SOLUTION ORAL 3 TIMES DAILY
Qty: 500 ML | Refills: 0 | Status: SHIPPED | OUTPATIENT
Start: 2020-12-06 | End: 2021-01-06

## 2020-12-06 RX ORDER — MULTIVITAMIN,THERAPEUTIC
1 TABLET ORAL DAILY
Qty: 30 TABLET | Refills: 0 | Status: SHIPPED | OUTPATIENT
Start: 2020-12-06

## 2020-12-06 RX ORDER — FOLIC ACID 1 MG/1
1 TABLET ORAL DAILY
Qty: 30 TABLET | Refills: 0 | Status: ON HOLD | OUTPATIENT
Start: 2020-12-06 | End: 2022-01-01

## 2020-12-06 RX ORDER — ECHINACEA PURPUREA EXTRACT 125 MG
TABLET ORAL
Qty: 30 ML | Refills: 1 | Status: SHIPPED | OUTPATIENT
Start: 2020-12-06

## 2020-12-06 RX ORDER — VITAMIN B COMPLEX
2 TABLET ORAL DAILY
Qty: 30 TABLET | Refills: 1 | Status: SHIPPED | OUTPATIENT
Start: 2020-12-06

## 2020-12-06 RX ORDER — DOXYCYCLINE 100 MG/1
100 CAPSULE ORAL EVERY 12 HOURS
Qty: 14 CAPSULE | Refills: 0 | Status: SHIPPED | OUTPATIENT
Start: 2020-12-06 | End: 2020-12-13

## 2020-12-06 RX ORDER — ONDANSETRON 4 MG/1
4 TABLET, ORALLY DISINTEGRATING ORAL EVERY 8 HOURS PRN
Qty: 20 TABLET | Refills: 0 | Status: ON HOLD | OUTPATIENT
Start: 2020-12-06 | End: 2022-01-01

## 2020-12-06 RX ORDER — FEXOFENADINE HCL 180 MG/1
180 TABLET ORAL DAILY
Qty: 30 TABLET | Refills: 0 | Status: SHIPPED | OUTPATIENT
Start: 2020-12-06

## 2020-12-06 RX ORDER — SILDENAFIL CITRATE 20 MG/1
40 TABLET ORAL 3 TIMES DAILY
Qty: 90 TABLET | Refills: 1 | Status: SHIPPED | OUTPATIENT
Start: 2020-12-06

## 2020-12-06 RX ADMIN — SILDENAFIL 40 MG: 20 TABLET ORAL at 08:02

## 2020-12-06 RX ADMIN — PANTOPRAZOLE SODIUM 40 MG: 40 TABLET, DELAYED RELEASE ORAL at 07:55

## 2020-12-06 RX ADMIN — THERA TABS 1 TABLET: TAB at 07:55

## 2020-12-06 RX ADMIN — FOLIC ACID 1 MG: 1 TABLET ORAL at 07:54

## 2020-12-06 RX ADMIN — Medication 50 MCG: at 07:55

## 2020-12-06 RX ADMIN — RIFAXIMIN 550 MG: 550 TABLET ORAL at 07:54

## 2020-12-06 RX ADMIN — FEXOFENADINE HYDROCHLORIDE 180 MG: 180 TABLET, FILM COATED ORAL at 07:54

## 2020-12-06 RX ADMIN — NICOTINE 7 MG/24 HR DAILY TRANSDERMAL PATCH 1 PATCH: at 10:20

## 2020-12-06 RX ADMIN — GUAIFENESIN 600 MG: 600 TABLET ORAL at 07:55

## 2020-12-06 RX ADMIN — DOXYCYCLINE HYCLATE 100 MG: 100 CAPSULE ORAL at 07:55

## 2020-12-06 RX ADMIN — LACTULOSE 20 G: 10 SOLUTION ORAL at 07:55

## 2020-12-06 RX ADMIN — THIAMINE HCL TAB 100 MG 100 MG: 100 TAB at 07:54

## 2020-12-06 RX ADMIN — LIDOCAINE 1 PATCH: 560 PATCH PERCUTANEOUS; TOPICAL; TRANSDERMAL at 07:55

## 2020-12-06 RX ADMIN — SIMETHICONE 80 MG: 80 TABLET, CHEWABLE ORAL at 04:07

## 2020-12-06 ASSESSMENT — ACTIVITIES OF DAILY LIVING (ADL)
ADLS_ACUITY_SCORE: 16

## 2020-12-06 ASSESSMENT — MIFFLIN-ST. JEOR: SCORE: 1451.54

## 2020-12-06 NOTE — PLAN OF CARE
"/84 (BP Location: Right arm)   Pulse 112   Temp 98.1  F (36.7  C) (Oral)   Resp 16   Ht 1.702 m (5' 7\")   Wt 73.9 kg (163 lb)   SpO2 95%   BMI 25.53 kg/m      Patient tachycardic on RA, afebrile. Pain managed w/ celebrex and simethicone. No complaints of nausea. Tolerating regular diet with good appetite. PIV saline locked. Voiding spontaneously.  No BM overnight. Patient is up independently. Plan to discharge 12/6 at 1000 to nursing home facility.   Will continue with POC and notify MD with changes or concerns.    "

## 2020-12-06 NOTE — DISCHARGE SUMMARY
[facility]   Hospitalist Discharge Summary      Date of Admission:  11/30/2020  Date of Discharge:  12/6/2020  Discharging Provider: Francis Arellano MD, MD  Discharge Team: Hospitalist Service, Gold 8    Discharge Diagnoses     #Nausea without emesis  #Abdominal pain  #Diarrhea   #Poor appetite    #Tachycardia   #Alcohol cirrhosis:   # erosions in stomach antrum,  #mild portal hypertensive gastropathy    #Pulmonary hypertension:    #Incidental finding: Equivocal asymmetric tissue left breast upper outer quadrant.      # Compression fracture deformity of the T6 vertebral body with about 50% loss of vertebral body height   # Dental pain  --------- Chronic medical problems --------     #Paroxysmal atrial fibrillation:   #Macrocytic anemia:   #Gastritis/PUD:   #Lupus anticoagulant positive:   #Hx of non-uremic calciphylaxis:   #Tobacco dependence:      Follow-ups Needed After Discharge   Follow-up Appointments     Adult [facility] Follow-up and recommended labs and tests      Follow up with primary care provider, Flako Gaona, within 7 days for   hospital follow- up.  The following labs/tests are recommended: CBC, CMP,   Breast imaging      Follow up with Hepatology, within 2 weeks, for hospital follow- up.     Follow up with Neurosurgery within 4 weeks     Follow up with Cardiology/ Pulmonary Hypertension clinic, MD, within 1    month. for hospital follow- up.  The following labs/tests are recommended:   evaluate Pulmonary hypertension   .  Follow up with Dentist , at (location with clinic name or city) [facility],   within 1-2 weeks  regarding new diagnosis.     Appointments on [place] and/or [facility] (with [facility] or [facility]   provider or service). Call [phone] if you haven't heard regarding   these appointments within 7 days of discharge.         {  Unresulted Labs Ordered in the Past 30 Days of this Admission     No orders found from 10/31/2020 to 12/1/2020.         Discharge Disposition   Discharged to [facility]  Address: [address] [place] [id number]  Condition at discharge: Stable      Hospital Course   Yenifer Maher is a 40 year old female with PMHx of alcohol abuse with alcoholic cirrhosis c/b portal hypertension, esophageal varices, ascites, and hepatic encephalopathy, pulmonary hypertension, paroxysmal atrial fibrillation, hx of calciphylaxis, lupus anticoagulant positive, neuropathy, macrocytic anemia, and PUD, who presented with chills, fatigue, nausea, abdominal cramping/distention, confusion on 11/26/2020, admitted to ED observation for hepatic encephalopathy, discharged on 11/28/2020 with higher dose of lactulose, re-presenting with continued chills, fatigue and now with diarrhea admitted on 11/30/2020.      #Nausea without emesis  #Abdominal pain  #Diarrhea   #Poor appetite    Work up in ED from OBS admission was unremarkable, with negative infectious work up (blood cx, UA, and COVID swab), abd US without ascites or PVT, and TTE with normal EF and without any right heart strain or pericardial effusion. DDx quite long with vague symptoms but includes underlying infection, viral syndrome, gastroenteritis. Low suspicion for any acute biliary process or acute hepatitis. With tachycardia as noted , likely dehydrated in setting of poor PO intake, diuretics and diarrhea, which could be contributing to her continued malaise.   -Infectious work up with UA, blood cultures, stool culture. CXR clear. COVID- neg  -Ct lactulose. Ct Rifaximin   -Ct to Hold diuretics   -Anti-emetics PRN   -Continue PTA PPI and tums      * she clinically got better and was tolerating PO before discharge.     #Tachycardia: EKG in sinus tachycardia. Likely due to dehydration. Lower suspicion for sepsis. Hx of PVT and lupus anticoagulant positive not on anticoagulation due to high risk of bleeding.   12/3: TSh wnl.   -repeat CT chest to r/o PE: Neg      #Alcohol cirrhosis: Alcohol  "cirrhosis c/b HE, portal HTN, varices, and remote hx of ascites. Hx of PVT in past. Last EGD 3/2020 with grade I EV,  with prior variceal banding in 2019.  MELD 19. PTA medication with rifaximin 550 mg BID, lactulose, spironolactone 100 mg daily, lasix 40 mg daily. Denies any recent alcohol use, currently residing at treatment facility. Recent abdominal US without any ascites or PVT. Recently had lactulose increased for HE. On exam, has mild asterixis with elevated ammonia level of 79. Likely has mild HE. Unclear underlying trigger. Patient reports one episode of black stool , so could have underlying GIB, but hemoglobin stable.   -Continued Rifaximin   -Held Diuretics   - Ct PPI bid  -Continued Thiamine, folic acid, and MVI   - appreciate GI review: s/p EGD 12/2:Stomach- erosions in stomach antrum, mild portal     hypertensive gastropathy  Ct PPI PO bid x 8 weeks, check H. Pylori (neg)  - advised to follow with GI after discharge  - Due to insurance issues- Rifaximin was not dispensed. Message sent to GI team.       #Pulmonary hypertension: Recently diagnosed during prolonged hospital course at [facility] [place] [place], after presenting with nausea and diarrhea, ultimately developing cardiogenic shock requiring pressors and HD, diagnosed with severe cor pulmonale 2/2 portal hypertension, now on sildenafil 40 mg TID. Recently saw outpatient pulmonologist on 11/25, with recommendations for repeat TTE and RHC. TTE on 11/27 with normal EF, no right heart strain or pericardial effusion.   -Continue PTA sildenafil 40 mg TID  - need to follow up with cardiology as outpatient      #Incidental finding: Equivocal asymmetric tissue left breast upper outer quadrant. Outpatient dedicated evaluation in breast imaging center with diagnostic mammography and possible ultrasound is advised.     # Compression fracture deformity of the T6 vertebral body with about 50% loss of vertebral body height  - appreciate NSG review: \"  No need for neurosurgical " "intervention  Follow up with Neurosurgery [facility] (schedulers messaged) in 6 weeks with upright XR \"     # Dental pain- appreciate dental review . Topical benzocaine  - O/P follow up   - Patient requires comprehensive exam and care in clinic. No urgent care is needed in the hospital at the moment.  - We will see the patient at the [facility] dental clinic in [facility] once she is discharged from the hospital.        # Sinusitis- advised sinus rinse, nasal saline and given 7 days of Doxycycline    --------- Chronic medical problems --------     #Paroxysmal atrial fibrillation: EKG in sinus rhythm. Not on anticoagulation due to high risk of bleeding.   #Macrocytic anemia: Hemoglobin ranging 10.0s. Trend CBC.   #Gastritis/PUD: Continue PTA pantoprazole 40 mg BID.   #Lupus anticoagulant positive: Followed by Dr. Jeffery. Peviously on anticoagulation, but stopped due to high risk of bleeding in the past.   #Hx of non-uremic calciphylaxis: No current skin lesions. Follow up with dermatology PRN.   #Tobacco dependence: Continued PTA nicotine patch.        Consultations This Hospital Stay   VASCULAR ACCESS CARE ADULT IP CONSULT  MEDICATION HISTORY IP PHARMACY CONSULT  PHYSICAL THERAPY ADULT IP CONSULT  OCCUPATIONAL THERAPY ADULT IP CONSULT  GI HEPATOLOGY ADULT IP CONSULT  GI HEPATOLOGY ADULT IP CONSULT  SMOKING CESSATION PROGRAM IP CONSULT  CARE MANAGEMENT / SOCIAL WORK IP CONSULT  DENTAL HYGIENE IP ADULT CONSULT - [facility]  NEUROSURGERY ADULT IP CONSULT  VASCULAR ACCESS CARE ADULT IP CONSULT  DENTISTRY ADULT IP CONSULT    Code Status   Full Code    Time Spent on this Encounter   I, Francis Arellano MD, MD, personally saw the patient today and spent greater than 30 minutes discharging this patient.       Francis Arellano MD, MD  [facility] [address] [address]  Phone: [phone]  ______________________________________________________________________    Physical Exam   Vital Signs: " Temp: 98.3  F (36.8  C) Temp src: Oral BP: (!) 166/83 Pulse: 80   Resp: 16 SpO2: 95 % O2 Device: None (Room air)    Weight: 162 lbs 14.4 oz  Gen- pleasant  lying in bed  HEENT- NAD, JERI  Neck- supple, no JVD elevation, no thyromegaly  CVS- I+II+ no m/r/g  RS- CTAB  Abdo- soft, no tenderness. No g/r/r   Ext- no edema   CNS-oriented to person/ place and time  Primary Care Physician   Flako Gaona    Discharge Orders      XR Thoracic Spine 2 Views    Please obtain upright XR     Reason for your hospital stay    Yenifer Maher is a 40 year old female with PMHx of alcohol abuse with alcoholic cirrhosis c/b portal hypertension, esophageal varices, ascites, and hepatic encephalopathy, pulmonary hypertension, paroxysmal atrial fibrillation, hx of calciphylaxis, lupus anticoagulant positive, neuropathy, macrocytic anemia, and PUD, who presented with chills, fatigue, nausea, abdominal cramping/distention, confusion on 11/26/2020, admitted to ED observation for hepatic encephalopathy, discharged on 11/28/2020 with higher dose of lactulose, re-presenting with continued chills, fatigue and now with diarrhea admitted on 11/30/2020.     Adult [facility] Follow-up and recommended labs and tests    Follow up with primary care provider, Flako Gaona, within 7 days for hospital follow- up.  The following labs/tests are recommended: CBC, CMP, Breast imaging      Follow up with Hepatology, within 2 weeks, for hospital follow- up.     Follow up with Neurosurgery within 4 weeks     Follow up with Cardiology/ Pulmonary Hypertension clinic, MD, within 1  month. for hospital follow- up.  The following labs/tests are recommended: evaluate Pulmonary hypertension   .  Follow up with Dentist , at (location with clinic name or city) [facility], within 1-2 weeks  regarding new diagnosis.     Appointments on [place] and/or [facility] (with [facility] or [facility] provider or service). Call [phone] if you haven't heard regarding these appointments within 7  "days of discharge.     Discharge Instructions    hypertensive gastropathy  Ct PPI PO bid x 8 weeks    #Pulmonary hypertension: Recently diagnosed during prolonged hospital course at [facility] [place], after presenting with nausea and diarrhea, ultimately developing cardiogenic shock requiring pressors and HD, diagnosed with severe cor pulmonale 2/2 portal hypertension, now on sildenafil 40 mg TID. Recently saw outpatient pulmonologist on 11/25, with recommendations for repeat TTE and RHC. TTE on 11/27 with normal EF, no right heart strain or pericardial effusion.   -Continue PTA sildenafil 40 mg TID  - need to follow up with cardiology as outpatient      #Incidental finding: Equivocal asymmetric tissue left breast upper outer quadrant. Outpatient dedicated evaluation in breast imaging center with diagnostic mammography and possible ultrasound is advised.     # Compression fracture deformity of the T6 vertebral body with about 50% loss of vertebral body height  - appreciate NSG review: \"  No need for neurosurgical intervention  Follow up with Neurosurgery [facility] (schedulers messaged) in 6 weeks with upright XR \"     # Dental pain- appreciate dental review . Topical benzocaine  - O/P follow up     Activity    Your activity upon discharge: activity as tolerated     When to contact your care team    Call your primary doctor if you have any of the following: temperature greater than 100.4 or less than 96,  increased shortness of breath, increased pain or worsening confusion/ any bleeding.     Monitor and record    Loose stools- titrate lactulose to atleast 3-4 loose stools per day     Diet    Follow this diet upon discharge: Orders Placed This Encounter      Regular Diet Adult       Significant Results and Procedures   Results for orders placed or performed during the hospital encounter of 11/30/20   XR Chest 2 Views    Narrative    EXAM: XR CHEST 2 VW  LOCATION: [facility]  DATE/TIME: 11/30/2020 5:26 " PM    INDICATION: Chills, fatigue  COMPARISON: None.      Impression    IMPRESSION: Negative chest.   CT Chest Pulmonary Embolism w Contrast     Value    Radiologist flags Left breast    Narrative    EXAMINATION: CTA pulmonary angiogram, 11/30/2020 9:13 PM     COMPARISON: Chest x-ray 11/30/2020, 8/13/2019. Report from CT chest  8/23/2020.    HISTORY: PE suspected, intermediate prob, positive D-dimer.    Additional history per chart: Admitted to ED observation for hepatic  encephalopathy, discharged on 11/28/2020 with higher dose of  lactulose, re-presenting with continued?chills, fatigue and now  with?diarrhea?admitted on 11/30/2020.    TECHNIQUE: Volumetric helical acquisition of CT images of the chest  from the lung apices to the kidneys were acquired after the  administration of 55 mL of Isovue-370 IV contrast. Post-processed  multiplanar and/or MIP reformations were obtained, archived to PACS  and used in interpretation of this study.  Technical challenging CT  during initial CT attempt due to discomfort at IV site.    New IV was  placed in other arm and exam was repeated.  Reviewed with CT  technologistMarylou by Dr. Schafer.    FINDINGS:      Contrast bolus is: suboptimal.  Exam is negative for acute pulmonary  embolism. Heart size within normal limits. No significant pericardial  effusion.. Visualized thoracic aorta and main pulmonary artery  diameters appear within normal limits. Normal 3 vessel branching  pattern of the great vessels.   No CT evidence of right heart strain.    Remaining mediastinum/lungs: Upon further review of the thyroid gland  on the 2105 hour CT axial views, there is no convincing abnormal  nodule. Unremarkable thyroid.  Tracheobronchial tree appears patent.  Esophagus appears unremarkable. No pleural effusion. No pneumothorax.  Bibasilar atelectasis. No suspicious lung nodules. There are no  visualized pathologically enlarged mediastinal, hilar or axillary  lymph nodes.     Upper  abdomen: Contrast within the collecting system of both kidneys.  Partially visualized cholelithiasis (series 6, image 281).  Nodular  contour of the liver.    Bones/Soft Tissues: Compression fracture deformity of the T6 vertebral  body with about 50% loss of vertebral body height, similar appearing  to chest x-ray from 11/27/2020, but new compared to chest x-ray from  8/13/2019.  No retropulsion.  Mild degenerative changes in the  visualized spine.  Equivocal asymmetric tissue left breast upper outer  quadrant (series 8, image 28, series 10, image 32 for example).      Impression    IMPRESSION:   1. Suboptimal contrast bolus timing. No evidence for central pulmonary  embolism.  2. Bibasilar atelectasis.  No focal consolidation.  3. Compression fracture deformity of the T6 vertebral body, new  compared to chest radiograph from 8/13/2019. Per report on care  everywhere, T6 compression deformity was present on CT chest dated  8/23/2020 (images not available at the time of this exam).    4. Equivocal asymmetric tissue left breast upper outer quadrant.   Outpatient dedicated evaluation in breast imaging center with  diagnostic mammography and possible ultrasound is advised.  5.  Partially visualized cirrhotic liver.    6.  Cholelithiasis.    [Recommend Follow Up: Left breast]    This report will be copied to the [facility] to ensure a  provider acknowledges the finding.     In the event of a positive result for acute pulmonary embolism  resulting in right heart strain, consider calling the   [facility] hospital  for PERT (Pulmonary Embolism Response Team)  Activation?    PERT -- Pulmonary Embolism Response Team (Multidisciplinary team  including cardiology, interventional radiology, critical care,  hematology)    I have personally reviewed the examination and initial interpretation  and I agree with the findings.    MATTHEW PALM MD   US Abdomen Limited    Narrative    EXAMINATION: US ABDOMEN LIMITED,  12/1/2020 8:04 AM     COMPARISON: Abdominal ultrasound 11/27/2020, 3/27/2020.    HISTORY: 40-year-old female with history of alcoholic cirrhosis,  portal hypertension, encephalopathy, and pulmonary hypertension.  Assess for ascites.    TECHNIQUE: Gray scale ultrasound of the abdomen.    FINDINGS:  A targeted ultrasound of all 4 abdominal quadrants was performed and  demonstrated no evidence of ascites. Increased echogenicity of the  liver with surface nodularity partially visualized as seen on recent  ultrasound 11/27/2020.        Impression    IMPRESSION: No evidence of ascites.    I have personally reviewed the examination and initial interpretation  and I agree with the findings.    CHETAN HUERTA MD   CT Chest Pulmonary Embolism w Contrast    Narrative    CT CHEST PULMONARY EMBOLISM W CONTRAST, 12/3/2020 2:17 PM    History: Dyspnea, chronic, neg or nondiagnostic xray    Comparison: Pulmonary CTA from 11/30/2020.    Technique: Helical acquisition of CT images of the chest from the lung  apices to the kidneys were acquired after the administration of  intravenous contrast according to the CT pulmonary angiogram protocol.  Axial images were reconstructed in 1 and 3 mm slice thickness. Coronal  reconstructions performed. Three-dimensional (3D) post-processed  angiographic images were reconstructed, archived to PACS and used in  the interpretation of this study.    Contrast dose: iopamidol (ISOVUE-370) solution 57 mL    Findings:   The contrast bolus is adequate.  There is no definite pulmonary  embolism. Some degree of respiratory motion decrease the sensitivity  to detect emboli in the subsegmental arteries.    Thorax:   Support devices: None    Asymmetrical soft tissue attenuation again noted in the left upper  outer breast. No pathologically enlarged axillary lymph nodes.    Soft tissue attenuation within the retrosternal space, superior  mediastinum. There are otherwise no pathologically enlarged hilar  or  mediastinal lymph nodes. Conventional aortic arch branching pattern.  The heart is normal in size without significant pericardial effusion.  There are no significant coronary calcifications.     Lungs:  The trachea and central airways are clear. The lung parenchyma is  clear without consolidation, pleural effusion, or pneumothorax. There  are no significant pulmonary nodules.    Upper abdomen: Limited evaluation of the upper abdomen. Heterogeneous  enhancement of the liver, compatible with history of cirrhosis.    Bones: Moderate T6 compression deformity (about 60% loss of height) is  unchanged, similar to 11/30/2020. No acute or aggressive osseus  abnormality.      Impression    Impression:    1. No pulmonary embolism identified.  2. Clear lungs, no consolidation.   3. Age-indeterminate moderate T6 compression fracture, unchanged.  4. Cirrhotic configuration of the liver.  5. Asymmetric tissue in the left upper outer breast, refer to previous  recommendations from 11/30/2020.    In the event of a positive result for acute pulmonary embolism  resulting in right heart strain, please activate the PERT  Multidisciplinary group for consultation by paging [phone]).     PERT -- Pulmonary Embolism Response Team (Multidisciplinary team  including cardiology, interventional radiology, critical care,  hematology)    I have personally reviewed the examination and initial interpretation  and I agree with the findings.    NAYELI HARRIS MD   CT Dental wo Contrast    Narrative    CT DENTAL WO CONTRAST 12/4/2020 3:26 PM    History:  To evaluate and diagnosis the patient's pain on the lower  right area possibly odontogenic    Comparison:  6/10/2020      TECHNIQUE: 3-D reconstruction by the technologists, with curved  multiplanar reformat of thin section imaging through the mandible and  maxilla obtained without intravenous contrast.    FINDINGS:  There is mucosal thickening with frothy debris in the left  maxillary  sinus. There is lucency about the roots of the first left maxillary  molar suggesting periapical abscess and resultant odontogenic  sinusitis. The first mandibular molars are absent bilaterally. There  is minimal lucency about the periodontal ligaments of the second  mandibular molars bilaterally suggest periodontal disease. No definite  periapical abscess related to the mandibular teeth.    The remaining visualized paranasal sinuses are clear. Normal  temporomandibular joints.      Impression    IMPRESSION:  1. Periapical abscess about the roots of the first left maxillary  molar with resultant odontogenic sinusitis.  2. No periapical disease in the mandible. Likely periodontal disease  involving the second mandibular molars bilaterally. The first  mandibular molars are absent bilaterally.    STEVE TRUONG MD   ,   BMP  Recent Labs   Lab 12/05/20  0707 12/03/20  0610 12/02/20  0538 12/01/20  0652 11/30/20  2137 11/30/20  1709   NA  --  135 136 137  --  133   POTASSIUM 4.1 3.9 4.1 4.4 4.3 4.6   CHLORIDE  --  108 110* 110*  --  102   MARKO  --  8.7 9.5 9.0  --  9.5   CO2  --  19* 19* 21  --  23   BUN  --  12 12 18  --  20   CR  --  0.62 0.68 0.76 0.90 0.90   GLC  --  98 91 94  --  98     CBC  Recent Labs   Lab 12/06/20  0639 12/03/20  0610 12/02/20  0538 12/01/20  0652 12/01/20  0652 11/30/20  1709   WBC  --  5.3 5.6  --  5.1 6.2   RBC  --  2.99* 3.09*  --  3.01* 3.44*   HGB  --  9.2* 9.4*   < > 9.3* 10.4*   HCT  --  27.9* 28.7*  --  28.5* 31.4*   MCV  --  93 93  --  95 91   MCH  --  30.8 30.4  --  30.9 30.2   MCHC  --  33.0 32.8  --  32.6 33.1   RDW  --  14.7 14.6  --  14.4 14.0   * 150 143*  --  142* 182    < > = values in this interval not displayed.     INR  Recent Labs   Lab 12/02/20  0538 12/01/20  0652 11/30/20  1709   INR 1.47* 1.52* 1.57*     LFTs  Recent Labs   Lab 12/02/20  0538 12/01/20  0652 11/30/20  1709   ALKPHOS 239* 251* 252*   AST 85* 87* 110*   ALT 38 36 45   BILITOTAL 2.8* 3.0*  3.2*   PROTTOTAL 7.0 7.0 8.0   ALBUMIN 2.7* 2.7* 3.1*      PANC  Recent Labs   Lab 11/30/20  2137 11/30/20  1709   LIPASE 241 154         Discharge Medications   Current Discharge Medication List      START taking these medications    Details   benzocaine (ORAJEL MAXIMUM STRENGTH) 20 % GEL gel Take by mouth 4 times daily as needed for moderate pain (apply to tooth/gums causing pain)  Qty: 1 Bottle, Refills: 1    Associated Diagnoses: Subacute frontal sinusitis      doxycycline hyclate (VIBRAMYCIN) 100 MG capsule Take 1 capsule (100 mg) by mouth every 12 hours for 7 days  Qty: 14 capsule, Refills: 0    Associated Diagnoses: Subacute frontal sinusitis      lactulose (CEPHULAC) 20 GM packet Take 1 packet (20 g) by mouth 3 times daily as needed for constipation (take in addition to scheduled dose of less than 4 loose stools / day)  Qty: 20 packet, Refills: 0    Associated Diagnoses: Hepatic encephalopathy (H)         CONTINUE these medications which have CHANGED    Details   fexofenadine (ALLEGRA) 180 MG tablet Take 1 tablet (180 mg) by mouth daily  Qty: 30 tablet, Refills: 0    Associated Diagnoses: Subacute frontal sinusitis      folic acid (FOLVITE) 1 MG tablet Take 1 tablet (1 mg) by mouth daily  Qty: 30 tablet, Refills: 0    Associated Diagnoses: Alcohol abuse      lactulose (CHRONULAC) 10 GM/15ML solution Take 30 mLs (20 g) by mouth 3 times daily Titrate to 3-4 loose stools per day  Qty: 500 mL, Refills: 0    Associated Diagnoses: UGIB (upper gastrointestinal bleed)      multivitamin, therapeutic (THERA-VIT) TABS tablet Take 1 tablet by mouth daily  Qty: 30 tablet, Refills: 0    Associated Diagnoses: UGIB (upper gastrointestinal bleed)      nicotine (NICODERM CQ) 7 MG/24HR 24 hr patch Place 1 patch onto the skin every 24 hours  Qty: 14 patch, Refills: 1    Associated Diagnoses: Subacute frontal sinusitis      ondansetron (ZOFRAN-ODT) 4 MG ODT tab Take 1 tablet (4 mg) by mouth every 8 hours as needed for  nausea  Qty: 20 tablet, Refills: 0    Associated Diagnoses: Hepatic encephalopathy (H)      pantoprazole (PROTONIX) 40 MG EC tablet Take 1 tablet (40 mg) by mouth 2 times daily (before meals)  Qty: 60 tablet, Refills: 1    Associated Diagnoses: Gastrointestinal hemorrhage, unspecified gastrointestinal hemorrhage type      rifaximin (XIFAXAN) 550 MG TABS tablet Take 1 tablet (550 mg) by mouth 2 times daily  Qty: 60 tablet, Refills: 1    Associated Diagnoses: Hepatic encephalopathy (H)      sildenafil (REVATIO) 20 MG tablet Take 2 tablets (40 mg) by mouth 3 times daily  Qty: 90 tablet, Refills: 1    Associated Diagnoses: Pulmonary hypertension (H)      sodium chloride (OCEAN) 0.65 % nasal spray Inhale 2 sprays in the nostril(s) every 30 minutes as needed for nasal congestion or nasal dryness  Qty: 30 mL, Refills: 1    Associated Diagnoses: Subacute frontal sinusitis      thiamine (B-1) 100 MG tablet Take 1 tablet (100 mg) by mouth daily  Qty: 30 tablet, Refills: 0    Associated Diagnoses: Alcohol abuse      Vitamin D3 (CHOLECALCIFEROL) 25 mcg (1000 units) tablet Take 2 tablets (50 mcg) by mouth daily  Qty: 30 tablet, Refills: 1    Associated Diagnoses: Alcohol abuse         CONTINUE these medications which have NOT CHANGED    Details   acetaminophen (TYLENOL) 325 MG tablet Take 325 mg by mouth every 4 hours as needed      bismuth subsalicylate (PEPTO BISMOL) 262 MG/15ML suspension Take 30 mL every half to one hour as needed. Do not exceed 8 doses in 24 hours.      calcium carbonate 750 MG CHEW Take 2-4 chew tab by mouth as needed for heartburn       celecoxib (CELEBREX) 200 MG capsule Take 200 mg by mouth 2 times daily as needed      melatonin 5 MG tablet Take 5-10 mg by mouth nightly as needed for sleep         STOP taking these medications       fluticasone (FLONASE) 50 MCG/ACT nasal spray Comments:   Reason for Stopping:         furosemide (LASIX) 40 MG tablet Comments:   Reason for Stopping:         ibuprofen  (ADVIL/MOTRIN) 200 MG tablet Comments:   Reason for Stopping:         Lidocaine (LIDOCARE) 4 % Patch Comments:   Reason for Stopping:         menthol (COUGH DROPS) 7 MG LOZG Comments:   Reason for Stopping:         nortriptyline (PAMELOR) 10 MG capsule Comments:   Reason for Stopping:         senna (SENOKOT) 8.6 MG tablet Comments:   Reason for Stopping:         simethicone (MYLICON) 125 MG chewable tablet Comments:   Reason for Stopping:         spironolactone (ALDACTONE) 100 MG tablet Comments:   Reason for Stopping:             Allergies   Allergies   Allergen Reactions     Bengay Pain Relief [Menthol] Other (See Comments)     Skin turns red and feels extremely hot     Cats      Chocolate Dermatitis     Dicyclomine Other (See Comments)     Severe sedation     Dust Mites      Derm, resp     No Clinical Screening - See Comments      GI upset     Phenobarbital      Pollen Extract      Derm, resp.

## 2020-12-06 NOTE — PROGRESS NOTES
"BP (!) 166/83 (BP Location: Right arm)   Pulse 80   Temp 98.3  F (36.8  C) (Oral)   Resp 16   Ht 1.702 m (5' 7\")   Wt 73.9 kg (162 lb 14.4 oz)   SpO2 95%   BMI 25.51 kg/m      Shift:[id number]  VS: VSS on RA, afebrile  Neuro: AOx4  Respiratory: no SOB  Pain/Nausea/PRN: orajel for tooth pain; lido patch on back   Diet: regular, encouraging soft foods  LDA: PIV removed  GI/: voiding, LBM 12/5  Skin: WDL  Mobility: up ad marcelo  Plan: discharged to [facility]; meds picked up at discharge pharmacy    Discharge instructions, medications & follow ups reviewed with Yenifer. Copy of discharge summary given to Yenifer and faxed to [facility].     Patient in stable condition. AVSS. Yenifer had no further questions regarding discharge instructions and medications. Patient transferred out by wheelchair& left with father for [facility].        Will continue with POC and notify MD with changes or concerns.      "

## 2020-12-06 NOTE — PLAN OF CARE
0212-2339  Status: Patient admitted for chills, fatigue, and diarrhea.   VS: Afebrile. VSS on RA.  Neuros: A&Ox4.   GI/: Tolerating regular diet. Denies nausea. BM today. Voiding spontaneously.   IV: L PIV SL.  Activity: Up independently.   Pain: Denies.  Respiratory/Trach: No issues.  Skin: Intact.  Plan of Care: Plan to discharge tomorrow at 1000. Continue to monitor and follow POC.

## 2020-12-06 NOTE — PLAN OF CARE
"/68 (BP Location: Right arm)   Pulse 107   Temp 98.5  F (36.9  C) (Oral)   Resp 16   Ht 1.702 m (5' 7\")   Wt 73.9 kg (163 lb)   SpO2 93%   BMI 25.53 kg/m      Shift: [id number]  VS: tachycardic (100s) OVSS on RA, afebrile  Neuro: AOx4  Respiratory: no SOB  Pain/Nausea/PRN: sinus pressure and tooth aches; celebrex BID  Diet: regular good appetite  LDA: L PIV SL  GI/: voiding, LBM 12/5  Skin: wdl  Mobility: up ad marcelo  Plan: discharge to [facility] @ 1000, mom will transport    Will continue with POC and notify MD with changes or concerns.    "

## 2020-12-07 NOTE — PROGRESS NOTES
"[facility]: Post-Discharge Note  SITUATION                                                      Admission:    Admission Date: 11/30/20   Reason for Admission: Nausea without emesis  Discharge:   Discharge Date: 12/06/20  Discharge Diagnosis: Nausea without emesis  Discharge Service: Hospitalist    BACKGROUND                                                      Yenifer Maher is a 40 year old female with PMHx of alcohol abuse with alcoholic cirrhosis c/b portal hypertension, esophageal varices, ascites, and hepatic encephalopathy, pulmonary hypertension, paroxysmal atrial fibrillation, hx of calciphylaxis, lupus anticoagulant positive, neuropathy, macrocytic anemia, and PUD, who presented with chills, fatigue, nausea, abdominal cramping/distention, confusion on 11/26/2020, admitted to ED observation for hepatic encephalopathy, discharged on 11/28/2020 with higher dose of lactulose, re-presenting with continued chills, fatigue and now with diarrhea admitted on 11/30/2020.     ASSESSMENT      Discharge Assessment  Patient reports symptoms are: Unchanged  Does the patient have all of their medications?: No(Patient was unable to  the \"medication for my liver\".)  Does patient know what their new medications are for?: Yes  Does patient have a follow-up appointment scheduled?: No  Does patient have any other questions or concerns?: No    Post-op  Did the patient have surgery or a procedure: Yes(EGD)  Fever: No  Chills: No  Eating & Drinking: eating and drinking without complaints/concerns  PO Intake: regular diet  Bowel Function: normal  Urinary Status: voiding without complaint/concerns    PLAN                                                      Outpatient Plan:      Follow up with primary care provider, Flako Gaona, within 7 days for hospital follow- up.  The following labs/tests are recommended: CBC, CMP, Breast imaging    Follow up with Hepatology, within 2 weeks, for hospital follow- up. "      Follow up with Neurosurgery within 4 weeks    Follow up with Cardiology/ Pulmonary Hypertension clinic, MD, within 1  month. for hospital follow- up.  The following labs/tests are recommended: evaluate Pulmonary hypertension   .  Follow up with Dentist , at (location with clinic name or city) [facility], within 1-2 weeks  regarding new diagnosis.     Future Appointments   Date Time Provider Department Center   1/4/2021 12:30 PM  LAB [facility]   1/4/2021  1:15 PM Ashvin Messina PA-C [facility]           [place], CMA

## 2020-12-07 NOTE — PROGRESS NOTES
LVM for patient regarding cost of rifaximin. Asked that patient check the [employer] web site to see if she may qualify for their pt assistance program if not she can look into getting medication from a pharmacy in [place] or she maybe able to continue on lactulose alone if provider approves.    Asked that patient call or send [employer] message on what she decides.    Kalli COOK LPN  Hepatology Clinic

## 2020-12-07 NOTE — PROGRESS NOTES
The patient was contacted for a post-hospital call and reports she was unable to  the rifaximin (XIFAXAN) 550 MG TABS tablet medication. It was going to cost her about $700 out of pocket.    Please contact patient to discuss further and schedule follow up.

## 2020-12-08 ENCOUNTER — TELEPHONE (OUTPATIENT)
Dept: NEUROSURGERY | Facility: CLINIC | Age: 40
End: 2020-12-08

## 2020-12-08 ENCOUNTER — TELEPHONE (OUTPATIENT)
Dept: CARDIOLOGY | Facility: CLINIC | Age: 40
End: 2020-12-08

## 2020-12-08 NOTE — TELEPHONE ENCOUNTER
SPINE PATIENTS - NEW PROTOCOL PREVISIT    RECORDS RECEIVED FROM: Internal   Date of Appt: 12/29/20   NOTES (FOR ALL VISITS) STATUS DETAILS   OFFICE NOTE from referring provider Internal SEE INPATIENT NOTES   OFFICE NOTE from other specialist N/A    DISCHARGE SUMMARY from hospital Internal [facility]:  11/30/20-12/6/20   DISCHARGE REPORT from ER N/A    EMG REPORT N/A    MEDICATION LIST Internal    IMAGING  (FOR ALL VISITS)     MRI (HEAD, NECK, SPINE) N/A    XRAY (SPINE) *NEUROSURGERY* N/A    CT (HEAD, NECK, SPINE) N/A       Action 12/8/20 MV 2.59pm   Action Taken Check closer to appt time if patient has updated imaging. Patient needs XR prior to appt

## 2020-12-09 NOTE — TELEPHONE ENCOUNTER
Attempted to reach out to pt. Unable to reach. Left a voicemail with direct call back number.    Nicol Saldaña  Clinic   Pulmonary Hypertension  [facility]  (P) [phone]

## 2020-12-14 ENCOUNTER — DOCUMENTATION ONLY (OUTPATIENT)
Dept: GASTROENTEROLOGY | Facility: CLINIC | Age: 40
End: 2020-12-14

## 2020-12-14 ENCOUNTER — TELEPHONE (OUTPATIENT)
Facility: CLINIC | Age: 40
End: 2020-12-14

## 2020-12-14 NOTE — TELEPHONE ENCOUNTER
Central Prior Authorization Team   Phone: [phone]      PA Initiation    Medication: rifaximin (XIFAXAN) 550 MG TABS tablet  Insurance Company: [place] Medicaid ([facility]) - Phone [phone] Fax [phone]  Pharmacy Filling the Rx: [facility] - [place], [place] - [address]  Filling Pharmacy Phone: [phone]  Filling Pharmacy Fax:    Start Date: 12/14/2020

## 2020-12-14 NOTE — TELEPHONE ENCOUNTER
Prior Authorization Retail Medication Request    Medication/Dose: rifaximin (XIFAXAN) 550 MG TABS tablet  ICD code (if different than what is on RX):    Previously Tried and Failed: lactulose alone  Rationale:  Reduce hospitalizations due to hepatic encephalopathy      Insurance Name:  [place] Medicaid  Insurance ID:  [id number]      Pharmacy Information (if different than what is on RX)  Name:  [facility] - [place], [place] - [address]  Phone:  [phone]

## 2020-12-14 NOTE — PROGRESS NOTES
Prior Authorization Retail Medication Request    Medication/Dose: Rifaximin 550 mg  ICD code (if different than what is on RX):    Previously Tried and Failed: lactulose alone  Rationale:  Reduce hospitalizations due to hepatic encephalopathy     Insurance Name:    Insurance ID:        Pharmacy Information (if different than what is on RX)  Name:    Phone:      Please let me know out of packet cost to patient.

## 2020-12-15 NOTE — TELEPHONE ENCOUNTER
Per pharmacy note, patient does not have Part D coverage therefore, PA cannot be done. Please see pharmacy note below.

## 2020-12-17 ENCOUNTER — TELEPHONE (OUTPATIENT)
Dept: NEUROSURGERY | Facility: CLINIC | Age: 40
End: 2020-12-17

## 2020-12-24 ENCOUNTER — TELEPHONE (OUTPATIENT)
Dept: NEUROSURGERY | Facility: CLINIC | Age: 40
End: 2020-12-24

## 2020-12-24 NOTE — TELEPHONE ENCOUNTER
LVM for patient to see if she will be getting her ordered imaging prior to her appointment on 12/29.    Provided imaging scheduling number.    Gabi Buckner

## 2020-12-28 ENCOUNTER — TELEPHONE (OUTPATIENT)
Dept: NEUROSURGERY | Facility: CLINIC | Age: 40
End: 2020-12-28

## 2020-12-29 ENCOUNTER — TELEPHONE (OUTPATIENT)
Dept: NEUROSURGERY | Facility: CLINIC | Age: 40
End: 2020-12-29

## 2020-12-29 ENCOUNTER — PRE VISIT (OUTPATIENT)
Dept: NEUROSURGERY | Facility: CLINIC | Age: 40
End: 2020-12-29

## 2020-12-29 DIAGNOSIS — S22.050D COMPRESSION FRACTURE OF T6 VERTEBRA WITH ROUTINE HEALING, SUBSEQUENT ENCOUNTER: Primary | ICD-10-CM

## 2020-12-29 PROCEDURE — 99207 PR NO CHARGE LOS: CPT | Performed by: NURSE PRACTITIONER

## 2020-12-29 NOTE — TELEPHONE ENCOUNTER
No imaging schedule prior to appointment on 12/29/2020.     Routed message to MAZIN Cantor and Vicki GEE In scheduling.     Mary Ellen Pineda LPN  Neurosurgery

## 2021-01-01 ENCOUNTER — HEALTH MAINTENANCE LETTER (OUTPATIENT)
Age: 41
End: 2021-01-01

## 2021-01-04 ENCOUNTER — VIRTUAL VISIT (OUTPATIENT)
Dept: GASTROENTEROLOGY | Facility: CLINIC | Age: 41
End: 2021-01-04
Attending: PHYSICIAN ASSISTANT
Payer: MEDICARE

## 2021-01-04 DIAGNOSIS — E83.59 CALCIPHYLAXIS: ICD-10-CM

## 2021-01-04 DIAGNOSIS — K70.30 ALCOHOLIC CIRRHOSIS OF LIVER WITHOUT ASCITES (H): ICD-10-CM

## 2021-01-04 DIAGNOSIS — E44.0 MODERATE PROTEIN-CALORIE MALNUTRITION (H): Primary | ICD-10-CM

## 2021-01-04 DIAGNOSIS — K76.82 HEPATIC ENCEPHALOPATHY (H): ICD-10-CM

## 2021-01-04 DIAGNOSIS — R60.0 LOWER EXTREMITY EDEMA: ICD-10-CM

## 2021-01-04 PROCEDURE — 99443 PR PHYSICIAN TELEPHONE EVALUATION 21-30 MIN: CPT | Mod: 95 | Performed by: PHYSICIAN ASSISTANT

## 2021-01-04 NOTE — PROGRESS NOTES
Yenifer Maher is a 40 year old female who is being evaluated via a billable telephone visit.     Consent for telephone: Yes  Telephone time: 30 minutes     How would you like to obtain your AVS? Mail     Hepatology Follow-up Clinic note  Yenifer Maher   Date of Birth 1980  Date of Service 1/4/2021    Reason for follow-up: ETOH cirrhossi          Assessment/plan:   Yenifer Maher is a 40 year old female with ETOH cirrhosis complicated by history of ascites currently controlled with diuretics, hepatic encephalopathy and history of GI bleed s/p banding. Sober since August 2020. Her liver function is slowly improving, current MELD-Na 17.  Discussed the importance of good nutrition and engaging in regular therapies while at SNF. Her most recent ultrasound imaging was reviewed, she is up to date with HCC screening and variceal screening.    # Will contact her SNF for updated medication list     # Worsening DOMINIQUE:   - Start 40 mg furosemide   - Start 50 mg spironolactone   - 2000 mg sodium diet   - Repeat BMP in one week after medication change    # Hepatic encephalopathy, well controlled   - Discontinue scheduled lactulose   - Start MiraLax as more gentle. Start with 1 capful and adjust as need to ensure 3-4 bowel movements a day.   - Continue Rifaximin 550 mg twice daily     # Malnutrition:   - Increase protein intake in diet (60-80 gm/day)    # History of alcohol abuse:   - CD treatment - had some changes to her insurance so this has been delayed  - Sobriety from alcohol     # follow up with Dr. Hernández in 3 months    90 minutes spent on the date of the encounter doing chart review, history and exam, documentation and further activities as noted above    Subjective     Yenifer Maher is a 40 year old female who presents to clinic today for the following health issues.     HPI      ETOH Cirrhosis  Complicated by:  - Ascites, currently resolved  Pleural effusions   - History of HE  - Hx of GI bleed s/p variceal banding    EGD: 12/2/2019  HCC: 11/27/2002: [place]     Patient was last seen by Dr. Hernández on 5/5/2020. She has been hospitalized five times in the past six months, including prolong hospitalization at [facility] for serve cor pulmonale/pulmonary HTN. Outside records and imaging from [facility] were reviewed. Most recently at [facility] for compression fracture of T6, nausea/diarrhea, poor appetite in the setting of ETOH cirrhosis and continued alcohol use.     She is staying [facility] Just completed physical therapy. Plan will be to go home March 10th.     Appetite is good. She is vegetarian. Not a lot of food options. She has some nuts/Dimas bars. Fluid in legs have been horrible. Her abdomen feels a lot more bloated. She is getting calciphylaxis lesions again. Followed by dermatology .    Energy fluctuates day to date. Thinks her strength has been staying the same over the past month.     She has been taking lactulose (started in October), twice daily. No problems with confusion.  Feels nauseous. No sure if still taking Rifaximin.     Last drank August 2020.     Review of Systems    7 point ROS completed, as noted in HPI      Objective       Vitals:  No vitals were obtained today due to virtual visit.    Physical Exam   PSYCH: Alert and oriented times 3; coherent speech, normal   rate and volume, able to articulate logical thoughts, able   to abstract reason, no tangential thoughts, no hallucinations   or delusions  Her affect is normal  RESP: No cough, no audible wheezing, able to talk in full sentences  Remainder of exam unable to be completed due to telephone visits         Data:   Reviewed in person and significant for:    Lab Results   Component Value Date     12/03/2020      Lab Results   Component Value Date    POTASSIUM 4.1 12/05/2020     Lab Results   Component Value Date    CHLORIDE 108 12/03/2020     Lab Results   Component Value Date    CO2 19 12/03/2020     Lab Results   Component Value Date    BUN 12  12/03/2020     Lab Results   Component Value Date    CR 0.62 12/03/2020       Lab Results   Component Value Date    WBC 5.3 12/03/2020     Lab Results   Component Value Date    HGB 9.2 12/03/2020     Lab Results   Component Value Date    HCT 27.9 12/03/2020     Lab Results   Component Value Date    MCV 93 12/03/2020     Lab Results   Component Value Date     12/06/2020       Lab Results   Component Value Date    AST 85 12/02/2020     Lab Results   Component Value Date    ALT 38 12/02/2020     No results found for: BILICONJ   Lab Results   Component Value Date    BILITOTAL 2.8 12/02/2020       Lab Results   Component Value Date    ALBUMIN 2.7 12/02/2020     Lab Results   Component Value Date    PROTTOTAL 7.0 12/02/2020      Lab Results   Component Value Date    ALKPHOS 239 12/02/2020       Lab Results   Component Value Date    INR 1.47 12/02/2020       Radiology:     CT ABODMEN PELVIS: W IV CON:   8/23/2020:   Impression:  1. Cirrhosis with chronic sequela of portal hypertension as described above.   2. Constellation of findings suspicious for volume overload and/or right heart dysfunction, with severely dilated inferior vena cava, severely dilated right atrium, diffuse anasarca, increased ascites, and new pleural effusions

## 2021-01-04 NOTE — LETTER
1/4/2021         RE: Yenifer Maher  [address]  [place] [address]        Dear Colleague,    Thank you for referring your patient, Yenifer Maher, to the [facility] [place]. Please see a copy of my visit note below.    Yenifer Maher is a 40 year old female who is being evaluated via a billable telephone visit.     Consent for telephone: Yes  Telephone time: 30 minutes     How would you like to obtain your AVS? Mail     Hepatology Follow-up Clinic note  Yenifer Maher   Date of Birth 1980  Date of Service 1/4/2021    Reason for follow-up: ETOH cirrhossi          Assessment/plan:   Yenifer Maher is a 40 year old female with ETOH cirrhosis complicated by history of ascites currently controlled with diuretics, hepatic encephalopathy and history of GI bleed s/p banding. Sober since August 2020. Her liver function is slowly improving, current MELD-Na 17.  Discussed the importance of good nutrition and engaging in regular therapies while at SNF. Her most recent ultrasound imaging was reviewed, she is up to date with HCC screening and variceal screening.    # Will contact her SNF for updated medication list     # Worsening DOMINIQUE:   - Start 40 mg furosemide   - Start 50 mg spironolactone   - 2000 mg sodium diet   - Repeat BMP in one week after medication change    # Hepatic encephalopathy, well controlled   - Discontinue scheduled lactulose   - Start MiraLax as more gentle. Start with 1 capful and adjust as need to ensure 3-4 bowel movements a day.   - Continue Rifaximin 550 mg twice daily     # Malnutrition:   - Increase protein intake in diet (60-80 gm/day)    # History of alcohol abuse:   - CD treatment - had some changes to her insurance so this has been delayed  - Sobriety from alcohol     # follow up with Dr. Hernández in 3 months    90 minutes spent on the date of the encounter doing chart review, history and exam, documentation and further activities as noted  above    Subjective     Yenifer Maher is a 40 year old female who presents to clinic today for the following health issues.     HPI      ETOH Cirrhosis  Complicated by:  - Ascites, currently resolved  Pleural effusions   - History of HE  - Hx of GI bleed s/p variceal banding   EGD: 12/2/2019  HCC: 11/27/2002: [place]     Patient was last seen by Dr. Hernández on 5/5/2020. She has been hospitalized five times in the past six months, including prolong hospitalization at [facility] for serve cor pulmonale/pulmonary HTN. Outside records and imaging from [facility] were reviewed. Most recently at [facility] for compression fracture of T6, nausea/diarrhea, poor appetite in the setting of ETOH cirrhosis and continued alcohol use.     She is staying [facility] Just completed physical therapy. Plan will be to go home March 10th.     Appetite is good. She is vegetarian. Not a lot of food options. She has some nuts/Dimas bars. Fluid in legs have been horrible. Her abdomen feels a lot more bloated. She is getting calciphylaxis lesions again. Followed by dermatology .    Energy fluctuates day to date. Thinks her strength has been staying the same over the past month.     She has been taking lactulose (started in October), twice daily. No problems with confusion.  Feels nauseous. No sure if still taking Rifaximin.     Last drank August 2020.     Review of Systems    7 point ROS completed, as noted in HPI      Objective       Vitals:  No vitals were obtained today due to virtual visit.    Physical Exam   PSYCH: Alert and oriented times 3; coherent speech, normal   rate and volume, able to articulate logical thoughts, able   to abstract reason, no tangential thoughts, no hallucinations   or delusions  Her affect is normal  RESP: No cough, no audible wheezing, able to talk in full sentences  Remainder of exam unable to be completed due to telephone visits         Data:   Reviewed in person and significant for:    Lab Results   Component Value  Date     12/03/2020      Lab Results   Component Value Date    POTASSIUM 4.1 12/05/2020     Lab Results   Component Value Date    CHLORIDE 108 12/03/2020     Lab Results   Component Value Date    CO2 19 12/03/2020     Lab Results   Component Value Date    BUN 12 12/03/2020     Lab Results   Component Value Date    CR 0.62 12/03/2020       Lab Results   Component Value Date    WBC 5.3 12/03/2020     Lab Results   Component Value Date    HGB 9.2 12/03/2020     Lab Results   Component Value Date    HCT 27.9 12/03/2020     Lab Results   Component Value Date    MCV 93 12/03/2020     Lab Results   Component Value Date     12/06/2020       Lab Results   Component Value Date    AST 85 12/02/2020     Lab Results   Component Value Date    ALT 38 12/02/2020     No results found for: BILICONJ   Lab Results   Component Value Date    BILITOTAL 2.8 12/02/2020       Lab Results   Component Value Date    ALBUMIN 2.7 12/02/2020     Lab Results   Component Value Date    PROTTOTAL 7.0 12/02/2020      Lab Results   Component Value Date    ALKPHOS 239 12/02/2020       Lab Results   Component Value Date    INR 1.47 12/02/2020       Radiology:     CT ABODMEN PELVIS: W IV CON:   8/23/2020:   Impression:  1. Cirrhosis with chronic sequela of portal hypertension as described above.   2. Constellation of findings suspicious for volume overload and/or right heart dysfunction, with severely dilated inferior vena cava, severely dilated right atrium, diffuse anasarca, increased ascites, and new pleural effusions          Again, thank you for allowing me to participate in the care of your patient.        Sincerely,        Ashvin Messina PA-C

## 2021-01-05 ENCOUNTER — VIRTUAL VISIT (OUTPATIENT)
Dept: NEUROSURGERY | Facility: CLINIC | Age: 41
End: 2021-01-05
Payer: MEDICARE

## 2021-01-05 DIAGNOSIS — S22.050D COMPRESSION FRACTURE OF T6 VERTEBRA WITH ROUTINE HEALING, SUBSEQUENT ENCOUNTER: Primary | ICD-10-CM

## 2021-01-05 PROCEDURE — 99207 PR NO SHOW FOR SCHEDULED APPT: CPT | Performed by: NURSE PRACTITIONER

## 2021-01-05 NOTE — LETTER
1/5/2021       RE: Yenifer Maher  [address] [address]     Dear Colleague,    Thank you for referring your patient, Yenifer Maher, to the [facility] [place] at [facility]. Please see a copy of my visit note below.    Patient was not available for scheduled appointment time.     May re-schedule at her convenience.     Rosangela Cantor DNP  Neurosurgery Nurse Practitioner  [facility]  [phone]        Again, thank you for allowing me to participate in the care of your patient.      Sincerely,    LÓPEZ Montanez CNP

## 2021-01-05 NOTE — PROGRESS NOTES
Patient was not available for scheduled appointment time.     May re-schedule at her convenience.     Rosangela Cantor DNP  Neurosurgery Nurse Practitioner  [facility]  [phone]

## 2021-01-06 ENCOUNTER — MEDICAL CORRESPONDENCE (OUTPATIENT)
Dept: HEALTH INFORMATION MANAGEMENT | Facility: CLINIC | Age: 41
End: 2021-01-06

## 2021-01-06 ENCOUNTER — TELEPHONE (OUTPATIENT)
Dept: GASTROENTEROLOGY | Facility: CLINIC | Age: 41
End: 2021-01-06

## 2021-01-06 RX ORDER — POLYETHYLENE GLYCOL 3350 17 G/17G
POWDER, FOR SOLUTION ORAL
Qty: 510 G | Refills: 1 | Status: SHIPPED | OUTPATIENT
Start: 2021-01-06

## 2021-01-06 RX ORDER — FUROSEMIDE 20 MG
40 TABLET ORAL DAILY
Qty: 60 TABLET | Refills: 1 | Status: SHIPPED | OUTPATIENT
Start: 2021-01-06

## 2021-01-06 RX ORDER — SPIRONOLACTONE 50 MG/1
50 TABLET, FILM COATED ORAL DAILY
Qty: 30 TABLET | Refills: 1 | Status: SHIPPED | OUTPATIENT
Start: 2021-01-06

## 2021-01-06 NOTE — LETTER
PHYSICIAN ORDERS         DATE  ISSUED:  2021    PATIENT NAME:  Fany    : 1980     DIAGNOSIS:  ETOH cirrhosis    ICD-10 CODE:  K70.11      Discontinue scheduled Lactulose   Continue to keep PRN lactulose on med list   Start 1-2 capfuls of MiraLax every day to ensure 3-4 bowel movements a day for hepatic encephalopathy.   Start 40 mg furosemide   Start 50 mg spironolactone   BMP in one week        MiraLax, furosemide and spironolactone prescriptions faxed to [facility].         Any questions please call: [phone]     Please fax results to [phone]              Ashvin Messina PA-C  [address] [place] [id number]

## 2021-01-06 NOTE — TELEPHONE ENCOUNTER
1/8/21-Signed orders faxed to [facility].    Kalli COOK LPN  Hepatology Clinic  -------  Called SNF with new orders from Ashvin Messina PA-C. [facility] requested orders be faxed. Writer will fax once signed by PA.    Kalli COOK LPN  Hepatology Clinic

## 2021-01-06 NOTE — TELEPHONE ENCOUNTER
----- Message from Ashvin Messina PA-C sent at 1/6/2021  2:47 PM CST -----  Regarding: f/u orders  Can you please call the [facility] with these orders:     Discontinue scheduled Lactulose   Continue to keep PRN lactulose on med list  Start 1-2 capfuls of MiraLax every day to ensure 3-4 bowel movements a day for hepatic encephalopathy.   Start 40 mg furosemide  Start 50 mg spironolactone   BMP in one week      Where should I send the MiraLax, furosemide and spironolactone prescription?     Thanks! Ashvin

## 2021-01-08 ENCOUNTER — TELEPHONE (OUTPATIENT)
Dept: GASTROENTEROLOGY | Facility: CLINIC | Age: 41
End: 2021-01-08

## 2021-01-15 ENCOUNTER — HEALTH MAINTENANCE LETTER (OUTPATIENT)
Age: 41
End: 2021-01-15

## 2022-01-01 ENCOUNTER — APPOINTMENT (OUTPATIENT)
Dept: GENERAL RADIOLOGY | Facility: CLINIC | Age: 42
DRG: 871 | End: 2022-01-01
Attending: INTERNAL MEDICINE
Payer: MEDICARE

## 2022-01-01 ENCOUNTER — ANESTHESIA (OUTPATIENT)
Dept: INTENSIVE CARE | Facility: CLINIC | Age: 42
DRG: 871 | End: 2022-01-01
Payer: MEDICARE

## 2022-01-01 ENCOUNTER — APPOINTMENT (OUTPATIENT)
Dept: CARDIOLOGY | Facility: CLINIC | Age: 42
DRG: 871 | End: 2022-01-01
Attending: PEDIATRICS
Payer: MEDICARE

## 2022-01-01 ENCOUNTER — ANESTHESIA EVENT (OUTPATIENT)
Dept: INTENSIVE CARE | Facility: CLINIC | Age: 42
DRG: 871 | End: 2022-01-01
Payer: MEDICARE

## 2022-01-01 ENCOUNTER — APPOINTMENT (OUTPATIENT)
Dept: ULTRASOUND IMAGING | Facility: CLINIC | Age: 42
DRG: 871 | End: 2022-01-01
Attending: PHYSICIAN ASSISTANT
Payer: MEDICARE

## 2022-01-01 ENCOUNTER — HOSPITAL ENCOUNTER (INPATIENT)
Facility: CLINIC | Age: 42
LOS: 1 days | Discharge: OTHER INSTITUTION | DRG: 871 | End: 2022-05-18
Attending: INTERNAL MEDICINE | Admitting: PEDIATRICS
Payer: MEDICARE

## 2022-01-01 ENCOUNTER — HEALTH MAINTENANCE LETTER (OUTPATIENT)
Age: 42
End: 2022-01-01

## 2022-01-01 VITALS
DIASTOLIC BLOOD PRESSURE: 65 MMHG | HEART RATE: 93 BPM | BODY MASS INDEX: 29.07 KG/M2 | OXYGEN SATURATION: 97 % | SYSTOLIC BLOOD PRESSURE: 105 MMHG | RESPIRATION RATE: 30 BRPM | HEIGHT: 67 IN | WEIGHT: 185.19 LBS | TEMPERATURE: 98 F

## 2022-01-01 DIAGNOSIS — E83.59 CALCIPHYLAXIS: ICD-10-CM

## 2022-01-01 DIAGNOSIS — E86.0 DEHYDRATION: ICD-10-CM

## 2022-01-01 DIAGNOSIS — K76.82 HEPATIC ENCEPHALOPATHY (H): ICD-10-CM

## 2022-01-01 DIAGNOSIS — K70.30 ALCOHOLIC CIRRHOSIS OF LIVER WITHOUT ASCITES (H): ICD-10-CM

## 2022-01-01 DIAGNOSIS — R57.0 CARDIOGENIC SHOCK (H): Primary | ICD-10-CM

## 2022-01-01 LAB
ABO/RH(D): NORMAL
ALBUMIN SERPL-MCNC: 1.3 G/DL (ref 3.4–5)
ALBUMIN SERPL-MCNC: 1.6 G/DL (ref 3.4–5)
ALBUMIN UR-MCNC: 20 MG/DL
ALP SERPL-CCNC: 172 U/L (ref 40–150)
ALP SERPL-CCNC: 209 U/L (ref 40–150)
ALT SERPL W P-5'-P-CCNC: 41 U/L (ref 0–50)
ALT SERPL W P-5'-P-CCNC: 50 U/L (ref 0–50)
AMMONIA PLAS-SCNC: 31 UMOL/L (ref 10–50)
ANION GAP SERPL CALCULATED.3IONS-SCNC: 16 MMOL/L (ref 3–14)
ANION GAP SERPL CALCULATED.3IONS-SCNC: 18 MMOL/L (ref 3–14)
ANION GAP SERPL CALCULATED.3IONS-SCNC: 19 MMOL/L (ref 3–14)
ANTIBODY SCREEN: NEGATIVE
APAP SERPL-MCNC: NORMAL UG/ML
APPEARANCE UR: CLEAR
APTT PPP: 43 SECONDS (ref 22–38)
AST SERPL W P-5'-P-CCNC: 145 U/L (ref 0–45)
AST SERPL W P-5'-P-CCNC: 181 U/L (ref 0–45)
ATRIAL RATE - MUSE: 108 BPM
BACTERIA UR CULT: ABNORMAL
BASE EXCESS BLDA CALC-SCNC: -10.8 MMOL/L (ref -9–1.8)
BASE EXCESS BLDA CALC-SCNC: -11.2 MMOL/L (ref -9–1.8)
BASE EXCESS BLDA CALC-SCNC: -12.2 MMOL/L (ref -9–1.8)
BASE EXCESS BLDA CALC-SCNC: -6.1 MMOL/L (ref -9–1.8)
BASE EXCESS BLDA CALC-SCNC: -6.2 MMOL/L (ref -9–1.8)
BASE EXCESS BLDA CALC-SCNC: -7.2 MMOL/L (ref -9–1.8)
BASE EXCESS BLDA CALC-SCNC: -8.5 MMOL/L (ref -9–1.8)
BASE EXCESS BLDA CALC-SCNC: -9 MMOL/L (ref -9–1.8)
BASE EXCESS BLDA CALC-SCNC: -9.6 MMOL/L (ref -9–1.8)
BASE EXCESS BLDA CALC-SCNC: -9.8 MMOL/L (ref -9–1.8)
BASOPHILS # BLD AUTO: 0 10E3/UL (ref 0–0.2)
BASOPHILS NFR BLD AUTO: 0 %
BILIRUB DIRECT SERPL-MCNC: 3.1 MG/DL (ref 0–0.2)
BILIRUB SERPL-MCNC: 5 MG/DL (ref 0.2–1.3)
BILIRUB SERPL-MCNC: 5 MG/DL (ref 0.2–1.3)
BILIRUB SERPL-MCNC: 6 MG/DL (ref 0.2–1.3)
BILIRUB UR QL STRIP: NEGATIVE
BUN SERPL-MCNC: 55 MG/DL (ref 7–30)
BUN SERPL-MCNC: 66 MG/DL (ref 7–30)
BUN SERPL-MCNC: 67 MG/DL (ref 7–30)
BUN SERPL-MCNC: 68 MG/DL (ref 7–30)
BUN SERPL-MCNC: 71 MG/DL (ref 7–30)
BUN SERPL-MCNC: 72 MG/DL (ref 7–30)
BUN SERPL-MCNC: 75 MG/DL (ref 7–30)
BUN SERPL-MCNC: 75 MG/DL (ref 7–30)
BUN SERPL-MCNC: 77 MG/DL (ref 7–30)
C DIFF TOX B STL QL: NEGATIVE
CA-I BLD-MCNC: 2.6 MG/DL (ref 4.4–5.2)
CALCIUM SERPL-MCNC: 5.3 MG/DL (ref 8.5–10.1)
CALCIUM SERPL-MCNC: 5.4 MG/DL (ref 8.5–10.1)
CALCIUM SERPL-MCNC: 5.4 MG/DL (ref 8.5–10.1)
CALCIUM SERPL-MCNC: 5.6 MG/DL (ref 8.5–10.1)
CALCIUM SERPL-MCNC: 5.6 MG/DL (ref 8.5–10.1)
CALCIUM SERPL-MCNC: 6.1 MG/DL (ref 8.5–10.1)
CALCIUM SERPL-MCNC: <5 MG/DL (ref 8.5–10.1)
CHLORIDE BLD-SCNC: 90 MMOL/L (ref 94–109)
CHLORIDE BLD-SCNC: 90 MMOL/L (ref 94–109)
CHLORIDE BLD-SCNC: 91 MMOL/L (ref 94–109)
CHLORIDE BLD-SCNC: 92 MMOL/L (ref 94–109)
CHLORIDE BLD-SCNC: 93 MMOL/L (ref 94–109)
CHLORIDE BLD-SCNC: 99 MMOL/L (ref 94–109)
CO2 SERPL-SCNC: 10 MMOL/L (ref 20–32)
CO2 SERPL-SCNC: 10 MMOL/L (ref 20–32)
CO2 SERPL-SCNC: 11 MMOL/L (ref 20–32)
CO2 SERPL-SCNC: 12 MMOL/L (ref 20–32)
CO2 SERPL-SCNC: 14 MMOL/L (ref 20–32)
CO2 SERPL-SCNC: 14 MMOL/L (ref 20–32)
CO2 SERPL-SCNC: 17 MMOL/L (ref 20–32)
COLOR UR AUTO: YELLOW
CREAT SERPL-MCNC: 3.19 MG/DL (ref 0.52–1.04)
CREAT SERPL-MCNC: 3.33 MG/DL (ref 0.52–1.04)
CREAT SERPL-MCNC: 3.41 MG/DL (ref 0.52–1.04)
CREAT SERPL-MCNC: 3.56 MG/DL (ref 0.52–1.04)
CREAT SERPL-MCNC: 3.66 MG/DL (ref 0.52–1.04)
CREAT SERPL-MCNC: 3.71 MG/DL (ref 0.52–1.04)
CREAT SERPL-MCNC: 3.73 MG/DL (ref 0.52–1.04)
CREAT SERPL-MCNC: 3.9 MG/DL (ref 0.52–1.04)
CREAT SERPL-MCNC: 3.96 MG/DL (ref 0.52–1.04)
CREAT UR-MCNC: 124 MG/DL
CREAT UR-MCNC: 126 MG/DL
DIASTOLIC BLOOD PRESSURE - MUSE: NORMAL MMHG
EOSINOPHIL # BLD AUTO: 0 10E3/UL (ref 0–0.7)
EOSINOPHIL NFR BLD AUTO: 0 %
ERYTHROCYTE [DISTWIDTH] IN BLOOD BY AUTOMATED COUNT: 22.3 % (ref 10–15)
ERYTHROCYTE [DISTWIDTH] IN BLOOD BY AUTOMATED COUNT: 22.4 % (ref 10–15)
ERYTHROCYTE [DISTWIDTH] IN BLOOD BY AUTOMATED COUNT: 22.5 % (ref 10–15)
FIBRINOGEN PPP-MCNC: 242 MG/DL (ref 170–490)
FRACT EXCRET NA UR+SERPL-RTO: 0.1 %
GFR SERPL CREATININE-BSD FRML MDRD: 14 ML/MIN/1.73M2
GFR SERPL CREATININE-BSD FRML MDRD: 14 ML/MIN/1.73M2
GFR SERPL CREATININE-BSD FRML MDRD: 15 ML/MIN/1.73M2
GFR SERPL CREATININE-BSD FRML MDRD: 16 ML/MIN/1.73M2
GFR SERPL CREATININE-BSD FRML MDRD: 17 ML/MIN/1.73M2
GFR SERPL CREATININE-BSD FRML MDRD: 17 ML/MIN/1.73M2
GFR SERPL CREATININE-BSD FRML MDRD: 18 ML/MIN/1.73M2
GLUCOSE BLD-MCNC: 101 MG/DL (ref 70–99)
GLUCOSE BLD-MCNC: 106 MG/DL (ref 70–99)
GLUCOSE BLD-MCNC: 110 MG/DL (ref 70–99)
GLUCOSE BLD-MCNC: 128 MG/DL (ref 70–99)
GLUCOSE BLD-MCNC: 131 MG/DL (ref 70–99)
GLUCOSE BLD-MCNC: 132 MG/DL (ref 70–99)
GLUCOSE BLD-MCNC: 133 MG/DL (ref 70–99)
GLUCOSE BLD-MCNC: 138 MG/DL (ref 70–99)
GLUCOSE BLD-MCNC: 81 MG/DL (ref 70–99)
GLUCOSE BLDC GLUCOMTR-MCNC: 107 MG/DL (ref 70–99)
GLUCOSE BLDC GLUCOMTR-MCNC: 114 MG/DL (ref 70–99)
GLUCOSE BLDC GLUCOMTR-MCNC: 118 MG/DL (ref 70–99)
GLUCOSE BLDC GLUCOMTR-MCNC: 120 MG/DL (ref 70–99)
GLUCOSE BLDC GLUCOMTR-MCNC: 123 MG/DL (ref 70–99)
GLUCOSE BLDC GLUCOMTR-MCNC: 130 MG/DL (ref 70–99)
GLUCOSE BLDC GLUCOMTR-MCNC: 131 MG/DL (ref 70–99)
GLUCOSE BLDC GLUCOMTR-MCNC: 132 MG/DL (ref 70–99)
GLUCOSE BLDC GLUCOMTR-MCNC: 136 MG/DL (ref 70–99)
GLUCOSE BLDC GLUCOMTR-MCNC: 150 MG/DL (ref 70–99)
GLUCOSE UR STRIP-MCNC: NEGATIVE MG/DL
HCO3 BLD-SCNC: 11 MMOL/L (ref 21–28)
HCO3 BLD-SCNC: 12 MMOL/L (ref 21–28)
HCO3 BLD-SCNC: 13 MMOL/L (ref 21–28)
HCO3 BLD-SCNC: 14 MMOL/L (ref 21–28)
HCO3 BLD-SCNC: 15 MMOL/L (ref 21–28)
HCO3 BLD-SCNC: 16 MMOL/L (ref 21–28)
HCO3 BLD-SCNC: 17 MMOL/L (ref 21–28)
HCO3 BLDV-SCNC: 13 MMOL/L (ref 21–28)
HCO3 BLDV-SCNC: 13 MMOL/L (ref 21–28)
HCT VFR BLD AUTO: 20 % (ref 35–47)
HCT VFR BLD AUTO: 26 % (ref 35–47)
HCT VFR BLD AUTO: 26.3 % (ref 35–47)
HCT VFR BLD AUTO: 26.5 % (ref 35–47)
HCT VFR BLD AUTO: 26.6 % (ref 35–47)
HCT VFR BLD AUTO: 26.8 % (ref 35–47)
HCT VFR BLD AUTO: 26.8 % (ref 35–47)
HCT VFR BLD AUTO: 27.2 % (ref 35–47)
HGB BLD-MCNC: 6.6 G/DL (ref 11.7–15.7)
HGB BLD-MCNC: 8.8 G/DL (ref 11.7–15.7)
HGB BLD-MCNC: 8.9 G/DL (ref 11.7–15.7)
HGB BLD-MCNC: 9 G/DL (ref 11.7–15.7)
HGB BLD-MCNC: 9 G/DL (ref 11.7–15.7)
HGB UR QL STRIP: ABNORMAL
IMM GRANULOCYTES # BLD: 0.1 10E3/UL
IMM GRANULOCYTES NFR BLD: 1 %
INR PPP: 2.11 (ref 0.85–1.15)
INR PPP: 2.14 (ref 0.85–1.15)
INTERPRETATION ECG - MUSE: NORMAL
KETONES UR STRIP-MCNC: NEGATIVE MG/DL
LACTATE BLD-SCNC: 2.6 MMOL/L
LACTATE BLD-SCNC: 2.7 MMOL/L
LACTATE SERPL-SCNC: 1.6 MMOL/L (ref 0.7–2)
LACTATE SERPL-SCNC: 1.7 MMOL/L (ref 0.7–2)
LACTATE SERPL-SCNC: 2.2 MMOL/L (ref 0.7–2)
LACTATE SERPL-SCNC: 2.2 MMOL/L (ref 0.7–2)
LACTATE SERPL-SCNC: 2.9 MMOL/L (ref 0.7–2)
LACTATE SERPL-SCNC: 3.1 MMOL/L (ref 0.7–2)
LACTATE SERPL-SCNC: 3.2 MMOL/L (ref 0.7–2)
LACTATE SERPL-SCNC: 3.7 MMOL/L (ref 0.7–2)
LACTATE SERPL-SCNC: 3.8 MMOL/L (ref 0.7–2)
LEUKOCYTE ESTERASE UR QL STRIP: ABNORMAL
LVEF ECHO: NORMAL
LYMPHOCYTES # BLD AUTO: 0.5 10E3/UL (ref 0.8–5.3)
LYMPHOCYTES NFR BLD AUTO: 6 %
MAGNESIUM SERPL-MCNC: 1.4 MG/DL (ref 1.6–2.3)
MCH RBC QN AUTO: 31.1 PG (ref 26.5–33)
MCH RBC QN AUTO: 31.6 PG (ref 26.5–33)
MCH RBC QN AUTO: 31.6 PG (ref 26.5–33)
MCH RBC QN AUTO: 31.7 PG (ref 26.5–33)
MCH RBC QN AUTO: 31.7 PG (ref 26.5–33)
MCH RBC QN AUTO: 32.1 PG (ref 26.5–33)
MCH RBC QN AUTO: 32.1 PG (ref 26.5–33)
MCH RBC QN AUTO: 32.3 PG (ref 26.5–33)
MCHC RBC AUTO-ENTMCNC: 33 G/DL (ref 31.5–36.5)
MCHC RBC AUTO-ENTMCNC: 33.1 G/DL (ref 31.5–36.5)
MCHC RBC AUTO-ENTMCNC: 33.2 G/DL (ref 31.5–36.5)
MCHC RBC AUTO-ENTMCNC: 33.2 G/DL (ref 31.5–36.5)
MCHC RBC AUTO-ENTMCNC: 33.5 G/DL (ref 31.5–36.5)
MCHC RBC AUTO-ENTMCNC: 33.6 G/DL (ref 31.5–36.5)
MCHC RBC AUTO-ENTMCNC: 33.8 G/DL (ref 31.5–36.5)
MCHC RBC AUTO-ENTMCNC: 34.2 G/DL (ref 31.5–36.5)
MCV RBC AUTO: 93 FL (ref 78–100)
MCV RBC AUTO: 94 FL (ref 78–100)
MCV RBC AUTO: 95 FL (ref 78–100)
MCV RBC AUTO: 96 FL (ref 78–100)
MCV RBC AUTO: 97 FL (ref 78–100)
MCV RBC AUTO: 97 FL (ref 78–100)
METHANOL SERPL-MCNC: <5 MG/DL
MITOCHONDRIA M2 IGG SER-ACNC: <1 U/ML
MONOCYTES # BLD AUTO: 1 10E3/UL (ref 0–1.3)
MONOCYTES NFR BLD AUTO: 12 %
NEUTROPHILS # BLD AUTO: 7 10E3/UL (ref 1.6–8.3)
NEUTROPHILS NFR BLD AUTO: 81 %
NITRATE UR QL: NEGATIVE
NRBC # BLD AUTO: 0.1 10E3/UL
NRBC BLD AUTO-RTO: 2 /100
O2/TOTAL GAS SETTING VFR VENT: 2 %
O2/TOTAL GAS SETTING VFR VENT: 21 %
O2/TOTAL GAS SETTING VFR VENT: 30 %
O2/TOTAL GAS SETTING VFR VENT: 5 %
O2/TOTAL GAS SETTING VFR VENT: 5 %
OXYHGB MFR BLD: 96 % (ref 92–100)
OXYHGB MFR BLD: 97 % (ref 92–100)
P AXIS - MUSE: 69 DEGREES
PCO2 BLD: 15 MM HG (ref 35–45)
PCO2 BLD: 16 MM HG (ref 35–45)
PCO2 BLD: 17 MM HG (ref 35–45)
PCO2 BLD: 18 MM HG (ref 35–45)
PCO2 BLD: 19 MM HG (ref 35–45)
PCO2 BLD: 19 MM HG (ref 35–45)
PCO2 BLD: 23 MM HG (ref 35–45)
PCO2 BLD: 26 MM HG (ref 35–45)
PCO2 BLDV: 19 MM HG (ref 40–50)
PCO2 BLDV: 19 MM HG (ref 40–50)
PH BLD: 7.42 [PH] (ref 7.35–7.45)
PH BLD: 7.42 [PH] (ref 7.35–7.45)
PH BLD: 7.46 [PH] (ref 7.35–7.45)
PH BLD: 7.48 [PH] (ref 7.35–7.45)
PH BLD: 7.49 [PH] (ref 7.35–7.45)
PH BLD: 7.49 [PH] (ref 7.35–7.45)
PH BLDV: 7.44 [PH] (ref 7.32–7.43)
PH BLDV: 7.44 [PH] (ref 7.32–7.43)
PH UR STRIP: 5.5 [PH] (ref 5–7)
PHOSPHATE SERPL-MCNC: 8 MG/DL (ref 2.5–4.5)
PLATELET # BLD AUTO: 102 10E3/UL (ref 150–450)
PLATELET # BLD AUTO: 102 10E3/UL (ref 150–450)
PLATELET # BLD AUTO: 105 10E3/UL (ref 150–450)
PLATELET # BLD AUTO: 105 10E3/UL (ref 150–450)
PLATELET # BLD AUTO: 111 10E3/UL (ref 150–450)
PLATELET # BLD AUTO: 113 10E3/UL (ref 150–450)
PLATELET # BLD AUTO: 115 10E3/UL (ref 150–450)
PLATELET # BLD AUTO: 116 10E3/UL (ref 150–450)
PO2 BLD: 107 MM HG (ref 80–105)
PO2 BLD: 113 MM HG (ref 80–105)
PO2 BLD: 68 MM HG (ref 80–105)
PO2 BLD: 69 MM HG (ref 80–105)
PO2 BLD: 70 MM HG (ref 80–105)
PO2 BLD: 76 MM HG (ref 80–105)
PO2 BLD: 77 MM HG (ref 80–105)
PO2 BLD: 81 MM HG (ref 80–105)
PO2 BLD: 86 MM HG (ref 80–105)
PO2 BLD: 93 MM HG (ref 80–105)
PO2 BLDV: 22 MM HG (ref 25–47)
PO2 BLDV: 28 MM HG (ref 25–47)
POTASSIUM BLD-SCNC: 2.8 MMOL/L (ref 3.4–5.3)
POTASSIUM BLD-SCNC: 3.3 MMOL/L (ref 3.4–5.3)
POTASSIUM BLD-SCNC: 3.3 MMOL/L (ref 3.4–5.3)
POTASSIUM BLD-SCNC: 3.5 MMOL/L (ref 3.4–5.3)
POTASSIUM BLD-SCNC: 3.5 MMOL/L (ref 3.4–5.3)
POTASSIUM BLD-SCNC: 4 MMOL/L (ref 3.4–5.3)
POTASSIUM BLD-SCNC: 4.1 MMOL/L (ref 3.4–5.3)
POTASSIUM BLD-SCNC: 4.1 MMOL/L (ref 3.4–5.3)
POTASSIUM BLD-SCNC: 4.2 MMOL/L (ref 3.4–5.3)
PR INTERVAL - MUSE: 130 MS
PROT SERPL-MCNC: 5.1 G/DL (ref 6.8–8.8)
PROT SERPL-MCNC: 6.4 G/DL (ref 6.8–8.8)
QRS DURATION - MUSE: 100 MS
QT - MUSE: 386 MS
QTC - MUSE: 517 MS
R AXIS - MUSE: 109 DEGREES
RBC # BLD AUTO: 2.08 10E6/UL (ref 3.8–5.2)
RBC # BLD AUTO: 2.77 10E6/UL (ref 3.8–5.2)
RBC # BLD AUTO: 2.78 10E6/UL (ref 3.8–5.2)
RBC # BLD AUTO: 2.79 10E6/UL (ref 3.8–5.2)
RBC # BLD AUTO: 2.8 10E6/UL (ref 3.8–5.2)
RBC # BLD AUTO: 2.82 10E6/UL (ref 3.8–5.2)
RBC # BLD AUTO: 2.82 10E6/UL (ref 3.8–5.2)
RBC # BLD AUTO: 2.86 10E6/UL (ref 3.8–5.2)
RBC URINE: 15 /HPF
SAO2 % BLDV: 43 % (ref 94–100)
SAO2 % BLDV: 57 % (ref 94–100)
SARS-COV-2 RNA RESP QL NAA+PROBE: NEGATIVE
SCANNED LAB RESULT: NORMAL
SCANNED LAB RESULT: NORMAL
SMA IGG SER-ACNC: 9 UNITS
SODIUM SERPL-SCNC: 119 MMOL/L (ref 133–144)
SODIUM SERPL-SCNC: 119 MMOL/L (ref 133–144)
SODIUM SERPL-SCNC: 120 MMOL/L (ref 133–144)
SODIUM SERPL-SCNC: 121 MMOL/L (ref 133–144)
SODIUM SERPL-SCNC: 122 MMOL/L (ref 133–144)
SODIUM SERPL-SCNC: 123 MMOL/L (ref 133–144)
SODIUM SERPL-SCNC: 124 MMOL/L (ref 133–144)
SODIUM SERPL-SCNC: 125 MMOL/L (ref 133–144)
SODIUM SERPL-SCNC: 125 MMOL/L (ref 133–144)
SODIUM UR-SCNC: 5 MMOL/L
SODIUM UR-SCNC: <5 MMOL/L
SP GR UR STRIP: 1.03 (ref 1–1.03)
SPECIMEN EXPIRATION DATE: NORMAL
SYSTOLIC BLOOD PRESSURE - MUSE: NORMAL MMHG
T AXIS - MUSE: 60 DEGREES
T4 FREE SERPL-MCNC: 1.35 NG/DL (ref 0.76–1.46)
TROPONIN I SERPL HS-MCNC: 168 NG/L
TROPONIN I SERPL HS-MCNC: 266 NG/L
TSH SERPL DL<=0.005 MIU/L-ACNC: 4.26 MU/L (ref 0.4–4)
UROBILINOGEN UR STRIP-MCNC: NORMAL MG/DL
UUN UR-MCNC: 347 MG/DL
UUN/CREAT 24H UR: 3 G/G CR
VENTRICULAR RATE- MUSE: 108 BPM
WBC # BLD AUTO: 8 10E3/UL (ref 4–11)
WBC # BLD AUTO: 8.1 10E3/UL (ref 4–11)
WBC # BLD AUTO: 8.3 10E3/UL (ref 4–11)
WBC # BLD AUTO: 8.5 10E3/UL (ref 4–11)
WBC # BLD AUTO: 8.6 10E3/UL (ref 4–11)
WBC URINE: 36 /HPF

## 2022-01-01 PROCEDURE — 82805 BLOOD GASES W/O2 SATURATION: CPT | Performed by: STUDENT IN AN ORGANIZED HEALTH CARE EDUCATION/TRAINING PROGRAM

## 2022-01-01 PROCEDURE — 250N000009 HC RX 250

## 2022-01-01 PROCEDURE — C1751 CATH, INF, PER/CENT/MIDLINE: HCPCS | Performed by: INTERNAL MEDICINE

## 2022-01-01 PROCEDURE — 84300 ASSAY OF URINE SODIUM: CPT | Performed by: STUDENT IN AN ORGANIZED HEALTH CARE EDUCATION/TRAINING PROGRAM

## 2022-01-01 PROCEDURE — C9113 INJ PANTOPRAZOLE SODIUM, VIA: HCPCS | Performed by: PEDIATRICS

## 2022-01-01 PROCEDURE — 999N000157 HC STATISTIC RCP TIME EA 10 MIN

## 2022-01-01 PROCEDURE — 93463 DRUG ADMIN & HEMODYNMIC MEAS: CPT | Performed by: INTERNAL MEDICINE

## 2022-01-01 PROCEDURE — 258N000003 HC RX IP 258 OP 636: Performed by: STUDENT IN AN ORGANIZED HEALTH CARE EDUCATION/TRAINING PROGRAM

## 2022-01-01 PROCEDURE — 93306 TTE W/DOPPLER COMPLETE: CPT | Mod: 26 | Performed by: INTERNAL MEDICINE

## 2022-01-01 PROCEDURE — 86015 ACTIN ANTIBODY EACH: CPT | Performed by: PHYSICIAN ASSISTANT

## 2022-01-01 PROCEDURE — 250N000009 HC RX 250: Performed by: NURSE ANESTHETIST, CERTIFIED REGISTERED

## 2022-01-01 PROCEDURE — 200N000001 HC R&B ICU

## 2022-01-01 PROCEDURE — 250N000011 HC RX IP 250 OP 636: Performed by: INTERNAL MEDICINE

## 2022-01-01 PROCEDURE — 82803 BLOOD GASES ANY COMBINATION: CPT | Performed by: STUDENT IN AN ORGANIZED HEALTH CARE EDUCATION/TRAINING PROGRAM

## 2022-01-01 PROCEDURE — 93459 L HRT ART/GRFT ANGIO: CPT | Performed by: INTERNAL MEDICINE

## 2022-01-01 PROCEDURE — 93005 ELECTROCARDIOGRAM TRACING: CPT

## 2022-01-01 PROCEDURE — 999N000208 ECHOCARDIOGRAM COMPLETE

## 2022-01-01 PROCEDURE — 80320 DRUG SCREEN QUANTALCOHOLS: CPT | Performed by: INTERNAL MEDICINE

## 2022-01-01 PROCEDURE — 82330 ASSAY OF CALCIUM: CPT | Performed by: INTERNAL MEDICINE

## 2022-01-01 PROCEDURE — 87493 C DIFF AMPLIFIED PROBE: CPT | Performed by: INTERNAL MEDICINE

## 2022-01-01 PROCEDURE — 85018 HEMOGLOBIN: CPT | Performed by: PEDIATRICS

## 2022-01-01 PROCEDURE — 999N000190 HC STATISTIC VAT ROUNDS

## 2022-01-01 PROCEDURE — 250N000013 HC RX MED GY IP 250 OP 250 PS 637: Performed by: STUDENT IN AN ORGANIZED HEALTH CARE EDUCATION/TRAINING PROGRAM

## 2022-01-01 PROCEDURE — 86901 BLOOD TYPING SEROLOGIC RH(D): CPT | Performed by: PEDIATRICS

## 2022-01-01 PROCEDURE — 250N000009 HC RX 250: Performed by: STUDENT IN AN ORGANIZED HEALTH CARE EDUCATION/TRAINING PROGRAM

## 2022-01-01 PROCEDURE — 80053 COMPREHEN METABOLIC PANEL: CPT | Performed by: PEDIATRICS

## 2022-01-01 PROCEDURE — 82803 BLOOD GASES ANY COMBINATION: CPT | Performed by: PEDIATRICS

## 2022-01-01 PROCEDURE — 86381 MITOCHONDRIAL ANTIBODY EACH: CPT | Performed by: PHYSICIAN ASSISTANT

## 2022-01-01 PROCEDURE — 99233 SBSQ HOSP IP/OBS HIGH 50: CPT | Performed by: INTERNAL MEDICINE

## 2022-01-01 PROCEDURE — 83605 ASSAY OF LACTIC ACID: CPT | Performed by: PEDIATRICS

## 2022-01-01 PROCEDURE — 4A023N6 MEASUREMENT OF CARDIAC SAMPLING AND PRESSURE, RIGHT HEART, PERCUTANEOUS APPROACH: ICD-10-PCS | Performed by: INTERNAL MEDICINE

## 2022-01-01 PROCEDURE — 85610 PROTHROMBIN TIME: CPT | Performed by: PEDIATRICS

## 2022-01-01 PROCEDURE — 84450 TRANSFERASE (AST) (SGOT): CPT | Performed by: INTERNAL MEDICINE

## 2022-01-01 PROCEDURE — 99291 CRITICAL CARE FIRST HOUR: CPT | Performed by: INTERNAL MEDICINE

## 2022-01-01 PROCEDURE — 370N000003 HC ANESTHESIA WARD SERVICE

## 2022-01-01 PROCEDURE — 250N000011 HC RX IP 250 OP 636: Performed by: STUDENT IN AN ORGANIZED HEALTH CARE EDUCATION/TRAINING PROGRAM

## 2022-01-01 PROCEDURE — 99291 CRITICAL CARE FIRST HOUR: CPT | Performed by: PEDIATRICS

## 2022-01-01 PROCEDURE — 258N000003 HC RX IP 258 OP 636

## 2022-01-01 PROCEDURE — P9047 ALBUMIN (HUMAN), 25%, 50ML: HCPCS | Performed by: PHYSICIAN ASSISTANT

## 2022-01-01 PROCEDURE — 93451 RIGHT HEART CATH: CPT | Performed by: INTERNAL MEDICINE

## 2022-01-01 PROCEDURE — 85730 THROMBOPLASTIN TIME PARTIAL: CPT | Performed by: PEDIATRICS

## 2022-01-01 PROCEDURE — 999N000065 XR CHEST PORT 1 VIEW

## 2022-01-01 PROCEDURE — 83605 ASSAY OF LACTIC ACID: CPT | Performed by: STUDENT IN AN ORGANIZED HEALTH CARE EDUCATION/TRAINING PROGRAM

## 2022-01-01 PROCEDURE — 93010 ELECTROCARDIOGRAM REPORT: CPT | Performed by: INTERNAL MEDICINE

## 2022-01-01 PROCEDURE — 250N000011 HC RX IP 250 OP 636: Performed by: NURSE ANESTHETIST, CERTIFIED REGISTERED

## 2022-01-01 PROCEDURE — 85384 FIBRINOGEN ACTIVITY: CPT | Performed by: PEDIATRICS

## 2022-01-01 PROCEDURE — 258N000003 HC RX IP 258 OP 636: Performed by: INTERNAL MEDICINE

## 2022-01-01 PROCEDURE — 85014 HEMATOCRIT: CPT | Performed by: STUDENT IN AN ORGANIZED HEALTH CARE EDUCATION/TRAINING PROGRAM

## 2022-01-01 PROCEDURE — 82140 ASSAY OF AMMONIA: CPT | Performed by: PHYSICIAN ASSISTANT

## 2022-01-01 PROCEDURE — 85027 COMPLETE CBC AUTOMATED: CPT | Performed by: STUDENT IN AN ORGANIZED HEALTH CARE EDUCATION/TRAINING PROGRAM

## 2022-01-01 PROCEDURE — 84300 ASSAY OF URINE SODIUM: CPT | Performed by: PEDIATRICS

## 2022-01-01 PROCEDURE — 84540 ASSAY OF URINE/UREA-N: CPT | Performed by: STUDENT IN AN ORGANIZED HEALTH CARE EDUCATION/TRAINING PROGRAM

## 2022-01-01 PROCEDURE — HZ2ZZZZ DETOXIFICATION SERVICES FOR SUBSTANCE ABUSE TREATMENT: ICD-10-PCS | Performed by: INTERNAL MEDICINE

## 2022-01-01 PROCEDURE — C1894 INTRO/SHEATH, NON-LASER: HCPCS | Performed by: INTERNAL MEDICINE

## 2022-01-01 PROCEDURE — 250N000013 HC RX MED GY IP 250 OP 250 PS 637: Performed by: PHYSICIAN ASSISTANT

## 2022-01-01 PROCEDURE — 76705 ECHO EXAM OF ABDOMEN: CPT

## 2022-01-01 PROCEDURE — 3E043XZ INTRODUCTION OF VASOPRESSOR INTO CENTRAL VEIN, PERCUTANEOUS APPROACH: ICD-10-PCS | Performed by: PEDIATRICS

## 2022-01-01 PROCEDURE — C9113 INJ PANTOPRAZOLE SODIUM, VIA: HCPCS | Performed by: STUDENT IN AN ORGANIZED HEALTH CARE EDUCATION/TRAINING PROGRAM

## 2022-01-01 PROCEDURE — 250N000011 HC RX IP 250 OP 636: Performed by: PEDIATRICS

## 2022-01-01 PROCEDURE — 82310 ASSAY OF CALCIUM: CPT | Performed by: STUDENT IN AN ORGANIZED HEALTH CARE EDUCATION/TRAINING PROGRAM

## 2022-01-01 PROCEDURE — 255N000002 HC RX 255 OP 636: Performed by: INTERNAL MEDICINE

## 2022-01-01 PROCEDURE — 80143 DRUG ASSAY ACETAMINOPHEN: CPT | Performed by: INTERNAL MEDICINE

## 2022-01-01 PROCEDURE — 84443 ASSAY THYROID STIM HORMONE: CPT | Performed by: STUDENT IN AN ORGANIZED HEALTH CARE EDUCATION/TRAINING PROGRAM

## 2022-01-01 PROCEDURE — 99291 CRITICAL CARE FIRST HOUR: CPT | Mod: GC | Performed by: INTERNAL MEDICINE

## 2022-01-01 PROCEDURE — 84484 ASSAY OF TROPONIN QUANT: CPT | Performed by: PEDIATRICS

## 2022-01-01 PROCEDURE — 5A1935Z RESPIRATORY VENTILATION, LESS THAN 24 CONSECUTIVE HOURS: ICD-10-PCS | Performed by: INTERNAL MEDICINE

## 2022-01-01 PROCEDURE — 80048 BASIC METABOLIC PNL TOTAL CA: CPT | Performed by: STUDENT IN AN ORGANIZED HEALTH CARE EDUCATION/TRAINING PROGRAM

## 2022-01-01 PROCEDURE — 82248 BILIRUBIN DIRECT: CPT | Performed by: PEDIATRICS

## 2022-01-01 PROCEDURE — 250N000009 HC RX 250: Performed by: INTERNAL MEDICINE

## 2022-01-01 PROCEDURE — U0005 INFEC AGEN DETEC AMPLI PROBE: HCPCS | Performed by: PEDIATRICS

## 2022-01-01 PROCEDURE — 84484 ASSAY OF TROPONIN QUANT: CPT | Performed by: STUDENT IN AN ORGANIZED HEALTH CARE EDUCATION/TRAINING PROGRAM

## 2022-01-01 PROCEDURE — 99223 1ST HOSP IP/OBS HIGH 75: CPT | Mod: 25 | Performed by: INTERNAL MEDICINE

## 2022-01-01 PROCEDURE — 83735 ASSAY OF MAGNESIUM: CPT | Performed by: INTERNAL MEDICINE

## 2022-01-01 PROCEDURE — 250N000011 HC RX IP 250 OP 636: Performed by: PHYSICIAN ASSISTANT

## 2022-01-01 PROCEDURE — 87088 URINE BACTERIA CULTURE: CPT | Performed by: STUDENT IN AN ORGANIZED HEALTH CARE EDUCATION/TRAINING PROGRAM

## 2022-01-01 PROCEDURE — 84100 ASSAY OF PHOSPHORUS: CPT | Performed by: INTERNAL MEDICINE

## 2022-01-01 PROCEDURE — 250N000009 HC RX 250: Performed by: PEDIATRICS

## 2022-01-01 PROCEDURE — 81001 URINALYSIS AUTO W/SCOPE: CPT | Performed by: STUDENT IN AN ORGANIZED HEALTH CARE EDUCATION/TRAINING PROGRAM

## 2022-01-01 PROCEDURE — 99221 1ST HOSP IP/OBS SF/LOW 40: CPT | Performed by: INTERNAL MEDICINE

## 2022-01-01 PROCEDURE — 94002 VENT MGMT INPAT INIT DAY: CPT

## 2022-01-01 PROCEDURE — 85610 PROTHROMBIN TIME: CPT | Performed by: INTERNAL MEDICINE

## 2022-01-01 PROCEDURE — 02HP32Z INSERTION OF MONITORING DEVICE INTO PULMONARY TRUNK, PERCUTANEOUS APPROACH: ICD-10-PCS | Performed by: INTERNAL MEDICINE

## 2022-01-01 PROCEDURE — 93451 RIGHT HEART CATH: CPT | Mod: 26 | Performed by: INTERNAL MEDICINE

## 2022-01-01 PROCEDURE — 82805 BLOOD GASES W/O2 SATURATION: CPT | Performed by: PEDIATRICS

## 2022-01-01 PROCEDURE — 999N000040 HC STATISTIC CONSULT NO CHARGE VASC ACCESS

## 2022-01-01 PROCEDURE — 84439 ASSAY OF FREE THYROXINE: CPT | Performed by: STUDENT IN AN ORGANIZED HEALTH CARE EDUCATION/TRAINING PROGRAM

## 2022-01-01 PROCEDURE — 272N000001 HC OR GENERAL SUPPLY STERILE: Performed by: INTERNAL MEDICINE

## 2022-01-01 PROCEDURE — 82803 BLOOD GASES ANY COMBINATION: CPT

## 2022-01-01 RX ORDER — CLONIDINE HYDROCHLORIDE 0.1 MG/1
0.1 TABLET ORAL EVERY 8 HOURS
Status: DISCONTINUED | OUTPATIENT
Start: 2022-01-01 | End: 2022-01-01

## 2022-01-01 RX ORDER — MULTIPLE VITAMINS W/ MINERALS TAB 9MG-400MCG
1 TAB ORAL DAILY
Status: DISCONTINUED | OUTPATIENT
Start: 2022-01-01 | End: 2022-01-01 | Stop reason: HOSPADM

## 2022-01-01 RX ORDER — CHLORHEXIDINE GLUCONATE ORAL RINSE 1.2 MG/ML
15 SOLUTION DENTAL EVERY 12 HOURS
Status: DISCONTINUED | OUTPATIENT
Start: 2022-01-01 | End: 2022-01-01 | Stop reason: HOSPADM

## 2022-01-01 RX ORDER — DOBUTAMINE HYDROCHLORIDE 200 MG/100ML
2.5-2 INJECTION INTRAVENOUS CONTINUOUS
Status: DISCONTINUED | OUTPATIENT
Start: 2022-01-01 | End: 2022-01-01 | Stop reason: HOSPADM

## 2022-01-01 RX ORDER — CHLOROTHIAZIDE SODIUM 500 MG/1
500 INJECTION INTRAVENOUS ONCE
Status: COMPLETED | OUTPATIENT
Start: 2022-01-01 | End: 2022-01-01

## 2022-01-01 RX ORDER — ASPIRIN 325 MG
325 TABLET ORAL ONCE
Status: DISCONTINUED | OUTPATIENT
Start: 2022-01-01 | End: 2022-01-01

## 2022-01-01 RX ORDER — HALOPERIDOL 5 MG/ML
2.5-5 INJECTION INTRAMUSCULAR EVERY 4 HOURS PRN
Status: DISCONTINUED | OUTPATIENT
Start: 2022-01-01 | End: 2022-01-01 | Stop reason: HOSPADM

## 2022-01-01 RX ORDER — LORAZEPAM 2 MG/ML
1-2 INJECTION INTRAMUSCULAR EVERY 30 MIN PRN
Start: 2022-01-01

## 2022-01-01 RX ORDER — DEXTROSE MONOHYDRATE 25 G/50ML
25-50 INJECTION, SOLUTION INTRAVENOUS
Status: DISCONTINUED | OUTPATIENT
Start: 2022-01-01 | End: 2022-01-01 | Stop reason: HOSPADM

## 2022-01-01 RX ORDER — LIDOCAINE 40 MG/G
CREAM TOPICAL
Status: DISCONTINUED | OUTPATIENT
Start: 2022-01-01 | End: 2022-01-01

## 2022-01-01 RX ORDER — PIPERACILLIN SODIUM, TAZOBACTAM SODIUM 3; .375 G/15ML; G/15ML
3.38 INJECTION, POWDER, LYOPHILIZED, FOR SOLUTION INTRAVENOUS EVERY 6 HOURS
Status: DISCONTINUED | OUTPATIENT
Start: 2022-01-01 | End: 2022-01-01 | Stop reason: HOSPADM

## 2022-01-01 RX ORDER — ASPIRIN 81 MG/1
243 TABLET, CHEWABLE ORAL ONCE
Status: DISCONTINUED | OUTPATIENT
Start: 2022-01-01 | End: 2022-01-01

## 2022-01-01 RX ORDER — POTASSIUM CHLORIDE 7.45 MG/ML
10 INJECTION INTRAVENOUS
Status: COMPLETED | OUTPATIENT
Start: 2022-01-01 | End: 2022-01-01

## 2022-01-01 RX ORDER — MULTIPLE VITAMINS W/ MINERALS TAB 9MG-400MCG
1 TAB ORAL DAILY
Start: 2022-01-01

## 2022-01-01 RX ORDER — NALOXONE HYDROCHLORIDE 0.4 MG/ML
0.2 INJECTION, SOLUTION INTRAMUSCULAR; INTRAVENOUS; SUBCUTANEOUS
Status: DISCONTINUED | OUTPATIENT
Start: 2022-01-01 | End: 2022-01-01 | Stop reason: HOSPADM

## 2022-01-01 RX ORDER — FENTANYL CITRATE 50 UG/ML
25-50 INJECTION, SOLUTION INTRAMUSCULAR; INTRAVENOUS
Refills: 0
Start: 2022-01-01

## 2022-01-01 RX ORDER — GABAPENTIN 600 MG/1
1200 TABLET ORAL ONCE
Status: DISCONTINUED | OUTPATIENT
Start: 2022-01-01 | End: 2022-01-01

## 2022-01-01 RX ORDER — LORAZEPAM 0.5 MG/1
0.5 TABLET ORAL
Status: DISCONTINUED | OUTPATIENT
Start: 2022-01-01 | End: 2022-01-01

## 2022-01-01 RX ORDER — DEXMEDETOMIDINE HYDROCHLORIDE 4 UG/ML
0-1.2 INJECTION, SOLUTION INTRAVENOUS CONTINUOUS
Status: DISCONTINUED | OUTPATIENT
Start: 2022-01-01 | End: 2022-01-01 | Stop reason: HOSPADM

## 2022-01-01 RX ORDER — LEVOTHYROXINE SODIUM 88 UG/1
88 TABLET ORAL DAILY
Status: DISCONTINUED | OUTPATIENT
Start: 2022-01-01 | End: 2022-01-01 | Stop reason: HOSPADM

## 2022-01-01 RX ORDER — MIDAZOLAM HCL IN 0.9 % NACL/PF 1 MG/ML
1-8 PLASTIC BAG, INJECTION (ML) INTRAVENOUS CONTINUOUS
Status: DISCONTINUED | OUTPATIENT
Start: 2022-01-01 | End: 2022-01-01 | Stop reason: HOSPADM

## 2022-01-01 RX ORDER — FOLIC ACID 5 MG/ML
1 INJECTION, SOLUTION INTRAMUSCULAR; INTRAVENOUS; SUBCUTANEOUS ONCE
Status: COMPLETED | OUTPATIENT
Start: 2022-01-01 | End: 2022-01-01

## 2022-01-01 RX ORDER — MIDODRINE HYDROCHLORIDE 5 MG/1
5 TABLET ORAL
Status: DISCONTINUED | OUTPATIENT
Start: 2022-01-01 | End: 2022-01-01 | Stop reason: HOSPADM

## 2022-01-01 RX ORDER — FOLIC ACID 5 MG/ML
1 INJECTION, SOLUTION INTRAMUSCULAR; INTRAVENOUS; SUBCUTANEOUS DAILY
Status: DISCONTINUED | OUTPATIENT
Start: 2022-01-01 | End: 2022-01-01 | Stop reason: HOSPADM

## 2022-01-01 RX ORDER — ALBUTEROL SULFATE 0.83 MG/ML
2.5 SOLUTION RESPIRATORY (INHALATION)
Status: DISCONTINUED | OUTPATIENT
Start: 2022-01-01 | End: 2022-01-01 | Stop reason: HOSPADM

## 2022-01-01 RX ORDER — GINSENG 100 MG
CAPSULE ORAL 2 TIMES DAILY
Start: 2022-01-01

## 2022-01-01 RX ORDER — METOPROLOL TARTRATE 1 MG/ML
5 INJECTION, SOLUTION INTRAVENOUS EVERY 6 HOURS PRN
Status: DISCONTINUED | OUTPATIENT
Start: 2022-01-01 | End: 2022-01-01 | Stop reason: HOSPADM

## 2022-01-01 RX ORDER — NALOXONE HYDROCHLORIDE 0.4 MG/ML
0.4 INJECTION, SOLUTION INTRAMUSCULAR; INTRAVENOUS; SUBCUTANEOUS
Status: DISCONTINUED | OUTPATIENT
Start: 2022-01-01 | End: 2022-01-01 | Stop reason: HOSPADM

## 2022-01-01 RX ORDER — DOBUTAMINE HYDROCHLORIDE 200 MG/100ML
5 INJECTION INTRAVENOUS CONTINUOUS
Start: 2022-01-01

## 2022-01-01 RX ORDER — PIPERACILLIN SODIUM, TAZOBACTAM SODIUM 3; .375 G/15ML; G/15ML
3.38 INJECTION, POWDER, LYOPHILIZED, FOR SOLUTION INTRAVENOUS EVERY 6 HOURS
Start: 2022-01-01

## 2022-01-01 RX ORDER — LANOLIN ALCOHOL/MO/W.PET/CERES
100 CREAM (GRAM) TOPICAL 3 TIMES DAILY
Start: 2022-01-01

## 2022-01-01 RX ORDER — GINSENG 100 MG
CAPSULE ORAL 2 TIMES DAILY
Status: DISCONTINUED | OUTPATIENT
Start: 2022-01-01 | End: 2022-01-01 | Stop reason: HOSPADM

## 2022-01-01 RX ORDER — LORAZEPAM 1 MG/1
1-2 TABLET ORAL EVERY 30 MIN PRN
Status: DISCONTINUED | OUTPATIENT
Start: 2022-01-01 | End: 2022-01-01 | Stop reason: HOSPADM

## 2022-01-01 RX ORDER — GABAPENTIN 300 MG/1
900 CAPSULE ORAL EVERY 8 HOURS
Status: DISCONTINUED | OUTPATIENT
Start: 2022-01-01 | End: 2022-01-01

## 2022-01-01 RX ORDER — NICOTINE POLACRILEX 4 MG
15-30 LOZENGE BUCCAL
Start: 2022-01-01

## 2022-01-01 RX ORDER — GABAPENTIN 100 MG/1
100 CAPSULE ORAL EVERY 8 HOURS
Status: DISCONTINUED | OUTPATIENT
Start: 2022-01-01 | End: 2022-01-01

## 2022-01-01 RX ORDER — LEVOTHYROXINE SODIUM 88 UG/1
88 TABLET ORAL DAILY
Start: 2022-01-01

## 2022-01-01 RX ORDER — GABAPENTIN 300 MG/1
300 CAPSULE ORAL EVERY 8 HOURS
Status: DISCONTINUED | OUTPATIENT
Start: 2022-01-01 | End: 2022-01-01

## 2022-01-01 RX ORDER — POTASSIUM CHLORIDE 7.45 MG/ML
10 INJECTION INTRAVENOUS
Start: 2022-01-01

## 2022-01-01 RX ORDER — CALCIUM CHLORIDE 100 MG/ML
1 INJECTION INTRAVENOUS; INTRAVENTRICULAR ONCE
Status: DISCONTINUED | OUTPATIENT
Start: 2022-01-01 | End: 2022-01-01

## 2022-01-01 RX ORDER — POTASSIUM CHLORIDE 1500 MG/1
20 TABLET, EXTENDED RELEASE ORAL ONCE
Status: DISCONTINUED | OUTPATIENT
Start: 2022-01-01 | End: 2022-01-01

## 2022-01-01 RX ORDER — FOLIC ACID 5 MG/ML
1 INJECTION, SOLUTION INTRAMUSCULAR; INTRAVENOUS; SUBCUTANEOUS DAILY
Start: 2022-01-01

## 2022-01-01 RX ORDER — FOLIC ACID 1 MG/1
1 TABLET ORAL DAILY
Status: DISCONTINUED | OUTPATIENT
Start: 2022-01-01 | End: 2022-01-01 | Stop reason: HOSPADM

## 2022-01-01 RX ORDER — FENTANYL CITRATE 50 UG/ML
25-50 INJECTION, SOLUTION INTRAMUSCULAR; INTRAVENOUS
Status: DISCONTINUED | OUTPATIENT
Start: 2022-01-01 | End: 2022-01-01 | Stop reason: HOSPADM

## 2022-01-01 RX ORDER — PIPERACILLIN SODIUM, TAZOBACTAM SODIUM 2; .25 G/10ML; G/10ML
2.25 INJECTION, POWDER, LYOPHILIZED, FOR SOLUTION INTRAVENOUS EVERY 6 HOURS
Status: DISCONTINUED | OUTPATIENT
Start: 2022-01-01 | End: 2022-01-01

## 2022-01-01 RX ORDER — FLUMAZENIL 0.1 MG/ML
0.2 INJECTION, SOLUTION INTRAVENOUS
Status: DISCONTINUED | OUTPATIENT
Start: 2022-01-01 | End: 2022-01-01 | Stop reason: HOSPADM

## 2022-01-01 RX ORDER — MIDODRINE HYDROCHLORIDE 5 MG/1
5 TABLET ORAL
Start: 2022-01-01

## 2022-01-01 RX ORDER — MULTIVITAMIN WITH IRON
1 TABLET ORAL DAILY
COMMUNITY

## 2022-01-01 RX ORDER — LORAZEPAM 2 MG/ML
0.5 INJECTION INTRAMUSCULAR
Status: DISCONTINUED | OUTPATIENT
Start: 2022-01-01 | End: 2022-01-01

## 2022-01-01 RX ORDER — NICOTINE POLACRILEX 4 MG
15-30 LOZENGE BUCCAL
Status: DISCONTINUED | OUTPATIENT
Start: 2022-01-01 | End: 2022-01-01 | Stop reason: HOSPADM

## 2022-01-01 RX ORDER — LANOLIN ALCOHOL/MO/W.PET/CERES
1000 CREAM (GRAM) TOPICAL AT BEDTIME
Status: DISCONTINUED | OUTPATIENT
Start: 2022-01-01 | End: 2022-01-01 | Stop reason: HOSPADM

## 2022-01-01 RX ORDER — HEPARIN SODIUM 5000 [USP'U]/.5ML
5000 INJECTION, SOLUTION INTRAVENOUS; SUBCUTANEOUS EVERY 12 HOURS
Status: DISCONTINUED | OUTPATIENT
Start: 2022-01-01 | End: 2022-01-01

## 2022-01-01 RX ORDER — ALBUTEROL SULFATE 0.83 MG/ML
2.5 SOLUTION RESPIRATORY (INHALATION)
Qty: 90 ML
Start: 2022-01-01

## 2022-01-01 RX ORDER — MAGNESIUM SULFATE HEPTAHYDRATE 40 MG/ML
2 INJECTION, SOLUTION INTRAVENOUS ONCE
Status: COMPLETED | OUTPATIENT
Start: 2022-01-01 | End: 2022-01-01

## 2022-01-01 RX ORDER — NIACIN 1000 MG
1000 TABLET, EXTENDED RELEASE ORAL AT BEDTIME
Start: 2022-01-01

## 2022-01-01 RX ORDER — ETOMIDATE 2 MG/ML
INJECTION INTRAVENOUS PRN
Status: DISCONTINUED | OUTPATIENT
Start: 2022-01-01 | End: 2022-01-01

## 2022-01-01 RX ORDER — BUMETANIDE 0.25 MG/ML
2 INJECTION INTRAMUSCULAR; INTRAVENOUS ONCE
Status: COMPLETED | OUTPATIENT
Start: 2022-01-01 | End: 2022-01-01

## 2022-01-01 RX ORDER — FOLIC ACID 1 MG/1
5 TABLET ORAL DAILY
Status: DISCONTINUED | OUTPATIENT
Start: 2022-01-01 | End: 2022-01-01 | Stop reason: ALTCHOICE

## 2022-01-01 RX ORDER — SODIUM CHLORIDE 9 MG/ML
INJECTION, SOLUTION INTRAVENOUS CONTINUOUS
Status: DISCONTINUED | OUTPATIENT
Start: 2022-01-01 | End: 2022-01-01

## 2022-01-01 RX ORDER — DEXTROSE MONOHYDRATE 25 G/50ML
25-50 INJECTION, SOLUTION INTRAVENOUS
Start: 2022-01-01

## 2022-01-01 RX ORDER — ALBUMIN (HUMAN) 12.5 G/50ML
12.5 SOLUTION INTRAVENOUS EVERY 8 HOURS
Status: DISCONTINUED | OUTPATIENT
Start: 2022-01-01 | End: 2022-01-01 | Stop reason: HOSPADM

## 2022-01-01 RX ORDER — LORAZEPAM 2 MG/ML
1-2 INJECTION INTRAMUSCULAR EVERY 30 MIN PRN
Status: DISCONTINUED | OUTPATIENT
Start: 2022-01-01 | End: 2022-01-01 | Stop reason: HOSPADM

## 2022-01-01 RX ORDER — POTASSIUM CHLORIDE 1500 MG/1
20 TABLET, EXTENDED RELEASE ORAL
Status: DISCONTINUED | OUTPATIENT
Start: 2022-01-01 | End: 2022-01-01

## 2022-01-01 RX ORDER — CHLORHEXIDINE GLUCONATE ORAL RINSE 1.2 MG/ML
15 SOLUTION DENTAL EVERY 12 HOURS
Start: 2022-01-01

## 2022-01-01 RX ORDER — LANOLIN ALCOHOL/MO/W.PET/CERES
1000 CREAM (GRAM) TOPICAL DAILY
Status: DISCONTINUED | OUTPATIENT
Start: 2022-01-01 | End: 2022-01-01 | Stop reason: HOSPADM

## 2022-01-01 RX ORDER — PROPOFOL 10 MG/ML
INJECTION, EMULSION INTRAVENOUS PRN
Status: DISCONTINUED | OUTPATIENT
Start: 2022-01-01 | End: 2022-01-01

## 2022-01-01 RX ORDER — MIDAZOLAM HCL IN 0.9 % NACL/PF 1 MG/ML
1-8 PLASTIC BAG, INJECTION (ML) INTRAVENOUS CONTINUOUS
Start: 2022-01-01

## 2022-01-01 RX ORDER — GABAPENTIN 300 MG/1
600 CAPSULE ORAL EVERY 8 HOURS
Status: DISCONTINUED | OUTPATIENT
Start: 2022-01-01 | End: 2022-01-01

## 2022-01-01 RX ADMIN — CALCIUM CHLORIDE 1 G: 100 INJECTION, SOLUTION INTRAVENOUS at 23:02

## 2022-01-01 RX ADMIN — SUCCINYLCHOLINE CHLORIDE 60 MG: 20 INJECTION, SOLUTION INTRAMUSCULAR; INTRAVENOUS; PARENTERAL at 10:48

## 2022-01-01 RX ADMIN — ETOMIDATE 6 MG: 2 INJECTION, SOLUTION INTRAVENOUS at 10:48

## 2022-01-01 RX ADMIN — DOBUTAMINE HYDROCHLORIDE 5 MCG/KG/MIN: 200 INJECTION INTRAVENOUS at 01:30

## 2022-01-01 RX ADMIN — PROPOFOL 50 MG: 10 INJECTION, EMULSION INTRAVENOUS at 10:44

## 2022-01-01 RX ADMIN — LORAZEPAM 2 MG: 2 INJECTION INTRAMUSCULAR; INTRAVENOUS at 05:28

## 2022-01-01 RX ADMIN — MIDAZOLAM HYDROCHLORIDE 1 MG/HR: 1 INJECTION, SOLUTION INTRAVENOUS at 11:43

## 2022-01-01 RX ADMIN — MAGNESIUM SULFATE HEPTAHYDRATE 2 G: 40 INJECTION, SOLUTION INTRAVENOUS at 06:02

## 2022-01-01 RX ADMIN — POTASSIUM CHLORIDE 10 MEQ: 7.46 INJECTION, SOLUTION INTRAVENOUS at 09:39

## 2022-01-01 RX ADMIN — DOBUTAMINE HYDROCHLORIDE 5 MCG/KG/MIN: 200 INJECTION INTRAVENOUS at 11:38

## 2022-01-01 RX ADMIN — THIAMINE HYDROCHLORIDE 500 MG: 100 INJECTION, SOLUTION INTRAMUSCULAR; INTRAVENOUS at 21:56

## 2022-01-01 RX ADMIN — THIAMINE HYDROCHLORIDE 500 MG: 100 INJECTION, SOLUTION INTRAMUSCULAR; INTRAVENOUS at 15:47

## 2022-01-01 RX ADMIN — PIPERACILLIN SODIUM AND TAZOBACTAM SODIUM 3.38 G: 3; .375 INJECTION, POWDER, LYOPHILIZED, FOR SOLUTION INTRAVENOUS at 19:58

## 2022-01-01 RX ADMIN — THIAMINE HYDROCHLORIDE 200 MG: 100 INJECTION, SOLUTION INTRAMUSCULAR; INTRAVENOUS at 09:05

## 2022-01-01 RX ADMIN — HEPARIN SODIUM 5000 UNITS: 5000 INJECTION, SOLUTION INTRAVENOUS; SUBCUTANEOUS at 02:16

## 2022-01-01 RX ADMIN — LEVOTHYROXINE SODIUM 88 MCG: 88 TABLET ORAL at 12:10

## 2022-01-01 RX ADMIN — DEXMEDETOMIDINE HYDROCHLORIDE 0.2 MCG/KG/HR: 400 INJECTION INTRAVENOUS at 09:05

## 2022-01-01 RX ADMIN — BUMETANIDE 1 MG/HR: 0.25 INJECTION INTRAMUSCULAR; INTRAVENOUS at 09:21

## 2022-01-01 RX ADMIN — FUROSEMIDE 5 MG/HR: 10 INJECTION, SOLUTION INTRAVENOUS at 02:07

## 2022-01-01 RX ADMIN — PANTOPRAZOLE SODIUM 40 MG: 40 INJECTION, POWDER, FOR SOLUTION INTRAVENOUS at 20:00

## 2022-01-01 RX ADMIN — CHLORHEXIDINE GLUCONATE 15 ML: 1.2 SOLUTION ORAL at 11:43

## 2022-01-01 RX ADMIN — PIPERACILLIN SODIUM AND TAZOBACTAM SODIUM 3.38 G: 3; .375 INJECTION, POWDER, LYOPHILIZED, FOR SOLUTION INTRAVENOUS at 02:16

## 2022-01-01 RX ADMIN — PIPERACILLIN SODIUM AND TAZOBACTAM SODIUM 3.38 G: 3; .375 INJECTION, POWDER, LYOPHILIZED, FOR SOLUTION INTRAVENOUS at 07:51

## 2022-01-01 RX ADMIN — PIPERACILLIN SODIUM AND TAZOBACTAM SODIUM 3.38 G: 3; .375 INJECTION, POWDER, LYOPHILIZED, FOR SOLUTION INTRAVENOUS at 08:31

## 2022-01-01 RX ADMIN — PANTOPRAZOLE SODIUM 40 MG: 40 INJECTION, POWDER, FOR SOLUTION INTRAVENOUS at 02:01

## 2022-01-01 RX ADMIN — BUMETANIDE 2 MG: 0.25 INJECTION INTRAMUSCULAR; INTRAVENOUS at 16:49

## 2022-01-01 RX ADMIN — HEPARIN SODIUM 5000 UNITS: 5000 INJECTION, SOLUTION INTRAVENOUS; SUBCUTANEOUS at 15:47

## 2022-01-01 RX ADMIN — FOLIC ACID 1 MG: 5 INJECTION, SOLUTION INTRAMUSCULAR; INTRAVENOUS; SUBCUTANEOUS at 05:27

## 2022-01-01 RX ADMIN — LORAZEPAM 2 MG: 2 INJECTION INTRAMUSCULAR; INTRAVENOUS at 04:20

## 2022-01-01 RX ADMIN — MIDODRINE HYDROCHLORIDE 5 MG: 5 TABLET ORAL at 10:00

## 2022-01-01 RX ADMIN — POTASSIUM CHLORIDE 10 MEQ: 7.46 INJECTION, SOLUTION INTRAVENOUS at 11:04

## 2022-01-01 RX ADMIN — LORAZEPAM 2 MG: 2 INJECTION INTRAMUSCULAR; INTRAVENOUS at 09:07

## 2022-01-01 RX ADMIN — DOBUTAMINE HYDROCHLORIDE 5 MCG/KG/MIN: 200 INJECTION INTRAVENOUS at 15:45

## 2022-01-01 RX ADMIN — LORAZEPAM 2 MG: 2 INJECTION INTRAMUSCULAR; INTRAVENOUS at 04:58

## 2022-01-01 RX ADMIN — PIPERACILLIN SODIUM AND TAZOBACTAM SODIUM 2.25 G: 2; .25 INJECTION, POWDER, LYOPHILIZED, FOR SOLUTION INTRAVENOUS at 02:10

## 2022-01-01 RX ADMIN — LORAZEPAM 2 MG: 2 INJECTION INTRAMUSCULAR; INTRAVENOUS at 06:02

## 2022-01-01 RX ADMIN — HEPARIN SODIUM 5000 UNITS: 5000 INJECTION, SOLUTION INTRAVENOUS; SUBCUTANEOUS at 02:13

## 2022-01-01 RX ADMIN — FOLIC ACID 5 MG: 1 TABLET ORAL at 12:06

## 2022-01-01 RX ADMIN — MIDODRINE HYDROCHLORIDE 5 MG: 5 TABLET ORAL at 12:10

## 2022-01-01 RX ADMIN — ETOMIDATE 10 MG: 2 INJECTION, SOLUTION INTRAVENOUS at 10:47

## 2022-01-01 RX ADMIN — DEXTROSE AND SODIUM CHLORIDE: 5; 900 INJECTION, SOLUTION INTRAVENOUS at 12:07

## 2022-01-01 RX ADMIN — BUMETANIDE 1 MG/HR: 0.25 INJECTION INTRAMUSCULAR; INTRAVENOUS at 17:06

## 2022-01-01 RX ADMIN — THIAMINE HYDROCHLORIDE 500 MG: 100 INJECTION, SOLUTION INTRAMUSCULAR; INTRAVENOUS at 12:06

## 2022-01-01 RX ADMIN — CYANOCOBALAMIN TAB 1000 MCG 1000 MCG: 1000 TAB at 12:07

## 2022-01-01 RX ADMIN — PANTOPRAZOLE SODIUM 40 MG: 40 INJECTION, POWDER, FOR SOLUTION INTRAVENOUS at 09:19

## 2022-01-01 RX ADMIN — BUMETANIDE 1 MG/HR: 0.25 INJECTION INTRAMUSCULAR; INTRAVENOUS at 00:53

## 2022-01-01 RX ADMIN — HUMAN ALBUMIN MICROSPHERES AND PERFLUTREN 9 ML: 10; .22 INJECTION, SOLUTION INTRAVENOUS at 09:53

## 2022-01-01 RX ADMIN — SODIUM CHLORIDE, POTASSIUM CHLORIDE, SODIUM LACTATE AND CALCIUM CHLORIDE 1000 ML: 600; 310; 30; 20 INJECTION, SOLUTION INTRAVENOUS at 10:00

## 2022-01-01 RX ADMIN — MIDODRINE HYDROCHLORIDE 5 MG: 5 TABLET ORAL at 17:17

## 2022-01-01 RX ADMIN — PIPERACILLIN SODIUM AND TAZOBACTAM SODIUM 3.38 G: 3; .375 INJECTION, POWDER, LYOPHILIZED, FOR SOLUTION INTRAVENOUS at 15:46

## 2022-01-01 RX ADMIN — CHLOROTHIAZIDE SODIUM 500 MG: 500 INJECTION, POWDER, LYOPHILIZED, FOR SOLUTION INTRAVENOUS at 17:06

## 2022-01-01 RX ADMIN — Medication 1000 MG: at 21:56

## 2022-01-01 RX ADMIN — PANTOPRAZOLE SODIUM 40 MG: 40 INJECTION, POWDER, FOR SOLUTION INTRAVENOUS at 08:33

## 2022-01-01 ASSESSMENT — ACTIVITIES OF DAILY LIVING (ADL)
ADLS_ACUITY_SCORE: 30
ADLS_ACUITY_SCORE: 30
WALKING_OR_CLIMBING_STAIRS: STAIR CLIMBING DIFFICULTY, ASSISTANCE 1 PERSON;AMBULATION DIFFICULTY, ASSISTANCE 1 PERSON
ADLS_ACUITY_SCORE: 30
ADLS_ACUITY_SCORE: 35
ADLS_ACUITY_SCORE: 30
ADLS_ACUITY_SCORE: 30
WALKING_OR_CLIMBING_STAIRS_DIFFICULTY: YES
ADLS_ACUITY_SCORE: 24
ADLS_ACUITY_SCORE: 30
ADLS_ACUITY_SCORE: 30
WEAR_GLASSES_OR_BLIND: YES
ADLS_ACUITY_SCORE: 30
DRESSING/BATHING_DIFFICULTY: NO
DOING_ERRANDS_INDEPENDENTLY_DIFFICULTY: YES
ADLS_ACUITY_SCORE: 30
DIFFICULTY_EATING/SWALLOWING: NO
ADLS_ACUITY_SCORE: 35
CONCENTRATING,_REMEMBERING_OR_MAKING_DECISIONS_DIFFICULTY: NO
ADLS_ACUITY_SCORE: 24
TOILETING_ISSUES: NO
ADLS_ACUITY_SCORE: 30

## 2022-05-17 PROBLEM — R57.0 CARDIOGENIC SHOCK (H): Status: ACTIVE | Noted: 2022-01-01

## 2022-05-17 NOTE — PROGRESS NOTES
Spoke with bedside RN about PICC order and was notified that PICC is no longer needed per MD. Will complete order. Please reconsult if VAT services become necessary.

## 2022-05-17 NOTE — Clinical Note
images have been sent and verified in PACs  
 = 40 PPM  Nitric Oxide  
 = 80 PPM  Nitric Oxide  
Family has been updated.  
Hemodynamic equipment used: 5 lead ECG, LIKEPAK Hands Off Patches, LIFEPAK With 3 Leads, Calometric ETCO2 device, Machine BP Cuff and pulse oximeter probe.
ICU RN
Lab results reviewed and abnormals discussed with physician.  
No sedation is planned for this procedure.    Provider immediate assessment completed.   
Patient education provided.   
Patient education provided.   
Potential access sites were evaluated for patency using ultrasound.   The internal jugular vein was selected. Access was obtained under with Sonosite and Fluoroscopic guidance using a micropuncture 21 gauge needle with direct visualization of needle entry.     
Procedure is scheduled in [facility].  
Procedure is scheduled in [facility].  
Procedure scheduled as Elective.  
Secured by suture. 
Sheath prepped and inserted in the right internal jugular vein.  
The ECG shows a sinus tachycardia. 
There were no immediate complications during the procedure. 
dry, intact, no bleeding and no hematoma. 
Phlegmon

## 2022-05-17 NOTE — PLAN OF CARE
Vital signs stable. Alert and oriented x4, follows commands, PERRLA, reports numbness and tingling in all four extremities. NSR/ST 80-110s on telemetry. MAP>65 on Dobutamine drip, edema in bilateral feet and ankles. Tolerating room air. NPO. C diff sample sent- history of Cdiff (C diff negative, precautions discontinued). Chavez catheter- adequate output.     GI, nephrology and cardiology saw patient this morning at bedside and made recommendations. Paracentesis ordered- didn't find any fluid. Cardiology talked with patient about possibly needing to be transferred over to the [facility] or [facility] for higher level of care (due to RV dysfunction and pulmonary hypertension).     Gave 800ml LR bolus this morning- stopped per MD. Right heart cath and Bailey-Gali catheter placement in Cath lab this afternoon. CVP high 20s- 30s. High PA numbers in 80s. Xray done to confirm placement. Diuril and Bumex (bumex drip started). At 1800, patient had flash pulmonary edema episode- MD notified, continue to monitor.

## 2022-05-17 NOTE — H&P
[facility]    ICU History and Physical    Primary Team: ICU  Reason for Critical Care Admission: Cardiogenic Shock, RV dysfunction, Acute on Chronic liver failure  Admitting Physician: Johnnie Lou MD  Date of Admission:  5/17/2022    Assessment: Critical Care   Yenifer Maher is a 42 year old female admitted on 5/17/2022. She has a complex medical history which includes alcohol abuse, alcohol related liver cirrhosis, and pulmonary arterial hypertension. She is admitted today with cardiogenic shock in the setting of right ventricular dysfunction with resultant congestive hepatopathy, acute kidney injury, and fluid overload.        Plan: Critical Care   Neuro/ pain/ sedation:  - Monitor neurological status. Notify provider for any acute changes in exam    Pulmonary:  - Supplemental oxygen to keep SpO2 > 92%. Leave on supplemental oxygen for pulmonary vasodilatory effect    Cardiovascular:  - Monitor hemodynamic status.  - Continue Dobutamine gtt. Goal MAP > 65  - ECHO in the AM  - Cardiology Consult    GI/Nutrition:  - Diet: NPO for Medical/Clinical Reasons Except for: Meds    - Nutrition consulted. Appreciate recommendations.   - IVF at [facility]  - Protonix for PUD ppx  - Hepatology consult in AM    Renal/ Fluid Balance:   - Will monitor intake and output  - Lasix gtt  - ICU electrolyte replacement protocol  - Na Q4H    Endocrine:  - Insulin gtt as per ICU protocol    ID:  - Start Zosyn Q6H due to concern for cholecystitis/cholangitis    Hematology:  - Heparin 5000 U subcutaneous Q12 for DVT prophylaxis    MSK:   - PT and OT consulted. Appreciate recommendations.     Lines/ tubes/ drains:  Chavez Catheter: Not present  Central Lines: PRESENT       Prophylaxis:  - DVT Prophylaxis: Heparin SQ  - PUD Prophylaxis: Protonix IV    Code Status: Full Code      Disposition:  - ICU    The patient's care was discussed with the Bedside Nurse and Patient.  Critical care time exclusive of procedures:  "40    Clinically Significant Risk Factors Present on Admission                   # Overweight: Estimated body mass index is 28.8 kg/m  as calculated from the following:    Height as of this encounter: 1.702 m (5' 7\").    Weight as of this encounter: 83.4 kg (183 lb 13.8 oz).        Johnnie Lou MD  [facility]  Securely message with the Vocera Web Console (learn more here)  Text page via [employer] Paging/Directory         ______________________________________________________________________    Chief Complaint   SOB    History is obtained from the patient and emergency department physician    History of Present Illness   Yenifer Maher is a 42 year old female who has a known history of alcohol abuse, alcohol related cirrhotic liver disease, pulmonary arterial hypertension, and lupus anticoagulant positivity, who presented to the [place] ED with complaints of worsening SOB for the past 1-1.5 weeks. In the ED found to be hypotensive, tachycardic, and had an elevated lactate of 5.6. CT PE negative. POC ECHO demonstrated RV dilation and moderate severe RV systolic dysfunction. Started on Dobutamine gtt, received 80 mg of Lasix IV, and started on Cefepime due to US findings of enlarged gallbladder with surrounding pericholecystic fluid. Transferred to [facility] ICU for ongoing cares and management.     Review of Systems    The 10 point Review of Systems is negative other than noted in the HPI or here    Past Medical History    I have reviewed this patient's medical history and updated it with pertinent information if needed.   Past Medical History:   Diagnosis Date     Alcohol abuse      Alcoholic cirrhosis (H)      Alcoholic peripheral neuropathy (H)      Coagulopathy (H)      Gastritis      History of Clostridium difficile colitis     unknown date     Impairment of cognitive function     MOCA 2/2019 22/30     Leukocytosis 02/2019    persistent leukocytosis across 2/2019 hospitalization without evidence " of source across multiple diagnostics including LP, BCx, UCx      Lupus anticoagulant positive      Macrocytic anemia      Moderate protein-calorie malnutrition (H)      Paroxysmal A-fib (H) 02/2019    not on chronic anticoagulation     Peptic ulcer disease      Positive SMILEY (antinuclear antibody)      Subclinical hypothyroidism 02/2019    normal T3, T4     Tobacco dependence     0.5 PPD       Past Surgical History   I have reviewed this patient's surgical history and updated it with pertinent information if needed.  Past Surgical History:   Procedure Laterality Date     ESOPHAGOSCOPY, GASTROSCOPY, DUODENOSCOPY (EGD), COMBINED N/A 8/9/2019    Procedure: ESOPHAGOGASTRODUODENOSCOPY (EGD);  Surgeon: Sowmya Ibanez MD;  [facility]     ESOPHAGOSCOPY, GASTROSCOPY, DUODENOSCOPY (EGD), COMBINED N/A 8/26/2019    Procedure: ESOPHAGOGASTRODUODENOSCOPY (EGD);  Surgeon: Leventhal, Thomas Michael, MD;  [facility]: [facility]     ESOPHAGOSCOPY, GASTROSCOPY, DUODENOSCOPY (EGD), COMBINED N/A 3/30/2020    Procedure: ESOPHAGOGASTRODUODENOSCOPY (EGD);  Surgeon: Sowmya Ibanez MD;  [facility]     ESOPHAGOSCOPY, GASTROSCOPY, DUODENOSCOPY (EGD), COMBINED N/A 12/2/2020    Procedure: ESOPHAGOGASTRODUODENOSCOPY (EGD);  Surgeon: Regulo Abarca MD;  Location:  GI     UPPER GI ENDOSCOPY  8/9/2019            Social History   I have reviewed this patient's social history and updated it with pertinent information if needed.  Social History     Tobacco Use     Smoking status: Current Some Day Smoker     Types: Cigarettes     Smokeless tobacco: Never Used     Tobacco comment: 6-8 per day   Substance Use Topics     Alcohol use: Not Currently     Alcohol/week: 2.0 standard drinks     Types: 2 Glasses of wine per week     Comment: last drink 10/2/2020     Drug use: Not Currently       Family History   I have reviewed this patient's family history and updated it with pertinent information if needed.  Family  History   Problem Relation Age of Onset     Skin Cancer Mother      Depression Mother      Hypertension Father      Depression Sister      Breast Cancer Maternal Aunt        Prior to Admission Medications   Prior to Admission Medications   Prescriptions Last Dose Informant Patient Reported? Taking?   Vitamin D3 (CHOLECALCIFEROL) 25 mcg (1000 units) tablet   No No   Sig: Take 2 tablets (50 mcg) by mouth daily   acetaminophen (TYLENOL) 325 MG tablet  Other Yes No   Sig: Take 325 mg by mouth every 4 hours as needed   benzocaine (ORAJEL MAXIMUM STRENGTH) 20 % GEL gel   No No   Sig: Take by mouth 4 times daily as needed for moderate pain (apply to tooth/gums causing pain)   bismuth subsalicylate (PEPTO BISMOL) 262 MG/15ML suspension  Other Yes No   Sig: Take 30 mL every half to one hour as needed. Do not exceed 8 doses in 24 hours.   calcium carbonate 750 MG CHEW  Other Yes No   Sig: Take 2-4 chew tab by mouth as needed for heartburn    celecoxib (CELEBREX) 200 MG capsule  Other Yes No   Sig: Take 200 mg by mouth 2 times daily as needed   fexofenadine (ALLEGRA) 180 MG tablet   No No   Sig: Take 1 tablet (180 mg) by mouth daily   folic acid (FOLVITE) 1 MG tablet   No No   Sig: Take 1 tablet (1 mg) by mouth daily   furosemide (LASIX) 20 MG tablet   No No   Sig: Take 2 tablets (40 mg) by mouth daily   lactulose (CEPHULAC) 20 GM packet   No No   Sig: Take 1 packet (20 g) by mouth 3 times daily as needed for constipation (take in addition to scheduled dose of less than 4 loose stools / day)   melatonin 5 MG tablet  Other Yes No   Sig: Take 5-10 mg by mouth nightly as needed for sleep   multivitamin, therapeutic (THERA-VIT) TABS tablet   No No   Sig: Take 1 tablet by mouth daily   nicotine (NICODERM CQ) 7 MG/24HR 24 hr patch   No No   Sig: Place 1 patch onto the skin every 24 hours   ondansetron (ZOFRAN-ODT) 4 MG ODT tab   No No   Sig: Take 1 tablet (4 mg) by mouth every 8 hours as needed for nausea   pantoprazole (PROTONIX)  40 MG EC tablet   No No   Sig: Take 1 tablet (40 mg) by mouth 2 times daily (before meals)   polyethylene glycol (MIRALAX) 17 GM/Dose powder   No No   Sig: Take 1-2 capfuls daily and adjust dose to ensure 3-5 bowel movements a day for hepatic encephalopathy.   rifaximin (XIFAXAN) 550 MG TABS tablet   No No   Sig: Take 1 tablet (550 mg) by mouth 2 times daily   sildenafil (REVATIO) 20 MG tablet   No No   Sig: Take 2 tablets (40 mg) by mouth 3 times daily   sodium chloride (OCEAN) 0.65 % nasal spray   No No   Sig: Inhale 2 sprays in the nostril(s) every 30 minutes as needed for nasal congestion or nasal dryness   spironolactone (ALDACTONE) 50 MG tablet   No No   Sig: Take 1 tablet (50 mg) by mouth daily   thiamine (B-1) 100 MG tablet   No No   Sig: Take 1 tablet (100 mg) by mouth daily      Facility-Administered Medications: None     Allergies   Allergies   Allergen Reactions     Bengay Pain Relief [Menthol] Other (See Comments)     Skin turns red and feels extremely hot     Cats      Chocolate Dermatitis     Dicyclomine Other (See Comments)     Severe sedation     Dust Mites      Derm, resp     No Clinical Screening - See Comments      GI upset     Phenobarbital      Pollen Extract      Derm, resp.        Physical Exam   Vital Signs:     BP: 118/79 Pulse: 101            Weight: 183 lbs 13.82 oz    General: Awake, alert, oriented  Neuro: No focal deficits, PERRL,   CV: S1/S2, S3 heard at LUSB, cool peripherally, 2+ peripheral pulses  Pulm: Clear to auscultation bilaterally, though diminished breath sounds in the bases.   Abd: Rounded, normal bowel sounds  : normal female genital anatomy  Extremities: Cool to touch, no pitting edema    Data   I reviewed all medications, new labs and imaging results over the last 24 hours.  Arterial Blood Gases   No lab results found in last 7 days.    Complete Blood Count   No lab results found in last 7 days.    Basic Metabolic Panel  Recent Labs   Lab 05/17/22  0112   *        Liver Function Tests  No lab results found in last 7 days.    Pancreatic Enzymes  No lab results found in last 7 days.    Coagulation Profile  No lab results found in last 7 days.    IMAGING:  No results found for this or any previous visit (from the past 24 hour(s)).

## 2022-05-17 NOTE — PHARMACY-ADMISSION MEDICATION HISTORY
"Pharmacy Medication History  Admission medication history interview status for the 5/17/2022  admission is complete. See EPIC admission navigator for prior to admission medications     Location of Interview: Patient room  Medication history sources: Patient    Significant changes made to the medication list:  Added B complex (pt states she's vegetarian so Dr wanted her on this)  Removed lactulose (\"used to\" take it), melatonin (\"didn't work\"), nicotine patch, zofran,     In the past week, patient estimated taking medication this percent of the time: less than 50% due to cost, not understanding which meds she should be taking it seems     Additional medication history information:   Pt has ran out of many of her Rx medications, indicating cost and insurance issues for most of them.  She has also been rationing her protonix (Rx for BID, has been taking once daily) due to this.    She is not sure if she is taking Vitamin D or not, left on list as it is an Rx.     Medication reconciliation completed by provider prior to medication history? No    Time spent in this activity: 20 mins    Prior to Admission medications    Medication Sig Last Dose Taking? Auth Provider   folic acid (FOLVITE) 1 MG tablet Take 1 tablet (1 mg) by mouth daily Past Week at ? Yes Francis Arellano MD   multivitamin, therapeutic (THERA-VIT) TABS tablet Take 1 tablet by mouth daily Past Week at Unknown time Yes Francis Arellano MD   polyethylene glycol (MIRALAX) 17 GM/Dose powder Take 1-2 capfuls daily and adjust dose to ensure 3-5 bowel movements a day for hepatic encephalopathy. Past Week at Unknown time Yes Ashvin Messina PA-C   vitamin (B COMPLEX-C) tablet Take 1 tablet by mouth daily Past Week at Unknown time Yes Unknown, Entered By History   acetaminophen (TYLENOL) 325 MG tablet Take 325 mg by mouth every 4 hours as needed prn  Unknown, Entered By History   benzocaine (ORAJEL MAXIMUM STRENGTH) 20 % GEL gel Take by mouth 4 times daily as " "needed for moderate pain (apply to tooth/gums causing pain) prn  Francis Arellano MD   bismuth subsalicylate (PEPTO BISMOL) 262 MG/15ML suspension Take 30 mL every half to one hour as needed. Do not exceed 8 doses in 24 hours. prn  Unknown, Entered By History   calcium carbonate 750 MG CHEW Take 2-4 chew tab by mouth as needed for heartburn  prn  Unknown, Entered By History   celecoxib (CELEBREX) 200 MG capsule Take 200 mg by mouth 2 times daily as needed couple months, insurance issues  Unknown, Entered By History   fexofenadine (ALLEGRA) 180 MG tablet Take 1 tablet (180 mg) by mouth daily ran out \"Can't afford\"  Francis Arellano MD   furosemide (LASIX) 20 MG tablet Take 2 tablets (40 mg) by mouth daily ran out  Ashvin Messina PA-C   pantoprazole (PROTONIX) 40 MG EC tablet Take 1 tablet (40 mg) by mouth 2 times daily (before meals) taking daily to ration  Francis Arellano MD   rifaximin (XIFAXAN) 550 MG TABS tablet Take 1 tablet (550 mg) by mouth 2 times daily ran out  Francis Arellano MD   sildenafil (REVATIO) 20 MG tablet Take 2 tablets (40 mg) by mouth 3 times daily ran out  Francis Arellano MD   sodium chloride (OCEAN) 0.65 % nasal spray Inhale 2 sprays in the nostril(s) every 30 minutes as needed for nasal congestion or nasal dryness prn  Francis Arellano MD   spironolactone (ALDACTONE) 50 MG tablet Take 1 tablet (50 mg) by mouth daily ran out  Ashvin Messina PA-C   thiamine (B-1) 100 MG tablet Take 1 tablet (100 mg) by mouth daily ran out  Francis Arellano MD   Vitamin D3 (CHOLECALCIFEROL) 25 mcg (1000 units) tablet Take 2 tablets (50 mcg) by mouth daily not sure  Francis Arellano MD Tiffany M. Reinitz, PharmD     The information provided in this note is only as accurate as the sources available at the time of update(s)     "

## 2022-05-17 NOTE — PROGRESS NOTES
[facility]    ICU Progress Note   Yenifer Maher is a 40 year old female with PMHx of alcohol abuse with alcoholic cirrhosis c/b portal hypertension, esophageal varices, ascites, and hepatic encephalopathy, pulmonary hypertension, paroxysmal atrial fibrillation, hx of calciphylaxis, lupus anticoagulant positive, neuropathy, macrocytic anemia, and PUD. Arrives at [facility] with concerns for SAPPHIRE, Septic Shock, Cirrhotic decompensation, Metabolic acidosis, and/or Cardiogenic shock.       Assessment: Critical Care   Yenifer Maher is a 42 year old female admitted on 5/17/2022. She has a complex medical history which includes alcohol abuse, alcohol related liver cirrhosis, portal hypertension, esophageal varices, ascites, and hepatic encephalopathy, paroxysmal atrial fibrillation, hx of calciphylaxis, lupus anticoagulant positive, neuropathy, macrocytic anemia, and PUD. She is admitted 5/17 with shock in the setting of possible right ventricular dysfunction with resultant congestive hepatopathy, acute kidney injury versus sepsis.        Subjective:  Reports abd pain, some intermittent nausea  Light headedness +  SOB +  Pitting edema +         Plan: Critical Care   Neuro/ pain/ sedation:  Confusion   - Monitor neurological status. Notify provider for any acute changes in exam  - intermittent   Pain  - mmpr    Pulmonary:  Acute Resp Insuff  - wean o2 as able  - IS and Acapella as able   - supplemental oxygen to keep saturation about 92%  Secretions  - neb prn    Cardiovascular:  Hypotension  - Monitor hemodynamic status.  - pressors prn  Sepsis  - LA elevated on arrival  - trend q4h  - resuscitate as able  Trop elevation  - ekg and trop trend  - echo pending  - card c/s  - likely demand ischemia  Cardiogenic shock  - concern for this given elevated trop, dilated RV   - monitor, cards c/s       GI/Nutrition:  Diet/Malntrution   - NPO for Medical/Clinical Reasons Except for: Meds    - Nutrition  consulted. Appreciate recommendations.   Liver failure - acute on chronic  - Hepatology c/s   - paracentesis/albumin/midodrine/ppi  Diarrhea  - c diff check  Transaminitis  - monitor  - resuscitate as able  - multifactorial, likely acute on chronic liver damage    Renal/ Fluid Balance:   SAPPHIRE/CKD  - shelley needed for critical monitoring of intake and output  - complicated picture, concern is present for hypervolemia or hypovolemia, clinically more consistent with hypervolemia but with inadequate perfusion, suggestive of cardiogenic shock   - may need bicarb gtt   - Neph c/s  - avoid nephrotoxic agents as able  - significant concern present for needing dialysis, temp or perm, pt informed of this  - FENA and FUREA suggestive of prerenal   Electrolyte abnormalities  - ICU electrolyte replacement protocol  Calciphylaxis   - localized wound care   - bacitracin prn to wounds  - avoid hypercalcemia and hyperphosphatemia  UTI  - obtained 5/17  - zosyn started for broad spectrum coverage given presentation     Endocrine:  Hypothyroidism  -levothyroxine   Hyperglycemia  -insulin prn     ID:  Chills/Abdominal Pain  -Unknown etiology  -UTI vs Diarrhea  -Covid negative  -Paracentesis pending   -CT shows fluid around gallbladder, frequently identified in cirrhotic pts, needs HIDA scan to elucidate whether she has cholecystitis or not     Hematology:  Cirrhotic/Macrotic Anemia  -concern for wernicke enc > thiamine  -b12 and folate ordered  Anemia  -likely chronic  -monitor  -no hematemesis/melena/hematochezia  -trend labs    MSK:   - PT and OT consulted. Appreciate recommendations.     Lines/ tubes/ drains:  Shelley Catheter: PRESENT, indication: Strict 1-2 Hour I&O  Central Lines: PRESENT       Prophylaxis:  - DVT Prophylaxis: Heparin SQ  - PUD Prophylaxis: Protonix     Code Status: Full Code      Disposition:  - ICU, may need transfer to [facility] if worsening     The patient's care was discussed with the Attending Physician,   Kelli.  Critical care time exclusive of procedures: 60 minutes       Aristides Lee MD SICU Fellow  [facility]      ______________________________________________________________________    Chief Complaint   Nausea and abdominal pain and diarrhea     History is obtained from the patient    History of Present Illness   Yenifer Maher is a 42 year old female who has a known history of alcohol abuse, alcohol related cirrhotic liver disease, pulmonary arterial hypertension, and lupus anticoagulant positivity, who presented to the [place] ED with complaints of worsening SOB for the past 1-1.5 weeks. In the ED found to be hypotensive, tachycardic, and had an elevated lactate of 5.6. CT PE negative. POC ECHO demonstrated RV dilation and moderate severe RV systolic dysfunction. Started on Dobutamine gtt, received 80 mg of Lasix IV, and started on Cefepime due to US findings of enlarged gallbladder with surrounding pericholecystic fluid. Transferred to [facility] ICU for ongoing cares and management.     Review of Systems    10 point ros completed, see above for positive findings     Past Medical History    I have reviewed this patient's medical history and updated it with pertinent information if needed.   Past Medical History:   Diagnosis Date     Alcohol abuse      Alcoholic cirrhosis (H)      Alcoholic peripheral neuropathy (H)      Coagulopathy (H)      Gastritis      History of Clostridium difficile colitis     unknown date     Impairment of cognitive function     MOCA 2/2019 22/30     Leukocytosis 02/2019    persistent leukocytosis across 2/2019 hospitalization without evidence of source across multiple diagnostics including LP, BCx, UCx      Lupus anticoagulant positive      Macrocytic anemia      Moderate protein-calorie malnutrition (H)      Paroxysmal A-fib (H) 02/2019    not on chronic anticoagulation     Peptic ulcer disease      Positive SMILEY (antinuclear antibody)      Subclinical  hypothyroidism 02/2019    normal T3, T4     Tobacco dependence     0.5 PPD       Past Surgical History   I have reviewed this patient's surgical history and updated it with pertinent information if needed.  Past Surgical History:   Procedure Laterality Date     ESOPHAGOSCOPY, GASTROSCOPY, DUODENOSCOPY (EGD), COMBINED N/A 8/9/2019    Procedure: ESOPHAGOGASTRODUODENOSCOPY (EGD);  Surgeon: Sowmya Ibanez MD;  Location: [facility]     ESOPHAGOSCOPY, GASTROSCOPY, DUODENOSCOPY (EGD), COMBINED N/A 8/26/2019    Procedure: ESOPHAGOGASTRODUODENOSCOPY (EGD);  Surgeon: Leventhal, Thomas Michael, MD;  Location: [facility]     ESOPHAGOSCOPY, GASTROSCOPY, DUODENOSCOPY (EGD), COMBINED N/A 3/30/2020    Procedure: ESOPHAGOGASTRODUODENOSCOPY (EGD);  Surgeon: Sowmya Ibanez MD;  [facility]     ESOPHAGOSCOPY, GASTROSCOPY, DUODENOSCOPY (EGD), COMBINED N/A 12/2/2020    Procedure: ESOPHAGOGASTRODUODENOSCOPY (EGD);  Surgeon: Regulo Abarca MD;  Location: [facility]     UPPER GI ENDOSCOPY  8/9/2019            Social History   I have reviewed this patient's social history and updated it with pertinent information if needed.  Social History     Tobacco Use     Smoking status: Current Some Day Smoker     Types: Cigarettes     Smokeless tobacco: Never Used     Tobacco comment: 6-8 per day   Substance Use Topics     Alcohol use: Not Currently     Alcohol/week: 2.0 standard drinks     Types: 2 Glasses of wine per week     Comment: last drink 10/2/2020     Drug use: Not Currently       Family History   I have reviewed this patient's family history and updated it with pertinent information if needed.  Family History   Problem Relation Age of Onset     Skin Cancer Mother      Depression Mother      Hypertension Father      Depression Sister      Breast Cancer Maternal Aunt        Prior to Admission Medications   Prior to Admission Medications   Prescriptions Last Dose Informant Patient Reported? Taking?   Vitamin D3  (CHOLECALCIFEROL) 25 mcg (1000 units) tablet   No No   Sig: Take 2 tablets (50 mcg) by mouth daily   acetaminophen (TYLENOL) 325 MG tablet  Other Yes No   Sig: Take 325 mg by mouth every 4 hours as needed   benzocaine (ORAJEL MAXIMUM STRENGTH) 20 % GEL gel   No No   Sig: Take by mouth 4 times daily as needed for moderate pain (apply to tooth/gums causing pain)   bismuth subsalicylate (PEPTO BISMOL) 262 MG/15ML suspension  Other Yes No   Sig: Take 30 mL every half to one hour as needed. Do not exceed 8 doses in 24 hours.   calcium carbonate 750 MG CHEW  Other Yes No   Sig: Take 2-4 chew tab by mouth as needed for heartburn    celecoxib (CELEBREX) 200 MG capsule  Other Yes No   Sig: Take 200 mg by mouth 2 times daily as needed   fexofenadine (ALLEGRA) 180 MG tablet   No No   Sig: Take 1 tablet (180 mg) by mouth daily   folic acid (FOLVITE) 1 MG tablet   No No   Sig: Take 1 tablet (1 mg) by mouth daily   furosemide (LASIX) 20 MG tablet   No No   Sig: Take 2 tablets (40 mg) by mouth daily   lactulose (CEPHULAC) 20 GM packet   No No   Sig: Take 1 packet (20 g) by mouth 3 times daily as needed for constipation (take in addition to scheduled dose of less than 4 loose stools / day)   melatonin 5 MG tablet  Other Yes No   Sig: Take 5-10 mg by mouth nightly as needed for sleep   multivitamin, therapeutic (THERA-VIT) TABS tablet   No No   Sig: Take 1 tablet by mouth daily   nicotine (NICODERM CQ) 7 MG/24HR 24 hr patch   No No   Sig: Place 1 patch onto the skin every 24 hours   ondansetron (ZOFRAN-ODT) 4 MG ODT tab   No No   Sig: Take 1 tablet (4 mg) by mouth every 8 hours as needed for nausea   pantoprazole (PROTONIX) 40 MG EC tablet   No No   Sig: Take 1 tablet (40 mg) by mouth 2 times daily (before meals)   polyethylene glycol (MIRALAX) 17 GM/Dose powder   No No   Sig: Take 1-2 capfuls daily and adjust dose to ensure 3-5 bowel movements a day for hepatic encephalopathy.   rifaximin (XIFAXAN) 550 MG TABS tablet   No No   Sig:  Take 1 tablet (550 mg) by mouth 2 times daily   sildenafil (REVATIO) 20 MG tablet   No No   Sig: Take 2 tablets (40 mg) by mouth 3 times daily   sodium chloride (OCEAN) 0.65 % nasal spray   No No   Sig: Inhale 2 sprays in the nostril(s) every 30 minutes as needed for nasal congestion or nasal dryness   spironolactone (ALDACTONE) 50 MG tablet   No No   Sig: Take 1 tablet (50 mg) by mouth daily   thiamine (B-1) 100 MG tablet   No No   Sig: Take 1 tablet (100 mg) by mouth daily      Facility-Administered Medications: None     Allergies   Allergies   Allergen Reactions     Bengay Pain Relief [Menthol] Other (See Comments)     Skin turns red and feels extremely hot     Cats      Chocolate Dermatitis     Dicyclomine Other (See Comments)     Severe sedation     Dust Mites      Derm, resp     No Clinical Screening - See Comments      GI upset     Phenobarbital      Pollen Extract      Derm, resp.        Physical Exam   Vital Signs: Temp: 97.6  F (36.4  C) Temp src: Axillary BP: 105/65 Pulse: 108     SpO2: 98 % O2 Device: None (Room air) Oxygen Delivery: 5 LPM  Weight: 183 lbs 13.82 oz    General: nad, sitting in bed   HEENT: atraumatic  Neuro: aox3, slow to respond on occasion, intermittent episodes of confusion  CV: ST  Pulm: tachypnic, coarse lung sounds b/l   Abd: distended, no fluid wave, diffusely mildly tender  : catheter in place   Extremities: atraumatic x4   Skin: anicteric sclera and skin, island patches of wounds with scabs without erythema/drainage  Incisions: none  Psych: appropriate and calm     Data   I reviewed all medications, new labs and imaging results over the last 24 hours.  Arterial Blood Gases   Recent Labs   Lab 05/17/22  0359 05/17/22  0154   PH 7.48* 7.46*   PCO2 16* 16*   PO2 113* 86   HCO3 12* 11*       Complete Blood Count   Recent Labs   Lab 05/17/22  0809 05/17/22  0153   WBC 8.6 8.0   HGB 8.9* 6.6*   * 111*       Basic Metabolic Panel  Recent Labs   Lab 05/17/22  0810 05/17/22  0809  05/17/22  0617 05/17/22  0400 05/17/22 0153   NA  --  119* 120*  --  125*   POTASSIUM  --  4.1 4.2  --  3.3*   CHLORIDE  --  90* 91*  --  99   CO2  --  11* 11*  --  10*   BUN  --  67* 66*  --  55*   CR  --  3.96* 3.90*  --  3.33*   * 110* 101* 107* 81       Liver Function Tests  Recent Labs   Lab 05/17/22  0809 05/17/22 0153   * 145*   ALT 50 41   ALKPHOS 209* 172*   BILITOTAL 6.0* 5.0*  5.0*   ALBUMIN 1.6* 1.3*   INR  --  2.11*       Pancreatic Enzymes  No lab results found in last 7 days.    Coagulation Profile  Recent Labs   Lab 05/17/22 0153   INR 2.11*   PTT 43*       IMAGING:  No results found for this or any previous visit (from the past 24 hour(s)).

## 2022-05-17 NOTE — CONSULTS
[facility]  Gastroenterology Consultation         Yenifer Maher  [address] [address]  42 year old female    Admission Date/Time: 5/17/2022  Primary Care Provider: Flako Gaona  Referring / Attending Physician:  Dr. Broderick Pereira    We were asked to see the patient in consultation by Dr. Broderick Pereira for evaluation of decompensated liver cirrhosis.      CC: shortness of breath    HPI:  History obtained mostly from chart review, patient is encephalopathic and unable to supply a reliable history.    Yenifer Maher is a 42 year old female who has a past medical history of alcohol abuse, alcohol related cirrhotic liver disease, ascites, esophageal varices, hepatic encephalopathy, pulmonary arterial hypertension, atrial fibrillation, and lupus anticoagulant positivity who presented to [place] ED with shortness of breath that had progressively worsened over last 1-15 weeks.     On initial evaluation she was found to he hypotensive with lactate of 5.6. CT PE was negative. Echo noted RV dilation and RV systolic dysfunction. Ultrasound of abdomen note enlarged gallbladder with pericholecystic fluid. She was transferred to [facility] ICU on 5/17/22.    She reports near daily alcohol use. States she is unaware of her liver cirrhosis. Has c/o abdominal pain, loose stools, reports prior paracentesis and unsure if has had SBP in past. States has not been able to get all her meds due to cost and unsure which they were.    Labs significant for; troponin 266, TSH 4.26, Sodium 119, Creatinine 3.90->3.96 (12/20 0.62), Albumin 1.6, Tbili 5.0->6.0, potassium 4.1. pH arterial 7.48 and CO2 16. Hemoglobin 8.9. Platelets 116.    ROS: A comprehensive ten point review of systems was negative aside from those in mentioned in the HPI.      PAST MED HX:  I have reviewed this patient's medical history and updated it with pertinent information if needed.   Past Medical History:   Diagnosis Date     Alcohol  abuse      Alcoholic cirrhosis (H)      Alcoholic peripheral neuropathy (H)      Coagulopathy (H)      Gastritis      History of Clostridium difficile colitis     unknown date     Impairment of cognitive function     MOCA 2/2019 22/30     Leukocytosis 02/2019    persistent leukocytosis across 2/2019 hospitalization without evidence of source across multiple diagnostics including LP, BCx, UCx      Lupus anticoagulant positive      Macrocytic anemia      Moderate protein-calorie malnutrition (H)      Paroxysmal A-fib (H) 02/2019    not on chronic anticoagulation     Peptic ulcer disease      Positive SMILEY (antinuclear antibody)      Subclinical hypothyroidism 02/2019    normal T3, T4     Tobacco dependence     0.5 PPD       MEDICATIONS:   Prior to Admission Medications   Prescriptions Last Dose Informant Patient Reported? Taking?   Vitamin D3 (CHOLECALCIFEROL) 25 mcg (1000 units) tablet   No No   Sig: Take 2 tablets (50 mcg) by mouth daily   acetaminophen (TYLENOL) 325 MG tablet  Other Yes No   Sig: Take 325 mg by mouth every 4 hours as needed   benzocaine (ORAJEL MAXIMUM STRENGTH) 20 % GEL gel   No No   Sig: Take by mouth 4 times daily as needed for moderate pain (apply to tooth/gums causing pain)   bismuth subsalicylate (PEPTO BISMOL) 262 MG/15ML suspension  Other Yes No   Sig: Take 30 mL every half to one hour as needed. Do not exceed 8 doses in 24 hours.   calcium carbonate 750 MG CHEW  Other Yes No   Sig: Take 2-4 chew tab by mouth as needed for heartburn    celecoxib (CELEBREX) 200 MG capsule  Other Yes No   Sig: Take 200 mg by mouth 2 times daily as needed   fexofenadine (ALLEGRA) 180 MG tablet   No No   Sig: Take 1 tablet (180 mg) by mouth daily   folic acid (FOLVITE) 1 MG tablet   No No   Sig: Take 1 tablet (1 mg) by mouth daily   furosemide (LASIX) 20 MG tablet   No No   Sig: Take 2 tablets (40 mg) by mouth daily   lactulose (CEPHULAC) 20 GM packet   No No   Sig: Take 1 packet (20 g) by mouth 3 times daily as  needed for constipation (take in addition to scheduled dose of less than 4 loose stools / day)   melatonin 5 MG tablet  Other Yes No   Sig: Take 5-10 mg by mouth nightly as needed for sleep   multivitamin, therapeutic (THERA-VIT) TABS tablet   No No   Sig: Take 1 tablet by mouth daily   nicotine (NICODERM CQ) 7 MG/24HR 24 hr patch   No No   Sig: Place 1 patch onto the skin every 24 hours   ondansetron (ZOFRAN-ODT) 4 MG ODT tab   No No   Sig: Take 1 tablet (4 mg) by mouth every 8 hours as needed for nausea   pantoprazole (PROTONIX) 40 MG EC tablet   No No   Sig: Take 1 tablet (40 mg) by mouth 2 times daily (before meals)   polyethylene glycol (MIRALAX) 17 GM/Dose powder   No No   Sig: Take 1-2 capfuls daily and adjust dose to ensure 3-5 bowel movements a day for hepatic encephalopathy.   rifaximin (XIFAXAN) 550 MG TABS tablet   No No   Sig: Take 1 tablet (550 mg) by mouth 2 times daily   sildenafil (REVATIO) 20 MG tablet   No No   Sig: Take 2 tablets (40 mg) by mouth 3 times daily   sodium chloride (OCEAN) 0.65 % nasal spray   No No   Sig: Inhale 2 sprays in the nostril(s) every 30 minutes as needed for nasal congestion or nasal dryness   spironolactone (ALDACTONE) 50 MG tablet   No No   Sig: Take 1 tablet (50 mg) by mouth daily   thiamine (B-1) 100 MG tablet   No No   Sig: Take 1 tablet (100 mg) by mouth daily      Facility-Administered Medications: None       ALLERGIES:   Allergies   Allergen Reactions     Bengay Pain Relief [Menthol] Other (See Comments)     Skin turns red and feels extremely hot     Cats      Chocolate Dermatitis     Dicyclomine Other (See Comments)     Severe sedation     Dust Mites      Derm, resp     No Clinical Screening - See Comments      GI upset     Phenobarbital      Pollen Extract      Derm, resp.        SOCIAL HISTORY:  Social History     Tobacco Use     Smoking status: Current Some Day Smoker     Types: Cigarettes     Smokeless tobacco: Never Used     Tobacco comment: 6-8 per day    Substance Use Topics     Alcohol use: Not Currently     Alcohol/week: 2.0 standard drinks     Types: 2 Glasses of wine per week     Comment: last drink 10/2/2020     Drug use: Not Currently       FAMILY HISTORY:  Family History   Problem Relation Age of Onset     Skin Cancer Mother      Depression Mother      Hypertension Father      Depression Sister      Breast Cancer Maternal Aunt        PHYSICAL EXAM:   General  Alert, oriented x2  Vital Signs with Ranges  Temp: 97.6  F (36.4  C) Temp src: Axillary BP: 105/65 Pulse: 109     SpO2: 97 % O2 Device: None (Room air) Oxygen Delivery: 5 LPM  I/O last 3 completed shifts:  In: 165.67 [P.O.:120; I.V.:45.67]  Out: 330 [Urine:330]    Constitutional: alert, mild distress, cooperative and pale   Cardiovascular: negative, PMI normal. No lifts, heaves, or thrills. RRR. No murmurs, clicks gallops or rub  Respiratory: negative, Percussion normal. Good diaphragmatic excursion. Lungs clear  Abdomen: Abdomen soft, non-tender. BS normal. No masses, organomegaly, positive findings: tenderness marked generalized          ADDITIONAL COMMENTS:   I reviewed the patient's new clinical lab test results.   Recent Labs   Lab Test 05/17/22  0809 05/17/22  0153 12/06/20  0639 12/03/20  0610 12/02/20  0538 12/01/20  1303 12/01/20  0652   WBC 8.6 8.0  --  5.3 5.6  --  5.1   HGB 8.9* 6.6*  --  9.2* 9.4*   < > 9.3*   MCV 93 96  --  93 93  --  95   * 111* 104* 150 143*  --  142*   INR  --  2.11*  --   --  1.47*  --  1.52*    < > = values in this interval not displayed.     Recent Labs   Lab Test 05/17/22  0617 05/17/22  0153 12/05/20  0707 12/03/20  0610   POTASSIUM 4.2 3.3* 4.1 3.9   CHLORIDE 91* 99  --  108   CO2 11* 10*  --  19*   BUN 66* 55*  --  12   ANIONGAP 18* 16*  --  8     Recent Labs   Lab Test 05/17/22  0153 12/02/20  0538 12/01/20  0652 11/30/20  2240 11/30/20  2137 11/30/20  1709 11/27/20  0616 11/26/20  1223 03/28/20  0323 03/27/20  2059 06/21/19  2136 06/21/19  1854    ALBUMIN 1.3* 2.7* 2.7*  --   --  3.1*   < >  --    < >  --    < > 2.6*   BILITOTAL 5.0*  5.0* 2.8* 3.0*  --   --  3.2*   < >  --    < >  --    < > 0.8   ALT 41 38 36  --   --  45   < >  --    < >  --    < > 38   * 85* 87*  --   --  110*   < >  --    < >  --    < > 171*   PROTEIN  --   --   --  Negative  --   --   --  Negative  --  10*   < >  --    LIPASE  --   --   --   --  241 154  --   --   --   --   --  141    < > = values in this interval not displayed.       I reviewed the patient's new imaging results.        CONSULTATION ASSESSMENT AND PLAN:    Yenifer Maher is a 42 year old female who has a past medical history of alcohol abuse, alcohol related cirrhotic liver disease, ascites, esophageal varices, hepatic encephalopathy, pulmonary arterial hypertension, atrial fibrillation, and lupus anticoagulant positivity who presented to [place] ED with shortness of breath.    Active Problems:  Alcoholic liver cirrhosis with decompensation  Sodium 120, Creatinine 3.90, Tbili 5.0 , INR 2.11 MELD 24 ( 90 day mortality 53%)  Maddrey score 91 (poor prognosis)- unable to start steroids due to concern for sepsis and unknown source.  Albumin 1.3  There is concern for hepatorenal syndrome with significantly elevated creatinine- patient would benefit from albumin infusion and midodrine  Cause of significant decompensation may be related to underlying infectious cause such as C difficile vs SBP vs other  Also infection may likely caused decompensation of liver followed by development of probable hepatorenal syndrome and cardiogenic shock- await further evaluation from nephrology and cardiology  Also some concern patient may have underlying autoimmune hepatitis- given positive SMILEY and lupus anticoagulant- will check smooth muscle antibody and AMA  -Consider chest xray if has cardiohepatic syndrome- I do not see record if done at [place]  -Patient is not a liver transplant candidate as has had recent alcohol use.  -Also  consider blood cultures to determine underlying cause of infection    -- recommend to check C difficile  -- Paracentesis for diagnostic/therapeutic purposes  -- Start albumin 25% TID   -- start midodrine 5 mg TID  -- Continue pantoprazole BID  -- Continue antibiotics  -- Check ammonia, AMA, ASMA  -- Diuretics will defer to below consulting groups given elevated creatinine  -- Await nephrology and cardiology consult     Cardiogenic shock (H)   Cardiology consulted        [facility].  Office: [phone]  Cell : [phone] (Dr. Palacios)  Cell: [phone] (Mandy Rodriguez PA-C)

## 2022-05-17 NOTE — CONSULTS
Consult Date: 05/17/2022    RENAL CONSULTATION    REFERRING PHYSICIAN:  Broderick Pereira M.D.    CONSULTANT:  Janell.    REASON:  Acute kidney injury and hyponatremia.    HISTORY OF PRESENT ILLNESS:  This is a 42-year-old who presents with encephalopathy and shortness of breath to an outside emergency room.  A CT angio of the chest was done which was negative for PE and did not show evidence of pulmonary hypertension.  She was treated with IV saline, but also IV Lasix and IV calcium.  She was started on pressors and transferred to [facility] ICU.  Prior to admission, her medication list included Celebrex, Lasix and spironolactone.  She tells me she has not imbibed any [facility] wiper fluid or antifreeze.  She says that she has been taking up to 6 Tylenol per day.  She denies other nonsteroidal antiinflammatory drug use.     PAST MEDICAL HISTORY:  Cirrhosis with ascites and varices, alcoholism, atrial fibrillation, pulmonary hypertension being treated with sildenafil, anemia and hypertension.    SOCIAL HISTORY:  She continues to smoke and drink.    In the hospital so far here, she has been treated with IV albumin 12.5 g every 8 hours x9 doses.  She got intravenous Lasix overnight but this has been stopped and now she got lactated Ringers 1000 mL bolus.  She is on midodrine, dobutamine, B12, folate and Zosyn.    PHYSICAL EXAMINATION:  GENERAL:  She is alert and responsive, but cannot give a detailed history.  Her urine output so far today is 495 mL.   VITAL SIGNS:  Pulse is 110, blood pressure 101/59.  She is afebrile.   HEENT:  She does not appear icteric by exam. Head showed no trauma.  The eyes showed pupils round and react to light.  NECK:  Supple.  LUNGS:  Clear anterior breath sounds.  CARDIAC:  Exam shows marked jugular venous distention, tachycardia with hyperdynamic heart sounds, normal S1, S2, no murmur.  ABDOMEN:  Slightly distended.  It is soft and nontender.   :  Exam shows a Chavez  catheter in place.  EXTREMITIES:  No edema.  There are good pulses in the lower extremities.    LABORATORY AND DIAGNOSTIC DATA:  In 12/2020, the creatinine was 0.62.  In 10/2021, a cardiac echo showed normal LV function and normal RV function.  There was no mention of pulmonary hypertension in that study.     In the outside emergency room yesterday, a CT scan of the chest with IV contrast was negative for PE and did not show changes consistent with pulmonary hypertension.  There was a small amount of ascites.  There was cirrhosis of the liver.  She had normal kidneys with no hydronephrosis.  In the outside emergency room, the pH was 7.43, pCO2 15, pO2 was 113, bicarbonate 10.  Her ionized calcium was low at 2.6.  Her lactate was elevated at 4.7.  Her anion gap was high at 23.  Her hemoglobin was 10.9.  Creatinine was now 4.77 with a sodium of 117 and a bicarb of 13.  Potassium 4.5.  Her albumin level was low at 1.8.  She had a normal liver function test with total bilirubin of 6.0, alkaline phosphatase of 265, AST of 201, ALT of 50.    Today, her laboratories show low fractional secretion of sodium at 0.1 and a low urine sodium at less than 5.  Her serum sodium is 119, potassium 4.1, bicarbonate 11, creatinine 3.96.  Her hemoglobin today is 0.9.  Blood gases show a pH of 7.9, pCO2 15, pO2 93, bicarbonate 11.  This is consistent with a chronic respiratory alkalosis.     IMPRESSION AND PLAN:  She has acute kidney injury with normal kidney function two years ago and now creatinine of 3.96.  She has urine output.  She has a high lactate and an increased anion gap.  She had multiple renal insults including IV contrast and a history of nonsteroidal antiinflammatory drugs.  There is no evidence of obstruction; however, she may have hepatorenal syndrome, especially with her low sodium and low fractional excretion of sodium.  Her blood gases show a high pH with a respiratory alkalosis.  She may also have metabolic acidosis.   She has abnormal liver function tests with a history of alcoholic cirrhosis.  She is COVID negative.  She has a history of pulmonary hypertension and has been on sildenafil.     Cardiology will be seeing her and we are awaiting the results of a repeat cardiac echo.  I agree with treating her with IV albumin and midodrine and we will add normal saline.  We will follow her kidney function closely and her urine output closely.  At this point, I am not going to give her additional bicarbonate because she appears to have predominantly a respiratory alkalosis.  Given this scenario, I am going to check salicylate levels and acetaminophen levels.  We will also screen for volatile alcohol such as methanol and ethylene glycol.  She denies these ingestions.    Issac Maciel MD        D: 2022   T: 2022   MT: MARIUSZ    Name:     BJORN CHAIREZ  MRN:      [id number]        Account:      [id number]   :      1980           Consult Date: 2022     Document: [id number]

## 2022-05-18 NOTE — PROGRESS NOTES
Brief cardiology progress note    I have had discussions with advanced heart failure physicians at [facility], [facility], and [facility], and bed availability has made transfer difficult, but thankfully a bed is currently available at [facility], and Dr. Dahl has graciously accepted the patient for transfer.  Very much appreciate the assistance of Dr. Bell and Dr. Dahl in arranging for the optimal care of this patient.  We will coordinate with the CICU at [facility] to coordinate transfer.      Jean Glover MD  Interventional Cardiology  5/18/2022  10:29 AM

## 2022-05-18 NOTE — SIGNIFICANT EVENT
Pt had worsening encephalopathy.   Gag reflex was also diminishing to the point of not being present, which was initially prominent despite her encephalopathy.   We attempted to wean her sedation but this did not make a difference in her encephalopathy.  Given the needed transportation to a [facility], the need for airway protection right now, and increased risk and potential for airway complication during transportation, we elected to intubate prior to transfer.    Attempted to call mother, father, and significant other for update and transfer approval.   However, no one answered, despite multiple attempts.   I did leave two generic voicemails indicating the transfer to [facility].   Given complexity, current state, we elected to transfer the pt to [facility] for additional therapies.       Aristides Lee MD

## 2022-05-18 NOTE — DISCHARGE SUMMARY
Physician Discharge Summary     Patient ID:  Yenifer Maher  [id number]  42 year old  1980    Admit date: 5/17/2022    Discharge date and time: May 18, 2022 ; afternoon     Admitting Physician: Johnnie Lou MD     Discharge Physician: Dr. Broderick Pereira    Admission Diagnoses: Cardiogenic shock (H) [R57.0], Hepatic encephalopathy, Alcohol withdrawal, Altered Mental Status, Urinary Tract Infection, Metabolic acidosis, Respiratory alkalosis     Discharge Diagnoses: Cardiogenic shock (H) [R57.0], Hepatic encephalopathy, Alcohol withdrawal, Altered Mental Status, Urinary Tract Infection, Metabolic acidosis, Respiratory alkalosis     Admission Condition: critical    Discharged Condition: critical    Indication for Admission: cardiogenic shock     Hospital Course:   Pt was admitted after transfer from OSH. Found to have cardiogenic shock. Due to history of etoh/cirrhosis, full workup ensued. Nephrology, cardiology, hepatology was consulted. Overall, pt was found to have cardiogenic shock, possible septic shock as well. Zoë was placed by cardiology during right heart cath. Dobutamine was initiated and pt tolerated this well and improved her hemodynamics. No changes were made to dobutamine and it was kept at a flat rate of 5. During hospital course, she developed etoh withdrawal. CIWA was inititiated but she continued to worsen from a mental status perspective. It worsened to the point of needing intubation.   Given the transportation, concurrent mental status, inability to protect her airway, she was intubated in safest manner possible.     Consults: cardiology, GI and nephrology      Discharge Exam:  GENERAL APPEARANCE: sedated  EYES: anicteric  NECK: no adenopathy, no asymmetry  RESP: lungs clear to auscultation   CV: ST, peripheral edema +1  ABDOMEN: soft, nontender, no hepatosplenomegaly, no masses    (female): normal female external genitalia  MS: no musculoskeletal defects are noted  SKIN: no jaundice    NEURO: sedated   PSYCH: sedated    Disposition: [facility]     Patient Instructions:   Current Discharge Medication List      START taking these medications    Details   albuterol (PROVENTIL) (2.5 MG/3ML) 0.083% neb solution Take 1 vial (2.5 mg) by nebulization every 2 hours as needed for shortness of breath / dyspnea  Qty: 90 mL    Associated Diagnoses: Cardiogenic shock (H); Hepatic encephalopathy (H); Alcoholic cirrhosis of liver without ascites (H); Dehydration      bacitracin 500 UNIT/GM OINT Apply topically 2 times daily    Associated Diagnoses: Cardiogenic shock (H); Calciphylaxis; Hepatic encephalopathy (H); Alcoholic cirrhosis of liver without ascites (H); Dehydration      bumetanide 0.25 MG/ML infusion Inject 1 mg/hr into the vein continuous    Associated Diagnoses: Cardiogenic shock (H); Hepatic encephalopathy (H); Alcoholic cirrhosis of liver without ascites (H); Dehydration      chlorhexidine (PERIDEX) 0.12 % solution Take 15 mLs by mouth every 12 hours    Associated Diagnoses: Cardiogenic shock (H); Hepatic encephalopathy (H); Alcoholic cirrhosis of liver without ascites (H); Dehydration      cyanocobalamin (CYANOCOBALAMIN) 1000 MCG tablet Take 1 tablet (1,000 mcg) by mouth daily    Associated Diagnoses: Cardiogenic shock (H); Hepatic encephalopathy (H); Alcoholic cirrhosis of liver without ascites (H); Dehydration      dextrose 50 % injection Inject 25-50 mLs into the vein every 15 minutes as needed for low blood sugar    Associated Diagnoses: Cardiogenic shock (H); Hepatic encephalopathy (H); Alcoholic cirrhosis of liver without ascites (H); Dehydration      DOBUTamine 500 mg in dextrose 5% 250 mL (adult std conc) premix Inject 417 mcg/min into the vein continuous    Associated Diagnoses: Cardiogenic shock (H); Hepatic encephalopathy (H); Alcoholic cirrhosis of liver without ascites (H); Dehydration      fentaNYL, PF, (SUBLIMAZE) 100 MCG/2ML injection Inject 0.5-1 mLs (25-50 mcg) into the vein  every hour as needed  Refills: 0    Associated Diagnoses: Cardiogenic shock (H); Hepatic encephalopathy (H); Alcoholic cirrhosis of liver without ascites (H); Dehydration      folic acid 5 MG/ML injection Inject 0.2 mLs (1 mg) into the vein daily    Associated Diagnoses: Cardiogenic shock (H); Hepatic encephalopathy (H); Alcoholic cirrhosis of liver without ascites (H); Dehydration      glucagon 1 MG SOLR injection Inject 1 mg Subcutaneous every 15 minutes as needed for low blood sugar (May repeat x 1 only)    Associated Diagnoses: Cardiogenic shock (H); Hepatic encephalopathy (H); Alcoholic cirrhosis of liver without ascites (H); Dehydration      glucose 40 % (400 mg/mL) gel Take 15-30 g by mouth every 15 minutes as needed for low blood sugar    Associated Diagnoses: Cardiogenic shock (H); Hepatic encephalopathy (H); Alcoholic cirrhosis of liver without ascites (H); Dehydration      levothyroxine (SYNTHROID/LEVOTHROID) 88 MCG tablet Take 1 tablet (88 mcg) by mouth daily    Associated Diagnoses: Cardiogenic shock (H); Hepatic encephalopathy (H); Alcoholic cirrhosis of liver without ascites (H); Dehydration      LORazepam (ATIVAN) 2 MG/ML injection Inject 0.5-1 mLs (1-2 mg) into the vein every 30 minutes as needed    Associated Diagnoses: Cardiogenic shock (H); Hepatic encephalopathy (H); Alcoholic cirrhosis of liver without ascites (H); Dehydration      midazolam (VERSED) drip - ADULT 100 mg/100 mL in NS (pre-mix) Inject 1-8 mg/hr into the vein continuous    Associated Diagnoses: Cardiogenic shock (H); Hepatic encephalopathy (H); Alcoholic cirrhosis of liver without ascites (H); Dehydration      midodrine (PROAMATINE) 5 MG tablet Take 1 tablet (5 mg) by mouth 3 times daily (with meals)    Associated Diagnoses: Cardiogenic shock (H); Hepatic encephalopathy (H); Alcoholic cirrhosis of liver without ascites (H); Dehydration      multivitamin w/minerals (THERA-VIT-M) tablet Take 1 tablet by mouth daily    Associated  Diagnoses: Cardiogenic shock (H); Hepatic encephalopathy (H); Alcoholic cirrhosis of liver without ascites (H); Dehydration      niacin 1000 MG TBCR Take 1 tablet (1,000 mg) by mouth At Bedtime    Associated Diagnoses: Cardiogenic shock (H); Hepatic encephalopathy (H); Alcoholic cirrhosis of liver without ascites (H); Dehydration      pantoprazole (PROTONIX) 40 mg IV push injection Inject 40 mg into the vein 2 times daily    Associated Diagnoses: Cardiogenic shock (H); Hepatic encephalopathy (H); Alcoholic cirrhosis of liver without ascites (H); Dehydration      piperacillin-tazobactam (ZOSYN) 3-0.375 GM vial Inject 3.375 g into the vein every 6 hours    Associated Diagnoses: Cardiogenic shock (H); Hepatic encephalopathy (H); Alcoholic cirrhosis of liver without ascites (H); Dehydration      potassium chloride 10 mEq in 100 mL sterile water intermittent infusion (premix) Inject 100 mLs (10 mEq) into the vein every hour    Associated Diagnoses: Cardiogenic shock (H); Hepatic encephalopathy (H); Alcoholic cirrhosis of liver without ascites (H); Dehydration      !! sodium chloride, PF, 0.9% PF flush 3 mLs by Intracatheter route every hour as needed for line flush    Associated Diagnoses: Cardiogenic shock (H); Hepatic encephalopathy (H); Alcoholic cirrhosis of liver without ascites (H); Dehydration      !! sodium chloride, PF, 0.9% PF flush 3 mLs by Intracatheter route every hour as needed for other (to lock peripheral IV dormant line)    Associated Diagnoses: Cardiogenic shock (H); Hepatic encephalopathy (H); Alcoholic cirrhosis of liver without ascites (H); Dehydration      !! sodium chloride, PF, 0.9% PF flush 3 mLs by Intracatheter route every 8 hours    Associated Diagnoses: Cardiogenic shock (H); Hepatic encephalopathy (H); Alcoholic cirrhosis of liver without ascites (H); Dehydration      !! sodium chloride, PF, 0.9% PF flush 10-40 mLs by Intracatheter route every hour as needed for other (to lock EACH CVC -  Valved (Tunneled and Non-Tunneled) dormant lumen(s))    Associated Diagnoses: Cardiogenic shock (H); Hepatic encephalopathy (H); Alcoholic cirrhosis of liver without ascites (H); Dehydration      !! sodium chloride, PF, 0.9% PF flush 10-40 mLs by Intracatheter route every 7 days    Associated Diagnoses: Cardiogenic shock (H); Hepatic encephalopathy (H); Alcoholic cirrhosis of liver without ascites (H); Dehydration      !! sodium chloride, PF, 0.9% PF flush 10-20 mLs by Intracatheter route every hour as needed for line flush    Associated Diagnoses: Cardiogenic shock (H); Hepatic encephalopathy (H); Alcoholic cirrhosis of liver without ascites (H); Dehydration      !! sodium chloride, PF, 0.9% PF flush 10-40 mLs by Intracatheter route once as needed for line flush (to flush each lumen with line placement)    Associated Diagnoses: Cardiogenic shock (H); Hepatic encephalopathy (H); Alcoholic cirrhosis of liver without ascites (H); Dehydration      thiamine 200 mg Inject 200 mg into the vein 3 times daily    Associated Diagnoses: Cardiogenic shock (H); Hepatic encephalopathy (H); Alcoholic cirrhosis of liver without ascites (H); Dehydration       !! - Potential duplicate medications found. Please discuss with provider.      CONTINUE these medications which have CHANGED    Details   thiamine (B-1) 100 MG tablet Take 1 tablet (100 mg) by mouth 3 times daily    Associated Diagnoses: Cardiogenic shock (H); Hepatic encephalopathy (H); Alcoholic cirrhosis of liver without ascites (H); Dehydration         CONTINUE these medications which have NOT CHANGED    Details   multivitamin, therapeutic (THERA-VIT) TABS tablet Take 1 tablet by mouth daily  Qty: 30 tablet, Refills: 0    Associated Diagnoses: UGIB (upper gastrointestinal bleed)      polyethylene glycol (MIRALAX) 17 GM/Dose powder Take 1-2 capfuls daily and adjust dose to ensure 3-5 bowel movements a day for hepatic encephalopathy.  Qty: 510 g, Refills: 1    Associated  Diagnoses: Hepatic encephalopathy (H)      vitamin (B COMPLEX-C) tablet Take 1 tablet by mouth daily      acetaminophen (TYLENOL) 325 MG tablet Take 325 mg by mouth every 4 hours as needed      benzocaine (ORAJEL MAXIMUM STRENGTH) 20 % GEL gel Take by mouth 4 times daily as needed for moderate pain (apply to tooth/gums causing pain)  Qty: 1 Bottle, Refills: 1    Associated Diagnoses: Subacute frontal sinusitis      bismuth subsalicylate (PEPTO BISMOL) 262 MG/15ML suspension Take 30 mL every half to one hour as needed. Do not exceed 8 doses in 24 hours.      calcium carbonate 750 MG CHEW Take 2-4 chew tab by mouth as needed for heartburn       celecoxib (CELEBREX) 200 MG capsule Take 200 mg by mouth 2 times daily as needed      fexofenadine (ALLEGRA) 180 MG tablet Take 1 tablet (180 mg) by mouth daily  Qty: 30 tablet, Refills: 0    Associated Diagnoses: Subacute frontal sinusitis      furosemide (LASIX) 20 MG tablet Take 2 tablets (40 mg) by mouth daily  Qty: 60 tablet, Refills: 1    Associated Diagnoses: Lower extremity edema; Alcoholic cirrhosis of liver without ascites (H)      rifaximin (XIFAXAN) 550 MG TABS tablet Take 1 tablet (550 mg) by mouth 2 times daily  Qty: 60 tablet, Refills: 1    Associated Diagnoses: Hepatic encephalopathy (H)      sildenafil (REVATIO) 20 MG tablet Take 2 tablets (40 mg) by mouth 3 times daily  Qty: 90 tablet, Refills: 1    Associated Diagnoses: Pulmonary hypertension (H)      sodium chloride (OCEAN) 0.65 % nasal spray Inhale 2 sprays in the nostril(s) every 30 minutes as needed for nasal congestion or nasal dryness  Qty: 30 mL, Refills: 1    Associated Diagnoses: Subacute frontal sinusitis      spironolactone (ALDACTONE) 50 MG tablet Take 1 tablet (50 mg) by mouth daily  Qty: 30 tablet, Refills: 1    Associated Diagnoses: Lower extremity edema; Alcoholic cirrhosis of liver without ascites (H)      Vitamin D3 (CHOLECALCIFEROL) 25 mcg (1000 units) tablet Take 2 tablets (50 mcg) by  mouth daily  Qty: 30 tablet, Refills: 1    Associated Diagnoses: Alcohol abuse         STOP taking these medications       folic acid (FOLVITE) 1 MG tablet Comments:   Reason for Stopping:         pantoprazole (PROTONIX) 40 MG EC tablet Comments:   Reason for Stopping:             Activity: activity as tolerated  Diet: regular diet  Wound Care: keep wound clean and dry    Follow-up with Cardiology, Nephrology, and Hepatology once discharged from hospital.     Signed:  Aristides Lee MD  5/18/2022  11:44 AM

## 2022-05-18 NOTE — ANESTHESIA PROCEDURE NOTES
Airway       Patient location during procedure: ICU  Staff -        CRNA: Marylou Stern APRN CRNA       Performed By: CRNA  Consent for Airway        Urgency: elective  Report Obtained from Primary Care Team       History regarding most recent potassium obtained: Yes       History regarding presence/absence of renal failure obtained:Yes       History regarding stroke/CVA obtained:Yes       History regarding presence/absence of NM disorder: YesIndications and Patient Condition       Indications for airway management: airway protection       Mallampati: Not Assessed     Induction type:intravenous       Mask difficulty assessment: 1 - vent by mask    Final Airway Details       Final airway type: endotracheal airway       Successful airway: ETT - single  Endotracheal Airway Details        ETT size (mm): 7.0       Cuffed: yes       Successful intubation technique: video laryngoscopy       VL Blade Size: Glidescope 3       Grade View of Cords: 1       Adjucts: stylet       Position: Right       Measured from: lips       Secured at (cm): 22       Bite block used: None    Post intubation assessment        ETT secured, Vent settings by primary/ICU team, Primary/ICU team to review CXR, Sedation to be ordered by primary/ICU team and No apparent complications       Placement verified by: capnometry, equal breath sounds and chest rise        Number of attempts at approach: 1       Secured with: commercial tube ventura       Ease of procedure: easy       Dentition: Unchanged

## 2022-05-18 NOTE — PLAN OF CARE
Vital signs stable. Disoriented x4, restless this morning at shift change- Precedex drip ordered, and followed CIWA protocol (see flowsheet and MAR), PERRLA. Sinus tachycardia 100s-1teens, CVP and PA pressures elevated, MAP>65- on Dobutamine drip and Bumex drip. 2 liters Oxymask at start of shift, intubated patient prior to transfer to [facility] for airway protection. OG placed after intubation (Xray done at bedside for placement of ETT and OG). Blood sugar checks in 130s. Chavez catheter adequate output. Potassium this morning 3.3- replaced with 10mEq x2 IV potassium.     Called report to [facility] BRAYAN Peralta. Provider attempted multiple times to contact family about transfer, but was unable to talk with them. The following patient belongings were sent with patient: clothing, purse, glasses, phone, .

## 2022-05-18 NOTE — PROGRESS NOTES
[facility]    ICU Progress Note   Yenifer Maher is a 40 year old female with PMHx of alcohol abuse with alcoholic cirrhosis c/b portal hypertension, esophageal varices, ascites, and hepatic encephalopathy, pulmonary hypertension, paroxysmal atrial fibrillation, hx of calciphylaxis, lupus anticoagulant positive, neuropathy, macrocytic anemia, and PUD. Arrives at [facility] with concerns for SAPPHIRE, Septic Shock, Cirrhotic decompensation, Metabolic acidosis, and/or Cardiogenic shock.       Assessment: Critical Care   Yenifer Maher is a 42 year old female admitted on 5/17/2022. She has a complex medical history which includes alcohol abuse, alcohol related liver cirrhosis, portal hypertension, esophageal varices, ascites, and hepatic encephalopathy, paroxysmal atrial fibrillation, hx of calciphylaxis, lupus anticoagulant positive, neuropathy, macrocytic anemia, and PUD. She is admitted 5/17 with shock in the setting of possible right ventricular dysfunction with resultant congestive hepatopathy, acute kidney injury versus sepsis.        Subjective:  Worsening encephalopathy   ciwa initiated given withdrawal symptoms  Strong gag reflex present this am despite somnolence       Plan: Critical Care   Neuro/ pain/ sedation:  Confusion   - Monitor neurological status. Notify provider for any acute changes in exam  - intermittent   Pain  - mmpr  EtOH withdrawal  -CIWA  -multivitamins  -precedex   -high risk for intubation     Pulmonary:  Acute Resp Insuff  - wean o2 as able  - IS and Acapella as able   - supplemental oxygen to keep saturation about 92%  Secretions  - neb prn    Cardiovascular:  Hypotension  - Monitor hemodynamic status.  - pressors prn  Sepsis  - LA elevated on arrival  - trend q4h  - resuscitate as able  Trop elevation  - ekg and trop trend  - echo pending  - card c/s: RV dysfunction   - likely demand ischemia  Cardiogenic shock  - concern for this given elevated trop, dilated RV   -  monitor, cards c/s   - transfer to [facility] Pulm HTN center when able  - diurese as able  - vaishali placed 5/17      GI/Nutrition:  Diet/Malntrution   - NPO for Medical/Clinical Reasons Except for: Meds    - Nutrition consulted. Appreciate recommendations.   Liver failure - acute on chronic  - Hepatology c/s   - paracentesis/albumin/midodrine/ppi  Diarrhea  - c diff check > pending   Transaminitis  - monitor  - resuscitate as able  - multifactorial, likely acute on chronic liver damage    Renal/ Fluid Balance:   SAPPHIRE/CKD  - shelley needed for critical monitoring of intake and output  - Neph c/s  - avoid nephrotoxic agents as able  - significant concern present for needing dialysis, temp or perm, pt informed of this  - FENA and FUREA suggestive of prerenal   - bumex gtt to assist in diuresis > responsive   Electrolyte abnormalities  - ICU electrolyte replacement protocol  Calciphylaxis   - localized wound care   - bacitracin prn to wounds  - avoid calcium supplementation to limit wound progression   UTI  - obtained 5/17  - zosyn started for broad spectrum coverage given presentation     Endocrine:  Hypothyroidism  -levothyroxine   Hyperglycemia  -insulin prn     ID:  Chills/Abdominal Pain  -Unknown etiology  -UTI vs Diarrhea  -Covid negative  -Paracentesis pending   -CT shows fluid around gallbladder, frequently identified in cirrhotic pts, needs HIDA scan to elucidate whether she has cholecystitis or not     Hematology:  Cirrhotic/Macrotic Anemia  -concern for wernicke enc > thiamine  -b12 and folate ordered  Anemia  -likely chronic  -monitor  -no hematemesis/melena/hematochezia  -trend labs    MSK:   - PT and OT consulted. Appreciate recommendations.     Lines/ tubes/ drains:  Shelley Catheter: PRESENT, indication: Strict 1-2 Hour I&O  Central Lines: PRESENT         Prophylaxis:  - DVT Prophylaxis: Heparin SQ  - PUD Prophylaxis: Protonix     Code Status: Full Code      Disposition:  - ICU, may need transfer to [facility] if  worsening     The patient's care was discussed with the Attending Physician, Dr. Pereira.  Critical care time exclusive of procedures: 60 minutes       Aristides Lee MD SICU Fellow        ______________________________________________________________________    Chief Complaint   Nausea and abdominal pain and diarrhea     History is obtained from the patient    History of Present Illness   Yenifer Maher is a 42 year old female who has a known history of alcohol abuse, alcohol related cirrhotic liver disease, pulmonary arterial hypertension, and lupus anticoagulant positivity, who presented to the [place] ED with complaints of worsening SOB for the past 1-1.5 weeks. In the ED found to be hypotensive, tachycardic, and had an elevated lactate of 5.6. CT PE negative. POC ECHO demonstrated RV dilation and moderate severe RV systolic dysfunction. Started on Dobutamine gtt, received 80 mg of Lasix IV, and started on Cefepime due to US findings of enlarged gallbladder with surrounding pericholecystic fluid. Transferred to [facility] ICU for ongoing cares and management.     Review of Systems    10 point ros completed, see above for positive findings     Past Medical History    I have reviewed this patient's medical history and updated it with pertinent information if needed.   Past Medical History:   Diagnosis Date     Alcohol abuse      Alcoholic cirrhosis (H)      Alcoholic peripheral neuropathy (H)      Coagulopathy (H)      Gastritis      History of Clostridium difficile colitis     unknown date     Impairment of cognitive function     MOCA 2/2019 22/30     Leukocytosis 02/2019    persistent leukocytosis across 2/2019 hospitalization without evidence of source across multiple diagnostics including LP, BCx, UCx      Lupus anticoagulant positive      Macrocytic anemia      Moderate protein-calorie malnutrition (H)      Paroxysmal A-fib (H) 02/2019    not on chronic anticoagulation     Peptic  ulcer disease      Positive SMILEY (antinuclear antibody)      Subclinical hypothyroidism 02/2019    normal T3, T4     Tobacco dependence     0.5 PPD       Past Surgical History   I have reviewed this patient's surgical history and updated it with pertinent information if needed.  Past Surgical History:   Procedure Laterality Date     CV RIGHT HEART CATH MEASUREMENTS RECORDED N/A 5/17/2022    Procedure: Right Heart Cath;  Surgeon: Anuj Burr MD;  Location:  HEART CARDIAC CATH LAB     CV SWAN CHAZ PROCEDURE N/A 5/17/2022    Procedure: Leave in [facility] [place];  Surgeon: Anuj Burr MD;  Location:  HEART CARDIAC CATH LAB     ESOPHAGOSCOPY, GASTROSCOPY, DUODENOSCOPY (EGD), COMBINED N/A 8/9/2019    Procedure: ESOPHAGOGASTRODUODENOSCOPY (EGD);  Surgeon: Sowmya Ibanez MD;  Location: [facility] GI     ESOPHAGOSCOPY, GASTROSCOPY, DUODENOSCOPY (EGD), COMBINED N/A 8/26/2019    Procedure: ESOPHAGOGASTRODUODENOSCOPY (EGD);  Surgeon: Leventhal, Thomas Michael, MD;  Location: [facility]     ESOPHAGOSCOPY, GASTROSCOPY, DUODENOSCOPY (EGD), COMBINED N/A 3/30/2020    Procedure: ESOPHAGOGASTRODUODENOSCOPY (EGD);  Surgeon: Sowmya Ibanez MD;  Location: [facility]     ESOPHAGOSCOPY, GASTROSCOPY, DUODENOSCOPY (EGD), COMBINED N/A 12/2/2020    Procedure: ESOPHAGOGASTRODUODENOSCOPY (EGD);  Surgeon: Regulo Abarca MD;  Location:  GI     UPPER GI ENDOSCOPY  8/9/2019            Social History   I have reviewed this patient's social history and updated it with pertinent information if needed.  Social History     Tobacco Use     Smoking status: Current Some Day Smoker     Types: Cigarettes     Smokeless tobacco: Never Used     Tobacco comment: 6-8 per day   Substance Use Topics     Alcohol use: Not Currently     Alcohol/week: 2.0 standard drinks     Types: 2 Glasses of wine per week     Comment: last drink 10/2/2020     Drug use: Not Currently       Family History   I have reviewed this patient's family  "history and updated it with pertinent information if needed.  Family History   Problem Relation Age of Onset     Skin Cancer Mother      Depression Mother      Hypertension Father      Depression Sister      Breast Cancer Maternal Aunt        Prior to Admission Medications   Prior to Admission Medications   Prescriptions Last Dose Informant Patient Reported? Taking?   Vitamin D3 (CHOLECALCIFEROL) 25 mcg (1000 units) tablet not sure Self No No   Sig: Take 2 tablets (50 mcg) by mouth daily   acetaminophen (TYLENOL) 325 MG tablet prn Self Yes No   Sig: Take 325 mg by mouth every 4 hours as needed   benzocaine (ORAJEL MAXIMUM STRENGTH) 20 % GEL gel prn Self No No   Sig: Take by mouth 4 times daily as needed for moderate pain (apply to tooth/gums causing pain)   bismuth subsalicylate (PEPTO BISMOL) 262 MG/15ML suspension prn Self Yes No   Sig: Take 30 mL every half to one hour as needed. Do not exceed 8 doses in 24 hours.   calcium carbonate 750 MG CHEW prn Self Yes No   Sig: Take 2-4 chew tab by mouth as needed for heartburn    celecoxib (CELEBREX) 200 MG capsule couple months, insurance issues Self Yes No   Sig: Take 200 mg by mouth 2 times daily as needed   fexofenadine (ALLEGRA) 180 MG tablet ran out \"Can't afford\" Self No No   Sig: Take 1 tablet (180 mg) by mouth daily   folic acid (FOLVITE) 1 MG tablet Past Week at ? Self No Yes   Sig: Take 1 tablet (1 mg) by mouth daily   furosemide (LASIX) 20 MG tablet ran out Self No No   Sig: Take 2 tablets (40 mg) by mouth daily   multivitamin, therapeutic (THERA-VIT) TABS tablet Past Week at Unknown time Self No Yes   Sig: Take 1 tablet by mouth daily   pantoprazole (PROTONIX) 40 MG EC tablet taking daily to ration Self No No   Sig: Take 1 tablet (40 mg) by mouth 2 times daily (before meals)   polyethylene glycol (MIRALAX) 17 GM/Dose powder Past Week at Unknown time Self No Yes   Sig: Take 1-2 capfuls daily and adjust dose to ensure 3-5 bowel movements a day for hepatic " encephalopathy.   rifaximin (XIFAXAN) 550 MG TABS tablet ran out Self No No   Sig: Take 1 tablet (550 mg) by mouth 2 times daily   sildenafil (REVATIO) 20 MG tablet ran out Self No No   Sig: Take 2 tablets (40 mg) by mouth 3 times daily   sodium chloride (OCEAN) 0.65 % nasal spray prn Self No No   Sig: Inhale 2 sprays in the nostril(s) every 30 minutes as needed for nasal congestion or nasal dryness   spironolactone (ALDACTONE) 50 MG tablet ran out Self No No   Sig: Take 1 tablet (50 mg) by mouth daily   thiamine (B-1) 100 MG tablet ran out Self No No   Sig: Take 1 tablet (100 mg) by mouth daily   vitamin (B COMPLEX-C) tablet Past Week at Unknown time Self Yes Yes   Sig: Take 1 tablet by mouth daily      Facility-Administered Medications: None     Allergies   Allergies   Allergen Reactions     Bengay Pain Relief [Menthol] Other (See Comments)     Skin turns red and feels extremely hot     Cats      Chocolate Dermatitis     Dicyclomine Other (See Comments)     Severe sedation     Dust Mites      Derm, resp     No Clinical Screening - See Comments      GI upset     Phenobarbital      Pollen Extract      Derm, resp.        Physical Exam   Vital Signs: Temp: 97.7  F (36.5  C) Temp src: Axillary   Pulse: 102   Resp: 30 SpO2: 93 % O2 Device: Oxymask Oxygen Delivery: 2 LPM  Weight: 185 lbs 2.98 oz    General: somnolent    HEENT: atraumatic  Neuro: alert, but unable to interact actively, slow to respond    CV: ST  Pulm: tachypnic, coarse lung sounds b/l   Abd: distended, no fluid wave, diffusely mildly tender  : catheter in place   Extremities: atraumatic x4   Skin: anicteric sclera and skin, island patches of wounds with scabs without erythema/drainage  Incisions: none  Psych: appropriate and calm     Data   I reviewed all medications, new labs and imaging results over the last 24 hours.  Arterial Blood Gases   Recent Labs   Lab 05/18/22  0615 05/18/22  0209 05/17/22  2206 05/17/22  1808   PH 7.49* 7.48* 7.48* 7.42    PCO2 19* 19* 18* 17*   PO2 68* 70* 69* 81   HCO3 15* 14* 13* 11*       Complete Blood Count   Recent Labs   Lab 22  0613 22  0207 22  1808   WBC 8.1 8.3 8.5 8.6   HGB 8.9* 8.9* 8.9* 9.0*   * 102* 105* 115*       Basic Metabolic Panel  Recent Labs   Lab 22  0622  0404 22  1808   *  --  122* 123*  --  121*   POTASSIUM 3.3*  --  3.5 3.5  --  4.0   CHLORIDE 92*  --  92* 93*  --  92*   CO2 14*  --  14* 12*  --  11*   BUN 77*  --  75* 72*  --  71*   CR 3.41*  --  3.66* 3.56*  --  3.73*   * 131* 128* 138*   < > 133*  136*    < > = values in this interval not displayed.       Liver Function Tests  Recent Labs   Lab 22  0809 22  0153   * 145*   ALT 50 41   ALKPHOS 209* 172*   BILITOTAL 6.0* 5.0*  5.0*   ALBUMIN 1.6* 1.3*   INR  --  2.11*       Pancreatic Enzymes  No lab results found in last 7 days.    Coagulation Profile  Recent Labs   Lab 22  0153   INR 2.11*   PTT 43*       IMAGING:  Recent Results (from the past 24 hour(s))   Echocardiogram Complete   Result Value    LVEF  >70%    [facility]    [id number]  [id number]  [id number]  [id number]^SUSIE^CALDERON     [facility]  Echocardiography Laboratory  [facility] [place] [address]     Name: BJORN CHAIREZ  MRN: [id number]  : 1980  Study Date: 2022 09:23 AM  Age: 42 yrs  Gender: Female  Patient Location: [facility]  Reason For Study: T  Ordering Physician: CALDERON RICHARDS  Referring Physician: Flako Gaona  Performed By: Jeremy Do RDCS     BSA: 1.9 m2  Height: 67 in  Weight: 183 lb  HR: 107  BP: 97/60 mmHg  ______________________________________________________________________________  Procedure  Complete Portable Echo Adult. Optison (NDC #[id number]) given intravenously.  ______________________________________________________________________________  Interpretation Summary     The left ventricular  cavity is small and under filled. Hyperdynamic left  ventricular function. The visual ejection fraction is >70%.  The right ventricle is severely dilated. Severely decreased right ventricular  systolic function.  Normal left atrial size. The right atrium is severely dilated.  There is severe (4+) tricuspid regurgitation.  Systemic right sided pressures. Pulmonary artery systolic pressure of >90 mmHg  based on RA pressure of >15 mmHg.  Compared to the prior study on 11/27/2020, significant RV dilation and  dysfunction and TR are new.  ______________________________________________________________________________  Left Ventricle  The left ventricular cavity is small. Hyperdynamic left ventricular function.  The visual ejection fraction is >70%. Left ventricular diastolic function is  indeterminate. Flattened septum is consistent with RV pressure/volume  overload.     Right Ventricle  The right ventricle is severely dilated. Severely decreased right ventricular  systolic function.     Atria  Normal left atrial size. The right atrium is severely dilated.     Mitral Valve  There is trace mitral regurgitation.     Tricuspid Valve  There is severe (4+) tricuspid regurgitation. The right ventricular systolic  pressure is approximated at 75.5 mmHg plus the right atrial pressure. Right  ventricular systolic pressure is elevated, consistent with severe pulmonary  hypertension.     Aortic Valve  The aortic valve is normal in structure and function. No aortic regurgitation  is present. No aortic stenosis is present.     Pulmonic Valve  The pulmonic valve is not well visualized.     Vessels  Normal size aorta. Normal size ascending aorta. IVC diameter >2.1 cm  collapsing <50% with sniff suggests a high RA pressure estimated at 15 mmHg or  greater.     Pericardium  Small pericardial effusion.     Rhythm  The rhythm was sinus tachycardia.  ______________________________________________________________________________  MMode/2D  Measurements & Calculations  IVSd: 0.98 cm     LVIDd: 2.7 cm  LVIDs: 1.4 cm  LVPWd: 1.2 cm  FS: 48.4 %  LV mass(C)d: 83.5 grams  LV mass(C)dI: 42.9 grams/m2  Ao root diam: 2.7 cm  LA dimension: 3.4 cm  asc Aorta Diam: 2.5 cm  LA/Ao: 1.3  LVOT diam: 1.9 cm  LVOT area: 2.7 cm2  LA Volume (BP): 37.2 ml  LA Volume Index (BP): 19.1 ml/m2  RWT: 0.89     Doppler Measurements & Calculations  MV E max susi: 47.7 cm/sec  MV A max susi: 30.3 cm/sec  MV E/A: 1.6  MV dec time: 0.13 sec  PA acc time: 0.07 sec  TR max susi: 434.4 cm/sec  TR max P.5 mmHg  E/E' av.5  Lateral E/e': 3.3  Medial E/e': 5.8     ______________________________________________________________________________  Report approved by: Juan Hernandez 2022 11:14 AM         US Abdomen Limited    Narrative    US ABDOMEN LIMITED 2022 11:26 AM     HISTORY: High volume paracentesis with or without diagnostic fluid  analysis with labs to be drawn if ordered. Total paracentesis volume  as much as possible.    COMPARISON: 2020      Impression    IMPRESSION: No significant ascites demonstrated, no paracentesis  performed.       RICHI CHAPIN MD         SYSTEM ID:  [id number]   Cardiac Catheterization    Narrative    Baseline on Dobutamine:  1) Severely elevated right-sided filling pressures (mean RA 22 mmHg)  2) Severe primary pulmonary hypertension (mean PA 43 mmHg, PCWP 16 mmHg,   PVR 7.4)  3) Normal left sided filling pressures   4) Depressed CO/CI on dobutamine: 3.5/1.8    On NO to 80 ppm:  1) There was no change in mean PA pressure.  2) PCWP decreased to 5 mmHg  3) CO/CI increased to 4.3/2.2   XR Chest Port 1 View    Narrative    EXAM: XR CHEST PORT 1 VIEW  LOCATION: [facility]  DATE/TIME: 2022 4:18 PM    INDICATION: Post [place].  COMPARISON: 2020.      Impression    IMPRESSION: [place]-Gali catheter has been placed with tip in the distal right pulmonary artery just proximal to the bifurcation. Lungs  clear.

## 2022-05-18 NOTE — PROGRESS NOTES
Intubation Note    Date of intubation: 5/18/22  Time of intubation: 1105  Location of intubation: ICU Room 350   Intubated by: CRNA  Size of ETT: 7.0  Location (cm) at lip: 22    ETT placement confirmed by: CO2 detector, breath sounds  Comments: pt tolerated well with SpO2 of 99% on 30% FiO2.

## 2022-05-18 NOTE — PROGRESS NOTES
[facility]  Gastroenterology Progress Note     Yenifer Maher MRN# [id number]   YOB: 1980 Age: 42 year old          Assessment and Plan:   Yenifer Maher is a 42 year old female who has a past medical history of alcohol abuse, alcohol related cirrhotic liver disease, ascites, esophageal varices, hepatic encephalopathy, pulmonary arterial hypertension, atrial fibrillation, and lupus anticoagulant positivity who presented to [place] ED with shortness of breath.     Active Problems:  Alcoholic liver cirrhosis with decompensation  Sodium 120, Creatinine 3.90, Tbili 5.0 , INR 2.11 MELD 24 ( 90 day mortality 53%)  Maddrey score 91 (poor prognosis)- unable to start steroids due to concern for sepsis and unknown source.  Creatinine3.19 improved today( 5/18) and INR 2.14  Albumin 1.3. Ammonia 79 ( 10-50 normal)  There is concern for hepatorenal syndrome with significantly elevated creatinine- patient would benefit from albumin infusion and midodrine  Cause of significant decompensation may be related to underlying infectious cause such as C difficile vs SBP vs other- unable to do paracentesis as no ascites present.  Also infection may likely caused decompensation of liver followed by development of probable hepatorenal syndrome and cardiogenic shock- await further evaluation from nephrology and cardiology. Underwent Echo on 5/17 noted small pericardial effusion, severely dilated right ventricle with decreasedsystolic function Planning transfer to [place] for advanced heart failure  Also some concern patient may have underlying autoimmune hepatitis- given positive SMILEY and lupus anticoagulant- will check smooth muscle antibody and AMA  -Consider chest xray if has cardiohepatic syndrome- I do not see record if done at [place]  -Patient is not a liver transplant candidate as has had recent alcohol use.  -Also consider blood cultures to determine underlying cause of infection     -- patient  being transferred and discharge summary started- will recommend starting lactulose when able  -- Continue albumin 25% TID until 3.5   -- continue midodrine 5 mg TID  -- Continue pantoprazole BID  -- Continue antibiotics  -- Diuretics will defer to below consulting groups given elevated creatinine  -- Appreciate nephrology and cardiology consults      Cardiogenic shock (H)   Cardiology consulted and planning transfer to [facility] History:   Intubated and sedated              Review of Systems:   C: NEGATIVE for fever, chills, change in weight  E/M: NEGATIVE for ear, mouth and throat problems  R: NEGATIVE for significant cough or SOB  CV: NEGATIVE for chest pain, palpitations or peripheral edema             Medications:   I have reviewed this patient's current medications    [Held by provider] albumin human  12.5 g Intravenous Q8H     bacitracin   Topical BID     chlorhexidine  15 mL Mouth/Throat Q12H     cyanocobalamin  1,000 mcg Oral Daily     [START ON 5/21/2022] folic acid  1 mg Oral Daily     [START ON 5/19/2022] folic acid  1 mg Intravenous Daily     levothyroxine  88 mcg Oral Daily     midodrine  5 mg Oral TID w/meals     multivitamin w/minerals  1 tablet Oral Daily     niacin  1,000 mg Oral At Bedtime     pantoprazole (PROTONIX) IV  40 mg Intravenous BID     piperacillin-tazobactam  3.375 g Intravenous Q6H     potassium chloride  10 mEq Intravenous Q1H     sodium chloride (PF)  10-40 mL Intracatheter Q7 Days     sodium chloride (PF)  3 mL Intracatheter Q8H     thiamine  200 mg Intravenous TID     [START ON 5/20/2022] thiamine  100 mg Oral TID     [START ON 5/25/2022] thiamine  100 mg Oral Daily                  Physical Exam:   Vitals were reviewed  Vital Signs with Ranges  Temp:  [97.7  F (36.5  C)-98.5  F (36.9  C)] 98  F (36.7  C)  Pulse:  [] 96  Resp:  [30] 30  MAP:  [69 mmHg-99 mmHg] 96 mmHg  Arterial Line BP: ()/(57-88) 129/83  FiO2 (%):  [30 %] 30 %  SpO2:  [80 %-100 %] 93  %  I/O last 3 completed shifts:  In: 1712.94 [I.V.:912.94; IV Piggyback:800]  Out: 2690 [Urine:2690]    Cardiovascular: negative, PMI normal. No lifts, heaves, or thrills. RRR. No murmurs, clicks gallops or rub  Respiratory: negative, Percussion normal. Good diaphragmatic excursion. Lungs clear           Data:   I reviewed the patient's new clinical lab test results.   Recent Labs   Lab Test 05/18/22  0913 05/18/22  0613 05/18/22  0207 05/17/22  0809 05/17/22  0153 12/03/20  0610 12/02/20  0538   WBC 8.6 8.1 8.3   < > 8.0   < > 5.6   HGB 9.0* 8.9* 8.9*   < > 6.6*   < > 9.4*   MCV 94 94 95   < > 96   < > 93   * 105* 102*   < > 111*   < > 143*   INR 2.14*  --   --   --  2.11*  --  1.47*    < > = values in this interval not displayed.     Recent Labs   Lab Test 05/18/22  0913 05/18/22  0613 05/18/22  0207   POTASSIUM 2.8* 3.3* 3.5   CHLORIDE 92* 92* 92*   CO2 17* 14* 14*   BUN 75* 77* 75*   ANIONGAP 16* 18* 16*     Recent Labs   Lab Test 05/17/22  0905 05/17/22  0809 05/17/22  0153 12/02/20  0538 12/01/20  0652 11/30/20  2240 11/30/20  2137 11/30/20  1709 11/27/20  0616 11/26/20  1223 06/21/19  2136 06/21/19  1854   ALBUMIN  --  1.6* 1.3* 2.7*   < >  --   --  3.1*   < >  --    < > 2.6*   BILITOTAL  --  6.0* 5.0*  5.0* 2.8*   < >  --   --  3.2*   < >  --    < > 0.8   ALT  --  50 41 38   < >  --   --  45   < >  --    < > 38   AST  --  181* 145* 85*   < >  --   --  110*   < >  --    < > 171*   PROTEIN 20 *  --   --   --   --  Negative  --   --   --  Negative   < >  --    LIPASE  --   --   --   --   --   --  241 154  --   --   --  141    < > = values in this interval not displayed.       I reviewed the patient's new imaging results.    All laboratory data reviewed  All imaging studies reviewed by me.    Mandy Rodriguez PA-C,  5/18/2022  [facility]  Office : [phone]  Cell: [phone] (Dr. Palacios)  Cell: [phone] (Mandy Rodriguez PA-C)

## 2022-05-18 NOTE — PLAN OF CARE
Pt A&O at start of shift, but progressively became more shaky and talking at and to things that were not there, spoke with intensivist after realizing she was not yet on CIWA protocol and got that started. CIWA scores remain high. SWAN in place, elevated PA pressures and CVP. Dobutamine at fixed rate of 5 and bumex at 1/hr with good UOP. Pt remains tachypneic but on room air. Critical labs reported to MD throughout the night with calcium and magnesium replaced overnight.

## 2022-05-18 NOTE — PROGRESS NOTES
Assessment and Plan:   SAPPHIRE: Cr actually improved: 3.73 > > 3.19. She demonstrates low K 2.8 and Mg 1.4. Phos 8.0 with Ca 6.1.  UO yest 1695 ml. UO so far today 3175 ml.   Hyponatremia has improved appropriately: 119 > > 125. HCO3 better: 10 > 17.   She is on bumex and chlorothiazide IV. Also on midodrine.   She has gotten replacement of Ca, Mg and K. Will start phos binder if taking po.     No indication for dialysis. Cont diuresis per Cards request. Monitor labs.               Interval History:   R heart failure: to be transferred to [place]. Pulmonary HT, severe.          Cirrhosis: alcoholism. Hx of varices, ascites, encephalopathy.  Encephalopathy:      Echo: LV cavity small, underfilled and hyperdynamic.RV severely dilated with RV failure and severe pulm HT. Marked TR.   R heart cath: RA pressure elevated at 22. Pulm art mean pressure 43 and PAWP 16. Nl left sided pressures.   She is on dobutamine. Now intubated.            Review of Systems:   Encephalopathic. Intubated.           Medications:       [Held by provider] albumin human  12.5 g Intravenous Q8H     bacitracin   Topical BID     cyanocobalamin  1,000 mcg Oral Daily     [START ON 5/21/2022] folic acid  1 mg Oral Daily     [START ON 5/19/2022] folic acid  1 mg Intravenous Daily     [START ON 5/25/2022] gabapentin  100 mg Oral Q8H     [START ON 5/23/2022] gabapentin  300 mg Oral Q8H     [START ON 5/21/2022] gabapentin  600 mg Oral Q8H     gabapentin  900 mg Oral Q8H     gabapentin  1,200 mg Oral Once     heparin ANTICOAGULANT  5,000 Units Subcutaneous Q12H     levothyroxine  88 mcg Oral Daily     midodrine  5 mg Oral TID w/meals     multivitamin w/minerals  1 tablet Oral Daily     niacin  1,000 mg Oral At Bedtime     pantoprazole (PROTONIX) IV  40 mg Intravenous BID     piperacillin-tazobactam  3.375 g Intravenous Q6H     potassium chloride  10 mEq Intravenous Q1H     sodium chloride (PF)  10-40 mL Intracatheter Q7 Days     sodium chloride (PF)  3  mL Intracatheter Q8H     thiamine  200 mg Intravenous TID     [START ON 5/20/2022] thiamine  100 mg Oral TID     [START ON 5/25/2022] thiamine  100 mg Oral Daily       bumetanide 1 mg/hr (05/18/22 0921)     dexmedetomidine 0.2 mcg/kg/hr (05/18/22 0905)     DOBUTamine 5 mcg/kg/min (05/18/22 0800)     Current active medications and PTA medications reviewed, see medication list for details.            Physical Exam:   Vitals were reviewed  Patient Vitals for the past 24 hrs:   Temp Temp src Pulse Resp SpO2 Weight   05/18/22 1000 -- -- 95 -- 94 % --   05/18/22 0945 -- -- 95 -- 94 % --   05/18/22 0930 -- -- 101 -- 94 % --   05/18/22 0915 -- -- 98 -- 94 % --   05/18/22 0900 -- -- 100 -- 96 % --   05/18/22 0845 -- -- 100 -- 99 % --   05/18/22 0830 -- -- 103 -- 98 % --   05/18/22 0815 -- -- 112 -- 91 % --   05/18/22 0800 97.7  F (36.5  C) Axillary 102 -- 93 % --   05/18/22 0745 -- -- 102 -- 94 % --   05/18/22 0730 -- -- 101 -- 95 % --   05/18/22 0715 -- -- 102 -- 95 % --   05/18/22 0700 -- -- 104 -- 96 % --   05/18/22 0615 -- -- 107 -- 91 % --   05/18/22 0600 -- -- 110 -- 92 % --   05/18/22 0545 -- -- 108 -- 93 % --   05/18/22 0530 -- -- 106 -- 94 % --   05/18/22 0515 -- -- 106 -- -- --   05/18/22 0500 -- -- 106 -- (!) 80 % --   05/18/22 0445 -- -- 109 -- 94 % --   05/18/22 0430 -- -- 104 -- 95 % --   05/18/22 0415 -- -- 105 -- -- --   05/18/22 0400 98  F (36.7  C) Axillary 107 -- 95 % 84 kg (185 lb 3 oz)   05/18/22 0345 -- -- 105 -- 96 % --   05/18/22 0330 -- -- 107 -- 95 % --   05/18/22 0315 -- -- 103 -- 96 % --   05/18/22 0300 -- -- 102 -- 95 % --   05/18/22 0245 -- -- 111 -- 96 % --   05/18/22 0230 -- -- 112 -- 95 % --   05/18/22 0215 -- -- 103 -- 95 % --   05/18/22 0200 -- -- 104 -- 94 % --   05/18/22 0145 -- -- 105 -- 96 % --   05/18/22 0130 -- -- 104 -- 96 % --   05/18/22 0115 -- -- 104 -- 96 % --   05/18/22 0100 -- -- 105 -- 98 % --   05/18/22 0045 -- -- 105 -- (!) 89 % --   05/18/22 0030 -- -- 104 -- 95 % --    05/18/22 0015 -- -- 105 -- 95 % --   05/18/22 0000 -- -- 107 -- 93 % --   05/17/22 2345 -- -- 103 -- 95 % --   05/17/22 2330 -- -- 102 -- 95 % --   05/17/22 2315 -- -- 103 -- 97 % --   05/17/22 2300 -- -- 101 -- 97 % --   05/17/22 2245 -- -- 105 -- 100 % --   05/17/22 2230 -- -- 104 -- 92 % --   05/17/22 2215 -- -- 104 -- 99 % --   05/17/22 2200 -- -- 105 -- 95 % --   05/17/22 2145 -- -- 103 -- 94 % --   05/17/22 2130 -- -- 106 -- 96 % --   05/17/22 2115 -- -- 104 -- 95 % --   05/17/22 2100 -- -- 103 -- 96 % --   05/17/22 2045 -- -- 104 -- 96 % --   05/17/22 2030 -- -- 105 -- -- --   05/17/22 2015 -- -- 105 -- 94 % --   05/17/22 2000 98.5  F (36.9  C) Axillary 103 -- -- --   05/17/22 1945 -- -- 107 -- -- --   05/17/22 1930 -- -- 104 -- 96 % --   05/17/22 1915 -- -- 105 -- 95 % --   05/17/22 1900 -- -- 105 -- -- --   05/17/22 1845 -- -- 105 -- -- --   05/17/22 1830 -- -- 105 -- 95 % --   05/17/22 1815 -- -- 106 -- 95 % --   05/17/22 1800 -- -- 118 30 -- --   05/17/22 1745 -- -- 104 -- 97 % --   05/17/22 1730 -- -- 104 -- 98 % --   05/17/22 1715 -- -- 103 -- 96 % --   05/17/22 1700 -- -- 103 -- 97 % --   05/17/22 1645 -- -- 105 -- 91 % --   05/17/22 1630 -- -- 105 -- 97 % --   05/17/22 1615 -- -- 105 -- 95 % --   05/17/22 1600 98.5  F (36.9  C) Axillary 105 30 97 % --   05/17/22 1545 -- -- 105 -- 93 % --   05/17/22 1530 -- -- 104 -- 97 % --   05/17/22 1515 -- -- 104 -- 97 % --   05/17/22 1500 -- -- 105 -- 96 % --   05/17/22 1315 -- -- 108 -- 95 % --   05/17/22 1300 -- -- 108 -- 95 % --   05/17/22 1245 -- -- 109 -- 94 % --   05/17/22 1230 -- -- 107 -- 96 % --   05/17/22 1215 -- -- 107 -- 97 % --   05/17/22 1200 98  F (36.7  C) Axillary 107 30 94 % --   05/17/22 1145 -- -- 106 -- 94 % --   05/17/22 1130 -- -- 107 -- 96 % --   05/17/22 1115 -- -- 109 -- 94 % --   05/17/22 1100 -- -- 106 -- 96 % --   05/17/22 1045 -- -- 109 -- 95 % --       Temp:  [97.7  F (36.5  C)-98.5  F (36.9  C)] 97.7  F (36.5  C)  Pulse:  []  95  Resp:  [30] 30  MAP:  [68 mmHg-99 mmHg] 83 mmHg  Arterial Line BP: ()/(57-88) 107/68  SpO2:  [80 %-100 %] 94 %    Temperatures:  Current - Temp: 97.7  F (36.5  C); Max - Temp  Av.1  F (36.7  C)  Min: 97.7  F (36.5  C)  Max: 98.5  F (36.9  C)  Respiration range: Resp  Av  Min: 30  Max: 30  Pulse range: Pulse  Av.9  Min: 95  Max: 118  Blood pressure range: No data recorded.  ; No data recorded.    Pulse oximetry range: SpO2  Av.9 %  Min: 80 %  Max: 100 %    I/O last 3 completed shifts:  In: 1712.94 [I.V.:912.94; IV Piggyback:800]  Out: 2690 [Urine:2690]      Intake/Output Summary (Last 24 hours) at 2022 1034  Last data filed at 2022 1000  Gross per 24 hour   Intake 1205.93 ml   Output 4375 ml   Net -3169.07 ml       Intubated.   Sclera do not appear icteric  Lungs with clear ant BS  Cor RRR nl S1 S2 no M  LE no edema       Wt Readings from Last 4 Encounters:   22 84 kg (185 lb 3 oz)   20 73.9 kg (162 lb 14.4 oz)   20 68 kg (150 lb)   20 63.6 kg (140 lb 3.2 oz)          Data:          Lab Results   Component Value Date     2022     2022     2022     2020     2020     2020    Lab Results   Component Value Date    CHLORIDE 92 2022    CHLORIDE 92 2022    CHLORIDE 92 2022    CHLORIDE 108 2020    CHLORIDE 110 2020    CHLORIDE 110 2020    Lab Results   Component Value Date    BUN 75 2022    BUN 77 2022    BUN 75 2022    BUN 12 2020    BUN 12 2020    BUN 18 2020      Lab Results   Component Value Date    POTASSIUM 2.8 2022    POTASSIUM 3.3 2022    POTASSIUM 3.5 2022    POTASSIUM 4.1 2020    POTASSIUM 3.9 2020    POTASSIUM 4.1 2020    Lab Results   Component Value Date    CO2 17 2022    CO2 14 2022    CO2 14 2022    CO2 19 2020    CO2 19 2020    CO2 21  12/01/2020    Lab Results   Component Value Date    CR 3.19 05/18/2022    CR 3.41 05/18/2022    CR 3.66 05/18/2022    CR 0.62 12/03/2020    CR 0.68 12/02/2020    CR 0.76 12/01/2020        Recent Labs   Lab Test 05/18/22  0913 05/18/22  0613 05/18/22  0207   WBC 8.6 8.1 8.3   HGB 9.0* 8.9* 8.9*   HCT 26.3* 26.6* 26.8*   MCV 94 94 95   * 105* 102*     Recent Labs   Lab Test 05/17/22  0958 05/17/22  0809 05/17/22  0153 12/02/20  0538 12/01/20  0652 11/30/20  1709 11/28/20  1001 08/09/19  2255 08/09/19  1546   AST  --  181* 145* 85*   < > 110*  --    < > 1,015*   ALT  --  50 41 38   < > 45  --    < > 124*   GGT  --   --   --   --   --   --   --   --  503*   ALKPHOS  --  209* 172* 239*   < > 252*  --    < > 300*   BILITOTAL  --  6.0* 5.0*  5.0* 2.8*   < > 3.2*  --    < > 2.9*   SOWMYA 31  --   --   --   --  79* Canceled, Test credited   < >  --     < > = values in this interval not displayed.       Recent Labs   Lab Test 05/18/22  0404 12/05/20  0707 12/02/20  1739   MAG 1.4* 1.7 1.9     Recent Labs   Lab Test 05/18/22  0404 11/30/20  2137 03/31/20  0539   PHOS 8.0* 2.5 1.8*     Recent Labs   Lab Test 05/18/22  0913 05/18/22  0613 05/18/22  0207   MARKO 6.1* 6.1* 6.1*       Lab Results   Component Value Date    MARKO 6.1 (L) 05/18/2022     Lab Results   Component Value Date    WBC 8.6 05/18/2022    HGB 9.0 (L) 05/18/2022    HCT 26.3 (L) 05/18/2022    MCV 94 05/18/2022     (L) 05/18/2022     Lab Results   Component Value Date     (L) 05/18/2022    POTASSIUM 2.8 (L) 05/18/2022    CHLORIDE 92 (L) 05/18/2022    CO2 17 (L) 05/18/2022     (H) 05/18/2022     Lab Results   Component Value Date    BUN 75 (H) 05/18/2022    CR 3.19 (H) 05/18/2022     Lab Results   Component Value Date    MAG 1.4 (L) 05/18/2022     Lab Results   Component Value Date    PHOS 8.0 (H) 05/18/2022       Creatinine   Date Value Ref Range Status   05/18/2022 3.19 (H) 0.52 - 1.04 mg/dL Final   05/18/2022 3.41 (H) 0.52 - 1.04 mg/dL  Final   05/18/2022 3.66 (H) 0.52 - 1.04 mg/dL Final   05/17/2022 3.56 (H) 0.52 - 1.04 mg/dL Final   05/17/2022 3.73 (H) 0.52 - 1.04 mg/dL Final   05/17/2022 3.71 (H) 0.52 - 1.04 mg/dL Final   12/03/2020 0.62 0.52 - 1.04 mg/dL Final   12/02/2020 0.68 0.52 - 1.04 mg/dL Final   12/01/2020 0.76 0.52 - 1.04 mg/dL Final   11/30/2020 0.90 0.52 - 1.04 mg/dL Final   11/30/2020 0.90 0.52 - 1.04 mg/dL Final   11/28/2020 0.70 0.52 - 1.04 mg/dL Final       Attestation:  I have reviewed today's vital signs, notes, medications, labs and imaging.     Issac Maciel MD

## 2022-07-08 NOTE — PROGRESS NOTES
[facility], [place]    Progress Note - Sundar 4 Service        Date of Admission:  8/25/2019    Changes today   - Chem dep consult today   - Continue PT. Would appreciate a short visit today if able.   - CIWA scores overnight primarily from tachycardia; suspect she is volume depleted. Encourage PO intake as  Able.   - Hgb 7.9 -> 7.0 however no signs of bleeding Patient will call when needed monitor in AM     Dispo: Likely discharge tomorrow pending further PT recs. And stable Hgb as well as no fever     Assessment & Plan   Yenifer Maher is a 39 year old female admitted on 8/25/2019. She has a past medical history of lupus anticoagulant, alcohol abuse, calciphylaxis, alcoholic liver cirrhosis with history of alcoholic hepatitis in August 2018.  She presents for hematemesis initially to the MICU, now transferred to the medicine service.      She underwent upper endoscopy as of 8/26/2019 which showed recently bleeding, large greater than 5 mm esophageal varices, small hiatal hernia and portal hypertensive gastropathy.    Fever   1x temp overnight 100.7 without any localizable symptoms. Concern for sepsis present in such host however no overt signs or symptoms at this time.   - Blood cultures.   - If urinary sx will consider unit(s)/a - denies at this time   - No evidence for SBP   - Monitor, acetaminophen OK with 2gm limit     Hematemesis  acute blood loss anemia due to stigmata of liver disease, EV and recent banding   Initially in the MICU for hematemesis with presenting Hgb at 5.3 (received a total of 2u pRBC) , given octreotide GTT as well as pantoprazole drip.  Underwent upper endoscopy showing recently bleeding large esophageal varices, ulcer secondary to banding.   -Twice daily pantoprazole for 2 weeks from 8/26/2019 to 9/2/2019.  Then once daily pantoprazole.  -Repeat upper endoscopy around 9/26/2019.    -CTX 1gm x7d for GI bleed in cirrhotic - switch t cipro to compelte 7 d  course on discharge (to end on 9/1/19)    Decompensated (presumably alcoholic) liver cirrhosis complicated by esophageal varices, ascites.   Continues active alcohol use with last drink over the weekend.  Follows with    Volume: Appears somewhat hypervolemic. On diuretics at Lasix 20mg every day and Spironolactone 50mg Qday.    Infection: No hx of infections or SBP. Not on PPX as OP.   Bleeding: As above.    Encephalopathy: no hx of encephalopathy. Oriented x4 at this time   Screening/transplant: Not tx candidate. ongoing etoh use.   ---  - On PTA meds of lasix and spironolactone   - Appreciate Hepatology recs      Diet: Snacks/Supplements Adult: Other; protein supplement, patient preference okay; Between Meals  Advance Diet as Tolerated: Regular Diet Adult; 2 gm NA Diet    Fluids: None, po intake   Lines: PIV   DVT Prophylaxis: Pneumatic Compression Devices and Ambulate every shift  Chavez Catheter: not present  Code Status: Full Code      Disposition Plan   Expected discharge: Tomorrow, recommended to prior living arrangement once hgb stable, met with chem dep .  Entered: Nica Bartlett MD 08/28/2019, 12:36 PM       The patient's care was discussed with the Attending Physician, Dr. Pressley and Patient.    Nica Bartlett MD  [address], [place]  Pager: [phone]  Please see sticky note for cross cover information  ______________________________________________________________________    Interval History   NAEO   Patient seen this AM and minimally talkative. States she still has some abd discomfort. Does not have any other significant complaints. Discussed briefly dischage planning and she was reluctant to talk a bout it much this morning.     Data reviewed today: I reviewed all medications, new labs and imaging results over the last 24 hours.      Physical Exam   Vital Signs: Temp: 99.2  F (37.3  C) Temp src: Oral BP: 109/62 Pulse: 110 Heart Rate: 111  Resp: 16 SpO2: 97 % O2 Device: None (Room air)    Weight: 144 lbs 9.6 oz  General Appearance: Ill appearing, resting in bed   Respiratory: clear on anterior examination   Cardiovascular: rrr.   GI: soft, no tender, prior para site intact.        Internal Medicine Staff Addendum  Date of Service: 8/28/2019  I have seen and examined Ms Maher, reviewed the data and discussed the plan of care with the care team on rounds.  I agree with the above documentation with the additions/changes to the ROS, HPI, Exam or data (including my edits in italics):    I discussed pt's care with bedside RN, case management/social work today.  I personally reviewed, labs, medications and past 24 hr notes.  Assessment/Plan/Diagnoses: plan/dx as above, which contains my edits and reflects our joint medical decision-making.     Manohar Pressley MD PhD  Internal Medicine Hospitalist & Staff Physician   of Internal Medicine   [facility]  Pager: [phone]   Bilobed Flap Text: The defect edges were debeveled with a #15 scalpel blade.  Given the location of the defect and the proximity to free margins a bilobe flap was deemed most appropriate.  Using a sterile surgical marker, an appropriate bilobe flap drawn around the defect.    The area thus outlined was incised deep to adipose tissue with a #15 scalpel blade.  The skin margins were undermined to an appropriate distance in all directions utilizing iris scissors.

## 2023-02-20 NOTE — PROCEDURES
Isoflex Air Pump attached to gel mattress this morning to provide increased pressure reduction. Sedation: per ICU team  Nurse: Titi Navarro RN  Physician: Sowmya Ibanez MD    Findings:  Esophagus: large varices, grade 3, with red carmela signs and nipple signs. No active bleeding but slight oozing initially. 7 bands placed. No bleeding at end.  Stomach: no varices visualized in cardia but view was limited by stomach filled with blood and clot  Duodenum: Normal    Diagnosis: variceal hemorrhage.    Rec  Continue PPI, octreotide, ceftriaxone  Once able to eat, start clear liquids  Repeat EGD 4-6 weeks  See Provation note.   Aklief counseling:  Patient advised to apply a pea-sized amount only at bedtime and wait 30 minutes after washing their face before applying.  If too drying, patient may add a non-comedogenic moisturizer.  The most commonly reported side effects including irritation, redness, scaling, dryness, stinging, burning, itching, and increased risk of sunburn.  The patient verbalized understanding of the proper use and possible adverse effects of retinoids.  All of the patient's questions and concerns were addressed.

## 2023-09-21 NOTE — PROVIDER NOTIFICATION
"Paged to [employer]:  \"5B, , A.W                                                                                          Pt temp 100.8, no prn Tylenol avail. Able to order?                                       BRAYAN Malin, [id number]\"                                                                                                     " [phone]

## 2023-11-02 NOTE — PROGRESS NOTES
Patient has clinic visit within 24-48 hours of Discharge so no post DC follow up call is needed    [facility]  Met with pt to discuss plans for HC.  Pt to be discharged home 8/29 and has agreed to have [facility] follow with services of SN, PT and OT. Patient care support center processing referral.  Pt verbalized understanding that initial visit is scheduled for   8/30 or 8/31.   Pt has 24 hour phone number for [facility] for any questions or concerns.     Thank you  Ramandeep Echeverria RN, BSN  [facility] Liaison  [facility]  [phone]   Melolabial Interpolation Flap Text: A decision was made to reconstruct the defect utilizing an interpolation axial flap and a staged reconstruction.  A telfa template was made of the defect.  This telfa template was then used to outline the melolabial interpolation flap.  The donor area for the pedicle flap was then injected with anesthesia.  The flap was excised through the skin and subcutaneous tissue down to the layer of the underlying musculature.  The pedicle flap was carefully excised within this deep plane to maintain its blood supply.  The edges of the donor site were undermined.   The donor site was closed in a primary fashion.  The pedicle was then rotated into position and sutured.  Once the tube was sutured into place, adequate blood supply was confirmed with blanching and refill.  The pedicle was then wrapped with xeroform gauze and dressed appropriately with a telfa and gauze bandage to ensure continued blood supply and protect the attached pedicle.

## (undated) DEVICE — MANIFOLD KIT ANGIO AUTOMATED 014613

## (undated) DEVICE — Device

## (undated) DEVICE — INTRO CATH 12CM 8.5FR FST-CATH

## (undated) DEVICE — KIT HAND CONTROL ANGIOTOUCH ACIST 65CM AT-P65

## (undated) DEVICE — TOTE ANGIO CORP PC15AT SAN32CC83O

## (undated) DEVICE — INTRODUCER SHEATH FAST-CATH 8FRX12CM 406114

## (undated) DEVICE — DEFIB PRO-PADZ LVP LQD GEL ADULT 8900-2105-01

## (undated) DEVICE — INTRODUCER CATH VASC 5FRX10CM  MPIS-501-NT-U-SST

## (undated) RX ORDER — LIDOCAINE HYDROCHLORIDE 10 MG/ML
INJECTION, SOLUTION EPIDURAL; INFILTRATION; INTRACAUDAL; PERINEURAL
Status: DISPENSED
Start: 2022-01-01

## (undated) RX ORDER — SIMETHICONE 40MG/0.6ML
SUSPENSION, DROPS(FINAL DOSAGE FORM)(ML) ORAL
Status: DISPENSED
Start: 2020-12-02

## (undated) RX ORDER — FENTANYL CITRATE 50 UG/ML
INJECTION, SOLUTION INTRAMUSCULAR; INTRAVENOUS
Status: DISPENSED
Start: 2020-12-02

## (undated) RX ORDER — HEPARIN SODIUM 1000 [USP'U]/ML
INJECTION, SOLUTION INTRAVENOUS; SUBCUTANEOUS
Status: DISPENSED
Start: 2022-01-01

## (undated) RX ORDER — SIMETHICONE 40MG/0.6ML
SUSPENSION, DROPS(FINAL DOSAGE FORM)(ML) ORAL
Status: DISPENSED
Start: 2020-03-30

## (undated) RX ORDER — HEPARIN SODIUM 200 [USP'U]/100ML
INJECTION, SOLUTION INTRAVENOUS
Status: DISPENSED
Start: 2022-01-01

## (undated) RX ORDER — FENTANYL CITRATE 50 UG/ML
INJECTION, SOLUTION INTRAMUSCULAR; INTRAVENOUS
Status: DISPENSED
Start: 2020-03-30